# Patient Record
Sex: FEMALE | Race: WHITE | NOT HISPANIC OR LATINO | Employment: FULL TIME | URBAN - METROPOLITAN AREA
[De-identification: names, ages, dates, MRNs, and addresses within clinical notes are randomized per-mention and may not be internally consistent; named-entity substitution may affect disease eponyms.]

---

## 2017-04-06 ENCOUNTER — ALLSCRIPTS OFFICE VISIT (OUTPATIENT)
Dept: OTHER | Facility: OTHER | Age: 57
End: 2017-04-06

## 2017-04-06 DIAGNOSIS — Z12.31 ENCOUNTER FOR SCREENING MAMMOGRAM FOR MALIGNANT NEOPLASM OF BREAST: ICD-10-CM

## 2017-04-06 DIAGNOSIS — G89.4 CHRONIC PAIN SYNDROME: ICD-10-CM

## 2017-04-06 DIAGNOSIS — F11.90 UNCOMPLICATED OPIOID USE: ICD-10-CM

## 2017-04-24 ENCOUNTER — GENERIC CONVERSION - ENCOUNTER (OUTPATIENT)
Dept: OTHER | Facility: OTHER | Age: 57
End: 2017-04-24

## 2017-04-25 ENCOUNTER — ALLSCRIPTS OFFICE VISIT (OUTPATIENT)
Dept: OTHER | Facility: OTHER | Age: 57
End: 2017-04-25

## 2017-05-01 ENCOUNTER — ALLSCRIPTS OFFICE VISIT (OUTPATIENT)
Dept: OTHER | Facility: OTHER | Age: 57
End: 2017-05-01

## 2017-05-04 ENCOUNTER — TRANSCRIBE ORDERS (OUTPATIENT)
Dept: ADMINISTRATIVE | Facility: HOSPITAL | Age: 57
End: 2017-05-04

## 2017-05-04 DIAGNOSIS — E13.42 DIABETIC POLYNEUROPATHY ASSOCIATED WITH OTHER SPECIFIED DIABETES MELLITUS (HCC): ICD-10-CM

## 2017-05-04 DIAGNOSIS — F11.90 OPIATE USE: Primary | ICD-10-CM

## 2017-05-15 ENCOUNTER — GENERIC CONVERSION - ENCOUNTER (OUTPATIENT)
Dept: OTHER | Facility: OTHER | Age: 57
End: 2017-05-15

## 2017-06-07 ENCOUNTER — GENERIC CONVERSION - ENCOUNTER (OUTPATIENT)
Dept: OTHER | Facility: OTHER | Age: 57
End: 2017-06-07

## 2017-06-12 ENCOUNTER — HOSPITAL ENCOUNTER (OUTPATIENT)
Dept: NEUROLOGY | Facility: HOSPITAL | Age: 57
Discharge: HOME | End: 2017-06-12
Attending: ANESTHESIOLOGY
Payer: COMMERCIAL

## 2017-06-12 ENCOUNTER — HOSPITAL ENCOUNTER (OUTPATIENT)
Dept: RADIOLOGY | Facility: HOSPITAL | Age: 57
Discharge: HOME | End: 2017-06-12
Attending: ANESTHESIOLOGY
Payer: COMMERCIAL

## 2017-06-12 ENCOUNTER — TRANSCRIBE ORDERS (OUTPATIENT)
Dept: ADMINISTRATIVE | Facility: HOSPITAL | Age: 57
End: 2017-06-12

## 2017-06-12 DIAGNOSIS — F11.90 OPIATE USE: ICD-10-CM

## 2017-06-12 DIAGNOSIS — E13.42 DIABETIC POLYNEUROPATHY ASSOCIATED WITH OTHER SPECIFIED DIABETES MELLITUS (HCC): ICD-10-CM

## 2017-06-12 DIAGNOSIS — M79.604 PAIN OF RIGHT LOWER EXTREMITY: ICD-10-CM

## 2017-06-12 DIAGNOSIS — F11.90 UNCOMPLICATED OPIOID USE: ICD-10-CM

## 2017-06-12 PROCEDURE — 72110 X-RAY EXAM L-2 SPINE 4/>VWS: CPT

## 2017-06-12 PROCEDURE — 72072 X-RAY EXAM THORAC SPINE 3VWS: CPT

## 2017-06-12 PROCEDURE — 95911 NRV CNDJ TEST 9-10 STUDIES: CPT

## 2017-06-12 PROCEDURE — 95886 MUSC TEST DONE W/N TEST COMP: CPT

## 2017-06-12 PROCEDURE — 72050 X-RAY EXAM NECK SPINE 4/5VWS: CPT

## 2017-06-13 ENCOUNTER — GENERIC CONVERSION - ENCOUNTER (OUTPATIENT)
Dept: OTHER | Facility: OTHER | Age: 57
End: 2017-06-13

## 2017-06-19 ENCOUNTER — GENERIC CONVERSION - ENCOUNTER (OUTPATIENT)
Dept: OTHER | Facility: OTHER | Age: 57
End: 2017-06-19

## 2017-06-22 ENCOUNTER — ALLSCRIPTS OFFICE VISIT (OUTPATIENT)
Dept: OTHER | Facility: OTHER | Age: 57
End: 2017-06-22

## 2017-07-17 ENCOUNTER — GENERIC CONVERSION - ENCOUNTER (OUTPATIENT)
Dept: OTHER | Facility: OTHER | Age: 57
End: 2017-07-17

## 2017-07-19 ENCOUNTER — HOSPITAL ENCOUNTER (OUTPATIENT)
Dept: BONE DENSITY | Facility: IMAGING CENTER | Age: 57
Discharge: HOME | End: 2017-07-19
Payer: COMMERCIAL

## 2017-07-19 DIAGNOSIS — N63.0 BREAST LUMP: ICD-10-CM

## 2017-07-19 DIAGNOSIS — Z12.31 ENCOUNTER FOR SCREENING MAMMOGRAM FOR MALIGNANT NEOPLASM OF BREAST: ICD-10-CM

## 2017-07-20 ENCOUNTER — HOSPITAL ENCOUNTER (OUTPATIENT)
Dept: ULTRASOUND IMAGING | Facility: CLINIC | Age: 57
Discharge: HOME | End: 2017-07-20
Payer: COMMERCIAL

## 2017-07-20 ENCOUNTER — HOSPITAL ENCOUNTER (OUTPATIENT)
Dept: MAMMOGRAPHY | Facility: CLINIC | Age: 57
Discharge: HOME | End: 2017-07-20
Payer: COMMERCIAL

## 2017-07-20 DIAGNOSIS — N63.0 BREAST LUMP: ICD-10-CM

## 2017-07-20 PROCEDURE — G0279 TOMOSYNTHESIS, MAMMO: HCPCS

## 2017-07-20 PROCEDURE — 76642 ULTRASOUND BREAST LIMITED: CPT

## 2017-07-20 PROCEDURE — G0204 DX MAMMO INCL CAD BI: HCPCS

## 2017-07-24 ENCOUNTER — LAB CONVERSION - ENCOUNTER (OUTPATIENT)
Dept: OTHER | Facility: OTHER | Age: 57
End: 2017-07-24

## 2017-07-24 ENCOUNTER — GENERIC CONVERSION - ENCOUNTER (OUTPATIENT)
Dept: OTHER | Facility: OTHER | Age: 57
End: 2017-07-24

## 2017-07-24 LAB
A/G RATIO (HISTORICAL): 1.5 (CALC) (ref 1–2.5)
ALBUMIN SERPL BCP-MCNC: 4 G/DL (ref 3.6–5.1)
ALP SERPL-CCNC: 87 U/L (ref 33–130)
ALT SERPL W P-5'-P-CCNC: 25 U/L (ref 6–29)
AST SERPL W P-5'-P-CCNC: 20 U/L (ref 10–35)
BILIRUB SERPL-MCNC: 0.5 MG/DL (ref 0.2–1.2)
BUN SERPL-MCNC: 18 MG/DL (ref 7–25)
BUN/CREA RATIO (HISTORICAL): ABNORMAL (CALC) (ref 6–22)
CALCIUM SERPL-MCNC: 9.7 MG/DL (ref 8.6–10.4)
CHLORIDE SERPL-SCNC: 104 MMOL/L (ref 98–110)
CHOLEST SERPL-MCNC: 156 MG/DL (ref 125–200)
CHOLEST/HDLC SERPL: 3.4 (CALC)
CO2 SERPL-SCNC: 27 MMOL/L (ref 20–31)
CREAT SERPL-MCNC: 0.69 MG/DL (ref 0.5–1.05)
EGFR AFRICAN AMERICAN (HISTORICAL): 112 ML/MIN/1.73M2
EGFR-AMERICAN CALC (HISTORICAL): 97 ML/MIN/1.73M2
GAMMA GLOBULIN (HISTORICAL): 2.6 G/DL (CALC) (ref 1.9–3.7)
GLUCOSE (HISTORICAL): 114 MG/DL (ref 65–99)
HBA1C MFR BLD HPLC: 7.4 % OF TOTAL HGB
HDLC SERPL-MCNC: 46 MG/DL
LDL CHOLESTEROL (HISTORICAL): 86 MG/DL (CALC)
NON-HDL-CHOL (CHOL-HDL) (HISTORICAL): 110 MG/DL (CALC)
POTASSIUM SERPL-SCNC: 4.3 MMOL/L (ref 3.5–5.3)
SODIUM SERPL-SCNC: 142 MMOL/L (ref 135–146)
TOTAL PROTEIN (HISTORICAL): 6.6 G/DL (ref 6.1–8.1)
TRIGL SERPL-MCNC: 121 MG/DL

## 2017-08-01 ENCOUNTER — ALLSCRIPTS OFFICE VISIT (OUTPATIENT)
Dept: OTHER | Facility: OTHER | Age: 57
End: 2017-08-01

## 2017-08-17 ENCOUNTER — ALLSCRIPTS OFFICE VISIT (OUTPATIENT)
Dept: OTHER | Facility: OTHER | Age: 57
End: 2017-08-17

## 2017-08-24 ENCOUNTER — GENERIC CONVERSION - ENCOUNTER (OUTPATIENT)
Dept: OTHER | Facility: OTHER | Age: 57
End: 2017-08-24

## 2017-08-25 ENCOUNTER — GENERIC CONVERSION - ENCOUNTER (OUTPATIENT)
Dept: OTHER | Facility: OTHER | Age: 57
End: 2017-08-25

## 2017-09-23 ENCOUNTER — HOSPITAL ENCOUNTER (EMERGENCY)
Facility: HOSPITAL | Age: 57
Discharge: HOME | End: 2017-09-23
Admitting: EMERGENCY MEDICINE
Payer: COMMERCIAL

## 2017-09-23 ENCOUNTER — APPOINTMENT (EMERGENCY)
Dept: CT IMAGING | Facility: HOSPITAL | Age: 57
End: 2017-09-23
Payer: COMMERCIAL

## 2017-09-23 ENCOUNTER — OFFICE VISIT (OUTPATIENT)
Dept: URGENT CARE | Facility: CLINIC | Age: 57
End: 2017-09-23
Payer: COMMERCIAL

## 2017-09-23 VITALS
SYSTOLIC BLOOD PRESSURE: 147 MMHG | RESPIRATION RATE: 20 BRPM | BODY MASS INDEX: 31.58 KG/M2 | DIASTOLIC BLOOD PRESSURE: 84 MMHG | HEART RATE: 102 BPM | HEIGHT: 64 IN | TEMPERATURE: 98.1 F | OXYGEN SATURATION: 96 % | WEIGHT: 185 LBS

## 2017-09-23 DIAGNOSIS — N10 ACUTE PYELITIS: Primary | ICD-10-CM

## 2017-09-23 LAB
ACETONE SERPL-MCNC: NEGATIVE MG/DL
ALBUMIN SERPL BCP-MCNC: 3.5 G/DL (ref 3.5–5)
ALP SERPL-CCNC: 95 U/L (ref 46–116)
ALT SERPL W P-5'-P-CCNC: 30 U/L (ref 12–78)
ANION GAP SERPL CALCULATED.3IONS-SCNC: 9 MMOL/L (ref 4–13)
AST SERPL W P-5'-P-CCNC: 17 U/L (ref 5–45)
BACTERIA UR QL AUTO: ABNORMAL /HPF
BASOPHILS # BLD AUTO: 0.02 THOUSANDS/ΜL (ref 0–0.1)
BASOPHILS NFR BLD AUTO: 0 % (ref 0–1)
BILIRUB SERPL-MCNC: 0.5 MG/DL (ref 0.2–1)
BILIRUB UR QL STRIP: NEGATIVE
BUN SERPL-MCNC: 16 MG/DL (ref 5–25)
CALCIUM SERPL-MCNC: 9.6 MG/DL (ref 8.3–10.1)
CHLORIDE SERPL-SCNC: 103 MMOL/L (ref 100–108)
CLARITY UR: ABNORMAL
CO2 SERPL-SCNC: 30 MMOL/L (ref 21–32)
COLOR UR: YELLOW
CREAT SERPL-MCNC: 0.76 MG/DL (ref 0.6–1.3)
EOSINOPHIL # BLD AUTO: 0.14 THOUSAND/ΜL (ref 0–0.61)
EOSINOPHIL NFR BLD AUTO: 1 % (ref 0–6)
ERYTHROCYTE [DISTWIDTH] IN BLOOD BY AUTOMATED COUNT: 14.5 % (ref 11.6–15.1)
GFR SERPL CREATININE-BSD FRML MDRD: 87 ML/MIN/1.73SQ M
GLUCOSE SERPL-MCNC: 155 MG/DL (ref 65–140)
GLUCOSE UR STRIP-MCNC: ABNORMAL MG/DL
HCT VFR BLD AUTO: 37 % (ref 34.8–46.1)
HGB BLD-MCNC: 11.8 G/DL (ref 11.5–15.4)
HGB UR QL STRIP.AUTO: ABNORMAL
KETONES UR STRIP-MCNC: NEGATIVE MG/DL
LEUKOCYTE ESTERASE UR QL STRIP: ABNORMAL
LIPASE SERPL-CCNC: 113 U/L (ref 73–393)
LYMPHOCYTES # BLD AUTO: 1.85 THOUSANDS/ΜL (ref 0.6–4.47)
LYMPHOCYTES NFR BLD AUTO: 17 % (ref 14–44)
MCH RBC QN AUTO: 26.3 PG (ref 26.8–34.3)
MCHC RBC AUTO-ENTMCNC: 31.9 G/DL (ref 31.4–37.4)
MCV RBC AUTO: 83 FL (ref 82–98)
MONOCYTES # BLD AUTO: 0.74 THOUSAND/ΜL (ref 0.17–1.22)
MONOCYTES NFR BLD AUTO: 7 % (ref 4–12)
NEUTROPHILS # BLD AUTO: 7.99 THOUSANDS/ΜL (ref 1.85–7.62)
NEUTS SEG NFR BLD AUTO: 75 % (ref 43–75)
NITRITE UR QL STRIP: POSITIVE
NON-SQ EPI CELLS URNS QL MICRO: ABNORMAL /HPF
PH UR STRIP.AUTO: 6 [PH] (ref 4.5–8)
PLATELET # BLD AUTO: 267 THOUSANDS/UL (ref 149–390)
PMV BLD AUTO: 11.3 FL (ref 8.9–12.7)
POTASSIUM SERPL-SCNC: 4.3 MMOL/L (ref 3.5–5.3)
PROT SERPL-MCNC: 7.7 G/DL (ref 6.4–8.2)
PROT UR STRIP-MCNC: ABNORMAL MG/DL
RBC # BLD AUTO: 4.48 MILLION/UL (ref 3.81–5.12)
RBC #/AREA URNS AUTO: ABNORMAL /HPF
SODIUM SERPL-SCNC: 142 MMOL/L (ref 136–145)
SP GR UR STRIP.AUTO: 1.01 (ref 1–1.03)
UROBILINOGEN UR QL STRIP.AUTO: 0.2 E.U./DL
WBC # BLD AUTO: 10.74 THOUSAND/UL (ref 4.31–10.16)
WBC #/AREA URNS AUTO: ABNORMAL /HPF

## 2017-09-23 PROCEDURE — G0382 LEV 3 HOSP TYPE B ED VISIT: HCPCS

## 2017-09-23 PROCEDURE — 74177 CT ABD & PELVIS W/CONTRAST: CPT

## 2017-09-23 PROCEDURE — 83690 ASSAY OF LIPASE: CPT | Performed by: PHYSICIAN ASSISTANT

## 2017-09-23 PROCEDURE — 82009 KETONE BODYS QUAL: CPT | Performed by: PHYSICIAN ASSISTANT

## 2017-09-23 PROCEDURE — 87077 CULTURE AEROBIC IDENTIFY: CPT | Performed by: PHYSICIAN ASSISTANT

## 2017-09-23 PROCEDURE — 81001 URINALYSIS AUTO W/SCOPE: CPT | Performed by: PHYSICIAN ASSISTANT

## 2017-09-23 PROCEDURE — 87086 URINE CULTURE/COLONY COUNT: CPT | Performed by: PHYSICIAN ASSISTANT

## 2017-09-23 PROCEDURE — 36415 COLL VENOUS BLD VENIPUNCTURE: CPT | Performed by: PHYSICIAN ASSISTANT

## 2017-09-23 PROCEDURE — 99284 EMERGENCY DEPT VISIT MOD MDM: CPT

## 2017-09-23 PROCEDURE — 85025 COMPLETE CBC W/AUTO DIFF WBC: CPT | Performed by: PHYSICIAN ASSISTANT

## 2017-09-23 PROCEDURE — 80053 COMPREHEN METABOLIC PANEL: CPT | Performed by: PHYSICIAN ASSISTANT

## 2017-09-23 PROCEDURE — 96374 THER/PROPH/DIAG INJ IV PUSH: CPT

## 2017-09-23 PROCEDURE — 87186 SC STD MICRODIL/AGAR DIL: CPT | Performed by: PHYSICIAN ASSISTANT

## 2017-09-23 PROCEDURE — 96361 HYDRATE IV INFUSION ADD-ON: CPT

## 2017-09-23 PROCEDURE — 81002 URINALYSIS NONAUTO W/O SCOPE: CPT

## 2017-09-23 RX ORDER — METFORMIN HYDROCHLORIDE 1000 MG/1
1000 TABLET, FILM COATED, EXTENDED RELEASE ORAL
COMMUNITY
End: 2017-09-23 | Stop reason: CLARIF

## 2017-09-23 RX ORDER — PREGABALIN 100 MG/1
100 CAPSULE ORAL 2 TIMES DAILY
COMMUNITY
End: 2019-04-01 | Stop reason: SDUPTHER

## 2017-09-23 RX ORDER — ONDANSETRON 2 MG/ML
4 INJECTION INTRAMUSCULAR; INTRAVENOUS ONCE
Status: COMPLETED | OUTPATIENT
Start: 2017-09-23 | End: 2017-09-23

## 2017-09-23 RX ORDER — CIPROFLOXACIN 500 MG/1
500 TABLET, FILM COATED ORAL ONCE
Status: DISCONTINUED | OUTPATIENT
Start: 2017-09-23 | End: 2017-09-23

## 2017-09-23 RX ORDER — ALBUTEROL SULFATE 90 UG/1
2 AEROSOL, METERED RESPIRATORY (INHALATION) EVERY 6 HOURS PRN
COMMUNITY
End: 2018-06-18 | Stop reason: SDUPTHER

## 2017-09-23 RX ORDER — CYCLOBENZAPRINE HCL 5 MG
10 TABLET ORAL DAILY
COMMUNITY
End: 2017-12-20 | Stop reason: ALTCHOICE

## 2017-09-23 RX ORDER — ATORVASTATIN CALCIUM 40 MG/1
40 TABLET, FILM COATED ORAL DAILY
COMMUNITY
End: 2018-03-19 | Stop reason: SDUPTHER

## 2017-09-23 RX ORDER — AMLODIPINE BESYLATE AND BENAZEPRIL HYDROCHLORIDE 5; 20 MG/1; MG/1
1 CAPSULE ORAL DAILY
COMMUNITY
End: 2018-03-14 | Stop reason: SDUPTHER

## 2017-09-23 RX ORDER — OMEPRAZOLE 40 MG/1
40 CAPSULE, DELAYED RELEASE ORAL DAILY
COMMUNITY
End: 2017-10-20 | Stop reason: ALTCHOICE

## 2017-09-23 RX ORDER — BUDESONIDE AND FORMOTEROL FUMARATE DIHYDRATE 80; 4.5 UG/1; UG/1
2 AEROSOL RESPIRATORY (INHALATION) 2 TIMES DAILY
COMMUNITY
End: 2018-07-23 | Stop reason: ALTCHOICE

## 2017-09-23 RX ORDER — ONDANSETRON 4 MG/1
4 TABLET, FILM COATED ORAL EVERY 8 HOURS PRN
Qty: 9 TABLET | Refills: 0 | Status: SHIPPED | OUTPATIENT
Start: 2017-09-23 | End: 2017-10-20 | Stop reason: ALTCHOICE

## 2017-09-23 RX ORDER — SULFAMETHOXAZOLE AND TRIMETHOPRIM 800; 160 MG/1; MG/1
1 TABLET ORAL ONCE
Status: DISCONTINUED | OUTPATIENT
Start: 2017-09-23 | End: 2017-09-23

## 2017-09-23 RX ORDER — CEPHALEXIN 500 MG/1
500 CAPSULE ORAL 4 TIMES DAILY
Qty: 40 CAPSULE | Refills: 0 | Status: SHIPPED | OUTPATIENT
Start: 2017-09-23 | End: 2017-10-03

## 2017-09-23 RX ORDER — CEPHALEXIN 250 MG/1
500 CAPSULE ORAL ONCE
Status: COMPLETED | OUTPATIENT
Start: 2017-09-23 | End: 2017-09-23

## 2017-09-23 RX ORDER — TRAMADOL HYDROCHLORIDE 50 MG/1
50 TABLET ORAL 2 TIMES DAILY
COMMUNITY
End: 2019-07-16 | Stop reason: CLARIF

## 2017-09-23 RX ORDER — ZOLPIDEM TARTRATE 5 MG/1
5 TABLET ORAL
COMMUNITY
End: 2018-03-20 | Stop reason: SDUPTHER

## 2017-09-23 RX ADMIN — IOHEXOL 100 ML: 350 INJECTION, SOLUTION INTRAVENOUS at 14:13

## 2017-09-23 RX ADMIN — CEPHALEXIN 500 MG: 250 CAPSULE ORAL at 15:19

## 2017-09-23 RX ADMIN — SODIUM CHLORIDE 500 ML: 0.9 INJECTION, SOLUTION INTRAVENOUS at 14:36

## 2017-09-23 RX ADMIN — ONDANSETRON 4 MG: 2 INJECTION INTRAMUSCULAR; INTRAVENOUS at 12:53

## 2017-09-23 RX ADMIN — SODIUM CHLORIDE 1000 ML: 0.9 INJECTION, SOLUTION INTRAVENOUS at 12:49

## 2017-09-25 LAB — BACTERIA UR CULT: ABNORMAL

## 2017-10-20 ENCOUNTER — HOSPITAL ENCOUNTER (EMERGENCY)
Facility: HOSPITAL | Age: 57
Discharge: HOME | End: 2017-10-20
Attending: EMERGENCY MEDICINE
Payer: COMMERCIAL

## 2017-10-20 VITALS
TEMPERATURE: 98.1 F | SYSTOLIC BLOOD PRESSURE: 184 MMHG | HEIGHT: 64 IN | RESPIRATION RATE: 18 BRPM | BODY MASS INDEX: 32.27 KG/M2 | OXYGEN SATURATION: 96 % | DIASTOLIC BLOOD PRESSURE: 82 MMHG | HEART RATE: 79 BPM | WEIGHT: 189 LBS

## 2017-10-20 DIAGNOSIS — S39.012A STRAIN OF LUMBAR REGION, INITIAL ENCOUNTER: ICD-10-CM
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
## 2017-10-20 PROCEDURE — 99284 EMERGENCY DEPT VISIT MOD MDM: CPT
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
## 2017-10-20 NOTE — ED NOTES
Pt ambulated herself to and from the bathroom without this nurse's knowledge despite this nurse providing pt with call bell and requesting for pt to use call bell and be placed on bedpan in she needed to use the bathroom until evaluated by MD  Pt states "I'm not using a bed pan, I am a grown women", Dr Suarez Monday made aware        Idris Capps, ROXANNE  10/20/17 9362
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
## 2017-10-23 ENCOUNTER — GENERIC CONVERSION - ENCOUNTER (OUTPATIENT)
Dept: OTHER | Facility: OTHER | Age: 57
End: 2017-10-23

## 2017-11-07 ENCOUNTER — GENERIC CONVERSION - ENCOUNTER (OUTPATIENT)
Dept: OTHER | Facility: OTHER | Age: 57
End: 2017-11-07

## 2017-11-07 ENCOUNTER — LAB CONVERSION - ENCOUNTER (OUTPATIENT)
Dept: OTHER | Facility: OTHER | Age: 57
End: 2017-11-07

## 2017-11-07 LAB
BUN SERPL-MCNC: 13 MG/DL (ref 7–25)
BUN/CREA RATIO (HISTORICAL): ABNORMAL (CALC) (ref 6–22)
CALCIUM SERPL-MCNC: 9.6 MG/DL (ref 8.6–10.4)
CHLORIDE SERPL-SCNC: 105 MMOL/L (ref 98–110)
CO2 SERPL-SCNC: 30 MMOL/L (ref 20–31)
CREAT SERPL-MCNC: 0.63 MG/DL (ref 0.5–1.05)
CREATININE, RANDOM URINE (HISTORICAL): 107 MG/DL (ref 20–320)
EGFR AFRICAN AMERICAN (HISTORICAL): 115 ML/MIN/1.73M2
EGFR-AMERICAN CALC (HISTORICAL): 100 ML/MIN/1.73M2
GLUCOSE (HISTORICAL): 141 MG/DL (ref 65–99)
HBA1C MFR BLD HPLC: 7.6 % OF TOTAL HGB
MAGNESIUM, UR (HISTORICAL): 0.7 MG/DL
MICROALBUMIN/CREATININE RATIO (HISTORICAL): 7 MCG/MG CREAT
POTASSIUM SERPL-SCNC: 4.4 MMOL/L (ref 3.5–5.3)
SODIUM SERPL-SCNC: 143 MMOL/L (ref 135–146)

## 2017-11-10 ENCOUNTER — GENERIC CONVERSION - ENCOUNTER (OUTPATIENT)
Dept: FAMILY MEDICINE CLINIC | Facility: CLINIC | Age: 57
End: 2017-11-10

## 2017-11-10 ENCOUNTER — GENERIC CONVERSION - ENCOUNTER (OUTPATIENT)
Dept: OTHER | Facility: OTHER | Age: 57
End: 2017-11-10

## 2017-11-13 ENCOUNTER — GENERIC CONVERSION - ENCOUNTER (OUTPATIENT)
Dept: OTHER | Facility: OTHER | Age: 57
End: 2017-11-13

## 2017-11-17 ENCOUNTER — GENERIC CONVERSION - ENCOUNTER (OUTPATIENT)
Dept: OTHER | Facility: OTHER | Age: 57
End: 2017-11-17

## 2017-11-17 LAB — SPECIMEN STATUS REPORT (HISTORICAL): NORMAL

## 2017-11-22 ENCOUNTER — HOSPITAL ENCOUNTER (OUTPATIENT)
Dept: RADIOLOGY | Facility: HOSPITAL | Age: 57
Discharge: HOME | End: 2017-11-22
Payer: COMMERCIAL

## 2017-11-22 ENCOUNTER — TRANSCRIBE ORDERS (OUTPATIENT)
Dept: ADMINISTRATIVE | Facility: HOSPITAL | Age: 57
End: 2017-11-22

## 2017-11-22 DIAGNOSIS — M54.10 RADICULOPATHY, UNSPECIFIED SPINAL REGION: Primary | ICD-10-CM

## 2017-11-22 DIAGNOSIS — M54.10 RADICULOPATHY, UNSPECIFIED SPINAL REGION: ICD-10-CM

## 2017-11-22 PROCEDURE — 72114 X-RAY EXAM L-S SPINE BENDING: CPT

## 2017-11-22 PROCEDURE — 72050 X-RAY EXAM NECK SPINE 4/5VWS: CPT

## 2017-12-04 ENCOUNTER — GENERIC CONVERSION - ENCOUNTER (OUTPATIENT)
Dept: FAMILY MEDICINE CLINIC | Facility: CLINIC | Age: 57
End: 2017-12-04

## 2017-12-20 ENCOUNTER — HOSPITAL ENCOUNTER (EMERGENCY)
Facility: HOSPITAL | Age: 57
Discharge: HOME | End: 2017-12-20
Attending: EMERGENCY MEDICINE | Admitting: EMERGENCY MEDICINE
Payer: COMMERCIAL

## 2017-12-20 ENCOUNTER — APPOINTMENT (EMERGENCY)
Dept: RADIOLOGY | Facility: HOSPITAL | Age: 57
End: 2017-12-20
Payer: COMMERCIAL

## 2017-12-20 VITALS
HEART RATE: 87 BPM | OXYGEN SATURATION: 96 % | TEMPERATURE: 97.1 F | BODY MASS INDEX: 33.47 KG/M2 | SYSTOLIC BLOOD PRESSURE: 161 MMHG | DIASTOLIC BLOOD PRESSURE: 81 MMHG | WEIGHT: 195 LBS | RESPIRATION RATE: 15 BRPM

## 2017-12-20 DIAGNOSIS — S90.122A CONTUSION OF THIRD TOE OF LEFT FOOT, INITIAL ENCOUNTER: Primary | ICD-10-CM

## 2017-12-20 PROCEDURE — 73630 X-RAY EXAM OF FOOT: CPT

## 2017-12-20 PROCEDURE — 99283 EMERGENCY DEPT VISIT LOW MDM: CPT

## 2017-12-20 RX ORDER — PIOGLITAZONEHYDROCHLORIDE 15 MG/1
15 TABLET ORAL DAILY
COMMUNITY
End: 2018-04-27 | Stop reason: ALTCHOICE

## 2017-12-20 RX ORDER — HYDROCODONE BITARTRATE AND ACETAMINOPHEN 5; 325 MG/1; MG/1
1 TABLET ORAL EVERY 4 HOURS PRN
COMMUNITY
End: 2018-07-23

## 2017-12-21 NOTE — DISCHARGE INSTRUCTIONS
Contusion in Adults   WHAT YOU NEED TO KNOW:   A contusion is a bruise that appears on your skin after an injury  A bruise happens when small blood vessels tear but skin does not  When blood vessels tear, blood leaks into nearby tissue, such as soft tissue or muscle  DISCHARGE INSTRUCTIONS:   Return to the emergency department if:   · You have new trouble moving the injured area  · You have tingling or numbness in or near the injured area  · Your hand or foot below the bruise gets cold or turns pale  Contact your healthcare provider if:   · You find a new lump in the injured area  · Your symptoms do not improve with treatment after 4 to 5 days  · You have questions or concerns about your condition or care  Medicines: You may need any of the following:  · NSAIDs  help decrease swelling and pain or fever  This medicine is available with or without a doctor's order  NSAIDs can cause stomach bleeding or kidney problems in certain people  If you take blood thinner medicine, always ask your healthcare provider if NSAIDs are safe for you  Always read the medicine label and follow directions  · Prescription pain medicine  may be given  Do not wait until the pain is severe before you take your medicine  · Take your medicine as directed  Contact your healthcare provider if you think your medicine is not helping or if you have side effects  Tell him of her if you are allergic to any medicine  Keep a list of the medicines, vitamins, and herbs you take  Include the amounts, and when and why you take them  Bring the list or the pill bottles to follow-up visits  Carry your medicine list with you in case of an emergency  Follow up with your healthcare provider as directed: You may need to return within a week to check your injury again  Write down your questions so you remember to ask them during your visits    Help a contusion heal:   · Rest the injured area  or use it less than usual  If you bruised advice for individual conditions or treatments  Talk to your doctor, nurse or pharmacist before following any medical regimen to see if it is safe and effective for you

## 2017-12-22 ENCOUNTER — TRANSCRIBE ORDERS (OUTPATIENT)
Dept: ADMINISTRATIVE | Facility: HOSPITAL | Age: 57
End: 2017-12-22

## 2017-12-22 ENCOUNTER — HOSPITAL ENCOUNTER (OUTPATIENT)
Dept: RADIOLOGY | Facility: HOSPITAL | Age: 57
Discharge: HOME | End: 2017-12-22
Payer: COMMERCIAL

## 2017-12-22 ENCOUNTER — ALLSCRIPTS OFFICE VISIT (OUTPATIENT)
Dept: OTHER | Facility: OTHER | Age: 57
End: 2017-12-22

## 2017-12-22 ENCOUNTER — GENERIC CONVERSION - ENCOUNTER (OUTPATIENT)
Dept: OTHER | Facility: OTHER | Age: 57
End: 2017-12-22

## 2017-12-22 DIAGNOSIS — M25.512 LEFT SHOULDER PAIN, UNSPECIFIED CHRONICITY: ICD-10-CM

## 2017-12-22 DIAGNOSIS — M25.512 LEFT SHOULDER PAIN, UNSPECIFIED CHRONICITY: Primary | ICD-10-CM

## 2017-12-22 PROCEDURE — 73030 X-RAY EXAM OF SHOULDER: CPT

## 2017-12-23 NOTE — PROGRESS NOTES
Assessment   1  Superficial bruising of toe (924 3) (T72 463J)    Discussion/Summary      Discussed signs/symptoms of worsening symptoms- go to ER if worse any problems or concerns, call office also approved by Dr Carole Ch  Possible side effects of new medications were reviewed with the patient/guardian today  The treatment plan was reviewed with the patient/guardian  The patient/guardian understands and agrees with the treatment plan      Chief Complaint   LEFT MIDDLE TOE IS PURPLE FOR 3 DAYS TO ER AND XRAY WAS NEGATIVE          History of Present Illness   HPI: LEFT MIDDLE TOE IS PURPLE FOR 3 DAYS TO ER AND XRAY WAS NEGATIVE painful  not on blood thinners, does not know of a traumatic injury      Review of Systems        Constitutional: No fever, no chills, feels well, no tiredness, no recent weight gain or loss  ENT: no ear ache, no loss of hearing, no nosebleeds or nasal discharge, no sore throat or hoarseness  Cardiovascular: no complaints of slow or fast heart rate, no chest pain, no palpitations, no leg claudication or lower extremity edema  Respiratory: no complaints of shortness of breath, no wheezing, no dyspnea on exertion, no orthopnea or PND  Breasts: no complaints of breast pain, breast lump or nipple discharge  Gastrointestinal: no complaints of abdominal pain, no constipation, no nausea or diarrhea, no vomiting, no bloody stools  Genitourinary: no complaints of dysuria, no incontinence, no pelvic pain, no dysmenorrhea, no vaginal discharge or abnormal vaginal bleeding  Musculoskeletal: as noted in HPI  Integumentary: as noted in HPI  Neurological: no complaints of headache, no confusion, no numbness or tingling, no dizziness or fainting  Active Problems   1  Benign essential hypertension (401 1) (I10)   2  Chronic bilateral back pain (724 5,338 29) (M54 9,G89 29)   3  Chronic cervical pain (723 1,338 29) (M54 2,G89 29)   4   Chronic lumbar radiculopathy (724 4) (M54 16)   5  Chronic pain syndrome (338 4) (G89 4)   6  Chronic thoracic spine pain (724 1,338 29) (M54 6,G89 29)   7  Chronic, continuous use of opioids (305 51) (F11 90)   8  Controlled type 2 diabetes mellitus without complication, without long-term current use of     insulin (250 00) (E11 9)   9  Diabetic peripheral neuropathy (250 60,357 2) (E11 42)   10  Diffuse pain (780 96) (R52)   11  Encounter for mammogram to establish baseline mammogram (V76 12) (Z12 31)   12  GERD without esophagitis (530 81) (K21 9)   13  Hyperlipidemia associated with type 2 diabetes mellitus (113 29,123  4) (E11 69,E78 5)   14  Insomnia (780 52) (G47 00)   15  Mild persistent asthma without complication (574 91) (M23 94)   16  Needs flu shot (V04 81) (Z23)   17  Peripheral polyneuropathy (356 9) (G62 9)   18  Screening for cervical cancer (V76 2) (Z12 4)   19  Screening for colon cancer (V76 51) (Z12 11)    Past Medical History   1  History of Anxiety (300 00) (F41 9)   2  History of asthma (V12 69) (Z87 09)   3  History of headache (V13 89) (Z87 898)   4  History of lump of left breast (V13 89) (R02 465)  Active Problems And Past Medical History Reviewed: The active problems and past medical history were reviewed and updated today  Family History   Mother    1  Family history of diabetes mellitus (V18 0) (Z83 3)   2  Family history of hyperlipidemia (V18 19) (Z83 49)   3  Family history of hypertension (V17 49) (Z82 49)  Father    4  Family history of Bladder carcinoma   5  Family history of lung cancer (V16 1) (Z80 1)   6  Family history of malignant neoplasm of prostate (V16 42) (Z80 42)  Brother    7  Family history of diabetes mellitus (V18 0) (Z83 3)   8  Family history of hyperlipidemia (V18 19) (Z83 49)   9  Family history of hypertension (V17 49) (Z82 49)  Family History Reviewed: The family history was reviewed and updated today         Social History    · Former smoker (E70 30) (L49 222)   · Former smoker (F96 95) (L39 206)  The social history was reviewed and updated today  Surgical History   1  History of Appendectomy   2  History of Back Surgery   3  History of  Section   4  History of Cholecystectomy   5  History of Gastric Surgery For Morbid Obesity Gastric Bypass   6  History of Shoulder Surgery  Surgical History Reviewed: The surgical history was reviewed and updated today  Current Meds    1  Acetaminophen-Codeine #3 300-30 MG Oral Tablet; Therapy: 93Mvp6373 to Recorded   2  Albuterol Sulfate (2 5 MG/3ML) 0 083% Inhalation Nebulization Solution; USE 1 UNIT     DOSE EVERY 4-6 HOURS AS NEEDED FOR WHEEZING ; Therapy: 50XSV3340 to Recorded   3  Amlodipine Besy-Benazepril HCl - 5-20 MG Oral Capsule; take 1 capsule daily; Therapy: 85PFJ2374 to (Evaluate:2018)  Requested for: 70Yps9488; Last     Rx:31Dir0933 Ordered   4  Atorvastatin Calcium 40 MG Oral Tablet; TAKE 1 TABLET DAILY; Therapy: 35OIW5214 to (Linda Salas) Recorded   5  Cyclobenzaprine HCl - 5 MG Oral Tablet; Take 1 tablet daily; Therapy: 25WUP2552 to Recorded   6  Iron 28 MG Oral Tablet; TAKE AS DIRECTED; Therapy: 89LMR8266 to Recorded   7  Lidocaine 5 % External Ointment; Therapy: 14LMA5367 to Recorded   8  Lyrica 100 MG Oral Capsule; Take 1 in AM and afternoon and 2 PO HS; Therapy: 63YQN4302 to (Evaluate:94Dab7192)  Requested for: 85OPW5473; Last     Rx:09Xhd1201; Status: ACTIVE - Renewal Denied Ordered   9  MetFORMIN HCl - 1000 MG Oral Tablet; take 1 tablet twice a day; Therapy: 62WBY7382 to (Evaluate:2018)  Requested for: 22Ada0768; Last     Rx:32Ely2421 Ordered   10  Omeprazole 40 MG Oral Capsule Delayed Release; TAKE 1 CAPSULE EVERY            MORNING; Therapy: 36TKL0224 to (Linda Manges)  Requested for: 71IJX9735; Last      Rx:2017 Ordered   11  OneTouch Ultra Blue In Vitro Strip; TEST ONCE DAILY; Therapy: 88JHN1738 to Recorded   12   ProAir  (90 Base) MCG/ACT Inhalation Aerosol Solution; INHALE 1 TO 2 PUFFS      EVERY 4 TO 6 HOURS AS NEEDED; Therapy: 93CCL4596 to (Evaluate:16Jun2018)  Requested for: 98AYY4311; Last      Rx:37Hpx3302 Ordered   13  Symbicort 160-4 5 MCG/ACT Inhalation Aerosol; INHALE 2 PUFFS TWICE DAILY  RINSE MOUTH AFTER USE; Therapy: 02GMT8422 to (Last Rx:06Apr2017) Ordered   14  TiZANidine HCl - 4 MG Oral Tablet; TAKE 1 TABLET 3 TIMES DAILY AS NEEDED; Therapy: 45UED1743 to Recorded   15  Tradjenta 5 MG Oral Tablet; TAKE 1 TABLET DAILY; Therapy: 64EZT4700 to ()  Requested for: 26Tjh8095; Last      Rx:91Ers4340 Ordered   16  TraMADol HCl - 50 MG Oral Tablet; Take 1-2 pills TID PRN for pain, max of 5 per day; Therapy: 73YSK7614 to (Evaluate:15Nov2017); Last Rx:17Aug2017 Ordered   17  Tylenol with Codeine #3 300-30 MG Oral Tablet; take 1 tablet by mouth every 4 to 6      hours; Therapy: 56HDN4689 to Recorded   18  Vicodin 5-300 MG Oral Tablet; Therapy: 07Dsu5802 to Recorded   19  Zolpidem Tartrate 10 MG Oral Tablet; take 1 tablet by mouth at bedtime if needed; Therapy: 26JGT5596 to (Evaluate:19Jan2018)  Requested for: 90OFC4226; Last      Rx:20Nov2017 Ordered     The medication list was reviewed and updated today  Allergies   1  NSAIDs   2  Percocet TABS    Vitals    Recorded: 40DLJ6818 89:80AA   Systolic 973   Diastolic 72   Height 5 ft 4 in   Weight 196 lb    BMI Calculated 33 64   BSA Calculated 1 94     Physical Exam        Constitutional      General appearance: No acute distress, well appearing and well nourished  Pulmonary      Respiratory effort: No increased work of breathing or signs of respiratory distress  Auscultation of lungs: Clear to auscultation  Cardiovascular      Auscultation of heart: Normal rate and rhythm, normal S1 and S2, without murmurs         Musculoskeletal      Gait and station: Normal        Digits and nails: Abnormal  -- 3rd and 4th left toes: + ecchymosis, mild tenderness with palp, no sores or open wounds  no swelling  pounding pedal pulses, cap  refill <2 secs  Also examined by Dr Rebecca Sanchez  Skin      Skin and subcutaneous tissue: Abnormal  -- ecchymosis as described above  Neurologic      Cranial nerves: Cranial nerves 2-12 intact         Psychiatric      Orientation to person, place, and time: Normal        Mood and affect: Normal           Signatures    Electronically signed by : Erika Alva; Dec 22 2017 11:30AM EST                       (Author)     Electronically signed by : Virgil Rogers MD; Dec 22 2017  1:57PM EST

## 2017-12-27 ENCOUNTER — GENERIC CONVERSION - ENCOUNTER (OUTPATIENT)
Dept: FAMILY MEDICINE CLINIC | Facility: CLINIC | Age: 57
End: 2017-12-27

## 2018-01-10 NOTE — MISCELLANEOUS
Message  Patient called requesting her records faxed over to Dr Tawana Snider, Fax # [phone]  I faxed over all notes & all imaging this morning Thursday 08/24/17  Active Problems    1  Benign essential hypertension (401 1) (I10)   2  Chronic bilateral back pain (724 5,338 29) (M54 9,G89 29)   3  Chronic cervical pain (723 1,338 29) (M54 2,G89 29)   4  Chronic lumbar radiculopathy (724 4) (M54 16)   5  Chronic pain syndrome (338 4) (G89 4)   6  Chronic thoracic spine pain (724 1,338 29) (M54 6,G89 29)   7  Chronic, continuous use of opioids (305 51) (F11 90)   8  Controlled type 2 diabetes mellitus without complication, without long-term current use of   insulin (250 00) (E11 9)   9  Diabetic peripheral neuropathy (250 60,357 2) (E11 42)   10  Diffuse pain (780 96) (R52)   11  Encounter for mammogram to establish baseline mammogram (V76 12) (Z12 31)   12  GERD without esophagitis (530 81) (K21 9)   13  Hyperlipidemia associated with type 2 diabetes mellitus (151 31,285  4) (E11 69,E78 5)   14  Insomnia (780 52) (G47 00)   15  Mild persistent asthma without complication (349 94) (B50 85)   16  Peripheral polyneuropathy (356 9) (G62 9)    Current Meds   1  Albuterol Sulfate (2 5 MG/3ML) 0 083% Inhalation Nebulization Solution; USE 1 UNIT   DOSE EVERY 4-6 HOURS AS NEEDED FOR WHEEZING ; Therapy: 45UQR0222 to Recorded   2  Amlodipine Besy-Benazepril HCl - 5-20 MG Oral Capsule; take 1 capsule daily; Therapy: 98DEG5679 to (Evaluate:19Nov2017)  Requested for: 56Wfg1169; Last   Rx:82Khe1050 Ordered   3  Atorvastatin Calcium 40 MG Oral Tablet; TAKE 1 TABLET DAILY; Therapy: 57EFN1142 to (Magalie Culp) Recorded   4  Cyclobenzaprine HCl - 5 MG Oral Tablet; Take 1 tablet daily; Therapy: 00WFY2576 to Recorded   5  Iron 28 MG Oral Tablet; TAKE AS DIRECTED; Therapy: 61RBO3196 to Recorded   6  Lyrica 100 MG Oral Capsule; Take 1 in AM and afternoon and 2 PO HS;    Therapy: 49YZM6812 to (Evaluate:20Nee4974) Requested for: 34Oiv2861; Last   Rx:17Aug2017; Status: ACTIVE - Renewal Denied Ordered   7  MetFORMIN HCl - 1000 MG Oral Tablet; take 1 tablet twice a day; Therapy: 18ALM2809 to (Evaluate:19Nov2017)  Requested for: 27Oyk7961; Last   Rx:21Aug2017 Ordered   8  Omeprazole 40 MG Oral Capsule Delayed Release; TAKE ONE CAPSULE BY MOUTH   EVERY MORNING; Therapy: 18IML8624 to (Evaluate:19Nov2017)  Requested for: 36XNA1557; Last   Rx:74Ail6136 Ordered   9  OneTouch Ultra Blue In Vitro Strip; TEST ONCE DAILY; Therapy: 95PJO2088 to Recorded   10  ProAir  (90 Base) MCG/ACT Inhalation Aerosol Solution; INHALE 1 TO 2 PUFFS    EVERY 4 TO 6 HOURS AS NEEDED; Therapy: 15LTB3758 to Recorded   11  Symbicort 160-4 5 MCG/ACT Inhalation Aerosol; INHALE 2 PUFFS TWICE DAILY  RINSE MOUTH AFTER USE; Therapy: 93EOM8984 to (Last Rx:06Apr2017) Ordered   12  Tradjenta 5 MG Oral Tablet; TAKE 1 TABLET DAILY; Therapy: 64WHK9029 to [phone])  Requested for: 01Aug2017; Last    Rx:01Aug2017 Ordered   13  TraMADol HCl - 50 MG Oral Tablet; Take 1-2 pills TID PRN for pain, max of 5 per day; Therapy: 95RKZ5850 to (Evaluate:25Xmg8924); Last Rx:17Aug2017 Ordered   14  Zolpidem Tartrate 10 MG Oral Tablet (Ambien); take 1 tablet by mouth at bedtime if    needed; Therapy: 64UXS5590 to (Evaluate:77Oia1028)  Requested for: 60Eeb3745; Last    Rx:18Hqb7190 Ordered    Allergies    1  NSAIDs   2   Percocet TABS    Signatures   Electronically signed by : Jeyson Salas, ; Aug 24 2017 11:54AM EST                       (Author)

## 2018-01-11 NOTE — MISCELLANEOUS
Message   Recorded as Task   Date: 06/05/2017 01:42 PM, Created By: Elia Dubin   Task Name: Miscellaneous   Assigned To: [facility] [place] [facility]   Regarding Patient: Gamaliel Kothari, Status: Active   CommentHilliard Perks - 05 Jun 2017 1:42 PM     TASK CREATED  med refill: tramadol hcl er 200mg tablets qty 30  1 x a day  pt has 3 left  pharmacy verified in allscripts  please call pt back at [phone]   Dayton Tucker - 05 Jun 2017 2:17 PM     TASK EDITED  LMOM to cb  Per 4/25 ov note  - emg b/l le  - x-rays cervical, thoracic, lumbar spine  - Lyrica  - PDMP - ok, drug screen collected  -ov in 2-3 weeks  5/15 - missed ov, lyrica refilled  6/22 ov w/ DG scheduled  Dayton Tucker - 07 Jun 2017 11:44 AM     TASK EDITED  s/w pt, reviewed 4/25 ov note  Per pt, EMG is scheduled for 6/15  X-rays to be done the same day  pt confirmed Lyrica w/ no issues, + relief  Confirmed Tramadol er 200 mg 1 tab qd w/ no issue + relief  States she has #2 tabs on hand  Per pt, missed 5/15 ov r/t both parents dying in early may  Advised pt, medication refills require an ov  Will d/w Dr Nadia Monique and cb to advise  Confirmed pharmacy per allscripts and ov w/ DG on 6/22  pt verbalized understanding and appreciation  Dayton Tucker - 07 Jun 2017 11:51 AM     TASK EDITED  Pdmp printed and on your desk for review   Joe Rudolph - 07 Jun 2017 12:29 PM     TASK REPLIED TO: Previously Assigned To Joe Rudolph  ok to call in   Dayton Tucker - 07 Jun 2017 1:25 PM     TASK EDITED  Called in Tramadol  mg 1 tab po qd #30 / 0 to [facility]  Cannot update chart r/t warning:  Increased risk of seizures is listed in the 's package labeling as a possibility when TraMADol HCl  MG TB24  and Cyclobenzaprine HCl - 5 MG Oral Tablet 5 MG TABS  are coadministered  Clinical significance is not known  OK to continue?    Joe Rudolph - 07 Jun 2017 1:35 PM     TASK REPLIED TO: Previously Assigned To Joe Rudolph  yes please   Dayton Tucker - 07 Jun 2017 1:51 PM     TASK EDITED  added to allscripts  Pt aware  Active Problems    1  Arthritis (716 90) (M19 90)   2  Benign essential hypertension (401 1) (I10)   3  Chronic back pain (724 5,338 29) (M54 9,G89 29)   4  Chronic pain syndrome (338 4) (G89 4)   5  Chronic, continuous use of opioids (305 51) (F11 90)   6  Controlled type 2 diabetes mellitus without complication, without long-term current use of   insulin (250 00) (E11 9)   7  Diabetes mellitus (250 00) (E11 9)   8  Diabetic peripheral neuropathy (250 60,357 2) (E11 42)   9  Encounter for mammogram to establish baseline mammogram (V76 12) (Z12 31)   10  GERD (gastroesophageal reflux disease) (530 81) (K21 9)   11  GERD without esophagitis (530 81) (K21 9)   12  Headache (784 0) (R51)   13  Hyperlipidemia (272 4) (E78 5)   14  Hyperlipidemia associated with type 2 diabetes mellitus (603 22,213  4) (E11 69,E78 5)   15  Hypertension (401 9) (I10)   16  Insomnia (780 52) (G47 00)   17  Mild persistent asthma without complication (981 58) (T94 08)   18  Peripheral polyneuropathy (356 9) (G62 9)    Current Meds   1  Albuterol Sulfate (2 5 MG/3ML) 0 083% Inhalation Nebulization Solution; USE 1 UNIT   DOSE EVERY 4-6 HOURS AS NEEDED FOR WHEEZING ; Therapy: 12FFQ2147 to Recorded   2  Ambien 10 MG Oral Tablet (Zolpidem Tartrate); TAKE 1 TABLET Bedtime PRN; Therapy: (Dorothea Cross) to Recorded   3  Amlodipine Besy-Benazepril HCl - 5-20 MG Oral Capsule; take 1 capsule daily; Therapy: 99DKD1133 to (Evaluate:97Kxi3198)  Requested for: 24ASX9783; Last   Rx:05Jun2017 Ordered   4  Atorvastatin Calcium 40 MG Oral Tablet; TAKE 1 TABLET DAILY; Therapy: 24OOD4379 to (Adan Laura) Recorded   5  Butalbital-APAP-Caffeine -40 MG Oral Tablet; TAKE 1 TABLET EVERY 6 HOURS   AS NEEDED; Therapy: 14BRB0997 to (Evaluate:25Kzx0592) Recorded   6  Cyclobenzaprine HCl - 5 MG Oral Tablet; Take 1 tablet daily; Therapy: 84BET2775 to Recorded   7  Iron 28 MG Oral Tablet; TAKE AS DIRECTED; Therapy: 40TYO2339 to Recorded   8  Lyrica 150 MG Oral Capsule; TAKE 1 CAPSULE TWICE DAILY; Therapy: 69OVS0912 to (Evaluate:87Gxk8445); Last Rx:15May2017 Ordered   9  MetFORMIN HCl - 1000 MG Oral Tablet; TAKE 1 TABLET TWICE DAILY; Therapy: 09WGC2218 to (Last Rx:05Jun2017)  Requested for: 51XXF7081 Ordered   10  Omeprazole 40 MG Oral Capsule Delayed Release; TAKE ONE CAPSULE BY MOUTH    EVERY MORNING; Therapy: 71FBM4916 to (Evaluate:19Nov2017)  Requested for: 35UPK8877; Last    Rx:23May2017 Ordered   11  OneTouch Ultra Blue In Vitro Strip; TEST ONCE DAILY; Therapy: 62HQI2927 to Recorded   12  ProAir  (90 Base) MCG/ACT Inhalation Aerosol Solution; INHALE 1 TO 2 PUFFS    EVERY 4 TO 6 HOURS AS NEEDED; Therapy: 55BBA2251 to Recorded   13  Symbicort 160-4 5 MCG/ACT Inhalation Aerosol; INHALE 2 PUFFS TWICE DAILY  RINSE MOUTH AFTER USE; Therapy: 29KTW1768 to (Last Rx:06Apr2017) Ordered   14  Tradjenta 5 MG Oral Tablet; TAKE 1 TABLET DAILY; Therapy: 44OBP7731 to (Evaluate:03Oct2017); Last Rx:06Apr2017 Ordered   15  TraMADol HCl - 50 MG Oral Tablet; Therapy: 45FUJ1059 to Recorded   16  Venlafaxine HCl - 75 MG Oral Tablet; TAKE 1 TABLET DAILY; Therapy: 50ITH1651 to Recorded   17  Zolpidem Tartrate 10 MG Oral Tablet (Ambien); TAKE 1 TABLET AT BEDTIME AS    NEEDED; Therapy: 30KMI5432 to (Evaluate:27Saa4760); Last Rx:23May2017 Ordered    Allergies    1  NSAIDs   2  NSAIDs   3  Percocet TABS   4   Percocet TABS    Signatures   Electronically signed by : Olvin Sims, ; Jun 7 2017  1:51PM EST                       (Author)

## 2018-01-11 NOTE — RESULT NOTES
Verified Results  (1) COMPREHENSIVE METABOLIC PANEL 05XQP5000 93:17UX Natacha Butter     Test Name Result Flag Reference   GLUCOSE 114 mg/dL H 65-99   Fasting reference interval     For someone without known diabetes, a glucose value  between 100 and 125 mg/dL is consistent with  prediabetes and should be confirmed with a  follow-up test    UREA NITROGEN (BUN) 18 mg/dL  7-25   CREATININE 0 69 mg/dL  0 50-1 05   For patients >52years of age, the reference limit  for Creatinine is approximately 13% higher for people  identified as -American  eGFR NON-AFR  AMERICAN 97 mL/min/1 73m2  > OR = 60   eGFR AFRICAN AMERICAN 112 mL/min/1 73m2  > OR = 60   BUN/CREATININE RATIO   9-00   NOT APPLICABLE (calc)   SODIUM 142 mmol/L  135-146   POTASSIUM 4 3 mmol/L  3 5-5 3   CHLORIDE 104 mmol/L     CARBON DIOXIDE 27 mmol/L  20-31   CALCIUM 9 7 mg/dL  8 6-10 4   PROTEIN, TOTAL 6 6 g/dL  6 1-8 1   ALBUMIN 4 0 g/dL  3 6-5 1   GLOBULIN 2 6 g/dL (calc)  1 9-3 7   ALBUMIN/GLOBULIN RATIO 1 5 (calc)  1 0-2 5   BILIRUBIN, TOTAL 0 5 mg/dL  0 2-1 2   ALKALINE PHOSPHATASE 87 U/L     AST 20 U/L  10-35   ALT 25 U/L  6-29     (Q) LIPID PANEL WITH REFLEX TO DIRECT LDL 77GDT9084 09:53AM Natacha Butter     Test Name Result Flag Reference   CHOLESTEROL, TOTAL 156 mg/dL  125-200   HDL CHOLESTEROL 46 mg/dL  > OR = 46   TRIGLICERIDES 982 mg/dL  <150   LDL-CHOLESTEROL 86 mg/dL (calc)  <130   Desirable range <100 mg/dL for patients with CHD or  diabetes and <70 mg/dL for diabetic patients with  known heart disease  CHOL/HDLC RATIO 3 4 (calc)  < OR = 5 0   NON HDL CHOLESTEROL 110 mg/dL (calc)     Target for non-HDL cholesterol is 30 mg/dL higher than   LDL cholesterol target       (Q) HEMOGLOBIN A1c 87Niv8796 09:53AM Natacha Butter   REPORT COMMENT:  FASTING:YES     Test Name Result Flag Reference   HEMOGLOBIN A1c 7 4 % of total Hgb H <5 7   For someone without known diabetes, a hemoglobin A1c  value of 6 5% or greater indicates that they may have   diabetes and this should be confirmed with a follow-up   test      For someone with known diabetes, a value <7% indicates   that their diabetes is well controlled and a value   greater than or equal to 7% indicates suboptimal   control  A1c targets should be individualized based on   duration of diabetes, age, comorbid conditions, and   other considerations  Currently, no consensus exists regarding use of  hemoglobin A1c for diagnosis of diabetes for children

## 2018-01-11 NOTE — MISCELLANEOUS
Message   Recorded as Task   Date: 04/07/2017 02:25 PM, Created By: System   Task Name: Schedule Appointment   Assigned To: [facility],Team   Regarding Patient: Alessandro Lyles, Status: In Progress   Comment:    System - 07 Apr 2017 2:25 PM     Preferred Communication: Mail  Ordering Site: [facility]    Referred To: [facility] [place] [facility]  To Be Done: 07 Apr 2017   Jocelyn Le - 07 Apr 2017 3:43 PM     TASK REASSIGNED: Previously Assigned To Shon Alves  Please call patient to schedule an appointment within 48 hours on their  The best time to reach the patient is in the  Patient prefers appointment to be  or     Last Mammogram date:[  ]    After this appointment is scheduled please reply back to [facility] med team    Humberto Stanley - 07 Apr 2017 5:28 PM     TASK IN PROGRESS   Olivia Zelaya - 10 Apr 2017 9:48 AM     TASK EDITED  intake started in your office at [facility]  Please follow up with the patient and schedule the appt  Filomena Kinney - 11 Apr 2017 8:21 AM     TASK REPLIED TO: Previously Assigned To [facility],Team  Per [facility]-04/06/2017- [facility] FOR ORDER & PREVIOUS PAIN RECORDS   FOR DR Rajan Ruiz TO REVIEW INTAKE IN [place] Petty - 11 Apr 2017 8:21 AM     TASK IN PROGRESS   Brittaney Liang - 24 Apr 2017 9:37 AM     TASK EDITED  Pt is scheduled for 4/25/17 at 8:30        Active Problems    1  Benign essential hypertension (401 1) (I10)   2  Chronic back pain (724 5,338 29) (M54 9,G89 29)   3  Controlled type 2 diabetes mellitus without complication, without long-term current use of   insulin (250 00) (E11 9)   4  Encounter for mammogram to establish baseline mammogram (V76 12) (Z12 31)   5  GERD without esophagitis (530 81) (K21 9)   6  Hyperlipidemia associated with type 2 diabetes mellitus (232 49,294  4) (E11 69,E78 5)   7   Mild persistent asthma without complication (381 18) (C05 55)    Current Meds 1  Albuterol Sulfate (2 5 MG/3ML) 0 083% Inhalation Nebulization Solution; USE 1 UNIT   DOSE EVERY 4-6 HOURS AS NEEDED FOR WHEEZING ; Therapy: 35JJK0534 to Recorded   2  Ambien 10 MG Oral Tablet (Zolpidem Tartrate); TAKE 1 TABLET AT BEDTIME AS   NEEDED; Therapy: 72JDH6337 to (Evaluate:05Jun2017) Recorded   3  Amlodipine Besy-Benazepril HCl - 5-20 MG Oral Capsule; take 1 capsule by mouth once   daily; Therapy: 57KNW9761 to (Chas Raquel) Recorded   4  Atorvastatin Calcium 40 MG Oral Tablet; TAKE 1 TABLET DAILY; Therapy: 95DEA4261 to (Chas Raquel) Recorded   5  Butalbital-APAP-Caffeine -40 MG Oral Tablet; TAKE 1 TABLET EVERY 6 HOURS   AS NEEDED; Therapy: 92OLT6503 to (Evaluate:01May2017) Recorded   6  Cyclobenzaprine HCl - 5 MG Oral Tablet; Take 1 tablet daily; Therapy: 31GSZ8560 to Recorded   7  Iron 28 MG Oral Tablet; TAKE AS DIRECTED; Therapy: 40COP4219 to Recorded   8  Lyrica 150 MG Oral Capsule; TAKE 1 CAPSULE TWICE DAILY; Therapy: 48LXR7446 to Recorded   9  MetFORMIN HCl - 1000 MG Oral Tablet; TAKE 1 TABLET TWICE DAILY; Therapy: 36OUT2146 to (Evaluate:05Jul2017) Recorded   10  Omeprazole 40 MG Oral Capsule Delayed Release; TAKE ONE CAPSULE BY MOUTH    EVERY MORNING; Therapy: 92LWV4202 to (Evaluate:03Oct2017) Recorded   11  OneTouch Ultra Blue In Vitro Strip; TEST ONCE DAILY; Therapy: 53JMF2084 to Recorded   12  ProAir  (90 Base) MCG/ACT Inhalation Aerosol Solution; INHALE 1 TO 2 PUFFS    EVERY 4 TO 6 HOURS AS NEEDED; Therapy: 73EOC6917 to Recorded   13  Symbicort 160-4 5 MCG/ACT Inhalation Aerosol; INHALE 2 PUFFS TWICE DAILY  RINSE MOUTH AFTER USE; Therapy: 20QEW0445 to (Last Rx:06Apr2017) Ordered   14  Tanzeum 30 MG Subcutaneous Pen-injector; Therapy: 59YPZ4942 to Recorded   15  Tradjenta 5 MG Oral Tablet; TAKE 1 TABLET DAILY; Therapy: 07LJT9249 to (Evaluate:03Oct2017); Last Rx:06Apr2017 Ordered   16  TraMADol HCl - 50 MG Oral Tablet;     Therapy: 22BLC5139 to Recorded   17  TraMADol HCl  MG Oral Capsule Extended Release 24 Hour; take 1 capsule    daily; Therapy: 24FZR0659 to Recorded   18  Venlafaxine HCl - 75 MG Oral Tablet; TAKE 1 TABLET DAILY; Therapy: 12YMF3466 to Recorded    Allergies    1  NSAIDs   2  Percocet TABS    Signatures   Electronically signed by :  Chapo Kruse, ; Apr 24 2017  9:38AM EST                       (Author)

## 2018-01-11 NOTE — RESULT NOTES
Verified Results  (1) BASIC METABOLIC PROFILE 09PNX7064 08:36AM Luli Chaudhari     Test Name Result Flag Reference   GLUCOSE 141 mg/dL H 65-99   Fasting reference interval     For someone without known diabetes, a glucose  value >125 mg/dL indicates that they may have  diabetes and this should be confirmed with a  follow-up test    UREA NITROGEN (BUN) 13 mg/dL  7-25   CREATININE 0 63 mg/dL  0 50-1 05   For patients >52years of age, the reference limit  for Creatinine is approximately 13% higher for people  identified as -American  eGFR NON-AFR   AMERICAN 100 mL/min/1 73m2  > OR = 60   eGFR AFRICAN AMERICAN 115 mL/min/1 73m2  > OR = 60   BUN/CREATININE RATIO   7-96   NOT APPLICABLE (calc)   SODIUM 143 mmol/L  135-146   POTASSIUM 4 4 mmol/L  3 5-5 3   CHLORIDE 105 mmol/L     CARBON DIOXIDE 30 mmol/L  20-31   CALCIUM 9 6 mg/dL  8 6-10 4

## 2018-01-12 VITALS
DIASTOLIC BLOOD PRESSURE: 80 MMHG | BODY MASS INDEX: 31.6 KG/M2 | HEART RATE: 74 BPM | HEIGHT: 64 IN | SYSTOLIC BLOOD PRESSURE: 128 MMHG | WEIGHT: 185.13 LBS

## 2018-01-13 VITALS
DIASTOLIC BLOOD PRESSURE: 78 MMHG | TEMPERATURE: 98.7 F | BODY MASS INDEX: 32.18 KG/M2 | HEIGHT: 64 IN | WEIGHT: 188.5 LBS | HEART RATE: 84 BPM | SYSTOLIC BLOOD PRESSURE: 118 MMHG

## 2018-01-13 VITALS
HEIGHT: 64 IN | DIASTOLIC BLOOD PRESSURE: 88 MMHG | WEIGHT: 187 LBS | BODY MASS INDEX: 31.92 KG/M2 | SYSTOLIC BLOOD PRESSURE: 132 MMHG

## 2018-01-13 VITALS
OXYGEN SATURATION: 94 % | RESPIRATION RATE: 16 BRPM | HEIGHT: 64 IN | WEIGHT: 180 LBS | SYSTOLIC BLOOD PRESSURE: 130 MMHG | DIASTOLIC BLOOD PRESSURE: 80 MMHG | HEART RATE: 97 BPM | BODY MASS INDEX: 30.73 KG/M2

## 2018-01-13 NOTE — PROGRESS NOTES
Assessment    1  Chronic bilateral back pain (724 5,338 29) (M54 9,G89 29)   2  Chronic lumbar radiculopathy (724 4) (M54 16)   3  Chronic thoracic spine pain (724 1,338 29) (M54 6,G89 29)   4  Chronic cervical pain (723 1,338 29) (M54 2,G89 29)   5  Chronic pain syndrome (338 4) (G89 4)   6  Diabetic peripheral neuropathy (250 60,357 2) (E11 42)   7  Diffuse pain (780 96) (R52)    Plan   Chronic bilateral back pain, Chronic cervical pain, Chronic pain syndrome, Chronic  thoracic spine pain    · TraMADol HCl - 50 MG Oral Tablet; Take 1-2 pills TID PRN for pain, max of 5 per  day   Rx By: Faiza Alvarez; Dispense: 30 Days ; #:150 Tablet; Refill: 2; For: Chronic bilateral back pain, Chronic cervical pain, Chronic pain syndrome, Chronic thoracic spine pain; CB = N; Print Rx  Chronic pain syndrome, Diabetic peripheral neuropathy    · Lyrica 100 MG Oral Capsule; Take 1 in AM and afternoon and 2 PO HS   Rx By: Faiza Alvarez; Dispense: 30 Days ; #:120 Capsule; Refill: 2; For: Chronic pain syndrome, Diabetic peripheral neuropathy; CB = N; Print Rx    Follow-up visit in 3 months Evaluation and Treatment  Follow-up  Status: Hold For - Scheduling  Requested for: 02Mcm7866  Ordered; For: Diffuse pain;  Ordered By: Faiza Alvarez  Performed:   Due: 04UZW7172     Discussion/Summary    While the patient was in the office today, I did have a very thorough and heidi conversation with the patient regarding her medication regimen and treatment plan  I 1st of all address the fact that at this point I feel the patient has a very unrealistic expectation of medication management and what we do here in pain management   I reviewed the medication management goals with the patient and explained to her that unfortunately she is going to have periods flare up and that as long as her medication regimen treatment plan is providing moderate and steady relief on a consistent basis, without side effects, this is most likely the best that we can hope for, especially since she has had this pain for 25 years  The patient seemed a little dismayed, but verbalized an understanding  With regards to her left-sided low back pain, I explained the patient that with her current symptoms, exam findings, the fact she has had an intra-articular facet joint injection in the past with relief, I feel that it would be in her best interest consider proceeding with a left L3 through S1 diagnostic medial branch block with Dr Manjula Wynn  The patient's low back pain persists despite time, relative rest, activity modification and therapy  Based on the patient's symptoms examination, I suspect that the pain is being generated by the lumbar facet joints  The facet joints are only one of many possible low-back pain generators  Unfortunately, studies have demonstrated that history and examination alone are unreliable  We will schedule the patient for diagnostic lumbar medial branch blockade using the double block paradigm  If the patient receives significant relief of appropriate duration with lidocaine 2%, we will confirm with Marcaine 0 75%  If the patient demonstrates appropriate response to medial branch blockade we will schedule for radiofrequency ablation to provide long-term relief  Complete risks and benefits including bleeding, infection, tissue reaction, nerve injury and allergic reaction were discussed  The approach was demonstrated using models and literature was provided  However, I advised the patient that we either need to get a copy of her last MRI report and/or consider getting an updated MRI for re-evaluation before we would proceed with any kind of injections  However, the patient was quite hesitant about proceeding with a repeat MRI as with her insurance testing can cost her lot of money and she is still pain off the cost of her EMG and some other testing she had to have for our office   The patient was agreeable to calling the MRI facility where she had her last MRI to get the report faxed to our office for us to review as I did give her the back line fax number and advised her that once we receive the MRI results, I will review them with Dr Chase Heard and give her a call  The patient also asked that I discussed with Dr Chase Heard the fact that she would like to proceed with just 1 injection and do a repeat intra-articular facet joint injection, in place of proceeding with the diagnostic medial branch blocks and possible radiofrequency ablation procedure  I explained to the patient that typically with her age and her etiology, the medial branch block is the more preferred treatment, however, I will discuss that with Dr Chase Heard once we have the MRI results and we will call her at that point in time  The patient was agreeable and verbalized an understanding  With regards to her medication regimen, I did have a thorough conversation with the patient and explained to her that at this point we could consider transferring her from the Tramadol to Tylenol with codeine instead of the tramadol for the next 3 months since she does not feel the tramadol as as helpful as she would like for this current flare-up  However, I explained to her that it is not our office policy to prescribe more than 1 short-acting opioid medication and that it has to be that she has the Tramadol with the Tylenol with codeine not both  After thorough conversation the patient want to know if during the flare ups could she take an extra Tramadol or two and I advised her that yes she could and gave her 5 extra pills in her prescription, however, made it clear that she needs to keep track of her x-rays and that she cannot run out early so she is having more pain on one day and takes extra pills she is going as take less pills on another day  The patient was agreeable and verbalized an understanding  The patient is to continue with the rest of her medication regimen as prescribed      While the patient was in the office today, I did review the patient's report on the [facility] web site and found it to be appropriate for what is being prescribed and I reviewed it for any inconsistencies or evidence of multiple prescribers/drug diversion  A copy of the patient's report can be found in their chart  The risk of opioid medications, including dependence, addiction and tolerance were explained to the patient  The patient understands and agrees to use these medications only as prescribed  I have fully discussed the potential side effects of the medication with the patient, which include, but are not limited to, constipation, drowsiness, addiction, impaired judgment and risk of fatal overdose as not taken as prescribed  I have warned the patient that sharing medications is a felony  I warned against driving while taking sedating medications  At this point in time, the patient is showing no signs of addiction, abuse, diversion or suicidal ideation  The patient has the current Goals: To continue with at least a current level of pain relief and healthy proceed with facet joint injection versus diagnostic medial branch blocks in the future  The patent has the current Barriers: Chronic pain syndrome, opioid dependence, gastric bypass surgery, and an unrealistic expectation of medication management and pain management in general    Patient is able to Self-Care  Educational resources provided: While the patient was in the office today, I discussed with the patient that with medication management our overall goal is to reduce the pain symptoms by 50%, 50% of the time, on the least amount medications, with the least amount side effects  The patient was agreeable and verbalized an understanding  The treatment plan was reviewed with the patient/guardian   The patient/guardian understands and agrees with the treatment plan   The patient was counseled regarding instructions for management, prognosis, patient and family education, impressions, risks and benefits of treatment options and importance of compliance with treatment  total time of encounter was 30 minutes  Chief Complaint    1  Pain  Chronic low back and leg pain, worsened  Chronic diffuse thoracic and neck pain  History of Present Illness  The patient presents today for a follow-up office visit  She is currently being treated for her chronic pain symptoms with the worst of her pain currently being her worsening left-sided greater than right low back pain and continued lower extremity radicular symptoms  She feels that with the increase in the Lyrica at her last office visit that the leg pain has improved and is being managed at approximately 70-80% relief with regards to the leg pain, however, she is noting 0% relief of her low back pain  The patient reports that the tramadol helps, but does not provide significant or long-lasting relief to allow her to function at work and wants to know if we could prescribe anything for the short time such as Tylenol with codeine as that is what she is use in the past during flare ups for now  She also want to discuss the possibility of proceeding with left intra-articular facet joint injections of the lumbar spine as she reports having them at least 3-4 years ago with moderate to significant improvement for several years  The patient reports that her current medication regimen is providing minimal to moderate relief, at best, however, the relief is not consistent as she would like and presents today to discuss her medication regimen and treatment plan options  Nilsa Aschoff presents with complaints of intermittent episodes of moderate left lower back and bilateral lower leg pain, described as sharp and burning  On a scale of 1 to 10, the patient rates the pain as 8  Symptoms are worsening  Review of Systems    Constitutional: no fever, no recent weight gain and no recent weight loss     Eyes: no double vision and no blurry vision  Cardiovascular: no chest pain, no palpitations and no lower extremity edema  Respiratory: no complaints of shortness of breath and no wheezing  Musculoskeletal: difficulty walking, joint stiffness and decreased range of motion, but no muscle weakness, no joint swelling, no limb swelling and no pain in extremity  Neurological: no dizziness, no difficulty swallowing, no memory loss, no loss of consciousness and no seizures  Gastrointestinal: no nausea, no vomiting, no constipation and no diarrhea  Genitourinary: no difficulty initiating urine stream, no genital pain and no frequent urination  Integumentary: no complaints of skin rash  Psychiatric: no depression  Endocrine: no excessive thirst, no adrenal disease, no hypothyroidism and no hyperthyroidism  Hematologic/Lymphatic: no tendency for easy bruising and no tendency for easy bleeding  Active Problems    1  Benign essential hypertension (401 1) (I10)   2  Chronic bilateral back pain (724 5,338 29) (M54 9,G89 29)   3  Chronic cervical pain (723 1,338 29) (M54 2,G89 29)   4  Chronic lumbar radiculopathy (724 4) (M54 16)   5  Chronic pain syndrome (338 4) (G89 4)   6  Chronic thoracic spine pain (724 1,338 29) (M54 6,G89 29)   7  Chronic, continuous use of opioids (305 51) (F11 90)   8  Controlled type 2 diabetes mellitus without complication, without long-term current use   of insulin (250 00) (E11 9)   9  Diabetic peripheral neuropathy (250 60,357 2) (E11 42)   10  Diffuse pain (780 96) (R52)   11  Encounter for mammogram to establish baseline mammogram (V76 12) (Z12 31)   12  GERD without esophagitis (530 81) (K21 9)   13  Hyperlipidemia associated with type 2 diabetes mellitus (594 59,027  4) (E11 69,E78 5)   14  Insomnia (780 52) (G47 00)   15  Mild persistent asthma without complication (024 12) (U24 01)   16  Peripheral polyneuropathy (356 9) (G62 9)    Past Medical History    1   History of Anxiety (300 00) (F41 9)   2  History of asthma (V12 69) (Z87 09)   3  History of headache (V13 89) (Z87 898)   4  History of lump of left breast (V13 89) (O47 819)    The active problems and past medical history were reviewed and updated today  Surgical History    1  History of Appendectomy   2  History of Back Surgery   3  History of  Section   4  History of Cholecystectomy   5  History of Gastric Surgery For Morbid Obesity Gastric Bypass   6  History of Shoulder Surgery    The surgical history was reviewed and updated today  Family History  Mother    1  Family history of diabetes mellitus (V18 0) (Z83 3)   2  Family history of hyperlipidemia (V18 19) (Z83 49)   3  Family history of hypertension (V17 49) (Z82 49)  Father    4  Family history of Bladder carcinoma   5  Family history of lung cancer (V16 1) (Z80 1)   6  Family history of malignant neoplasm of prostate (V16 42) (Z80 42)  Brother    7  Family history of diabetes mellitus (V18 0) (Z83 3)   8  Family history of hyperlipidemia (V18 19) (Z83 49)   9  Family history of hypertension (V17 49) (Z82 49)    The family history was reviewed and updated today  Social History    · Former smoker (U73 24) (O91 403)   · Former smoker (B51 50) (E83 373)  The social history was reviewed and updated today  The social history was reviewed and is unchanged  Current Meds   1  Albuterol Sulfate (2 5 MG/3ML) 0 083% Inhalation Nebulization Solution; USE 1 UNIT   DOSE EVERY 4-6 HOURS AS NEEDED FOR WHEEZING ; Therapy: 72CPT8838 to Recorded   2  Amlodipine Besy-Benazepril HCl - 5-20 MG Oral Capsule; take 1 capsule daily; Therapy: 73MOP1422 to (Evaluate:59Rle7455)  Requested for: 95ZQC1115; Last   Rx:2017 Ordered   3  Atorvastatin Calcium 40 MG Oral Tablet; TAKE 1 TABLET DAILY; Therapy: 07RZM7337 to (Angeline Astudillo) Recorded   4  Cyclobenzaprine HCl - 5 MG Oral Tablet; Take 1 tablet daily; Therapy: 53EFW8263 to Recorded   5   Iron 28 MG Oral Tablet; TAKE AS DIRECTED; Therapy: 74QQH0997 to Recorded   6  Lyrica 100 MG Oral Capsule; Take 1 in AM and afternoon and 2 PO HS; Therapy: 25KGA5382 to (Evaluate:94Mlg4517)  Requested for: 01Aug2017; Last   Rx:01Aug2017 Ordered   7  MetFORMIN HCl - 1000 MG Oral Tablet; TAKE 1 TABLET TWICE DAILY; Therapy: 22YDL9726 to (Last Rx:05Jun2017)  Requested for: 81DWN7678 Ordered   8  Omeprazole 40 MG Oral Capsule Delayed Release; TAKE ONE CAPSULE BY MOUTH   EVERY MORNING; Therapy: 32YKR6314 to (Evaluate:19Nov2017)  Requested for: 38RNS1700; Last   Rx:67Kwg2906 Ordered   9  OneTouch Ultra Blue In Vitro Strip; TEST ONCE DAILY; Therapy: 22IYR1221 to Recorded   10  ProAir  (90 Base) MCG/ACT Inhalation Aerosol Solution; INHALE 1 TO 2 PUFFS    EVERY 4 TO 6 HOURS AS NEEDED; Therapy: 40MCX1539 to Recorded   11  Symbicort 160-4 5 MCG/ACT Inhalation Aerosol; INHALE 2 PUFFS TWICE DAILY  RINSE    MOUTH AFTER USE; Therapy: 15KKB4206 to (Last Rx:06Apr2017) Ordered   12  Tradjenta 5 MG Oral Tablet; TAKE 1 TABLET DAILY; Therapy: 74OED9372 to [phone]  Requested for: 01Aug2017; Last    Rx:01Aug2017 Ordered   13  TraMADol HCl - 50 MG Oral Tablet; Take 1-2 pills TID PRN for pain, max of 5 per day; Therapy: 29KXM4108 to (Evaluate:18Kxx9431); Last Rx:22Jun2017 Ordered   14  Zolpidem Tartrate 10 MG Oral Tablet; take 1 tablet by mouth at bedtime if needed; Therapy: 48MII9990 to (Evaluate:31Phe6656)  Requested for: 63CGA8086; Last    Rx:96Xtf7236 Ordered    The medication list was reviewed and updated today  Allergies    1  NSAIDs   2   Percocet TABS    Vitals  Vital Signs    Recorded: 17Aug2017 02:26PM   Temperature 98 7 F   Heart Rate 84   Systolic 368   Diastolic 78   Height 5 ft 4 in   Weight 188 lb 8 oz   BMI Calculated 32 36   BSA Calculated 1 91   Pain Scale 8     Physical Exam    Constitutional   General appearance: Well developed, well nourished, alert, in no distress, non-toxic and no overt pain behavior  Eyes   Sclera: anicteric   HEENT   Hearing grossly intact  Pulmonary   Respiratory effort: Even and unlabored  Cardiovascular   Examination of extremities: No edema or pitting edema present  Abdomen   Abdomen: Soft, non-tender, non-distended  Skin   Skin and subcutaneous tissue: Normal without rashes or lesions, well hydrated  Psychiatric   Mood and affect: Mood and affect appropriate  Neurologic   Cranial nerves: Cranial nerves II-XII grossly intact  the muscle tone was normal   Musculoskeletal Tandem Gait: Intact   Lumbar/Sacral Spine examination demonstrates Lumbosacral Spine:   Appearance: Normal    Tenderness: at lumbar spine, at the sacral spine, left paraspinal, left sciatic notch and the left sacroiliac joint  Palpatory Findings include left-sided muscle spasms  Lumbosacral Spine Sensory: intact to light touch and pinprick in the lower extremities  Flexion was not restricted and was painless  Extension was restricted and was painful  Rotation to the left was not restricted and was painful  Rotation to the right was not restricted and was painless  ROM Hips: Full   Foot and ankle strength was normal bilaterally  Knee strength was normal bilaterally  Hip strength was normal bilaterally  Evaluation of Muscle Stretch Reflexes on the right side demonstrates 1/4 Hamstring Reflex, 1/4 Knee Jerk Reflex and 1/4 Ankle Jerk Reflex, but negative right ankle clonus  Evaluation of Muscle Stretch Reflexes on the left side demonstrates 1/4 Hamstring Reflex, 1/4 Knee Jerk Reflex, 1/4 Ankle Jerk Reflex and negative left ankle clonus  Special Tests: negative Straight Leg Raise on right and negative Straight Leg Raise on left  Results/Data  Results Free Text Form [facility]:   Results     Other  Tramadol- this am 08/17/2017        Future Appointments    Date/Time Provider Specialty Site   11/09/2017 03:15 PM [facility]ITRA Salazar [facility] Signatures   Electronically signed by : CHITRA Yao;  Aug 18 2017  6:57AM EST                       (Author)    Electronically signed by : Carlos Chowdhury DO; Aug 18 2017 10:10AM EST

## 2018-01-14 VITALS
SYSTOLIC BLOOD PRESSURE: 122 MMHG | HEART RATE: 90 BPM | BODY MASS INDEX: 31.58 KG/M2 | OXYGEN SATURATION: 96 % | DIASTOLIC BLOOD PRESSURE: 84 MMHG | HEIGHT: 64 IN | WEIGHT: 185 LBS

## 2018-01-15 ENCOUNTER — ALLSCRIPTS OFFICE VISIT (OUTPATIENT)
Dept: OTHER | Facility: OTHER | Age: 58
End: 2018-01-15

## 2018-01-15 VITALS
HEART RATE: 88 BPM | SYSTOLIC BLOOD PRESSURE: 126 MMHG | WEIGHT: 186 LBS | DIASTOLIC BLOOD PRESSURE: 76 MMHG | HEIGHT: 64 IN | TEMPERATURE: 98 F | BODY MASS INDEX: 31.76 KG/M2

## 2018-01-15 NOTE — RESULT NOTES
Verified Results  (Q) MICROALBUMIN, RANDOM URINE (W/CREATININE) [id number] 08:36AM [employer]     Test Name Result Flag Reference   CREATININE, RANDOM URINE 107 mg/dL     MICROALBUMIN 0 7 mg/dL     Reference Range  Not established   MICROALBUMIN/CREATININE$RATIO, RANDOM URINE 7 mcg/mg creat  <30   The ADA defines abnormalities in albumin  excretion as follows:     Category         Result (mcg/mg creatinine)     Normal                    <30  Microalbuminuria            Clinical albuminuria   > OR = 300     The ADA recommends that at least two of three  specimens collected within a 3-6 month period be  abnormal before considering a patient to be  within a diagnostic category  (Q) HEMOGLOBIN A1c [id number] 08:36AM [employer]     Test Name Result Flag Reference   HEMOGLOBIN A1c 7 6 % of total Hgb H <5 7   For someone without known diabetes, a hemoglobin A1c  value of 6 5% or greater indicates that they may have   diabetes and this should be confirmed with a follow-up   test      For someone with known diabetes, a value <7% indicates   that their diabetes is well controlled and a value   greater than or equal to 7% indicates suboptimal   control  A1c targets should be individualized based on   duration of diabetes, age, comorbid conditions, and   other considerations  Currently, no consensus exists regarding use of  hemoglobin A1c for diagnosis of diabetes for children

## 2018-01-16 NOTE — MISCELLANEOUS
Message   Recorded as Task   Date: 06/13/2017 08:04 AM, Created By: Ilia Conde   Task Name: Care Coordination   Assigned To: [facility] [place] [facility],Team   Regarding Patient: Jorge Luis Sutherland, Status: Active   Comment:    Joe Rudolph - 13 Jun 2017 8:04 AM     TASK CREATED  EMG revealed mild S1 radiculopathy  We'll discuss at follow-up accommodation and treatment   Dayton Tucker - 13 Jun 2017 11:26 AM     TASK EDITED  LMOM to cb on home #   Jackie Tan - 13 Jun 2017 12:26 PM     TASK EDITED  Pt returned call and can be reached at [phone]  Caitlin,Dayton - 13 Jun 2017 1:45 PM     TASK EDITED  s/w pt, advised of above and confirmed 6/22 ov w/ DG  pt verbalized understanding and appreciation  Active Problems    1  Arthritis (716 90) (M19 90)   2  Benign essential hypertension (401 1) (I10)   3  Chronic back pain (724 5,338 29) (M54 9,G89 29)   4  Chronic pain syndrome (338 4) (G89 4)   5  Chronic, continuous use of opioids (305 51) (F11 90)   6  Controlled type 2 diabetes mellitus without complication, without long-term current use of   insulin (250 00) (E11 9)   7  Diabetes mellitus (250 00) (E11 9)   8  Diabetic peripheral neuropathy (250 60,357 2) (E11 42)   9  Encounter for mammogram to establish baseline mammogram (V76 12) (Z12 31)   10  GERD (gastroesophageal reflux disease) (530 81) (K21 9)   11  GERD without esophagitis (530 81) (K21 9)   12  Headache (784 0) (R51)   13  Hyperlipidemia (272 4) (E78 5)   14  Hyperlipidemia associated with type 2 diabetes mellitus (977 36,836  4) (E11 69,E78 5)   15  Hypertension (401 9) (I10)   16  Insomnia (780 52) (G47 00)   17  Mild persistent asthma without complication (635 66) (G34 34)   18  Peripheral polyneuropathy (356 9) (G62 9)    Current Meds   1  Albuterol Sulfate (2 5 MG/3ML) 0 083% Inhalation Nebulization Solution; USE 1 UNIT   DOSE EVERY 4-6 HOURS AS NEEDED FOR WHEEZING ; Therapy: 76LDS4823 to Recorded   2   Ambien 10 MG Oral Tablet (Zolpidem Tartrate); TAKE 1 TABLET Bedtime PRN; Therapy: (Marine Medleynell) to Recorded   3  Amlodipine Besy-Benazepril HCl - 5-20 MG Oral Capsule; take 1 capsule daily; Therapy: 53XCV4470 to (Evaluate:03Sep2017)  Requested for: 91OCD5536; Last   Rx:05Jun2017 Ordered   4  Atorvastatin Calcium 40 MG Oral Tablet; TAKE 1 TABLET DAILY; Therapy: 21JVD2508 to (Rita Mckeon) Recorded   5  Butalbital-APAP-Caffeine -40 MG Oral Tablet; TAKE 1 TABLET EVERY 6 HOURS   AS NEEDED; Therapy: 36GLB4822 to (Evaluate:01May2017) Recorded   6  Cyclobenzaprine HCl - 5 MG Oral Tablet; Take 1 tablet daily; Therapy: 60HUQ2064 to Recorded   7  Iron 28 MG Oral Tablet; TAKE AS DIRECTED; Therapy: 55RPJ6235 to Recorded   8  Lyrica 150 MG Oral Capsule; TAKE 1 CAPSULE TWICE DAILY; Therapy: 88AOU5784 to (Evaluate:42Vyb2768); Last Rx:15May2017 Ordered   9  MetFORMIN HCl - 1000 MG Oral Tablet; TAKE 1 TABLET TWICE DAILY; Therapy: 86YOW6122 to (Last Rx:05Jun2017)  Requested for: 44YEQ3877 Ordered   10  Omeprazole 40 MG Oral Capsule Delayed Release; TAKE ONE CAPSULE BY MOUTH    EVERY MORNING; Therapy: 48LDN4793 to (Evaluate:19Nov2017)  Requested for: 32FWV0787; Last    Rx:10Avi9387 Ordered   11  OneTouch Ultra Blue In Vitro Strip; TEST ONCE DAILY; Therapy: 62VPL5305 to Recorded   12  ProAir  (90 Base) MCG/ACT Inhalation Aerosol Solution; INHALE 1 TO 2 PUFFS    EVERY 4 TO 6 HOURS AS NEEDED; Therapy: 01UGF4947 to Recorded   13  Symbicort 160-4 5 MCG/ACT Inhalation Aerosol; INHALE 2 PUFFS TWICE DAILY  RINSE MOUTH AFTER USE; Therapy: 09GEV0647 to (Last Rx:06Apr2017) Ordered   14  Tradjenta 5 MG Oral Tablet; TAKE 1 TABLET DAILY; Therapy: 45IJS7380 to (Evaluate:03Oct2017); Last Rx:06Apr2017 Ordered   15  TraMADol HCl - 50 MG Oral Tablet; Therapy: 18SLV2996 to Recorded   16  Venlafaxine HCl - 75 MG Oral Tablet; TAKE 1 TABLET DAILY; Therapy: 81DEI9000 to Recorded   17   Zolpidem Tartrate 10 MG Oral Tablet (Ambien); TAKE 1 TABLET AT BEDTIME AS    NEEDED; Therapy: 47IDH5537 to (Evaluate:36Pyg7467); Last Rx:78Qtv0040 Ordered    Allergies    1  NSAIDs   2  NSAIDs   3  Percocet TABS   4   Percocet TABS    Signatures   Electronically signed by : Angeline Tovar, ; Jun 13 2017  1:45PM EST                       (Author)

## 2018-01-17 NOTE — MISCELLANEOUS
Message   Recorded as Task   Date: 07/17/2017 09:03 AM, Created By: Araceli Tony   Task Name: Miscellaneous   Assigned To: [facility]   Regarding Patient: Ghanshyam Murdock, Status: Active   CommentArlette Dc - 17 Jul 2017 9:03 AM     TASK CREATED  Pt called regarding her Lyrica refill  Pt was on 150mg and was given 50mg to add to it for 200mg  Pt would like 200mg as it seems to be working  Please call [phone]  Dayton Tucker - 17 Jul 2017 2:58 PM     TASK EDITED  s/w pt, confirmed she has been taking lyrica 150 mg 1 tab am, 50 mg afternoon and 150 mg hs  Pt states she would liek to increase to 100 mg am, afternoon and 200 mg hs as discussed at 6/22 ov  Pt states she had some drowsiness x ~ 1 week w/ increase  Resolved at this time  Denies any other se's  Pt states she continues w/ a lot of pain  Feels it is r/t increased activity  Advised pt, will d/w Dg and cb to advise  pt verbalized understanding and appreciation  Fareed Soriano - 17 Jul 2017 3:16 PM     TASK REPLIED TO: Previously Assigned To Fareed Soriano  Can you please call in Lyrica 100 mg, 1 in AM and afternoon and 2 PO HS, disp #120 with zero refills as that should get her to her next OV as scheduled next month  Thank you  Dayton Tucker - 17 Jul 2017 4:11 PM     TASK EDITED  rx called in to [facility] per allscripts  S/w pt, advised of above  pt verbalized understanding - will cb w/ questions or concerns  Active Problems    1  Arthritis (716 90) (M19 90)   2  Benign essential hypertension (401 1) (I10)   3  Chronic bilateral back pain (724 5,338 29) (M54 9,G89 29)   4  Chronic cervical pain (723 1,338 29) (M54 2,G89 29)   5  Chronic lumbar radiculopathy (724 4) (M54 16)   6  Chronic pain syndrome (338 4) (G89 4)   7  Chronic thoracic spine pain (724 1,338 29) (M54 6,G89 29)   8  Chronic, continuous use of opioids (305 51) (F11 90)   9   Controlled type 2 diabetes mellitus without complication, without long-term current use of   insulin (250 00) (E11 9)   10  Diabetes mellitus (250 00) (E11 9)   11  Diabetic peripheral neuropathy (250 60,357 2) (E11 42)   12  Diffuse pain (780 96) (R52)   13  Encounter for mammogram to establish baseline mammogram (V76 12) (Z12 31)   14  GERD (gastroesophageal reflux disease) (530 81) (K21 9)   15  GERD without esophagitis (530 81) (K21 9)   16  Headache (784 0) (R51)   17  Hyperlipidemia (272 4) (E78 5)   18  Hyperlipidemia associated with type 2 diabetes mellitus (456 71,155  4) (E11 69,E78 5)   19  Hypertension (401 9) (I10)   20  Insomnia (780 52) (G47 00)   21  Mild persistent asthma without complication (744 18) (U08 24)   22  Peripheral polyneuropathy (356 9) (G62 9)    Current Meds   1  Albuterol Sulfate (2 5 MG/3ML) 0 083% Inhalation Nebulization Solution; USE 1 UNIT   DOSE EVERY 4-6 HOURS AS NEEDED FOR WHEEZING ; Therapy: 62SFK5794 to Recorded   2  Amlodipine Besy-Benazepril HCl - 5-20 MG Oral Capsule; take 1 capsule daily; Therapy: 07MBD6808 to (Evaluate:40Kgt8748)  Requested for: 77SOB6651; Last   Rx:05Jun2017 Ordered   3  Atorvastatin Calcium 40 MG Oral Tablet; TAKE 1 TABLET DAILY; Therapy: 43ATD9745 to [facility] Recorded   4  Butalbital-APAP-Caffeine -40 MG Oral Tablet; TAKE 1 TABLET EVERY 6 HOURS   AS NEEDED; Therapy: 10DGG2422 to (Evaluate:73Dpe7828) Recorded   5  Cyclobenzaprine HCl - 5 MG Oral Tablet; Take 1 tablet daily; Therapy: 86NYA3434 to Recorded   6  Iron 28 MG Oral Tablet; TAKE AS DIRECTED; Therapy: 38XPS9504 to Recorded   7  Lyrica 100 MG Oral Capsule; Take 1 in AM and afternoon and 2 PO HS; Therapy: 33MJE4498 to (Evaluate:73Agk2895)  Requested for: 04BLT7235; Last   Rx:12Fgz8269 Ordered   8  Lyrica 50 MG Oral Capsule; Take 1 pill in the afternoon; Therapy: 29ARA8614 to (Evaluate:67Qtw8846); Last Rx:22Jun2017 Ordered   9  MetFORMIN HCl - 1000 MG Oral Tablet; TAKE 1 TABLET TWICE DAILY;    Therapy: 46DJT8033 to (Last Rx:05Jun2017)  Requested for: 63MKD3427 Ordered   10  Omeprazole 40 MG Oral Capsule Delayed Release; TAKE ONE CAPSULE BY MOUTH    EVERY MORNING; Therapy: 18WLE2214 to (Evaluate:19Nov2017)  Requested for: 61KQE5529; Last    Rx:23May2017 Ordered   11  OneTouch Ultra Blue In Vitro Strip; TEST ONCE DAILY; Therapy: 74BOS6988 to Recorded   12  ProAir  (90 Base) MCG/ACT Inhalation Aerosol Solution; INHALE 1 TO 2 PUFFS    EVERY 4 TO 6 HOURS AS NEEDED; Therapy: 85DPM5089 to Recorded   13  Symbicort 160-4 5 MCG/ACT Inhalation Aerosol; INHALE 2 PUFFS TWICE DAILY  RINSE MOUTH AFTER USE; Therapy: 60IJR7948 to (Last Rx:06Apr2017) Ordered   14  Tradjenta 5 MG Oral Tablet; TAKE 1 TABLET DAILY; Therapy: 97WOJ9954 to (Evaluate:70Kqv8838)  Requested for: 12LBR1218; Last    FF:24ZFE5131 Ordered   15  TraMADol HCl - 50 MG Oral Tablet; Take 1-2 pills TID PRN for pain, max of 5 per day; Therapy: 26MUH2729 to (Evaluate:99Gra8519); Last Rx:22Jun2017 Ordered   16  Venlafaxine HCl - 75 MG Oral Tablet; TAKE 1 TABLET DAILY; Therapy: 91APL6439 to Recorded   17  Zolpidem Tartrate 10 MG Oral Tablet (Ambien); TAKE 1 TABLET AT BEDTIME AS    NEEDED; Therapy: 44DYH9560 to (Evaluate:40Wqe4740); Last Rx:23May2017 Ordered    Allergies    1  NSAIDs   2   Percocet TABS    Signatures   Electronically signed by : Jaye Newberry, ; Jul 17 2017  4:11PM EST                       (Author)

## 2018-01-17 NOTE — MISCELLANEOUS
Message   Recorded as Task   Date: 06/19/2017 08:07 AM, Created By: Sheryle Clamp   Task Name: Miscellaneous   Assigned To: [facility]   Regarding Patient: Dago Morales, Status: Active   CommentSlorin Jacintok - 19 Jun 2017 8:07 AM     TASK CREATED  Pt having a lot pain in her left lower back  Has an appt  on Thursday, 6/22 but has been having pain since last Wednesday  Please call [phone]   Dayton Tucker - 19 Jun 2017 11:49 AM     TASK EDITED  S/w pt, confirmed above  Per pt, completed x-rays and emg as ordered by [facility] in april 2017  Confirmed tramadol and Lyrica  Pt states she continues to have pain r/t strenuous activity over the weekend  Pt states she recently tx'd to [address] from her pain dr  In [place]  Per pt, + relief w/ TPI's from her previous pain provider  Questioning a sooner ov w/ DG for TPI's this week  Advised pt, no availability at this time  Will d/w Dg and cb to advise  Fareed Soriano - 19 Jun 2017 11:52 AM     TASK REPLIED TO: Previously Assigned To Fareed Soriano  You can have them double book her Thursday morning or afternoon for TPI's  Dayton Tucker - 19 Jun 2017 11:53 AM     TASK REASSIGNED: Previously Assigned To [facility]  She's scheduled for thursday, @ 9:30  Anything in the meantime? rest, ice / heat? oral steroid? Fareed Soriano - 19 Jun 2017 12:11 PM     TASK REPLIED TO: Previously Assigned To Fareed Soriano  I would try rest, ice, heat, and continue her meds  We will have to re-evaluate then  Thank you  Daytno Tucker - 19 Jun 2017 2:04 PM     TASK EDITED  s/w pt, advised of above  pt verbalized understanding  Active Problems    1  Arthritis (716 90) (M19 90)   2  Benign essential hypertension (401 1) (I10)   3  Chronic back pain (724 5,338 29) (M54 9,G89 29)   4  Chronic pain syndrome (338 4) (G89 4)   5  Chronic, continuous use of opioids (305 51) (F11 90)   6   Controlled type 2 diabetes mellitus without complication, without long-term current use of   insulin (250 00) (E11 9)   7  Diabetes mellitus (250 00) (E11 9)   8  Diabetic peripheral neuropathy (250 60,357 2) (E11 42)   9  Encounter for mammogram to establish baseline mammogram (V76 12) (Z12 31)   10  GERD (gastroesophageal reflux disease) (530 81) (K21 9)   11  GERD without esophagitis (530 81) (K21 9)   12  Headache (784 0) (R51)   13  Hyperlipidemia (272 4) (E78 5)   14  Hyperlipidemia associated with type 2 diabetes mellitus (779 28,454  4) (E11 69,E78 5)   15  Hypertension (401 9) (I10)   16  Insomnia (780 52) (G47 00)   17  Mild persistent asthma without complication (065 06) (X21 97)   18  Peripheral polyneuropathy (356 9) (G62 9)    Current Meds   1  Albuterol Sulfate (2 5 MG/3ML) 0 083% Inhalation Nebulization Solution; USE 1 UNIT   DOSE EVERY 4-6 HOURS AS NEEDED FOR WHEEZING ; Therapy: 37MXE1363 to Recorded   2  Ambien 10 MG Oral Tablet (Zolpidem Tartrate); TAKE 1 TABLET Bedtime PRN; Therapy: (Kandy Flick) to Recorded   3  Amlodipine Besy-Benazepril HCl - 5-20 MG Oral Capsule; take 1 capsule daily; Therapy: 91XOW1910 to (Evaluate:97Sbq9271)  Requested for: 19DWW7590; Last   Rx:05Jun2017 Ordered   4  Atorvastatin Calcium 40 MG Oral Tablet; TAKE 1 TABLET DAILY; Therapy: 36OEY8473 to (Geoffry Primus) Recorded   5  Butalbital-APAP-Caffeine -40 MG Oral Tablet; TAKE 1 TABLET EVERY 6 HOURS   AS NEEDED; Therapy: 19WEK0059 to (Evaluate:20Uvx7182) Recorded   6  Cyclobenzaprine HCl - 5 MG Oral Tablet; Take 1 tablet daily; Therapy: 36HKG5867 to Recorded   7  Iron 28 MG Oral Tablet; TAKE AS DIRECTED; Therapy: 59GFP3685 to Recorded   8  Lyrica 150 MG Oral Capsule; TAKE 1 CAPSULE TWICE DAILY; Therapy: 15AHM4251 to (Evaluate:55Tol7896); Last Rx:69Gla1445 Ordered   9  MetFORMIN HCl - 1000 MG Oral Tablet; TAKE 1 TABLET TWICE DAILY; Therapy: 69BTE9939 to (Last Rx:05Jun2017)  Requested for: 12YZF7102 Ordered   10   Omeprazole 40 MG Oral Capsule Delayed Release; TAKE ONE CAPSULE BY MOUTH    EVERY MORNING; Therapy: 56OLJ7329 to (Evaluate:19Nov2017)  Requested for: 99FQI7473; Last    Rx:23May2017 Ordered   11  OneTouch Ultra Blue In Vitro Strip; TEST ONCE DAILY; Therapy: 42XKP2800 to Recorded   12  ProAir  (90 Base) MCG/ACT Inhalation Aerosol Solution; INHALE 1 TO 2 PUFFS    EVERY 4 TO 6 HOURS AS NEEDED; Therapy: 33NYJ3281 to Recorded   13  Symbicort 160-4 5 MCG/ACT Inhalation Aerosol; INHALE 2 PUFFS TWICE DAILY  RINSE MOUTH AFTER USE; Therapy: 14HYH9876 to (Last Rx:06Apr2017) Ordered   14  Tradjenta 5 MG Oral Tablet; TAKE 1 TABLET DAILY; Therapy: 82UQN0412 to (Evaluate:03Oct2017); Last Rx:06Apr2017 Ordered   15  TraMADol HCl - 50 MG Oral Tablet; Therapy: 62IFL1429 to Recorded   16  Venlafaxine HCl - 75 MG Oral Tablet; TAKE 1 TABLET DAILY; Therapy: 49AXN1926 to Recorded   17  Zolpidem Tartrate 10 MG Oral Tablet (Ambien); TAKE 1 TABLET AT BEDTIME AS    NEEDED; Therapy: 15YML7604 to (Evaluate:69Ren5016); Last Rx:23May2017 Ordered    Allergies    1  NSAIDs   2  NSAIDs   3  Percocet TABS   4   Percocet TABS    Signatures   Electronically signed by : Lyubov Flores, ; Jun 19 2017  2:04PM EST                       (Author)

## 2018-01-22 VITALS — DIASTOLIC BLOOD PRESSURE: 82 MMHG | SYSTOLIC BLOOD PRESSURE: 138 MMHG | HEART RATE: 80 BPM

## 2018-01-22 VITALS
WEIGHT: 196 LBS | SYSTOLIC BLOOD PRESSURE: 120 MMHG | BODY MASS INDEX: 33.46 KG/M2 | HEIGHT: 64 IN | DIASTOLIC BLOOD PRESSURE: 72 MMHG

## 2018-01-22 VITALS
OXYGEN SATURATION: 96 % | HEIGHT: 64 IN | DIASTOLIC BLOOD PRESSURE: 92 MMHG | WEIGHT: 191 LBS | SYSTOLIC BLOOD PRESSURE: 148 MMHG | BODY MASS INDEX: 32.61 KG/M2 | HEART RATE: 84 BPM

## 2018-01-23 VITALS
RESPIRATION RATE: 16 BRPM | BODY MASS INDEX: 33.46 KG/M2 | OXYGEN SATURATION: 95 % | HEART RATE: 108 BPM | HEIGHT: 64 IN | WEIGHT: 196 LBS | DIASTOLIC BLOOD PRESSURE: 80 MMHG | SYSTOLIC BLOOD PRESSURE: 120 MMHG

## 2018-02-05 ENCOUNTER — TELEPHONE (OUTPATIENT)
Dept: FAMILY MEDICINE CLINIC | Facility: CLINIC | Age: 58
End: 2018-02-05

## 2018-02-05 NOTE — TELEPHONE ENCOUNTER
msg left would like someone to call her back, has questions about her dm medications and her sugar readings

## 2018-02-07 DIAGNOSIS — E11.8 TYPE 2 DIABETES MELLITUS WITH COMPLICATION, WITHOUT LONG-TERM CURRENT USE OF INSULIN (HCC): Primary | ICD-10-CM

## 2018-03-14 ENCOUNTER — OFFICE VISIT (OUTPATIENT)
Dept: FAMILY MEDICINE CLINIC | Facility: CLINIC | Age: 58
End: 2018-03-14
Payer: COMMERCIAL

## 2018-03-14 VITALS
HEIGHT: 64 IN | OXYGEN SATURATION: 96 % | HEART RATE: 87 BPM | DIASTOLIC BLOOD PRESSURE: 82 MMHG | WEIGHT: 193 LBS | BODY MASS INDEX: 32.95 KG/M2 | SYSTOLIC BLOOD PRESSURE: 126 MMHG

## 2018-03-14 DIAGNOSIS — E13.8 DIABETES MELLITUS OF OTHER TYPE WITH COMPLICATION, UNSPECIFIED LONG TERM INSULIN USE STATUS: Primary | ICD-10-CM

## 2018-03-14 DIAGNOSIS — I10 ESSENTIAL HYPERTENSION: Primary | ICD-10-CM

## 2018-03-14 DIAGNOSIS — Z01.419 ENCNTR FOR GYN EXAM (GENERAL) (ROUTINE) W/O ABN FINDINGS: Primary | ICD-10-CM

## 2018-03-14 PROCEDURE — S0612 ANNUAL GYNECOLOGICAL EXAMINA: HCPCS | Performed by: NURSE PRACTITIONER

## 2018-03-14 RX ORDER — AMLODIPINE BESYLATE AND BENAZEPRIL HYDROCHLORIDE 5; 20 MG/1; MG/1
CAPSULE ORAL
Qty: 90 CAPSULE | Refills: 1 | Status: SHIPPED | OUTPATIENT
Start: 2018-03-14 | End: 2018-08-22 | Stop reason: SDUPTHER

## 2018-03-14 NOTE — PROGRESS NOTES
[id number]         NAME: Preeti Mcmillan is a 62 y o  female  : 1960    MRN: [id number]  DATE: 2018  TIME: 5:15 PM    Assessment and Plan   Encntr for gyn exam (general) (routine) w/o abn findings [Z01 419]  1  Encntr for gyn exam (general) (routine) w/o abn findings           Patient Instructions     Patient Instructions   Call for mammo order when due  No pap today  Call or return for problems/concerns          Chief Complaint     Chief Complaint   Patient presents with    Gynecologic Exam     yearly         History of Present Illness       Here for annual well women exam Pap done last year at previous Dr Kait Isidro pap today  No problems or concerns  Menopause age [de-identified] forties  Has not been sexually active for 20 years  Gynecologic Exam   Pertinent negatives include no abdominal pain, back pain, constipation, diarrhea, dysuria, fever, frequency, headaches, nausea, rash, sore throat, urgency or vomiting  Review of Systems   Review of Systems   Constitutional: Negative for activity change, diaphoresis, fatigue and fever  HENT: Negative for congestion, facial swelling, hearing loss, rhinorrhea, sinus pain, sinus pressure, sneezing, sore throat and voice change  Eyes: Negative for discharge and visual disturbance  Respiratory: Negative for cough, choking, chest tightness, shortness of breath, wheezing and stridor  Cardiovascular: Negative for chest pain, palpitations and leg swelling  Gastrointestinal: Negative for abdominal distention, abdominal pain, constipation, diarrhea, nausea and vomiting  Endocrine: Negative for polydipsia, polyphagia and polyuria  Genitourinary: Negative for difficulty urinating, dysuria, frequency and urgency  Musculoskeletal: Negative for arthralgias, back pain, gait problem, joint swelling, myalgias, neck pain and neck stiffness  Skin: Negative for color change, rash and wound     Neurological: Negative for dizziness, syncope, speech difficulty, weakness, light-headedness and headaches  Hematological: Negative for adenopathy  Does not bruise/bleed easily  Psychiatric/Behavioral: Negative for agitation, behavioral problems, confusion, hallucinations, sleep disturbance and suicidal ideas  The patient is not nervous/anxious            Current Medications       Current Outpatient Prescriptions:     albuterol (PROVENTIL HFA,VENTOLIN HFA) 90 mcg/act inhaler, Inhale 2 puffs every 6 (six) hours as needed for wheezing, Disp: , Rfl:     amLODIPine-benazepril (LOTREL 5-20) 5-20 MG per capsule, TAKE 1 CAPSULE DAILY, Disp: 90 capsule, Rfl: 1    atorvastatin (LIPITOR) 40 mg tablet, Take 40 mg by mouth daily, Disp: , Rfl:     budesonide-formoterol (SYMBICORT) 80-4 5 MCG/ACT inhaler, Inhale 2 puffs 2 (two) times a day, Disp: , Rfl:     Ferrous Fumarate 63 (20 Fe) MG TABS, Take 65 mg by mouth daily, Disp: , Rfl:     HYDROcodone-acetaminophen (NORCO) 5-325 mg per tablet, Take 1 tablet by mouth every 4 (four) hours as needed for pain, Disp: , Rfl:     Linagliptin (TRADJENTA) 5 MG TABS, Take 5 mg by mouth daily, Disp: , Rfl:     metFORMIN (GLUCOPHAGE) 1000 MG tablet, TAKE 1 TABLET TWICE A DAY, Disp: 180 tablet, Rfl: 0    pioglitazone (ACTOS) 15 mg tablet, Take 15 mg by mouth daily, Disp: , Rfl:     pregabalin (LYRICA) 100 mg capsule, Take 50 mg by mouth 3 (three) times a day, Disp: , Rfl:     traMADol (ULTRAM) 50 mg tablet, Take 50 mg by mouth 2 (two) times a day, Disp: , Rfl:     zolpidem (AMBIEN) 5 mg tablet, Take 5 mg by mouth daily at bedtime as needed for sleep, Disp: , Rfl:     Current Allergies     Allergies as of 03/14/2018 - Reviewed 03/14/2018   Allergen Reaction Noted    Nsaids Other (See Comments) 09/23/2017    Percocet [oxycodone-acetaminophen] GI Intolerance 09/23/2017            The following portions of the patient's history were reviewed and updated as appropriate: allergies, current medications, past family history, past medical history, past social history, past surgical history and problem list      Past Medical History:   Diagnosis Date    Anxiety     Asthma     Breast lump     Resolved: 2017     Chronic pain     back    Diabetes mellitus ([address] )     History of stomach ulcers     Hyperlipidemia     Hypertension        Past Surgical History:   Procedure Laterality Date    APPENDECTOMY      BACK SURGERY       SECTION      CHOLECYSTECTOMY      GASTRIC BYPASS      SHOULDER SURGERY         Family History   Problem Relation Age of Onset    Diabetes Mother      Mellitus    Hyperlipidemia Mother     Hypertension Mother     Cancer Father      Bladder     Alcohol abuse Father     Lung cancer Father     Prostate cancer Father     Diabetes Brother      Mellitus    Hyperlipidemia Brother     Hypertension Brother          Medications have been verified  Objective   /82   Pulse 87   Ht 5' 4" (1 626 m)   Wt 87 5 kg (193 lb)   LMP  (LMP Unknown)   SpO2 96%   BMI 33 13 kg/m²        Physical Exam     Physical Exam   Constitutional: She is oriented to person, place, and time  She appears well-developed and well-nourished  No distress  Neck: Normal range of motion  Neck supple  No tracheal deviation present  No thyroid mass and no thyromegaly present  Thyroid: no nodules   Cardiovascular: Normal rate and regular rhythm  Exam reveals no gallop and no friction rub  No murmur heard  Pulmonary/Chest: Effort normal and breath sounds normal  No respiratory distress  She has no wheezes  She has no rales  She exhibits no tenderness  Abdominal: Soft  Bowel sounds are normal  She exhibits no distension and no mass  There is no tenderness  There is no rebound and no guarding  Genitourinary: Vagina normal and uterus normal  No breast swelling, tenderness, discharge or bleeding  There is no rash, tenderness, lesion or injury on the right labia   There is no rash, tenderness, lesion or injury on the left labia  Uterus is not deviated, not enlarged, not fixed and not tender  Cervix exhibits no motion tenderness, no discharge and no friability  Right adnexum displays no mass, no tenderness and no fullness  Left adnexum displays no mass, no tenderness and no fullness  No erythema, tenderness or bleeding in the vagina  No foreign body in the vagina  No signs of injury around the vagina  No vaginal discharge found  Musculoskeletal: Normal range of motion  She exhibits no edema, tenderness or deformity  Neurological: She is alert and oriented to person, place, and time  Skin: Skin is warm and dry  She is not diaphoretic  Psychiatric: She has a normal mood and affect   Her behavior is normal  Judgment and thought content normal

## 2018-03-19 DIAGNOSIS — E78.5 HYPERLIPIDEMIA ASSOCIATED WITH TYPE 2 DIABETES MELLITUS (HCC): Primary | ICD-10-CM

## 2018-03-19 DIAGNOSIS — E11.69 HYPERLIPIDEMIA ASSOCIATED WITH TYPE 2 DIABETES MELLITUS (HCC): Primary | ICD-10-CM

## 2018-03-19 RX ORDER — ATORVASTATIN CALCIUM 40 MG/1
40 TABLET, FILM COATED ORAL DAILY
Qty: 90 TABLET | Refills: 0 | Status: SHIPPED | OUTPATIENT
Start: 2018-03-19 | End: 2018-06-03 | Stop reason: SDUPTHER

## 2018-03-20 DIAGNOSIS — G47.00 INSOMNIA, UNSPECIFIED TYPE: Primary | ICD-10-CM

## 2018-03-20 RX ORDER — ZOLPIDEM TARTRATE 5 MG/1
5 TABLET ORAL
Qty: 30 TABLET | Refills: 2 | Status: SHIPPED | OUTPATIENT
Start: 2018-03-20 | End: 2018-06-20 | Stop reason: SDUPTHER

## 2018-04-16 NOTE — PROGRESS NOTES
Call patient with lab result-labs look okay except for elevated liver enzymes  Has she had any intestinal complaints  Such as diarrhea or nausea

## 2018-04-18 ENCOUNTER — TELEPHONE (OUTPATIENT)
Dept: FAMILY MEDICINE CLINIC | Facility: CLINIC | Age: 58
End: 2018-04-18

## 2018-04-18 DIAGNOSIS — R74.01 ELEVATED TRANSAMINASE LEVEL: Primary | ICD-10-CM

## 2018-04-18 NOTE — TELEPHONE ENCOUNTER
Okay to order, but would talk to her 1st to see if there is any other exposures of the risk that she is aware of  Also ask how much alcohol  Ask how she is feeling

## 2018-04-18 NOTE — TELEPHONE ENCOUNTER
May be wait about 1 week and repeat the hepatic function panel also to see if this is trending up or down

## 2018-04-18 NOTE — TELEPHONE ENCOUNTER
PM -- See below -- anything other than the Hep B testing? Spoke to patient  States she does gets some episodes of nausea after eating lunch, but then it does subside  She Denies:   Any Alcohol use, IV drug use, tattoos, Hepatitis B exposure, No diarrhea, No yellowing of eyes/skin  Reports that she thinks she was told in past that she had a fatty liver-- had CT abdomen/pelvis 9/23/2017    "LIVER/GALLBLADDER/BILIARY TREE:  Prior cholecystectomy with expected prominence of the central intrahepatic ducts and common duct gentle taper to the pancreatic head  No concerning biliary ductal dilatation  Liver parenchyma is unremarkable    Liver   size is normal "

## 2018-04-18 NOTE — TELEPHONE ENCOUNTER
Spoke to [facility], added :  Hep B surface Ag & Hep A Ab -- addl code R74 [id number]    Patient notified of same and as per PM    Hepatic Panel ordered at [facility] for next week

## 2018-04-18 NOTE — TELEPHONE ENCOUNTER
PM -- before I call her if she has not had any "stomach issues" should I order a Hep B panel? Hep C was just Negative  This is a huge increase in her ALT/AST since they were done 7 mos ago      ----- Message from Estella Thompson MD sent at 4/16/2018  7:27 PM EDT -----  Call patient with lab result-labs look okay except for elevated liver enzymes  Has she had any intestinal complaints  Such as diarrhea or nausea

## 2018-04-19 DIAGNOSIS — E11.9 TYPE 2 DIABETES MELLITUS WITHOUT COMPLICATION, WITHOUT LONG-TERM CURRENT USE OF INSULIN (HCC): Primary | ICD-10-CM

## 2018-04-20 LAB
ALBUMIN SERPL-MCNC: 3.8 G/DL (ref 3.6–5.1)
ALBUMIN/GLOB SERPL: 1.7 (CALC) (ref 1–2.5)
ALP SERPL-CCNC: 118 U/L (ref 33–130)
ALT SERPL-CCNC: 135 U/L (ref 6–29)
AST SERPL-CCNC: 206 U/L (ref 10–35)
BILIRUB SERPL-MCNC: 0.6 MG/DL (ref 0.2–1.2)
BUN SERPL-MCNC: 16 MG/DL (ref 7–25)
BUN/CREAT SERPL: ABNORMAL (CALC) (ref 6–22)
CALCIUM SERPL-MCNC: 9 MG/DL (ref 8.6–10.4)
CHLORIDE SERPL-SCNC: 104 MMOL/L (ref 98–110)
CHOLEST SERPL-MCNC: 130 MG/DL
CHOLEST/HDLC SERPL: 3 (CALC)
CO2 SERPL-SCNC: 29 MMOL/L (ref 20–31)
CREAT SERPL-MCNC: 0.68 MG/DL (ref 0.5–1.05)
GLOBULIN SER CALC-MCNC: 2.3 G/DL (CALC) (ref 1.9–3.7)
GLUCOSE SERPL-MCNC: 127 MG/DL (ref 65–99)
HAV AB SER QL IA: REACTIVE
HBA1C MFR BLD: 7.7 % OF TOTAL HGB
HBV SURFACE AG SERPL QL IA: NORMAL
HCV AB S/CO SERPL IA: 0.01
HCV AB SERPL QL IA: NORMAL
HDLC SERPL-MCNC: 43 MG/DL
LDLC SERPL CALC-MCNC: 66 MG/DL (CALC)
NONHDLC SERPL-MCNC: 87 MG/DL (CALC)
POTASSIUM SERPL-SCNC: 4.1 MMOL/L (ref 3.5–5.3)
PROT SERPL-MCNC: 6.1 G/DL (ref 6.1–8.1)
REF LAB TEST NAME: NORMAL
REF LAB TEST: NORMAL
SL AMB CLIENT CONTACT: NORMAL
SL AMB EGFR AFRICAN AMERICAN: 112 ML/MIN/1.73M2
SL AMB EGFR NON AFRICAN AMERICAN: 96 ML/MIN/1.73M2
SODIUM SERPL-SCNC: 141 MMOL/L (ref 135–146)
TRIGL SERPL-MCNC: 130 MG/DL
VIT B12 SERPL-MCNC: 292 PG/ML (ref 200–1100)

## 2018-04-25 ENCOUNTER — TELEPHONE (OUTPATIENT)
Dept: FAMILY MEDICINE CLINIC | Facility: CLINIC | Age: 58
End: 2018-04-25

## 2018-04-25 NOTE — TELEPHONE ENCOUNTER
Msg left pt Cb for results        Call patient with lab result-  Patient aware of hepatitis a-A1c is elevated at 7 7   She could increase Actos to 30 mg daily and repeat in 3 months   She should also attempt to improve her diet

## 2018-04-27 ENCOUNTER — OFFICE VISIT (OUTPATIENT)
Dept: FAMILY MEDICINE CLINIC | Facility: CLINIC | Age: 58
End: 2018-04-27
Payer: COMMERCIAL

## 2018-04-27 VITALS
WEIGHT: 194 LBS | HEIGHT: 64 IN | DIASTOLIC BLOOD PRESSURE: 84 MMHG | SYSTOLIC BLOOD PRESSURE: 126 MMHG | BODY MASS INDEX: 33.12 KG/M2

## 2018-04-27 DIAGNOSIS — B15.9 VIRAL HEPATITIS A WITHOUT HEPATIC COMA: ICD-10-CM

## 2018-04-27 DIAGNOSIS — E11.9 CONTROLLED TYPE 2 DIABETES MELLITUS WITHOUT COMPLICATION, WITHOUT LONG-TERM CURRENT USE OF INSULIN (HCC): ICD-10-CM

## 2018-04-27 DIAGNOSIS — R19.7 DIARRHEA OF PRESUMED INFECTIOUS ORIGIN: ICD-10-CM

## 2018-04-27 DIAGNOSIS — R74.01 ELEVATED TRANSAMINASE LEVEL: Primary | ICD-10-CM

## 2018-04-27 PROBLEM — E78.5 HYPERLIPIDEMIA ASSOCIATED WITH TYPE 2 DIABETES MELLITUS (HCC): Status: ACTIVE | Noted: 2017-04-06

## 2018-04-27 PROBLEM — E11.69 HYPERLIPIDEMIA ASSOCIATED WITH TYPE 2 DIABETES MELLITUS (HCC): Status: ACTIVE | Noted: 2017-04-06

## 2018-04-27 PROBLEM — G89.29 CHRONIC THORACIC SPINE PAIN: Status: ACTIVE | Noted: 2017-06-22

## 2018-04-27 PROBLEM — G47.00 INSOMNIA: Status: ACTIVE | Noted: 2017-04-24

## 2018-04-27 PROBLEM — K21.9 GERD WITHOUT ESOPHAGITIS: Status: ACTIVE | Noted: 2017-04-06

## 2018-04-27 PROBLEM — M54.9 CHRONIC BILATERAL BACK PAIN: Status: ACTIVE | Noted: 2017-04-06

## 2018-04-27 PROBLEM — I10 BENIGN ESSENTIAL HYPERTENSION: Status: ACTIVE | Noted: 2017-04-06

## 2018-04-27 PROBLEM — J45.30 MILD PERSISTENT ASTHMA WITHOUT COMPLICATION: Status: ACTIVE | Noted: 2017-04-06

## 2018-04-27 PROBLEM — M54.6 CHRONIC THORACIC SPINE PAIN: Status: ACTIVE | Noted: 2017-06-22

## 2018-04-27 PROBLEM — G89.29 CHRONIC BILATERAL BACK PAIN: Status: ACTIVE | Noted: 2017-04-06

## 2018-04-27 PROBLEM — M54.16 CHRONIC LUMBAR RADICULOPATHY: Status: ACTIVE | Noted: 2017-06-22

## 2018-04-27 PROBLEM — G62.9 PERIPHERAL POLYNEUROPATHY: Status: ACTIVE | Noted: 2017-04-25

## 2018-04-27 PROBLEM — M54.2 CHRONIC CERVICAL PAIN: Status: ACTIVE | Noted: 2017-06-22

## 2018-04-27 PROBLEM — G89.29 CHRONIC CERVICAL PAIN: Status: ACTIVE | Noted: 2017-06-22

## 2018-04-27 PROBLEM — G89.4 CHRONIC PAIN DISORDER: Status: ACTIVE | Noted: 2017-04-25

## 2018-04-27 PROCEDURE — 99214 OFFICE O/P EST MOD 30 MIN: CPT | Performed by: FAMILY MEDICINE

## 2018-04-27 RX ORDER — ONDANSETRON 4 MG/1
4 TABLET, FILM COATED ORAL EVERY 8 HOURS PRN
Qty: 20 TABLET | Refills: 0 | Status: SHIPPED | OUTPATIENT
Start: 2018-04-27 | End: 2018-09-11

## 2018-04-27 RX ORDER — GLIPIZIDE 2.5 MG/1
2.5 TABLET, EXTENDED RELEASE ORAL DAILY
Qty: 30 TABLET | Refills: 5 | Status: SHIPPED | OUTPATIENT
Start: 2018-04-27 | End: 2018-07-23 | Stop reason: SDUPTHER

## 2018-04-27 NOTE — PROGRESS NOTES
Assessment/Plan:    No problem-specific Assessment & Plan notes found for this encounter  Diagnoses and all orders for this visit:    Elevated transaminase level    Viral hepatitis A without hepatic coma  -     CBC and differential; Future  -     Hepatic function panel; Future  -     ondansetron (ZOFRAN) 4 mg tablet; Take 1 tablet (4 mg total) by mouth every 8 (eight) hours as needed for nausea or vomiting  -     CBC and differential  -     Hepatic function panel    Diarrhea of presumed infectious origin  -     CBC and differential; Future  -     Stool Enteric Bacterial Panel by PCR; Future  -     ondansetron (ZOFRAN) 4 mg tablet; Take 1 tablet (4 mg total) by mouth every 8 (eight) hours as needed for nausea or vomiting  -     CBC and differential  -     Stool Enteric Bacterial Panel by PCR    Controlled type 2 diabetes mellitus without complication, without long-term current use of insulin (HCC)  -     glipiZIDE (GLUCOTROL XL) 2 5 mg 24 hr tablet; Take 1 tablet (2 5 mg total) by mouth daily    Other orders  -     glucose blood (ONE TOUCH ULTRA TEST) test strip; by In Vitro route daily        Patient Instructions     I would repeat her liver enzymes today  Also add a CBC  These results will come back on Saturday  With the diarrhea persisting it may also be reasonable just to check a stool sample for culture  Stick on a lighter diet with good fluid intake  Diabetes - will give written prescription for glipizide to be used once she is feeling better and attempt to lower sugar  If there are side effects from this or sugars are still not coming down may need to go to basal insulin  Subjective:   Chief Complaint   Patient presents with    Discuss Medication     diabetes, hep A          Patient ID: Brian Brochure is a 62 y o  female  Patient here to follow-up on abnormal laboratory studies  Liver enzymes are elevated with a positive hepatitis a antibody    Hepatitis-B antigen and hepatitis-C antibody were negative  Patient does recall eating some raw shrimp she had ordered which may have been a bit off  She is having some abdominal cramping with right upper quadrant pain  There is also diarrhea  Intermittent fevers are also present  Diabetes- A1c had been 7 7%  Has had some recent high sugars being ill  Did not tolerate the Actos as a drop sugars too low  Also has not tolerated the other injectables  Options are discussed  The following portions of the patient's history were reviewed and updated as appropriate: allergies, current medications, past family history, past medical history, past social history and problem list     Review of Systems   Constitutional: Positive for appetite change, chills, diaphoresis, fatigue and fever  Respiratory: Positive for cough  Negative for choking and shortness of breath  Cardiovascular: Negative for chest pain and palpitations  Gastrointestinal: Positive for abdominal pain, diarrhea and nausea  Negative for abdominal distention, blood in stool, constipation, rectal pain and vomiting  Genitourinary: Negative for dysuria, flank pain, frequency and urgency  Musculoskeletal: Negative for back pain and myalgias  Neurological: Negative for dizziness, syncope and light-headedness  Objective:      /84   Ht 5' 4" (1 626 m)   Wt 88 kg (194 lb)   LMP  (LMP Unknown)   BMI 33 30 kg/m²          Physical Exam   Constitutional: She is oriented to person, place, and time  She appears well-developed and well-nourished  No distress  HENT:   Head: Normocephalic and atraumatic  Mouth/Throat: Oropharynx is clear and moist    Neck: Neck supple  No thyromegaly present  Cardiovascular: Normal rate, regular rhythm and normal heart sounds  Pulmonary/Chest: Effort normal and breath sounds normal  No respiratory distress  She has no wheezes  Abdominal: Soft  Bowel sounds are normal  She exhibits no distension and no mass   There is no hepatosplenomegaly  There is tenderness  There is no rebound, no guarding and no CVA tenderness  No hernia  Musculoskeletal:        Right shoulder: She exhibits no tenderness  Lymphadenopathy:     She has no cervical adenopathy  Neurological: She is alert and oriented to person, place, and time  Skin: Skin is warm and dry  She is not diaphoretic

## 2018-04-27 NOTE — PATIENT INSTRUCTIONS
I would repeat her liver enzymes today  Also add a CBC  These results will come back on Saturday  With the diarrhea persisting it may also be reasonable just to check a stool sample for culture  Stick on a lighter diet with good fluid intake  Diabetes - will give written prescription for glipizide to be used once she is feeling better and attempt to lower sugar  If there are side effects from this or sugars are still not coming down may need to go to basal insulin

## 2018-04-28 LAB
ALBUMIN SERPL-MCNC: 4.2 G/DL (ref 3.6–5.1)
ALBUMIN/GLOB SERPL: 1.6 (CALC) (ref 1–2.5)
ALP SERPL-CCNC: 74 U/L (ref 33–130)
ALT SERPL-CCNC: 29 U/L (ref 6–29)
AST SERPL-CCNC: 23 U/L (ref 10–35)
BASOPHILS # BLD AUTO: 31 CELLS/UL (ref 0–200)
BASOPHILS NFR BLD AUTO: 0.3 %
BILIRUB DIRECT SERPL-MCNC: 0.1 MG/DL
BILIRUB INDIRECT SERPL-MCNC: 0.3 MG/DL (CALC) (ref 0.2–1.2)
BILIRUB SERPL-MCNC: 0.4 MG/DL (ref 0.2–1.2)
EOSINOPHIL # BLD AUTO: 165 CELLS/UL (ref 15–500)
EOSINOPHIL NFR BLD AUTO: 1.6 %
ERYTHROCYTE [DISTWIDTH] IN BLOOD BY AUTOMATED COUNT: 14.2 % (ref 11–15)
GLOBULIN SER CALC-MCNC: 2.7 G/DL (CALC) (ref 1.9–3.7)
HCT VFR BLD AUTO: 38.6 % (ref 35–45)
HGB BLD-MCNC: 12.4 G/DL (ref 11.7–15.5)
LYMPHOCYTES # BLD AUTO: 2184 CELLS/UL (ref 850–3900)
LYMPHOCYTES NFR BLD AUTO: 21.2 %
MCH RBC QN AUTO: 26.2 PG (ref 27–33)
MCHC RBC AUTO-ENTMCNC: 32.1 G/DL (ref 32–36)
MCV RBC AUTO: 81.6 FL (ref 80–100)
MONOCYTES # BLD AUTO: 453 CELLS/UL (ref 200–950)
MONOCYTES NFR BLD AUTO: 4.4 %
NEUTROPHILS # BLD AUTO: 7468 CELLS/UL (ref 1500–7800)
NEUTROPHILS NFR BLD AUTO: 72.5 %
PLATELET # BLD AUTO: 270 THOUSAND/UL (ref 140–400)
PMV BLD REES-ECKER: 12.5 FL (ref 7.5–12.5)
PROT SERPL-MCNC: 6.9 G/DL (ref 6.1–8.1)
RBC # BLD AUTO: 4.73 MILLION/UL (ref 3.8–5.1)
WBC # BLD AUTO: 10.3 THOUSAND/UL (ref 3.8–10.8)

## 2018-04-28 NOTE — PROGRESS NOTES
Call patient with lab result-  Liver enzymes are return back to normal   Stool culture still pending

## 2018-04-30 ENCOUNTER — TELEPHONE (OUTPATIENT)
Dept: FAMILY MEDICINE CLINIC | Facility: CLINIC | Age: 58
End: 2018-04-30

## 2018-04-30 NOTE — TELEPHONE ENCOUNTER
PT AWARE--WILL CALL WHEN STOOL CULTURE RESULTS ARE FINALIZED      ----- Message from Jasper Sandhoff, MD sent at 4/28/2018 10:27 AM EDT -----  Call patient with lab result-  Liver enzymes are return back to normal   Stool culture still pending

## 2018-05-01 ENCOUNTER — TELEPHONE (OUTPATIENT)
Dept: FAMILY MEDICINE CLINIC | Facility: CLINIC | Age: 58
End: 2018-05-01

## 2018-05-01 NOTE — TELEPHONE ENCOUNTER
PT AWARE- STILL HAVING DIARRHEA- ONCE OR TWICE PER DAY, ALSO C/O FATIGUE AND SOME PAIN RUQ PAIN--- WANTS TO KNOW IF SHE STILL HAS HEPATITIS A? AND IF SO, HOW LONG WILL SHE HAVE THE SXS FOR? TASK TO PM TO ADVISE        ----- Message from Bonnie Lara MD sent at 4/30/2018  1:05 PM EDT -----  Call patient with lab result-stool culture is negative, if she still having the diarrhea?

## 2018-05-02 NOTE — TELEPHONE ENCOUNTER
The liver enzymes are back to normal   My concern is the diarrhea may not be related to the hepatitis any more  Check to see if anybody else is ill  Also has she had been on antibiotics in the last month for rehab where about C diff  I believe only did a stool culture  I suppose we should check over in parasites and C diff

## 2018-05-02 NOTE — TELEPHONE ENCOUNTER
PT AWARE- SHE IS NOT CONCERNED ABOUT THE DIARRHEA AS MUCH B/C SHE HAS CHANGED HER DIET  IF THINGS DONT GET ANY BETTER, OR WORSE, THEN SHE WILL CALL BACK TO HAVE HER STOOL TESTED

## 2018-05-07 DIAGNOSIS — K21.00 GASTROESOPHAGEAL REFLUX DISEASE WITH ESOPHAGITIS: Primary | ICD-10-CM

## 2018-05-08 RX ORDER — OMEPRAZOLE 40 MG/1
CAPSULE, DELAYED RELEASE ORAL
Qty: 90 CAPSULE | Refills: 1 | Status: SHIPPED | OUTPATIENT
Start: 2018-05-08 | End: 2018-10-25 | Stop reason: SDUPTHER

## 2018-05-30 DIAGNOSIS — E11.9 TYPE 2 DIABETES MELLITUS WITHOUT COMPLICATION, WITHOUT LONG-TERM CURRENT USE OF INSULIN (HCC): ICD-10-CM

## 2018-05-30 NOTE — TELEPHONE ENCOUNTER
Called and spoke to pt         E-Prescribing Status     Medication        Linagliptin (TRADJENTA) 5 MG TABS        Sig: Take 5 mg by mouth daily        Class: Normal        Route: Oral        Date/Time Signed: 4/19/2018  3:00 PM       E-Prescribing Status: Receipt confirmed by pharmacy (4/19/2018  3:00 PM EDT)

## 2018-06-01 DIAGNOSIS — E11.9 TYPE 2 DIABETES MELLITUS WITHOUT COMPLICATION, WITHOUT LONG-TERM CURRENT USE OF INSULIN (HCC): ICD-10-CM

## 2018-06-03 DIAGNOSIS — E11.69 HYPERLIPIDEMIA ASSOCIATED WITH TYPE 2 DIABETES MELLITUS (HCC): ICD-10-CM

## 2018-06-03 DIAGNOSIS — E78.5 HYPERLIPIDEMIA ASSOCIATED WITH TYPE 2 DIABETES MELLITUS (HCC): ICD-10-CM

## 2018-06-03 DIAGNOSIS — E11.9 TYPE 2 DIABETES MELLITUS WITHOUT COMPLICATION, WITHOUT LONG-TERM CURRENT USE OF INSULIN (HCC): Primary | ICD-10-CM

## 2018-06-04 RX ORDER — ATORVASTATIN CALCIUM 40 MG/1
TABLET, FILM COATED ORAL
Qty: 90 TABLET | Refills: 0 | Status: SHIPPED | OUTPATIENT
Start: 2018-06-04 | End: 2018-09-04 | Stop reason: SDUPTHER

## 2018-06-18 ENCOUNTER — TELEPHONE (OUTPATIENT)
Dept: FAMILY MEDICINE CLINIC | Facility: CLINIC | Age: 58
End: 2018-06-18

## 2018-06-18 DIAGNOSIS — J45.909 ASTHMA, UNSPECIFIED ASTHMA SEVERITY, UNSPECIFIED WHETHER COMPLICATED, UNSPECIFIED WHETHER PERSISTENT: Primary | ICD-10-CM

## 2018-06-18 RX ORDER — ALBUTEROL SULFATE 90 UG/1
2 AEROSOL, METERED RESPIRATORY (INHALATION) EVERY 6 HOURS PRN
Qty: 3 INHALER | Refills: 0 | Status: SHIPPED | OUTPATIENT
Start: 2018-06-18 | End: 2018-06-20

## 2018-06-20 ENCOUNTER — TELEPHONE (OUTPATIENT)
Dept: FAMILY MEDICINE CLINIC | Facility: CLINIC | Age: 58
End: 2018-06-20

## 2018-06-20 DIAGNOSIS — J45.30 MILD PERSISTENT ASTHMA, UNSPECIFIED WHETHER COMPLICATED: Primary | ICD-10-CM

## 2018-06-20 DIAGNOSIS — G47.00 INSOMNIA, UNSPECIFIED TYPE: ICD-10-CM

## 2018-06-20 RX ORDER — ALBUTEROL SULFATE 90 UG/1
2 AEROSOL, METERED RESPIRATORY (INHALATION) EVERY 6 HOURS PRN
Qty: 3 INHALER | Refills: 0 | Status: SHIPPED | OUTPATIENT
Start: 2018-06-20 | End: 2018-08-16 | Stop reason: SDUPTHER

## 2018-06-20 RX ORDER — ZOLPIDEM TARTRATE 5 MG/1
5 TABLET ORAL
Qty: 30 TABLET | Refills: 1 | Status: SHIPPED | OUTPATIENT
Start: 2018-06-20 | End: 2018-08-20 | Stop reason: SDUPTHER

## 2018-06-20 NOTE — TELEPHONE ENCOUNTER
Call pharmacy- the proAir is what was called- all our RX start with the generic name  I cannot order as straight ProAir- The ProAir is in ( )   Can this be called in?

## 2018-07-09 LAB
LEFT EYE DIABETIC RETINOPATHY: NORMAL
RIGHT EYE DIABETIC RETINOPATHY: NORMAL

## 2018-07-15 LAB
ALBUMIN SERPL-MCNC: 3.7 G/DL (ref 3.6–5.1)
ALBUMIN/GLOB SERPL: 1.4 (CALC) (ref 1–2.5)
ALP SERPL-CCNC: 175 U/L (ref 33–130)
ALT SERPL-CCNC: 62 U/L (ref 6–29)
AST SERPL-CCNC: 28 U/L (ref 10–35)
BILIRUB DIRECT SERPL-MCNC: 0.1 MG/DL
BILIRUB INDIRECT SERPL-MCNC: 0.3 MG/DL (CALC) (ref 0.2–1.2)
BILIRUB SERPL-MCNC: 0.4 MG/DL (ref 0.2–1.2)
BUN SERPL-MCNC: 13 MG/DL (ref 7–25)
BUN/CREAT SERPL: ABNORMAL (CALC) (ref 6–22)
CALCIUM SERPL-MCNC: 9.3 MG/DL (ref 8.6–10.4)
CHLORIDE SERPL-SCNC: 104 MMOL/L (ref 98–110)
CO2 SERPL-SCNC: 28 MMOL/L (ref 20–31)
CREAT SERPL-MCNC: 0.64 MG/DL (ref 0.5–1.05)
GLOBULIN SER CALC-MCNC: 2.6 G/DL (CALC) (ref 1.9–3.7)
GLUCOSE SERPL-MCNC: 141 MG/DL (ref 65–99)
HBA1C MFR BLD: 7.9 % OF TOTAL HGB
POTASSIUM SERPL-SCNC: 4.8 MMOL/L (ref 3.5–5.3)
PROT SERPL-MCNC: 6.3 G/DL (ref 6.1–8.1)
SL AMB EGFR AFRICAN AMERICAN: 114 ML/MIN/1.73M2
SL AMB EGFR NON AFRICAN AMERICAN: 98 ML/MIN/1.73M2
SODIUM SERPL-SCNC: 140 MMOL/L (ref 135–146)

## 2018-07-23 ENCOUNTER — OFFICE VISIT (OUTPATIENT)
Dept: FAMILY MEDICINE CLINIC | Facility: CLINIC | Age: 58
End: 2018-07-23
Payer: COMMERCIAL

## 2018-07-23 VITALS
DIASTOLIC BLOOD PRESSURE: 90 MMHG | SYSTOLIC BLOOD PRESSURE: 160 MMHG | OXYGEN SATURATION: 93 % | BODY MASS INDEX: 32.78 KG/M2 | RESPIRATION RATE: 16 BRPM | WEIGHT: 192 LBS | HEIGHT: 64 IN | HEART RATE: 88 BPM

## 2018-07-23 DIAGNOSIS — I10 BENIGN ESSENTIAL HYPERTENSION: ICD-10-CM

## 2018-07-23 DIAGNOSIS — K21.9 GERD WITHOUT ESOPHAGITIS: ICD-10-CM

## 2018-07-23 DIAGNOSIS — G89.4 CHRONIC PAIN DISORDER: ICD-10-CM

## 2018-07-23 DIAGNOSIS — B19.9 INFECTIOUS VIRAL HEPATITIS: ICD-10-CM

## 2018-07-23 DIAGNOSIS — E11.69 HYPERLIPIDEMIA ASSOCIATED WITH TYPE 2 DIABETES MELLITUS (HCC): ICD-10-CM

## 2018-07-23 DIAGNOSIS — Z00.00 ENCOUNTER FOR WELLNESS EXAMINATION IN ADULT: Primary | ICD-10-CM

## 2018-07-23 DIAGNOSIS — Z12.31 SCREENING MAMMOGRAM, ENCOUNTER FOR: ICD-10-CM

## 2018-07-23 DIAGNOSIS — E11.9 TYPE 2 DIABETES MELLITUS WITHOUT COMPLICATION, WITHOUT LONG-TERM CURRENT USE OF INSULIN (HCC): ICD-10-CM

## 2018-07-23 DIAGNOSIS — E78.5 HYPERLIPIDEMIA ASSOCIATED WITH TYPE 2 DIABETES MELLITUS (HCC): ICD-10-CM

## 2018-07-23 DIAGNOSIS — J45.30 MILD PERSISTENT ASTHMA WITHOUT COMPLICATION: ICD-10-CM

## 2018-07-23 PROCEDURE — 99396 PREV VISIT EST AGE 40-64: CPT | Performed by: FAMILY MEDICINE

## 2018-07-23 PROCEDURE — 99214 OFFICE O/P EST MOD 30 MIN: CPT | Performed by: FAMILY MEDICINE

## 2018-07-23 RX ORDER — PREDNISONE 20 MG/1
TABLET ORAL
Qty: 15 TABLET | Refills: 0 | Status: SHIPPED | OUTPATIENT
Start: 2018-07-23 | End: 2018-08-29

## 2018-07-23 RX ORDER — GLIPIZIDE 5 MG/1
5 TABLET, FILM COATED, EXTENDED RELEASE ORAL DAILY
Qty: 90 TABLET | Refills: 0 | Status: SHIPPED | OUTPATIENT
Start: 2018-07-23 | End: 2018-11-26 | Stop reason: SDUPTHER

## 2018-07-23 RX ORDER — GLIPIZIDE 5 MG/1
5 TABLET, FILM COATED, EXTENDED RELEASE ORAL DAILY
Qty: 90 TABLET | Refills: 1 | Status: SHIPPED | OUTPATIENT
Start: 2018-07-23 | End: 2018-07-23 | Stop reason: SDUPTHER

## 2018-07-23 RX ORDER — BUDESONIDE AND FORMOTEROL FUMARATE DIHYDRATE 160; 4.5 UG/1; UG/1
2 AEROSOL RESPIRATORY (INHALATION) 2 TIMES DAILY
Qty: 3 INHALER | Refills: 3 | Status: SHIPPED | OUTPATIENT
Start: 2018-07-23 | End: 2019-07-06 | Stop reason: SDUPTHER

## 2018-07-23 NOTE — PATIENT INSTRUCTIONS
Increase Glucotrol to 5 mg daily  Continue other meds for diabetes  Recheck labs in 3 months  Asthma-restart Symbicort twice daily and short course of prednisone for acute symptoms  Continue your ProAir as needed  Liver enzyme elevations-would repeat hepatitis panel just to verify this is not bad hepatitis B or C rather than the positive hepatitis A  Continue other medications  Health maintenance-mammogram order  Consider getting new shingles vaccine at pharmacy  Advanced directives were discussed and forms provided

## 2018-07-23 NOTE — PROGRESS NOTES
Assessment/Plan:    No problem-specific Assessment & Plan notes found for this encounter  Diagnoses and all orders for this visit:    Encounter for wellness examination in adult    Screening mammogram, encounter for  -     Mammo screening bilateral w 3d & cad; Future    Mild persistent asthma without complication  -     predniSONE 20 mg tablet; 1 tab twice daily for 5 days, then 1 tab daily for 5 days  -     budesonide-formoterol (SYMBICORT) 160-4 5 mcg/act inhaler; Inhale 2 puffs 2 (two) times a day Rinse mouth after use  Hyperlipidemia associated with type 2 diabetes mellitus (HCC)    Benign essential hypertension    Chronic pain disorder    GERD without esophagitis    Type 2 diabetes mellitus without complication, without long-term current use of insulin (HCC)  -     Linagliptin (TRADJENTA) 5 MG TABS; Take 5 mg by mouth daily  -     Discontinue: glipiZIDE (GLUCOTROL XL) 5 mg 24 hr tablet; Take 1 tablet (5 mg total) by mouth daily  -     Hemoglobin A1C With EAG; Future  -     Comprehensive metabolic panel; Future  -     Hemoglobin A1C With EAG  -     Comprehensive metabolic panel  -     glipiZIDE (GLUCOTROL XL) 5 mg 24 hr tablet; Take 1 tablet (5 mg total) by mouth daily    Infectious viral hepatitis  -     Hepatitis panel, acute; Future  -     Hepatitis panel, acute      ncrease Glucotrol to 5 mg daily  Continue other meds for diabetes  Recheck labs in 3 months  Asthma-restart Symbicort twice daily and short course of prednisone for acute symptoms  Continue your ProAir as needed  Liver enzyme elevations-would repeat hepatitis panel just to verify this is not bad hepatitis B or C rather than the positive hepatitis A  Continue other medications  Subjective:   Chief Complaint   Patient presents with    Follow-up     mm - review labs,mammo, med refill     Physical Exam               Patient ID: García Ramirez is a 62 y o  female        Medical management multiple issues-  1 diabetes mellitus type 2- A1c is still elevated  She had previous side effects from some of the injectables  Also side effects to Invokana  She is reluctant to start insulin  She does admit there is room with the diet to improve  2) hypertension - slightly elevated today patient did not take her pills  Normal range are 125-135/85-90  No cardiac symptoms  Numeral 3) hyperlipidemia- stable on current medication  4) asthma- patient is been diagnosed with mild intermittent asthma  However since having respiratory infection she has been using her Proventil inhaler more frequently  She also does feel wheezy at times  She had been given a Symbicort inhaler in the past which she did not refill be on the sample  5) GERD - stable with current medications  6) previous hepatitis- her liver enzymes had improved with last check, however on this draw all there is slight elevation  Unclear whether this is a persistent hepatitis or whether this is due to fatty liver  Hepatitis a antibody had been positive B and C were negative 3 months ago  She has no risk factors for B or C hepatitis  7) chronic pain syndrome continues to see pain management  The following portions of the patient's history were reviewed and updated as appropriate: allergies, current medications, past family history, past medical history, past social history, past surgical history and problem list     Review of Systems   Constitutional: Negative for activity change, appetite change, diaphoresis and fatigue  Respiratory: Negative for cough, chest tightness, shortness of breath and wheezing  Cardiovascular: Negative for chest pain, palpitations and leg swelling  Fast or slow heart rate   Gastrointestinal: Negative for abdominal pain, blood in stool, constipation, diarrhea, nausea and vomiting  Genitourinary: Negative for difficulty urinating, dysuria and frequency     Musculoskeletal: Negative for arthralgias, gait problem, joint swelling and myalgias  Neurological: Negative for dizziness, weakness, light-headedness, numbness and headaches  Psychiatric/Behavioral: Negative for agitation, confusion, dysphoric mood and sleep disturbance  The patient is not nervous/anxious  Objective:      /90 (BP Location: Left arm, Patient Position: Sitting, Cuff Size: Standard)   Pulse 88   Resp 16   Ht 5' 4" (1 626 m)   Wt 87 1 kg (192 lb)   LMP  (LMP Unknown)   SpO2 93%   BMI 32 96 kg/m²          Physical Exam   Constitutional: She is oriented to person, place, and time  She appears well-developed and well-nourished  No distress  HENT:   Head: Normocephalic and atraumatic  Right Ear: Tympanic membrane, external ear and ear canal normal    Left Ear: Tympanic membrane, external ear and ear canal normal    Nose: No mucosal edema or rhinorrhea  Right sinus exhibits no maxillary sinus tenderness  Left sinus exhibits no maxillary sinus tenderness  Mouth/Throat: Uvula is midline  Mucous membranes are not pale and not dry  Normal dentition  No oropharyngeal exudate  Eyes: EOM are normal  Pupils are equal, round, and reactive to light  Right eye exhibits no discharge  Left eye exhibits no discharge  Neck: Normal range of motion  Neck supple  No thyromegaly present  Cardiovascular: Normal rate, regular rhythm, normal heart sounds and intact distal pulses  Pulses are no weak pulses  No murmur heard  Pulses:       Dorsalis pedis pulses are 2+ on the right side, and 2+ on the left side  Posterior tibial pulses are 2+ on the right side, and 2+ on the left side  Pulmonary/Chest: Effort normal and breath sounds normal  No respiratory distress  She has no wheezes  She has no rales  Musculoskeletal: Normal range of motion  She exhibits no edema or tenderness  Feet:   Right Foot:   Skin Integrity: Negative for ulcer, skin breakdown, erythema, warmth, callus or dry skin     Left Foot:   Skin Integrity: Negative for ulcer, skin breakdown, erythema, warmth, callus or dry skin  Lymphadenopathy:     She has no cervical adenopathy  Neurological: She is alert and oriented to person, place, and time  No cranial nerve deficit  Skin: She is not diaphoretic  Psychiatric: She has a normal mood and affect  Her behavior is normal        Patient's shoes and socks removed  Right Foot/Ankle   Right Foot Inspection  Skin Exam: skin normal and skin intact no dry skin, no warmth, no callus, no erythema, no maceration, no abnormal color, no pre-ulcer, no ulcer and no callus                          Toe Exam: ROM and strength within normal limits  Sensory   Vibration: intact      Vascular    The right DP pulse is 2+  The right PT pulse is 2+  Left Foot/Ankle  Left Foot Inspection  Skin Exam: skin normal and skin intactno dry skin, no warmth, no erythema, no maceration, normal color, no pre-ulcer, no ulcer and no callus                         Toe Exam: ROM and strength within normal limits                   Sensory   Vibration: intact    Monofilament: intact  Vascular    The left DP pulse is 2+  The left PT pulse is 2+  Assign Risk Category:  No deformity present; No loss of protective sensation;  No weak pulses       Risk: 0

## 2018-07-23 NOTE — PROGRESS NOTES
HPI:  Radha Rhodes is a 62 y o  female here for her yearly health maintenance exam    Patient Active Problem List   Diagnosis    Benign essential hypertension    Controlled type 2 diabetes mellitus without complication, without long-term current use of insulin (HCC)    Mild persistent asthma without complication    Peripheral polyneuropathy    Chronic pain disorder    Chronic bilateral back pain    Chronic lumbar radiculopathy    Chronic cervical pain    Chronic thoracic spine pain    GERD without esophagitis    Hyperlipidemia associated with type 2 diabetes mellitus ([facility] [address] )    Insomnia     Past Medical History:   Diagnosis Date    Anxiety     Asthma     Breast lump     Resolved: 2017     Chronic pain     back    Diabetes mellitus ([facility] [address] )     History of stomach ulcers     Hyperlipidemia     Hypertension     Infectious viral hepatitis     Hepatitiss A       1  Advanced Directive: no     2  Durable Power of  for Healthcare: no     3  Social History:               Marital History:               Work Status: full time              Drug and alcohol History: no drug              Alcohol Use: rare     4  General Health: fair              Regular Dental Visits:yes              Vision problems:wears glasses-              Hearing Loss:no              Immunizations up to date: no- consider repeat shingles                 Lifestyle:                           Healthy Diet:fair                          Tobacco Use:quit 2008                          Regular exercise:no                          Weight concerns:yes                          Drug abuse: none     5  Reproductive Health (females only)              Premenopausal:-              Perimenopausal:-              Postmenopausal: yes                 Menstrual Problems:n              Contraceptions:n              Sexually Active: -              Pregnancy History:     6   Over the past 2 weeks, how often have you been bothered by the following:              Little interest or pleasure in doing things:not at all              Felling down, depressed or hopeless: not at all    Current Outpatient Prescriptions   Medication Sig Dispense Refill    albuterol (PROAIR HFA) 90 mcg/act inhaler Inhale 2 puffs every 6 (six) hours as needed for wheezing 3 Inhaler 0    amLODIPine-benazepril (LOTREL 5-20) 5-20 MG per capsule TAKE 1 CAPSULE DAILY 90 capsule 1    atorvastatin (LIPITOR) 40 mg tablet TAKE 1 TABLET DAILY 90 tablet 0    budesonide-formoterol (SYMBICORT) 80-4 5 MCG/ACT inhaler Inhale 2 puffs 2 (two) times a day      Ferrous Fumarate 63 (20 Fe) MG TABS Take 65 mg by mouth daily      glipiZIDE (GLUCOTROL XL) 2 5 mg 24 hr tablet Take 1 tablet (2 5 mg total) by mouth daily 30 tablet 5    glucose blood (ONE TOUCH ULTRA TEST) test strip by In Vitro route daily      HYDROcodone-acetaminophen (NORCO) 5-325 mg per tablet Take 1 tablet by mouth every 4 (four) hours as needed for pain      Linagliptin (TRADJENTA) 5 MG TABS Take 5 mg by mouth daily 90 tablet 0    metFORMIN (GLUCOPHAGE) 1000 MG tablet TAKE 1 TABLET TWICE A  tablet 0    omeprazole (PriLOSEC) 40 MG capsule TAKE 1 CAPSULE EVERY       MORNING 90 capsule 1    ondansetron (ZOFRAN) 4 mg tablet Take 1 tablet (4 mg total) by mouth every 8 (eight) hours as needed for nausea or vomiting 20 tablet 0    pregabalin (LYRICA) 100 mg capsule Take 50 mg by mouth 3 (three) times a day      traMADol (ULTRAM) 50 mg tablet Take 50 mg by mouth 2 (two) times a day      zolpidem (AMBIEN) 5 mg tablet Take 1 tablet (5 mg total) by mouth daily at bedtime as needed for sleep 30 tablet 1     No current facility-administered medications for this visit        Allergies   Allergen Reactions    Nsaids Other (See Comments)     Cannot take NSAIDS secondary to having gastric bypass    Percocet [Oxycodone-Acetaminophen] GI Intolerance     Immunization History   Administered Date(s) Administered    Influenza Quadrivalent Preservative Free 3 years and older IM 11/10/2017    Zoster 02/26/2016       Patient Care Team:  Cheryle Cardozo MD as PCP - General (Family Medicine)  CHITRA Norman      Physical Exam :/90 (BP Location: Left arm, Patient Position: Sitting, Cuff Size: Standard)   Pulse 88   Resp 16   Ht 5' 4" (1 626 m)   Wt 87 1 kg (192 lb)   LMP  (LMP Unknown)   SpO2 93%   BMI 32 96 kg/m²     General Appearance:    Alert, cooperative, no distress, appears stated age   Head:    Normocephalic, without obvious abnormality, atraumatic   Eyes:    PERRL, conjunctiva/corneas clear, EOM's intact, fundi     benign, both eyes        Ears:    Normal TM's and external ear canals, both ears   Nose:   Nares normal, septum midline, mucosa normal, no drainage    or sinus tenderness   Throat:   Lips, mucosa, and tongue normal; teeth and gums normal   Neck:   Supple, symmetrical, trachea midline, no adenopathy;        thyroid:  No enlargement/tenderness/nodules; no carotid    bruit or JVD   Lungs:     Mild wheezes bilaterally, respirations unlabored   Chest wall:    No tenderness or deformity   Heart:    Regular rate and rhythm, S1 and S2 normal, no murmur, rub   or gallop   Abdomen:     Soft, non-tender, bowel sounds active all four quadrants,     no masses, no organomegaly   Extremities:   Extremities normal, atraumatic, no cyanosis or edema   Pulses:   2+ and symmetric all extremities   Skin:   Skin color, texture, turgor normal, no rashes or lesions   Lymph nodes:   Cervical and supraclavicular nodes normal   Neurologic:   CNII-XII intact  Normal strength, sensation and reflexes       throughout       Physical Exam     Reviewed Updated [facility] Prior Wellness Visits:   Last Health Maintenance visit information was reviewed, patient interviewed , no change since last HM visit yes  Last HM visit information was reviewed, patient interviewed and updates made to the record today yes    Assessment and Plan:  1   Encounter for wellness examination in adult         Health Maintenance Due   Topic Date Due    HIV SCREENING  1960    PNEUMOCOCCAL POLYSACCHARIDE VACCINE AGE 2-64 HIGH RISK  01/07/1962    DM Eye Exam  01/07/1970    DTaP,Tdap,and Td Vaccines (1 - Tdap) 01/07/1981    Medicare Annual Wellness Visit (AWV)  05/01/2018    MAMMOGRAM  07/20/2018        Health maintenance-mammogram order  Consider getting new shingles vaccine at pharmacy  Advanced directives were discussed and forms provided

## 2018-08-16 DIAGNOSIS — J45.30 MILD PERSISTENT ASTHMA, UNSPECIFIED WHETHER COMPLICATED: ICD-10-CM

## 2018-08-16 RX ORDER — ALBUTEROL SULFATE 90 UG/1
2 AEROSOL, METERED RESPIRATORY (INHALATION) EVERY 6 HOURS PRN
Qty: 3 INHALER | Refills: 0 | Status: SHIPPED | OUTPATIENT
Start: 2018-08-16 | End: 2018-10-15 | Stop reason: SDUPTHER

## 2018-08-20 DIAGNOSIS — G47.00 INSOMNIA, UNSPECIFIED TYPE: ICD-10-CM

## 2018-08-20 RX ORDER — ZOLPIDEM TARTRATE 5 MG/1
5 TABLET ORAL
Qty: 30 TABLET | Refills: 1 | Status: SHIPPED | OUTPATIENT
Start: 2018-08-20 | End: 2018-10-18 | Stop reason: SDUPTHER

## 2018-08-22 DIAGNOSIS — E11.9 TYPE 2 DIABETES MELLITUS WITHOUT COMPLICATION, WITHOUT LONG-TERM CURRENT USE OF INSULIN (HCC): ICD-10-CM

## 2018-08-22 DIAGNOSIS — I10 ESSENTIAL HYPERTENSION: ICD-10-CM

## 2018-08-22 RX ORDER — AMLODIPINE BESYLATE AND BENAZEPRIL HYDROCHLORIDE 5; 20 MG/1; MG/1
CAPSULE ORAL
Qty: 90 CAPSULE | Refills: 1 | Status: SHIPPED | OUTPATIENT
Start: 2018-08-22 | End: 2019-02-28 | Stop reason: SDUPTHER

## 2018-08-29 ENCOUNTER — OFFICE VISIT (OUTPATIENT)
Dept: FAMILY MEDICINE CLINIC | Facility: CLINIC | Age: 58
End: 2018-08-29
Payer: COMMERCIAL

## 2018-08-29 VITALS
DIASTOLIC BLOOD PRESSURE: 90 MMHG | HEIGHT: 64 IN | SYSTOLIC BLOOD PRESSURE: 140 MMHG | WEIGHT: 193 LBS | HEART RATE: 94 BPM | OXYGEN SATURATION: 97 % | RESPIRATION RATE: 18 BRPM | BODY MASS INDEX: 32.95 KG/M2

## 2018-08-29 DIAGNOSIS — Z12.2 ENCOUNTER FOR SCREENING FOR LUNG CANCER: ICD-10-CM

## 2018-08-29 DIAGNOSIS — J45.30 MILD PERSISTENT ASTHMA WITHOUT COMPLICATION: Primary | ICD-10-CM

## 2018-08-29 DIAGNOSIS — J20.9 ACUTE BRONCHITIS, UNSPECIFIED ORGANISM: ICD-10-CM

## 2018-08-29 PROCEDURE — 99214 OFFICE O/P EST MOD 30 MIN: CPT | Performed by: FAMILY MEDICINE

## 2018-08-29 RX ORDER — AZITHROMYCIN 250 MG/1
TABLET, FILM COATED ORAL
Qty: 6 TABLET | Refills: 0 | Status: SHIPPED | OUTPATIENT
Start: 2018-08-29 | End: 2018-09-02

## 2018-08-29 RX ORDER — PREDNISONE 20 MG/1
TABLET ORAL
Qty: 15 TABLET | Refills: 0 | Status: SHIPPED | OUTPATIENT
Start: 2018-08-29 | End: 2019-01-01

## 2018-08-29 NOTE — PATIENT INSTRUCTIONS
Start Zithromax and prednisone taper  Continue her Symbicort and rescue inhaler  If you're not seen improve in the next 2-3 days I would get a chest x-ray  When you are well, assuming chest x-ray shows no lung cancers, you would benefit from a screening CT scan

## 2018-08-29 NOTE — PROGRESS NOTES
[id number]        NAME: Krystle Burdick is a 62 y o  female  : 1960    MRN: [id number]  DATE: 2018  TIME: 11:00 AM    Assessment and Plan   Mild persistent asthma without complication [A01 50]  1  Mild persistent asthma without complication  predniSONE 20 mg tablet    XR chest pa & lateral   2  Acute bronchitis, unspecified organism  predniSONE 20 mg tablet    azithromycin (ZITHROMAX) 250 mg tablet    XR chest pa & lateral   3  Encounter for screening for lung cancer  [place] lung screening program       Mild persistent asthma without complication  Has been doing well- This is a flare due to URI_ continue routine meds, Should have PFTs in future to evaluate for COPD vs all asthma  Patient Instructions     Patient Instructions     Start Zithromax and prednisone taper  Continue her Symbicort and rescue inhaler  If you're not seen improve in the next 2-3 days I would get a chest x-ray  When you are well, assuming chest x-ray shows no lung cancers, you would benefit from a screening CT scan  Chief Complaint     Chief Complaint   Patient presents with    Shortness of Breath     sob         History of Present Illness       Patient been ill for the last 7-8 days  This has been mostly flare of asthma with cough  Over the last 2-3 days she has also had some night sweats  At home her pulse ox are dropping slightly to 91-93%  She has been using her rescue inhaler greater than 4 times per day  She does continue using the Symbicort 2 puffs twice daily  She does have a history of cigarette smoking greater than 30 pack year  Review of Systems   Review of Systems   Constitutional: Positive for diaphoresis and fever  Negative for chills  HENT: Negative for congestion, sinus pressure and sore throat  Respiratory: Positive for cough, chest tightness and shortness of breath  Negative for wheezing      Cardiovascular: Negative for chest pain, palpitations and leg swelling  Gastrointestinal: Positive for nausea  Negative for abdominal pain and diarrhea  Current Medications       Current Outpatient Prescriptions:     albuterol (PROAIR HFA) 90 mcg/act inhaler, Inhale 2 puffs every 6 (six) hours as needed for wheezing, Disp: 3 Inhaler, Rfl: 0    amLODIPine-benazepril (LOTREL 5-20) 5-20 MG per capsule, TAKE 1 CAPSULE DAILY, Disp: 90 capsule, Rfl: 1    atorvastatin (LIPITOR) 40 mg tablet, TAKE 1 TABLET DAILY, Disp: 90 tablet, Rfl: 0    azithromycin (ZITHROMAX) 250 mg tablet, 2 tabs Day 1, then 1 tab daily X 4 days, Disp: 6 tablet, Rfl: 0    budesonide-formoterol (SYMBICORT) 160-4 5 mcg/act inhaler, Inhale 2 puffs 2 (two) times a day Rinse mouth after use , Disp: 3 Inhaler, Rfl: 3    Ferrous Fumarate 63 (20 Fe) MG TABS, Take 65 mg by mouth daily, Disp: , Rfl:     glipiZIDE (GLUCOTROL XL) 5 mg 24 hr tablet, Take 1 tablet (5 mg total) by mouth daily, Disp: 90 tablet, Rfl: 0    glucose blood (ONE TOUCH ULTRA TEST) test strip, by In Vitro route daily, Disp: , Rfl:     Linagliptin (TRADJENTA) 5 MG TABS, Take 5 mg by mouth daily, Disp: 90 tablet, Rfl: 3    metFORMIN (GLUCOPHAGE) 1000 MG tablet, TAKE 1 TABLET TWICE A DAY, Disp: 180 tablet, Rfl: 1    omeprazole (PriLOSEC) 40 MG capsule, TAKE 1 CAPSULE EVERY       MORNING, Disp: 90 capsule, Rfl: 1    ondansetron (ZOFRAN) 4 mg tablet, Take 1 tablet (4 mg total) by mouth every 8 (eight) hours as needed for nausea or vomiting, Disp: 20 tablet, Rfl: 0    predniSONE 20 mg tablet, 1 tab twice daily for 5 days, then 1 tab daily for 5 days  , Disp: 15 tablet, Rfl: 0    pregabalin (LYRICA) 100 mg capsule, Take 50 mg by mouth 3 (three) times a day, Disp: , Rfl:     traMADol (ULTRAM) 50 mg tablet, Take 50 mg by mouth 2 (two) times a day, Disp: , Rfl:     zolpidem (AMBIEN) 5 mg tablet, Take 1 tablet (5 mg total) by mouth daily at bedtime as needed for sleep, Disp: 30 tablet, Rfl: 1    Current Allergies Allergies as of 2018 - Reviewed 2018   Allergen Reaction Noted    Nsaids Other (See Comments) 2017    Percocet [oxycodone-acetaminophen] GI Intolerance 2017            The following portions of the patient's history were reviewed and updated as appropriate: allergies, current medications, past family history, past medical history, past social history, past surgical history and problem list      Past Medical History:   Diagnosis Date    Anxiety     Asthma     Breast lump     Resolved: 2017     Chronic pain     back    Diabetes mellitus ([address] )     History of stomach ulcers     Hyperlipidemia     Hypertension     Infectious viral hepatitis     Hepatitiss A       Past Surgical History:   Procedure Laterality Date    APPENDECTOMY      BACK SURGERY       SECTION      CHOLECYSTECTOMY      GASTRIC BYPASS      SHOULDER SURGERY         Family History   Problem Relation Age of Onset    Diabetes Mother         Mellitus    Hyperlipidemia Mother     Hypertension Mother     Cancer Father         Bladder     Alcohol abuse Father     Lung cancer Father     Prostate cancer Father     Diabetes Brother         Mellitus    Hyperlipidemia Brother     Hypertension Brother     Mental illness Neg Hx          Medications have been verified  Objective   /90 (BP Location: Right arm, Patient Position: Sitting, Cuff Size: Large)   Pulse 94   Resp 18   Ht 5' 4" (1 626 m)   Wt 87 5 kg (193 lb)   LMP  (LMP Unknown)   SpO2 97%   BMI 33 13 kg/m²        Physical Exam     Physical Exam   Constitutional: She appears well-developed and well-nourished  No distress  HENT:   Head: Normocephalic and atraumatic  Right Ear: Tympanic membrane and external ear normal  No drainage  Left Ear: Tympanic membrane normal  There is drainage  Mouth/Throat: No oropharyngeal exudate  Eyes: Conjunctivae and EOM are normal  Right eye exhibits no discharge   Left eye exhibits no discharge  Neck: Normal range of motion  Neck supple  No thyromegaly present  Cardiovascular: Normal rate, regular rhythm and normal heart sounds  Pulmonary/Chest: Effort normal  No respiratory distress  She has decreased breath sounds in the right middle field, the right lower field, the left middle field and the left lower field  She has wheezes  She has no rhonchi  She has no rales  Abdominal: Soft  Lymphadenopathy:     She has no cervical adenopathy  Skin: Skin is warm and dry

## 2018-08-29 NOTE — ASSESSMENT & PLAN NOTE
Has been doing well- This is a flare due to URI_ continue routine meds, Should have PFTs in future to evaluate for COPD vs all asthma

## 2018-08-31 ENCOUNTER — HOSPITAL ENCOUNTER (OUTPATIENT)
Dept: RADIOLOGY | Facility: HOSPITAL | Age: 58
Discharge: HOME | End: 2018-08-31
Payer: COMMERCIAL

## 2018-08-31 ENCOUNTER — TRANSCRIBE ORDERS (OUTPATIENT)
Dept: ADMINISTRATIVE | Facility: HOSPITAL | Age: 58
End: 2018-08-31

## 2018-08-31 DIAGNOSIS — J45.30 MILD PERSISTENT ASTHMA WITHOUT COMPLICATION: ICD-10-CM

## 2018-08-31 DIAGNOSIS — J20.9 ACUTE BRONCHITIS, UNSPECIFIED ORGANISM: ICD-10-CM

## 2018-08-31 PROCEDURE — 71046 X-RAY EXAM CHEST 2 VIEWS: CPT

## 2018-09-04 DIAGNOSIS — E78.5 HYPERLIPIDEMIA ASSOCIATED WITH TYPE 2 DIABETES MELLITUS (HCC): ICD-10-CM

## 2018-09-04 DIAGNOSIS — E11.69 HYPERLIPIDEMIA ASSOCIATED WITH TYPE 2 DIABETES MELLITUS (HCC): ICD-10-CM

## 2018-09-04 RX ORDER — ATORVASTATIN CALCIUM 40 MG/1
TABLET, FILM COATED ORAL
Qty: 90 TABLET | Refills: 0 | Status: SHIPPED | OUTPATIENT
Start: 2018-09-04 | End: 2018-11-22 | Stop reason: SDUPTHER

## 2018-09-05 ENCOUNTER — TELEPHONE (OUTPATIENT)
Dept: FAMILY MEDICINE CLINIC | Facility: CLINIC | Age: 58
End: 2018-09-05

## 2018-09-05 NOTE — TELEPHONE ENCOUNTER
----- Message from Morgan Manley MD sent at 9/5/2018  8:02 AM EDT -----  Call patient with lab result-possible pneumonia, if not feeling better, should retreat with Levaquin? Check allergies, F/U appt 1-2 weeks, will need repeat CXR 3-4 weeks

## 2018-09-05 NOTE — PROGRESS NOTES
Call patient with lab result-possible pneumonia, if not feeling better, should retreat with Levaquin? Check allergies, F/U appt 1-2 weeks, will need repeat CXR 3-4 weeks

## 2018-09-11 ENCOUNTER — OFFICE VISIT (OUTPATIENT)
Dept: FAMILY MEDICINE CLINIC | Facility: CLINIC | Age: 58
End: 2018-09-11
Payer: COMMERCIAL

## 2018-09-11 VITALS
SYSTOLIC BLOOD PRESSURE: 126 MMHG | HEART RATE: 90 BPM | HEIGHT: 64 IN | WEIGHT: 193 LBS | BODY MASS INDEX: 32.95 KG/M2 | OXYGEN SATURATION: 96 % | TEMPERATURE: 98.4 F | DIASTOLIC BLOOD PRESSURE: 84 MMHG

## 2018-09-11 DIAGNOSIS — A09 INFECTIOUS ENTERITIS, UNSPECIFIED INFECTIOUS AGENT: Primary | ICD-10-CM

## 2018-09-11 DIAGNOSIS — J18.9 PNEUMONIA OF RIGHT LOWER LOBE DUE TO INFECTIOUS ORGANISM: ICD-10-CM

## 2018-09-11 PROCEDURE — 3008F BODY MASS INDEX DOCD: CPT | Performed by: FAMILY MEDICINE

## 2018-09-11 PROCEDURE — 99214 OFFICE O/P EST MOD 30 MIN: CPT | Performed by: FAMILY MEDICINE

## 2018-09-11 PROCEDURE — 1036F TOBACCO NON-USER: CPT | Performed by: FAMILY MEDICINE

## 2018-09-11 RX ORDER — ONDANSETRON 4 MG/1
4 TABLET, FILM COATED ORAL EVERY 8 HOURS PRN
Qty: 20 TABLET | Refills: 0 | Status: SHIPPED | OUTPATIENT
Start: 2018-09-11 | End: 2019-11-01 | Stop reason: ALTCHOICE

## 2018-09-11 NOTE — PROGRESS NOTES
[id number]        NAME: Monica Villarreal is a 62 y o  female  : 1960    MRN: [id number]  DATE: 2018  TIME: 2:10 PM    Assessment and Plan   Infectious enteritis, unspecified infectious agent [A09]  1  Infectious enteritis, unspecified infectious agent  ondansetron (ZOFRAN) 4 mg tablet   2  Pneumonia of right lower lobe due to infectious organism ([address] )  XR chest pa & lateral       No problem-specific Assessment & Plan notes found for this encounter  Patient Instructions     Patient Instructions    This appears to be a viral enteritis  Use the Zofran as needed for nausea  Increase fluid intake, but stick to a lighter diet  You should have some caloric intake in her fluids  If this persist beyond the next 4-5 days, we should hear from you  If he develops severe abdominal pains, you may require ER evaluation  As far as her recent pneumonia-I would wait 3 weeks and repeat the chest x-ray to document clearing of the pneumonia  Assuming the chest x-ray shows resolution, I would wait another 2-3 weeks and get your screening lung cancer CT scan  Chief Complaint     Chief Complaint   Patient presents with    Fatigue     for 5 days    Nausea     1 day         History of Present Illness       Patient here for evaluation of acute illness  Over the last 4-5 days patient has had muscle aches with possible low-grade fever  Also she had nausea with 1 episode of vomiting yesterday  No diarrhea is reported  The symptoms do not appear to be related to recent antibiotics for her pneumonia  Patient recently treated with Zithromax for pneumonia found on x-ray  Her breathing is improved  She has finished prednisone taper and Zithromax  She was off these several days prior to onset of her nausea  Review of Systems   Review of Systems   Constitutional: Positive for appetite change, chills, fatigue and fever  Negative for diaphoresis  Respiratory: Negative for cough, choking and shortness of breath  Cardiovascular: Negative for chest pain and palpitations  Gastrointestinal: Positive for abdominal pain, nausea and vomiting  Negative for abdominal distention, blood in stool, constipation, diarrhea and rectal pain  Genitourinary: Negative for dysuria, flank pain and frequency  Musculoskeletal: Negative for back pain and myalgias  Neurological: Negative for dizziness, syncope and light-headedness  Current Medications       Current Outpatient Prescriptions:     albuterol (PROAIR HFA) 90 mcg/act inhaler, Inhale 2 puffs every 6 (six) hours as needed for wheezing, Disp: 3 Inhaler, Rfl: 0    amLODIPine-benazepril (LOTREL 5-20) 5-20 MG per capsule, TAKE 1 CAPSULE DAILY, Disp: 90 capsule, Rfl: 1    atorvastatin (LIPITOR) 40 mg tablet, TAKE 1 TABLET DAILY, Disp: 90 tablet, Rfl: 0    budesonide-formoterol (SYMBICORT) 160-4 5 mcg/act inhaler, Inhale 2 puffs 2 (two) times a day Rinse mouth after use , Disp: 3 Inhaler, Rfl: 3    Ferrous Fumarate 63 (20 Fe) MG TABS, Take 65 mg by mouth daily, Disp: , Rfl:     glipiZIDE (GLUCOTROL XL) 5 mg 24 hr tablet, Take 1 tablet (5 mg total) by mouth daily, Disp: 90 tablet, Rfl: 0    glucose blood (ONE TOUCH ULTRA TEST) test strip, by In Vitro route daily, Disp: , Rfl:     Linagliptin (TRADJENTA) 5 MG TABS, Take 5 mg by mouth daily, Disp: 90 tablet, Rfl: 3    metFORMIN (GLUCOPHAGE) 1000 MG tablet, TAKE 1 TABLET TWICE A DAY, Disp: 180 tablet, Rfl: 1    omeprazole (PriLOSEC) 40 MG capsule, TAKE 1 CAPSULE EVERY       MORNING, Disp: 90 capsule, Rfl: 1    predniSONE 20 mg tablet, 1 tab twice daily for 5 days, then 1 tab daily for 5 days  , Disp: 15 tablet, Rfl: 0    pregabalin (LYRICA) 100 mg capsule, Take 50 mg by mouth 3 (three) times a day, Disp: , Rfl:     traMADol (ULTRAM) 50 mg tablet, Take 50 mg by mouth 2 (two) times a day, Disp: , Rfl:     zolpidem (AMBIEN) 5 mg tablet, Take 1 tablet (5 mg total) by mouth daily at bedtime as needed for sleep, Disp: 30 tablet, Rfl: 1    ondansetron (ZOFRAN) 4 mg tablet, Take 1 tablet (4 mg total) by mouth every 8 (eight) hours as needed for nausea or vomiting, Disp: 20 tablet, Rfl: 0    Current Allergies     Allergies as of 2018 - Reviewed 2018   Allergen Reaction Noted    Nsaids Other (See Comments) 2017    Percocet [oxycodone-acetaminophen] GI Intolerance 2017            The following portions of the patient's history were reviewed and updated as appropriate: allergies, current medications, past family history, past medical history, past social history, past surgical history and problem list      Past Medical History:   Diagnosis Date    Anxiety     Asthma     Breast lump     Resolved: 2017     Chronic pain     back    Diabetes mellitus ([facility] )     History of stomach ulcers     Hyperlipidemia     Hypertension     Infectious viral hepatitis     Hepatitiss A       Past Surgical History:   Procedure Laterality Date    APPENDECTOMY      BACK SURGERY       SECTION      CHOLECYSTECTOMY      GASTRIC BYPASS      SHOULDER SURGERY         Family History   Problem Relation Age of Onset    Diabetes Mother         Mellitus    Hyperlipidemia Mother     Hypertension Mother     Colon cancer Mother     Pancreatic cancer Mother     Melanoma Mother     Cancer Father         Bladder     Alcohol abuse Father     Lung cancer Father     Prostate cancer Father     Diabetes Brother         Mellitus    Hyperlipidemia Brother     Hypertension Brother     Mental illness Neg Hx          Medications have been verified  Objective   /84   Pulse 90   Temp 98 4 °F (36 9 °C)   Ht 5' 4" (1 626 m)   Wt 87 5 kg (193 lb)   LMP  (LMP Unknown)   SpO2 96%   BMI 33 13 kg/m²        Physical Exam     Physical Exam   Constitutional: She is oriented to person, place, and time  She appears well-developed and well-nourished   No distress  HENT:   Head: Normocephalic and atraumatic  Mouth/Throat: Oropharynx is clear and moist    Neck: Neck supple  No thyromegaly present  Cardiovascular: Normal rate, regular rhythm and normal heart sounds  Pulmonary/Chest: Effort normal and breath sounds normal  No respiratory distress  She has no wheezes  Abdominal: Soft  Bowel sounds are normal  She exhibits no distension and no mass  There is no hepatosplenomegaly  There is no tenderness  There is no rebound, no guarding and no CVA tenderness  No hernia  Musculoskeletal:        Right shoulder: She exhibits no tenderness  Lymphadenopathy:     She has no cervical adenopathy  Neurological: She is alert and oriented to person, place, and time  Skin: Skin is warm and dry  She is not diaphoretic

## 2018-09-11 NOTE — PATIENT INSTRUCTIONS
This appears to be a viral enteritis  Use the Zofran as needed for nausea  Increase fluid intake, but stick to a lighter diet  You should have some caloric intake in her fluids  If this persist beyond the next 4-5 days, we should hear from you  If he develops severe abdominal pains, you may require ER evaluation  As far as her recent pneumonia-I would wait 3 weeks and repeat the chest x-ray to document clearing of the pneumonia  Assuming the chest x-ray shows resolution, I would wait another 2-3 weeks and get your screening lung cancer CT scan

## 2018-10-08 ENCOUNTER — HOSPITAL ENCOUNTER (OUTPATIENT)
Dept: RADIOLOGY | Facility: HOSPITAL | Age: 58
Discharge: HOME | End: 2018-10-08
Payer: COMMERCIAL

## 2018-10-08 DIAGNOSIS — J18.9 PNEUMONIA OF RIGHT LOWER LOBE DUE TO INFECTIOUS ORGANISM: ICD-10-CM

## 2018-10-08 PROCEDURE — 71046 X-RAY EXAM CHEST 2 VIEWS: CPT

## 2018-10-15 ENCOUNTER — TELEPHONE (OUTPATIENT)
Dept: FAMILY MEDICINE CLINIC | Facility: CLINIC | Age: 58
End: 2018-10-15

## 2018-10-15 DIAGNOSIS — J45.30 MILD PERSISTENT ASTHMA, UNSPECIFIED WHETHER COMPLICATED: ICD-10-CM

## 2018-10-15 RX ORDER — ALBUTEROL SULFATE 90 UG/1
2 AEROSOL, METERED RESPIRATORY (INHALATION) EVERY 6 HOURS PRN
Qty: 3 INHALER | Refills: 1 | Status: SHIPPED | OUTPATIENT
Start: 2018-10-15 | End: 2019-02-13 | Stop reason: SDUPTHER

## 2018-10-15 NOTE — TELEPHONE ENCOUNTER
Chest x-ray was normal   Inform patient I was out of the office last week and did not see until today  Date on current symptoms  ----- Message from Dixon Wadsworth sent at 10/12/2018  3:40 PM EDT -----  Regarding: FW: Referral Request  Contact: [phone]      ----- Message -----  From: Db Albarran  Sent: 10/12/2018   2:54 PM  To: [facility]  Subject: Referral Request                                 Hi Dr Katie Sweet, I had my chest x-ray done on Monday as a follow-up to pneumonia about a month ago  Curious as to why I haven't heard anything yet and [facility]  Is that something your office can follow up on? Thank you

## 2018-10-15 NOTE — TELEPHONE ENCOUNTER
Please approve    Due labs 10/2018 - called and spoke to pt, per pt she will go in the next couple of weeks

## 2018-10-16 ENCOUNTER — TELEPHONE (OUTPATIENT)
Dept: FAMILY MEDICINE CLINIC | Facility: CLINIC | Age: 58
End: 2018-10-16

## 2018-10-16 NOTE — TELEPHONE ENCOUNTER
----- Message from Jennifer Martins MD sent at 10/15/2018  5:16 PM EDT -----  Call patient with lab result-be sure patient does chest x-ray was normal

## 2018-10-18 DIAGNOSIS — G47.00 INSOMNIA, UNSPECIFIED TYPE: ICD-10-CM

## 2018-10-18 RX ORDER — ZOLPIDEM TARTRATE 5 MG/1
5 TABLET ORAL
Qty: 30 TABLET | Refills: 0 | Status: SHIPPED | OUTPATIENT
Start: 2018-10-18 | End: 2018-11-19 | Stop reason: SDUPTHER

## 2018-10-25 ENCOUNTER — HOSPITAL ENCOUNTER (OUTPATIENT)
Dept: CT IMAGING | Facility: HOSPITAL | Age: 58
Discharge: HOME | End: 2018-10-25
Payer: COMMERCIAL

## 2018-10-25 DIAGNOSIS — K21.00 GASTROESOPHAGEAL REFLUX DISEASE WITH ESOPHAGITIS: ICD-10-CM

## 2018-10-25 DIAGNOSIS — Z12.2 ENCOUNTER FOR SCREENING FOR LUNG CANCER: ICD-10-CM

## 2018-10-25 RX ORDER — OMEPRAZOLE 40 MG/1
CAPSULE, DELAYED RELEASE ORAL
Qty: 90 CAPSULE | Refills: 1 | Status: SHIPPED | OUTPATIENT
Start: 2018-10-25 | End: 2019-04-22 | Stop reason: SDUPTHER

## 2018-10-29 ENCOUNTER — HOSPITAL ENCOUNTER (OUTPATIENT)
Dept: MAMMOGRAPHY | Facility: CLINIC | Age: 58
Discharge: HOME | End: 2018-10-29
Payer: COMMERCIAL

## 2018-10-29 ENCOUNTER — TELEPHONE (OUTPATIENT)
Dept: FAMILY MEDICINE CLINIC | Facility: CLINIC | Age: 58
End: 2018-10-29

## 2018-10-29 DIAGNOSIS — Z12.31 SCREENING MAMMOGRAM, ENCOUNTER FOR: ICD-10-CM

## 2018-10-29 PROCEDURE — 77067 SCR MAMMO BI INCL CAD: CPT

## 2018-10-29 PROCEDURE — 77063 BREAST TOMOSYNTHESIS BI: CPT

## 2018-10-29 NOTE — PROGRESS NOTES
Call patient with lab result-CT scan negative for lung cancer  There is some evidence of decreased lung aeration in the bases

## 2018-10-29 NOTE — TELEPHONE ENCOUNTER
----- Message from Reid Lauren MD sent at 10/29/2018  1:58 PM EDT -----  Call patient with lab result-CT scan negative for lung cancer  There is some evidence of decreased lung aeration in the bases

## 2018-11-05 LAB
ALBUMIN SERPL-MCNC: 3.8 G/DL (ref 3.6–5.1)
ALBUMIN/GLOB SERPL: 1.4 (CALC) (ref 1–2.5)
ALP SERPL-CCNC: 324 U/L (ref 33–130)
ALT SERPL-CCNC: 160 U/L (ref 6–29)
AST SERPL-CCNC: 88 U/L (ref 10–35)
BILIRUB SERPL-MCNC: 0.6 MG/DL (ref 0.2–1.2)
BUN SERPL-MCNC: 16 MG/DL (ref 7–25)
BUN/CREAT SERPL: ABNORMAL (CALC) (ref 6–22)
CALCIUM SERPL-MCNC: 9.5 MG/DL (ref 8.6–10.4)
CHLORIDE SERPL-SCNC: 103 MMOL/L (ref 98–110)
CO2 SERPL-SCNC: 28 MMOL/L (ref 20–32)
CREAT SERPL-MCNC: 0.75 MG/DL (ref 0.5–1.05)
EST. AVERAGE GLUCOSE BLD GHB EST-MCNC: 180 (CALC)
EST. AVERAGE GLUCOSE BLD GHB EST-SCNC: 10 (CALC)
GLOBULIN SER CALC-MCNC: 2.8 G/DL (CALC) (ref 1.9–3.7)
GLUCOSE SERPL-MCNC: 135 MG/DL (ref 65–99)
HAV IGM SERPL QL IA: NORMAL
HBA1C MFR BLD: 7.9 % OF TOTAL HGB
HBV CORE IGM SERPL QL IA: NORMAL
HBV SURFACE AG SERPL QL IA: NORMAL
HCV AB S/CO SERPL IA: 0.01
HCV AB SERPL QL IA: NORMAL
POTASSIUM SERPL-SCNC: 4.5 MMOL/L (ref 3.5–5.3)
PROT SERPL-MCNC: 6.6 G/DL (ref 6.1–8.1)
SL AMB EGFR AFRICAN AMERICAN: 102 ML/MIN/1.73M2
SL AMB EGFR NON AFRICAN AMERICAN: 88 ML/MIN/1.73M2
SODIUM SERPL-SCNC: 140 MMOL/L (ref 135–146)

## 2018-11-05 NOTE — PROGRESS NOTES
Call patient with lab result-liver enzymes are higher, if she still feeling sick to the stomach? Order ultrasound of abdomen due to increased liver enzymes  This would include liver and hopefully pancreatic area

## 2018-11-07 ENCOUNTER — TELEPHONE (OUTPATIENT)
Dept: FAMILY MEDICINE CLINIC | Facility: CLINIC | Age: 58
End: 2018-11-07

## 2018-11-07 DIAGNOSIS — R74.8 ELEVATED LIVER ENZYMES: Primary | ICD-10-CM

## 2018-11-07 NOTE — TELEPHONE ENCOUNTER
Notified patient as per Dr Marine Platt  She states she is still feeling sick  Will get US Abdomen      ----- Message from Simeon Rapp MD sent at 11/5/2018  6:40 PM EST -----  Call patient with lab result-liver enzymes are higher, if she still feeling sick to the stomach? Order ultrasound of abdomen due to increased liver enzymes  This would include liver and hopefully pancreatic area      Review Identification Number: [id number] Review Decision: Certified in [facility]AR[facility] Trace Number: [id number] Reason Code: -- NavJunt Status: Approved    I called patient back, gave her the phone # for Central Scheduling and she will call to schedule appointment later today

## 2018-11-13 DIAGNOSIS — E11.9 TYPE 2 DIABETES MELLITUS WITHOUT COMPLICATION, WITHOUT LONG-TERM CURRENT USE OF INSULIN (HCC): ICD-10-CM

## 2018-11-14 ENCOUNTER — OFFICE VISIT (OUTPATIENT)
Dept: FAMILY MEDICINE CLINIC | Facility: CLINIC | Age: 58
End: 2018-11-14
Payer: COMMERCIAL

## 2018-11-14 VITALS
DIASTOLIC BLOOD PRESSURE: 70 MMHG | HEART RATE: 96 BPM | HEIGHT: 64 IN | OXYGEN SATURATION: 97 % | BODY MASS INDEX: 32.95 KG/M2 | WEIGHT: 193 LBS | SYSTOLIC BLOOD PRESSURE: 122 MMHG

## 2018-11-14 DIAGNOSIS — I10 BENIGN ESSENTIAL HYPERTENSION: ICD-10-CM

## 2018-11-14 DIAGNOSIS — Z23 NEED FOR VACCINATION: Primary | ICD-10-CM

## 2018-11-14 DIAGNOSIS — E11.69 HYPERLIPIDEMIA ASSOCIATED WITH TYPE 2 DIABETES MELLITUS (HCC): ICD-10-CM

## 2018-11-14 DIAGNOSIS — R74.01 ELEVATED TRANSAMINASE LEVEL: ICD-10-CM

## 2018-11-14 DIAGNOSIS — E78.5 HYPERLIPIDEMIA ASSOCIATED WITH TYPE 2 DIABETES MELLITUS (HCC): ICD-10-CM

## 2018-11-14 DIAGNOSIS — E11.9 CONTROLLED TYPE 2 DIABETES MELLITUS WITHOUT COMPLICATION, WITHOUT LONG-TERM CURRENT USE OF INSULIN (HCC): ICD-10-CM

## 2018-11-14 PROCEDURE — 99214 OFFICE O/P EST MOD 30 MIN: CPT | Performed by: FAMILY MEDICINE

## 2018-11-14 PROCEDURE — 90471 IMMUNIZATION ADMIN: CPT

## 2018-11-14 PROCEDURE — 90682 RIV4 VACC RECOMBINANT DNA IM: CPT

## 2018-11-14 PROCEDURE — 3008F BODY MASS INDEX DOCD: CPT | Performed by: FAMILY MEDICINE

## 2018-11-14 PROCEDURE — 3074F SYST BP LT 130 MM HG: CPT | Performed by: FAMILY MEDICINE

## 2018-11-14 PROCEDURE — 3078F DIAST BP <80 MM HG: CPT | Performed by: FAMILY MEDICINE

## 2018-11-14 NOTE — PROGRESS NOTES
[id number]        NAME: Charleen Ndiaye is a 62 y o  female  : 1960    MRN: [id number]  DATE: 2018  TIME: 2:38 PM    Assessment and Plan   Need for vaccination [Z23]  1  Need for vaccination  influenza vaccine, 6340-0924, quadrivalent, recombinant, PF, 0 5 mL, for patients 18 yr+ (FLUBLOK)   2  Controlled type 2 diabetes mellitus without complication, without long-term current use of insulin (HCC)     3  Elevated transaminase level     4  Hyperlipidemia associated with type 2 diabetes mellitus ([address] )     5  Benign essential hypertension         Controlled type 2 diabetes mellitus without complication, without long-term current use of insulin (HCC)  Lab Results   Component Value Date    HGBA1C 7 9 (H) 2018       No results for input(s): POCGLU in the last 72 hours  Blood Sugar Average: Last 72 hrs:   A1c is stable  Currently under 3 medications  We did discuss if the sugars are not control would consider switching to basal insulin  Will wait to the evaluation of possible liver issues or pancreatic issues this further evaluated  Hyperlipidemia associated with type 2 diabetes mellitus ([facility] )  Lab Results   Component Value Date    HGBA1C 7 9 (H) 2018       No results for input(s): POCGLU in the last 72 hours  Blood Sugar Average: Last 72 hrs:   this has been stable current medication  Benign essential hypertension  Good control current medications continue the same  Elevated transaminase level  Patient has rising liver enzymes and also alkaline phosphatase  She really has no symptoms  She has had a cholecystectomy in the past she has had ultrasound tomorrow to evaluate liver and upper abdomen  If this is unremarkable she may need to see GI  Patient Instructions     Patient Instructions   Get the ultrasound as ordered  Your liver enzymes are further elevated    You may need to get a CT scan of the abdomen or I may send you directly to GI further evaluation due to your elevated liver enzymes and the fact you're having some vomiting  Chief Complaint     Chief Complaint   Patient presents with    Follow-up     MM/labs         History of Present Illness       Patient here to follow-up on recent labs  Multiple issues-  1) diabetes mellitus-A1C is still slightly high  3 current medications  Patient previous had tried injectables, non-insulin, which she had side effects from  She does attempt to watch her diet  2) elevated liver enzymes  These are slightly higher than last month  She does have an ultrasound of upper abdomen schedule for tomorrow  She does note some nausea with occasional vomiting  No weight loss at this point  No jaundice is noted  She had previous cholecystectomy  She also has a rising alkaline phosphatase  No bony pains are reported  3) hypertension-good level with current medications  4) hyperlipidemia-last levels have been good she is on a statin, not sure this is adding to her liver enzymes elevations  Review of Systems   Review of Systems   Constitutional: Negative for appetite change, chills, diaphoresis, fatigue and fever  Respiratory: Negative for cough, choking and shortness of breath  Cardiovascular: Negative for chest pain and palpitations  Gastrointestinal: Positive for nausea and vomiting  Negative for abdominal distention, abdominal pain, blood in stool, constipation, diarrhea and rectal pain  Genitourinary: Negative for dysuria, flank pain and frequency  Musculoskeletal: Negative for back pain and myalgias  Neurological: Negative for dizziness, syncope and light-headedness           Current Medications       Current Outpatient Prescriptions:     albuterol (PROAIR HFA) 90 mcg/act inhaler, Inhale 2 puffs every 6 (six) hours as needed for wheezing, Disp: 3 Inhaler, Rfl: 1    amLODIPine-benazepril (LOTREL 5-20) 5-20 MG per capsule, TAKE 1 CAPSULE DAILY, Disp: 90 capsule, Rfl: 1    atorvastatin (LIPITOR) 40 mg tablet, TAKE 1 TABLET DAILY, Disp: 90 tablet, Rfl: 0    budesonide-formoterol (SYMBICORT) 160-4 5 mcg/act inhaler, Inhale 2 puffs 2 (two) times a day Rinse mouth after use , Disp: 3 Inhaler, Rfl: 3    Ferrous Fumarate 63 (20 Fe) MG TABS, Take 65 mg by mouth daily, Disp: , Rfl:     glipiZIDE (GLUCOTROL XL) 5 mg 24 hr tablet, Take 1 tablet (5 mg total) by mouth daily, Disp: 90 tablet, Rfl: 0    glucose blood (ONE TOUCH ULTRA TEST) test strip, by In Vitro route daily, Disp: , Rfl:     Linagliptin (TRADJENTA) 5 MG TABS, Take 5 mg by mouth daily, Disp: 90 tablet, Rfl: 0    metFORMIN (GLUCOPHAGE) 1000 MG tablet, TAKE 1 TABLET TWICE A DAY, Disp: 180 tablet, Rfl: 1    omeprazole (PriLOSEC) 40 MG capsule, TAKE 1 CAPSULE EVERY       MORNING, Disp: 90 capsule, Rfl: 1    ondansetron (ZOFRAN) 4 mg tablet, Take 1 tablet (4 mg total) by mouth every 8 (eight) hours as needed for nausea or vomiting, Disp: 20 tablet, Rfl: 0    predniSONE 20 mg tablet, 1 tab twice daily for 5 days, then 1 tab daily for 5 days  , Disp: 15 tablet, Rfl: 0    pregabalin (LYRICA) 100 mg capsule, Take 50 mg by mouth 3 (three) times a day, Disp: , Rfl:     traMADol (ULTRAM) 50 mg tablet, Take 50 mg by mouth 2 (two) times a day, Disp: , Rfl:     zolpidem (AMBIEN) 5 mg tablet, Take 1 tablet (5 mg total) by mouth daily at bedtime as needed for sleep, Disp: 30 tablet, Rfl: 0    Current Allergies     Allergies as of 11/14/2018 - Reviewed 11/14/2018   Allergen Reaction Noted    Nsaids Other (See Comments) 09/23/2017    Percocet [oxycodone-acetaminophen] GI Intolerance 09/23/2017            The following portions of the patient's history were reviewed and updated as appropriate: allergies, current medications, past family history, past medical history, past social history, past surgical history and problem list      Past Medical History:   Diagnosis Date    Anxiety     Asthma     Breast lump Resolved: 2017     Chronic pain     back    Diabetes mellitus ([address] )     History of stomach ulcers     Hyperlipidemia     Hypertension     Infectious viral hepatitis     Hepatitiss A       Past Surgical History:   Procedure Laterality Date    APPENDECTOMY      BACK SURGERY       SECTION      CHOLECYSTECTOMY      GASTRIC BYPASS      SHOULDER SURGERY         Family History   Problem Relation Age of Onset    Diabetes Mother         Mellitus    Hyperlipidemia Mother     Hypertension Mother     Colon cancer Mother     Pancreatic cancer Mother     Melanoma Mother     Cancer Father         Bladder     Alcohol abuse Father     Lung cancer Father     Prostate cancer Father     Diabetes Brother         Mellitus    Hyperlipidemia Brother     Hypertension Brother     Mental illness Neg Hx          Medications have been verified  Objective   /70   Pulse 96   Ht 5' 4" (1 626 m)   Wt 87 5 kg (193 lb)   LMP  (LMP Unknown)   SpO2 97%   BMI 33 13 kg/m²        Physical Exam     Physical Exam   Constitutional: She is oriented to person, place, and time  She appears well-developed and well-nourished  No distress  HENT:   Head: Normocephalic and atraumatic  Mouth/Throat: Oropharynx is clear and moist    Neck: Neck supple  No thyromegaly present  Cardiovascular: Normal rate, regular rhythm and normal heart sounds  Pulmonary/Chest: Effort normal and breath sounds normal  No respiratory distress  She has no wheezes  Abdominal: Soft  Bowel sounds are normal  She exhibits no distension and no mass  There is no hepatosplenomegaly  There is tenderness  There is no rebound, no guarding and no CVA tenderness  No hernia  Musculoskeletal:        Right shoulder: She exhibits no tenderness  Lymphadenopathy:     She has no cervical adenopathy  Neurological: She is alert and oriented to person, place, and time  Skin: Skin is warm and dry  She is not diaphoretic

## 2018-11-14 NOTE — ASSESSMENT & PLAN NOTE
Patient has rising liver enzymes and also alkaline phosphatase  She really has no symptoms  She has had a cholecystectomy in the past she has had ultrasound tomorrow to evaluate liver and upper abdomen  If this is unremarkable she may need to see GI

## 2018-11-14 NOTE — PATIENT INSTRUCTIONS
Get the ultrasound as ordered  Your liver enzymes are further elevated  You may need to get a CT scan of the abdomen or I may send you directly to GI further evaluation due to your elevated liver enzymes and the fact you're having some vomiting

## 2018-11-14 NOTE — ASSESSMENT & PLAN NOTE
Lab Results   Component Value Date    HGBA1C 7 9 (H) 11/03/2018       No results for input(s): POCGLU in the last 72 hours  Blood Sugar Average: Last 72 hrs:   this has been stable current medication

## 2018-11-14 NOTE — ASSESSMENT & PLAN NOTE
Lab Results   Component Value Date    HGBA1C 7 9 (H) 11/03/2018       No results for input(s): POCGLU in the last 72 hours  Blood Sugar Average: Last 72 hrs:   A1c is stable  Currently under 3 medications  We did discuss if the sugars are not control would consider switching to basal insulin  Will wait to the evaluation of possible liver issues or pancreatic issues this further evaluated

## 2018-11-15 ENCOUNTER — HOSPITAL ENCOUNTER (OUTPATIENT)
Dept: ULTRASOUND IMAGING | Facility: HOSPITAL | Age: 58
Discharge: HOME | End: 2018-11-15
Payer: COMMERCIAL

## 2018-11-15 ENCOUNTER — TELEPHONE (OUTPATIENT)
Dept: FAMILY MEDICINE CLINIC | Facility: CLINIC | Age: 58
End: 2018-11-15

## 2018-11-15 DIAGNOSIS — R74.8 ELEVATED LIVER ENZYMES: ICD-10-CM

## 2018-11-15 DIAGNOSIS — R79.89 LFT ELEVATION: Primary | ICD-10-CM

## 2018-11-15 PROCEDURE — 76700 US EXAM ABDOM COMPLETE: CPT

## 2018-11-15 NOTE — PROGRESS NOTES
Call patient with lab result-ultrasound does not reveal any problem with the liver  What I can see of the pancreas is also normal   I would refer her for GI consult  If they cannot see here within 10-14 days    We should also may be try to get a CT scan of the abdomen with contrast

## 2018-11-15 NOTE — TELEPHONE ENCOUNTER
Patient notified as per Dr Herbert Hagan  She is going to call her GI -- Dr Andreas Interiano and see if they can get her in within the next 2 weeks  She will call back with date and can decide at that time if need to attempt a CT prior to GI visit  GI order placed, but, not  Insurance Referral until patient secures appointment      ----- Message from Oliver Cast MD sent at 11/15/2018 12:15 PM EST -----  Call patient with lab result-ultrasound does not reveal any problem with the liver  What I can see of the pancreas is also normal   I would refer her for GI consult  If they cannot see here within 10-14 days    We should also may be try to get a CT scan of the abdomen with contrast

## 2018-11-19 DIAGNOSIS — G47.00 INSOMNIA, UNSPECIFIED TYPE: ICD-10-CM

## 2018-11-19 RX ORDER — ZOLPIDEM TARTRATE 5 MG/1
5 TABLET ORAL
Qty: 30 TABLET | Refills: 3 | Status: SHIPPED | OUTPATIENT
Start: 2018-11-19 | End: 2019-03-12 | Stop reason: SDUPTHER

## 2018-11-22 DIAGNOSIS — E11.69 HYPERLIPIDEMIA ASSOCIATED WITH TYPE 2 DIABETES MELLITUS (HCC): ICD-10-CM

## 2018-11-22 DIAGNOSIS — E78.5 HYPERLIPIDEMIA ASSOCIATED WITH TYPE 2 DIABETES MELLITUS (HCC): ICD-10-CM

## 2018-11-23 RX ORDER — ATORVASTATIN CALCIUM 40 MG/1
TABLET, FILM COATED ORAL
Qty: 90 TABLET | Refills: 0 | Status: SHIPPED | OUTPATIENT
Start: 2018-11-23 | End: 2019-02-23 | Stop reason: SDUPTHER

## 2018-11-26 DIAGNOSIS — E11.9 TYPE 2 DIABETES MELLITUS WITHOUT COMPLICATION, WITHOUT LONG-TERM CURRENT USE OF INSULIN (HCC): ICD-10-CM

## 2018-11-26 RX ORDER — GLIPIZIDE 5 MG/1
5 TABLET, FILM COATED, EXTENDED RELEASE ORAL DAILY
Qty: 90 TABLET | Refills: 1 | Status: SHIPPED | OUTPATIENT
Start: 2018-11-26 | End: 2019-05-26 | Stop reason: SDUPTHER

## 2019-01-01 ENCOUNTER — HOSPITAL ENCOUNTER (EMERGENCY)
Facility: HOSPITAL | Age: 59
Discharge: HOME | End: 2019-01-01
Attending: EMERGENCY MEDICINE | Admitting: EMERGENCY MEDICINE
Payer: COMMERCIAL

## 2019-01-01 ENCOUNTER — TELEPHONE (OUTPATIENT)
Dept: OTHER | Facility: OTHER | Age: 59
End: 2019-01-01

## 2019-01-01 ENCOUNTER — APPOINTMENT (EMERGENCY)
Dept: RADIOLOGY | Facility: HOSPITAL | Age: 59
End: 2019-01-01
Payer: COMMERCIAL

## 2019-01-01 VITALS
DIASTOLIC BLOOD PRESSURE: 87 MMHG | RESPIRATION RATE: 18 BRPM | BODY MASS INDEX: 32.44 KG/M2 | HEIGHT: 64 IN | SYSTOLIC BLOOD PRESSURE: 126 MMHG | WEIGHT: 190 LBS | TEMPERATURE: 97.1 F | HEART RATE: 108 BPM | OXYGEN SATURATION: 22 %

## 2019-01-01 DIAGNOSIS — J18.9 PNEUMONIA: Primary | ICD-10-CM

## 2019-01-01 LAB
ALBUMIN SERPL BCP-MCNC: 3.2 G/DL (ref 3.5–5)
ALP SERPL-CCNC: 98 U/L (ref 46–116)
ALT SERPL W P-5'-P-CCNC: 26 U/L (ref 12–78)
ANION GAP SERPL CALCULATED.3IONS-SCNC: 13 MMOL/L (ref 4–13)
APTT PPP: 30 SECONDS (ref 26–38)
AST SERPL W P-5'-P-CCNC: 17 U/L (ref 5–45)
BASOPHILS # BLD AUTO: 0.03 THOUSANDS/ΜL (ref 0–0.1)
BASOPHILS NFR BLD AUTO: 0 % (ref 0–1)
BILIRUB SERPL-MCNC: 0.4 MG/DL (ref 0.2–1)
BILIRUB UR QL STRIP: NEGATIVE
BUN SERPL-MCNC: 13 MG/DL (ref 5–25)
CALCIUM SERPL-MCNC: 9.2 MG/DL (ref 8.3–10.1)
CHLORIDE SERPL-SCNC: 99 MMOL/L (ref 100–108)
CLARITY UR: CLEAR
CO2 SERPL-SCNC: 26 MMOL/L (ref 21–32)
COLOR UR: YELLOW
CREAT SERPL-MCNC: 0.81 MG/DL (ref 0.6–1.3)
EOSINOPHIL # BLD AUTO: 0.11 THOUSAND/ΜL (ref 0–0.61)
EOSINOPHIL NFR BLD AUTO: 1 % (ref 0–6)
ERYTHROCYTE [DISTWIDTH] IN BLOOD BY AUTOMATED COUNT: 14.9 % (ref 11.6–15.1)
GFR SERPL CREATININE-BSD FRML MDRD: 80 ML/MIN/1.73SQ M
GLUCOSE SERPL-MCNC: 257 MG/DL (ref 65–140)
GLUCOSE UR STRIP-MCNC: ABNORMAL MG/DL
HCT VFR BLD AUTO: 37.5 % (ref 34.8–46.1)
HGB BLD-MCNC: 11.8 G/DL (ref 11.5–15.4)
HGB UR QL STRIP.AUTO: NEGATIVE
IMM GRANULOCYTES # BLD AUTO: 0.07 THOUSAND/UL (ref 0–0.2)
IMM GRANULOCYTES NFR BLD AUTO: 1 % (ref 0–2)
INR PPP: 0.98 (ref 0.86–1.17)
KETONES UR STRIP-MCNC: NEGATIVE MG/DL
LACTATE SERPL-SCNC: 1.8 MMOL/L (ref 0.5–2)
LEUKOCYTE ESTERASE UR QL STRIP: NEGATIVE
LYMPHOCYTES # BLD AUTO: 1.93 THOUSANDS/ΜL (ref 0.6–4.47)
LYMPHOCYTES NFR BLD AUTO: 14 % (ref 14–44)
MCH RBC QN AUTO: 26.1 PG (ref 26.8–34.3)
MCHC RBC AUTO-ENTMCNC: 31.5 G/DL (ref 31.4–37.4)
MCV RBC AUTO: 83 FL (ref 82–98)
MONOCYTES # BLD AUTO: 0.85 THOUSAND/ΜL (ref 0.17–1.22)
MONOCYTES NFR BLD AUTO: 6 % (ref 4–12)
NEUTROPHILS # BLD AUTO: 11.07 THOUSANDS/ΜL (ref 1.85–7.62)
NEUTS SEG NFR BLD AUTO: 78 % (ref 43–75)
NITRITE UR QL STRIP: NEGATIVE
NRBC BLD AUTO-RTO: 0 /100 WBCS
PH UR STRIP.AUTO: 5.5 [PH] (ref 4.5–8)
PLATELET # BLD AUTO: 232 THOUSANDS/UL (ref 149–390)
PMV BLD AUTO: 11.8 FL (ref 8.9–12.7)
POTASSIUM SERPL-SCNC: 3.9 MMOL/L (ref 3.5–5.3)
PROT SERPL-MCNC: 8 G/DL (ref 6.4–8.2)
PROT UR STRIP-MCNC: NEGATIVE MG/DL
PROTHROMBIN TIME: 12.4 SECONDS (ref 11.8–14.2)
RBC # BLD AUTO: 4.52 MILLION/UL (ref 3.81–5.12)
SODIUM SERPL-SCNC: 138 MMOL/L (ref 136–145)
SP GR UR STRIP.AUTO: 1.02 (ref 1–1.03)
TROPONIN I SERPL-MCNC: <0.02 NG/ML
UROBILINOGEN UR QL STRIP.AUTO: 0.2 E.U./DL
WBC # BLD AUTO: 14.06 THOUSAND/UL (ref 4.31–10.16)

## 2019-01-01 PROCEDURE — 81003 URINALYSIS AUTO W/O SCOPE: CPT | Performed by: EMERGENCY MEDICINE

## 2019-01-01 PROCEDURE — 83605 ASSAY OF LACTIC ACID: CPT | Performed by: EMERGENCY MEDICINE

## 2019-01-01 PROCEDURE — 99284 EMERGENCY DEPT VISIT MOD MDM: CPT

## 2019-01-01 PROCEDURE — 96365 THER/PROPH/DIAG IV INF INIT: CPT

## 2019-01-01 PROCEDURE — 85730 THROMBOPLASTIN TIME PARTIAL: CPT | Performed by: EMERGENCY MEDICINE

## 2019-01-01 PROCEDURE — 96375 TX/PRO/DX INJ NEW DRUG ADDON: CPT

## 2019-01-01 PROCEDURE — 71046 X-RAY EXAM CHEST 2 VIEWS: CPT

## 2019-01-01 PROCEDURE — 96367 TX/PROPH/DG ADDL SEQ IV INF: CPT

## 2019-01-01 PROCEDURE — 36415 COLL VENOUS BLD VENIPUNCTURE: CPT | Performed by: EMERGENCY MEDICINE

## 2019-01-01 PROCEDURE — 93005 ELECTROCARDIOGRAM TRACING: CPT

## 2019-01-01 PROCEDURE — 85610 PROTHROMBIN TIME: CPT | Performed by: EMERGENCY MEDICINE

## 2019-01-01 PROCEDURE — 94640 AIRWAY INHALATION TREATMENT: CPT

## 2019-01-01 PROCEDURE — 87040 BLOOD CULTURE FOR BACTERIA: CPT | Performed by: EMERGENCY MEDICINE

## 2019-01-01 PROCEDURE — 80053 COMPREHEN METABOLIC PANEL: CPT | Performed by: EMERGENCY MEDICINE

## 2019-01-01 PROCEDURE — 85025 COMPLETE CBC W/AUTO DIFF WBC: CPT | Performed by: EMERGENCY MEDICINE

## 2019-01-01 PROCEDURE — 84484 ASSAY OF TROPONIN QUANT: CPT | Performed by: EMERGENCY MEDICINE

## 2019-01-01 RX ORDER — LEVALBUTEROL 1.25 MG/.5ML
1.25 SOLUTION, CONCENTRATE RESPIRATORY (INHALATION) ONCE
Status: COMPLETED | OUTPATIENT
Start: 2019-01-01 | End: 2019-01-01

## 2019-01-01 RX ORDER — PREDNISONE 20 MG/1
60 TABLET ORAL DAILY
Qty: 12 TABLET | Refills: 0 | Status: SHIPPED | OUTPATIENT
Start: 2019-01-02 | End: 2019-04-01 | Stop reason: ALTCHOICE

## 2019-01-01 RX ORDER — CEFTRIAXONE 1 G/50ML
1000 INJECTION, SOLUTION INTRAVENOUS ONCE
Status: COMPLETED | OUTPATIENT
Start: 2019-01-01 | End: 2019-01-01

## 2019-01-01 RX ORDER — AZITHROMYCIN 250 MG/1
250 TABLET, FILM COATED ORAL EVERY 24 HOURS
Qty: 4 TABLET | Refills: 0 | Status: SHIPPED | OUTPATIENT
Start: 2019-01-02 | End: 2019-01-06

## 2019-01-01 RX ORDER — SODIUM CHLORIDE FOR INHALATION 0.9 %
VIAL, NEBULIZER (ML) INHALATION
Status: COMPLETED
Start: 2019-01-01 | End: 2019-01-01

## 2019-01-01 RX ORDER — BENZONATATE 100 MG/1
100 CAPSULE ORAL ONCE
Status: COMPLETED | OUTPATIENT
Start: 2019-01-01 | End: 2019-01-01

## 2019-01-01 RX ORDER — METHYLPREDNISOLONE SODIUM SUCCINATE 125 MG/2ML
125 INJECTION, POWDER, LYOPHILIZED, FOR SOLUTION INTRAMUSCULAR; INTRAVENOUS ONCE
Status: COMPLETED | OUTPATIENT
Start: 2019-01-01 | End: 2019-01-01

## 2019-01-01 RX ADMIN — CEFTRIAXONE 1000 MG: 1 INJECTION, SOLUTION INTRAVENOUS at 11:29

## 2019-01-01 RX ADMIN — AZITHROMYCIN MONOHYDRATE 500 MG: 500 INJECTION, POWDER, LYOPHILIZED, FOR SOLUTION INTRAVENOUS at 12:06

## 2019-01-01 RX ADMIN — BENZONATATE 100 MG: 100 CAPSULE ORAL at 11:28

## 2019-01-01 RX ADMIN — ISODIUM CHLORIDE 3 ML: 0.03 SOLUTION RESPIRATORY (INHALATION) at 11:28

## 2019-01-01 RX ADMIN — SODIUM CHLORIDE 1000 ML: 0.9 INJECTION, SOLUTION INTRAVENOUS at 11:14

## 2019-01-01 RX ADMIN — LEVALBUTEROL 1.25 MG: 1.25 SOLUTION, CONCENTRATE RESPIRATORY (INHALATION) at 11:28

## 2019-01-01 RX ADMIN — METHYLPREDNISOLONE SODIUM SUCCINATE 125 MG: 125 INJECTION, POWDER, FOR SOLUTION INTRAMUSCULAR; INTRAVENOUS at 11:28

## 2019-01-01 NOTE — DISCHARGE INSTRUCTIONS
Community Acquired Pneumonia   WHAT YOU NEED TO KNOW:   What is community-acquired pneumonia (CAP)? CAP is a lung infection that you get outside of a hospital or nursing home setting  Your lungs become inflamed and cannot work well  CAP may be caused by bacteria, viruses, or fungi  What increases my risk for CAP? · Chronic lung disease    · Cigarette smoking    · Brain disorders such as stroke, dementia, and cerebral palsy    · Weakened immune system    · Recent surgery or trauma    · Surgery for cancer of the mouth, throat, or neck    · Medical conditions such as diabetes or heart disease  What are the signs and symptoms of CAP?   · Cough that may bring up green, yellow, or bloody mucus    · Fever, chills, or severe shaking    · Shortness of breath    · Breathing and heartbeat that are faster than usual    · Pain in your chest or back when you breathe in or cough    · Fatigue and loss of appetite    · Trouble thinking clearly (especially in elderly people)  How is CAP diagnosed? Your healthcare provider will listen to your lungs for abnormal sounds  You may also need any of the following:  · X-ray or CT scan  pictures may show a lung infection or other problems, such as fluid around your lungs  You may be given contrast liquid to help your lungs show up better in the pictures  Tell the healthcare provider if you have ever had an allergic reaction to contrast liquid  · A pulse oximeter  is a device that measures the amount of oxygen in your blood  · Blood and sputum tests  may be done to check for the germ causing your infection  · Bronchoscopy  is a procedure to look inside your airway and learn the cause of your airway or lung condition  A bronchoscope (thin tube with a light) is inserted into your mouth and moved down your throat to your airway  You may be given medicine to numb your throat and help you relax during the procedure   Tissue and fluid may be collected from your airway or lungs to be tested  How is CAP treated? Treatment will depend on what type of germ is causing your CAP, and how bad your symptoms are  You may need antibiotics if your pneumonia is caused by bacteria  Antiviral medicines may be given if you have viral pneumonia  You may need medicines that dilate your bronchial tubes  You may need oxygen if your blood oxygen level is lower than it should be  You may need to be admitted to the hospital if your pneumonia is severe  What can I do to manage CAP?   · Do not smoke or allow others to smoke around you  Nicotine and other chemicals in cigarettes and cigars can cause lung damage  Ask your healthcare provider for information if you currently smoke and need help to quit  E-cigarettes or smokeless tobacco still contain nicotine  Talk to your healthcare provider before you use these products  · Breathe warm, moist air  This helps loosen mucus  Loosely place a warm, wet washcloth over your nose and mouth  A room humidifier may also help make the air moist     · Take deep breaths  Deep breaths help open your airway  Take 2 deep breaths and cough 2 or 3 times every hour  Coughing helps get mucus out of your body  · Drink liquids as directed  Ask your healthcare provider how much liquid to drink each day and which liquids to drink  Liquids help make mucus thin and easier to get out of your body  · Gently tap your chest   This helps loosen mucus so it is easier to cough  Lie with your head lower than your chest several times a day and tap your chest      · Get plenty of rest   Rest helps your body heal   How can I prevent CAP? · Wash your hands often with soap and water  Carry germ-killing hand gel with you  You can use the gel to clean your hands when soap and water are not available  Do not touch your eyes, nose, or mouth unless you have washed your hands first      · Clean surfaces often    Clean doorknobs, countertops, cell phones, and other surfaces that are touched often  · Always cover your mouth when you cough  Cough into a tissue or your shirtsleeve so you do not spread germs from your hands  · Try to avoid people who have a cold or the flu  If you are sick, stay away from others as much as possible  · Ask about vaccines  You may need a vaccine to help prevent pneumonia  Get an influenza (flu) vaccine every year as soon as it becomes available  When should I seek immediate care? · You are confused and cannot think clearly  · You have increased trouble breathing  · Your lips or fingernails turn gray or blue  When should I contact my healthcare provider? · Your symptoms do not get better, or get worse  · You are urinating less, or not at all  · You have questions or concerns about your condition or care  CARE AGREEMENT:   You have the right to help plan your care  Learn about your health condition and how it may be treated  Discuss treatment options with your caregivers to decide what care you want to receive  You always have the right to refuse treatment  The above information is an  only  It is not intended as medical advice for individual conditions or treatments  Talk to your doctor, nurse or pharmacist before following any medical regimen to see if it is safe and effective for you  © 2017 [facility] Information is for End User's use only and may not be sold, redistributed or otherwise used for commercial purposes  All illustrations and images included in CareNotes® are the copyrighted property of [employer]  or Chema Knowles  Asthma   WHAT YOU NEED TO KNOW:   What is asthma? Asthma is a lung disease that makes breathing difficult  Chronic inflammation and reactions to triggers narrow the airways in the lungs  Asthma can become life-threatening if it is not managed  What is cough-variant asthma?   Cough-variant asthma is a type of asthma that causes a dry cough that keeps coming back  A dry cough may be your only symptom, or you may also have chest tightness  These symptoms may be caused by exercise or exposure to odors, allergens, or respiratory tract infections  Cough-variant asthma is treated the same way as typical asthma  What are the signs and symptoms of asthma? · Coughing     · Wheezing     · Shortness of breath     · Chest tightness  What may trigger an asthma attack? · A cold, the flu, or a sinus infection     · Exercise     · Weather changes, especially cold, dry air    · Smoking or secondhand smoke    · Fumes from chemicals, dust, air pollution, or other small particles in the air    · Pets, pollen, dust mites, or cockroaches       How is asthma diagnosed? Your healthcare provider will examine you and listen to your lungs  He or she will ask how often you have symptoms and what makes them worse  Tell him or her if you have trouble sleeping, exercising, or doing other activities because of shortness of breath  Your provider will ask about your allergies and past colds, and if anyone in your family has allergies or asthma  Tell your healthcare provider about medicines you take, including over-the-counter drugs and herbal supplements  You may need a chest x-ray to check for lung problems, or a lung function test  Lung function tests show how well you can breathe  How is asthma treated? · Medicines  decrease inflammation, open airways, and make it easier to breathe  Medicines may be inhaled, taken as a pill, or injected  Short-term medicines relieve your symptoms quickly  Long-term medicines are used to prevent future attacks  You may also need medicine to help control your allergies  · Allergy testing  may find allergies that trigger an asthma attack  You may need allergy shots or medicine to control allergies that make your asthma worse  How can I manage my symptoms and prevent future attacks? · Follow your Asthma Action Plan (AAP)    This is a written plan that you and your healthcare provider create  It explains which medicine you need and when to change doses if necessary  It also explains how you can monitor symptoms and use a peak flow meter  The meter measures how well your lungs are working  · Manage other health conditions , such as allergies, acid reflux, and sleep apnea  · Identify and avoid triggers  These may include pets, dust mites, mold, and cockroaches  · Do not smoke or be around others who smoke  Nicotine and other chemicals in cigarettes and cigars can cause lung damage  Ask your healthcare provider for information if you currently smoke and need help to quit  E-cigarettes or smokeless tobacco still contain nicotine  Talk to your healthcare provider before you use these products  · Ask about the flu vaccine  The flu can make your asthma worse  You may need a yearly flu shot  When should I seek immediate care? · You have severe shortness of breath  · Your lips or nails turn blue or gray  · The skin around your neck and ribs pulls in with each breath  · You have shortness of breath, even after you take your short-term medicine as directed  · Your peak flow numbers are in the red zone of your AAP  When should I contact my healthcare provider? · You run out of medicine before your next refill is due  · Your symptoms get worse  · You need to take more medicine than usual to control your symptoms  · You have questions or concerns about your condition or care  CARE AGREEMENT:   You have the right to help plan your care  Learn about your health condition and how it may be treated  Discuss treatment options with your caregivers to decide what care you want to receive  You always have the right to refuse treatment  The above information is an  only  It is not intended as medical advice for individual conditions or treatments   Talk to your doctor, nurse or pharmacist before following any medical regimen to see if it is safe and effective for you  © 2017 [employer] [employer] Information is for End User's use only and may not be sold, redistributed or otherwise used for commercial purposes  All illustrations and images included in CareNotes® are the copyrighted property of A [employer] M [employer]  or Chema Knowles

## 2019-01-01 NOTE — SEPSIS NOTE
Sepsis Note   Bladimir Ortiz 62 y o  female MRN: [id number]  Unit/Bed#: ED 06 Encounter: [id number]            Initial Sepsis Screening     Row Name 01/01/19 1141                Is the patient's history suggestive of a new or worsening infection? (!)  Yes (Proceed)  -ELVA        Suspected source of infection pneumonia  -ELVA        Are two or more of the following signs & symptoms of infection both present and new to the patient? (!)  Yes (Proceed)  -ELVA        Indicate SIRS criteria Tachycardia > 90 bpm;Leukocytosis (WBC > 99243 IJL)  -ELVA        If the answer is yes to both questions, suspicion of sepsis is present          If severe sepsis is present AND tissue hypoperfusion perists in the hour after fluid resuscitation or lactate > 4, the patient meets criteria for SEPTIC SHOCK          Are any of the following organ dysfunction criteria present within 6 hours of suspected infection and SIRS criteria that are NOT considered to be chronic conditions? No  -ELVA        Organ dysfunction          Date of presentation of severe sepsis          Time of presentation of severe sepsis          Tissue hypoperfusion persists in the hour after crystalloid fluid administration, evidenced, by either:          Was hypotension present within one hour of the conclusion of crystalloid fluid administration?           Date of presentation of septic shock          Time of presentation of septic shock            User Key  (r) = Recorded By, (t) = Taken By, (c) = Cosigned By    Initials Name Provider Type    [address], DO Physician

## 2019-01-01 NOTE — TELEPHONE ENCOUNTER
Dustin Mishra 1960  CONFIDENTIALTY NOTICE: This fax transmission is intended only for the addressee  It contains information that is legally privileged,  confidential or otherwise protected from use or disclosure  If you are not the intended recipient, you are strictly prohibited from reviewing,  disclosing, copying using or disseminating any of this information or taking any action in reliance on or regarding this information  If you have  received this fax in error, please notify us immediately by telephone so that we can arrange for its return to us  Page:   Call Id: [id number]  Health Call  Standard Call Report  Health Call  Patient Name: Dustin Mishra  Gender: Female  : 1960  Age: 62 Y 6 M 22 D  Return Phone  Number: [phone] (Home)  Address: [place] [address]  City/State/Zip: [address] [place] [address]  Practice Name: [facility]:  Physician:  [address] [place] [address] Name:  Relationship To  Patient:  Return Phone Number: [phone] (Home)  Presenting Problem: "I am having a asthma flare up "  Service Type: Messages  Charged Service 1: Messages  Pharmacy Name and  Number:  Nurse Assessment  Protocols  Protocol Title Nurse Date/Time  Disp  Time Disposition Final User  2019 10:36:52 AM Send to Follow Up Radha Medel RN, Coretta Cheadle  2019 11:50:19 AM Close Yes Lee Thacker RN, Coretta Cheadle  Comments  User: Nicci Nunn RN Date/Time: 2019 10:36:20 AM  Called back and no answer  Left VM  Will try again in about 15 minutes  User: Nicci Nunn RN Date/Time: 2019 11:50:14 AM  Called back and no answer  Left VM  Will close file now

## 2019-01-01 NOTE — ED PROVIDER NOTES
History  Chief Complaint   Patient presents with    Cough     Patient presents to the ER stating she was diagnosed with URI last friday and put on keflex, continues with URI symptoms  Patient complains of 6 days of SOB, green mucous cough with chest pain with cough  She has associated sweats, sore throat, body aches  She has been taking her nebulizer for asthma and thinks that a steroid would help  She has dysuria as well so [facility] started her on keflex 5 days ago but no relief of symptoms  History provided by:  Patient      Prior to Admission Medications   Prescriptions Last Dose Informant Patient Reported? Taking? Ferrous Fumarate 63 (20 Fe) MG TABS   Yes No   Sig: Take 65 mg by mouth daily   Linagliptin (TRADJENTA) 5 MG TABS   No No   Sig: Take 5 mg by mouth daily   albuterol (PROAIR HFA) 90 mcg/act inhaler   No No   Sig: Inhale 2 puffs every 6 (six) hours as needed for wheezing   amLODIPine-benazepril (LOTREL 5-20) 5-20 MG per capsule   No No   Sig: TAKE 1 CAPSULE DAILY   atorvastatin (LIPITOR) 40 mg tablet   No No   Sig: TAKE 1 TABLET DAILY   budesonide-formoterol (SYMBICORT) 160-4 5 mcg/act inhaler   No No   Sig: Inhale 2 puffs 2 (two) times a day Rinse mouth after use     glipiZIDE (GLUCOTROL XL) 5 mg 24 hr tablet   No No   Sig: Take 1 tablet (5 mg total) by mouth daily   glucose blood (ONE TOUCH ULTRA TEST) test strip   No No   Si each by Other route daily   metFORMIN (GLUCOPHAGE) 1000 MG tablet   No No   Sig: TAKE 1 TABLET TWICE A DAY   omeprazole (PriLOSEC) 40 MG capsule   No No   Sig: TAKE 1 CAPSULE EVERY       MORNING   ondansetron (ZOFRAN) 4 mg tablet   No No   Sig: Take 1 tablet (4 mg total) by mouth every 8 (eight) hours as needed for nausea or vomiting   pregabalin (LYRICA) 100 mg capsule   Yes No   Sig: Take 50 mg by mouth 3 (three) times a day   traMADol (ULTRAM) 50 mg tablet   Yes No   Sig: Take 50 mg by mouth 2 (two) times a day   zolpidem (AMBIEN) 5 mg tablet   No No   Sig: Take 1 tablet (5 mg total) by mouth daily at bedtime as needed for sleep      Facility-Administered Medications: None       Past Medical History:   Diagnosis Date    Anxiety     Asthma     Breast lump     Resolved: 2017     Chronic pain     back    Diabetes mellitus ([address] )     History of stomach ulcers     Hyperlipidemia     Hypertension     Infectious viral hepatitis     Hepatitiss A       Past Surgical History:   Procedure Laterality Date    APPENDECTOMY      BACK SURGERY       SECTION      CHOLECYSTECTOMY      GASTRIC BYPASS      SHOULDER SURGERY         Family History   Problem Relation Age of Onset    Diabetes Mother         Mellitus    Hyperlipidemia Mother     Hypertension Mother     Colon cancer Mother     Pancreatic cancer Mother     Melanoma Mother     Cancer Father         Bladder     Alcohol abuse Father     Lung cancer Father     Prostate cancer Father     Diabetes Brother         Mellitus    Hyperlipidemia Brother     Hypertension Brother     Mental illness Neg Hx      I have reviewed and agree with the history as documented  Social History   Substance Use Topics    Smoking status: Former Smoker    Smokeless tobacco: Never Used    Alcohol use No        Review of Systems   Constitutional: Positive for diaphoresis  Negative for chills and fever  HENT: Positive for sore throat  Negative for rhinorrhea  Respiratory: Positive for shortness of breath  Cardiovascular: Positive for chest pain  Gastrointestinal: Negative for constipation, diarrhea, nausea and vomiting  Genitourinary: Positive for dysuria  Negative for frequency  Skin: Negative for rash  All other systems reviewed and are negative  Physical Exam  Physical Exam   Constitutional: She is oriented to person, place, and time  She appears well-developed and well-nourished  HENT:   Mouth/Throat: Posterior oropharyngeal erythema present     Eyes: Pupils are equal, round, and reactive to light  Conjunctivae and EOM are normal    Neck: Normal range of motion  Neck supple  No spinous process tenderness present  Cardiovascular: Regular rhythm, normal heart sounds and intact distal pulses  Tachycardia present  Pulmonary/Chest: Effort normal  Tachypnea noted  No respiratory distress  She has wheezes  Abdominal: Soft  Bowel sounds are normal  She exhibits no distension  There is no tenderness  Musculoskeletal: Normal range of motion  Neurological: She is alert and oriented to person, place, and time  She has normal strength  No sensory deficit  GCS eye subscore is 4  GCS verbal subscore is 5  GCS motor subscore is 6  Skin: Skin is warm  No rash noted  She is diaphoretic  Psychiatric: She has a normal mood and affect  Nursing note and vitals reviewed        Vital Signs  ED Triage Vitals [01/01/19 1051]   Temperature Pulse Respirations Blood Pressure SpO2   (!) 97 1 °F (36 2 °C) (!) 115 15 122/74 96 %      Temp Source Heart Rate Source Patient Position - Orthostatic VS BP Location FiO2 (%)   Temporal Monitor Sitting Right arm --      Pain Score       --           Vitals:    01/01/19 1200 01/01/19 1215 01/01/19 1227 01/01/19 1245   BP:   128/65 126/87   Pulse: (!) 115 (!) 111 (!) 114 (!) 108   Patient Position - Orthostatic VS:   Lying Lying       Visual Acuity  Visual Acuity      Most Recent Value   L Pupil Size (mm)  3   R Pupil Size (mm)  3          ED Medications  Medications   sodium chloride 0 9 % bolus 1,000 mL (0 mL Intravenous Stopped 1/1/19 1334)   cefTRIAXone (ROCEPHIN) IVPB (premix) 1,000 mg (0 mg Intravenous Stopped 1/1/19 1206)   azithromycin (ZITHROMAX) 500 mg in sodium chloride 0 9% 250mL IVPB 500 mg (0 mg Intravenous Stopped 1/1/19 1334)   levalbuterol (XOPENEX) inhalation solution 1 25 mg (1 25 mg Nebulization Given 1/1/19 1128)   benzonatate (TESSALON PERLES) capsule 100 mg (100 mg Oral Given 1/1/19 1128)   methylPREDNISolone sodium succinate (Solu-MEDROL) injection 125 mg (125 mg Intravenous Given 1/1/19 1128)   sodium chloride 0 9 % inhalation solution **ADS Override Pull** (3 mL  Given 1/1/19 1128)       Diagnostic Studies  Results Reviewed     Procedure Component Value Units Date/Time    UA w Reflex to Microscopic w Reflex to Culture [[id number]]  (Abnormal) Collected:  01/01/19 1156    Lab Status:  Final result Specimen:  Urine from Urine, Clean Catch Updated:  01/01/19 1222     Color, UA Yellow     Clarity, UA Clear     Specific [place], UA 1 020     pH, UA 5 5     Leukocytes, UA Negative     Nitrite, UA Negative     Protein, UA Negative mg/dl      Glucose,  (1/10%) (A) mg/dl      Ketones, UA Negative mg/dl      Urobilinogen, UA 0 2 E U /dl      Bilirubin, UA Negative     Blood, UA Negative    Troponin I [[id number]]  (Normal) Collected:  01/01/19 1110    Lab Status:  Final result Specimen:  Blood from Arm, Left Updated:  01/01/19 1137     Troponin I <0 02 ng/mL     Lactic Acid x2 [[id number]]  (Normal) Collected:  01/01/19 1110    Lab Status:  Final result Specimen:  Blood from Arm, Left Updated:  01/01/19 1136     LACTIC ACID 1 8 mmol/L     Narrative:         Result may be elevated if tourniquet was used during collection      Comprehensive metabolic panel [[id number]]  (Abnormal) Collected:  01/01/19 1110    Lab Status:  Final result Specimen:  Blood from Arm, Left Updated:  01/01/19 1135     Sodium 138 mmol/L      Potassium 3 9 mmol/L      Chloride 99 (L) mmol/L      CO2 26 mmol/L      ANION GAP 13 mmol/L      BUN 13 mg/dL      Creatinine 0 81 mg/dL      Glucose 257 (H) mg/dL      Calcium 9 2 mg/dL      AST 17 U/L      ALT 26 U/L      Alkaline Phosphatase 98 U/L      Total Protein 8 0 g/dL      Albumin 3 2 (L) g/dL      Total Bilirubin 0 40 mg/dL      eGFR 80 ml/min/1 73sq m     Narrative:         National Kidney Disease Education Program recommendations are as follows:  GFR calculation is accurate only with a steady state creatinine  Chronic Kidney disease less than 60 ml/min/1 73 sq  meters  Kidney failure less than 15 ml/min/1 73 sq  meters  APTT [[id number]]  (Normal) Collected:  01/01/19 1110    Lab Status:  Final result Specimen:  Blood from Arm, Left Updated:  01/01/19 1128     PTT 30 seconds     Protime-INR [[id number]]  (Normal) Collected:  01/01/19 1110    Lab Status:  Final result Specimen:  Blood from Arm, Left Updated:  01/01/19 1128     Protime 12 4 seconds      INR 0 98    CBC and differential [[id number]]  (Abnormal) Collected:  01/01/19 1110    Lab Status:  Final result Specimen:  Blood from Arm, Left Updated:  01/01/19 1117     WBC 14 06 (H) Thousand/uL      RBC 4 52 Million/uL      Hemoglobin 11 8 g/dL      Hematocrit 37 5 %      MCV 83 fL      MCH 26 1 (L) pg      MCHC 31 5 g/dL      RDW 14 9 %      MPV 11 8 fL      Platelets 494 Thousands/uL      nRBC 0 /100 WBCs      Neutrophils Relative 78 (H) %      Immat GRANS % 1 %      Lymphocytes Relative 14 %      Monocytes Relative 6 %      Eosinophils Relative 1 %      Basophils Relative 0 %      Neutrophils Absolute 11 07 (H) Thousands/µL      Immature Grans Absolute 0 07 Thousand/uL      Lymphocytes Absolute 1 93 Thousands/µL      Monocytes Absolute 0 85 Thousand/µL      Eosinophils Absolute 0 11 Thousand/µL      Basophils Absolute 0 03 Thousands/µL     Blood culture #1 [[id number]] Collected:  01/01/19 1109    Lab Status: In process Specimen:  Blood from Arm, Left Updated:  01/01/19 1115    Blood culture #2 [[id number]] Collected:  01/01/19 1110    Lab Status:   In process Specimen:  Blood from Arm, Right Updated:  01/01/19 1115                 XR chest pa & lateral   ED Interpretation by Nara Christie DO (01/01 1231)   Right middle lobe infiltrate                 Procedures  ECG 12 Lead Documentation  Date/Time: 1/1/2019 11:41 AM  Performed by: Enoch Sr  Authorized by: Enoch Sr     Indications / Diagnosis:  Sob  ECG reviewed by me, the ED Provider: yes    Patient location:  ED  Previous ECG:     Previous ECG: Unavailable  Interpretation:     Interpretation: normal    Rate:     ECG rate:  114    ECG rate assessment: tachycardic    Rhythm:     Rhythm: sinus tachycardia    Ectopy:     Ectopy: none    QRS:     QRS axis:  Normal    QRS intervals:  Normal  Conduction:     Conduction: normal    ST segments:     ST segments:  Normal  T waves:     T waves: normal               Phone Contacts  ED Phone Contact    ED Course     patient reassessed after breathing tx - wheezing resolved but rhonchi noted right posterior chest                    Initial Sepsis Screening     [address] [facility] [address] Name 01/01/19 1141                Is the patient's history suggestive of a new or worsening infection? (!)  Yes (Proceed)  -ELVA        Suspected source of infection pneumonia  -ELVA        Are two or more of the following signs & symptoms of infection both present and new to the patient? (!)  Yes (Proceed)  -ELVA        Indicate SIRS criteria Tachycardia > 90 bpm;Leukocytosis (WBC > [address] IJL)  -ELVA        If the answer is yes to both questions, suspicion of sepsis is present          If severe sepsis is present AND tissue hypoperfusion perists in the hour after fluid resuscitation or lactate > 4, the patient meets criteria for SEPTIC SHOCK          Are any of the following organ dysfunction criteria present within 6 hours of suspected infection and SIRS criteria that are NOT considered to be chronic conditions?         Organ dysfunction          Date of presentation of severe sepsis          Time of presentation of severe sepsis          Tissue hypoperfusion persists in the hour after crystalloid fluid administration, evidenced, by either:          Was hypotension present within one hour of the conclusion of crystalloid fluid administration?           Date of presentation of septic shock          Time of presentation of septic shock            User Key  (r) = Recorded By, (t) = Taken By, (c) = Cosigned By    Initials Name Provider Type    Salvador Ferguson DO Physician                  MDM  Number of Diagnoses or Management Options  Pneumonia: new and requires workup     Amount and/or Complexity of Data Reviewed  Clinical lab tests: ordered and reviewed  Tests in the radiology section of CPT®: ordered and reviewed  Discuss the patient with other providers: yes    Patient Progress  Patient progress: improved    CritCare Time    Disposition  Final diagnoses:   Pneumonia - acute right middle lung with asthma exacerbation     Time reflects when diagnosis was documented in both MDM as applicable and the Disposition within this note     Time User Action Codes Description Comment    1/1/2019 12:25 PM Allen Fiore Add [J18 9] Pneumonia     1/1/2019 12:26 PM Allen Fiore Modify [J18 9] Pneumonia acute right middle lung with asthma exacerbation      ED Disposition     ED Disposition Condition Comment    Discharge  Charleen Ndiaye discharge to home/self care      Condition at discharge: Good        Follow-up Information     Follow up With Specialties Details Why Bailee Zaldivar MD Family Medicine In 10 days  [address]  [address] [address]  [address] [facility]  [phone]            Discharge Medication List as of 1/1/2019 12:31 PM      START taking these medications    Details   azithromycin (ZITHROMAX) 250 mg tablet Take 1 tablet (250 mg total) by mouth every 24 hours for 4 days Take 2 tablets today then 1 tablet daily x 4 days, Starting Wed 1/2/2019, Until Sun 1/6/2019, Print      predniSONE 20 mg tablet Take 3 tablets (60 mg total) by mouth daily, Starting Wed 1/2/2019, Print         CONTINUE these medications which have NOT CHANGED    Details   albuterol (PROAIR HFA) 90 mcg/act inhaler Inhale 2 puffs every 6 (six) hours as needed for wheezing, Starting Mon 10/15/2018, Normal      amLODIPine-benazepril (LOTREL 5-20) 5-20 MG per capsule TAKE 1 CAPSULE DAILY, Normal      atorvastatin (LIPITOR) 40 mg tablet TAKE 1 TABLET DAILY, Normal      budesonide-formoterol (SYMBICORT) 160-4 5 mcg/act inhaler Inhale 2 puffs 2 (two) times a day Rinse mouth after use , Starting Mon 7/23/2018, Normal      Ferrous Fumarate 63 (20 Fe) MG TABS Take 65 mg by mouth daily, Historical Med      glipiZIDE (GLUCOTROL XL) 5 mg 24 hr tablet Take 1 tablet (5 mg total) by mouth daily, Starting Mon 11/26/2018, Normal      glucose blood (ONE TOUCH ULTRA TEST) test strip 1 each by Other route daily, Starting Mon 11/26/2018, Normal      Linagliptin (TRADJENTA) 5 MG TABS Take 5 mg by mouth daily, Starting Tue 11/13/2018, Normal      metFORMIN (GLUCOPHAGE) 1000 MG tablet TAKE 1 TABLET TWICE A DAY, Normal      omeprazole (PriLOSEC) 40 MG capsule TAKE 1 CAPSULE EVERY       MORNING, Normal      ondansetron (ZOFRAN) 4 mg tablet Take 1 tablet (4 mg total) by mouth every 8 (eight) hours as needed for nausea or vomiting, Starting Tue 9/11/2018, Normal      pregabalin (LYRICA) 100 mg capsule Take 50 mg by mouth 3 (three) times a day, Historical Med      traMADol (ULTRAM) 50 mg tablet Take 50 mg by mouth 2 (two) times a day, Historical Med      zolpidem (AMBIEN) 5 mg tablet Take 1 tablet (5 mg total) by mouth daily at bedtime as needed for sleep, Starting Mon 11/19/2018, Normal           No discharge procedures on file      ED Provider  Electronically Signed by           Terrell Pugh DO  01/01/19 7483

## 2019-01-02 LAB
ATRIAL RATE: 114 BPM
P AXIS: 66 DEGREES
PR INTERVAL: 202 MS
QRS AXIS: 42 DEGREES
QRSD INTERVAL: 80 MS
QT INTERVAL: 308 MS
QTC INTERVAL: 424 MS
T WAVE AXIS: 72 DEGREES
VENTRICULAR RATE: 114 BPM

## 2019-01-02 PROCEDURE — 93010 ELECTROCARDIOGRAM REPORT: CPT | Performed by: INTERNAL MEDICINE

## 2019-01-03 DIAGNOSIS — J45.909 ASTHMA, UNSPECIFIED ASTHMA SEVERITY, UNSPECIFIED WHETHER COMPLICATED, UNSPECIFIED WHETHER PERSISTENT: Primary | ICD-10-CM

## 2019-01-03 RX ORDER — ALBUTEROL SULFATE 2.5 MG/3ML
2.5 SOLUTION RESPIRATORY (INHALATION) EVERY 6 HOURS PRN
Qty: 100 VIAL | Refills: 0 | Status: SHIPPED | OUTPATIENT
Start: 2019-01-03 | End: 2020-02-21 | Stop reason: SDUPTHER

## 2019-01-03 NOTE — TELEPHONE ENCOUNTER
Spoke to patient regarding follow up to ER on 1/1/19 for pneumonia    Appointment scheduled for 1/15/19 @019 ([id number])

## 2019-01-03 NOTE — TELEPHONE ENCOUNTER
Per pt's ph call, per ER she has pneumonia, pt would like to know when to f/u with you  Vaughn Rodriguez

## 2019-01-03 NOTE — TELEPHONE ENCOUNTER
PLEASE APPROVE       PT WAS TOLD IN ER THAT SHE HAS PNEUMONIA, SHE WANTS TO KNOW WHEN SHE SHOULD F/U WITH YOU

## 2019-01-06 LAB
BACTERIA BLD CULT: NORMAL
BACTERIA BLD CULT: NORMAL

## 2019-01-15 ENCOUNTER — OFFICE VISIT (OUTPATIENT)
Dept: FAMILY MEDICINE CLINIC | Facility: CLINIC | Age: 59
End: 2019-01-15
Payer: COMMERCIAL

## 2019-01-15 VITALS
HEART RATE: 90 BPM | BODY MASS INDEX: 32.44 KG/M2 | RESPIRATION RATE: 16 BRPM | DIASTOLIC BLOOD PRESSURE: 70 MMHG | TEMPERATURE: 98.2 F | SYSTOLIC BLOOD PRESSURE: 120 MMHG | OXYGEN SATURATION: 93 % | WEIGHT: 190 LBS | HEIGHT: 64 IN

## 2019-01-15 DIAGNOSIS — J18.9 PNEUMONIA OF RIGHT LOWER LOBE DUE TO INFECTIOUS ORGANISM: Primary | ICD-10-CM

## 2019-01-15 PROCEDURE — 3008F BODY MASS INDEX DOCD: CPT | Performed by: FAMILY MEDICINE

## 2019-01-15 PROCEDURE — 99213 OFFICE O/P EST LOW 20 MIN: CPT | Performed by: FAMILY MEDICINE

## 2019-01-15 PROCEDURE — 1036F TOBACCO NON-USER: CPT | Performed by: FAMILY MEDICINE

## 2019-01-15 RX ORDER — BUPRENORPHINE HYDROCHLORIDE 75 UG/1
150 FILM, SOLUBLE BUCCAL 2 TIMES DAILY
Refills: 0 | COMMUNITY
Start: 2019-01-07 | End: 2019-04-01 | Stop reason: SINTOL

## 2019-01-15 NOTE — PROGRESS NOTES
[address]         NAME: Marc Lopez is a 61 y o  female  : 1960    MRN: [id number]  DATE: January 15, 2019  TIME: 3:55 PM    Assessment and Plan   Pneumonia of right lower lobe due to infectious organism ([facility] [address] ) [J18 1]  1  Pneumonia of right lower lobe due to infectious organism [facility])         No problem-specific Assessment & Plan notes found for this encounter  Patient Instructions     Patient Instructions   I think clinically your improving from her pneumonia  I would wait approximately 2 weeks before having her endoscopy done  Use her asthma inhalers routinely and as needed for symptom relief  You would be due for a BMP and A1c for me sometime after   This can be added on to any additional blood work that the GI specialist would want  I would maybe also add a TSH due to your sweats  Checking blood sugar when you wake up with sweats to be sure your blood sugars were not low  Chief Complaint     Chief Complaint   Patient presents with    Follow-up     fu ER - pneumonia         History of Present Illness       Patient returns today for follow-up from urgency you room visit 2 weeks ago  Diagnosed with clinical pneumonia, although radiology did not feel there was 1 on the reading of the x-ray  She had been on Keflex prior to the visit and was switched to Zithromax and prednisone  She is generally feeling better  There is still some shortness of breath with exertion  Cough is down to minimal and is nonproductive  No current night sweats or fevers are noted  Patient is asthmatic in using her inhalers  She has also been scheduled for an endoscopic exam due to elevated liver enzymes  Review of Systems   Review of Systems   Constitutional: Positive for fatigue  Negative for appetite change, chills, diaphoresis and fever  HENT: Negative for ear pain, rhinorrhea, sinus pressure and sore throat      Eyes: Negative for discharge, redness and itching  Respiratory: Positive for cough  Negative for shortness of breath and wheezing  Cardiovascular: Negative for chest pain and palpitations  Rapid or slow heart rate   Gastrointestinal: Negative for abdominal pain, diarrhea, nausea and vomiting           Current Medications       Current Outpatient Prescriptions:     albuterol (2 5 mg/3 mL) 0 083 % nebulizer solution, Take 1 vial (2 5 mg total) by nebulization every 6 (six) hours as needed for wheezing or shortness of breath, Disp: 100 vial, Rfl: 0    albuterol (PROAIR HFA) 90 mcg/act inhaler, Inhale 2 puffs every 6 (six) hours as needed for wheezing, Disp: 3 Inhaler, Rfl: 1    amLODIPine-benazepril (LOTREL 5-20) 5-20 MG per capsule, TAKE 1 CAPSULE DAILY, Disp: 90 capsule, Rfl: 1    atorvastatin (LIPITOR) 40 mg tablet, TAKE 1 TABLET DAILY, Disp: 90 tablet, Rfl: 0    BELBUCA 75 MCG FILM, Take 150 mg by mouth 2 (two) times a day, Disp: , Rfl: 0    budesonide-formoterol (SYMBICORT) 160-4 5 mcg/act inhaler, Inhale 2 puffs 2 (two) times a day Rinse mouth after use , Disp: 3 Inhaler, Rfl: 3    Ferrous Fumarate 63 (20 Fe) MG TABS, Take 65 mg by mouth daily, Disp: , Rfl:     glipiZIDE (GLUCOTROL XL) 5 mg 24 hr tablet, Take 1 tablet (5 mg total) by mouth daily, Disp: 90 tablet, Rfl: 1    glucose blood (ONE TOUCH ULTRA TEST) test strip, 1 each by Other route daily, Disp: 100 each, Rfl: 1    Linagliptin (TRADJENTA) 5 MG TABS, Take 5 mg by mouth daily, Disp: 90 tablet, Rfl: 0    LYRICA 200 MG capsule, Take 200 mg by mouth 2 (two) times a day, Disp: , Rfl: 0    metFORMIN (GLUCOPHAGE) 1000 MG tablet, TAKE 1 TABLET TWICE A DAY, Disp: 180 tablet, Rfl: 1    omeprazole (PriLOSEC) 40 MG capsule, TAKE 1 CAPSULE EVERY       MORNING, Disp: 90 capsule, Rfl: 1    ondansetron (ZOFRAN) 4 mg tablet, Take 1 tablet (4 mg total) by mouth every 8 (eight) hours as needed for nausea or vomiting, Disp: 20 tablet, Rfl: 0    predniSONE 20 mg tablet, Take 3 tablets (60 mg total) by mouth daily, Disp: 12 tablet, Rfl: 0    pregabalin (LYRICA) 100 mg capsule, Take 50 mg by mouth 3 (three) times a day, Disp: , Rfl:     traMADol (ULTRAM) 50 mg tablet, Take 50 mg by mouth 2 (two) times a day, Disp: , Rfl:     zolpidem (AMBIEN) 5 mg tablet, Take 1 tablet (5 mg total) by mouth daily at bedtime as needed for sleep, Disp: 30 tablet, Rfl: 3    Current Allergies     Allergies as of 01/15/2019 - Reviewed 01/15/2019   Allergen Reaction Noted    Nsaids Other (See Comments) 2017    Percocet [oxycodone-acetaminophen] GI Intolerance 2017            The following portions of the patient's history were reviewed and updated as appropriate: allergies, current medications, past family history, past medical history, past social history, past surgical history and problem list      Past Medical History:   Diagnosis Date    Anxiety     Asthma     Breast lump     Resolved: 2017     Chronic pain     back    Diabetes mellitus ([facility] [address] )     History of stomach ulcers     Hyperlipidemia     Hypertension     Infectious viral hepatitis     Hepatitiss A       Past Surgical History:   Procedure Laterality Date    APPENDECTOMY      BACK SURGERY       SECTION      CHOLECYSTECTOMY      GASTRIC BYPASS      SHOULDER SURGERY         Family History   Problem Relation Age of Onset    Diabetes Mother         Mellitus    Hyperlipidemia Mother     Hypertension Mother     Colon cancer Mother     Pancreatic cancer Mother     Melanoma Mother     Cancer Father         Bladder     Alcohol abuse Father     Lung cancer Father     Prostate cancer Father     Diabetes Brother         Mellitus    Hyperlipidemia Brother     Hypertension Brother     Mental illness Neg Hx          Medications have been verified          Objective   /70 (BP Location: Right arm, Patient Position: Sitting, Cuff Size: Large)   Pulse 90   Temp 98 2 °F (36 8 °C) (Oral)   Resp 16   Ht 5' 4" (1 626 m)   Wt 86 2 kg (190 lb)   LMP  (LMP Unknown)   SpO2 93%   BMI 32 61 kg/m²        Physical Exam     Physical Exam   Constitutional: She is oriented to person, place, and time  She appears well-developed and well-nourished  No distress  HENT:   Head: Normocephalic and atraumatic  Right Ear: Tympanic membrane and external ear normal  No drainage  Left Ear: Tympanic membrane normal  No drainage  Mouth/Throat: No oropharyngeal exudate  Eyes: Conjunctivae and EOM are normal  Right eye exhibits no discharge  Left eye exhibits no discharge  Neck: Normal range of motion  Neck supple  No thyromegaly present  Cardiovascular: Normal rate, regular rhythm and normal heart sounds  Pulmonary/Chest: Effort normal  No respiratory distress  She has no wheezes  She has no rales  Lymphadenopathy:     She has no cervical adenopathy  Neurological: She is alert and oriented to person, place, and time  Skin: Skin is warm and dry  Psychiatric: She has a normal mood and affect   Her behavior is normal

## 2019-01-15 NOTE — PATIENT INSTRUCTIONS
I think clinically your improving from her pneumonia  I would wait approximately 2 weeks before having her endoscopy done  Use her asthma inhalers routinely and as needed for symptom relief  You would be due for a BMP and A1c for me sometime after February 4th  This can be added on to any additional blood work that the GI specialist would want  I would maybe also add a TSH due to your sweats  Checking blood sugar when you wake up with sweats to be sure your blood sugars were not low

## 2019-02-12 DIAGNOSIS — E11.9 TYPE 2 DIABETES MELLITUS WITHOUT COMPLICATION, WITHOUT LONG-TERM CURRENT USE OF INSULIN (HCC): ICD-10-CM

## 2019-02-13 DIAGNOSIS — J45.30 MILD PERSISTENT ASTHMA, UNSPECIFIED WHETHER COMPLICATED: ICD-10-CM

## 2019-02-19 DIAGNOSIS — E11.9 TYPE 2 DIABETES MELLITUS WITHOUT COMPLICATION, WITHOUT LONG-TERM CURRENT USE OF INSULIN (HCC): Primary | ICD-10-CM

## 2019-02-19 NOTE — TELEPHONE ENCOUNTER
Ambar Lin was not covered by insurance  PMM would like to change med to [address] [facility] or [address]   Discussed with patient  And she will try Januvia,  Has been on that previously and did not have any adverse issues  Will send med to be approved

## 2019-02-23 DIAGNOSIS — E78.5 HYPERLIPIDEMIA ASSOCIATED WITH TYPE 2 DIABETES MELLITUS (HCC): ICD-10-CM

## 2019-02-23 DIAGNOSIS — E11.69 HYPERLIPIDEMIA ASSOCIATED WITH TYPE 2 DIABETES MELLITUS (HCC): ICD-10-CM

## 2019-02-25 RX ORDER — ATORVASTATIN CALCIUM 40 MG/1
TABLET, FILM COATED ORAL
Qty: 90 TABLET | Refills: 0 | Status: SHIPPED | OUTPATIENT
Start: 2019-02-25 | End: 2019-05-29 | Stop reason: SDUPTHER

## 2019-02-28 DIAGNOSIS — I10 ESSENTIAL HYPERTENSION: ICD-10-CM

## 2019-02-28 DIAGNOSIS — E11.9 TYPE 2 DIABETES MELLITUS WITHOUT COMPLICATION, WITHOUT LONG-TERM CURRENT USE OF INSULIN (HCC): ICD-10-CM

## 2019-02-28 RX ORDER — AMLODIPINE BESYLATE AND BENAZEPRIL HYDROCHLORIDE 5; 20 MG/1; MG/1
CAPSULE ORAL
Qty: 90 CAPSULE | Refills: 0 | Status: SHIPPED | OUTPATIENT
Start: 2019-02-28 | End: 2019-05-29 | Stop reason: SDUPTHER

## 2019-03-12 DIAGNOSIS — G47.00 INSOMNIA, UNSPECIFIED TYPE: ICD-10-CM

## 2019-03-13 RX ORDER — ZOLPIDEM TARTRATE 5 MG/1
TABLET ORAL
Qty: 30 TABLET | Refills: 3 | Status: SHIPPED | OUTPATIENT
Start: 2019-03-13 | End: 2019-07-09 | Stop reason: SDUPTHER

## 2019-03-23 LAB — HBA1C MFR BLD HPLC: 7.6 %

## 2019-04-01 ENCOUNTER — OFFICE VISIT (OUTPATIENT)
Dept: FAMILY MEDICINE CLINIC | Facility: CLINIC | Age: 59
End: 2019-04-01
Payer: COMMERCIAL

## 2019-04-01 VITALS
SYSTOLIC BLOOD PRESSURE: 134 MMHG | HEART RATE: 90 BPM | OXYGEN SATURATION: 94 % | WEIGHT: 190 LBS | RESPIRATION RATE: 16 BRPM | HEIGHT: 64 IN | DIASTOLIC BLOOD PRESSURE: 84 MMHG | BODY MASS INDEX: 32.44 KG/M2

## 2019-04-01 DIAGNOSIS — E78.5 HYPERLIPIDEMIA ASSOCIATED WITH TYPE 2 DIABETES MELLITUS (HCC): ICD-10-CM

## 2019-04-01 DIAGNOSIS — E66.9 OBESITY (BMI 30.0-34.9): ICD-10-CM

## 2019-04-01 DIAGNOSIS — I10 BENIGN ESSENTIAL HYPERTENSION: ICD-10-CM

## 2019-04-01 DIAGNOSIS — E11.9 CONTROLLED TYPE 2 DIABETES MELLITUS WITHOUT COMPLICATION, WITHOUT LONG-TERM CURRENT USE OF INSULIN (HCC): Primary | ICD-10-CM

## 2019-04-01 DIAGNOSIS — G62.9 PERIPHERAL POLYNEUROPATHY: ICD-10-CM

## 2019-04-01 DIAGNOSIS — M54.50 CHRONIC BILATERAL LOW BACK PAIN WITHOUT SCIATICA: ICD-10-CM

## 2019-04-01 DIAGNOSIS — Z23 NEED FOR 23-POLYVALENT PNEUMOCOCCAL POLYSACCHARIDE VACCINE: ICD-10-CM

## 2019-04-01 DIAGNOSIS — G89.29 CHRONIC BILATERAL LOW BACK PAIN WITHOUT SCIATICA: ICD-10-CM

## 2019-04-01 DIAGNOSIS — E11.69 HYPERLIPIDEMIA ASSOCIATED WITH TYPE 2 DIABETES MELLITUS (HCC): ICD-10-CM

## 2019-04-01 DIAGNOSIS — J45.30 MILD PERSISTENT ASTHMA WITHOUT COMPLICATION: ICD-10-CM

## 2019-04-01 PROCEDURE — 3008F BODY MASS INDEX DOCD: CPT | Performed by: FAMILY MEDICINE

## 2019-04-01 PROCEDURE — 99214 OFFICE O/P EST MOD 30 MIN: CPT | Performed by: FAMILY MEDICINE

## 2019-04-01 PROCEDURE — 3079F DIAST BP 80-89 MM HG: CPT | Performed by: FAMILY MEDICINE

## 2019-04-01 PROCEDURE — 90471 IMMUNIZATION ADMIN: CPT

## 2019-04-01 PROCEDURE — 3075F SYST BP GE 130 - 139MM HG: CPT | Performed by: FAMILY MEDICINE

## 2019-04-01 PROCEDURE — 1036F TOBACCO NON-USER: CPT | Performed by: FAMILY MEDICINE

## 2019-04-01 PROCEDURE — 90732 PPSV23 VACC 2 YRS+ SUBQ/IM: CPT

## 2019-04-01 RX ORDER — TRAMADOL HYDROCHLORIDE 300 MG/1
300 CAPSULE ORAL DAILY
Qty: 30 TABLET | Refills: 0 | COMMUNITY
Start: 2019-04-01 | End: 2022-04-11 | Stop reason: SDUPTHER

## 2019-04-06 DIAGNOSIS — J45.30 MILD PERSISTENT ASTHMA WITHOUT COMPLICATION: ICD-10-CM

## 2019-04-08 LAB
ALBUMIN/CREAT UR: 9 MCG/MG CREAT
CREAT UR-MCNC: 120 MG/DL (ref 20–275)
MICROALBUMIN UR-MCNC: 1.1 MG/DL

## 2019-04-08 RX ORDER — BUDESONIDE AND FORMOTEROL FUMARATE DIHYDRATE 160; 4.5 UG/1; UG/1
AEROSOL RESPIRATORY (INHALATION)
Qty: 30.6 G | Refills: 0 | OUTPATIENT
Start: 2019-04-08

## 2019-04-09 ENCOUNTER — TELEPHONE (OUTPATIENT)
Dept: FAMILY MEDICINE CLINIC | Facility: CLINIC | Age: 59
End: 2019-04-09

## 2019-04-22 DIAGNOSIS — K21.00 GASTROESOPHAGEAL REFLUX DISEASE WITH ESOPHAGITIS: ICD-10-CM

## 2019-04-22 RX ORDER — OMEPRAZOLE 40 MG/1
CAPSULE, DELAYED RELEASE ORAL
Qty: 90 CAPSULE | Refills: 0 | Status: SHIPPED | OUTPATIENT
Start: 2019-04-22 | End: 2019-07-21 | Stop reason: SDUPTHER

## 2019-05-26 DIAGNOSIS — E11.9 TYPE 2 DIABETES MELLITUS WITHOUT COMPLICATION, WITHOUT LONG-TERM CURRENT USE OF INSULIN (HCC): ICD-10-CM

## 2019-05-28 RX ORDER — GLIPIZIDE 5 MG/1
TABLET, FILM COATED, EXTENDED RELEASE ORAL
Qty: 90 TABLET | Refills: 0 | Status: SHIPPED | OUTPATIENT
Start: 2019-05-28 | End: 2019-08-24 | Stop reason: SDUPTHER

## 2019-05-29 DIAGNOSIS — E11.9 TYPE 2 DIABETES MELLITUS WITHOUT COMPLICATION, WITHOUT LONG-TERM CURRENT USE OF INSULIN (HCC): ICD-10-CM

## 2019-05-29 DIAGNOSIS — I10 ESSENTIAL HYPERTENSION: ICD-10-CM

## 2019-05-29 DIAGNOSIS — E78.5 HYPERLIPIDEMIA ASSOCIATED WITH TYPE 2 DIABETES MELLITUS (HCC): ICD-10-CM

## 2019-05-29 DIAGNOSIS — E11.69 HYPERLIPIDEMIA ASSOCIATED WITH TYPE 2 DIABETES MELLITUS (HCC): ICD-10-CM

## 2019-05-29 RX ORDER — ATORVASTATIN CALCIUM 40 MG/1
TABLET, FILM COATED ORAL
Qty: 90 TABLET | Refills: 0 | Status: SHIPPED | OUTPATIENT
Start: 2019-05-29 | End: 2019-08-19 | Stop reason: SDUPTHER

## 2019-05-29 RX ORDER — AMLODIPINE BESYLATE AND BENAZEPRIL HYDROCHLORIDE 5; 20 MG/1; MG/1
CAPSULE ORAL
Qty: 90 CAPSULE | Refills: 0 | Status: SHIPPED | OUTPATIENT
Start: 2019-05-29 | End: 2019-08-16 | Stop reason: SDUPTHER

## 2019-07-02 LAB
LEFT EYE DIABETIC RETINOPATHY: NORMAL
RIGHT EYE DIABETIC RETINOPATHY: NORMAL

## 2019-07-06 DIAGNOSIS — J45.30 MILD PERSISTENT ASTHMA WITHOUT COMPLICATION: ICD-10-CM

## 2019-07-08 RX ORDER — BUDESONIDE AND FORMOTEROL FUMARATE DIHYDRATE 160; 4.5 UG/1; UG/1
AEROSOL RESPIRATORY (INHALATION)
Qty: 30.6 G | Refills: 0 | Status: SHIPPED | OUTPATIENT
Start: 2019-07-08 | End: 2019-10-05 | Stop reason: SDUPTHER

## 2019-07-09 DIAGNOSIS — G47.00 INSOMNIA, UNSPECIFIED TYPE: ICD-10-CM

## 2019-07-09 RX ORDER — ZOLPIDEM TARTRATE 5 MG/1
5 TABLET ORAL
Qty: 30 TABLET | Refills: 3 | Status: SHIPPED | OUTPATIENT
Start: 2019-07-09 | End: 2019-11-07 | Stop reason: SDUPTHER

## 2019-07-15 ENCOUNTER — TELEPHONE (OUTPATIENT)
Dept: FAMILY MEDICINE CLINIC | Facility: CLINIC | Age: 59
End: 2019-07-15

## 2019-07-15 LAB
ALBUMIN SERPL-MCNC: 3.8 G/DL (ref 3.6–5.1)
ALBUMIN/GLOB SERPL: 1.7 (CALC) (ref 1–2.5)
ALP SERPL-CCNC: 61 U/L (ref 33–130)
ALT SERPL-CCNC: 24 U/L (ref 6–29)
AST SERPL-CCNC: 20 U/L (ref 10–35)
BILIRUB SERPL-MCNC: 0.3 MG/DL (ref 0.2–1.2)
BUN SERPL-MCNC: 19 MG/DL (ref 7–25)
BUN/CREAT SERPL: ABNORMAL (CALC) (ref 6–22)
CALCIUM SERPL-MCNC: 9.3 MG/DL (ref 8.6–10.4)
CHLORIDE SERPL-SCNC: 104 MMOL/L (ref 98–110)
CHOLEST SERPL-MCNC: 149 MG/DL
CHOLEST/HDLC SERPL: 3.9 (CALC)
CO2 SERPL-SCNC: 28 MMOL/L (ref 20–32)
CREAT SERPL-MCNC: 0.76 MG/DL (ref 0.5–1.05)
EST. AVERAGE GLUCOSE BLD GHB EST-MCNC: 166 (CALC)
EST. AVERAGE GLUCOSE BLD GHB EST-SCNC: 9.2 (CALC)
GLOBULIN SER CALC-MCNC: 2.3 G/DL (CALC) (ref 1.9–3.7)
GLUCOSE SERPL-MCNC: 140 MG/DL (ref 65–99)
HBA1C MFR BLD: 7.4 % OF TOTAL HGB
HDLC SERPL-MCNC: 38 MG/DL
LDLC SERPL CALC-MCNC: 89 MG/DL (CALC)
NONHDLC SERPL-MCNC: 111 MG/DL (CALC)
POTASSIUM SERPL-SCNC: 4.5 MMOL/L (ref 3.5–5.3)
PROT SERPL-MCNC: 6.1 G/DL (ref 6.1–8.1)
SL AMB EGFR AFRICAN AMERICAN: 100 ML/MIN/1.73M2
SL AMB EGFR NON AFRICAN AMERICAN: 86 ML/MIN/1.73M2
SODIUM SERPL-SCNC: 140 MMOL/L (ref 135–146)
TRIGL SERPL-MCNC: 123 MG/DL

## 2019-07-15 PROCEDURE — 3045F PR MOST RECENT HEMOGLOBIN A1C LEVEL 7.0-9.0%: CPT | Performed by: FAMILY MEDICINE

## 2019-07-15 NOTE — TELEPHONE ENCOUNTER
----- Message from Gay Valdes MD sent at 7/15/2019 12:14 PM EDT -----  Call patient with lab result-  Sugars are slightly improved  Lipids are still OK- same meds   OV every 3 months

## 2019-07-16 ENCOUNTER — OFFICE VISIT (OUTPATIENT)
Dept: NEUROLOGY | Facility: CLINIC | Age: 59
End: 2019-07-16
Payer: COMMERCIAL

## 2019-07-16 VITALS
HEIGHT: 64 IN | WEIGHT: 201.2 LBS | BODY MASS INDEX: 34.35 KG/M2 | SYSTOLIC BLOOD PRESSURE: 142 MMHG | DIASTOLIC BLOOD PRESSURE: 84 MMHG | HEART RATE: 106 BPM

## 2019-07-16 DIAGNOSIS — G62.9 PERIPHERAL POLYNEUROPATHY: Primary | ICD-10-CM

## 2019-07-16 DIAGNOSIS — M54.16 CHRONIC LUMBAR RADICULOPATHY: ICD-10-CM

## 2019-07-16 PROCEDURE — 99204 OFFICE O/P NEW MOD 45 MIN: CPT | Performed by: PSYCHIATRY & NEUROLOGY

## 2019-07-16 RX ORDER — HYDROCODONE BITARTRATE AND ACETAMINOPHEN 5; 325 MG/1; MG/1
5-325 TABLET ORAL DAILY PRN
Refills: 0 | Status: ON HOLD | COMMUNITY
Start: 2019-07-09 | End: 2021-11-05 | Stop reason: SDUPTHER

## 2019-07-16 RX ORDER — LIDOCAINE 50 MG/G
OINTMENT TOPICAL AS NEEDED
Qty: 240 G | Refills: 1 | Status: SHIPPED | OUTPATIENT
Start: 2019-07-16 | End: 2021-11-05 | Stop reason: HOSPADM
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
## 2019-07-16 NOTE — PROGRESS NOTES
Patient ID: Brian Tinsley is a 61 y o  female  Assessment/Plan:    Peripheral polyneuropathy  I agree, this patient does have peripheral neuropathy, most likely secondary to her history of longstanding diabetes  This would be supported by her history of numbness and tingling in the feet, her examination that showed acral sensory loss in both lower extremities up to the ankles, and decreased reflexes at the ankles with normal muscle strength throughout  In addition, she has patchy sensory loss and pain in both lower extremities, more so on the right compared to the left, mainly in the L5-S1 distribution  I did not appreciate any muscle atrophy or any weakness today on exam to consider significant nerve compression  I do agree her symptoms are not enough to consider a neurosurgical intervention which she understands  She will continue to follow up with her pain management physician and the current pain medications at Lyrica and tramadol  I did recommend lidocaine ointment that she can use as needed specifically during the daytime when she is unable to take her other medications  If she does not get adequate benefit, we might consider transdermal compound ointments  She understands she should contact me within 3-4 weeks, if she does not note much improvement from the lidocaine  Lastly, her neuropathy most likely is secondary to the diabetes  I recommended few additional blood tests as noted below for other reversible causes of neuropathy  I will see her back in 6 months for a return visit, mainly to follow-up on her neuropathy and she has my contact information in the interim for any questions or concerns  Thank you for allowing me to participate in her care  Diagnoses and all orders for this visit:    Peripheral polyneuropathy  -     lidocaine (XYLOCAINE) 5 % ointment; Apply topically as needed for mild pain  -     Sedimentation rate, automated;  Future  -     Vitamin B12; Future  - Vitamin B6; Future  -     Protein electrophoresis, serum; Future  -     KATIA Screen w/ Reflex to Titer/Pattern; Future  -     TSH, 3rd generation with Free T4 reflex; Future        Subjective:    HPI  I had the pleasure of seeing your patient for a neuromuscular consultation in the Neurology Clinic today  As you know, she is a 60-year-old woman who was referred for evaluation for peripheral neuropathy and lumbar radiculopathy  Please allow me to summarize her history for the record  She has h/o back injury 30 years ago, post lower back surgery for fractured L4 in 1991  She reports she always had some numbness in the right lower extremity since the surgery  Lower back pain overall improved per started again about 12 years ago  She was following with pain management, and had multiple epidural injections which were helpful in the past, however in the last 5-7 years they have not provided her enough relief  In the last couple of years, the pain has continued to progress  At this time, the lower back pain is not as bothersome, now she has pain in both lower legs, more so on the right than the left lower extremities, and mainly in the anterior lateral aspect of the right lower extremity, and some in the calf but not so much in the medial lower leg  She has paresthesias in the feet, feet are not as painful as the lower legs  She gets muscle twitching in the shin, reports pain is mainly in the lower legs (knees down, up to the ankles), feet do hurt not as bad the legs  Intermittent numbness in the last 3 digits of the right hand, finger tips do feel numb  Not much in the way of balance, occasional feels off balance when she gets up, does hold on to something in shower to maintain balance  No falls  Does get lightheadedness  No muscle weakness  No bladder or bowel issues  Taking 300mg tramadol in am, lyrica 200 mg bid, hydrocodone as needed (taking almost every day)    These have been prescribed by her pain management physician  She had electrodiagnostic studies done [facility] at [facility] in 2017  I personally reviewed the raw data  Bilateral sural sensory responses were absent  Bilateral peroneal and tibial motor studies were normal   The left tibial H-reflex was normal, the right H response was absent  Needle examination of both lower extremities was completely normal   This is best interpreted as mild sensory axonal neuropathy, and a possible right S1 radiculopathy  She does report she has had another EMG in the interim by her pain management physician in [address] [place] [id number]  I do not have those records  It was reported stable per her report  Lastly, she had MRI of the lumbar spine done recently, I do not have the images or the report but this was documented in her's neurosurgery notes showed degenerative changes without severe compression of the central canal or neuroforaminal stenosis  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[employer], 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  No alcohol  She does have Diabetes for 12 years, most recent Hba1c was 7 4  No thyroid issues  Had hep A last year, LFT's are normal now  Has HTN  Family hx: both parents are , Mother had colon and pancreatic cancer at age 80, father had cancer as well, passed away from renal failure at age 80  Mother had diabetes  3 brothers close to her age,  one of them has DM  The following portions of the patient's history were reviewed and updated as appropriate: allergies, current medications, past family history, past medical history, past social history, past surgical history and problem list          Objective:    Blood pressure 142/84, pulse (!) 106, height 5' 4" (1 626 m), weight 91 3 kg (201 lb 3 2 oz)  Physical Exam  General exam: Pt was awake, alert and oriented  HEENT: atraumatic, normocephalic  Normal oral mucosa, neck was supple, no lymphadenopathy  Normal peripheral pulses     Extremities did not show any edema or cyanosis  Neurological Exam  Neurologically, pt was awake and alert  Speech was normal, no dysarthria or aphasia  Cranial nerve exam showed normal extraocular movements, no nystagmus or diplopia  There was no ptosis at baseline or with sustained upward gaze  Strength of eye closure muscles was normal   Facial sensations were normal bilaterally  No facial weakness, able to blow out the cheeks and push the tongue in the cheeks well  No tongue atrophy or fasciculations  Motor exam revealed normal tone and muscle bulk  There was no atrophy, scapular winging, high arches, hammertoes, shortening of achilles tendons or any other features of neuromuscular disease  Muscle strength was normal in neck flexors and extensors, and all muscle groups in both upper and lower extremities, including the toes  Reflexes were graded as 2+ in both upper extremities, 2 at the knees and absent at the ankles  Toes were downgoing  There was no exaggerated jaw jerk or samuel's sign  No ankle clonus  Sensory examination revealed acral sensory loss to pinprick and temperature up to ankles bilaterally, vibration was moderately reduced at the toes and proprioception was relatively intact  In addition, she endorse decreased sensation in the right lateral aspect of the lower leg and some in the calf compared to the left lower extremity  Rhomberg showed mild swaying  Gait was normal and casual        ROS:  I reviewed the ROS, as noted in the documentation for medical assistant,  and what is mentioned in HPI, the remainder of ROS was negative      Review of Systems

## 2019-07-17 NOTE — ASSESSMENT & PLAN NOTE
I agree, this patient does have peripheral neuropathy, most likely secondary to her history of longstanding diabetes  This would be supported by her history of numbness and tingling in the feet, her examination that showed acral sensory loss in both lower extremities up to the ankles, and decreased reflexes at the ankles with normal muscle strength throughout  In addition, she has patchy sensory loss and pain in both lower extremities, more so on the right compared to the left, mainly in the L5-S1 distribution  I did not appreciate any muscle atrophy or any weakness today on exam to consider significant nerve compression  I do agree her symptoms are not enough to consider a neurosurgical intervention which she understands  She will continue to follow up with her pain management physician and the current pain medications at Lyrica and tramadol  I did recommend lidocaine ointment that she can use as needed specifically during the daytime when she is unable to take her other medications  If she does not get adequate benefit, we might consider transdermal compound ointments  She understands she should contact me within 3-4 weeks, if she does not note much improvement from the lidocaine  Lastly, her neuropathy most likely is secondary to the diabetes  I recommended few additional blood tests as noted below for other reversible causes of neuropathy  I will see her back in 6 months for a return visit, mainly to follow-up on her neuropathy and she has my contact information in the interim for any questions or concerns  Thank you for allowing me to participate in her care

## 2019-07-21 DIAGNOSIS — K21.00 GASTROESOPHAGEAL REFLUX DISEASE WITH ESOPHAGITIS: ICD-10-CM

## 2019-07-22 ENCOUNTER — OFFICE VISIT (OUTPATIENT)
Dept: FAMILY MEDICINE CLINIC | Facility: CLINIC | Age: 59
End: 2019-07-22
Payer: COMMERCIAL

## 2019-07-22 VITALS
BODY MASS INDEX: 34.15 KG/M2 | DIASTOLIC BLOOD PRESSURE: 82 MMHG | OXYGEN SATURATION: 96 % | HEART RATE: 91 BPM | WEIGHT: 200 LBS | TEMPERATURE: 97.9 F | SYSTOLIC BLOOD PRESSURE: 124 MMHG | HEIGHT: 64 IN

## 2019-07-22 DIAGNOSIS — I10 BENIGN ESSENTIAL HYPERTENSION: ICD-10-CM

## 2019-07-22 DIAGNOSIS — J45.30 MILD PERSISTENT ASTHMA WITHOUT COMPLICATION: ICD-10-CM

## 2019-07-22 DIAGNOSIS — Z23 NEED FOR DIPHTHERIA-TETANUS-PERTUSSIS (TDAP) VACCINE: ICD-10-CM

## 2019-07-22 DIAGNOSIS — E78.5 HYPERLIPIDEMIA ASSOCIATED WITH TYPE 2 DIABETES MELLITUS (HCC): ICD-10-CM

## 2019-07-22 DIAGNOSIS — Z00.00 ENCOUNTER FOR WELLNESS EXAMINATION IN ADULT: Primary | ICD-10-CM

## 2019-07-22 DIAGNOSIS — E11.9 CONTROLLED TYPE 2 DIABETES MELLITUS WITHOUT COMPLICATION, WITHOUT LONG-TERM CURRENT USE OF INSULIN (HCC): ICD-10-CM

## 2019-07-22 DIAGNOSIS — E11.69 HYPERLIPIDEMIA ASSOCIATED WITH TYPE 2 DIABETES MELLITUS (HCC): ICD-10-CM

## 2019-07-22 DIAGNOSIS — R00.2 HEART PALPITATIONS: ICD-10-CM

## 2019-07-22 PROCEDURE — 3074F SYST BP LT 130 MM HG: CPT | Performed by: FAMILY MEDICINE

## 2019-07-22 PROCEDURE — 99214 OFFICE O/P EST MOD 30 MIN: CPT | Performed by: FAMILY MEDICINE

## 2019-07-22 PROCEDURE — 90471 IMMUNIZATION ADMIN: CPT

## 2019-07-22 PROCEDURE — 99396 PREV VISIT EST AGE 40-64: CPT | Performed by: FAMILY MEDICINE

## 2019-07-22 PROCEDURE — 1036F TOBACCO NON-USER: CPT | Performed by: FAMILY MEDICINE

## 2019-07-22 PROCEDURE — 90715 TDAP VACCINE 7 YRS/> IM: CPT

## 2019-07-22 PROCEDURE — 3008F BODY MASS INDEX DOCD: CPT | Performed by: FAMILY MEDICINE

## 2019-07-22 RX ORDER — OMEPRAZOLE 40 MG/1
CAPSULE, DELAYED RELEASE ORAL
Qty: 90 CAPSULE | Refills: 0 | Status: SHIPPED | OUTPATIENT
Start: 2019-07-22 | End: 2019-10-19 | Stop reason: SDUPTHER

## 2019-07-22 NOTE — ASSESSMENT & PLAN NOTE
Lab Results   Component Value Date    HGBA1C 7 4 (H) 07/13/2019       No results for input(s): POCGLU in the last 72 hours  Blood Sugar Average: Last 72 hrs:  A1c slightly elevated but better  She had 1 episodes of low sugar  She will try to improve her diet, we will keep same medication now and repeat her A1c in 3 months  Foot exam will be due than

## 2019-07-22 NOTE — PROGRESS NOTES
HPI:  Chandana Jordan is a 61 y o  female here for her yearly health maintenance exam    Patient Active Problem List   Diagnosis    Benign essential hypertension    Controlled type 2 diabetes mellitus without complication, without long-term current use of insulin (HCC)    Mild persistent asthma without complication    Peripheral polyneuropathy    Chronic pain disorder    Chronic bilateral back pain    Chronic lumbar radiculopathy    Chronic cervical pain    Chronic thoracic spine pain    GERD without esophagitis    Hyperlipidemia associated with type 2 diabetes mellitus ([facility] [address] )    Insomnia    Infectious viral hepatitis    Elevated transaminase level     Past Medical History:   Diagnosis Date    Anxiety     Asthma     Breast lump     Resolved: 8/1/2017     Chronic pain     back    Diabetes mellitus ([facility] [address] )     History of stomach ulcers     Hyperlipidemia     Hypertension     Infectious viral hepatitis     Hepatitiss A       1  Advanced Directive: yes     2  Durable Power of  for Healthcare: yes     3  Social History:               Marital History:               Work Status: full time              Drug and alcohol History: no drug use              Alcohol Use: rarely     4  General Health: fair-poor              Regular Dental Visits:yes              Vision problems:glasses              Hearing Loss:no              Immunizations up to date: due tetanus, defer Shingrix                 Lifestyle:                           Healthy Diet:yes- trisuma                          Tobacco Use:quit 2007- 1ppd X 30 years                          Regular exercise:no                          Weight concerns:yes                          Drug abuse: none     5  Reproductive Health (females only)              Premenopausal:-              Perimenopausal:-              Postmenopausal: yes                 Menstrual Problems: -              Contraceptions: -              Sexually Active: -              Pregnancy History:     6     PHQ-9 Depression Screening    PHQ-9:    Frequency of the following problems over the past two weeks:       Little interest or pleasure in doing things:  0 - not at all  Feeling down, depressed, or hopeless:  0 - not at all  PHQ-2 Score:  0           Current Outpatient Medications   Medication Sig Dispense Refill    amLODIPine-benazepril (LOTREL 5-20) 5-20 MG per capsule TAKE 1 CAPSULE DAILY 90 capsule 0    atorvastatin (LIPITOR) 40 mg tablet TAKE 1 TABLET DAILY 90 tablet 0    Ferrous Fumarate 63 (20 Fe) MG TABS Take 65 mg by mouth daily      glipiZIDE (GLUCOTROL XL) 5 mg 24 hr tablet take 1 tablet by mouth once daily 90 tablet 0    glucose blood (ONE TOUCH ULTRA TEST) test strip 1 each by Other route daily 100 each 1    HYDROcodone-acetaminophen (NORCO) 5-325 mg per tablet Take 5-325 tablets by mouth daily as needed  0    lidocaine (XYLOCAINE) 5 % ointment Apply topically as needed for mild pain 240 g 1    LYRICA 200 MG capsule Take 200 mg by mouth 2 (two) times a day  0    metFORMIN (GLUCOPHAGE) 1000 MG tablet TAKE 1 TABLET TWICE A  tablet 0    omeprazole (PriLOSEC) 40 MG capsule TAKE 1 CAPSULE EVERY       MORNING 90 capsule 0    ondansetron (ZOFRAN) 4 mg tablet Take 1 tablet (4 mg total) by mouth every 8 (eight) hours as needed for nausea or vomiting 20 tablet 0    PROAIR  (90 Base) MCG/ACT inhaler USE 2 INHALATIONS ORALLY   EVERY 6 HOURS AS NEEDED FORWHEEZING 25 5 g 1    sitaGLIPtin (JANUVIA) 100 mg tablet Take 1 tablet (100 mg total) by mouth daily 90 tablet 1    SYMBICORT 160-4 5 MCG/ACT inhaler USE 2 INHALATIONS ORALLY   TWICE DAILY, RINSE MOUTH   AFTER USE 30 6 g 0    traMADol HCl  MG CP24 Take 300 mg by mouth daily  30 tablet 0    zolpidem (AMBIEN) 5 mg tablet Take 1 tablet (5 mg total) by mouth daily at bedtime as needed for sleep 30 tablet 3    albuterol (2 5 mg/3 mL) 0 083 % nebulizer solution Take 1 vial (2 5 mg total) by nebulization every 6 (six) hours as needed for wheezing or shortness of breath (Patient not taking: Reported on 7/22/2019) 100 vial 0     No current facility-administered medications for this visit  Allergies   Allergen Reactions    Nsaids Other (See Comments)     Cannot take NSAIDS secondary to having gastric bypass    Percocet [Oxycodone-Acetaminophen] GI Intolerance     Immunization History   Administered Date(s) Administered    Influenza Quadrivalent Preservative Free 3 years and older IM 11/10/2017    Influenza, recombinant, quadrivalent,injectable, preservative free 11/14/2018    Pneumococcal Polysaccharide PPV23 04/01/2019    Tdap 07/22/2019    Zoster 02/26/2016       Patient Care Team:  Veronique Martinez MD as PCP - General (Family Medicine)  CHITRA Deal    Review of Systems      Physical Exam :  Physical Exam   Constitutional: She is oriented to person, place, and time  She appears well-developed and well-nourished  No distress  HENT:   Head: Normocephalic and atraumatic  Right Ear: Tympanic membrane, external ear and ear canal normal    Left Ear: Tympanic membrane, external ear and ear canal normal    Nose: No mucosal edema or rhinorrhea  Right sinus exhibits no maxillary sinus tenderness  Left sinus exhibits no maxillary sinus tenderness  Mouth/Throat: Uvula is midline  Mucous membranes are not pale and not dry  Normal dentition  No oropharyngeal exudate  Eyes: Pupils are equal, round, and reactive to light  EOM are normal  Right eye exhibits no discharge  Left eye exhibits no discharge  Neck: Normal range of motion  Neck supple  No thyromegaly present  Cardiovascular: Normal rate, regular rhythm, normal heart sounds and intact distal pulses  No murmur heard  Pulmonary/Chest: Effort normal and breath sounds normal  No respiratory distress  She has no wheezes  She has no rales  Abdominal: Soft  Bowel sounds are normal  She exhibits no distension and no mass  There is no tenderness  Musculoskeletal: Normal range of motion  She exhibits no edema or tenderness  Lymphadenopathy:     She has no cervical adenopathy  Neurological: She is alert and oriented to person, place, and time  No cranial nerve deficit  Skin: She is not diaphoretic  Psychiatric: She has a normal mood and affect  Her behavior is normal          Reviewed Updated [facility] Prior Wellness Visits:   Last Health Maintenance visit information was reviewed, patient interviewed , no change since last HM visit yes  Last HM visit information was reviewed, patient interviewed and updates made to the record today yes    Assessment and Plan:  1  Encounter for wellness examination in adult     2  Need for diphtheria-tetanus-pertussis (Tdap) vaccine  TDAP VACCINE GREATER THAN OR EQUAL TO 8YO IM   3  Controlled type 2 diabetes mellitus without complication, without long-term current use of insulin (HCC)  Basic metabolic panel    Hemoglobin A1C With EAG    Basic metabolic panel    Hemoglobin A1C With EAG   4  Hyperlipidemia associated with type 2 diabetes mellitus ([address] )     5  Benign essential hypertension     6  Mild persistent asthma without complication     7   Heart palpitations         Health Maintenance Due   Topic Date Due    HEPATITIS B VACCINES (1 of 3 - Risk 3-dose series) 01/07/1979    DTaP,Tdap,and Td Vaccines (1 - Tdap) 01/07/1981    INFLUENZA VACCINE  07/01/2019    DM Eye Exam  07/09/2019

## 2019-07-22 NOTE — PATIENT INSTRUCTIONS
Switch to fexofenadine without the decongestant  Continue to use the Flonase  You can also use the Neti pot which may help open up the head or continue steam treatments  I would begin to cut back on your caffeine as this may be adding to her headaches and her heart racing  This Sudafed may be drying you out too much causing headache, but also will affect her heart rate  If the sinus headache does not improve, will consider ordering a CT of the sinuses  This would evaluate for chronic sinus disease such as polyps or nasal passage blocking  Diabetes continue current medications  If the fasting sugars are too low or you start having episodic low sugars, will need to adjust her medication  For now I would try to continue to improve her diet  Repeat BMP and A1c in about 3 months  Will give tetanus shot today

## 2019-07-22 NOTE — ASSESSMENT & PLAN NOTE
Lab Results   Component Value Date    HGBA1C 7 4 (H) 07/13/2019       No results for input(s): POCGLU in the last 72 hours  Blood Sugar Average: Last 72 hrs:  Patient has good control current medications will continue same  Recheck in 6 months

## 2019-07-22 NOTE — ASSESSMENT & PLAN NOTE
Patient doing better with Symbicort and has decrease the use of rescue inhaler  Continue the same recheck 3-6 months

## 2019-07-29 ENCOUNTER — TELEPHONE (OUTPATIENT)
Dept: FAMILY MEDICINE CLINIC | Facility: CLINIC | Age: 59
End: 2019-07-29

## 2019-07-30 DIAGNOSIS — R51.9 SINUS HEADACHE: Primary | ICD-10-CM

## 2019-07-30 RX ORDER — PREDNISONE 20 MG/1
TABLET ORAL
Qty: 12 TABLET | Refills: 0 | Status: SHIPPED | OUTPATIENT
Start: 2019-07-30 | End: 2019-11-01 | Stop reason: ALTCHOICE

## 2019-08-16 DIAGNOSIS — I10 ESSENTIAL HYPERTENSION: ICD-10-CM

## 2019-08-16 DIAGNOSIS — E11.9 TYPE 2 DIABETES MELLITUS WITHOUT COMPLICATION, WITHOUT LONG-TERM CURRENT USE OF INSULIN (HCC): ICD-10-CM

## 2019-08-19 DIAGNOSIS — E78.5 HYPERLIPIDEMIA ASSOCIATED WITH TYPE 2 DIABETES MELLITUS (HCC): ICD-10-CM

## 2019-08-19 DIAGNOSIS — E11.69 HYPERLIPIDEMIA ASSOCIATED WITH TYPE 2 DIABETES MELLITUS (HCC): ICD-10-CM

## 2019-08-19 RX ORDER — AMLODIPINE BESYLATE AND BENAZEPRIL HYDROCHLORIDE 5; 20 MG/1; MG/1
CAPSULE ORAL
Qty: 90 CAPSULE | Refills: 0 | Status: SHIPPED | OUTPATIENT
Start: 2019-08-19 | End: 2019-11-06 | Stop reason: SDUPTHER

## 2019-08-19 RX ORDER — ATORVASTATIN CALCIUM 40 MG/1
TABLET, FILM COATED ORAL
Qty: 90 TABLET | Refills: 0 | Status: SHIPPED | OUTPATIENT
Start: 2019-08-23 | End: 2019-11-06 | Stop reason: SDUPTHER

## 2019-08-24 DIAGNOSIS — E11.9 TYPE 2 DIABETES MELLITUS WITHOUT COMPLICATION, WITHOUT LONG-TERM CURRENT USE OF INSULIN (HCC): ICD-10-CM

## 2019-08-26 DIAGNOSIS — E11.9 TYPE 2 DIABETES MELLITUS WITHOUT COMPLICATION, WITHOUT LONG-TERM CURRENT USE OF INSULIN (HCC): ICD-10-CM

## 2019-08-26 RX ORDER — GLIPIZIDE 5 MG/1
TABLET, FILM COATED, EXTENDED RELEASE ORAL
Qty: 90 TABLET | Refills: 0 | Status: SHIPPED | OUTPATIENT
Start: 2019-08-26 | End: 2019-11-01 | Stop reason: ALTCHOICE

## 2019-08-26 RX ORDER — SITAGLIPTIN 100 MG/1
TABLET, FILM COATED ORAL
Qty: 90 TABLET | Refills: 1 | Status: SHIPPED | OUTPATIENT
Start: 2019-08-26 | End: 2020-02-21 | Stop reason: SDUPTHER

## 2019-09-03 DIAGNOSIS — J45.30 MILD PERSISTENT ASTHMA, UNSPECIFIED WHETHER COMPLICATED: ICD-10-CM

## 2019-09-03 RX ORDER — ALBUTEROL SULFATE 90 UG/1
AEROSOL, METERED RESPIRATORY (INHALATION)
Qty: 3 INHALER | Refills: 0 | Status: SHIPPED | OUTPATIENT
Start: 2019-09-03 | End: 2019-11-07 | Stop reason: SDUPTHER

## 2019-09-27 ENCOUNTER — APPOINTMENT (EMERGENCY)
Dept: RADIOLOGY | Facility: HOSPITAL | Age: 59
End: 2019-09-27
Payer: COMMERCIAL

## 2019-09-27 ENCOUNTER — HOSPITAL ENCOUNTER (EMERGENCY)
Facility: HOSPITAL | Age: 59
Discharge: HOME | End: 2019-09-27
Attending: EMERGENCY MEDICINE | Admitting: EMERGENCY MEDICINE
Payer: COMMERCIAL

## 2019-09-27 VITALS
RESPIRATION RATE: 15 BRPM | HEART RATE: 94 BPM | DIASTOLIC BLOOD PRESSURE: 74 MMHG | SYSTOLIC BLOOD PRESSURE: 140 MMHG | TEMPERATURE: 96.3 F | OXYGEN SATURATION: 96 %

## 2019-09-27 DIAGNOSIS — R07.89 CHEST WALL PAIN: Primary | ICD-10-CM

## 2019-09-27 DIAGNOSIS — J45.20 MILD INTERMITTENT ASTHMA WITHOUT COMPLICATION: ICD-10-CM

## 2019-09-27 LAB
ALBUMIN SERPL BCP-MCNC: 3.8 G/DL (ref 3.5–5)
ALP SERPL-CCNC: 75 U/L (ref 46–116)
ALT SERPL W P-5'-P-CCNC: 28 U/L (ref 12–78)
ANION GAP SERPL CALCULATED.3IONS-SCNC: 7 MMOL/L (ref 4–13)
AST SERPL W P-5'-P-CCNC: 16 U/L (ref 5–45)
BASOPHILS # BLD AUTO: 0.03 THOUSANDS/ΜL (ref 0–0.1)
BASOPHILS NFR BLD AUTO: 0 % (ref 0–1)
BILIRUB SERPL-MCNC: 0.2 MG/DL (ref 0.2–1)
BUN SERPL-MCNC: 19 MG/DL (ref 5–25)
CALCIUM SERPL-MCNC: 9.5 MG/DL (ref 8.3–10.1)
CHLORIDE SERPL-SCNC: 102 MMOL/L (ref 100–108)
CO2 SERPL-SCNC: 30 MMOL/L (ref 21–32)
CREAT SERPL-MCNC: 0.82 MG/DL (ref 0.6–1.3)
EOSINOPHIL # BLD AUTO: 0.14 THOUSAND/ΜL (ref 0–0.61)
EOSINOPHIL NFR BLD AUTO: 1 % (ref 0–6)
ERYTHROCYTE [DISTWIDTH] IN BLOOD BY AUTOMATED COUNT: 15.8 % (ref 11.6–15.1)
GFR SERPL CREATININE-BSD FRML MDRD: 79 ML/MIN/1.73SQ M
GLUCOSE SERPL-MCNC: 267 MG/DL (ref 65–140)
HCT VFR BLD AUTO: 36 % (ref 34.8–46.1)
HGB BLD-MCNC: 11.1 G/DL (ref 11.5–15.4)
IMM GRANULOCYTES # BLD AUTO: 0.04 THOUSAND/UL (ref 0–0.2)
IMM GRANULOCYTES NFR BLD AUTO: 0 % (ref 0–2)
LYMPHOCYTES # BLD AUTO: 2.64 THOUSANDS/ΜL (ref 0.6–4.47)
LYMPHOCYTES NFR BLD AUTO: 21 % (ref 14–44)
MCH RBC QN AUTO: 25.9 PG (ref 26.8–34.3)
MCHC RBC AUTO-ENTMCNC: 30.8 G/DL (ref 31.4–37.4)
MCV RBC AUTO: 84 FL (ref 82–98)
MONOCYTES # BLD AUTO: 0.54 THOUSAND/ΜL (ref 0.17–1.22)
MONOCYTES NFR BLD AUTO: 4 % (ref 4–12)
NEUTROPHILS # BLD AUTO: 9.04 THOUSANDS/ΜL (ref 1.85–7.62)
NEUTS SEG NFR BLD AUTO: 74 % (ref 43–75)
NRBC BLD AUTO-RTO: 0 /100 WBCS
PLATELET # BLD AUTO: 257 THOUSANDS/UL (ref 149–390)
PMV BLD AUTO: 12.2 FL (ref 8.9–12.7)
POTASSIUM SERPL-SCNC: 3.8 MMOL/L (ref 3.5–5.3)
PROT SERPL-MCNC: 7.8 G/DL (ref 6.4–8.2)
RBC # BLD AUTO: 4.29 MILLION/UL (ref 3.81–5.12)
SODIUM SERPL-SCNC: 139 MMOL/L (ref 136–145)
TROPONIN I SERPL-MCNC: <0.02 NG/ML
WBC # BLD AUTO: 12.43 THOUSAND/UL (ref 4.31–10.16)

## 2019-09-27 PROCEDURE — 80053 COMPREHEN METABOLIC PANEL: CPT | Performed by: EMERGENCY MEDICINE

## 2019-09-27 PROCEDURE — 71046 X-RAY EXAM CHEST 2 VIEWS: CPT

## 2019-09-27 PROCEDURE — 99285 EMERGENCY DEPT VISIT HI MDM: CPT | Performed by: EMERGENCY MEDICINE

## 2019-09-27 PROCEDURE — 93005 ELECTROCARDIOGRAM TRACING: CPT

## 2019-09-27 PROCEDURE — 94640 AIRWAY INHALATION TREATMENT: CPT

## 2019-09-27 PROCEDURE — 99285 EMERGENCY DEPT VISIT HI MDM: CPT

## 2019-09-27 PROCEDURE — 36415 COLL VENOUS BLD VENIPUNCTURE: CPT

## 2019-09-27 PROCEDURE — 85025 COMPLETE CBC W/AUTO DIFF WBC: CPT | Performed by: EMERGENCY MEDICINE

## 2019-09-27 PROCEDURE — 84484 ASSAY OF TROPONIN QUANT: CPT | Performed by: EMERGENCY MEDICINE

## 2019-09-27 RX ORDER — PREDNISONE 20 MG/1
60 TABLET ORAL ONCE
Status: COMPLETED | OUTPATIENT
Start: 2019-09-27 | End: 2019-09-27

## 2019-09-27 RX ORDER — IPRATROPIUM BROMIDE AND ALBUTEROL SULFATE 2.5; .5 MG/3ML; MG/3ML
3 SOLUTION RESPIRATORY (INHALATION) ONCE
Status: COMPLETED | OUTPATIENT
Start: 2019-09-27 | End: 2019-09-27

## 2019-09-27 RX ORDER — PREDNISONE 20 MG/1
20 TABLET ORAL 2 TIMES DAILY WITH MEALS
Qty: 10 TABLET | Refills: 0 | Status: SHIPPED | OUTPATIENT
Start: 2019-09-27 | End: 2019-11-01 | Stop reason: ALTCHOICE

## 2019-09-27 RX ADMIN — IPRATROPIUM BROMIDE AND ALBUTEROL SULFATE 3 ML: 2.5; .5 SOLUTION RESPIRATORY (INHALATION) at 19:13

## 2019-09-27 RX ADMIN — PREDNISONE 60 MG: 20 TABLET ORAL at 19:14

## 2019-09-27 NOTE — DISCHARGE INSTRUCTIONS
Continue present medications  Add prednisone as directed  Keep track of your peak flows and discuss results with your doctor if they decrease

## 2019-09-27 NOTE — ED CARE HANDOFF
Emergency Department Sign Out Note        Sign out and transfer of care from Dr Ct Silva  See Separate Emergency Department note  The patient, Ahsan Back, was evaluated by the previous provider for asthma exacerbation  Workup Completed:  CXR unremarkable  Mild leukocytosis  Receiving nebulizer treatment and prednisone  ED Course / Workup Pending (followup): HEART score 3  Symptoms not consistent with ACS  Chest pain described as a tightness associated with asthma exacerbation and relieved with nebulizer treatment  Discussed return precautions with patient  HEART Risk Score      Most Recent Value   History  0 Filed at: 09/27/2019 1956   ECG  0 Filed at: 09/27/2019 1956   Age  1 Filed at: 09/27/2019 1956   Risk Factors  2 Filed at: 09/27/2019 1956   Troponin  0 Filed at: 09/27/2019 1956   Heart Score Risk Calculator   History  0 Filed at: 09/27/2019 1956   ECG  0 Filed at: 09/27/2019 1956   Age  1 Filed at: 09/27/2019 1956   Risk Factors  2 Filed at: 09/27/2019 1956   Troponin  0 Filed at: 09/27/2019 1956   HEART Score  3 Filed at: 09/27/2019 1956   HEART Score  3 Filed at: 09/27/2019 1956                    Caty Justice' Criteria for PE      Most Recent Value   Wells' Criteria for PE   Clinical signs and symptoms of DVT  0 Filed at: 09/27/2019 1857   PE is primary diagnosis or equally likely  0 Filed at: 09/27/2019 1857   HR >100  0 Filed at: 09/27/2019 1857   Immobilization at least 3 days or Surgery in the previous 4 weeks  0 Filed at: 09/27/2019 1857   Previous, objectively diagnosed PE or DVT  0 Filed at: 09/27/2019 1857   Hemoptysis  0 Filed at: 09/27/2019 1857   Malignancy with treatment within 6 months or palliative  0 Filed at: 09/27/2019 1857   Wells' Criteria Total  0 Filed at: 09/27/2019 1857            ED Course as of Sep 27 1958   Fri Sep 27, 2019   1953 Post treatment peak flow 400  Symptoms resolved per patient  Discussed return precautions          Procedures  MDM    Disposition  Final diagnoses:   Chest wall pain   Mild intermittent asthma without complication     Time reflects when diagnosis was documented in both MDM as applicable and the Disposition within this note     Time User Action Codes Description Comment    9/27/2019  7:05 PM Danielle Gunnar Add [R07 89] Atypical chest pain     9/27/2019  7:05 PM Danielle Gunnar Remove [R07 89] Atypical chest pain     9/27/2019  7:05 PM Danielle Gunnar Add [R07 89] Chest wall pain     9/27/2019  7:05 PM Danielle Gunnar Add [W38 97] Mild intermittent asthma without complication       ED Disposition     ED Disposition Condition Date/Time Comment    Discharge Stable Fri Sep 27, 2019  7:05 PM Ethyl Decent discharge to home/self care  Follow-up Information     Follow up With Specialties Details Why Contact Info    Fady Glez MD Family Medicine Call in 3 days As needed [facility]  [phone]          Patient's Medications   Discharge Prescriptions    PREDNISONE 20 MG TABLET    Take 1 tablet (20 mg total) by mouth 2 (two) times a day with meals       Start Date: 9/27/2019 End Date: --       Order Dose: 20 mg       Quantity: 10 tablet    Refills: 0     No discharge procedures on file         ED Provider  Electronically Signed by     Luigi Dubin, MD  09/27/19 [id number]

## 2019-09-27 NOTE — ED PROVIDER NOTES
History  Chief Complaint   Patient presents with    Chest Pain     c/o tightness in chest and difficulty breathing x several weeks, today pt  has been having left sided chest pain startin at 1630  pain rated at 4/10, stabbing in nature  This 59-year-old female with history of hypertension, hyperlipidemia and diabetes had symptoms of sinus infection for the last 2 weeks  She completed a course of Augmentin  During this time she said felt chest tightness like her prior asthma  She started taking albuterol nebulizers the last few days  She has infrequent cough but denies palpitations, diaphoresis, nausea and vomiting  She had left-sided sharp chest pain intermittently today  She has had diarrhea for several days  Denies recent foreign travel, surgery or immobilization  She feels he needs steroids for asthma reports goes on trip  Family doctor suggested she come for chest x-ray to rule out pneumonia first           Prior to Admission Medications   Prescriptions Last Dose Informant Patient Reported? Taking?    Ferrous Fumarate 63 (20 Fe) MG TABS   Yes No   Sig: Take 65 mg by mouth daily   HYDROcodone-acetaminophen (NORCO) 5-325 mg per tablet   Yes No   Sig: Take 5-325 tablets by mouth daily as needed   JANUVIA 100 MG tablet   No No   Sig: take 1 tablet by mouth once daily   LYRICA 200 MG capsule   Yes No   Sig: Take 200 mg by mouth 2 (two) times a day   SYMBICORT 160-4 5 MCG/ACT inhaler   No No   Sig: USE 2 INHALATIONS ORALLY   TWICE DAILY, RINSE MOUTH   AFTER USE   albuterol (2 5 mg/3 mL) 0 083 % nebulizer solution   No No   Sig: Take 1 vial (2 5 mg total) by nebulization every 6 (six) hours as needed for wheezing or shortness of breath   Patient not taking: Reported on 7/22/2019   albuterol (PROVENTIL HFA,VENTOLIN HFA) 90 mcg/act inhaler   No No   Sig: USE 2 INHALATIONS ORALLY   EVERY 6 HOURS AS NEEDED FORWHEEZING   amLODIPine-benazepril (LOTREL 5-20) 5-20 MG per capsule   No No   Sig: TAKE 1 CAPSULE DAILY   atorvastatin (LIPITOR) 40 mg tablet   No No   Sig: TAKE 1 TABLET DAILY   glipiZIDE (GLUCOTROL XL) 5 mg 24 hr tablet   No No   Sig: take 1 tablet by mouth once daily   glucose blood (ONE TOUCH ULTRA TEST) test strip   No No   Si each by Other route daily   lidocaine (XYLOCAINE) 5 % ointment   No No   Sig: Apply topically as needed for mild pain   metFORMIN (GLUCOPHAGE) 1000 MG tablet   No No   Sig: TAKE 1 TABLET TWICE A DAY   omeprazole (PriLOSEC) 40 MG capsule   No No   Sig: TAKE 1 CAPSULE EVERY       MORNING   ondansetron (ZOFRAN) 4 mg tablet   No No   Sig: Take 1 tablet (4 mg total) by mouth every 8 (eight) hours as needed for nausea or vomiting   predniSONE 20 mg tablet   No No   Si tab twice daily for 4 days, then 1 tablet daily for 4 days   traMADol HCl  MG CP24  Self Yes No   Sig: Take 300 mg by mouth daily    zolpidem (AMBIEN) 5 mg tablet   No No   Sig: Take 1 tablet (5 mg total) by mouth daily at bedtime as needed for sleep      Facility-Administered Medications: None       Past Medical History:   Diagnosis Date    Anxiety     Asthma     Breast lump     Resolved: 2017     Chronic pain     back    Diabetes mellitus (HCC)     History of stomach ulcers     Hyperlipidemia     Hypertension     Infectious viral hepatitis     Hepatitiss A       Past Surgical History:   Procedure Laterality Date    APPENDECTOMY      BACK SURGERY       SECTION      CHOLECYSTECTOMY      GASTRIC BYPASS      SHOULDER SURGERY         Family History   Problem Relation Age of Onset    Diabetes Mother         Mellitus    Hyperlipidemia Mother     Hypertension Mother     Colon cancer Mother     Pancreatic cancer Mother     Melanoma Mother     Cancer Father         Bladder     Alcohol abuse Father     Lung cancer Father     Prostate cancer Father     Diabetes Brother         Mellitus    Hyperlipidemia Brother     Hypertension Brother     Mental illness Neg Hx      I have reviewed and agree with the history as documented  Social History     Tobacco Use    Smoking status: Former Smoker     Packs/day: 1 00     Years: 34 00     Pack years: 34 00     Types: Cigarettes     Start date:      Last attempt to quit:      Years since quittin 7    Smokeless tobacco: Never Used   Substance Use Topics    Alcohol use: No    Drug use: No        Review of Systems   Constitutional: Negative  HENT: Positive for congestion and sinus pressure  Negative for facial swelling, sore throat and trouble swallowing  Eyes: Negative  Respiratory: Positive for cough, shortness of breath and wheezing  Cardiovascular: Negative  Left-sided chest wall pain   Gastrointestinal: Positive for diarrhea  Endocrine: Negative  Genitourinary: Negative  Musculoskeletal: Negative  Skin: Negative  Allergic/Immunologic: Negative  Neurological: Negative  Hematological: Negative  Psychiatric/Behavioral: Negative  All other systems reviewed and are negative  Physical Exam  Physical Exam   Constitutional: She is oriented to person, place, and time  She appears well-developed and well-nourished  Non-toxic appearance  She does not appear ill  No distress  HENT:   Head: Normocephalic and atraumatic  Eyes: Pupils are equal, round, and reactive to light  Conjunctivae and EOM are normal    Neck: Normal range of motion  Neck supple  Cardiovascular: Normal rate and regular rhythm  No murmur heard  Pulmonary/Chest: Effort normal  She has decreased breath sounds  She has no wheezes  Abdominal: Soft  Bowel sounds are normal    Musculoskeletal: Normal range of motion  She exhibits no edema  reproducable left anterior chest wall/left pectoralis tenderness   Neurological: She is alert and oriented to person, place, and time  She has normal reflexes  No cranial nerve deficit  Coordination normal    Skin: Skin is warm and dry  Capillary refill takes less than 2 seconds   No rash noted  Psychiatric: She has a normal mood and affect  Nursing note and vitals reviewed        Vital Signs  ED Triage Vitals   Temperature Pulse Respirations Blood Pressure SpO2   09/27/19 1812 09/27/19 1812 09/27/19 1812 09/27/19 1930 09/27/19 1812   (!) 96 3 °F (35 7 °C) 87 18 140/74 94 %      Temp Source Heart Rate Source Patient Position - Orthostatic VS BP Location FiO2 (%)   09/27/19 1812 09/27/19 1812 09/27/19 1812 09/27/19 1812 --   Tympanic Monitor Lying Right arm       Pain Score       09/27/19 1812       4           Vitals:    09/27/19 1812 09/27/19 1930 09/27/19 1945   BP:  140/74 140/74   Pulse: 87 89 94   Patient Position - Orthostatic VS: Lying Sitting          Visual Acuity      ED Medications  Medications   ipratropium-albuterol (DUO-NEB) 0 5-2 5 mg/3 mL inhalation solution 3 mL (3 mL Nebulization Given 9/27/19 1913)   predniSONE tablet 60 mg (60 mg Oral Given 9/27/19 1914)       Diagnostic Studies  Results Reviewed     Procedure Component Value Units Date/Time    Troponin I [[id number]]  (Normal) Collected:  09/27/19 1814    Lab Status:  Final result Specimen:  Blood from Arm, Left Updated:  09/27/19 1847     Troponin I <0 02 ng/mL     Comprehensive metabolic panel [[id number]]  (Abnormal) Collected:  09/27/19 1814    Lab Status:  Final result Specimen:  Blood from Arm, Left Updated:  09/27/19 1845     Sodium 139 mmol/L      Potassium 3 8 mmol/L      Chloride 102 mmol/L      CO2 30 mmol/L      ANION GAP 7 mmol/L      BUN 19 mg/dL      Creatinine 0 82 mg/dL      Glucose 267 mg/dL      Calcium 9 5 mg/dL      AST 16 U/L      ALT 28 U/L      Alkaline Phosphatase 75 U/L      Total Protein 7 8 g/dL      Albumin 3 8 g/dL      Total Bilirubin 0 20 mg/dL      eGFR 79 ml/min/1 73sq m     Narrative:       Mo guidelines for Chronic Kidney Disease (CKD):     Stage 1 with normal or high GFR (GFR > 90 mL/min/1 73 square meters)    Stage 2 Mild CKD (GFR = 60-89 mL/min/1 73 square meters)    Stage 3A Moderate CKD (GFR = 45-59 mL/min/1 73 square meters)    Stage 3B Moderate CKD (GFR = 30-44 mL/min/1 73 square meters)    Stage 4 Severe CKD (GFR = 15-29 mL/min/1 73 square meters)    Stage 5 End Stage CKD (GFR <15 mL/min/1 73 square meters)  Note: GFR calculation is accurate only with a steady state creatinine    CBC and differential [[id number]]  (Abnormal) Collected:  09/27/19 1814    Lab Status:  Final result Specimen:  Blood from Arm, Left Updated:  09/27/19 1825     WBC 12 43 Thousand/uL      RBC 4 29 Million/uL      Hemoglobin 11 1 g/dL      Hematocrit 36 0 %      MCV 84 fL      MCH 25 9 pg      MCHC 30 8 g/dL      RDW 15 8 %      MPV 12 2 fL      Platelets 857 Thousands/uL      nRBC 0 /100 WBCs      Neutrophils Relative 74 %      Immat GRANS % 0 %      Lymphocytes Relative 21 %      Monocytes Relative 4 %      Eosinophils Relative 1 %      Basophils Relative 0 %      Neutrophils Absolute 9 04 Thousands/µL      Immature Grans Absolute 0 04 Thousand/uL      Lymphocytes Absolute 2 64 Thousands/µL      Monocytes Absolute 0 54 Thousand/µL      Eosinophils Absolute 0 14 Thousand/µL      Basophils Absolute 0 03 Thousands/µL                  XR chest 2 views   ED Interpretation by Benja Cardenas DO (09/27 3336)   Unremarkable      Final Result by Love Louie MD (09/27 2158)      No acute cardiopulmonary disease              Workstation performed: [id number]                    Procedures  ECG 12 Lead Documentation Only  Date/Time: 9/27/2019 6:17 PM  Performed by: Benja Cardenas DO  Authorized by: Benja Cardenas DO     ECG reviewed by me, the ED Provider: yes    Patient location:  ED  Previous ECG:     Previous ECG:  Compared to current    Comparison ECG info:  Compared to EKG of January 1, 2019    Similarity:  No change  Rhythm:     Rhythm: sinus rhythm and A-V block    Ectopy:     Ectopy: none    QRS:     QRS axis:  Normal    QRS intervals:  Normal  Conduction: Conduction: abnormal      Abnormal conduction: 1st degree    ST segments:     ST segments:  Normal  T waves:     T waves: normal    Other findings:     Other findings comment:  Low voltage           ED Course  ED Course as of Sep 29 1357   Fri Sep 27, 2019   1909 Peak flow is 380 pretreatment            HEART Risk Score      Most Recent Value   History  0 Filed at: 09/27/2019 1956   ECG  0 Filed at: 09/27/2019 1956   Age  1 Filed at: 09/27/2019 1956   Risk Factors  2 Filed at: 09/27/2019 1956   Troponin  0 Filed at: 09/27/2019 1956   Heart Score Risk Calculator   History  0 Filed at: 09/27/2019 1956   ECG  0 Filed at: 09/27/2019 1956   Age  1 Filed at: 09/27/2019 1956   Risk Factors  2 Filed at: 09/27/2019 1956   Troponin  0 Filed at: 09/27/2019 1956   HEART Score  3 Filed at: 09/27/2019 1956   HEART Score  3 Filed at: 09/27/2019 1956                      Thu Dickson Criteria for PE      Most Recent Value   Boom' Criteria for PE   Clinical signs and symptoms of DVT  0 Filed at: 09/27/2019 1857   PE is primary diagnosis or equally likely  0 Filed at: 09/27/2019 1857   HR >100  0 Filed at: 09/27/2019 1857   Immobilization at least 3 days or Surgery in the previous 4 weeks  0 Filed at: 09/27/2019 1857   Previous, objectively diagnosed PE or DVT  0 Filed at: 09/27/2019 1857   Hemoptysis  0 Filed at: 09/27/2019 1857   Malignancy with treatment within 6 months or palliative  0 Filed at: 09/27/2019 1857   Boom' Criteria Total  0 Filed at: 09/27/2019 1857            MDM  Number of Diagnoses or Management Options  Chest wall pain: new and requires workup  Mild intermittent asthma without complication: new and requires workup     Amount and/or Complexity of Data Reviewed  Clinical lab tests: ordered and reviewed  Tests in the radiology section of CPT®: ordered and reviewed  Independent visualization of images, tracings, or specimens: yes        Disposition  Final diagnoses:   Chest wall pain   Mild intermittent asthma without complication     Time reflects when diagnosis was documented in both MDM as applicable and the Disposition within this note     Time User Action Codes Description Comment    9/27/2019  7:05 PM Todd Eason Add [R07 89] Atypical chest pain     9/27/2019  7:05 PM Todd Eason Remove [R07 89] Atypical chest pain     9/27/2019  7:05 PM Todd Ashing Add [R07 89] Chest wall pain     9/27/2019  7:05 PM Todd Eason Add [N38 14] Mild intermittent asthma without complication       ED Disposition     ED Disposition Condition Date/Time Comment    Discharge Stable Fri Sep 27, 2019  7:05 PM Kait Freed discharge to home/self care  Follow-up Information     Follow up With Specialties Details Why Contact Info    Shayla Gilmore MD Family Medicine Call in 3 days As needed [address]            Discharge Medication List as of 9/27/2019  7:09 PM      START taking these medications    Details   ! ! predniSONE 20 mg tablet Take 1 tablet (20 mg total) by mouth 2 (two) times a day with meals, Starting Fri 9/27/2019, Normal       !! - Potential duplicate medications found  Please discuss with provider        CONTINUE these medications which have NOT CHANGED    Details   albuterol (2 5 mg/3 mL) 0 083 % nebulizer solution Take 1 vial (2 5 mg total) by nebulization every 6 (six) hours as needed for wheezing or shortness of breath, Starting u 1/3/2019, Normal      albuterol (PROVENTIL HFA,VENTOLIN HFA) 90 mcg/act inhaler USE 2 INHALATIONS ORALLY   EVERY 6 HOURS AS NEEDED FORWHEEZING, Normal      amLODIPine-benazepril (LOTREL 5-20) 5-20 MG per capsule TAKE 1 CAPSULE DAILY, Normal      atorvastatin (LIPITOR) 40 mg tablet TAKE 1 TABLET DAILY, Normal      Ferrous Fumarate 63 (20 Fe) MG TABS Take 65 mg by mouth daily, Historical Med      glipiZIDE (GLUCOTROL XL) 5 mg 24 hr tablet take 1 tablet by mouth once daily, Normal      glucose blood (ONE TOUCH ULTRA TEST) test strip 1 each by Other route daily, Starting Mon 11/26/2018, Normal      HYDROcodone-acetaminophen (NORCO) 5-325 mg per tablet Take 5-325 tablets by mouth daily as needed, Starting Tue 7/9/2019, Historical Med      JANUVIA 100 MG tablet take 1 tablet by mouth once daily, Normal      lidocaine (XYLOCAINE) 5 % ointment Apply topically as needed for mild pain, Starting Tue 7/16/2019, Normal      LYRICA 200 MG capsule Take 200 mg by mouth 2 (two) times a day, Starting Wed 12/19/2018, Historical Med      metFORMIN (GLUCOPHAGE) 1000 MG tablet TAKE 1 TABLET TWICE A DAY, Normal      omeprazole (PriLOSEC) 40 MG capsule TAKE 1 CAPSULE EVERY       MORNING, Normal      ondansetron (ZOFRAN) 4 mg tablet Take 1 tablet (4 mg total) by mouth every 8 (eight) hours as needed for nausea or vomiting, Starting Tue 9/11/2018, Normal      !! predniSONE 20 mg tablet 1 tab twice daily for 4 days, then 1 tablet daily for 4 days, Normal      SYMBICORT 160-4 5 MCG/ACT inhaler USE 2 INHALATIONS ORALLY   TWICE DAILY, RINSE MOUTH   AFTER USE, Normal      traMADol HCl  MG CP24 Take 300 mg by mouth daily , Starting Mon 4/1/2019, Historical Med      zolpidem (AMBIEN) 5 mg tablet Take 1 tablet (5 mg total) by mouth daily at bedtime as needed for sleep, Starting Tue 7/9/2019, Normal       !! - Potential duplicate medications found  Please discuss with provider  No discharge procedures on file      ED Provider  Electronically Signed by           Zeyad Leyva DO  09/29/19 3589

## 2019-10-02 ENCOUNTER — VBI (OUTPATIENT)
Dept: FAMILY MEDICINE CLINIC | Facility: CLINIC | Age: 59
End: 2019-10-02

## 2019-10-02 LAB
ATRIAL RATE: 84 BPM
P AXIS: 50 DEGREES
PR INTERVAL: 214 MS
QRS AXIS: 30 DEGREES
QRSD INTERVAL: 92 MS
QT INTERVAL: 368 MS
QTC INTERVAL: 434 MS
T WAVE AXIS: 63 DEGREES
VENTRICULAR RATE: 84 BPM

## 2019-10-02 PROCEDURE — 93010 ELECTROCARDIOGRAM REPORT: CPT | Performed by: INTERNAL MEDICINE

## 2019-10-02 NOTE — TELEPHONE ENCOUNTER
Naresh Marroquin    ED Visit Information     Ed visit date: 9/27/2019  Diagnosis Description: Chest wall pain; Mild intermittent asthma without complication  In Network? Yes [facility] [place]  Discharge status: Home  Discharged with meds ? Yes  Number of ED visits to date: 2  ED Severity:n/a     Outreach Information    Outreach successful: Yes 2  Date letter mailed:10/03/2019  Date Finalized:10/03/2019    Care Coordination    Follow up appointment with pcp: no Declined due to being out of state  Transportation issues ? NA    Value Consolidated [place]    Outreach type:  7 Day Outreach  Patient refsued the answer questions:  Yes  Emergent necessity warranted by diagnosis:  Yes  10/02/2019 12:51 PM Phone ([place]) Juan Springerce (Self) [phone] (H)   Left Message  Unable to reach patient regarding recent ED visit on 9/27 for Chest wall pain; Mild intermittent asthma without complication  Patient was discharged with medication and advised to follow up with PCP  2nd attempt will be made on 10/3      10/03/2019 11:11 AM Phone ([place]) Juan Milroy (Self) [phone] (H)   Left Message  Unable to reach patient regarding recent ED visit on 9/27 for Chest wall pain; Mild intermittent asthma without complication  Patient was discharged with medication (prednisone) and advised to follow up with PCP  Letter generated and mailed  10/03/2019 11:37 AM Phone ([email or web address]) Juan Milroy ([employer]) [phone] (H)   Return Call  Personal communication with patient regarding recent ED visit on  9/27 for Chest wall pain; Mild intermittent asthma without complication  Patient was discharged with medication (prednisone) and advised to follow up with PCP  Patient stated that she is feeling better and is currently in [place]  She will be returning next week and will call to schedule, if needed  Patient rushed off phone  Unable to ask follow up questions

## 2019-10-02 NOTE — LETTER
[address]  [address]  [address] [address]  [place] [id number]    Date: 10/03/19  Beatrice [address] [place] [id number]    Dear Davidson Recio:                                                                                                                              Thank you for choosing [facility] emergency department for care  As your primary care provider we want to make sure that your ongoing medical care is being addressed  If you require follow up care as a result of your emergency department visit, there are a few things we would like you to know  As part of our continuing commitment to caring for our patient, we have added more same day appointments and have extended our office hours to meet your medical needs  After hours, on-call physicians are available via our main office line  We encourage you to contact our office prior to seeking treatment to discuss your symptoms with our medical staff  Together, we can determine the correct course of action  A majority of non-emergent conditions such as: common cold, flu-like symptoms, fevers, strains/sprains, dislocations, minor burns, cuts and animal bites can be treated at [facility] facilities  Diagnostic testing is available at some sites  Of course, if you are experiencing a life threatening medical emergency call 911 or proceed directly to the nearest emergency room      Your nearest [facility] facility is conveniently located at:    [address], [facility] [place], [address]  [address] offered at most [address] your spot online at www [email or web address] or on the [facility] [address]    Sincerely,    [address]  Dept: [phone]

## 2019-10-05 DIAGNOSIS — J45.30 MILD PERSISTENT ASTHMA WITHOUT COMPLICATION: ICD-10-CM

## 2019-10-07 RX ORDER — BUDESONIDE AND FORMOTEROL FUMARATE DIHYDRATE 160; 4.5 UG/1; UG/1
AEROSOL RESPIRATORY (INHALATION)
Qty: 30.6 G | Refills: 0 | Status: SHIPPED | OUTPATIENT
Start: 2019-10-07 | End: 2019-12-26 | Stop reason: SDUPTHER

## 2019-10-15 LAB
ALBUMIN SERPL ELPH-MCNC: 3.7 G/DL (ref 3.8–4.8)
ALPHA1 GLOB SERPL ELPH-MCNC: 0.3 G/DL (ref 0.2–0.3)
ALPHA2 GLOB SERPL ELPH-MCNC: 0.8 G/DL (ref 0.5–0.9)
ANA SER QL IF: NEGATIVE
BETA1 GLOB SERPL ELPH-MCNC: 0.5 G/DL (ref 0.4–0.6)
BETA2 GLOB SERPL ELPH-MCNC: 0.4 G/DL (ref 0.2–0.5)
ERYTHROCYTE [SEDIMENTATION RATE] IN BLOOD BY WESTERGREN METHOD: 11 MM/H
GAMMA GLOB SERPL ELPH-MCNC: 0.6 G/DL (ref 0.8–1.7)
PROT SERPL-MCNC: 6.3 G/DL (ref 6.1–8.1)
TSH SERPL-ACNC: 1.85 MIU/L (ref 0.4–4.5)
VIT B12 SERPL-MCNC: 144 PG/ML (ref 200–1100)
VIT B6 SERPL-MCNC: 6.7 NG/ML (ref 2.1–21.7)

## 2019-10-17 ENCOUNTER — TRANSCRIBE ORDERS (OUTPATIENT)
Dept: ADMINISTRATIVE | Facility: HOSPITAL | Age: 59
End: 2019-10-17

## 2019-10-17 DIAGNOSIS — M54.14 THORACIC RADICULITIS: Primary | ICD-10-CM

## 2019-10-19 DIAGNOSIS — K21.00 GASTROESOPHAGEAL REFLUX DISEASE WITH ESOPHAGITIS: ICD-10-CM

## 2019-10-21 DIAGNOSIS — D47.2 ASYMPTOMATIC MONOCLONAL GAMMOPATHY: Primary | ICD-10-CM

## 2019-10-21 RX ORDER — OMEPRAZOLE 40 MG/1
CAPSULE, DELAYED RELEASE ORAL
Qty: 90 CAPSULE | Refills: 0 | Status: SHIPPED | OUTPATIENT
Start: 2019-10-21 | End: 2020-01-08

## 2019-10-21 NOTE — PROGRESS NOTES
Serum protein electrophoresis recently done showed a pattern that may be suggestive of potentially early nephrotic syndrome  It was suggested to check urine protein electrophoresis, order was placed today

## 2019-10-22 ENCOUNTER — TELEPHONE (OUTPATIENT)
Dept: NEUROLOGY | Facility: CLINIC | Age: 59
End: 2019-10-22

## 2019-10-22 NOTE — TELEPHONE ENCOUNTER
----- Message from Petrona Fowler MD sent at 10/21/2019  2:44 PM EDT -----  Please let the patient know that her blood work done recently showed low vitamin B12 levels at 144 , I recommend that she gets vitamin B12 injections, I usually recommend once a week for 4 weeks and then once a month for the next 6 months  She can get these either in her primary care physician's office, or can be arranged at our practice  In addition, serum protein electrophoresis showed minimal abnormality, which is not related to her neuropathy but I would like to check these proteins in her urine  I have placed the order, please ask her to get this done    Rest of the blood work was normal     Thank you    ----- Message -----  From: Haim Patel Results In  Sent: 10/14/2019  12:00 PM EDT  To: Petrona Fowler MD

## 2019-10-24 NOTE — TELEPHONE ENCOUNTER
Spoke with patient and advised of results  Patient will follow up with PCP regarding B12 injections  Patient requested us to mail her the lab script for the urine test  Will do so

## 2019-10-25 ENCOUNTER — CLINICAL SUPPORT (OUTPATIENT)
Dept: FAMILY MEDICINE CLINIC | Facility: CLINIC | Age: 59
End: 2019-10-25
Payer: COMMERCIAL

## 2019-10-25 DIAGNOSIS — E53.8 B12 DEFICIENCY: Primary | ICD-10-CM

## 2019-10-25 RX ORDER — CYANOCOBALAMIN 1000 UG/ML
1000 INJECTION INTRAMUSCULAR; SUBCUTANEOUS WEEKLY
Status: DISCONTINUED | OUTPATIENT
Start: 2019-10-25 | End: 2021-11-05 | Stop reason: HOSPADM

## 2019-10-25 RX ADMIN — CYANOCOBALAMIN 1000 MCG: 1000 INJECTION INTRAMUSCULAR; SUBCUTANEOUS at 15:15

## 2019-10-26 LAB
BUN SERPL-MCNC: 17 MG/DL (ref 7–25)
BUN/CREAT SERPL: ABNORMAL (CALC) (ref 6–22)
CALCIUM SERPL-MCNC: 9.5 MG/DL (ref 8.6–10.4)
CHLORIDE SERPL-SCNC: 100 MMOL/L (ref 98–110)
CO2 SERPL-SCNC: 30 MMOL/L (ref 20–32)
CREAT SERPL-MCNC: 0.61 MG/DL (ref 0.5–1.05)
EST. AVERAGE GLUCOSE BLD GHB EST-MCNC: 203 (CALC)
EST. AVERAGE GLUCOSE BLD GHB EST-SCNC: 11.2 (CALC)
GLUCOSE SERPL-MCNC: 128 MG/DL (ref 65–99)
HBA1C MFR BLD: 8.7 % OF TOTAL HGB
POTASSIUM SERPL-SCNC: 4.4 MMOL/L (ref 3.5–5.3)
SL AMB EGFR AFRICAN AMERICAN: 115 ML/MIN/1.73M2
SL AMB EGFR NON AFRICAN AMERICAN: 99 ML/MIN/1.73M2
SODIUM SERPL-SCNC: 136 MMOL/L (ref 135–146)

## 2019-10-28 ENCOUNTER — TELEPHONE (OUTPATIENT)
Dept: FAMILY MEDICINE CLINIC | Facility: CLINIC | Age: 59
End: 2019-10-28

## 2019-10-28 NOTE — TELEPHONE ENCOUNTER
----- Message from Rodo Crawley MD sent at 10/26/2019  8:46 AM EDT -----  Results reviewed-will discuss at scheduled appointment-no changes needed now

## 2019-10-31 ENCOUNTER — HOSPITAL ENCOUNTER (OUTPATIENT)
Dept: MRI IMAGING | Facility: HOSPITAL | Age: 59
Discharge: HOME | End: 2019-10-31
Payer: COMMERCIAL

## 2019-10-31 DIAGNOSIS — M54.14 THORACIC RADICULITIS: ICD-10-CM

## 2019-10-31 PROCEDURE — 72146 MRI CHEST SPINE W/O DYE: CPT

## 2019-11-01 ENCOUNTER — OFFICE VISIT (OUTPATIENT)
Dept: FAMILY MEDICINE CLINIC | Facility: CLINIC | Age: 59
End: 2019-11-01
Payer: COMMERCIAL

## 2019-11-01 VITALS
BODY MASS INDEX: 35 KG/M2 | OXYGEN SATURATION: 96 % | DIASTOLIC BLOOD PRESSURE: 84 MMHG | HEIGHT: 64 IN | HEART RATE: 83 BPM | SYSTOLIC BLOOD PRESSURE: 126 MMHG | WEIGHT: 205 LBS

## 2019-11-01 DIAGNOSIS — E78.5 HYPERLIPIDEMIA ASSOCIATED WITH TYPE 2 DIABETES MELLITUS (HCC): ICD-10-CM

## 2019-11-01 DIAGNOSIS — Z12.2 ENCOUNTER FOR SCREENING FOR LUNG CANCER: ICD-10-CM

## 2019-11-01 DIAGNOSIS — E11.9 CONTROLLED TYPE 2 DIABETES MELLITUS WITHOUT COMPLICATION, WITHOUT LONG-TERM CURRENT USE OF INSULIN (HCC): Primary | ICD-10-CM

## 2019-11-01 DIAGNOSIS — I10 BENIGN ESSENTIAL HYPERTENSION: ICD-10-CM

## 2019-11-01 DIAGNOSIS — Z12.31 SCREENING MAMMOGRAM, ENCOUNTER FOR: ICD-10-CM

## 2019-11-01 DIAGNOSIS — E11.69 HYPERLIPIDEMIA ASSOCIATED WITH TYPE 2 DIABETES MELLITUS (HCC): ICD-10-CM

## 2019-11-01 PROCEDURE — 3074F SYST BP LT 130 MM HG: CPT | Performed by: FAMILY MEDICINE

## 2019-11-01 PROCEDURE — 3008F BODY MASS INDEX DOCD: CPT | Performed by: FAMILY MEDICINE

## 2019-11-01 PROCEDURE — 99214 OFFICE O/P EST MOD 30 MIN: CPT | Performed by: FAMILY MEDICINE

## 2019-11-01 PROCEDURE — 3079F DIAST BP 80-89 MM HG: CPT | Performed by: FAMILY MEDICINE

## 2019-11-01 RX ORDER — TRAMADOL HYDROCHLORIDE 300 MG/1
300 TABLET, FILM COATED, EXTENDED RELEASE ORAL DAILY
Refills: 0 | COMMUNITY
Start: 2019-10-10 | End: 2020-02-06 | Stop reason: SDUPTHER

## 2019-11-01 RX ORDER — AMOXICILLIN AND CLAVULANATE POTASSIUM 875; 125 MG/1; MG/1
TABLET, FILM COATED ORAL
Refills: 0 | COMMUNITY
Start: 2019-09-14 | End: 2019-11-01 | Stop reason: ALTCHOICE

## 2019-11-01 RX ORDER — BENZONATATE 200 MG/1
200 CAPSULE ORAL 3 TIMES DAILY
Refills: 0 | COMMUNITY
Start: 2019-09-14 | End: 2019-11-01 | Stop reason: ALTCHOICE

## 2019-11-01 NOTE — PATIENT INSTRUCTIONS
Due to high A1c, will stop glipizide and start Basaglar 10 units at bedtime  Monitor sugars  Send in report of fasting sugars for the next 2 weeks  Goal for fasting sugar should be   I would repeat your A1c in 2-3 months  Follow up with Neurology  Get repeat CT lung screen CT scan    Get mammogram as ordered

## 2019-11-01 NOTE — PROGRESS NOTES
[id number]         NAME: Melyssa Heath is a 61 y o  female  : 1960    MRN: [id number]  DATE: 2019  TIME: 4:57 PM    Assessment and Plan   Controlled type 2 diabetes mellitus without complication, without long-term current use of insulin ([address] ) [E11 9]  1  Controlled type 2 diabetes mellitus without complication, without long-term current use of insulin ([facility])  insulin glargine (BASAGLAR KWIKPEN) 100 units/mL injection pen    Basic metabolic panel    Hemoglobin A1C With EAG    Basic metabolic panel    Hemoglobin A1C With EAG   2  Hyperlipidemia associated with type 2 diabetes mellitus ([address] )     3  Benign essential hypertension     4  Screening mammogram, encounter for  Mammo screening bilateral w 3d & cad   5  Encounter for screening for lung cancer  CT lung screening program       No problem-specific Assessment & Plan notes found for this encounter  Patient Instructions     Patient Instructions   Due to high A1c, will stop glipizide and start Basaglar 10 units at bedtime  Monitor sugars  Send in report of fasting sugars for the next 2 weeks  Goal for fasting sugar should be   I would repeat your A1c in 2-3 months  Follow up with Neurology  Get repeat CT lung screen CT scan  Chief Complaint     Chief Complaint   Patient presents with    Follow-up     3 month dm exam          History of Present Illness       Medical management multiple issues-  1) diabetes-A1c is markedly elevated 8 6  Patient had been on prednisone for other medical issues  Also had been on a cruise wish may have over eaten  Options have been discussed  2) hyperlipidemia-last levels were good  Tolerating medication  3) hypertension-satisfactory control  Review of Systems   Review of Systems   Constitutional: Negative for activity change, appetite change, diaphoresis and fatigue     Respiratory: Negative for cough, chest tightness, shortness of breath and wheezing  Cardiovascular: Negative for chest pain, palpitations and leg swelling  Fast or slow heart rate   Gastrointestinal: Negative for abdominal pain, blood in stool, constipation, diarrhea, nausea and vomiting  Genitourinary: Negative for difficulty urinating, dysuria and frequency  Neurological: Negative for dizziness, light-headedness and headaches  Psychiatric/Behavioral: Negative for agitation, confusion, dysphoric mood and sleep disturbance  The patient is not nervous/anxious            Current Medications       Current Outpatient Medications:     albuterol (PROVENTIL HFA,VENTOLIN HFA) 90 mcg/act inhaler, USE 2 INHALATIONS ORALLY   EVERY 6 HOURS AS NEEDED FORWHEEZING, Disp: 3 Inhaler, Rfl: 0    amLODIPine-benazepril (LOTREL 5-20) 5-20 MG per capsule, TAKE 1 CAPSULE DAILY, Disp: 90 capsule, Rfl: 0    atorvastatin (LIPITOR) 40 mg tablet, TAKE 1 TABLET DAILY, Disp: 90 tablet, Rfl: 0    Ferrous Fumarate 63 (20 Fe) MG TABS, Take 65 mg by mouth daily, Disp: , Rfl:     glucose blood (ONE TOUCH ULTRA TEST) test strip, 1 each by Other route daily, Disp: 100 each, Rfl: 1    HYDROcodone-acetaminophen (NORCO) 5-325 mg per tablet, Take 5-325 tablets by mouth daily as needed, Disp: , Rfl: 0    JANUVIA 100 MG tablet, take 1 tablet by mouth once daily, Disp: 90 tablet, Rfl: 1    lidocaine (XYLOCAINE) 5 % ointment, Apply topically as needed for mild pain, Disp: 240 g, Rfl: 1    LYRICA 200 MG capsule, Take 200 mg by mouth 2 (two) times a day, Disp: , Rfl: 0    metFORMIN (GLUCOPHAGE) 1000 MG tablet, TAKE 1 TABLET TWICE A DAY, Disp: 180 tablet, Rfl: 0    omeprazole (PriLOSEC) 40 MG capsule, TAKE 1 CAPSULE EVERY       MORNING, Disp: 90 capsule, Rfl: 0    SYMBICORT 160-4 5 MCG/ACT inhaler, USE 2 INHALATIONS ORALLY   TWICE DAILY, RINSE MOUTH   AFTER USE, Disp: 30 6 g, Rfl: 0    traMADol HCl  MG CP24, Take 300 mg by mouth daily , Disp: 30 tablet, Rfl: 0    traMADol HCl ER, Biphasic, 300 MG 24 hr tablet, Take 300 mg by mouth daily, Disp: , Rfl: 0    zolpidem (AMBIEN) 5 mg tablet, Take 1 tablet (5 mg total) by mouth daily at bedtime as needed for sleep, Disp: 30 tablet, Rfl: 3    albuterol (2 5 mg/3 mL) 0 083 % nebulizer solution, Take 1 vial (2 5 mg total) by nebulization every 6 (six) hours as needed for wheezing or shortness of breath (Patient not taking: Reported on 2019), Disp: 100 vial, Rfl: 0    insulin glargine (BASAGLAR KWIKPEN) 100 units/mL injection pen, Inject 10 Units under the skin daily at bedtime, Disp: 5 pen, Rfl: 1    Current Facility-Administered Medications:     cyanocobalamin injection 1,000 mcg, 1,000 mcg, Intramuscular, Weekly, CHITRA Ingram, 1,000 mcg at 10/25/19 1515    Current Allergies     Allergies as of 2019 - Reviewed 2019   Allergen Reaction Noted    Nsaids Other (See Comments) 2017    Percocet [oxycodone-acetaminophen] GI Intolerance 2017            The following portions of the patient's history were reviewed and updated as appropriate: allergies, current medications, past family history, past medical history, past social history, past surgical history and problem list      Past Medical History:   Diagnosis Date    Anxiety     Asthma     Breast lump     Resolved: 2017     Chronic pain     back    Diabetes mellitus ([address] )     History of stomach ulcers     Hyperlipidemia     Hypertension     Infectious viral hepatitis     Hepatitiss A       Past Surgical History:   Procedure Laterality Date    APPENDECTOMY      BACK SURGERY       SECTION      CHOLECYSTECTOMY      GASTRIC BYPASS      SHOULDER SURGERY         Family History   Problem Relation Age of Onset    Diabetes Mother         Mellitus    Hyperlipidemia Mother     Hypertension Mother     Colon cancer Mother     Pancreatic cancer Mother     Melanoma Mother     Cancer Father         Bladder     Alcohol abuse Father     Lung cancer Father     Prostate cancer Father     Diabetes Brother         Mellitus    Hyperlipidemia Brother     Hypertension Brother     Mental illness Neg Hx          Medications have been verified  Objective   /84   Pulse 83   Ht 5' 4" (1 626 m)   Wt 93 kg (205 lb)   LMP  (LMP Unknown)   SpO2 96%   BMI 35 19 kg/m²        Physical Exam     Physical Exam   Constitutional: She is oriented to person, place, and time  She appears well-developed and well-nourished  No distress  HENT:   Head: Normocephalic and atraumatic  Right Ear: Tympanic membrane, external ear and ear canal normal    Left Ear: Tympanic membrane, external ear and ear canal normal    Nose: No mucosal edema or rhinorrhea  Right sinus exhibits no maxillary sinus tenderness  Left sinus exhibits no maxillary sinus tenderness  Mouth/Throat: Uvula is midline  Mucous membranes are not pale and not dry  Normal dentition  No oropharyngeal exudate  Eyes: Pupils are equal, round, and reactive to light  EOM are normal  Right eye exhibits no discharge  Left eye exhibits no discharge  Neck: Normal range of motion  Neck supple  No thyromegaly present  Cardiovascular: Normal rate, regular rhythm, normal heart sounds and intact distal pulses  No murmur heard  Pulmonary/Chest: Effort normal and breath sounds normal  No respiratory distress  She has no wheezes  She has no rales  Musculoskeletal: Normal range of motion  She exhibits no edema or tenderness  Lymphadenopathy:     She has no cervical adenopathy  Neurological: She is alert and oriented to person, place, and time  No cranial nerve deficit  Skin: She is not diaphoretic  Psychiatric: She has a normal mood and affect   Her behavior is normal

## 2019-11-04 DIAGNOSIS — E11.9 CONTROLLED TYPE 2 DIABETES MELLITUS WITHOUT COMPLICATION, WITHOUT LONG-TERM CURRENT USE OF INSULIN (HCC): Primary | ICD-10-CM

## 2019-11-04 RX ORDER — PEN NEEDLE, DIABETIC 30 GX3/16"
NEEDLE, DISPOSABLE MISCELLANEOUS
Qty: 30 EACH | Refills: 1 | Status: SHIPPED | OUTPATIENT
Start: 2019-11-04 | End: 2020-04-08

## 2019-11-06 DIAGNOSIS — J45.30 MILD PERSISTENT ASTHMA, UNSPECIFIED WHETHER COMPLICATED: ICD-10-CM

## 2019-11-06 DIAGNOSIS — E78.5 HYPERLIPIDEMIA ASSOCIATED WITH TYPE 2 DIABETES MELLITUS (HCC): ICD-10-CM

## 2019-11-06 DIAGNOSIS — E11.69 HYPERLIPIDEMIA ASSOCIATED WITH TYPE 2 DIABETES MELLITUS (HCC): ICD-10-CM

## 2019-11-06 DIAGNOSIS — E11.9 TYPE 2 DIABETES MELLITUS WITHOUT COMPLICATION, WITHOUT LONG-TERM CURRENT USE OF INSULIN (HCC): ICD-10-CM

## 2019-11-06 DIAGNOSIS — I10 ESSENTIAL HYPERTENSION: ICD-10-CM

## 2019-11-06 LAB
ALBUMIN ?TM MFR UR ELPH: 100 %
ALPHA1 GLOB ?TM MFR UR ELPH: 0 %
ALPHA2 GLOB ?TM MFR UR ELPH: 0 %
B-GLOBULIN ?TM MFR UR ELPH: 0 %
CREAT UR-MCNC: 73 MG/DL (ref 20–275)
GAMMA GLOB ?TM MFR UR ELPH: 0 %
INTERPRETATION UR IFE-IMP: NORMAL
PROT UR-MCNC: 9 MG/DL (ref 5–24)
PROT/CREAT UR: 0.12 MG/MG CREAT (ref 0.02–0.16)
PROT/CREAT UR: 123 MG/G CREAT (ref 21–161)

## 2019-11-07 DIAGNOSIS — G47.00 INSOMNIA, UNSPECIFIED TYPE: ICD-10-CM

## 2019-11-07 RX ORDER — ATORVASTATIN CALCIUM 40 MG/1
TABLET, FILM COATED ORAL
Qty: 90 TABLET | Refills: 0 | Status: SHIPPED | OUTPATIENT
Start: 2019-11-18 | End: 2020-02-10

## 2019-11-07 RX ORDER — ALBUTEROL SULFATE 90 UG/1
AEROSOL, METERED RESPIRATORY (INHALATION)
Qty: 25.5 G | Refills: 0 | Status: SHIPPED | OUTPATIENT
Start: 2019-11-27 | End: 2020-01-08

## 2019-11-07 RX ORDER — AMLODIPINE BESYLATE AND BENAZEPRIL HYDROCHLORIDE 5; 20 MG/1; MG/1
CAPSULE ORAL
Qty: 90 CAPSULE | Refills: 0 | Status: SHIPPED | OUTPATIENT
Start: 2019-11-13 | End: 2020-02-05

## 2019-11-08 ENCOUNTER — CLINICAL SUPPORT (OUTPATIENT)
Dept: FAMILY MEDICINE CLINIC | Facility: CLINIC | Age: 59
End: 2019-11-08

## 2019-11-08 DIAGNOSIS — E53.8 B12 DEFICIENCY: Primary | ICD-10-CM

## 2019-11-08 RX ORDER — ZOLPIDEM TARTRATE 5 MG/1
5 TABLET ORAL
Qty: 30 TABLET | Refills: 2 | Status: SHIPPED | OUTPATIENT
Start: 2019-11-08 | End: 2020-02-07 | Stop reason: SDUPTHER

## 2019-11-08 RX ADMIN — CYANOCOBALAMIN 1000 MCG: 1000 INJECTION INTRAMUSCULAR; SUBCUTANEOUS at 13:56

## 2019-11-15 ENCOUNTER — CLINICAL SUPPORT (OUTPATIENT)
Dept: FAMILY MEDICINE CLINIC | Facility: CLINIC | Age: 59
End: 2019-11-15
Payer: COMMERCIAL

## 2019-11-15 DIAGNOSIS — E53.8 B12 DEFICIENCY: Primary | ICD-10-CM

## 2019-11-15 RX ADMIN — CYANOCOBALAMIN 1000 MCG: 1000 INJECTION INTRAMUSCULAR; SUBCUTANEOUS at 13:56

## 2019-12-04 ENCOUNTER — TELEPHONE (OUTPATIENT)
Dept: FAMILY MEDICINE CLINIC | Facility: CLINIC | Age: 59
End: 2019-12-04

## 2019-12-04 DIAGNOSIS — R11.0 NAUSEA: Primary | ICD-10-CM

## 2019-12-04 RX ORDER — ONDANSETRON 4 MG/1
4 TABLET, FILM COATED ORAL EVERY 8 HOURS PRN
Qty: 20 TABLET | Refills: 0 | Status: SHIPPED | OUTPATIENT
Start: 2019-12-04 | End: 2020-06-09 | Stop reason: SDUPTHER

## 2019-12-18 ENCOUNTER — HOSPITAL ENCOUNTER (OUTPATIENT)
Dept: CT IMAGING | Facility: HOSPITAL | Age: 59
Discharge: HOME | End: 2019-12-18
Payer: COMMERCIAL

## 2019-12-18 DIAGNOSIS — Z12.2 ENCOUNTER FOR SCREENING FOR LUNG CANCER: ICD-10-CM

## 2019-12-18 PROCEDURE — G0297 LDCT FOR LUNG CA SCREEN: HCPCS

## 2019-12-19 ENCOUNTER — OFFICE VISIT (OUTPATIENT)
Dept: FAMILY MEDICINE CLINIC | Facility: CLINIC | Age: 59
End: 2019-12-19
Payer: COMMERCIAL

## 2019-12-19 VITALS
SYSTOLIC BLOOD PRESSURE: 136 MMHG | BODY MASS INDEX: 34.83 KG/M2 | DIASTOLIC BLOOD PRESSURE: 82 MMHG | TEMPERATURE: 97.9 F | OXYGEN SATURATION: 92 % | HEART RATE: 108 BPM | HEIGHT: 64 IN | WEIGHT: 204 LBS

## 2019-12-19 DIAGNOSIS — J18.9 COMMUNITY ACQUIRED PNEUMONIA OF RIGHT LOWER LOBE OF LUNG: Primary | ICD-10-CM

## 2019-12-19 DIAGNOSIS — J45.30 MILD PERSISTENT ASTHMA WITHOUT COMPLICATION: ICD-10-CM

## 2019-12-19 PROCEDURE — 1036F TOBACCO NON-USER: CPT | Performed by: FAMILY MEDICINE

## 2019-12-19 PROCEDURE — 3008F BODY MASS INDEX DOCD: CPT | Performed by: FAMILY MEDICINE

## 2019-12-19 PROCEDURE — 99214 OFFICE O/P EST MOD 30 MIN: CPT | Performed by: FAMILY MEDICINE

## 2019-12-19 RX ORDER — AMOXICILLIN AND CLAVULANATE POTASSIUM 875; 125 MG/1; MG/1
1 TABLET, FILM COATED ORAL EVERY 12 HOURS SCHEDULED
Qty: 20 TABLET | Refills: 0 | Status: SHIPPED | OUTPATIENT
Start: 2019-12-19 | End: 2019-12-29

## 2019-12-19 RX ORDER — PREDNISONE 20 MG/1
TABLET ORAL
Qty: 15 TABLET | Refills: 0 | Status: SHIPPED | OUTPATIENT
Start: 2019-12-19 | End: 2020-02-10 | Stop reason: HOSPADM

## 2019-12-19 RX ADMIN — CYANOCOBALAMIN 1000 MCG: 1000 INJECTION INTRAMUSCULAR; SUBCUTANEOUS at 15:16

## 2019-12-19 NOTE — PATIENT INSTRUCTIONS
Start Augmentin and prednisone to help with possible pneumonia  Use her nebulizer inhalers  If things become worse as far as fevers and shortness of breath be evaluated in hospital emergency room  If things are generally are not improving call the office to get your CT report as that may show possible pneumonia  Or get a chest x-ray

## 2019-12-19 NOTE — PROGRESS NOTES
[address]         NAME: Wilmer Mi is a 61 y o  female  : 1960    MRN: [id number]  DATE: 2019  TIME: 6:20 PM    Assessment and Plan   Community acquired pneumonia of right lower lobe of lung ([address] ) [J18 1]  1  Community acquired pneumonia of right lower lobe of lung (HCC)  amoxicillin-clavulanate (AUGMENTIN) 875-125 mg per tablet    predniSONE 20 mg tablet    XR chest pa & lateral   2  Mild persistent asthma without complication  predniSONE 20 mg tablet       No problem-specific Assessment & Plan notes found for this encounter  Patient Instructions     Patient Instructions   Start Augmentin and prednisone to help with possible pneumonia  Use her nebulizer inhalers  If things become worse as far as fevers and shortness of breath be evaluated in hospital emergency room  If things are generally are not improving call the office to get your CT report as that may show possible pneumonia  Or get a chest x-ray  Chief Complaint     Chief Complaint   Patient presents with    Cough     poss bronchitis, sinus         History of Present Illness       Patient been ill over the last 7 -8 days  Initially started with sinus congestion and pressure  Over the last 48 hours more cough congestive been some shortness of breath  She does has asthma, she has been using her inhalers more  She also uses a nebulizer  Review of Systems   Review of Systems   Constitutional: Negative for appetite change, chills, diaphoresis and fever  HENT: Positive for congestion and sinus pressure  Negative for ear pain, rhinorrhea and sore throat  Eyes: Negative for discharge, redness and itching  Respiratory: Positive for cough and wheezing  Negative for shortness of breath  Cardiovascular: Negative for chest pain and palpitations  Rapid or slow heart rate   Gastrointestinal: Negative for abdominal pain, diarrhea, nausea and vomiting           Current Medications       Current Outpatient Medications:     albuterol (PROVENTIL HFA,VENTOLIN HFA) 90 mcg/act inhaler, USE 2 INHALATIONS ORALLY   EVERY 6 HOURS AS NEEDED FORWHEEZING, Disp: 25 5 g, Rfl: 0    amLODIPine-benazepril (LOTREL 5-20) 5-20 MG per capsule, TAKE 1 CAPSULE DAILY, Disp: 90 capsule, Rfl: 0    atorvastatin (LIPITOR) 40 mg tablet, TAKE 1 TABLET DAILY, Disp: 90 tablet, Rfl: 0    Ferrous Fumarate 63 (20 Fe) MG TABS, Take 65 mg by mouth daily, Disp: , Rfl:     glucose blood (ONE TOUCH ULTRA TEST) test strip, 1 each by Other route daily, Disp: 100 each, Rfl: 1    HYDROcodone-acetaminophen (NORCO) 5-325 mg per tablet, Take 5-325 tablets by mouth daily as needed, Disp: , Rfl: 0    insulin glargine (BASAGLAR KWIKPEN) 100 units/mL injection pen, Inject 10 Units under the skin daily at bedtime, Disp: 5 pen, Rfl: 1    Insulin Pen Needle (PEN NEEDLES) 31G X 5 MM MISC, by Does not apply route daily at bedtime, Disp: 30 each, Rfl: 1    JANUVIA 100 MG tablet, take 1 tablet by mouth once daily, Disp: 90 tablet, Rfl: 1    lidocaine (XYLOCAINE) 5 % ointment, Apply topically as needed for mild pain, Disp: 240 g, Rfl: 1    LYRICA 200 MG capsule, Take 200 mg by mouth 3 (three) times a day , Disp: , Rfl: 0    metFORMIN (GLUCOPHAGE) 1000 MG tablet, TAKE 1 TABLET TWICE A DAY, Disp: 180 tablet, Rfl: 0    omeprazole (PriLOSEC) 40 MG capsule, TAKE 1 CAPSULE EVERY       MORNING, Disp: 90 capsule, Rfl: 0    ondansetron (ZOFRAN) 4 mg tablet, Take 1 tablet (4 mg total) by mouth every 8 (eight) hours as needed for nausea or vomiting, Disp: 20 tablet, Rfl: 0    SYMBICORT 160-4 5 MCG/ACT inhaler, USE 2 INHALATIONS ORALLY   TWICE DAILY, RINSE MOUTH   AFTER USE, Disp: 30 6 g, Rfl: 0    traMADol HCl  MG CP24, Take 300 mg by mouth daily , Disp: 30 tablet, Rfl: 0    traMADol HCl ER, Biphasic, 300 MG 24 hr tablet, Take 300 mg by mouth daily, Disp: , Rfl: 0    zolpidem (AMBIEN) 5 mg tablet, Take 1 tablet (5 mg total) by mouth daily at bedtime as needed for sleep, Disp: 30 tablet, Rfl: 2    albuterol (2 5 mg/3 mL) 0 083 % nebulizer solution, Take 1 vial (2 5 mg total) by nebulization every 6 (six) hours as needed for wheezing or shortness of breath (Patient not taking: Reported on 2019), Disp: 100 vial, Rfl: 0    amoxicillin-clavulanate (AUGMENTIN) 875-125 mg per tablet, Take 1 tablet by mouth every 12 (twelve) hours for 10 days, Disp: 20 tablet, Rfl: 0    predniSONE 20 mg tablet, 1 tab twice daily for 5 days, then 1 tab daily for 5 days  , Disp: 15 tablet, Rfl: 0    Current Facility-Administered Medications:     cyanocobalamin injection 1,000 mcg, 1,000 mcg, Intramuscular, Weekly, Matilde Deephaven, CRNP, 1,000 mcg at 19 1516    Current Allergies     Allergies as of 2019 - Reviewed 2019   Allergen Reaction Noted    Nsaids Other (See Comments) 2017    Percocet [oxycodone-acetaminophen] GI Intolerance 2017            The following portions of the patient's history were reviewed and updated as appropriate: allergies, current medications, past family history, past medical history, past social history, past surgical history and problem list      Past Medical History:   Diagnosis Date    Anxiety     Asthma     Breast lump     Resolved: 2017     Chronic pain     back    Diabetes mellitus ([facility] )     History of stomach ulcers     Hyperlipidemia     Hypertension     Infectious viral hepatitis     Hepatitiss A       Past Surgical History:   Procedure Laterality Date    APPENDECTOMY      BACK SURGERY       SECTION      CHOLECYSTECTOMY      GASTRIC BYPASS      SHOULDER SURGERY         Family History   Problem Relation Age of Onset    Diabetes Mother         Mellitus    Hyperlipidemia Mother     Hypertension Mother     Colon cancer Mother     Pancreatic cancer Mother     Melanoma Mother     Cancer Father         Bladder     Alcohol abuse Father     Lung cancer Father     Prostate cancer Father     Diabetes Brother         Mellitus    Hyperlipidemia Brother     Hypertension Brother     Mental illness Neg Hx          Medications have been verified  Objective   /82 (BP Location: Left arm, Patient Position: Sitting, Cuff Size: Extra-Large)   Pulse (!) 108   Temp 97 9 °F (36 6 °C) (Tympanic)   Ht 5' 4" (1 626 m)   Wt 92 5 kg (204 lb)   LMP  (LMP Unknown)   SpO2 92%   BMI 35 02 kg/m²        Physical Exam     Physical Exam   Constitutional: She is oriented to person, place, and time  She appears well-developed and well-nourished  No distress  HENT:   Head: Normocephalic and atraumatic  Right Ear: Tympanic membrane and external ear normal  No drainage  Left Ear: Tympanic membrane normal  No drainage  Nose: Rhinorrhea present  Right sinus exhibits maxillary sinus tenderness  Left sinus exhibits maxillary sinus tenderness  Mouth/Throat: No oropharyngeal exudate  Eyes: Conjunctivae and EOM are normal  Right eye exhibits no discharge  Left eye exhibits no discharge  Neck: Normal range of motion  Neck supple  No thyromegaly present  Cardiovascular: Normal rate, regular rhythm and normal heart sounds  Pulmonary/Chest: Effort normal  No respiratory distress  She has wheezes  She has no rales  Lymphadenopathy:     She has no cervical adenopathy  Neurological: She is alert and oriented to person, place, and time

## 2019-12-26 DIAGNOSIS — J45.30 MILD PERSISTENT ASTHMA WITHOUT COMPLICATION: ICD-10-CM

## 2019-12-27 ENCOUNTER — TELEPHONE (OUTPATIENT)
Dept: FAMILY MEDICINE CLINIC | Facility: CLINIC | Age: 59
End: 2019-12-27

## 2019-12-27 NOTE — RESULT ENCOUNTER NOTE
Call patient with lab result-call patient review CT scan  No lung cancer seen  General recommendations repeat in 1 year

## 2019-12-27 NOTE — TELEPHONE ENCOUNTER
----- Message from Re Lemus MD sent at 12/27/2019 10:46 AM EST -----  Call patient with lab result-call patient review CT scan  No lung cancer seen  General recommendations repeat in 1 year

## 2019-12-30 RX ORDER — BUDESONIDE AND FORMOTEROL FUMARATE DIHYDRATE 160; 4.5 UG/1; UG/1
AEROSOL RESPIRATORY (INHALATION)
Qty: 30.6 G | Refills: 0 | Status: SHIPPED | OUTPATIENT
Start: 2019-12-30 | End: 2020-05-26

## 2019-12-30 NOTE — TELEPHONE ENCOUNTER
Can we try again to call in verify her  Believe she was using the Symbicort  But there were problems with the expense I believes maybe she was away for the holiday

## 2020-01-06 ENCOUNTER — HOSPITAL ENCOUNTER (OUTPATIENT)
Dept: BONE DENSITY | Facility: CLINIC | Age: 60
Discharge: HOME | End: 2020-01-06
Payer: COMMERCIAL

## 2020-01-06 VITALS — HEIGHT: 64 IN | WEIGHT: 204 LBS | BODY MASS INDEX: 34.83 KG/M2

## 2020-01-06 DIAGNOSIS — Z12.31 SCREENING MAMMOGRAM, ENCOUNTER FOR: ICD-10-CM

## 2020-01-06 PROCEDURE — 77063 BREAST TOMOSYNTHESIS BI: CPT

## 2020-01-06 PROCEDURE — 77067 SCR MAMMO BI INCL CAD: CPT

## 2020-01-08 DIAGNOSIS — J45.30 MILD PERSISTENT ASTHMA, UNSPECIFIED WHETHER COMPLICATED: ICD-10-CM

## 2020-01-08 DIAGNOSIS — K21.00 GASTROESOPHAGEAL REFLUX DISEASE WITH ESOPHAGITIS: ICD-10-CM

## 2020-01-08 RX ORDER — OMEPRAZOLE 40 MG/1
CAPSULE, DELAYED RELEASE ORAL
Qty: 90 CAPSULE | Refills: 0 | Status: SHIPPED | OUTPATIENT
Start: 2020-01-08 | End: 2020-04-06

## 2020-01-08 RX ORDER — ALBUTEROL SULFATE 90 UG/1
AEROSOL, METERED RESPIRATORY (INHALATION)
Qty: 25.5 G | Refills: 0 | Status: SHIPPED | OUTPATIENT
Start: 2020-01-08 | End: 2020-03-13

## 2020-01-30 LAB
BUN SERPL-MCNC: 14 MG/DL (ref 7–25)
BUN/CREAT SERPL: ABNORMAL (CALC) (ref 6–22)
CALCIUM SERPL-MCNC: 9.4 MG/DL (ref 8.6–10.4)
CHLORIDE SERPL-SCNC: 103 MMOL/L (ref 98–110)
CO2 SERPL-SCNC: 29 MMOL/L (ref 20–32)
CREAT SERPL-MCNC: 0.68 MG/DL (ref 0.5–0.99)
EST. AVERAGE GLUCOSE BLD GHB EST-MCNC: 229 (CALC)
EST. AVERAGE GLUCOSE BLD GHB EST-SCNC: 12.7 (CALC)
GLUCOSE SERPL-MCNC: 168 MG/DL (ref 65–99)
HBA1C MFR BLD: 9.6 % OF TOTAL HGB
POTASSIUM SERPL-SCNC: 4.1 MMOL/L (ref 3.5–5.3)
SL AMB EGFR AFRICAN AMERICAN: 110 ML/MIN/1.73M2
SL AMB EGFR NON AFRICAN AMERICAN: 95 ML/MIN/1.73M2
SODIUM SERPL-SCNC: 141 MMOL/L (ref 135–146)

## 2020-01-30 PROCEDURE — 3046F HEMOGLOBIN A1C LEVEL >9.0%: CPT | Performed by: FAMILY MEDICINE

## 2020-02-03 ENCOUNTER — TELEPHONE (OUTPATIENT)
Dept: FAMILY MEDICINE CLINIC | Facility: CLINIC | Age: 60
End: 2020-02-03

## 2020-02-03 NOTE — TELEPHONE ENCOUNTER
----- Message from Dory Mayorga MD sent at 2/3/2020 12:55 PM EST -----  Call patient with lab result-

## 2020-02-05 DIAGNOSIS — I10 ESSENTIAL HYPERTENSION: ICD-10-CM

## 2020-02-05 DIAGNOSIS — E11.9 TYPE 2 DIABETES MELLITUS WITHOUT COMPLICATION, WITHOUT LONG-TERM CURRENT USE OF INSULIN (HCC): ICD-10-CM

## 2020-02-05 RX ORDER — AMLODIPINE BESYLATE AND BENAZEPRIL HYDROCHLORIDE 5; 20 MG/1; MG/1
CAPSULE ORAL
Qty: 90 CAPSULE | Refills: 0 | Status: SHIPPED | OUTPATIENT
Start: 2020-02-05 | End: 2020-05-09

## 2020-02-06 ENCOUNTER — OFFICE VISIT (OUTPATIENT)
Dept: FAMILY MEDICINE CLINIC | Facility: CLINIC | Age: 60
End: 2020-02-06
Payer: COMMERCIAL

## 2020-02-06 VITALS
OXYGEN SATURATION: 96 % | TEMPERATURE: 98.2 F | HEART RATE: 70 BPM | SYSTOLIC BLOOD PRESSURE: 126 MMHG | BODY MASS INDEX: 34.49 KG/M2 | WEIGHT: 202 LBS | HEIGHT: 64 IN | DIASTOLIC BLOOD PRESSURE: 74 MMHG

## 2020-02-06 DIAGNOSIS — E53.8 B12 DEFICIENCY: ICD-10-CM

## 2020-02-06 DIAGNOSIS — E11.9 CONTROLLED TYPE 2 DIABETES MELLITUS WITHOUT COMPLICATION, WITHOUT LONG-TERM CURRENT USE OF INSULIN (HCC): Primary | ICD-10-CM

## 2020-02-06 DIAGNOSIS — I10 BENIGN ESSENTIAL HYPERTENSION: ICD-10-CM

## 2020-02-06 PROCEDURE — 3074F SYST BP LT 130 MM HG: CPT | Performed by: FAMILY MEDICINE

## 2020-02-06 PROCEDURE — 3078F DIAST BP <80 MM HG: CPT | Performed by: FAMILY MEDICINE

## 2020-02-06 PROCEDURE — 1036F TOBACCO NON-USER: CPT | Performed by: FAMILY MEDICINE

## 2020-02-06 PROCEDURE — 99214 OFFICE O/P EST MOD 30 MIN: CPT | Performed by: FAMILY MEDICINE

## 2020-02-06 PROCEDURE — 96372 THER/PROPH/DIAG INJ SC/IM: CPT | Performed by: FAMILY MEDICINE

## 2020-02-06 PROCEDURE — 2022F DILAT RTA XM EVC RTNOPTHY: CPT | Performed by: FAMILY MEDICINE

## 2020-02-06 PROCEDURE — 3008F BODY MASS INDEX DOCD: CPT | Performed by: FAMILY MEDICINE

## 2020-02-06 RX ADMIN — CYANOCOBALAMIN 1000 MCG: 1000 INJECTION INTRAMUSCULAR; SUBCUTANEOUS at 14:47

## 2020-02-06 NOTE — PATIENT INSTRUCTIONS
Increase Basaglar to 15 units daily  Send in fasting blood sugar reports in 1 week  If her fasting sugars are still greater than 140, increase your Basaglar to 18 units per day  I would check some sugars prior to evening meal several times a week  Our goal is to get her fasting sugars closer or below 120  The afternoon sugars would like to be below 150  Continue other medications  Continue to work on the diet, portion control, increasing activity as able

## 2020-02-06 NOTE — PROGRESS NOTES
[address]        NAME: Kait Saez is a 61 y o  female  : 1960    MRN: [id number]  DATE: 2020  TIME: 3:26 PM    Assessment and Plan   Controlled type 2 diabetes mellitus without complication, without long-term current use of insulin ([address] ) [E11 9]  1  Controlled type 2 diabetes mellitus without complication, without long-term current use of insulin ([facility])  insulin glargine (BASAGLAR KWIKPEN) 100 units/mL injection pen   2  B12 deficiency     3  Benign essential hypertension         No problem-specific Assessment & Plan notes found for this encounter  Patient Instructions     Patient Instructions   Increase Basaglar to 15 units daily  Send in fasting blood sugar reports in 1 week  If her fasting sugars are still greater than 140, increase your Basaglar to 18 units per day  I would check some sugars prior to evening meal several times a week  Our goal is to get her fasting sugars closer or below 120  The afternoon sugars would like to be below 150  Continue other medications  Continue to work on the diet, portion control, increasing activity as able  Chief Complaint     Chief Complaint   Patient presents with    Follow-up     3 MM/B12 shot         History of Present Illness       Medical management 3 month check-  1) diabetes-A1c is actually higher  Patient has started at insulin the we add this communicated on message to call in with sugars to adjust before visit  She still taking only 10 units Basaglar  2) hypertension-good control current medication  3) history of vitamin B12 deficiency  Review of Systems   Review of Systems   Constitutional: Negative for activity change, appetite change, diaphoresis and fatigue  Respiratory: Negative for cough, chest tightness, shortness of breath and wheezing  Cardiovascular: Negative for chest pain, palpitations and leg swelling          Fast or slow heart rate   Gastrointestinal: Negative for abdominal pain, blood in stool, constipation, diarrhea, nausea and vomiting  Genitourinary: Positive for frequency  Negative for difficulty urinating, dysuria and hematuria  Musculoskeletal: Positive for arthralgias  Negative for gait problem, joint swelling and myalgias  Neurological: Negative for dizziness, light-headedness and headaches  Psychiatric/Behavioral: Negative for agitation, confusion, dysphoric mood and sleep disturbance  The patient is not nervous/anxious            Current Medications       Current Outpatient Medications:     albuterol (2 5 mg/3 mL) 0 083 % nebulizer solution, Take 1 vial (2 5 mg total) by nebulization every 6 (six) hours as needed for wheezing or shortness of breath, Disp: 100 vial, Rfl: 0    albuterol (PROVENTIL HFA,VENTOLIN HFA) 90 mcg/act inhaler, USE 2 INHALATIONS ORALLY   EVERY 6 HOURS AS NEEDED FORWHEEZING, Disp: 25 5 g, Rfl: 0    amLODIPine-benazepril (LOTREL 5-20) 5-20 MG per capsule, TAKE 1 CAPSULE DAILY, Disp: 90 capsule, Rfl: 0    atorvastatin (LIPITOR) 40 mg tablet, TAKE 1 TABLET DAILY, Disp: 90 tablet, Rfl: 0    Ferrous Fumarate 63 (20 Fe) MG TABS, Take 65 mg by mouth daily, Disp: , Rfl:     glucose blood (ONE TOUCH ULTRA TEST) test strip, 1 each by Other route daily, Disp: 100 each, Rfl: 1    HYDROcodone-acetaminophen (NORCO) 5-325 mg per tablet, Take 5-325 tablets by mouth daily as needed, Disp: , Rfl: 0    insulin glargine (BASAGLAR KWIKPEN) 100 units/mL injection pen, Inject 18 Units under the skin daily at bedtime, Disp: 5 pen, Rfl: 1    Insulin Pen Needle (PEN NEEDLES) 31G X 5 MM MISC, by Does not apply route daily at bedtime, Disp: 30 each, Rfl: 1    JANUVIA 100 MG tablet, take 1 tablet by mouth once daily, Disp: 90 tablet, Rfl: 1    lidocaine (XYLOCAINE) 5 % ointment, Apply topically as needed for mild pain, Disp: 240 g, Rfl: 1    LYRICA 200 MG capsule, Take 200 mg by mouth 3 (three) times a day , Disp: , Rfl: 0    metFORMIN (GLUCOPHAGE) 1000 MG tablet, TAKE 1 TABLET TWICE A DAY, Disp: 180 tablet, Rfl: 0    omeprazole (PriLOSEC) 40 MG capsule, TAKE 1 CAPSULE EVERY       MORNING, Disp: 90 capsule, Rfl: 0    ondansetron (ZOFRAN) 4 mg tablet, Take 1 tablet (4 mg total) by mouth every 8 (eight) hours as needed for nausea or vomiting, Disp: 20 tablet, Rfl: 0    SYMBICORT 160-4 5 MCG/ACT inhaler, USE 2 INHALATIONS ORALLY   TWICE DAILY, RINSE MOUTH   AFTER USE, Disp: 30 6 g, Rfl: 0    traMADol HCl  MG CP24, Take 300 mg by mouth daily , Disp: 30 tablet, Rfl: 0    zolpidem (AMBIEN) 5 mg tablet, Take 1 tablet (5 mg total) by mouth daily at bedtime as needed for sleep, Disp: 30 tablet, Rfl: 2    predniSONE 20 mg tablet, 1 tab twice daily for 5 days, then 1 tab daily for 5 days   (Patient not taking: Reported on 2020), Disp: 15 tablet, Rfl: 0    Current Facility-Administered Medications:     cyanocobalamin injection 1,000 mcg, 1,000 mcg, Intramuscular, Weekly, CHITRA Alex, 1,000 mcg at 20 1447    Current Allergies     Allergies as of 2020 - Reviewed 2020   Allergen Reaction Noted    Nsaids Other (See Comments) 2017    Percocet [oxycodone-acetaminophen] GI Intolerance 2017            The following portions of the patient's history were reviewed and updated as appropriate: allergies, current medications, past family history, past medical history, past social history, past surgical history and problem list      Past Medical History:   Diagnosis Date    Anxiety     Asthma     Breast lump     Resolved: 2017     Chronic pain     back    Diabetes mellitus [facility] [address] [facility] )     History of stomach ulcers     Hyperlipidemia     Hypertension     Infectious viral hepatitis     Hepatitiss A       Past Surgical History:   Procedure Laterality Date    APPENDECTOMY      BACK SURGERY       SECTION      CHOLECYSTECTOMY      GASTRIC BYPASS      SHOULDER SURGERY         Family History   Problem Relation Age of Onset    Diabetes Mother         Mellitus    Hyperlipidemia Mother     Hypertension Mother     Colon cancer Mother     Pancreatic cancer Mother     Melanoma Mother     Cancer Father         Bladder     Alcohol abuse Father     Lung cancer Father     Prostate cancer Father     Diabetes Brother         Mellitus    Hyperlipidemia Brother     Hypertension Brother     Stomach cancer Paternal Grandmother     Lung cancer Paternal Grandfather     Diabetes Brother     Breast cancer Maternal Aunt 28    Mental illness Neg Hx          Medications have been verified  Objective   /74 (BP Location: Right arm, Patient Position: Sitting, Cuff Size: Extra-Large)   Pulse 70   Temp 98 2 °F (36 8 °C) (Tympanic)   Ht 5' 4" (1 626 m)   Wt 91 6 kg (202 lb)   LMP  (LMP Unknown)   SpO2 96%   BMI 34 67 kg/m²        Physical Exam     Physical Exam   Constitutional: She is oriented to person, place, and time  She appears well-developed and well-nourished  No distress  HENT:   Head: Normocephalic and atraumatic  Right Ear: Tympanic membrane and external ear normal  No drainage  Left Ear: Tympanic membrane normal  No drainage  Mouth/Throat: No oropharyngeal exudate  Eyes: Conjunctivae and EOM are normal  Right eye exhibits no discharge  Left eye exhibits no discharge  Neck: Normal range of motion  Neck supple  No thyromegaly present  Cardiovascular: Normal rate, regular rhythm and normal heart sounds  Pulmonary/Chest: Effort normal  No respiratory distress  She has no wheezes  She has no rales  Lymphadenopathy:     She has no cervical adenopathy  Neurological: She is alert and oriented to person, place, and time  Skin: Skin is dry  Psychiatric: She has a normal mood and affect   Her behavior is normal

## 2020-02-07 DIAGNOSIS — G47.00 INSOMNIA, UNSPECIFIED TYPE: ICD-10-CM

## 2020-02-07 RX ORDER — ZOLPIDEM TARTRATE 5 MG/1
5 TABLET ORAL
Qty: 30 TABLET | Refills: 1 | Status: SHIPPED | OUTPATIENT
Start: 2020-02-07 | End: 2020-04-03 | Stop reason: SDUPTHER

## 2020-02-09 DIAGNOSIS — E78.5 HYPERLIPIDEMIA ASSOCIATED WITH TYPE 2 DIABETES MELLITUS (HCC): ICD-10-CM

## 2020-02-09 DIAGNOSIS — E11.9 TYPE 2 DIABETES MELLITUS WITHOUT COMPLICATION, WITHOUT LONG-TERM CURRENT USE OF INSULIN (HCC): ICD-10-CM

## 2020-02-09 DIAGNOSIS — E11.69 HYPERLIPIDEMIA ASSOCIATED WITH TYPE 2 DIABETES MELLITUS (HCC): ICD-10-CM

## 2020-02-10 RX ORDER — ATORVASTATIN CALCIUM 40 MG/1
TABLET, FILM COATED ORAL
Qty: 90 TABLET | Refills: 0 | Status: SHIPPED | OUTPATIENT
Start: 2020-02-10 | End: 2020-05-12

## 2020-02-14 ENCOUNTER — TELEPHONE (OUTPATIENT)
Dept: FAMILY MEDICINE CLINIC | Facility: CLINIC | Age: 60
End: 2020-02-14

## 2020-02-14 NOTE — TELEPHONE ENCOUNTER
PC pt states she was to call in her morning sugars, and she added few evening sugars--pt states she is aware what she needs to do    Morning 151, 134, 134, 149, 199, 174, 171    Evening 214, 183, 145, 165

## 2020-02-14 NOTE — TELEPHONE ENCOUNTER
Assuming the better ones are towards the front of this list, I would still have her increase her basal glargine 18 units per day

## 2020-02-21 DIAGNOSIS — J45.909 ASTHMA, UNSPECIFIED ASTHMA SEVERITY, UNSPECIFIED WHETHER COMPLICATED, UNSPECIFIED WHETHER PERSISTENT: ICD-10-CM

## 2020-02-21 DIAGNOSIS — E11.9 TYPE 2 DIABETES MELLITUS WITHOUT COMPLICATION, WITHOUT LONG-TERM CURRENT USE OF INSULIN (HCC): ICD-10-CM

## 2020-02-24 RX ORDER — ALBUTEROL SULFATE 2.5 MG/3ML
2.5 SOLUTION RESPIRATORY (INHALATION) EVERY 6 HOURS PRN
Qty: 100 VIAL | Refills: 0 | Status: SHIPPED | OUTPATIENT
Start: 2020-02-24 | End: 2020-07-27 | Stop reason: SDUPTHER

## 2020-03-02 ENCOUNTER — OFFICE VISIT (OUTPATIENT)
Dept: FAMILY MEDICINE CLINIC | Facility: CLINIC | Age: 60
End: 2020-03-02
Payer: COMMERCIAL

## 2020-03-02 VITALS
HEIGHT: 64 IN | DIASTOLIC BLOOD PRESSURE: 84 MMHG | WEIGHT: 201 LBS | HEART RATE: 77 BPM | TEMPERATURE: 97.9 F | OXYGEN SATURATION: 95 % | BODY MASS INDEX: 34.31 KG/M2 | SYSTOLIC BLOOD PRESSURE: 152 MMHG

## 2020-03-02 DIAGNOSIS — E11.69 HYPERLIPIDEMIA ASSOCIATED WITH TYPE 2 DIABETES MELLITUS (HCC): ICD-10-CM

## 2020-03-02 DIAGNOSIS — K21.9 GERD WITHOUT ESOPHAGITIS: ICD-10-CM

## 2020-03-02 DIAGNOSIS — I10 BENIGN ESSENTIAL HYPERTENSION: ICD-10-CM

## 2020-03-02 DIAGNOSIS — E53.8 B12 DEFICIENCY: ICD-10-CM

## 2020-03-02 DIAGNOSIS — E11.9 CONTROLLED TYPE 2 DIABETES MELLITUS WITHOUT COMPLICATION, WITHOUT LONG-TERM CURRENT USE OF INSULIN (HCC): Primary | ICD-10-CM

## 2020-03-02 DIAGNOSIS — E78.5 HYPERLIPIDEMIA ASSOCIATED WITH TYPE 2 DIABETES MELLITUS (HCC): ICD-10-CM

## 2020-03-02 DIAGNOSIS — J45.30 MILD PERSISTENT ASTHMA WITHOUT COMPLICATION: ICD-10-CM

## 2020-03-02 PROBLEM — B19.9 INFECTIOUS VIRAL HEPATITIS: Status: RESOLVED | Noted: 2018-07-23 | Resolved: 2020-03-02

## 2020-03-02 PROBLEM — R74.01 ELEVATED TRANSAMINASE LEVEL: Status: RESOLVED | Noted: 2018-11-14 | Resolved: 2020-03-02

## 2020-03-02 PROCEDURE — 1036F TOBACCO NON-USER: CPT | Performed by: FAMILY MEDICINE

## 2020-03-02 PROCEDURE — 3008F BODY MASS INDEX DOCD: CPT | Performed by: FAMILY MEDICINE

## 2020-03-02 PROCEDURE — 3079F DIAST BP 80-89 MM HG: CPT | Performed by: FAMILY MEDICINE

## 2020-03-02 PROCEDURE — 99214 OFFICE O/P EST MOD 30 MIN: CPT | Performed by: FAMILY MEDICINE

## 2020-03-02 PROCEDURE — 3077F SYST BP >= 140 MM HG: CPT | Performed by: FAMILY MEDICINE

## 2020-03-02 PROCEDURE — 2022F DILAT RTA XM EVC RTNOPTHY: CPT | Performed by: FAMILY MEDICINE

## 2020-03-02 RX ORDER — LANCETS
EACH MISCELLANEOUS
COMMUNITY
Start: 2020-02-17 | End: 2021-01-19

## 2020-03-02 NOTE — PATIENT INSTRUCTIONS
Increase Basaglar to 22 units  If after 1 week the fasting sugars are still above 160 increase to 25 units in the evening  Drop off or call in fasting blood sugar readings  Weight Management   AMBULATORY CARE:   Why it is important to manage your weight:  Being overweight increases your risk of health conditions such as heart disease, high blood pressure, type 2 diabetes, and certain types of cancer  It can also increase your risk for osteoarthritis, sleep apnea, and other respiratory problems  Aim for a slow, steady weight loss  Even a small amount of weight loss can lower your risk of health problems  How to lose weight safely:  A safe and healthy way to lose weight is to eat fewer calories and get regular exercise  You can lose up about 1 pound a week by decreasing the number of calories you eat by 500 calories each day  You can decrease calories by eating smaller portion sizes or by cutting out high-calorie foods  Read labels to find out how many calories are in the foods you eat  You can also burn calories with exercise such as walking, swimming, or biking  You will be more likely to keep weight off if you make these changes part of your lifestyle  Healthy meal plan for weight management:  A healthy meal plan includes a variety of foods, contains fewer calories, and helps you stay healthy  A healthy meal plan includes the following:  · Eat whole-grain foods more often  A healthy meal plan should contain fiber  Fiber is the part of grains, fruits, and vegetables that is not broken down by your body  Whole-grain foods are healthy and provide extra fiber in your diet  Some examples of whole-grain foods are whole-wheat breads and pastas, oatmeal, brown rice, and bulgur  · Eat a variety of vegetables every day  Include dark, leafy greens such as spinach, kale, kvng greens, and mustard greens  Eat yellow and orange vegetables such as carrots, sweet potatoes, and winter squash       · Eat a variety of fruits every day  Choose fresh or canned fruit (canned in its own juice or light syrup) instead of juice  Fruit juice has very little or no fiber  · Eat low-fat dairy foods  Drink fat-free (skim) milk or 1% milk  Eat fat-free yogurt and low-fat cottage cheese  Try low-fat cheeses such as mozzarella and other reduced-fat cheeses  · Choose meat and other protein foods that are low in fat  Choose beans or other legumes such as split peas or lentils  Choose fish, skinless poultry (chicken or turkey), or lean cuts of red meat (beef or pork)  Before you cook meat or poultry, cut off any visible fat  · Use less fat and oil  Try baking foods instead of frying them  Add less fat, such as margarine, sour cream, regular salad dressing and mayonnaise to foods  Eat fewer high-fat foods  Some examples of high-fat foods include french fries, doughnuts, ice cream, and cakes  · Eat fewer sweets  Limit foods and drinks that are high in sugar  This includes candy, cookies, regular soda, and sweetened drinks  Ways to decrease calories:   · Eat smaller portions  ¨ Use a small plate with smaller servings  ¨ Do not eat second helpings  ¨ When you eat at a restaurant, ask for a box and place half of your meal in the box before you eat  ¨ Share an entrée with someone else  · Replace high-calorie snacks with healthy, low-calorie snacks  ¨ Choose fresh fruit, vegetables, fat-free rice cakes, or air-popped popcorn instead of potato chips, nuts, or chocolate  ¨ Choose water or calorie-free drinks instead of soda or sweetened drinks  · Eat regular meals  Skipping meals can lead to overeating later in the day  Eat a healthy snack in place of a meal if you do not have time to eat a regular meal      · Do not shop for groceries when you are hungry  You may be more likely to make unhealthy food choices  Take a grocery list of healthy foods and shop after you have eaten    Exercise:  Exercise at least 30 minutes per day on most days of the week  Some examples of exercise include walking, biking, dancing, and swimming  You can also fit in more physical activity by taking the stairs instead of the elevator or parking farther away from stores  Ask your healthcare provider about the best exercise plan for you  Other things to consider as you try to lose weight:   · Be aware of situations that may give you the urge to overeat, such as eating while watching television  Find ways to avoid these situations  For example, read a book, go for a walk, or do crafts  · Meet with a weight loss support group or friends who are also trying to lose weight  This may help you stay motivated to continue working on your weight loss goals  © 2017 [employer] Information is for End User's use only and may not be sold, redistributed or otherwise used for commercial purposes  All illustrations and images included in CareNotes® are the copyrighted property of [employer]  or [employer]  The above information is an  only  It is not intended as medical advice for individual conditions or treatments  Talk to your doctor, nurse or pharmacist before following any medical regimen to see if it is safe and effective for you  Heart Healthy Diet   AMBULATORY CARE:   A heart healthy diet  is an eating plan low in total fat, unhealthy fats, and sodium (salt)  A heart healthy diet helps decrease your risk for heart disease and stroke  Limit the amount of fat you eat to 25% to 35% of your total daily calories  Limit sodium to less than 2,300 mg each day  Healthy fats:  Healthy fats can help improve cholesterol levels  The risk for heart disease is decreased when cholesterol levels are normal  Choose healthy fats, such as the following:  · Unsaturated fat  is found in foods such as soybean, canola, olive, corn, and safflower oils   It is also found in soft tub margarine that is made with liquid vegetable oil  · Omega-3 fat  is found in certain fish, such as salmon, tuna, and trout, and in walnuts and flaxseed  Unhealthy fats:  Unhealthy fats can cause unhealthy cholesterol levels in your blood and increase your risk of heart disease  Limit unhealthy fats, such as the following:  · Cholesterol  is found in animal foods, such as eggs and lobster, and in dairy products made from whole milk  Limit cholesterol to less than 300 milligrams (mg) each day  You may need to limit cholesterol to 200 mg each day if you have heart disease  · Saturated fat  is found in meats, such as rosado and hamburger  It is also found in chicken or turkey skin, whole milk, and butter  Limit saturated fat to less than 7% of your total daily calories  Limit saturated fat to less than 6% if you have heart disease or are at increased risk for it  · Trans fat  is found in packaged foods, such as potato chips and cookies  It is also in hard margarine, some fried foods, and shortening  Avoid trans fats as much as possible    Heart healthy foods and drinks to include:  Ask your dietitian or healthcare provider how many servings to have from each of the following food groups:  · Grains:      ¨ Whole-wheat breads, cereals, and pastas, and brown rice    ¨ Low-fat, low-sodium crackers and chips    · Vegetables:      ¨ Broccoli, green beans, green peas, and spinach    ¨ Collards, kale, and lima beans    ¨ Carrots, sweet potatoes, tomatoes, and peppers    ¨ Canned vegetables with no salt added    · Fruits:      ¨ Bananas, peaches, pears, and pineapple    ¨ Grapes, raisins, and dates    ¨ Oranges, tangerines, grapefruit, orange juice, and grapefruit juice    ¨ Apricots, mangoes, melons, and papaya    ¨ Raspberries and strawberries    ¨ Canned fruit with no added sugar    · Low-fat dairy products:      ¨ Nonfat (skim) milk, 1% milk, and low-fat almond, cashew, or soy milks fortified with calcium    ¨ Low-fat cheese, regular or frozen yogurt, and cottage cheese    · Meats and proteins , such as lean cuts of beef and pork (loin, leg, round), skinless chicken and turkey, legumes, soy products, egg whites, and nuts  Foods and drinks to limit or avoid:  Ask your dietitian or healthcare provider about these and other foods that are high in unhealthy fat, sodium, and sugar:  · Snack or packaged foods , such as frozen dinners, cookies, macaroni and cheese, and cereals with more than 300 mg of sodium per serving    · Canned or dry mixes  for cakes, soups, sauces, or gravies    · Vegetables with added sodium , such as instant potatoes, vegetables with added sauces, or regular canned vegetables    · Other foods high in sodium , such as ketchup, barbecue sauce, salad dressing, pickles, olives, soy sauce, and miso    · High-fat dairy foods  such as whole or 2% milk, cream cheese, or sour cream, and cheeses     · High-fat protein foods  such as high-fat cuts of beef (T-bone steaks, ribs), chicken or turkey with skin, and organ meats, such as liver    · Cured or smoked meats , such as hot dogs, rosado, and sausage    · Unhealthy fats and oils , such as butter, stick margarine, shortening, and cooking oils such as coconut or palm oil    · Food and drinks high in sugar , such as soft drinks (soda), sports drinks, sweetened tea, candy, cake, cookies, pies, and doughnuts  Other diet guidelines to follow:   · Eat more foods containing omega-3 fats  Eat fish high in omega-3 fats at least 2 times a week  · Limit alcohol  Too much alcohol can damage your heart and raise your blood pressure  Women should limit alcohol to 1 drink a day  Men should limit alcohol to 2 drinks a day  A drink of alcohol is 12 ounces of beer, 5 ounces of wine, or 1½ ounces of liquor  · Choose low-sodium foods  High-sodium foods can lead to high blood pressure  Add little or no salt to food you prepare  Use herbs and spices in place of salt      · Eat more fiber  to help lower cholesterol levels  Eat at least 5 servings of fruits and vegetables each day  Eat 3 ounces of whole-grain foods each day  Legumes (beans) are also a good source of fiber  Lifestyle guidelines:   · Do not smoke  Nicotine and other chemicals in cigarettes and cigars can cause lung and heart damage  Ask your healthcare provider for information if you currently smoke and need help to quit  E-cigarettes or smokeless tobacco still contain nicotine  Talk to your healthcare provider before you use these products  · Exercise regularly  to help you maintain a healthy weight and improve your blood pressure and cholesterol levels  Ask your healthcare provider about the best exercise plan for you  Do not start an exercise program without asking your healthcare provider  Follow up with your healthcare provider as directed:  Write down your questions so you remember to ask them during your visits  © 2017 [employer]  Information is for End User's use only and may not be sold, redistributed or otherwise used for commercial purposes  All illustrations and images included in CareNotes® are the copyrighted property of [employer]  or [employer]  The above information is an  only  It is not intended as medical advice for individual conditions or treatments  Talk to your doctor, nurse or pharmacist before following any medical regimen to see if it is safe and effective for you

## 2020-03-02 NOTE — PROGRESS NOTES
[address]        NAME: Kat Sousa is a 61 y o  female  : 1960    MRN: [id number]  DATE: 2020  TIME: 3:40 PM    Assessment and Plan   Controlled type 2 diabetes mellitus without complication, without long-term current use of insulin ([address] ) [E11 9]  1  Controlled type 2 diabetes mellitus without complication, without long-term current use of insulin (HCC)  Hemoglobin A1C With EAG    Comprehensive metabolic panel    Lipid Panel with Direct LDL reflex    Microalbumin, Random Urine (W/Creatinine)    Hemoglobin A1C With EAG    Comprehensive metabolic panel    Lipid Panel with Direct LDL reflex    Microalbumin, Random Urine (W/Creatinine)   2  Mild persistent asthma without complication     3  Benign essential hypertension  Comprehensive metabolic panel    Comprehensive metabolic panel   4  BMI 34 0-34 9,adult     5  Hyperlipidemia associated with type 2 diabetes mellitus (HCC)  Comprehensive metabolic panel    Lipid Panel with Direct LDL reflex    Comprehensive metabolic panel    Lipid Panel with Direct LDL reflex   6  B12 deficiency  Methylmalonic acid, serum    Methylmalonic acid, serum   7  GERD without esophagitis         No problem-specific Assessment & Plan notes found for this encounter  Patient Instructions     Patient Instructions   Increase Basaglar to 22 units  If after 1 week the fasting sugars are still above 160 increase to 25 units in the evening  Drop off or call in fasting blood sugar readings  Weight Management   AMBULATORY CARE:   Why it is important to manage your weight:  Being overweight increases your risk of health conditions such as heart disease, high blood pressure, type 2 diabetes, and certain types of cancer  It can also increase your risk for osteoarthritis, sleep apnea, and other respiratory problems  Aim for a slow, steady weight loss  Even a small amount of weight loss can lower your risk of health problems    How to lose weight safely:  A safe and healthy way to lose weight is to eat fewer calories and get regular exercise  You can lose up about 1 pound a week by decreasing the number of calories you eat by 500 calories each day  You can decrease calories by eating smaller portion sizes or by cutting out high-calorie foods  Read labels to find out how many calories are in the foods you eat  You can also burn calories with exercise such as walking, swimming, or biking  You will be more likely to keep weight off if you make these changes part of your lifestyle  Healthy meal plan for weight management:  A healthy meal plan includes a variety of foods, contains fewer calories, and helps you stay healthy  A healthy meal plan includes the following:  · Eat whole-grain foods more often  A healthy meal plan should contain fiber  Fiber is the part of grains, fruits, and vegetables that is not broken down by your body  Whole-grain foods are healthy and provide extra fiber in your diet  Some examples of whole-grain foods are whole-wheat breads and pastas, oatmeal, brown rice, and bulgur  · Eat a variety of vegetables every day  Include dark, leafy greens such as spinach, kale, kvng greens, and mustard greens  Eat yellow and orange vegetables such as carrots, sweet potatoes, and winter squash  · Eat a variety of fruits every day  Choose fresh or canned fruit (canned in its own juice or light syrup) instead of juice  Fruit juice has very little or no fiber  · Eat low-fat dairy foods  Drink fat-free (skim) milk or 1% milk  Eat fat-free yogurt and low-fat cottage cheese  Try low-fat cheeses such as mozzarella and other reduced-fat cheeses  · Choose meat and other protein foods that are low in fat  Choose beans or other legumes such as split peas or lentils  Choose fish, skinless poultry (chicken or turkey), or lean cuts of red meat (beef or pork)  Before you cook meat or poultry, cut off any visible fat       · Use less fat and oil  Try baking foods instead of frying them  Add less fat, such as margarine, sour cream, regular salad dressing and mayonnaise to foods  Eat fewer high-fat foods  Some examples of high-fat foods include french fries, doughnuts, ice cream, and cakes  · Eat fewer sweets  Limit foods and drinks that are high in sugar  This includes candy, cookies, regular soda, and sweetened drinks  Ways to decrease calories:   · Eat smaller portions  ¨ Use a small plate with smaller servings  ¨ Do not eat second helpings  ¨ When you eat at a restaurant, ask for a box and place half of your meal in the box before you eat  ¨ Share an entrée with someone else  · Replace high-calorie snacks with healthy, low-calorie snacks  ¨ Choose fresh fruit, vegetables, fat-free rice cakes, or air-popped popcorn instead of potato chips, nuts, or chocolate  ¨ Choose water or calorie-free drinks instead of soda or sweetened drinks  · Eat regular meals  Skipping meals can lead to overeating later in the day  Eat a healthy snack in place of a meal if you do not have time to eat a regular meal      · Do not shop for groceries when you are hungry  You may be more likely to make unhealthy food choices  Take a grocery list of healthy foods and shop after you have eaten  Exercise:  Exercise at least 30 minutes per day on most days of the week  Some examples of exercise include walking, biking, dancing, and swimming  You can also fit in more physical activity by taking the stairs instead of the elevator or parking farther away from stores  Ask your healthcare provider about the best exercise plan for you  Other things to consider as you try to lose weight:   · Be aware of situations that may give you the urge to overeat, such as eating while watching television  Find ways to avoid these situations  For example, read a book, go for a walk, or do crafts      · Meet with a weight loss support group or friends who are also trying to lose weight  This may help you stay motivated to continue working on your weight loss goals  © 2017 [employer] Information is for End User's use only and may not be sold, redistributed or otherwise used for commercial purposes  All illustrations and images included in CareNotes® are the copyrighted property of [employer]  or [employer] Know[employer]  The above information is an  only  It is not intended as medical advice for individual conditions or treatments  Talk to your doctor, nurse or pharmacist before following any medical regimen to see if it is safe and effective for you  Heart Healthy Diet   AMBULATORY CARE:   A heart healthy diet  is an eating plan low in total fat, unhealthy fats, and sodium (salt)  A heart healthy diet helps decrease your risk for heart disease and stroke  Limit the amount of fat you eat to 25% to 35% of your total daily calories  Limit sodium to less than 2,300 mg each day  Healthy fats:  Healthy fats can help improve cholesterol levels  The risk for heart disease is decreased when cholesterol levels are normal  Choose healthy fats, such as the following:  · Unsaturated fat  is found in foods such as soybean, canola, olive, corn, and safflower oils  It is also found in soft tub margarine that is made with liquid vegetable oil  · Omega-3 fat  is found in certain fish, such as salmon, tuna, and trout, and in walnuts and flaxseed  Unhealthy fats:  Unhealthy fats can cause unhealthy cholesterol levels in your blood and increase your risk of heart disease  Limit unhealthy fats, such as the following:  · Cholesterol  is found in animal foods, such as eggs and lobster, and in dairy products made from whole milk  Limit cholesterol to less than 300 milligrams (mg) each day  You may need to limit cholesterol to 200 mg each day if you have heart disease  · Saturated fat  is found in meats, such as rosado and hamburger   It is also found in chicken or turkey skin, whole milk, and butter  Limit saturated fat to less than 7% of your total daily calories  Limit saturated fat to less than 6% if you have heart disease or are at increased risk for it  · Trans fat  is found in packaged foods, such as potato chips and cookies  It is also in hard margarine, some fried foods, and shortening  Avoid trans fats as much as possible    Heart healthy foods and drinks to include:  Ask your dietitian or healthcare provider how many servings to have from each of the following food groups:  · Grains:      ¨ Whole-wheat breads, cereals, and pastas, and brown rice    ¨ Low-fat, low-sodium crackers and chips    · Vegetables:      ¨ Broccoli, green beans, green peas, and spinach    ¨ Collards, kale, and lima beans    ¨ Carrots, sweet potatoes, tomatoes, and peppers    ¨ Canned vegetables with no salt added    · Fruits:      ¨ Bananas, peaches, pears, and pineapple    ¨ Grapes, raisins, and dates    ¨ Oranges, tangerines, grapefruit, orange juice, and grapefruit juice    ¨ Apricots, mangoes, melons, and papaya    ¨ Raspberries and strawberries    ¨ Canned fruit with no added sugar    · Low-fat dairy products:      ¨ Nonfat (skim) milk, 1% milk, and low-fat almond, cashew, or soy milks fortified with calcium    ¨ Low-fat cheese, regular or frozen yogurt, and cottage cheese    · Meats and proteins , such as lean cuts of beef and pork (loin, leg, round), skinless chicken and turkey, legumes, soy products, egg whites, and nuts  Foods and drinks to limit or avoid:  Ask your dietitian or healthcare provider about these and other foods that are high in unhealthy fat, sodium, and sugar:  · Snack or packaged foods , such as frozen dinners, cookies, macaroni and cheese, and cereals with more than 300 mg of sodium per serving    · Canned or dry mixes  for cakes, soups, sauces, or gravies    · Vegetables with added sodium , such as instant potatoes, vegetables with added sauces, or regular canned vegetables    · Other foods high in sodium , such as ketchup, barbecue sauce, salad dressing, pickles, olives, soy sauce, and miso    · High-fat dairy foods  such as whole or 2% milk, cream cheese, or sour cream, and cheeses     · High-fat protein foods  such as high-fat cuts of beef (T-bone steaks, ribs), chicken or turkey with skin, and organ meats, such as liver    · Cured or smoked meats , such as hot dogs, rosado, and sausage    · Unhealthy fats and oils , such as butter, stick margarine, shortening, and cooking oils such as coconut or palm oil    · Food and drinks high in sugar , such as soft drinks (soda), sports drinks, sweetened tea, candy, cake, cookies, pies, and doughnuts  Other diet guidelines to follow:   · Eat more foods containing omega-3 fats  Eat fish high in omega-3 fats at least 2 times a week  · Limit alcohol  Too much alcohol can damage your heart and raise your blood pressure  Women should limit alcohol to 1 drink a day  Men should limit alcohol to 2 drinks a day  A drink of alcohol is 12 ounces of beer, 5 ounces of wine, or 1½ ounces of liquor  · Choose low-sodium foods  High-sodium foods can lead to high blood pressure  Add little or no salt to food you prepare  Use herbs and spices in place of salt  · Eat more fiber  to help lower cholesterol levels  Eat at least 5 servings of fruits and vegetables each day  Eat 3 ounces of whole-grain foods each day  Legumes (beans) are also a good source of fiber  Lifestyle guidelines:   · Do not smoke  Nicotine and other chemicals in cigarettes and cigars can cause lung and heart damage  Ask your healthcare provider for information if you currently smoke and need help to quit  E-cigarettes or smokeless tobacco still contain nicotine  Talk to your healthcare provider before you use these products  · Exercise regularly  to help you maintain a healthy weight and improve your blood pressure and cholesterol levels  Ask your healthcare provider about the best exercise plan for you  Do not start an exercise program without asking your healthcare provider  Follow up with your healthcare provider as directed:  Write down your questions so you remember to ask them during your visits  © 2017 [employer] Information is for End User's use only and may not be sold, redistributed or otherwise used for commercial purposes  All illustrations and images included in CareNotes® are the copyrighted property of [employer]  or [employer] Know[employer]  The above information is an  only  It is not intended as medical advice for individual conditions or treatments  Talk to your doctor, nurse or pharmacist before following any medical regimen to see if it is safe and effective for you  Chief Complaint     Chief Complaint   Patient presents with    Follow-up         History of Present Illness       Patient here to follow-up on diabetes  A1c end of January had been 9 6  We had increased her basal insulin to 18 units  Sugars are generally still around 200 fasting  No low sugars are reported  She also will get closer to 300 before her evening meal   Patient also follow-up on her asthma  She had a bad reflux episode where she may have aspirated  She has been using the nebulizer in feels significantly better  No current fevers  GERD symptoms are also better controlled  Hypertension-slightly elevated today  Patient has not been monitoring at home  Patient due labs to monitor B12 deficiency  Continues with shots monthly  Review of Systems   Review of Systems   Constitutional: Negative for appetite change, chills, diaphoresis and fever  HENT: Negative for congestion, ear pain, rhinorrhea, sinus pressure and sore throat  Eyes: Negative for discharge, redness and itching  Respiratory: Positive for cough  Negative for shortness of breath and wheezing      Cardiovascular: Negative for chest pain and palpitations  Rapid or slow heart rate   Gastrointestinal: Negative for abdominal pain, diarrhea, nausea and vomiting           Current Medications       Current Outpatient Medications:     Accu-Chek FastClix Lancets MISC, , Disp: , Rfl:     albuterol (2 5 mg/3 mL) 0 083 % nebulizer solution, Take 1 vial (2 5 mg total) by nebulization every 6 (six) hours as needed for wheezing or shortness of breath, Disp: 100 vial, Rfl: 0    albuterol (PROVENTIL HFA,VENTOLIN HFA) 90 mcg/act inhaler, USE 2 INHALATIONS ORALLY   EVERY 6 HOURS AS NEEDED FORWHEEZING, Disp: 25 5 g, Rfl: 0    amLODIPine-benazepril (LOTREL 5-20) 5-20 MG per capsule, TAKE 1 CAPSULE DAILY, Disp: 90 capsule, Rfl: 0    atorvastatin (LIPITOR) 40 mg tablet, TAKE 1 TABLET DAILY, Disp: 90 tablet, Rfl: 0    Ferrous Fumarate 63 (20 Fe) MG TABS, Take 65 mg by mouth daily, Disp: , Rfl:     glucose blood (ONE TOUCH ULTRA TEST) test strip, 1 each by Other route daily, Disp: 100 each, Rfl: 0    HYDROcodone-acetaminophen (NORCO) 5-325 mg per tablet, Take 5-325 tablets by mouth daily as needed, Disp: , Rfl: 0    insulin glargine (BASAGLAR KWIKPEN) 100 units/mL injection pen, Inject 18 Units under the skin daily at bedtime, Disp: 5 pen, Rfl: 1    Insulin Pen Needle (PEN NEEDLES) 31G X 5 MM MISC, by Does not apply route daily at bedtime, Disp: 30 each, Rfl: 1    lidocaine (XYLOCAINE) 5 % ointment, Apply topically as needed for mild pain, Disp: 240 g, Rfl: 1    LYRICA 200 MG capsule, Take 200 mg by mouth 3 (three) times a day , Disp: , Rfl: 0    metFORMIN (GLUCOPHAGE) 1000 MG tablet, TAKE 1 TABLET TWICE A DAY, Disp: 180 tablet, Rfl: 0    omeprazole (PriLOSEC) 40 MG capsule, TAKE 1 CAPSULE EVERY       MORNING, Disp: 90 capsule, Rfl: 0    ondansetron (ZOFRAN) 4 mg tablet, Take 1 tablet (4 mg total) by mouth every 8 (eight) hours as needed for nausea or vomiting, Disp: 20 tablet, Rfl: 0    sitaGLIPtin (Januvia) 100 mg tablet, Take 1 tablet (100 mg total) by mouth daily, Disp: 90 tablet, Rfl: 0    SYMBICORT 160-4 5 MCG/ACT inhaler, USE 2 INHALATIONS ORALLY   TWICE DAILY, RINSE MOUTH   AFTER USE, Disp: 30 6 g, Rfl: 0    traMADol HCl  MG CP24, Take 300 mg by mouth daily , Disp: 30 tablet, Rfl: 0    zolpidem (AMBIEN) 5 mg tablet, Take 1 tablet (5 mg total) by mouth daily at bedtime as needed for sleep, Disp: 30 tablet, Rfl: 1    Current Facility-Administered Medications:     cyanocobalamin injection 1,000 mcg, 1,000 mcg, Intramuscular, Weekly, CHITRA Ingram, 1,000 mcg at 20 1447    Current Allergies     Allergies as of 2020 - Reviewed 2020   Allergen Reaction Noted    Nsaids Other (See Comments) 2017    Percocet [oxycodone-acetaminophen] GI Intolerance 2017            The following portions of the patient's history were reviewed and updated as appropriate: allergies, current medications, past family history, past medical history, past social history, past surgical history and problem list      Past Medical History:   Diagnosis Date    Anxiety     Asthma     Breast lump     Resolved: 2017     Chronic pain     back    Diabetes mellitus ([facility] [address] )     History of stomach ulcers     Hyperlipidemia     Hypertension     Infectious viral hepatitis     Hepatitiss A       Past Surgical History:   Procedure Laterality Date    APPENDECTOMY      BACK SURGERY       SECTION      CHOLECYSTECTOMY      GASTRIC BYPASS      SHOULDER SURGERY         Family History   Problem Relation Age of Onset    Diabetes Mother         Mellitus    Hyperlipidemia Mother     Hypertension Mother     Colon cancer Mother     Pancreatic cancer Mother     Melanoma Mother     Cancer Father         Bladder     Alcohol abuse Father     Lung cancer Father     Prostate cancer Father     Diabetes Brother         Mellitus    Hyperlipidemia Brother     Hypertension Brother     Stomach cancer Paternal Grandmother     Lung cancer Paternal Grandfather     Diabetes Brother     Breast cancer Maternal Aunt 28    Mental illness Neg Hx          Medications have been verified  Objective   /84 (BP Location: Right arm, Patient Position: Sitting, Cuff Size: Large)   Pulse 77   Temp 97 9 °F (36 6 °C) (Tympanic)   Ht 5' 4" (1 626 m)   Wt 91 2 kg (201 lb)   LMP  (LMP Unknown)   SpO2 95%   BMI 34 50 kg/m²        Physical Exam     Physical Exam   Constitutional: She is oriented to person, place, and time  She appears well-developed and well-nourished  No distress  HENT:   Head: Normocephalic and atraumatic  Right Ear: Tympanic membrane and external ear normal  No drainage  Left Ear: Tympanic membrane normal  No drainage  Mouth/Throat: No oropharyngeal exudate  Eyes: Conjunctivae and EOM are normal  Right eye exhibits no discharge  Left eye exhibits no discharge  Neck: Normal range of motion  Neck supple  No thyromegaly present  Cardiovascular: Normal rate, regular rhythm and normal heart sounds  Pulmonary/Chest: Effort normal  No respiratory distress  She has no wheezes  She has no rales  Musculoskeletal: Normal range of motion  She exhibits no edema  Lymphadenopathy:     She has no cervical adenopathy  Neurological: She is alert and oriented to person, place, and time  Psychiatric: She has a normal mood and affect  Her behavior is normal    Vitals reviewed  BMI Counseling: Body mass index is 34 5 kg/m²  The BMI is above normal  Nutrition recommendations include reducing portion sizes, decreasing overall calorie intake and 3-5 servings of fruits/vegetables daily

## 2020-03-12 DIAGNOSIS — J45.30 MILD PERSISTENT ASTHMA, UNSPECIFIED WHETHER COMPLICATED: ICD-10-CM

## 2020-03-14 ENCOUNTER — NURSE TRIAGE (OUTPATIENT)
Dept: OTHER | Facility: OTHER | Age: 60
End: 2020-03-14

## 2020-03-14 NOTE — TELEPHONE ENCOUNTER
Regarding: coronavirus  ----- Message from Jeannette Miranda sent at 3/14/2020  2:58 PM EDT -----  "I have a sore throat , ache and weakness   I do have a cough as well "

## 2020-03-14 NOTE — TELEPHONE ENCOUNTER
Recent travel: Denies    + exposure contact: Denies    Place of employment/student:     Denies: cough, sob, fever      Reason for Disposition   [1] No COVID-19 EXPOSURE BUT [2] questions about    Protocols used: CORONAVIRUS (COVID-19) EXPOSURE-ADULT-AH

## 2020-03-16 ENCOUNTER — PATIENT MESSAGE (OUTPATIENT)
Dept: FAMILY MEDICINE CLINIC | Facility: CLINIC | Age: 60
End: 2020-03-16

## 2020-03-16 RX ORDER — ALBUTEROL SULFATE 90 UG/1
AEROSOL, METERED RESPIRATORY (INHALATION)
Qty: 25.5 G | Refills: 0 | Status: SHIPPED | OUTPATIENT
Start: 2020-03-16 | End: 2020-05-28 | Stop reason: SDUPTHER

## 2020-03-30 ENCOUNTER — TELEPHONE (OUTPATIENT)
Dept: FAMILY MEDICINE CLINIC | Facility: CLINIC | Age: 60
End: 2020-03-30

## 2020-04-02 ENCOUNTER — PATIENT MESSAGE (OUTPATIENT)
Dept: FAMILY MEDICINE CLINIC | Facility: CLINIC | Age: 60
End: 2020-04-02

## 2020-04-03 ENCOUNTER — DOCUMENTATION (OUTPATIENT)
Dept: FAMILY MEDICINE CLINIC | Facility: CLINIC | Age: 60
End: 2020-04-03

## 2020-04-03 ENCOUNTER — TELEPHONE (OUTPATIENT)
Dept: FAMILY MEDICINE CLINIC | Facility: CLINIC | Age: 60
End: 2020-04-03

## 2020-04-03 DIAGNOSIS — G47.00 INSOMNIA, UNSPECIFIED TYPE: ICD-10-CM

## 2020-04-04 RX ORDER — ZOLPIDEM TARTRATE 5 MG/1
5 TABLET ORAL
Qty: 30 TABLET | Refills: 1 | Status: SHIPPED | OUTPATIENT
Start: 2020-04-04 | End: 2020-05-29 | Stop reason: SDUPTHER

## 2020-04-06 DIAGNOSIS — K21.00 GASTROESOPHAGEAL REFLUX DISEASE WITH ESOPHAGITIS: ICD-10-CM

## 2020-04-06 RX ORDER — OMEPRAZOLE 40 MG/1
CAPSULE, DELAYED RELEASE ORAL
Qty: 90 CAPSULE | Refills: 0 | Status: SHIPPED | OUTPATIENT
Start: 2020-04-06 | End: 2020-07-06

## 2020-04-07 DIAGNOSIS — E11.9 CONTROLLED TYPE 2 DIABETES MELLITUS WITHOUT COMPLICATION, WITHOUT LONG-TERM CURRENT USE OF INSULIN (HCC): ICD-10-CM

## 2020-04-08 RX ORDER — PEN NEEDLE, DIABETIC 31 GX5/16"
NEEDLE, DISPOSABLE MISCELLANEOUS
Qty: 100 EACH | Refills: 1 | Status: SHIPPED | OUTPATIENT
Start: 2020-04-08 | End: 2020-12-02 | Stop reason: SDUPTHER

## 2020-04-09 ENCOUNTER — TELEPHONE (OUTPATIENT)
Dept: FAMILY MEDICINE CLINIC | Facility: CLINIC | Age: 60
End: 2020-04-09

## 2020-04-13 ENCOUNTER — TELEPHONE (OUTPATIENT)
Dept: FAMILY MEDICINE CLINIC | Facility: CLINIC | Age: 60
End: 2020-04-13

## 2020-04-13 DIAGNOSIS — E11.69 HYPERLIPIDEMIA ASSOCIATED WITH TYPE 2 DIABETES MELLITUS (HCC): ICD-10-CM

## 2020-04-13 DIAGNOSIS — E11.9 TYPE 2 DIABETES MELLITUS WITHOUT COMPLICATION, WITHOUT LONG-TERM CURRENT USE OF INSULIN (HCC): ICD-10-CM

## 2020-04-13 DIAGNOSIS — E78.5 HYPERLIPIDEMIA ASSOCIATED WITH TYPE 2 DIABETES MELLITUS (HCC): ICD-10-CM

## 2020-04-13 DIAGNOSIS — I10 ESSENTIAL HYPERTENSION: ICD-10-CM

## 2020-04-13 DIAGNOSIS — E11.9 CONTROLLED TYPE 2 DIABETES MELLITUS WITHOUT COMPLICATION, WITHOUT LONG-TERM CURRENT USE OF INSULIN (HCC): ICD-10-CM

## 2020-04-13 RX ORDER — AMLODIPINE BESYLATE AND BENAZEPRIL HYDROCHLORIDE 5; 20 MG/1; MG/1
1 CAPSULE ORAL DAILY
Qty: 90 CAPSULE | Refills: 0 | Status: CANCELLED | OUTPATIENT
Start: 2020-04-28

## 2020-04-13 RX ORDER — ATORVASTATIN CALCIUM 40 MG/1
40 TABLET, FILM COATED ORAL DAILY
Qty: 90 TABLET | Refills: 0 | Status: CANCELLED | OUTPATIENT
Start: 2020-05-01

## 2020-04-24 ENCOUNTER — TELEMEDICINE (OUTPATIENT)
Dept: FAMILY MEDICINE CLINIC | Facility: CLINIC | Age: 60
End: 2020-04-24
Payer: COMMERCIAL

## 2020-04-24 DIAGNOSIS — J45.31 MILD PERSISTENT ASTHMA WITH ACUTE EXACERBATION: Primary | ICD-10-CM

## 2020-04-24 PROCEDURE — 99213 OFFICE O/P EST LOW 20 MIN: CPT | Performed by: FAMILY MEDICINE

## 2020-04-24 RX ORDER — PREDNISONE 20 MG/1
TABLET ORAL
Qty: 15 TABLET | Refills: 0 | Status: SHIPPED | OUTPATIENT
Start: 2020-04-24 | End: 2020-06-09 | Stop reason: ALTCHOICE

## 2020-05-06 ENCOUNTER — TELEMEDICINE (OUTPATIENT)
Dept: FAMILY MEDICINE CLINIC | Facility: CLINIC | Age: 60
End: 2020-05-06
Payer: COMMERCIAL

## 2020-05-06 VITALS
OXYGEN SATURATION: 94 % | HEIGHT: 64 IN | HEART RATE: 11 BPM | WEIGHT: 200 LBS | SYSTOLIC BLOOD PRESSURE: 120 MMHG | BODY MASS INDEX: 34.15 KG/M2 | DIASTOLIC BLOOD PRESSURE: 72 MMHG

## 2020-05-06 DIAGNOSIS — Z20.828 EXPOSURE TO SARS-ASSOCIATED CORONAVIRUS: Primary | ICD-10-CM

## 2020-05-06 DIAGNOSIS — Z20.828 EXPOSURE TO SARS-ASSOCIATED CORONAVIRUS: ICD-10-CM

## 2020-05-06 DIAGNOSIS — J06.9 VIRAL UPPER RESPIRATORY TRACT INFECTION: ICD-10-CM

## 2020-05-06 DIAGNOSIS — J45.30 MILD PERSISTENT ASTHMA WITHOUT COMPLICATION: ICD-10-CM

## 2020-05-06 PROCEDURE — 99214 OFFICE O/P EST MOD 30 MIN: CPT | Performed by: FAMILY MEDICINE

## 2020-05-06 PROCEDURE — U0003 INFECTIOUS AGENT DETECTION BY NUCLEIC ACID (DNA OR RNA); SEVERE ACUTE RESPIRATORY SYNDROME CORONAVIRUS 2 (SARS-COV-2) (CORONAVIRUS DISEASE [COVID-19]), AMPLIFIED PROBE TECHNIQUE, MAKING USE OF HIGH THROUGHPUT TECHNOLOGIES AS DESCRIBED BY CMS-2020-01-R: HCPCS

## 2020-05-07 ENCOUNTER — TELEPHONE (OUTPATIENT)
Dept: FAMILY MEDICINE CLINIC | Facility: CLINIC | Age: 60
End: 2020-05-07

## 2020-05-07 LAB — SARS-COV-2 RNA SPEC QL NAA+PROBE: NOT DETECTED

## 2020-05-09 DIAGNOSIS — I10 ESSENTIAL HYPERTENSION: ICD-10-CM

## 2020-05-09 DIAGNOSIS — E11.9 TYPE 2 DIABETES MELLITUS WITHOUT COMPLICATION, WITHOUT LONG-TERM CURRENT USE OF INSULIN (HCC): ICD-10-CM

## 2020-05-09 RX ORDER — AMLODIPINE BESYLATE AND BENAZEPRIL HYDROCHLORIDE 5; 20 MG/1; MG/1
CAPSULE ORAL
Qty: 90 CAPSULE | Refills: 0 | Status: SHIPPED | OUTPATIENT
Start: 2020-05-09 | End: 2020-08-05

## 2020-05-12 DIAGNOSIS — E11.69 HYPERLIPIDEMIA ASSOCIATED WITH TYPE 2 DIABETES MELLITUS (HCC): ICD-10-CM

## 2020-05-12 DIAGNOSIS — E78.5 HYPERLIPIDEMIA ASSOCIATED WITH TYPE 2 DIABETES MELLITUS (HCC): ICD-10-CM

## 2020-05-12 RX ORDER — ATORVASTATIN CALCIUM 40 MG/1
TABLET, FILM COATED ORAL
Qty: 90 TABLET | Refills: 0 | Status: SHIPPED | OUTPATIENT
Start: 2020-05-12 | End: 2020-08-05

## 2020-05-20 DIAGNOSIS — E11.9 CONTROLLED TYPE 2 DIABETES MELLITUS WITHOUT COMPLICATION, WITHOUT LONG-TERM CURRENT USE OF INSULIN (HCC): ICD-10-CM

## 2020-05-26 DIAGNOSIS — J45.30 MILD PERSISTENT ASTHMA WITHOUT COMPLICATION: ICD-10-CM

## 2020-05-26 RX ORDER — BUDESONIDE AND FORMOTEROL FUMARATE DIHYDRATE 160; 4.5 UG/1; UG/1
AEROSOL RESPIRATORY (INHALATION)
Qty: 30.6 G | Refills: 0 | Status: SHIPPED | OUTPATIENT
Start: 2020-05-26 | End: 2020-08-05

## 2020-05-28 DIAGNOSIS — J45.30 MILD PERSISTENT ASTHMA, UNSPECIFIED WHETHER COMPLICATED: ICD-10-CM

## 2020-05-28 RX ORDER — ALBUTEROL SULFATE 90 UG/1
AEROSOL, METERED RESPIRATORY (INHALATION)
Qty: 25.5 G | Refills: 3 | Status: SHIPPED | OUTPATIENT
Start: 2020-05-28 | End: 2020-12-14 | Stop reason: SDUPTHER

## 2020-05-29 DIAGNOSIS — E11.9 TYPE 2 DIABETES MELLITUS WITHOUT COMPLICATION, WITHOUT LONG-TERM CURRENT USE OF INSULIN (HCC): ICD-10-CM

## 2020-05-29 DIAGNOSIS — G47.00 INSOMNIA, UNSPECIFIED TYPE: ICD-10-CM

## 2020-05-29 LAB
ALBUMIN/CREAT UR: 7 MCG/MG CREAT
CREAT UR-MCNC: 103 MG/DL (ref 20–275)
MICROALBUMIN UR-MCNC: 0.7 MG/DL

## 2020-05-29 PROCEDURE — 3061F NEG MICROALBUMINURIA REV: CPT | Performed by: FAMILY MEDICINE

## 2020-06-01 ENCOUNTER — TELEPHONE (OUTPATIENT)
Dept: FAMILY MEDICINE CLINIC | Facility: CLINIC | Age: 60
End: 2020-06-01

## 2020-06-01 RX ORDER — ZOLPIDEM TARTRATE 5 MG/1
5 TABLET ORAL
Qty: 30 TABLET | Refills: 0 | Status: SHIPPED | OUTPATIENT
Start: 2020-06-01 | End: 2020-07-02 | Stop reason: SDUPTHER

## 2020-06-02 LAB
ALBUMIN SERPL-MCNC: 3.9 G/DL (ref 3.6–5.1)
ALBUMIN/GLOB SERPL: 1.6 (CALC) (ref 1–2.5)
ALP SERPL-CCNC: 67 U/L (ref 37–153)
ALT SERPL-CCNC: 27 U/L (ref 6–29)
AST SERPL-CCNC: 24 U/L (ref 10–35)
BILIRUB SERPL-MCNC: 0.5 MG/DL (ref 0.2–1.2)
BUN SERPL-MCNC: 20 MG/DL (ref 7–25)
BUN/CREAT SERPL: ABNORMAL (CALC) (ref 6–22)
CALCIUM SERPL-MCNC: 9.5 MG/DL (ref 8.6–10.4)
CHLORIDE SERPL-SCNC: 105 MMOL/L (ref 98–110)
CHOLEST SERPL-MCNC: 124 MG/DL
CHOLEST/HDLC SERPL: 4.3 (CALC)
CO2 SERPL-SCNC: 26 MMOL/L (ref 20–32)
CREAT SERPL-MCNC: 0.61 MG/DL (ref 0.5–0.99)
EST. AVERAGE GLUCOSE BLD GHB EST-MCNC: 180 (CALC)
EST. AVERAGE GLUCOSE BLD GHB EST-SCNC: 10 (CALC)
GLOBULIN SER CALC-MCNC: 2.4 G/DL (CALC) (ref 1.9–3.7)
GLUCOSE SERPL-MCNC: 124 MG/DL (ref 65–99)
HBA1C MFR BLD: 7.9 % OF TOTAL HGB
HDLC SERPL-MCNC: 29 MG/DL
LDLC SERPL CALC-MCNC: 74 MG/DL (CALC)
METHYLMALONATE SERPL-SCNC: 202 NMOL/L (ref 87–318)
NONHDLC SERPL-MCNC: 95 MG/DL (CALC)
POTASSIUM SERPL-SCNC: 4.3 MMOL/L (ref 3.5–5.3)
PROT SERPL-MCNC: 6.3 G/DL (ref 6.1–8.1)
SL AMB EGFR AFRICAN AMERICAN: 114 ML/MIN/1.73M2
SL AMB EGFR NON AFRICAN AMERICAN: 99 ML/MIN/1.73M2
SODIUM SERPL-SCNC: 141 MMOL/L (ref 135–146)
TRIGL SERPL-MCNC: 125 MG/DL

## 2020-06-02 PROCEDURE — 3051F HG A1C>EQUAL 7.0%<8.0%: CPT | Performed by: FAMILY MEDICINE

## 2020-06-02 NOTE — RESULT ENCOUNTER NOTE
Call patient with lab result-A1c is slightly better  Verify where her sugars are running  Verify what dose of basal insulin she is taking  Should increase by 2-4 units depending were sugars are running

## 2020-06-04 ENCOUNTER — TELEPHONE (OUTPATIENT)
Dept: FAMILY MEDICINE CLINIC | Facility: CLINIC | Age: 60
End: 2020-06-04

## 2020-06-09 ENCOUNTER — TELEMEDICINE (OUTPATIENT)
Dept: FAMILY MEDICINE CLINIC | Facility: CLINIC | Age: 60
End: 2020-06-09
Payer: COMMERCIAL

## 2020-06-09 VITALS — OXYGEN SATURATION: 95 %

## 2020-06-09 DIAGNOSIS — J45.30 MILD PERSISTENT ASTHMA WITHOUT COMPLICATION: ICD-10-CM

## 2020-06-09 DIAGNOSIS — R11.0 NAUSEA: ICD-10-CM

## 2020-06-09 DIAGNOSIS — E11.9 CONTROLLED TYPE 2 DIABETES MELLITUS WITHOUT COMPLICATION, WITHOUT LONG-TERM CURRENT USE OF INSULIN (HCC): Primary | ICD-10-CM

## 2020-06-09 DIAGNOSIS — K21.9 GERD WITHOUT ESOPHAGITIS: ICD-10-CM

## 2020-06-09 DIAGNOSIS — K29.00 ACUTE GASTRITIS WITHOUT HEMORRHAGE, UNSPECIFIED GASTRITIS TYPE: ICD-10-CM

## 2020-06-09 PROCEDURE — 99214 OFFICE O/P EST MOD 30 MIN: CPT | Performed by: FAMILY MEDICINE

## 2020-06-09 RX ORDER — ONDANSETRON 4 MG/1
4 TABLET, FILM COATED ORAL EVERY 8 HOURS PRN
Qty: 30 TABLET | Refills: 1 | Status: SHIPPED | OUTPATIENT
Start: 2020-06-09 | End: 2020-09-17

## 2020-06-11 ENCOUNTER — TELEPHONE (OUTPATIENT)
Dept: FAMILY MEDICINE CLINIC | Facility: CLINIC | Age: 60
End: 2020-06-11

## 2020-07-02 DIAGNOSIS — G47.00 INSOMNIA, UNSPECIFIED TYPE: ICD-10-CM

## 2020-07-02 RX ORDER — ZOLPIDEM TARTRATE 5 MG/1
5 TABLET ORAL
Qty: 30 TABLET | Refills: 0 | Status: SHIPPED | OUTPATIENT
Start: 2020-07-02 | End: 2020-07-30 | Stop reason: SDUPTHER

## 2020-07-06 DIAGNOSIS — K21.00 GASTROESOPHAGEAL REFLUX DISEASE WITH ESOPHAGITIS: ICD-10-CM

## 2020-07-06 LAB
LEFT EYE DIABETIC RETINOPATHY: NORMAL
RIGHT EYE DIABETIC RETINOPATHY: NORMAL

## 2020-07-06 RX ORDER — OMEPRAZOLE 40 MG/1
CAPSULE, DELAYED RELEASE ORAL
Qty: 90 CAPSULE | Refills: 0 | Status: SHIPPED | OUTPATIENT
Start: 2020-07-06 | End: 2020-09-27

## 2020-07-07 DIAGNOSIS — E11.9 TYPE 2 DIABETES MELLITUS WITHOUT COMPLICATION, WITHOUT LONG-TERM CURRENT USE OF INSULIN (HCC): ICD-10-CM

## 2020-07-07 RX ORDER — SITAGLIPTIN 100 MG/1
TABLET, FILM COATED ORAL
Qty: 90 TABLET | Refills: 0 | Status: SHIPPED | OUTPATIENT
Start: 2020-07-07 | End: 2020-10-14 | Stop reason: SDUPTHER

## 2020-07-10 DIAGNOSIS — E11.9 CONTROLLED TYPE 2 DIABETES MELLITUS WITHOUT COMPLICATION, WITHOUT LONG-TERM CURRENT USE OF INSULIN (HCC): ICD-10-CM

## 2020-07-27 ENCOUNTER — OFFICE VISIT (OUTPATIENT)
Dept: FAMILY MEDICINE CLINIC | Facility: CLINIC | Age: 60
End: 2020-07-27
Payer: COMMERCIAL

## 2020-07-27 VITALS
OXYGEN SATURATION: 94 % | HEIGHT: 64 IN | TEMPERATURE: 97.4 F | DIASTOLIC BLOOD PRESSURE: 68 MMHG | SYSTOLIC BLOOD PRESSURE: 122 MMHG | WEIGHT: 200 LBS | HEART RATE: 104 BPM | BODY MASS INDEX: 34.15 KG/M2

## 2020-07-27 DIAGNOSIS — E61.1 IRON DEFICIENCY: ICD-10-CM

## 2020-07-27 DIAGNOSIS — I10 BENIGN ESSENTIAL HYPERTENSION: ICD-10-CM

## 2020-07-27 DIAGNOSIS — Z00.00 ANNUAL PHYSICAL EXAM: Primary | ICD-10-CM

## 2020-07-27 DIAGNOSIS — E11.9 CONTROLLED TYPE 2 DIABETES MELLITUS WITHOUT COMPLICATION, WITHOUT LONG-TERM CURRENT USE OF INSULIN (HCC): ICD-10-CM

## 2020-07-27 DIAGNOSIS — E78.5 HYPERLIPIDEMIA ASSOCIATED WITH TYPE 2 DIABETES MELLITUS (HCC): ICD-10-CM

## 2020-07-27 DIAGNOSIS — E11.69 HYPERLIPIDEMIA ASSOCIATED WITH TYPE 2 DIABETES MELLITUS (HCC): ICD-10-CM

## 2020-07-27 DIAGNOSIS — J45.30 MILD PERSISTENT ASTHMA WITHOUT COMPLICATION: ICD-10-CM

## 2020-07-27 DIAGNOSIS — K21.9 GERD WITHOUT ESOPHAGITIS: ICD-10-CM

## 2020-07-27 PROBLEM — M54.16 LUMBAR RADICULOPATHY: Status: ACTIVE | Noted: 2017-02-20

## 2020-07-27 PROBLEM — M25.559 PAIN IN JOINT INVOLVING PELVIC REGION AND THIGH: Status: ACTIVE | Noted: 2018-02-01

## 2020-07-27 PROCEDURE — 2022F DILAT RTA XM EVC RTNOPTHY: CPT | Performed by: FAMILY MEDICINE

## 2020-07-27 PROCEDURE — 3078F DIAST BP <80 MM HG: CPT | Performed by: FAMILY MEDICINE

## 2020-07-27 PROCEDURE — 3008F BODY MASS INDEX DOCD: CPT | Performed by: FAMILY MEDICINE

## 2020-07-27 PROCEDURE — 99396 PREV VISIT EST AGE 40-64: CPT | Performed by: FAMILY MEDICINE

## 2020-07-27 PROCEDURE — 99214 OFFICE O/P EST MOD 30 MIN: CPT | Performed by: FAMILY MEDICINE

## 2020-07-27 PROCEDURE — 1036F TOBACCO NON-USER: CPT | Performed by: FAMILY MEDICINE

## 2020-07-27 PROCEDURE — 3074F SYST BP LT 130 MM HG: CPT | Performed by: FAMILY MEDICINE

## 2020-07-27 RX ORDER — ALBUTEROL SULFATE 2.5 MG/3ML
2.5 SOLUTION RESPIRATORY (INHALATION) EVERY 6 HOURS PRN
Qty: 100 VIAL | Refills: 0 | Status: SHIPPED | OUTPATIENT
Start: 2020-07-27 | End: 2020-10-08

## 2020-07-27 RX ORDER — ASCORBIC ACID 500 MG
2000 TABLET ORAL DAILY
COMMUNITY
End: 2021-11-05 | Stop reason: HOSPADM

## 2020-07-27 NOTE — PROGRESS NOTES
ADULT ANNUAL PHYSICAL  [address]    NAME: Dean Pierce  AGE: 61 y o  SEX: female  : 1960     DATE: 2020     Assessment and Plan:     Problem List Items Addressed This Visit     None      Visit Diagnoses     Annual physical exam    -  Primary          Immunizations and preventive care screenings were discussed with patient today  Appropriate education was printed on patient's after visit summary  Counseling:  Dental Health: discussed importance of regular tooth brushing, flossing, and dental visits  Injury prevention: discussed safety/seat belts, safety helmets, smoke detectors, carbon dioxide detectors, and smoking near bedding or upholstery  · Exercise: the importance of regular exercise/physical activity was discussed  Recommend exercise 3-5 times per week for at least 30 minutes  No follow-ups on file  Chief Complaint:     Chief Complaint   Patient presents with    Annual Exam     physical/MM      History of Present Illness:     Adult Annual Physical   Patient here for a comprehensive physical exam  The patient reports See problem list     Diet and Physical Activity  · Diet/Nutrition: well balanced diet, limited junk food and consuming 3-5 servings of fruits/vegetables daily  · Exercise: walking and 1-2 times a week on average  Depression Screening  PHQ-9 Depression Screening    PHQ-9:    Frequency of the following problems over the past two weeks:            General Health  · Sleep: sleeps well and gets 7-8 hours of sleep on average  · Hearing: normal - bilateral   · Vision: wears glasses and No diabetic retinopathy  · Dental: regular dental visits, brushes teeth once daily and flosses teeth occasionally  /GYN Health  · Patient is: postmenopausal  · Last menstrual period: -  · Contraceptive method: n/a       Review of Systems:     Review of Systems   Constitutional: Negative for activity change, appetite change, diaphoresis and fatigue  Respiratory: Negative for cough, chest tightness, shortness of breath and wheezing  Cardiovascular: Positive for palpitations  Negative for chest pain and leg swelling  Fast or slow heart rate   Gastrointestinal: Negative for abdominal pain, blood in stool, constipation, diarrhea, nausea and vomiting  Genitourinary: Negative for difficulty urinating, dysuria, frequency, hematuria and vaginal bleeding  Musculoskeletal: Negative for arthralgias, gait problem, joint swelling and myalgias  Neurological: Negative for dizziness, light-headedness and headaches  Psychiatric/Behavioral: Negative for agitation, confusion, dysphoric mood and sleep disturbance  The patient is not nervous/anxious         Past Medical History:     Past Medical History:   Diagnosis Date    Anxiety     Asthma     Breast lump     Resolved: 2017     Chronic pain     back    Diabetes mellitus ([facility] [address] )     History of stomach ulcers     Hyperlipidemia     Hypertension     Infectious viral hepatitis     Hepatitiss A      Past Surgical History:     Past Surgical History:   Procedure Laterality Date    APPENDECTOMY      BACK SURGERY       SECTION      CHOLECYSTECTOMY      GASTRIC BYPASS      SHOULDER SURGERY        Social History:        Social History     Socioeconomic History    Marital status:      Spouse name: None    Number of children: None    Years of education: None    Highest education level: None   Occupational History    None   Social Needs    Financial resource strain: None    Food insecurity:     Worry: None     Inability: None    Transportation needs:     Medical: None     Non-medical: None   Tobacco Use    Smoking status: Former Smoker     Packs/day: 1 00     Years: 34 00     Pack years: 34 00     Types: Cigarettes     Start date:      Last attempt to quit:      Years since quittin 5    Smokeless tobacco: Never Used Substance and Sexual Activity    Alcohol use: No    Drug use: No    Sexual activity: Never   Lifestyle    Physical activity:     Days per week: None     Minutes per session: None    Stress: None   Relationships    Social connections:     Talks on phone: None     Gets together: None     Attends Voodoo service: None     Active member of club or organization: None     Attends meetings of clubs or organizations: None     Relationship status: None    Intimate partner violence:     Fear of current or ex partner: None     Emotionally abused: None     Physically abused: None     Forced sexual activity: None   Other Topics Concern    None   Social History Narrative    None      Family History:     Family History   Problem Relation Age of Onset    Diabetes Mother         Mellitus    Hyperlipidemia Mother     Hypertension Mother     Colon cancer Mother     Pancreatic cancer Mother     Melanoma Mother     Cancer Father         Bladder     Alcohol abuse Father     Lung cancer Father     Prostate cancer Father     Diabetes Brother         Mellitus    Hyperlipidemia Brother     Hypertension Brother     Stomach cancer Paternal Grandmother     Lung cancer Paternal Grandfather     Diabetes Brother     Breast cancer Maternal Aunt 32    Mental illness Neg Hx       Current Medications:     Current Outpatient Medications   Medication Sig Dispense Refill    Accu-Chek FastClix Lancets MISC       albuterol (2 5 mg/3 mL) 0 083 % nebulizer solution Take 1 vial (2 5 mg total) by nebulization every 6 (six) hours as needed for wheezing or shortness of breath 100 vial 0    albuterol (PROVENTIL HFA,VENTOLIN HFA) 90 mcg/act inhaler USE 2 INHALATIONS EVERY 6 HOURS AS NEEDED FOR WHEEZING 25 5 g 3    amLODIPine-benazepril (LOTREL 5-20) 5-20 MG per capsule TAKE 1 CAPSULE DAILY 90 capsule 0    atorvastatin (LIPITOR) 40 mg tablet TAKE 1 TABLET DAILY 90 tablet 0    B-D UF III MINI PEN NEEDLES 31G X 5 MM MISC USE AT BEDTIME 100 each 1    Ferrous Fumarate 63 (20 Fe) MG TABS Take 65 mg by mouth daily      glucose blood (ONE TOUCH ULTRA TEST) test strip 1 each by Other route daily 100 each 0    HYDROcodone-acetaminophen (NORCO) 5-325 mg per tablet Take 5-325 tablets by mouth daily as needed  0    insulin glargine (Basaglar KwikPen) 100 units/mL injection pen Inject 31 units under skin daily at bedtime 5 pen 0    JANUVIA 100 MG tablet take 1 tablet by mouth once daily 90 tablet 0    lidocaine (XYLOCAINE) 5 % ointment Apply topically as needed for mild pain 240 g 1    LYRICA 200 MG capsule Take 200 mg by mouth 3 (three) times a day   0    metFORMIN (GLUCOPHAGE) 1000 MG tablet TAKE 1 TABLET TWICE A  tablet 0    omeprazole (PriLOSEC) 40 MG capsule TAKE 1 CAPSULE EVERY       MORNING 90 capsule 0    ondansetron (ZOFRAN) 4 mg tablet Take 1 tablet (4 mg total) by mouth every 8 (eight) hours as needed for nausea or vomiting 30 tablet 1    SYMBICORT 160-4 5 MCG/ACT inhaler USE 2 INHALATIONS ORALLY   TWICE DAILY, RINSE MOUTH   AFTER USE 30 6 g 0    traMADol HCl  MG CP24 Take 300 mg by mouth daily  30 tablet 0    zolpidem (AMBIEN) 5 mg tablet Take 1 tablet (5 mg total) by mouth daily at bedtime as needed for sleep 30 tablet 0    ascorbic acid (VITAMIN C) 500 mg tablet Take 2,000 mg by mouth daily      VITAMIN D PO        Current Facility-Administered Medications   Medication Dose Route Frequency Provider Last Rate Last Dose    cyanocobalamin injection 1,000 mcg  1,000 mcg Intramuscular Weekly CHITRA Abreu   1,000 mcg at 02/06/20 1447      Allergies:      Allergies   Allergen Reactions    Nsaids Other (See Comments)     Cannot take NSAIDS secondary to having gastric bypass    Percocet [Oxycodone-Acetaminophen] GI Intolerance      Physical Exam:     /68 (BP Location: Left arm, Patient Position: Sitting, Cuff Size: Extra-Large)   Pulse 104   Temp (!) 97 4 °F (36 3 °C) (Tympanic)   Ht 5' 4" (1 626 m)   Wt 90 7 kg (200 lb)   LMP  (LMP Unknown)   SpO2 94%   BMI 34 33 kg/m²     Physical Exam   Constitutional: She appears well-developed and well-nourished  No distress  HENT:   Head: Normocephalic and atraumatic  Right Ear: External ear normal    Left Ear: External ear normal    Mouth/Throat: Oropharynx is clear and moist    Eyes: Pupils are equal, round, and reactive to light  Conjunctivae and EOM are normal  Right eye exhibits no discharge  Left eye exhibits no discharge  Neck: Normal range of motion  Neck supple  No thyromegaly present  Cardiovascular: Normal rate, regular rhythm, normal heart sounds and intact distal pulses  No murmur heard  Pulmonary/Chest: Effort normal and breath sounds normal  No respiratory distress  She has no wheezes  She exhibits no tenderness  Abdominal: Soft  Bowel sounds are normal  She exhibits no mass  There is no tenderness  There is no rebound  Musculoskeletal: Normal range of motion  She exhibits no edema or deformity  Lymphadenopathy:     She has no cervical adenopathy  Neurological: She is alert  She has normal reflexes  No cranial nerve deficit  Skin: Skin is warm and dry  No rash noted  No erythema  Psychiatric: She has a normal mood and affect   Her behavior is normal  Judgment normal        Darcie Sr MD  [id number]

## 2020-07-27 NOTE — PATIENT INSTRUCTIONS
Medical management-continue current medications  Continue to monitor sugars  Will check labs in early September to include BMP, A1c, CBC, and ferritin level  Continue to work on diet, exercise as able, and weight loss  Check back here 3-6 months  Wellness Visit for Adults   AMBULATORY CARE:   A wellness visit  is when you see your healthcare provider to get screened for health problems  You can also get advice on how to stay healthy  Write down your questions so you remember to ask them  Ask your healthcare provider how often you should have a wellness visit  What happens at a wellness visit:  Your healthcare provider will ask about your health, and your family history of health problems  This includes high blood pressure, heart disease, and cancer  He or she will ask if you have symptoms that concern you, if you smoke, and about your mood  You may also be asked about your intake of medicines, supplements, food, and alcohol  Any of the following may be done:  · Your weight  will be checked  Your height may also be checked so your body mass index (BMI) can be calculated  Your BMI shows if you are at a healthy weight  · Your blood pressure  and heart rate will be checked  Your temperature may also be checked  · Blood and urine tests  may be done  Blood tests may be done to check your cholesterol levels  Abnormal cholesterol levels increase your risk for heart disease and stroke  You may also need a blood or urine test to check for diabetes if you are at increased risk  Urine tests may be done to look for signs of an infection or kidney disease  · A physical exam  includes checking your heartbeat and lungs with a stethoscope  Your healthcare provider may also check your skin to look for sun damage  · Screening tests  may be recommended  A screening test is done to check for diseases that may not cause symptoms   The screening tests you may need depend on your age, gender, family history, and lifestyle habits  For example, colorectal screening may be recommended if you are 48years old or older  Screening tests you need if you are a woman:   · A Pap smear  is used to screen for cervical cancer  Pap smears are usually done every 3 to 5 years depending on your age  You may need them more often if you have had abnormal Pap smear test results in the past  Ask your healthcare provider how often you should have a Pap smear  · A mammogram  is an x-ray of your breasts to screen for breast cancer  Experts recommend mammograms every 2 years starting at age 48 years  You may need a mammogram at age 52 years or younger if you have an increased risk for breast cancer  Talk to your healthcare provider about when you should start having mammograms and how often you need them  Vaccines you may need:   · Get an influenza vaccine  every year  The influenza vaccine protects you from the flu  Several types of viruses cause the flu  The viruses change over time, so new vaccines are made each year  · Get a tetanus-diphtheria (Td) booster vaccine  every 10 years  This vaccine protects you against tetanus and diphtheria  Tetanus is a severe infection that may cause painful muscle spasms and lockjaw  Diphtheria is a severe bacterial infection that causes a thick covering in the back of your mouth and throat  · Get a human papillomavirus (HPV) vaccine  if you are female and aged 23 to 32 or male 23 to 24 and never received it  This vaccine protects you from HPV infection  HPV is the most common infection spread by sexual contact  HPV may also cause vaginal, penile, and anal cancers  · Get a pneumococcal vaccine  if you are aged 72 years or older  The pneumococcal vaccine is an injection given to protect you from pneumococcal disease  Pneumococcal disease is an infection caused by pneumococcal bacteria  The infection may cause pneumonia, meningitis, or an ear infection      · Get a shingles vaccine  if you are aged 61 or older, even if you have had shingles before  The shingles vaccine is an injection to protect you from the varicella-zoster virus  This is the same virus that causes chickenpox  Shingles is a painful rash that develops in people who had chickenpox or have been exposed to the virus  How to eat healthy:  My Plate is a model for planning healthy meals  It shows the types and amounts of foods that should go on your plate  Fruits and vegetables make up about half of your plate, and grains and protein make up the other half  A serving of dairy is included on the side of your plate  The amount of calories and serving sizes you need depends on your age, gender, weight, and height  Examples of healthy foods are listed below:  · Eat a variety of vegetables  such as dark green, red, and orange vegetables  You can also include canned vegetables low in sodium (salt) and frozen vegetables without added butter or sauces  · Eat a variety of fresh fruits , canned fruit in 100% juice, frozen fruit, and dried fruit  · Include whole grains  At least half of the grains you eat should be whole grains  Examples include whole-wheat bread, wheat pasta, brown rice, and whole-grain cereals such as oatmeal     · Eat a variety of protein foods such as seafood (fish and shellfish), lean meat, and poultry without skin (turkey and chicken)  Examples of lean meats include pork leg, shoulder, or tenderloin, and beef round, sirloin, tenderloin, and extra lean ground beef  Other protein foods include eggs and egg substitutes, beans, peas, soy products, nuts, and seeds  · Choose low-fat dairy products such as skim or 1% milk or low-fat yogurt, cheese, and cottage cheese  · Limit unhealthy fats  such as butter, hard margarine, and shortening  Exercise:  Exercise at least 30 minutes per day on most days of the week  Some examples of exercise include walking, biking, dancing, and swimming   You can also fit in more physical activity by taking the stairs instead of the elevator or parking farther away from stores  Include muscle strengthening activities 2 days each week  Regular exercise provides many health benefits  It helps you manage your weight, and decreases your risk for type 2 diabetes, heart disease, stroke, and high blood pressure  Exercise can also help improve your mood  Ask your healthcare provider about the best exercise plan for you  General health and safety guidelines:   · Do not smoke  Nicotine and other chemicals in cigarettes and cigars can cause lung damage  Ask your healthcare provider for information if you currently smoke and need help to quit  E-cigarettes or smokeless tobacco still contain nicotine  Talk to your healthcare provider before you use these products  · Limit alcohol  A drink of alcohol is 12 ounces of beer, 5 ounces of wine, or 1½ ounces of liquor  · Lose weight, if needed  Being overweight increases your risk of certain health conditions  These include heart disease, high blood pressure, type 2 diabetes, and certain types of cancer  · Protect your skin  Do not sunbathe or use tanning beds  Use sunscreen with a SPF 15 or higher  Apply sunscreen at least 15 minutes before you go outside  Reapply sunscreen every 2 hours  Wear protective clothing, hats, and sunglasses when you are outside  · Drive safely  Always wear your seatbelt  Make sure everyone in your car wears a seatbelt  A seatbelt can save your life if you are in an accident  Do not use your cell phone when you are driving  This could distract you and cause an accident  Pull over if you need to make a call or send a text message  · Practice safe sex  Use latex condoms if are sexually active and have more than one partner  Your healthcare provider may recommend screening tests for sexually transmitted infections (STIs)  · Wear helmets, lifejackets, and protective gear    Always wear a helmet when you ride a bike or motorcycle, go skiing, or play sports that could cause a head injury  Wear protective equipment when you play sports  Wear a lifejacket when you are on a boat or doing water sports  © 2017 [facility] Information is for End User's use only and may not be sold, redistributed or otherwise used for commercial purposes  All illustrations and images included in CareNotes® are the copyrighted property of [employer]  or Chema Knowles  The above information is an  only  It is not intended as medical advice for individual conditions or treatments  Talk to your doctor, nurse or pharmacist before following any medical regimen to see if it is safe and effective for you  Weight Management   AMBULATORY CARE:   Why it is important to manage your weight:  Being overweight increases your risk of health conditions such as heart disease, high blood pressure, type 2 diabetes, and certain types of cancer  It can also increase your risk for osteoarthritis, sleep apnea, and other respiratory problems  Aim for a slow, steady weight loss  Even a small amount of weight loss can lower your risk of health problems  How to lose weight safely:  A safe and healthy way to lose weight is to eat fewer calories and get regular exercise  You can lose up about 1 pound a week by decreasing the number of calories you eat by 500 calories each day  You can decrease calories by eating smaller portion sizes or by cutting out high-calorie foods  Read labels to find out how many calories are in the foods you eat  You can also burn calories with exercise such as walking, swimming, or biking  You will be more likely to keep weight off if you make these changes part of your lifestyle  Healthy meal plan for weight management:  A healthy meal plan includes a variety of foods, contains fewer calories, and helps you stay healthy  A healthy meal plan includes the following:  · Eat whole-grain foods more often    A healthy meal plan should contain fiber  Fiber is the part of grains, fruits, and vegetables that is not broken down by your body  Whole-grain foods are healthy and provide extra fiber in your diet  Some examples of whole-grain foods are whole-wheat breads and pastas, oatmeal, brown rice, and bulgur  · Eat a variety of vegetables every day  Include dark, leafy greens such as spinach, kale, kvng greens, and mustard greens  Eat yellow and orange vegetables such as carrots, sweet potatoes, and winter squash  · Eat a variety of fruits every day  Choose fresh or canned fruit (canned in its own juice or light syrup) instead of juice  Fruit juice has very little or no fiber  · Eat low-fat dairy foods  Drink fat-free (skim) milk or 1% milk  Eat fat-free yogurt and low-fat cottage cheese  Try low-fat cheeses such as mozzarella and other reduced-fat cheeses  · Choose meat and other protein foods that are low in fat  Choose beans or other legumes such as split peas or lentils  Choose fish, skinless poultry (chicken or turkey), or lean cuts of red meat (beef or pork)  Before you cook meat or poultry, cut off any visible fat  · Use less fat and oil  Try baking foods instead of frying them  Add less fat, such as margarine, sour cream, regular salad dressing and mayonnaise to foods  Eat fewer high-fat foods  Some examples of high-fat foods include french fries, doughnuts, ice cream, and cakes  · Eat fewer sweets  Limit foods and drinks that are high in sugar  This includes candy, cookies, regular soda, and sweetened drinks  Ways to decrease calories:   · Eat smaller portions  ¨ Use a small plate with smaller servings  ¨ Do not eat second helpings  ¨ When you eat at a restaurant, ask for a box and place half of your meal in the box before you eat  ¨ Share an entrée with someone else  · Replace high-calorie snacks with healthy, low-calorie snacks       ¨ Choose fresh fruit, vegetables, fat-free rice cakes, or air-popped popcorn instead of potato chips, nuts, or chocolate  ¨ Choose water or calorie-free drinks instead of soda or sweetened drinks  · Eat regular meals  Skipping meals can lead to overeating later in the day  Eat a healthy snack in place of a meal if you do not have time to eat a regular meal      · Do not shop for groceries when you are hungry  You may be more likely to make unhealthy food choices  Take a grocery list of healthy foods and shop after you have eaten  Exercise:  Exercise at least 30 minutes per day on most days of the week  Some examples of exercise include walking, biking, dancing, and swimming  You can also fit in more physical activity by taking the stairs instead of the elevator or parking farther away from stores  Ask your healthcare provider about the best exercise plan for you  Other things to consider as you try to lose weight:   · Be aware of situations that may give you the urge to overeat, such as eating while watching television  Find ways to avoid these situations  For example, read a book, go for a walk, or do crafts  · Meet with a weight loss support group or friends who are also trying to lose weight  This may help you stay motivated to continue working on your weight loss goals  © 2017 [address]  Information is for End User's use only and may not be sold, redistributed or otherwise used for commercial purposes  All illustrations and images included in CareNotes® are the copyrighted property of [employer]  or Chema Knowles  The above information is an  only  It is not intended as medical advice for individual conditions or treatments  Talk to your doctor, nurse or pharmacist before following any medical regimen to see if it is safe and effective for you  Cholesterol and Your Health   AMBULATORY CARE:   Cholesterol  is a waxy, fat-like substance   Cholesterol is made by your body, but also comes from certain foods you eat  Your body uses cholesterol to make hormones and new cells  Your body also uses cholesterol to protect nerves  Cholesterol comes from foods such as meat and dairy products  Your total cholesterol level is made up by LDL cholesterol, HDL cholesterol, and triglycerides:  · LDL cholesterol  is called bad cholesterol  because it forms plaque in your arteries  As plaque builds up, your arteries become narrow, and less blood flows through  When plaque decreases blood flow to your heart, you may have chest pain  If plaque completely blocks an artery that bring blood to your heart, you may have a heart attack  Plaque can break off and form blood clots  Blood clots may block arteries in your brain and cause a stroke  · HDL cholesterol  is called good cholesterol  because it helps remove LDL cholesterol from your arteries  It does this by attaching to LDL cholesterol and carrying it to your liver  Your liver breaks down LDL cholesterol so your body can get rid of it  High levels of HDL cholesterol can help prevent a heart attack and stroke  Low levels of HDL cholesterol can increase your risk for heart disease, heart attack, and stroke  · Triglycerides  are a type of fat that store energy from foods you eat  High levels of triglycerides also cause plaque buildup  This can increase your risk for a heart attack or stroke  If your triglyceride level is high, your LDL cholesterol level may also be high  How food affects your cholesterol levels:   · Unhealthy fats  increase LDL cholesterol and triglyceride levels in your blood  They are found in foods high in cholesterol, saturated fat, and trans fat:     ¨ Cholesterol  is found in eggs, dairy, and meat  ¨ Saturated fat  is found in butter, cheese, ice cream, whole milk, and coconut oil  Saturated fat is also found in meat, such as sausage, hot dogs, and bologna      ¨ Trans fat  is found in liquid oils and is used in fried and baked foods  Foods that contain trans fats include chips, crackers, muffins, sweet rolls, microwave popcorn, and cookies  · Healthy fats,  also called unsaturated fats, help lower LDL cholesterol and triglyceride levels  Healthy fats include the following:     ¨ Monounsaturated fats  are found in foods such as olive oil, canola oil, avocado, nuts, and olives  ¨ Polyunsaturated fats,  such as omega 3 fats, are found in fish, such as salmon, trout, and tuna  They can also be found in plant foods such as flaxseed, walnuts, and soybeans  Other things that affect your cholesterol levels:   · Smoking cigarettes    · Being overweight or obese     · Drinking large amounts of alcohol    · Not enough exercise or no exercise    · Certain genes passed from your parents to you  What you need to know about having your cholesterol levels checked: Adults 21to 39years of age should have their cholesterol levels checked every 4 to 6 years  Adults 45 years and older should have their cholesterol checked every 1 to 2 years  You may need your cholesterol checked more often, or at a younger age, if you have risk factors for heart disease  You may also need to have your cholesterol checked more often if you have other health conditions, such as diabetes  Blood tests are used to check cholesterol levels  Blood tests measure your levels of triglycerides, LDL cholesterol, and HDL cholesterol  Cholesterol level goals: Your cholesterol level goal may depend on your risk for heart disease  It may also depend on your age and other health conditions  Ask your healthcare provider if the following goals are right for you:  · Your total cholesterol level  should be less than 200 mg/dL  This number may also depend on your HDL and LDL cholesterol goals  · Your LDL cholesterol level  should be less than 130 mg/dL  · Your HDL cholesterol level  should be 60 mg/dL or higher       · Your triglyceride level  should be less than 150 mg/dL  Treatment for high cholesterol:  Treatment for high cholesterol will also decrease your risk of heart disease, heart attack, and stroke  Treatment may include any of the following:  · Medicines  may be given to lower your LDL cholesterol, triglyceride levels, or total cholesterol level  You may need medicines to lower your cholesterol if any of the following is true:     ¨ You have a history of stroke, TIA, unstable angina, or a heart attack    ¨ Your LDL cholesterol level is 190 mg/dL or higher    ¨ You are age 36to 76years of age, have diabetes, and your LDL cholesterol is 70 mg/dL or higher    ¨ You are age 36to 76years of age, have risk factors for heart disease, and your LDL cholesterol is 70 mg/dL or higher    · Lifestyle changes  include changes to your diet, exercise, weight loss, and quitting smoking  It also includes decreasing the amount of alcohol you drink  · Supplements  include fish oil, red yeast rice, and garlic  Fish oil may help lower your triglyceride and LDL cholesterol levels  It may also increase your HDL cholesterol level  Red yeast rice may help decrease your total cholesterol level and LDL cholesterol level  Garlic may help lower your total cholesterol level  Do not take these supplements without talking to your healthcare provider  Nutrition to help lower your cholesterol levels:  A registered dietitian can help you create a healthy eating plan  Read food labels and choose foods low in saturated fat, trans fats, and cholesterol  · Decrease the total amount of fat you eat  Choose lean meats, fat-free or 1% fat milk, and low-fat dairy products, such as yogurt and cheese  Try to limit or avoid red meats  Limit or do not eat fried foods or baked goods such as cookies  · Replace unhealthy fats with healthy fats  Cook foods in olive oil or canola oil  Choose soft margarines that are low in saturated fat and trans fat   Seeds, nuts, and avocados are other examples of healthy fats  · Eat foods with omega-3 fats  Examples include salmon, tuna, mackerel, walnuts, and flaxseed  Eat fish 2 times per week  Children and pregnant women should not eat fish that have high levels of mercury, such as shark, swordfish, and samina mackerel  · Increase the amount of plant-based foods you eat  Plant-based foods are low in cholesterol and fat  Eating more of these foods may help lower your cholesterol and help you lose weight  Examples of plant-based foods includes fruits, vegetables, legumes, and whole grains  Replace milk that contains dairy with almond, soy, or coconut milk  Eat beans and foods with soy for protein instead of meat  Ask your healthcare provider or dietitian for more information on plant-based foods  · Increase the amount of fiber you eat  High-fiber foods can help lower your LDL cholesterol  You should eat between 20 and 30 grams of fiber each day  Eat at least 5 servings of fruits and vegetables each day  Other examples of high-fiber foods include whole-grain or whole-wheat breads, pastas, or cereals, and brown rice  Eat 3 ounces of whole-grain foods each day  Increase fiber slowly  You may have abdominal discomfort, bloating, and gas if you add fiber to your diet too quickly  Lifestyle changes you can make to help lower your cholesterol levels:   · Maintain a healthy weight  Ask your healthcare provider how much you should weigh  Ask him or her to help you create a weight loss plan if you are overweight  Weight loss can decrease your total cholesterol and triglyceride levels  · Exercise regularly  Exercise can help lower your total cholesterol level and maintain a healthy weight  Exercise can also help increase your HDL cholesterol level  Work with your healthcare provider to create an exercise program that is right for you  Get at least 30 minutes of moderate exercise most days of the week  Examples of exercise include brisk walking, swimming, or biking  · Do not smoke  Nicotine and other chemicals in cigarettes and cigars can damage your lungs, heart, and blood vessels  They can also raise your triglyceride levels  Ask your healthcare provider for information if you currently smoke and need help to quit  E-cigarettes or smokeless tobacco still contain nicotine  Talk to your healthcare provider before you use these products  · Limit or do not drink alcohol  Alcohol can increase your triglyceride levels  Ask your healthcare provider if it is safe for you to drink alcohol  Also ask how much is safe for you to drink each day  © 2017 [facility] Information is for End User's use only and may not be sold, redistributed or otherwise used for commercial purposes  All illustrations and images included in CareNotes® are the copyrighted property of [employer]  or Chema Knowles  The above information is an  only  It is not intended as medical advice for individual conditions or treatments  Talk to your doctor, nurse or pharmacist before following any medical regimen to see if it is safe and effective for you

## 2020-07-27 NOTE — PROGRESS NOTES
Subjective:   Chief Complaint   Patient presents with    Annual Exam     physical/MM        Patient ID: Yesika Baeza is a 61 y o  female  Medical Management-  1) diabetes-sugars have been down below 125  Only 1 low sugar symptom  She is not able to exercise aggressively due to other medical issues  Current dose 31 units daily  2) hypertension-this has been generally well controlled  3) hyperlipidemia-last levels were good  4) asthma-this has been generally stable  You      The following portions of the patient's history were reviewed and updated as appropriate: allergies, current medications, past family history, past medical history, past social history, past surgical history and problem list     Review of Systems   Constitutional: Negative for activity change, appetite change, diaphoresis and fatigue  Respiratory: Negative for cough, chest tightness, shortness of breath and wheezing  Cardiovascular: Negative for chest pain, palpitations and leg swelling  Fast or slow heart rate   Gastrointestinal: Negative for abdominal pain, blood in stool, constipation, diarrhea, nausea and vomiting  Genitourinary: Negative for difficulty urinating, dysuria and frequency  Musculoskeletal: Negative for arthralgias, gait problem, joint swelling and myalgias  Neurological: Negative for dizziness, weakness, light-headedness, numbness and headaches  Psychiatric/Behavioral: Negative for agitation, confusion, dysphoric mood and sleep disturbance  The patient is not nervous/anxious  Objective:  Vitals:    07/27/20 1013   BP: 122/68   BP Location: Left arm   Patient Position: Sitting   Cuff Size: Extra-Large   Pulse: 104   Temp: (!) 97 4 °F (36 3 °C)   TempSrc: Tympanic   SpO2: 94%   Weight: 90 7 kg (200 lb)   Height: 5' 4" (1 626 m)      Physical Exam   Constitutional: She appears well-developed and well-nourished  No distress  HENT:   Head: Normocephalic and atraumatic     Right Ear: Tympanic membrane and external ear normal  No drainage  Left Ear: Tympanic membrane normal  No drainage  Mouth/Throat: No oropharyngeal exudate  Eyes: Conjunctivae and EOM are normal  Right eye exhibits no discharge  Left eye exhibits no discharge  Neck: Normal range of motion  Neck supple  No thyromegaly present  Cardiovascular: Normal rate, regular rhythm and normal heart sounds  Pulses are no weak pulses  Pulses:       Dorsalis pedis pulses are 2+ on the right side, and 2+ on the left side  Posterior tibial pulses are 2+ on the right side, and 2+ on the left side  Pulmonary/Chest: Effort normal  No respiratory distress  She has no wheezes  She has no rales  Musculoskeletal:        Feet:    Feet:   Right Foot:   Skin Integrity: Negative for ulcer, skin breakdown, erythema, warmth, callus or dry skin  Left Foot:   Skin Integrity: Negative for ulcer, skin breakdown, erythema, warmth, callus or dry skin  Lymphadenopathy:     She has no cervical adenopathy  Neurological: She is alert  Skin: Skin is warm and dry  Vitals reviewed  Patient's shoes and socks removed  Right Foot/Ankle   Right Foot Inspection  Skin Exam: skin normal and skin intact no dry skin, no warmth, no callus, no erythema, no maceration, no abnormal color, no pre-ulcer, no ulcer and no callus                            Sensory   Vibration: intact    Monofilament testing: intact  Vascular  Capillary refills: < 3 seconds  The right DP pulse is 2+  The right PT pulse is 2+  Left Foot/Ankle  Left Foot Inspection  Skin Exam: skin normal and skin intactno dry skin, no warmth, no erythema, no maceration, normal color, no pre-ulcer, no ulcer and no callus                                         Sensory   Vibration: intact    Monofilament: intact  Vascular  Capillary refills: < 3 seconds  The left DP pulse is 2+  The left PT pulse is 2+  Assign Risk Category:  No deformity present;  No loss of protective sensation; No weak pulses       Risk: 0      Assessment/Plan:    No problem-specific Assessment & Plan notes found for this encounter  Diagnoses and all orders for this visit:    Annual physical exam    Controlled type 2 diabetes mellitus without complication, without long-term current use of insulin ([facility] )  -     Hemoglobin A1C With EAG; Future  -     Basic metabolic panel; Future  -     CBC and differential; Future  -     Hemoglobin A1C With EAG  -     Basic metabolic panel  -     CBC and differential    Hyperlipidemia associated with type 2 diabetes mellitus (HCC)    Benign essential hypertension  -     CBC and differential; Future  -     CBC and differential    Mild persistent asthma without complication  -     albuterol (2 5 mg/3 mL) 0 083 % nebulizer solution; Take 1 vial (2 5 mg total) by nebulization every 6 (six) hours as needed for wheezing or shortness of breath    GERD without esophagitis  -     CBC and differential; Future  -     CBC and differential    Iron deficiency  -     CBC and differential; Future  -     Ferritin; Future  -     CBC and differential  -     Ferritin    Other orders  -     ascorbic acid (VITAMIN C) 500 mg tablet; Take 2,000 mg by mouth daily  -     VITAMIN D PO        Medical management-continue current medications  Continue to monitor sugars  Will check labs in early September to include BMP, A1c, CBC, and ferritin level  Continue to work on diet, exercise as able, and weight loss  Check back here 3-6 months

## 2020-07-30 DIAGNOSIS — G47.00 INSOMNIA, UNSPECIFIED TYPE: ICD-10-CM

## 2020-07-30 RX ORDER — ZOLPIDEM TARTRATE 5 MG/1
5 TABLET ORAL
Qty: 30 TABLET | Refills: 0 | Status: SHIPPED | OUTPATIENT
Start: 2020-07-30 | End: 2020-08-28 | Stop reason: SDUPTHER

## 2020-08-03 PROBLEM — E11.42 CONTROLLED TYPE 2 DIABETES MELLITUS WITH DIABETIC POLYNEUROPATHY, WITH LONG-TERM CURRENT USE OF INSULIN (HCC): Status: ACTIVE | Noted: 2017-04-06

## 2020-08-03 PROBLEM — Z79.4 CONTROLLED TYPE 2 DIABETES MELLITUS WITH DIABETIC POLYNEUROPATHY, WITH LONG-TERM CURRENT USE OF INSULIN (HCC): Status: ACTIVE | Noted: 2017-04-06

## 2020-08-04 DIAGNOSIS — J45.30 MILD PERSISTENT ASTHMA WITHOUT COMPLICATION: ICD-10-CM

## 2020-08-04 DIAGNOSIS — I10 ESSENTIAL HYPERTENSION: ICD-10-CM

## 2020-08-04 DIAGNOSIS — E11.9 TYPE 2 DIABETES MELLITUS WITHOUT COMPLICATION, WITHOUT LONG-TERM CURRENT USE OF INSULIN (HCC): ICD-10-CM

## 2020-08-04 DIAGNOSIS — E78.5 HYPERLIPIDEMIA ASSOCIATED WITH TYPE 2 DIABETES MELLITUS (HCC): ICD-10-CM

## 2020-08-04 DIAGNOSIS — E11.69 HYPERLIPIDEMIA ASSOCIATED WITH TYPE 2 DIABETES MELLITUS (HCC): ICD-10-CM

## 2020-08-05 RX ORDER — BLOOD SUGAR DIAGNOSTIC
STRIP MISCELLANEOUS
Qty: 100 EACH | Refills: 1 | Status: SHIPPED | OUTPATIENT
Start: 2020-08-05 | End: 2020-11-10

## 2020-08-05 RX ORDER — BUDESONIDE AND FORMOTEROL FUMARATE DIHYDRATE 160; 4.5 UG/1; UG/1
AEROSOL RESPIRATORY (INHALATION)
Qty: 30.6 G | Refills: 0 | Status: SHIPPED | OUTPATIENT
Start: 2020-08-05 | End: 2020-10-21

## 2020-08-05 RX ORDER — AMLODIPINE BESYLATE AND BENAZEPRIL HYDROCHLORIDE 5; 20 MG/1; MG/1
CAPSULE ORAL
Qty: 90 CAPSULE | Refills: 0 | Status: SHIPPED | OUTPATIENT
Start: 2020-08-05 | End: 2020-10-21

## 2020-08-05 RX ORDER — ATORVASTATIN CALCIUM 40 MG/1
TABLET, FILM COATED ORAL
Qty: 90 TABLET | Refills: 0 | Status: SHIPPED | OUTPATIENT
Start: 2020-08-05 | End: 2020-10-21

## 2020-08-28 DIAGNOSIS — G47.00 INSOMNIA, UNSPECIFIED TYPE: ICD-10-CM

## 2020-08-29 RX ORDER — ZOLPIDEM TARTRATE 5 MG/1
5 TABLET ORAL
Qty: 30 TABLET | Refills: 0 | Status: SHIPPED | OUTPATIENT
Start: 2020-08-30 | End: 2020-09-26 | Stop reason: SDUPTHER

## 2020-09-01 DIAGNOSIS — E11.9 CONTROLLED TYPE 2 DIABETES MELLITUS WITHOUT COMPLICATION, WITHOUT LONG-TERM CURRENT USE OF INSULIN (HCC): ICD-10-CM

## 2020-09-01 RX ORDER — INSULIN GLARGINE 100 [IU]/ML
INJECTION, SOLUTION SUBCUTANEOUS
Qty: 5 PEN | Refills: 0 | Status: SHIPPED | OUTPATIENT
Start: 2020-09-01 | End: 2020-10-16 | Stop reason: SDUPTHER

## 2020-09-03 ENCOUNTER — TELEMEDICINE (OUTPATIENT)
Dept: FAMILY MEDICINE CLINIC | Facility: CLINIC | Age: 60
End: 2020-09-03
Payer: COMMERCIAL

## 2020-09-03 DIAGNOSIS — Z79.4 CONTROLLED TYPE 2 DIABETES MELLITUS WITH DIABETIC POLYNEUROPATHY, WITH LONG-TERM CURRENT USE OF INSULIN (HCC): ICD-10-CM

## 2020-09-03 DIAGNOSIS — E11.42 CONTROLLED TYPE 2 DIABETES MELLITUS WITH DIABETIC POLYNEUROPATHY, WITH LONG-TERM CURRENT USE OF INSULIN (HCC): ICD-10-CM

## 2020-09-03 DIAGNOSIS — K21.9 GERD WITHOUT ESOPHAGITIS: Primary | ICD-10-CM

## 2020-09-03 PROCEDURE — 1036F TOBACCO NON-USER: CPT | Performed by: FAMILY MEDICINE

## 2020-09-03 PROCEDURE — 99213 OFFICE O/P EST LOW 20 MIN: CPT | Performed by: FAMILY MEDICINE

## 2020-09-03 NOTE — PATIENT INSTRUCTIONS
Double up on omeprazole 40 mg to twice daily  Get antacid which has some simethicone in it  Decrease the amount of food to taking her evening meal   Continue to raise the head of the bed  Contact GI for any further evaluations or advice  They may need to evaluate for diabetic gastroparesis

## 2020-09-03 NOTE — PROGRESS NOTES
Virtual Regular Visit      Assessment/Plan:    Problem List Items Addressed This Visit        Digestive    GERD without esophagitis - Primary       Endocrine    Controlled type 2 diabetes mellitus with diabetic polyneuropathy, with long-term current use of insulin ([facility] )               Reason for visit is No chief complaint on file  Encounter provider Eder Nick MD    Provider located at [address] [place] [id number]      Recent Visits  No visits were found meeting these conditions  Showing recent visits within past 7 days and meeting all other requirements     Today's Visits  Date Type Provider Dept   09/03/20 Telemedicine Eder Nick MD [facility] [address] [facility] today's visits and meeting all other requirements     Future Appointments  No visits were found meeting these conditions  Showing future appointments within next 150 days and meeting all other requirements        The patient was identified by name and date of birth  Yesika Baeza was informed that this is a telemedicine visit and that the visit is being conducted through [employer] and patient was informed that this is not a secure, HIPAA-complaint platform  She agrees to proceed     My office door was closed  No one else was in the room  She acknowledged consent and understanding of privacy and security of the video platform  The patient has agreed to participate and understands they can discontinue the visit at any time  Patient is aware this is a billable service  Vika Yancey is a 61 y o  female -   Patient having increased reflux symptoms over the last several weeks  Is taking omeprazole 40 mg with antacids on an as-needed basis  She has or problems mostly at bedtime  She does have the ability to raise the head of her bed or sleep in a recliner  This does not seem to change the symptoms    She does have her largest meal in the evening but  goes to bed 4-5 hours later  She appears to just have acid rather than food  There is no vomiting present  She does feel somewhat bloated         Past Medical History:   Diagnosis Date    Anxiety     Asthma     Breast lump     Resolved: 2017     Chronic pain     back    Diabetes mellitus ([address] )     History of stomach ulcers     Hyperlipidemia     Hypertension     Infectious viral hepatitis     Hepatitiss A       Past Surgical History:   Procedure Laterality Date    APPENDECTOMY      BACK SURGERY       SECTION      CHOLECYSTECTOMY      GASTRIC BYPASS      SHOULDER SURGERY         Current Outpatient Medications   Medication Sig Dispense Refill    Accu-Chek FastClix Lancets MISC       Accu-Chek Guide test strip TEST TWO TIMES A  each 1    albuterol (2 5 mg/3 mL) 0 083 % nebulizer solution Take 1 vial (2 5 mg total) by nebulization every 6 (six) hours as needed for wheezing or shortness of breath 100 vial 0    albuterol (PROVENTIL HFA,VENTOLIN HFA) 90 mcg/act inhaler USE 2 INHALATIONS EVERY 6 HOURS AS NEEDED FOR WHEEZING 25 5 g 3    amLODIPine-benazepril (LOTREL 5-20) 5-20 MG per capsule TAKE 1 CAPSULE DAILY 90 capsule 0    ascorbic acid (VITAMIN C) 500 mg tablet Take 2,000 mg by mouth daily      atorvastatin (LIPITOR) 40 mg tablet TAKE 1 TABLET DAILY 90 tablet 0    B-D UF III MINI PEN NEEDLES 31G X 5 MM MISC USE AT BEDTIME 100 each 1    Ferrous Fumarate 63 (20 Fe) MG TABS Take 65 mg by mouth daily      HYDROcodone-acetaminophen (NORCO) 5-325 mg per tablet Take 5-325 tablets by mouth daily as needed  0    insulin glargine (Basaglar KwikPen) 100 units/mL injection pen Inject 31 units under skin daily at bedtime 5 pen 0    JANUVIA 100 MG tablet take 1 tablet by mouth once daily 90 tablet 0    lidocaine (XYLOCAINE) 5 % ointment Apply topically as needed for mild pain 240 g 1    LYRICA 200 MG capsule Take 200 mg by mouth 3 (three) times a day 0    metFORMIN (GLUCOPHAGE) 1000 MG tablet TAKE 1 TABLET TWICE A  tablet 0    omeprazole (PriLOSEC) 40 MG capsule TAKE 1 CAPSULE EVERY       MORNING 90 capsule 0    ondansetron (ZOFRAN) 4 mg tablet Take 1 tablet (4 mg total) by mouth every 8 (eight) hours as needed for nausea or vomiting 30 tablet 1    Symbicort 160-4 5 MCG/ACT inhaler USE 2 INHALATIONS ORALLY   TWICE DAILY, RINSE MOUTH   AFTER USE 30 6 g 0    traMADol HCl  MG CP24 Take 300 mg by mouth daily  30 tablet 0    VITAMIN D PO       zolpidem (AMBIEN) 5 mg tablet Take 1 tablet (5 mg total) by mouth daily at bedtime as needed for sleep 30 tablet 0     Current Facility-Administered Medications   Medication Dose Route Frequency Provider Last Rate Last Dose    cyanocobalamin injection 1,000 mcg  1,000 mcg Intramuscular Weekly CHITRA Smiley   1,000 mcg at 02/06/20 1447        Allergies   Allergen Reactions    Nsaids Other (See Comments)     Cannot take NSAIDS secondary to having gastric bypass    Percocet [Oxycodone-Acetaminophen] GI Intolerance       Review of Systems   Constitutional: Negative for appetite change, chills, diaphoresis and fever  HENT: Negative for ear pain, rhinorrhea, sinus pressure and sore throat  Eyes: Negative for discharge, redness and itching  Respiratory: Positive for cough  Negative for shortness of breath and wheezing  Cardiovascular: Negative for chest pain and palpitations  Rapid or slow heart rate   Gastrointestinal: Negative for abdominal pain, blood in stool, diarrhea, nausea and vomiting  Video Exam    There were no vitals filed for this visit  Physical Exam  Constitutional:       Appearance: Normal appearance  HENT:      Head: Normocephalic and atraumatic  Eyes:      Extraocular Movements: Extraocular movements intact  Pulmonary:      Effort: Pulmonary effort is normal  No respiratory distress  Neurological:      Mental Status: She is alert            I spent 15 minutes directly with the patient during this visit  Patient Instructions   Double up on omeprazole 40 mg to twice daily  Get antacid which has some simethicone in it  Decrease the amount of food to taking her evening meal   Continue to raise the head of the bed  Contact GI for any further evaluations or advice  They may need to evaluate for diabetic gastroparesis  VIRTUAL VISIT NYDIA Viktoriya Stapleton 19 acknowledges that she has consented to an online visit or consultation  She understands that the online visit is based solely on information provided by her, and that, in the absence of a face-to-face physical evaluation by the physician, the diagnosis she receives is both limited and provisional in terms of accuracy and completeness  This is not intended to replace a full medical face-to-face evaluation by the physician  Darnell Calvillo understands and accepts these terms

## 2020-09-04 ENCOUNTER — TELEPHONE (OUTPATIENT)
Dept: FAMILY MEDICINE CLINIC | Facility: CLINIC | Age: 60
End: 2020-09-04

## 2020-09-04 LAB
BASOPHILS # BLD AUTO: 30 CELLS/UL (ref 0–200)
BASOPHILS NFR BLD AUTO: 0.3 %
BUN SERPL-MCNC: 21 MG/DL (ref 7–25)
BUN/CREAT SERPL: ABNORMAL (CALC) (ref 6–22)
CALCIUM SERPL-MCNC: 9.1 MG/DL (ref 8.6–10.4)
CHLORIDE SERPL-SCNC: 105 MMOL/L (ref 98–110)
CO2 SERPL-SCNC: 31 MMOL/L (ref 20–32)
CREAT SERPL-MCNC: 0.68 MG/DL (ref 0.5–0.99)
EOSINOPHIL # BLD AUTO: 149 CELLS/UL (ref 15–500)
EOSINOPHIL NFR BLD AUTO: 1.5 %
ERYTHROCYTE [DISTWIDTH] IN BLOOD BY AUTOMATED COUNT: 14.1 % (ref 11–15)
EST. AVERAGE GLUCOSE BLD GHB EST-MCNC: 177 (CALC)
EST. AVERAGE GLUCOSE BLD GHB EST-SCNC: 9.8 (CALC)
FERRITIN SERPL-MCNC: 18 NG/ML (ref 16–232)
GLUCOSE SERPL-MCNC: 126 MG/DL (ref 65–99)
HBA1C MFR BLD: 7.8 % OF TOTAL HGB
HCT VFR BLD AUTO: 36.9 % (ref 35–45)
HGB BLD-MCNC: 11.4 G/DL (ref 11.7–15.5)
LYMPHOCYTES # BLD AUTO: 2119 CELLS/UL (ref 850–3900)
LYMPHOCYTES NFR BLD AUTO: 21.4 %
MCH RBC QN AUTO: 25 PG (ref 27–33)
MCHC RBC AUTO-ENTMCNC: 30.9 G/DL (ref 32–36)
MCV RBC AUTO: 80.9 FL (ref 80–100)
MONOCYTES # BLD AUTO: 554 CELLS/UL (ref 200–950)
MONOCYTES NFR BLD AUTO: 5.6 %
NEUTROPHILS # BLD AUTO: 7049 CELLS/UL (ref 1500–7800)
NEUTROPHILS NFR BLD AUTO: 71.2 %
PLATELET # BLD AUTO: 196 THOUSAND/UL (ref 140–400)
PMV BLD REES-ECKER: 12 FL (ref 7.5–12.5)
POTASSIUM SERPL-SCNC: 4.3 MMOL/L (ref 3.5–5.3)
RBC # BLD AUTO: 4.56 MILLION/UL (ref 3.8–5.1)
SL AMB EGFR AFRICAN AMERICAN: 110 ML/MIN/1.73M2
SL AMB EGFR NON AFRICAN AMERICAN: 95 ML/MIN/1.73M2
SODIUM SERPL-SCNC: 143 MMOL/L (ref 135–146)
WBC # BLD AUTO: 9.9 THOUSAND/UL (ref 3.8–10.8)

## 2020-09-04 PROCEDURE — 3051F HG A1C>EQUAL 7.0%<8.0%: CPT | Performed by: FAMILY MEDICINE

## 2020-09-04 NOTE — TELEPHONE ENCOUNTER
----- Message from Reid Lauren MD sent at 9/4/2020 11:50 AM EDT -----  Call patient with lab result-sugars slight better- A1C still at 7 8   Advise increase basal insulin dose by 4 units- repeat labs 3 months

## 2020-09-04 NOTE — RESULT ENCOUNTER NOTE
Call patient with lab result-sugars slight better- A1C still at 7 8   Advise increase basal insulin dose by 4 units- repeat labs 3 months

## 2020-09-17 DIAGNOSIS — R11.0 NAUSEA: ICD-10-CM

## 2020-09-17 RX ORDER — ONDANSETRON 4 MG/1
TABLET, FILM COATED ORAL
Qty: 30 TABLET | Refills: 1 | Status: SHIPPED | OUTPATIENT
Start: 2020-09-17 | End: 2021-03-23 | Stop reason: SDUPTHER

## 2020-09-23 ENCOUNTER — IMMUNIZATIONS (OUTPATIENT)
Dept: FAMILY MEDICINE CLINIC | Facility: CLINIC | Age: 60
End: 2020-09-23
Payer: COMMERCIAL

## 2020-09-23 DIAGNOSIS — Z23 NEED FOR VACCINATION: Primary | ICD-10-CM

## 2020-09-23 PROCEDURE — 90682 RIV4 VACC RECOMBINANT DNA IM: CPT

## 2020-09-23 PROCEDURE — 90471 IMMUNIZATION ADMIN: CPT

## 2020-09-26 DIAGNOSIS — G47.00 INSOMNIA, UNSPECIFIED TYPE: ICD-10-CM

## 2020-09-27 DIAGNOSIS — K21.00 GASTROESOPHAGEAL REFLUX DISEASE WITH ESOPHAGITIS: ICD-10-CM

## 2020-09-27 RX ORDER — OMEPRAZOLE 40 MG/1
CAPSULE, DELAYED RELEASE ORAL
Qty: 90 CAPSULE | Refills: 0 | Status: SHIPPED | OUTPATIENT
Start: 2020-09-27 | End: 2020-12-30

## 2020-09-28 RX ORDER — ZOLPIDEM TARTRATE 5 MG/1
5 TABLET ORAL
Qty: 30 TABLET | Refills: 2 | Status: SHIPPED | OUTPATIENT
Start: 2020-09-28 | End: 2020-12-24 | Stop reason: SDUPTHER

## 2020-10-08 DIAGNOSIS — J45.30 MILD PERSISTENT ASTHMA WITHOUT COMPLICATION: ICD-10-CM

## 2020-10-08 RX ORDER — ALBUTEROL SULFATE 2.5 MG/3ML
SOLUTION RESPIRATORY (INHALATION)
Qty: 300 ML | Refills: 0 | Status: SHIPPED | OUTPATIENT
Start: 2020-10-08 | End: 2021-09-15 | Stop reason: SDUPTHER

## 2020-10-14 DIAGNOSIS — E11.9 TYPE 2 DIABETES MELLITUS WITHOUT COMPLICATION, WITHOUT LONG-TERM CURRENT USE OF INSULIN (HCC): ICD-10-CM

## 2020-10-16 DIAGNOSIS — E11.9 CONTROLLED TYPE 2 DIABETES MELLITUS WITHOUT COMPLICATION, WITHOUT LONG-TERM CURRENT USE OF INSULIN (HCC): ICD-10-CM

## 2020-10-16 RX ORDER — INSULIN GLARGINE 100 [IU]/ML
INJECTION, SOLUTION SUBCUTANEOUS
Qty: 5 PEN | Refills: 1 | Status: SHIPPED | OUTPATIENT
Start: 2020-10-16 | End: 2021-01-06 | Stop reason: SDUPTHER

## 2020-10-21 DIAGNOSIS — J45.30 MILD PERSISTENT ASTHMA WITHOUT COMPLICATION: ICD-10-CM

## 2020-10-21 DIAGNOSIS — E11.9 TYPE 2 DIABETES MELLITUS WITHOUT COMPLICATION, WITHOUT LONG-TERM CURRENT USE OF INSULIN (HCC): ICD-10-CM

## 2020-10-21 DIAGNOSIS — E11.69 HYPERLIPIDEMIA ASSOCIATED WITH TYPE 2 DIABETES MELLITUS (HCC): ICD-10-CM

## 2020-10-21 DIAGNOSIS — I10 ESSENTIAL HYPERTENSION: ICD-10-CM

## 2020-10-21 DIAGNOSIS — E78.5 HYPERLIPIDEMIA ASSOCIATED WITH TYPE 2 DIABETES MELLITUS (HCC): ICD-10-CM

## 2020-10-21 RX ORDER — AMLODIPINE BESYLATE AND BENAZEPRIL HYDROCHLORIDE 5; 20 MG/1; MG/1
CAPSULE ORAL
Qty: 90 CAPSULE | Refills: 0 | Status: SHIPPED | OUTPATIENT
Start: 2020-10-21 | End: 2021-01-19

## 2020-10-21 RX ORDER — ATORVASTATIN CALCIUM 40 MG/1
TABLET, FILM COATED ORAL
Qty: 90 TABLET | Refills: 0 | Status: SHIPPED | OUTPATIENT
Start: 2020-10-21 | End: 2021-01-23

## 2020-10-21 RX ORDER — BUDESONIDE AND FORMOTEROL FUMARATE DIHYDRATE 160; 4.5 UG/1; UG/1
AEROSOL RESPIRATORY (INHALATION)
Qty: 30.6 G | Refills: 0 | Status: SHIPPED | OUTPATIENT
Start: 2020-10-21 | End: 2021-01-18

## 2020-10-23 ENCOUNTER — TELEMEDICINE (OUTPATIENT)
Dept: FAMILY MEDICINE CLINIC | Facility: CLINIC | Age: 60
End: 2020-10-23
Payer: COMMERCIAL

## 2020-10-23 DIAGNOSIS — J45.31 MILD PERSISTENT ASTHMA WITH EXACERBATION: Primary | ICD-10-CM

## 2020-10-23 PROCEDURE — 99213 OFFICE O/P EST LOW 20 MIN: CPT | Performed by: NURSE PRACTITIONER

## 2020-10-23 RX ORDER — PREDNISONE 20 MG/1
TABLET ORAL
Qty: 15 TABLET | Refills: 0 | Status: SHIPPED | OUTPATIENT
Start: 2020-10-23 | End: 2021-11-05 | Stop reason: HOSPADM

## 2020-11-10 DIAGNOSIS — E11.9 TYPE 2 DIABETES MELLITUS WITHOUT COMPLICATION, WITHOUT LONG-TERM CURRENT USE OF INSULIN (HCC): ICD-10-CM

## 2020-11-10 RX ORDER — BLOOD SUGAR DIAGNOSTIC
STRIP MISCELLANEOUS
Qty: 100 EACH | Refills: 1 | Status: SHIPPED | OUTPATIENT
Start: 2020-11-10 | End: 2021-02-18

## 2020-12-02 DIAGNOSIS — Z79.4 CONTROLLED TYPE 2 DIABETES MELLITUS WITH DIABETIC POLYNEUROPATHY, WITH LONG-TERM CURRENT USE OF INSULIN (HCC): Primary | ICD-10-CM

## 2020-12-02 DIAGNOSIS — E11.42 CONTROLLED TYPE 2 DIABETES MELLITUS WITH DIABETIC POLYNEUROPATHY, WITH LONG-TERM CURRENT USE OF INSULIN (HCC): Primary | ICD-10-CM

## 2020-12-02 DIAGNOSIS — E11.9 CONTROLLED TYPE 2 DIABETES MELLITUS WITHOUT COMPLICATION, WITHOUT LONG-TERM CURRENT USE OF INSULIN (HCC): ICD-10-CM

## 2020-12-02 DIAGNOSIS — E61.1 IRON DEFICIENCY: ICD-10-CM

## 2020-12-02 RX ORDER — PEN NEEDLE, DIABETIC 31 GX5/16"
NEEDLE, DISPOSABLE MISCELLANEOUS
Qty: 100 EACH | Refills: 0 | Status: SHIPPED | OUTPATIENT
Start: 2020-12-02 | End: 2021-03-10 | Stop reason: SDUPTHER

## 2020-12-14 DIAGNOSIS — E11.9 TYPE 2 DIABETES MELLITUS WITHOUT COMPLICATION, WITHOUT LONG-TERM CURRENT USE OF INSULIN (HCC): ICD-10-CM

## 2020-12-14 DIAGNOSIS — J45.30 MILD PERSISTENT ASTHMA, UNSPECIFIED WHETHER COMPLICATED: ICD-10-CM

## 2020-12-14 RX ORDER — SITAGLIPTIN 100 MG/1
TABLET, FILM COATED ORAL
Qty: 90 TABLET | Refills: 0 | Status: SHIPPED | OUTPATIENT
Start: 2020-12-14 | End: 2021-03-08

## 2020-12-15 RX ORDER — ALBUTEROL SULFATE 90 UG/1
AEROSOL, METERED RESPIRATORY (INHALATION)
Qty: 25.5 G | Refills: 3 | Status: SHIPPED | OUTPATIENT
Start: 2020-12-15 | End: 2021-02-25

## 2020-12-22 ENCOUNTER — TELEPHONE (OUTPATIENT)
Dept: FAMILY MEDICINE CLINIC | Facility: CLINIC | Age: 60
End: 2020-12-22

## 2020-12-22 LAB
BUN SERPL-MCNC: 16 MG/DL (ref 7–25)
BUN/CREAT SERPL: NORMAL (CALC) (ref 6–22)
CALCIUM SERPL-MCNC: 9.1 MG/DL (ref 8.6–10.4)
CHLORIDE SERPL-SCNC: 102 MMOL/L (ref 98–110)
CO2 SERPL-SCNC: 30 MMOL/L (ref 20–32)
CREAT SERPL-MCNC: 0.64 MG/DL (ref 0.5–0.99)
FERRITIN SERPL-MCNC: 19 NG/ML (ref 16–232)
GLUCOSE SERPL-MCNC: 96 MG/DL (ref 65–99)
HBA1C MFR BLD: 7.1 % OF TOTAL HGB
HCT VFR BLD AUTO: 35.9 % (ref 35–45)
HGB BLD-MCNC: 11.1 G/DL (ref 11.7–15.5)
POTASSIUM SERPL-SCNC: 4.1 MMOL/L (ref 3.5–5.3)
SL AMB EGFR AFRICAN AMERICAN: 112 ML/MIN/1.73M2
SL AMB EGFR NON AFRICAN AMERICAN: 97 ML/MIN/1.73M2
SODIUM SERPL-SCNC: 141 MMOL/L (ref 135–146)

## 2020-12-24 DIAGNOSIS — G47.00 INSOMNIA, UNSPECIFIED TYPE: ICD-10-CM

## 2020-12-25 RX ORDER — ZOLPIDEM TARTRATE 5 MG/1
5 TABLET ORAL
Qty: 30 TABLET | Refills: 2 | Status: SHIPPED | OUTPATIENT
Start: 2020-12-25 | End: 2021-03-23 | Stop reason: SDUPTHER

## 2020-12-30 DIAGNOSIS — K21.00 GASTROESOPHAGEAL REFLUX DISEASE WITH ESOPHAGITIS: ICD-10-CM

## 2020-12-30 RX ORDER — OMEPRAZOLE 40 MG/1
CAPSULE, DELAYED RELEASE ORAL
Qty: 90 CAPSULE | Refills: 0 | Status: SHIPPED | OUTPATIENT
Start: 2020-12-30 | End: 2021-03-23

## 2021-01-06 DIAGNOSIS — E11.9 CONTROLLED TYPE 2 DIABETES MELLITUS WITHOUT COMPLICATION, WITHOUT LONG-TERM CURRENT USE OF INSULIN (HCC): ICD-10-CM

## 2021-01-07 RX ORDER — INSULIN GLARGINE 100 [IU]/ML
INJECTION, SOLUTION SUBCUTANEOUS
Qty: 5 PEN | Refills: 1 | Status: SHIPPED | OUTPATIENT
Start: 2021-01-07 | End: 2021-03-25 | Stop reason: SDUPTHER

## 2021-01-16 DIAGNOSIS — J45.30 MILD PERSISTENT ASTHMA WITHOUT COMPLICATION: ICD-10-CM

## 2021-01-18 DIAGNOSIS — I10 ESSENTIAL HYPERTENSION: ICD-10-CM

## 2021-01-18 DIAGNOSIS — E11.9 TYPE 2 DIABETES MELLITUS WITHOUT COMPLICATION, WITHOUT LONG-TERM CURRENT USE OF INSULIN (HCC): ICD-10-CM

## 2021-01-18 RX ORDER — BUDESONIDE AND FORMOTEROL FUMARATE DIHYDRATE 160; 4.5 UG/1; UG/1
AEROSOL RESPIRATORY (INHALATION)
Qty: 30.6 G | Refills: 0 | Status: SHIPPED | OUTPATIENT
Start: 2021-01-18 | End: 2021-03-30

## 2021-01-19 RX ORDER — LANCETS
EACH MISCELLANEOUS
Qty: 204 EACH | Refills: 3 | Status: SHIPPED | OUTPATIENT
Start: 2021-01-19 | End: 2022-01-07

## 2021-01-19 RX ORDER — AMLODIPINE BESYLATE AND BENAZEPRIL HYDROCHLORIDE 5; 20 MG/1; MG/1
CAPSULE ORAL
Qty: 90 CAPSULE | Refills: 0 | Status: SHIPPED | OUTPATIENT
Start: 2021-01-19 | End: 2021-06-03 | Stop reason: SDUPTHER

## 2021-01-22 DIAGNOSIS — E11.69 HYPERLIPIDEMIA ASSOCIATED WITH TYPE 2 DIABETES MELLITUS (HCC): ICD-10-CM

## 2021-01-22 DIAGNOSIS — E78.5 HYPERLIPIDEMIA ASSOCIATED WITH TYPE 2 DIABETES MELLITUS (HCC): ICD-10-CM

## 2021-01-23 ENCOUNTER — TELEPHONE (OUTPATIENT)
Dept: FAMILY MEDICINE CLINIC | Facility: CLINIC | Age: 61
End: 2021-01-23

## 2021-01-23 RX ORDER — ATORVASTATIN CALCIUM 40 MG/1
TABLET, FILM COATED ORAL
Qty: 90 TABLET | Refills: 0 | Status: SHIPPED | OUTPATIENT
Start: 2021-01-23 | End: 2021-08-06 | Stop reason: SDUPTHER

## 2021-01-23 NOTE — TELEPHONE ENCOUNTER
Message left that Dr Aaron Urbina can do a virtual appointment on Monday morning may be as early as 8am to be sure she is available  Requested she call the office back to confirm this  I also told her to be sure she is quarantined away from others  ----- Message from [place],Nob [address] Gee Brian sent at 1/22/2021  7:34 PM EST -----  Regarding: Visit Follow-Up Question  Contact: [phone]  Hi Dr Yañez Second, my daughter recently saw a doctor and she has Bell's palsy  The doctor asked if anybody else around her was sick  As it happens I am sick  He suggests that we get tested for covid  My symptoms are sore throat, headache, stomach pain and fatigue  So I'm wondering if you would suggest covid testing and if so where would I get that done?

## 2021-01-25 ENCOUNTER — TELEMEDICINE (OUTPATIENT)
Dept: FAMILY MEDICINE CLINIC | Facility: CLINIC | Age: 61
End: 2021-01-25
Payer: COMMERCIAL

## 2021-01-25 DIAGNOSIS — Z20.822 SUSPECTED COVID-19 VIRUS INFECTION: ICD-10-CM

## 2021-01-25 DIAGNOSIS — Z20.822 SUSPECTED COVID-19 VIRUS INFECTION: Primary | ICD-10-CM

## 2021-01-25 PROCEDURE — U0003 INFECTIOUS AGENT DETECTION BY NUCLEIC ACID (DNA OR RNA); SEVERE ACUTE RESPIRATORY SYNDROME CORONAVIRUS 2 (SARS-COV-2) (CORONAVIRUS DISEASE [COVID-19]), AMPLIFIED PROBE TECHNIQUE, MAKING USE OF HIGH THROUGHPUT TECHNOLOGIES AS DESCRIBED BY CMS-2020-01-R: HCPCS | Performed by: FAMILY MEDICINE

## 2021-01-25 PROCEDURE — U0005 INFEC AGEN DETEC AMPLI PROBE: HCPCS | Performed by: FAMILY MEDICINE

## 2021-01-25 PROCEDURE — 99213 OFFICE O/P EST LOW 20 MIN: CPT | Performed by: FAMILY MEDICINE

## 2021-01-25 NOTE — PROGRESS NOTES
COVID-19 Virtual Visit     Assessment/Plan:    Problem List Items Addressed This Visit     None      Visit Diagnoses     Suspected COVID-19 virus infection    -  Primary         Disposition:     I referred patient to one of our centralized sites for a COVID-19 swab  I have spent 15 minutes directly with the patient  Greater than 50% of this time was spent in counseling/coordination of care regarding: impressions  Encounter provider Bj Siegel MD    Provider located at [address] [place] [address] [place]    Recent Visits  Date Type Provider Dept   01/23/21 Telephone Marci Ramirez MA [facility] [address] [facility] recent visits within past 7 days and meeting all other requirements     Today's Visits  Date Type Provider Dept   01/25/21 Telemedicine Bj Siegel MD [facility] [address] [facility] today's visits and meeting all other requirements     Future Appointments  No visits were found meeting these conditions  Showing future appointments within next 150 days and meeting all other requirements        Patient agrees to participate in a virtual check in via telephone or video visit instead of presenting to the office to address urgent/immediate medical needs  Patient is aware this is a billable service  After connecting through [facility], the patient was identified by name and date of birth  Jeff Rader was informed that this was a telemedicine visit and that the exam was being conducted confidentially over secure lines  My office door was closed  No one else was in the room  Jeff Rader acknowledged consent and understanding of privacy and security of the telemedicine visit  I informed the patient that I have reviewed her record in Epic and presented the opportunity for her to ask any questions regarding the visit today  The patient agreed to participate      Subjective:   Jeff Rader is a 64 y o  female who is concerned about COVID-19  Patient's symptoms include fatigue, sore throat and myalgias       Date of symptom onset: 2021    Lab Results   Component Value Date    SARSCOV2 Not Detected 2020     Past Medical History:   Diagnosis Date    Anxiety     Asthma     Breast lump     Resolved: 2017     Chronic pain     back    Diabetes mellitus ([address] )     History of stomach ulcers     Hyperlipidemia     Hypertension     Infectious viral hepatitis     Hepatitiss A     Past Surgical History:   Procedure Laterality Date    APPENDECTOMY      BACK SURGERY       SECTION      CHOLECYSTECTOMY      GASTRIC BYPASS      SHOULDER SURGERY       Current Outpatient Medications   Medication Sig Dispense Refill    Accu-Chek FastClix Lancets MISC TEST TWO TIMES A  each 3    Accu-Chek Guide test strip TEST TWO TIMES A  each 1    albuterol (2 5 mg/3 mL) 0 083 % nebulizer solution INHALE THE CONTENTS OF 1 VIAL VIA NEBULIZER EVERY 6 HOURS AS NEEDED FOR SHORTNESS OF BREATH OR WHEEZING 300 mL 0    albuterol (PROVENTIL HFA,VENTOLIN HFA) 90 mcg/act inhaler USE 2 INHALATIONS EVERY 6 HOURS AS NEEDED FOR WHEEZING 25 5 g 3    amLODIPine-benazepril (LOTREL 5-20) 5-20 MG per capsule TAKE 1 CAPSULE DAILY 90 capsule 0    ascorbic acid (VITAMIN C) 500 mg tablet Take 2,000 mg by mouth daily      atorvastatin (LIPITOR) 40 mg tablet TAKE 1 TABLET DAILY 90 tablet 0    Ferrous Fumarate 63 (20 Fe) MG TABS Take 65 mg by mouth daily      HYDROcodone-acetaminophen (NORCO) 5-325 mg per tablet Take 5-325 tablets by mouth daily as needed  0    insulin glargine (Basaglar KwikPen) 100 units/mL injection pen Inject 31 units under skin daily at bedtime 5 pen 1    Insulin Pen Needle (B-D UF III MINI PEN NEEDLES) 31G X 5 MM MISC USE AT BEDTIME 100 each 0    Januvia 100 MG tablet TAKE 1 TABLET DAILY 90 tablet 0    lidocaine (XYLOCAINE) 5 % ointment Apply topically as needed for mild pain 240 g 1    LYRICA 200 MG capsule Take 200 mg by mouth 3 (three) times a day   0    metFORMIN (GLUCOPHAGE) 1000 MG tablet TAKE 1 TABLET TWICE A  tablet 0    omeprazole (PriLOSEC) 40 MG capsule TAKE 1 CAPSULE EVERY       MORNING 90 capsule 0    ondansetron (ZOFRAN) 4 mg tablet take 1 tablet by mouth every 8 hours if needed for nausea and vomiting 30 tablet 1    predniSONE 20 mg tablet Take twice daily x 5 days, then once daily x 5 days 15 tablet 0    Symbicort 160-4 5 MCG/ACT inhaler USE 2 INHALATIONS ORALLY   TWICE DAILY, RINSE MOUTH   AFTER USE 30 6 g 0    traMADol HCl  MG CP24 Take 300 mg by mouth daily  30 tablet 0    VITAMIN D PO       zolpidem (AMBIEN) 5 mg tablet Take 1 tablet (5 mg total) by mouth daily at bedtime as needed for sleep 30 tablet 2     Current Facility-Administered Medications   Medication Dose Route Frequency Provider Last Rate Last Admin    cyanocobalamin injection 1,000 mcg  1,000 mcg Intramuscular Weekly Oval Bars, CRNP   1,000 mcg at 02/06/20 3747     Allergies   Allergen Reactions    Nsaids Other (See Comments)     Cannot take NSAIDS secondary to having gastric bypass    Percocet [Oxycodone-Acetaminophen] GI Intolerance       Review of Systems   Constitutional: Positive for fatigue  HENT: Positive for sore throat  Musculoskeletal: Positive for myalgias  Objective: There were no vitals filed for this visit  Physical Exam  VIRTUAL VISIT DISCLAIMER    Avril Darden acknowledges that she has consented to an online visit or consultation  She understands that the online visit is based solely on information provided by her, and that, in the absence of a face-to-face physical evaluation by the physician, the diagnosis she receives is both limited and provisional in terms of accuracy and completeness  This is not intended to replace a full medical face-to-face evaluation by the physician  Avril Darden understands and accepts these terms

## 2021-01-26 ENCOUNTER — TELEPHONE (OUTPATIENT)
Dept: FAMILY MEDICINE CLINIC | Facility: CLINIC | Age: 61
End: 2021-01-26

## 2021-01-26 LAB — SARS-COV-2 RNA RESP QL NAA+PROBE: NEGATIVE

## 2021-01-27 ENCOUNTER — TELEMEDICINE (OUTPATIENT)
Dept: FAMILY MEDICINE CLINIC | Facility: CLINIC | Age: 61
End: 2021-01-27
Payer: COMMERCIAL

## 2021-01-27 VITALS
TEMPERATURE: 97 F | OXYGEN SATURATION: 95 % | HEIGHT: 64 IN | HEART RATE: 94 BPM | WEIGHT: 200 LBS | BODY MASS INDEX: 34.15 KG/M2

## 2021-01-27 DIAGNOSIS — J01.00 ACUTE NON-RECURRENT MAXILLARY SINUSITIS: Primary | ICD-10-CM

## 2021-01-27 PROCEDURE — 3725F SCREEN DEPRESSION PERFORMED: CPT | Performed by: FAMILY MEDICINE

## 2021-01-27 PROCEDURE — 3008F BODY MASS INDEX DOCD: CPT | Performed by: FAMILY MEDICINE

## 2021-01-27 PROCEDURE — 1036F TOBACCO NON-USER: CPT | Performed by: FAMILY MEDICINE

## 2021-01-27 PROCEDURE — 99213 OFFICE O/P EST LOW 20 MIN: CPT | Performed by: FAMILY MEDICINE

## 2021-01-27 RX ORDER — CEPHALEXIN 500 MG/1
500 CAPSULE ORAL 3 TIMES DAILY
Qty: 30 CAPSULE | Refills: 0 | Status: SHIPPED | OUTPATIENT
Start: 2021-01-27 | End: 2021-02-06

## 2021-01-27 NOTE — PROGRESS NOTES
Virtual Regular Visit      Assessment/Plan:    Problem List Items Addressed This Visit     None      Visit Diagnoses     Acute non-recurrent maxillary sinusitis    -  Primary    Relevant Medications    cephalexin (KEFLEX) 500 mg capsule               Reason for visit is   Chief Complaint   Patient presents with    Sinus Problem     SINUS/ST/HEADACHE X 1 WEEK    Virtual Regular Visit        Encounter provider Re Lemus MD    Provider located at [address] [place] [phone]      Recent Visits  Date Type Provider Dept   01/26/21 Telephone [address] [facility] [address]   01/25/21 Telemedicine Troy Sands MD [facility] [address]   01/23/21 Telephone Ulisses Meek MA [facility]   Showing recent visits within past 7 days and meeting all other requirements     Today's Visits  Date Type Provider Dept   01/27/21 Telemedicine Re Lemus MD [facility] [address] [facility] today's visits and meeting all other requirements     Future Appointments  No visits were found meeting these conditions  Showing future appointments within next 150 days and meeting all other requirements        The patient was identified by name and date of birth  Flako Bob was informed that this is a telemedicine visit and that the visit is being conducted through [employer] and patient was informed that this is not a secure, HIPAA-compliant platform  She agrees to proceed     My office door was closed  No one else was in the room  She acknowledged consent and understanding of privacy and security of the video platform  The patient has agreed to participate and understands they can discontinue the visit at any time  Patient is aware this is a billable service  Dacia Cooney is a 64 y o  female -   Patient calls in today follow-up on upper respiratory symptoms    Did have a negative COVID test 2 days ago  She has now had sinus pressure with congestion over the last 1 week  Mild sinus pressure headache  Also slight postnasal drip with mild cough  No fevers  No other new symptoms to suggest COVID         Past Medical History:   Diagnosis Date    Anxiety     Asthma     Breast lump     Resolved: 2017     Chronic pain     back    Diabetes mellitus ([address] )     History of stomach ulcers     Hyperlipidemia     Hypertension     Infectious viral hepatitis     Hepatitiss A       Past Surgical History:   Procedure Laterality Date    APPENDECTOMY      BACK SURGERY       SECTION      CHOLECYSTECTOMY      GASTRIC BYPASS      SHOULDER SURGERY         Current Outpatient Medications   Medication Sig Dispense Refill    Accu-Chek FastClix Lancets MISC TEST TWO TIMES A  each 3    Accu-Chek Guide test strip TEST TWO TIMES A  each 1    albuterol (2 5 mg/3 mL) 0 083 % nebulizer solution INHALE THE CONTENTS OF 1 VIAL VIA NEBULIZER EVERY 6 HOURS AS NEEDED FOR SHORTNESS OF BREATH OR WHEEZING 300 mL 0    albuterol (PROVENTIL HFA,VENTOLIN HFA) 90 mcg/act inhaler USE 2 INHALATIONS EVERY 6 HOURS AS NEEDED FOR WHEEZING 25 5 g 3    amLODIPine-benazepril (LOTREL 5-20) 5-20 MG per capsule TAKE 1 CAPSULE DAILY 90 capsule 0    ascorbic acid (VITAMIN C) 500 mg tablet Take 2,000 mg by mouth daily      atorvastatin (LIPITOR) 40 mg tablet TAKE 1 TABLET DAILY 90 tablet 0    cephalexin (KEFLEX) 500 mg capsule Take 1 capsule (500 mg total) by mouth 3 (three) times a day for 10 days Complete full course 30 capsule 0    Ferrous Fumarate 63 (20 Fe) MG TABS Take 65 mg by mouth daily      HYDROcodone-acetaminophen (NORCO) 5-325 mg per tablet Take 5-325 tablets by mouth daily as needed  0    insulin glargine (Basaglar KwikPen) 100 units/mL injection pen Inject 31 units under skin daily at bedtime 5 pen 1    Insulin Pen Needle (B-D UF III MINI PEN NEEDLES) 31G X 5 MM MISC USE AT BEDTIME 100 each 0    Januvia 100 MG tablet TAKE 1 TABLET DAILY 90 tablet 0    lidocaine (XYLOCAINE) 5 % ointment Apply topically as needed for mild pain 240 g 1    LYRICA 200 MG capsule Take 200 mg by mouth 3 (three) times a day   0    metFORMIN (GLUCOPHAGE) 1000 MG tablet TAKE 1 TABLET TWICE A  tablet 0    omeprazole (PriLOSEC) 40 MG capsule TAKE 1 CAPSULE EVERY       MORNING 90 capsule 0    ondansetron (ZOFRAN) 4 mg tablet take 1 tablet by mouth every 8 hours if needed for nausea and vomiting 30 tablet 1    predniSONE 20 mg tablet Take twice daily x 5 days, then once daily x 5 days 15 tablet 0    Symbicort 160-4 5 MCG/ACT inhaler USE 2 INHALATIONS ORALLY   TWICE DAILY, RINSE MOUTH   AFTER USE 30 6 g 0    traMADol HCl  MG CP24 Take 300 mg by mouth daily  30 tablet 0    VITAMIN D PO       zolpidem (AMBIEN) 5 mg tablet Take 1 tablet (5 mg total) by mouth daily at bedtime as needed for sleep 30 tablet 2     Current Facility-Administered Medications   Medication Dose Route Frequency Provider Last Rate Last Admin    cyanocobalamin injection 1,000 mcg  1,000 mcg Intramuscular Weekly CHITRA [place]   1,000 mcg at 02/06/20 1447        Allergies   Allergen Reactions    Nsaids Other (See Comments)     Cannot take NSAIDS secondary to having gastric bypass    Percocet [Oxycodone-Acetaminophen] GI Intolerance       Review of Systems   Constitutional: Negative for appetite change, chills, diaphoresis, fatigue and fever  HENT: Positive for congestion and sinus pressure  Negative for ear pain, rhinorrhea and sore throat  Eyes: Negative for discharge, redness and itching  Respiratory: Positive for cough  Negative for shortness of breath and wheezing  Cardiovascular: Negative for chest pain and palpitations  Rapid or slow heart rate   Gastrointestinal: Negative for abdominal pain, diarrhea, nausea and vomiting         Video Exam    Vitals:    01/27/21 0830   Pulse: 94   Temp: (!) 97 °F (36 1 °C)   SpO2: 95%   Weight: 90 7 kg (200 lb)   Height: 5' 4" (1 626 m)       Physical Exam  Constitutional:       General: She is not in acute distress  Appearance: Normal appearance  She is not ill-appearing  HENT:      Head: Normocephalic and atraumatic  Nose: Congestion present  Right Sinus: Frontal sinus tenderness present  Left Sinus: Frontal sinus tenderness present  Eyes:      Extraocular Movements: Extraocular movements intact  Pulmonary:      Effort: Pulmonary effort is normal  No respiratory distress  Neurological:      Mental Status: She is alert  Psychiatric:         Behavior: Behavior normal           I spent 15 minutes directly with the patient during this visit    Patient Instructions   With COVID negative testing and persistent sinus pressure  Will treat as sinus infection with Keflex 3 times daily for 7-10 days  Other measures were discussed  Recheck as needed  VIRTUAL VISIT NYDIA Viktoriya Stap[address] 19 acknowledges that she has consented to an online visit or consultation  She understands that the online visit is based solely on information provided by her, and that, in the absence of a face-to-face physical evaluation by the physician, the diagnosis she receives is both limited and provisional in terms of accuracy and completeness  This is not intended to replace a full medical face-to-face evaluation by the physician  Vivien Hammond understands and accepts these terms

## 2021-01-27 NOTE — PATIENT INSTRUCTIONS
With COVID negative testing and persistent sinus pressure  Will treat as sinus infection with Keflex 3 times daily for 7-10 days  Other measures were discussed  Recheck as needed

## 2021-02-18 DIAGNOSIS — E11.9 TYPE 2 DIABETES MELLITUS WITHOUT COMPLICATION, WITHOUT LONG-TERM CURRENT USE OF INSULIN (HCC): ICD-10-CM

## 2021-02-18 RX ORDER — BLOOD SUGAR DIAGNOSTIC
STRIP MISCELLANEOUS
Qty: 100 EACH | Refills: 3 | Status: SHIPPED | OUTPATIENT
Start: 2021-02-18 | End: 2021-08-13

## 2021-02-24 DIAGNOSIS — J45.30 MILD PERSISTENT ASTHMA, UNSPECIFIED WHETHER COMPLICATED: ICD-10-CM

## 2021-02-25 RX ORDER — ALBUTEROL SULFATE 90 UG/1
AEROSOL, METERED RESPIRATORY (INHALATION)
Qty: 25.5 G | Refills: 3 | Status: SHIPPED | OUTPATIENT
Start: 2021-02-25 | End: 2021-10-20 | Stop reason: SDUPTHER

## 2021-03-06 DIAGNOSIS — E11.9 TYPE 2 DIABETES MELLITUS WITHOUT COMPLICATION, WITHOUT LONG-TERM CURRENT USE OF INSULIN (HCC): ICD-10-CM

## 2021-03-08 RX ORDER — SITAGLIPTIN 100 MG/1
TABLET, FILM COATED ORAL
Qty: 90 TABLET | Refills: 0 | Status: SHIPPED | OUTPATIENT
Start: 2021-03-08 | End: 2021-03-30

## 2021-03-10 DIAGNOSIS — Z23 ENCOUNTER FOR IMMUNIZATION: ICD-10-CM

## 2021-03-10 DIAGNOSIS — E11.9 CONTROLLED TYPE 2 DIABETES MELLITUS WITHOUT COMPLICATION, WITHOUT LONG-TERM CURRENT USE OF INSULIN (HCC): ICD-10-CM

## 2021-03-10 DIAGNOSIS — Z79.4 CONTROLLED TYPE 2 DIABETES MELLITUS WITH DIABETIC POLYNEUROPATHY, WITH LONG-TERM CURRENT USE OF INSULIN (HCC): Primary | ICD-10-CM

## 2021-03-10 DIAGNOSIS — E61.1 IRON DEFICIENCY: ICD-10-CM

## 2021-03-10 DIAGNOSIS — E11.42 CONTROLLED TYPE 2 DIABETES MELLITUS WITH DIABETIC POLYNEUROPATHY, WITH LONG-TERM CURRENT USE OF INSULIN (HCC): Primary | ICD-10-CM

## 2021-03-10 RX ORDER — PEN NEEDLE, DIABETIC 31 GX5/16"
NEEDLE, DISPOSABLE MISCELLANEOUS
Qty: 100 EACH | Refills: 0 | Status: SHIPPED | OUTPATIENT
Start: 2021-03-10 | End: 2021-06-24

## 2021-03-17 ENCOUNTER — TELEPHONE (OUTPATIENT)
Dept: FAMILY MEDICINE CLINIC | Facility: CLINIC | Age: 61
End: 2021-03-17

## 2021-03-17 LAB
BASOPHILS # BLD AUTO: 26 CELLS/UL (ref 0–200)
BASOPHILS NFR BLD AUTO: 0.3 %
BUN SERPL-MCNC: 20 MG/DL (ref 7–25)
BUN/CREAT SERPL: ABNORMAL (CALC) (ref 6–22)
CALCIUM SERPL-MCNC: 9.8 MG/DL (ref 8.6–10.4)
CHLORIDE SERPL-SCNC: 103 MMOL/L (ref 98–110)
CO2 SERPL-SCNC: 33 MMOL/L (ref 20–32)
CREAT SERPL-MCNC: 0.66 MG/DL (ref 0.5–0.99)
EOSINOPHIL # BLD AUTO: 181 CELLS/UL (ref 15–500)
EOSINOPHIL NFR BLD AUTO: 2.1 %
ERYTHROCYTE [DISTWIDTH] IN BLOOD BY AUTOMATED COUNT: 14.9 % (ref 11–15)
GLUCOSE SERPL-MCNC: 116 MG/DL (ref 65–99)
HBA1C MFR BLD: 7.1 % OF TOTAL HGB
HCT VFR BLD AUTO: 36.3 % (ref 35–45)
HGB BLD-MCNC: 11.4 G/DL (ref 11.7–15.5)
LYMPHOCYTES # BLD AUTO: 2253 CELLS/UL (ref 850–3900)
LYMPHOCYTES NFR BLD AUTO: 26.2 %
MCH RBC QN AUTO: 25.1 PG (ref 27–33)
MCHC RBC AUTO-ENTMCNC: 31.4 G/DL (ref 32–36)
MCV RBC AUTO: 80 FL (ref 80–100)
MONOCYTES # BLD AUTO: 507 CELLS/UL (ref 200–950)
MONOCYTES NFR BLD AUTO: 5.9 %
NEUTROPHILS # BLD AUTO: 5633 CELLS/UL (ref 1500–7800)
NEUTROPHILS NFR BLD AUTO: 65.5 %
PLATELET # BLD AUTO: 226 THOUSAND/UL (ref 140–400)
PMV BLD REES-ECKER: 13.3 FL (ref 7.5–12.5)
POTASSIUM SERPL-SCNC: 4.3 MMOL/L (ref 3.5–5.3)
RBC # BLD AUTO: 4.54 MILLION/UL (ref 3.8–5.1)
SL AMB EGFR AFRICAN AMERICAN: 110 ML/MIN/1.73M2
SL AMB EGFR NON AFRICAN AMERICAN: 95 ML/MIN/1.73M2
SODIUM SERPL-SCNC: 142 MMOL/L (ref 135–146)
WBC # BLD AUTO: 8.6 THOUSAND/UL (ref 3.8–10.8)

## 2021-03-17 PROCEDURE — 3051F HG A1C>EQUAL 7.0%<8.0%: CPT | Performed by: FAMILY MEDICINE

## 2021-03-17 NOTE — TELEPHONE ENCOUNTER
----- Message from Meche De Jesus MD sent at 3/17/2021  8:10 AM EDT -----  Call patient with lab result-labs look good, she should be in for diabetes visit- in office advised

## 2021-03-23 ENCOUNTER — OFFICE VISIT (OUTPATIENT)
Dept: FAMILY MEDICINE CLINIC | Facility: CLINIC | Age: 61
End: 2021-03-23
Payer: COMMERCIAL

## 2021-03-23 VITALS
DIASTOLIC BLOOD PRESSURE: 86 MMHG | OXYGEN SATURATION: 92 % | HEART RATE: 90 BPM | BODY MASS INDEX: 34.15 KG/M2 | WEIGHT: 200 LBS | SYSTOLIC BLOOD PRESSURE: 136 MMHG | TEMPERATURE: 96.4 F | HEIGHT: 64 IN

## 2021-03-23 DIAGNOSIS — E11.69 HYPERLIPIDEMIA ASSOCIATED WITH TYPE 2 DIABETES MELLITUS (HCC): ICD-10-CM

## 2021-03-23 DIAGNOSIS — G47.00 INSOMNIA, UNSPECIFIED TYPE: ICD-10-CM

## 2021-03-23 DIAGNOSIS — E61.1 IRON DEFICIENCY: ICD-10-CM

## 2021-03-23 DIAGNOSIS — Z12.31 ENCOUNTER FOR SCREENING MAMMOGRAM FOR MALIGNANT NEOPLASM OF BREAST: ICD-10-CM

## 2021-03-23 DIAGNOSIS — Z79.4 CONTROLLED TYPE 2 DIABETES MELLITUS WITH DIABETIC POLYNEUROPATHY, WITH LONG-TERM CURRENT USE OF INSULIN (HCC): Primary | ICD-10-CM

## 2021-03-23 DIAGNOSIS — R11.0 NAUSEA: ICD-10-CM

## 2021-03-23 DIAGNOSIS — I10 BENIGN ESSENTIAL HYPERTENSION: ICD-10-CM

## 2021-03-23 DIAGNOSIS — J01.00 ACUTE NON-RECURRENT MAXILLARY SINUSITIS: ICD-10-CM

## 2021-03-23 DIAGNOSIS — E11.42 CONTROLLED TYPE 2 DIABETES MELLITUS WITH DIABETIC POLYNEUROPATHY, WITH LONG-TERM CURRENT USE OF INSULIN (HCC): Primary | ICD-10-CM

## 2021-03-23 DIAGNOSIS — K21.00 GASTROESOPHAGEAL REFLUX DISEASE WITH ESOPHAGITIS: ICD-10-CM

## 2021-03-23 DIAGNOSIS — R41.3 MEMORY DYSFUNCTION: ICD-10-CM

## 2021-03-23 DIAGNOSIS — E53.8 B12 DEFICIENCY: ICD-10-CM

## 2021-03-23 DIAGNOSIS — E78.5 HYPERLIPIDEMIA ASSOCIATED WITH TYPE 2 DIABETES MELLITUS (HCC): ICD-10-CM

## 2021-03-23 DIAGNOSIS — J45.30 MILD PERSISTENT ASTHMA WITHOUT COMPLICATION: ICD-10-CM

## 2021-03-23 PROCEDURE — 99214 OFFICE O/P EST MOD 30 MIN: CPT | Performed by: FAMILY MEDICINE

## 2021-03-23 PROCEDURE — 1036F TOBACCO NON-USER: CPT | Performed by: FAMILY MEDICINE

## 2021-03-23 PROCEDURE — 3075F SYST BP GE 130 - 139MM HG: CPT | Performed by: FAMILY MEDICINE

## 2021-03-23 PROCEDURE — 3079F DIAST BP 80-89 MM HG: CPT | Performed by: FAMILY MEDICINE

## 2021-03-23 PROCEDURE — 3008F BODY MASS INDEX DOCD: CPT | Performed by: FAMILY MEDICINE

## 2021-03-23 RX ORDER — AZITHROMYCIN 250 MG/1
TABLET, FILM COATED ORAL
Qty: 6 TABLET | Refills: 0 | Status: SHIPPED | OUTPATIENT
Start: 2021-03-23 | End: 2021-03-27

## 2021-03-23 RX ORDER — ZOLPIDEM TARTRATE 5 MG/1
5 TABLET ORAL
Qty: 30 TABLET | Refills: 2 | Status: SHIPPED | OUTPATIENT
Start: 2021-03-23 | End: 2021-06-17 | Stop reason: SDUPTHER

## 2021-03-23 RX ORDER — ONDANSETRON 4 MG/1
4 TABLET, FILM COATED ORAL EVERY 8 HOURS PRN
Qty: 30 TABLET | Refills: 1 | Status: SHIPPED | OUTPATIENT
Start: 2021-03-23 | End: 2022-07-20 | Stop reason: SDUPTHER

## 2021-03-23 RX ORDER — OMEPRAZOLE 40 MG/1
CAPSULE, DELAYED RELEASE ORAL
Qty: 90 CAPSULE | Refills: 0 | Status: SHIPPED | OUTPATIENT
Start: 2021-03-23 | End: 2021-04-22

## 2021-03-23 RX ORDER — TRAMADOL HYDROCHLORIDE 300 MG/1
300 TABLET, FILM COATED, EXTENDED RELEASE ORAL DAILY
COMMUNITY
Start: 2021-03-17 | End: 2021-11-05 | Stop reason: HOSPADM

## 2021-03-23 NOTE — PATIENT INSTRUCTIONS
Weight Management   AMBULATORY CARE:   Why it is important to manage your weight:  Being overweight increases your risk of health conditions such as heart disease, high blood pressure, type 2 diabetes, and certain types of cancer  It can also increase your risk for osteoarthritis, sleep apnea, and other respiratory problems  Aim for a slow, steady weight loss  Even a small amount of weight loss can lower your risk of health problems  How to lose weight safely:  A safe and healthy way to lose weight is to eat fewer calories and get regular exercise  · You can lose up about 1 pound a week by decreasing the number of calories you eat by 500 calories each day  You can decrease calories by eating smaller portion sizes or by cutting out high-calorie foods  Read labels to find out how many calories are in the foods you eat  · You can also burn calories with exercise such as walking, swimming, or biking  You will be more likely to keep weight off if you make these changes part of your lifestyle  Exercise at least 30 minutes per day on most days of the week  You can also fit in more physical activity by taking the stairs instead of the elevator or parking farther away from stores  Ask your healthcare provider about the best exercise plan for you  Healthy meal plan for weight management:  A healthy meal plan includes a variety of foods, contains fewer calories, and helps you stay healthy  A healthy meal plan includes the following:     · Eat whole-grain foods more often  A healthy meal plan should contain fiber  Fiber is the part of grains, fruits, and vegetables that is not broken down by your body  Whole-grain foods are healthy and provide extra fiber in your diet  Some examples of whole-grain foods are whole-wheat breads and pastas, oatmeal, brown rice, and bulgur  · Eat a variety of vegetables every day  Include dark, leafy greens such as spinach, kale, kvng greens, and mustard greens   Eat yellow and orange vegetables such as carrots, sweet potatoes, and winter squash  · Eat a variety of fruits every day  Choose fresh or canned fruit (canned in its own juice or light syrup) instead of juice  Fruit juice has very little or no fiber  · Eat low-fat dairy foods  Drink fat-free (skim) milk or 1% milk  Eat fat-free yogurt and low-fat cottage cheese  Try low-fat cheeses such as mozzarella and other reduced-fat cheeses  · Choose meat and other protein foods that are low in fat  Choose beans or other legumes such as split peas or lentils  Choose fish, skinless poultry (chicken or turkey), or lean cuts of red meat (beef or pork)  Before you cook meat or poultry, cut off any visible fat  · Use less fat and oil  Try baking foods instead of frying them  Add less fat, such as margarine, sour cream, regular salad dressing and mayonnaise to foods  Eat fewer high-fat foods  Some examples of high-fat foods include french fries, doughnuts, ice cream, and cakes  · Eat fewer sweets  Limit foods and drinks that are high in sugar  This includes candy, cookies, regular soda, and sweetened drinks  Ways to decrease calories:   · Eat smaller portions  ? Use a small plate with smaller servings  ? Do not eat second helpings  ? When you eat at a restaurant, ask for a box and place half of your meal in the box before you eat  ? Share an entrée with someone else  · Replace high-calorie snacks with healthy, low-calorie snacks  ? Choose fresh fruit, vegetables, fat-free rice cakes, or air-popped popcorn instead of potato chips, nuts, or chocolate  ? Choose water or calorie-free drinks instead of soda or sweetened drinks  · Do not shop for groceries when you are hungry  You may be more likely to make unhealthy food choices  Take a grocery list of healthy foods and shop after you have eaten  · Eat regular meals  Do not skip meals  Skipping meals can lead to overeating later in the day   This can make it harder for you to lose weight  Eat a healthy snack in place of a meal if you do not have time to eat a regular meal  Talk with a dietitian to help you create a meal plan and schedule that is right for you  Other things to consider as you try to lose weight:   · Be aware of situations that may give you the urge to overeat, such as eating while watching television  Find ways to avoid these situations  For example, read a book, go for a walk, or do crafts  · Meet with a weight loss support group or friends who are also trying to lose weight  This may help you stay motivated to continue working on your weight loss goals  © Copyright [address] Information is for End User's use only and may not be sold, redistributed or otherwise used for commercial purposes  All illustrations and images included in CareNotes® are the copyrighted property of [employer]  or [facility]   The above information is an  only  It is not intended as medical advice for individual conditions or treatments  Talk to your doctor, nurse or pharmacist before following any medical regimen to see if it is safe and effective for you

## 2021-03-23 NOTE — PROGRESS NOTES
[id number]         NAME: Jose Raul Darling is a 64 y o  female  : 1960    MRN: [id number]  DATE: 2021  TIME: 12:58 PM    Assessment and Plan   Controlled type 2 diabetes mellitus with diabetic polyneuropathy, with long-term current use of insulin ([facility] [address] ) [E11 42, Z79 4]  1  Controlled type 2 diabetes mellitus with diabetic polyneuropathy, with long-term current use of insulin (HCC)  Hemoglobin A1C With EAG    Comprehensive metabolic panel    Hemoglobin A1C With EAG    Comprehensive metabolic panel   2  Hyperlipidemia associated with type 2 diabetes mellitus (HCC)  Comprehensive metabolic panel    Lipid Panel with Direct LDL reflex    Comprehensive metabolic panel    Lipid Panel with Direct LDL reflex   3  Benign essential hypertension     4  Encounter for screening mammogram for malignant neoplasm of breast  Mammo screening bilateral w 3d & cad   5  Mild persistent asthma without complication     6  Insomnia, unspecified type  zolpidem (AMBIEN) 5 mg tablet   7  Nausea  ondansetron (ZOFRAN) 4 mg tablet   8  BMI 34 0-34 9,adult     9  Acute non-recurrent maxillary sinusitis  azithromycin (ZITHROMAX) 250 mg tablet   10  B12 deficiency  Vitamin B12    Vitamin B12   11  Iron deficiency  Ferritin    Ferritin   12  Memory dysfunction  Ambulatory referral to Neurology       No problem-specific Assessment & Plan notes found for this encounter  Patient Instructions     Patient Instructions       Weight Management   AMBULATORY CARE:   Why it is important to manage your weight:  Being overweight increases your risk of health conditions such as heart disease, high blood pressure, type 2 diabetes, and certain types of cancer  It can also increase your risk for osteoarthritis, sleep apnea, and other respiratory problems  Aim for a slow, steady weight loss  Even a small amount of weight loss can lower your risk of health problems    How to lose weight safely:  A safe and healthy way to lose weight is to eat fewer calories and get regular exercise  · You can lose up about 1 pound a week by decreasing the number of calories you eat by 500 calories each day  You can decrease calories by eating smaller portion sizes or by cutting out high-calorie foods  Read labels to find out how many calories are in the foods you eat  · You can also burn calories with exercise such as walking, swimming, or biking  You will be more likely to keep weight off if you make these changes part of your lifestyle  Exercise at least 30 minutes per day on most days of the week  You can also fit in more physical activity by taking the stairs instead of the elevator or parking farther away from stores  Ask your healthcare provider about the best exercise plan for you  Healthy meal plan for weight management:  A healthy meal plan includes a variety of foods, contains fewer calories, and helps you stay healthy  A healthy meal plan includes the following:     · Eat whole-grain foods more often  A healthy meal plan should contain fiber  Fiber is the part of grains, fruits, and vegetables that is not broken down by your body  Whole-grain foods are healthy and provide extra fiber in your diet  Some examples of whole-grain foods are whole-wheat breads and pastas, oatmeal, brown rice, and bulgur  · Eat a variety of vegetables every day  Include dark, leafy greens such as spinach, kale, kvng greens, and mustard greens  Eat yellow and orange vegetables such as carrots, sweet potatoes, and winter squash  · Eat a variety of fruits every day  Choose fresh or canned fruit (canned in its own juice or light syrup) instead of juice  Fruit juice has very little or no fiber  · Eat low-fat dairy foods  Drink fat-free (skim) milk or 1% milk  Eat fat-free yogurt and low-fat cottage cheese  Try low-fat cheeses such as mozzarella and other reduced-fat cheeses      · Choose meat and other protein foods that are low in fat  Choose beans or other legumes such as split peas or lentils  Choose fish, skinless poultry (chicken or turkey), or lean cuts of red meat (beef or pork)  Before you cook meat or poultry, cut off any visible fat  · Use less fat and oil  Try baking foods instead of frying them  Add less fat, such as margarine, sour cream, regular salad dressing and mayonnaise to foods  Eat fewer high-fat foods  Some examples of high-fat foods include french fries, doughnuts, ice cream, and cakes  · Eat fewer sweets  Limit foods and drinks that are high in sugar  This includes candy, cookies, regular soda, and sweetened drinks  Ways to decrease calories:   · Eat smaller portions  ? Use a small plate with smaller servings  ? Do not eat second helpings  ? When you eat at a restaurant, ask for a box and place half of your meal in the box before you eat  ? Share an entrée with someone else  · Replace high-calorie snacks with healthy, low-calorie snacks  ? Choose fresh fruit, vegetables, fat-free rice cakes, or air-popped popcorn instead of potato chips, nuts, or chocolate  ? Choose water or calorie-free drinks instead of soda or sweetened drinks  · Do not shop for groceries when you are hungry  You may be more likely to make unhealthy food choices  Take a grocery list of healthy foods and shop after you have eaten  · Eat regular meals  Do not skip meals  Skipping meals can lead to overeating later in the day  This can make it harder for you to lose weight  Eat a healthy snack in place of a meal if you do not have time to eat a regular meal  Talk with a dietitian to help you create a meal plan and schedule that is right for you  Other things to consider as you try to lose weight:   · Be aware of situations that may give you the urge to overeat, such as eating while watching television  Find ways to avoid these situations   For example, read a book, go for a walk, or do angi  · Meet with a weight loss support group or friends who are also trying to lose weight  This may help you stay motivated to continue working on your weight loss goals  © Copyright [address] Information is for End User's use only and may not be sold, redistributed or otherwise used for commercial purposes  All illustrations and images included in CareNotes® are the copyrighted property of A D A M , Inc  or [facility] Lobito Darleen   The above information is an  only  It is not intended as medical advice for individual conditions or treatments  Talk to your doctor, nurse or pharmacist before following any medical regimen to see if it is safe and effective for you  Chief Complaint     Chief Complaint   Patient presents with    Follow-up     diabetic          History of Present Illness         Medical management - labs are reviewed  Medications reconciled  1) diabetes - stable current medications  2) hyperlipidemia-also stable  3) hypertension-  Levels remain at top of acceptable range  Occasionally higher at home  No cardiac symptoms  4)  Asthma- stable with current inhalers  5) acute problem today - sinus congestion sinus pressure  No fevers  Some chills  This is been going on for 7-10 days  6) insomnia- stable with as needed medication  7) patient has ongoing memory dysfunction  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  This has caused a decrease in her job performance from where she was before  Severe enough that she is concerned between her chronic pain and memory dysfunction she needs to go on disability  8) vitamin-B 12 deficiency -no recent level  9) history of iron deficiency-no recent levels  Review of Systems   Review of Systems   Constitutional: Positive for fatigue  Negative for activity change, appetite change, diaphoresis and fever  HENT: Positive for congestion and sinus pressure   Negative for ear pain and sore throat  Respiratory: Positive for cough  Negative for chest tightness, shortness of breath and wheezing  Cardiovascular: Negative for chest pain, palpitations and leg swelling  Fast or slow heart rate   Gastrointestinal: Negative for abdominal pain, blood in stool, constipation, diarrhea, nausea and vomiting  Genitourinary: Negative for difficulty urinating, dysuria, frequency and hematuria  Musculoskeletal: Positive for back pain and neck stiffness  Negative for arthralgias, gait problem, joint swelling and myalgias  Neurological: Positive for weakness  Negative for dizziness, light-headedness, numbness and headaches  Psychiatric/Behavioral: Negative for agitation, confusion, dysphoric mood and sleep disturbance  The patient is not nervous/anxious            Current Medications       Current Outpatient Medications:     Accu-Chek FastClix Lancets MISC, TEST TWO TIMES A DAY, Disp: 204 each, Rfl: 3    Accu-Chek Guide test strip, TEST TWO TIMES A DAY, Disp: 100 each, Rfl: 3    albuterol (2 5 mg/3 mL) 0 083 % nebulizer solution, INHALE THE CONTENTS OF 1 VIAL VIA NEBULIZER EVERY 6 HOURS AS NEEDED FOR SHORTNESS OF BREATH OR WHEEZING, Disp: 300 mL, Rfl: 0    albuterol (PROVENTIL HFA,VENTOLIN HFA) 90 mcg/act inhaler, USE 2 INHALATIONS ORALLY   EVERY 6 HOURS AS NEEDED FORWHEEZING, Disp: 25 5 g, Rfl: 3    amLODIPine-benazepril (LOTREL 5-20) 5-20 MG per capsule, TAKE 1 CAPSULE DAILY, Disp: 90 capsule, Rfl: 0    ascorbic acid (VITAMIN C) 500 mg tablet, Take 2,000 mg by mouth daily, Disp: , Rfl:     atorvastatin (LIPITOR) 40 mg tablet, TAKE 1 TABLET DAILY, Disp: 90 tablet, Rfl: 0    Ferrous Fumarate 63 (20 Fe) MG TABS, Take 65 mg by mouth daily, Disp: , Rfl:     HYDROcodone-acetaminophen (NORCO) 5-325 mg per tablet, Take 5-325 tablets by mouth daily as needed, Disp: , Rfl: 0    insulin glargine (Basaglar KwikPen) 100 units/mL injection pen, Inject 31 units under skin daily at bedtime, Disp: 5 pen, Rfl: 1    Insulin Pen Needle (B-D UF III MINI PEN NEEDLES) 31G X 5 MM MISC, USE AT BEDTIME, Disp: 100 each, Rfl: 0    Januvia 100 MG tablet, TAKE 1 TABLET DAILY, Disp: 90 tablet, Rfl: 0    lidocaine (XYLOCAINE) 5 % ointment, Apply topically as needed for mild pain, Disp: 240 g, Rfl: 1    LYRICA 200 MG capsule, Take 200 mg by mouth 3 (three) times a day , Disp: , Rfl: 0    metFORMIN (GLUCOPHAGE) 1000 MG tablet, TAKE 1 TABLET TWICE A DAY, Disp: 180 tablet, Rfl: 0    ondansetron (ZOFRAN) 4 mg tablet, Take 1 tablet (4 mg total) by mouth every 8 (eight) hours as needed for nausea or vomiting, Disp: 30 tablet, Rfl: 1    Symbicort 160-4 5 MCG/ACT inhaler, USE 2 INHALATIONS ORALLY   TWICE DAILY, RINSE MOUTH   AFTER USE, Disp: 30 6 g, Rfl: 0    traMADol HCl  MG CP24, Take 300 mg by mouth daily , Disp: 30 tablet, Rfl: 0    traMADol HCl ER, Biphasic, 300 MG 24 hr tablet, Take 300 mg by mouth daily, Disp: , Rfl:     VITAMIN D PO, , Disp: , Rfl:     zolpidem (AMBIEN) 5 mg tablet, Take 1 tablet (5 mg total) by mouth daily at bedtime as needed for sleep, Disp: 30 tablet, Rfl: 2    azithromycin (ZITHROMAX) 250 mg tablet, 2 tabs Day 1, then 1 tab daily X 4 days, Disp: 6 tablet, Rfl: 0    omeprazole (PriLOSEC) 40 MG capsule, TAKE 1 CAPSULE EVERY       MORNING, Disp: 90 capsule, Rfl: 0    predniSONE 20 mg tablet, Take twice daily x 5 days, then once daily x 5 days (Patient not taking: Reported on 3/23/2021), Disp: 15 tablet, Rfl: 0    Current Facility-Administered Medications:     cyanocobalamin injection 1,000 mcg, 1,000 mcg, Intramuscular, Weekly, Rofridane Cheli, JENNYNP, 1,000 mcg at 02/06/20 3994    Current Allergies     Allergies as of 03/23/2021 - Reviewed 03/23/2021   Allergen Reaction Noted    Nsaids Other (See Comments) 09/23/2017    Percocet [oxycodone-acetaminophen] GI Intolerance 09/23/2017            The following portions of the patient's history were reviewed and updated as appropriate: allergies, current medications, past family history, past medical history, past social history, past surgical history and problem list      Past Medical History:   Diagnosis Date    Anxiety     Asthma     Breast lump     Resolved: 2017     Chronic pain     back    Diabetes mellitus ([address] )     History of stomach ulcers     Hyperlipidemia     Hypertension     Infectious viral hepatitis     Hepatitiss A       Past Surgical History:   Procedure Laterality Date    APPENDECTOMY      BACK SURGERY       SECTION      CHOLECYSTECTOMY      GASTRIC BYPASS      SHOULDER SURGERY         Family History   Problem Relation Age of Onset    Diabetes Mother         Mellitus    Hyperlipidemia Mother     Hypertension Mother     Colon cancer Mother     Pancreatic cancer Mother     Melanoma Mother     Cancer Father         Bladder     Alcohol abuse Father     Lung cancer Father     Prostate cancer Father     Diabetes Brother         Mellitus    Hyperlipidemia Brother     Hypertension Brother     Stomach cancer Paternal Grandmother     Lung cancer Paternal Grandfather     Diabetes Brother     Breast cancer Maternal Aunt 28    Mental illness Neg Hx          Medications have been verified  Objective   /86   Pulse 90   Temp (!) 96 4 °F (35 8 °C) (Temporal)   Ht 5' 4" (1 626 m)   Wt 90 7 kg (200 lb)   LMP  (LMP Unknown)   SpO2 92%   BMI 34 33 kg/m²        Physical Exam     Physical Exam  Vitals signs reviewed  Constitutional:       General: She is not in acute distress  Appearance: She is well-developed  HENT:      Head: Normocephalic and atraumatic  Right Ear: Tympanic membrane and external ear normal  No drainage  Left Ear: Tympanic membrane normal  No drainage  Nose: Congestion present  Right Sinus: Maxillary sinus tenderness present  Left Sinus: Maxillary sinus tenderness present        Mouth/Throat:      Mouth: Mucous membranes are moist  Pharynx: No oropharyngeal exudate or posterior oropharyngeal erythema  Eyes:      General:         Right eye: No discharge  Left eye: No discharge  Conjunctiva/sclera: Conjunctivae normal    Neck:      Musculoskeletal: Normal range of motion and neck supple  Thyroid: No thyromegaly  Cardiovascular:      Rate and Rhythm: Normal rate and regular rhythm  Heart sounds: Normal heart sounds  Pulmonary:      Effort: Pulmonary effort is normal  No respiratory distress  Breath sounds: No wheezing or rales  Lymphadenopathy:      Cervical: Cervical adenopathy present  Right cervical: Superficial cervical adenopathy present  Skin:     General: Skin is warm and dry  BMI Counseling: Body mass index is 34 33 kg/m²  The BMI is above normal  Nutrition recommendations include reducing portion sizes, decreasing overall calorie intake and 3-5 servings of fruits/vegetables daily

## 2021-03-25 DIAGNOSIS — E11.9 CONTROLLED TYPE 2 DIABETES MELLITUS WITHOUT COMPLICATION, WITHOUT LONG-TERM CURRENT USE OF INSULIN (HCC): ICD-10-CM

## 2021-03-25 RX ORDER — INSULIN GLARGINE 100 [IU]/ML
INJECTION, SOLUTION SUBCUTANEOUS
Qty: 15 ML | Refills: 2 | Status: SHIPPED | OUTPATIENT
Start: 2021-03-25 | End: 2021-08-05

## 2021-03-30 DIAGNOSIS — E11.9 TYPE 2 DIABETES MELLITUS WITHOUT COMPLICATION, WITHOUT LONG-TERM CURRENT USE OF INSULIN (HCC): ICD-10-CM

## 2021-03-30 DIAGNOSIS — J45.30 MILD PERSISTENT ASTHMA WITHOUT COMPLICATION: ICD-10-CM

## 2021-03-30 RX ORDER — BUDESONIDE AND FORMOTEROL FUMARATE DIHYDRATE 160; 4.5 UG/1; UG/1
AEROSOL RESPIRATORY (INHALATION)
Qty: 30.6 G | Refills: 0 | Status: SHIPPED | OUTPATIENT
Start: 2021-03-30 | End: 2021-04-22

## 2021-03-30 RX ORDER — SITAGLIPTIN 100 MG/1
TABLET, FILM COATED ORAL
Qty: 90 TABLET | Refills: 0 | Status: SHIPPED | OUTPATIENT
Start: 2021-03-30 | End: 2021-06-21 | Stop reason: SDUPTHER

## 2021-04-21 DIAGNOSIS — E11.9 TYPE 2 DIABETES MELLITUS WITHOUT COMPLICATION, WITHOUT LONG-TERM CURRENT USE OF INSULIN (HCC): ICD-10-CM

## 2021-04-22 DIAGNOSIS — J45.30 MILD PERSISTENT ASTHMA WITHOUT COMPLICATION: ICD-10-CM

## 2021-04-22 DIAGNOSIS — K21.00 GASTROESOPHAGEAL REFLUX DISEASE WITH ESOPHAGITIS: ICD-10-CM

## 2021-04-22 RX ORDER — OMEPRAZOLE 40 MG/1
CAPSULE, DELAYED RELEASE ORAL
Qty: 90 CAPSULE | Refills: 0 | Status: SHIPPED | OUTPATIENT
Start: 2021-04-22 | End: 2021-07-15 | Stop reason: SDUPTHER

## 2021-04-22 RX ORDER — BUDESONIDE AND FORMOTEROL FUMARATE DIHYDRATE 160; 4.5 UG/1; UG/1
AEROSOL RESPIRATORY (INHALATION)
Qty: 30.6 G | Refills: 0 | Status: SHIPPED | OUTPATIENT
Start: 2021-04-22 | End: 2022-07-18 | Stop reason: ALTCHOICE

## 2021-05-02 ENCOUNTER — HOSPITAL ENCOUNTER (OUTPATIENT)
Dept: MAMMOGRAPHY | Facility: IMAGING CENTER | Age: 61
Discharge: HOME | End: 2021-05-02
Payer: COMMERCIAL

## 2021-05-02 VITALS — WEIGHT: 200 LBS | BODY MASS INDEX: 34.15 KG/M2 | HEIGHT: 64 IN

## 2021-05-02 DIAGNOSIS — Z12.31 ENCOUNTER FOR SCREENING MAMMOGRAM FOR MALIGNANT NEOPLASM OF BREAST: ICD-10-CM

## 2021-05-02 DIAGNOSIS — Z12.31 VISIT FOR SCREENING MAMMOGRAM: ICD-10-CM

## 2021-05-02 PROCEDURE — 77063 BREAST TOMOSYNTHESIS BI: CPT

## 2021-05-02 PROCEDURE — 77067 SCR MAMMO BI INCL CAD: CPT

## 2021-06-03 DIAGNOSIS — I10 ESSENTIAL HYPERTENSION: ICD-10-CM

## 2021-06-03 RX ORDER — AMLODIPINE BESYLATE AND BENAZEPRIL HYDROCHLORIDE 5; 20 MG/1; MG/1
1 CAPSULE ORAL DAILY
Qty: 90 CAPSULE | Refills: 2 | Status: SHIPPED | OUTPATIENT
Start: 2021-06-03 | End: 2021-12-06 | Stop reason: ALTCHOICE

## 2021-06-15 LAB
ALBUMIN SERPL-MCNC: 4 G/DL (ref 3.6–5.1)
ALBUMIN/GLOB SERPL: 1.5 (CALC) (ref 1–2.5)
ALP SERPL-CCNC: 63 U/L (ref 37–153)
ALT SERPL-CCNC: 27 U/L (ref 6–29)
AST SERPL-CCNC: 23 U/L (ref 10–35)
BILIRUB SERPL-MCNC: 0.3 MG/DL (ref 0.2–1.2)
BUN SERPL-MCNC: 17 MG/DL (ref 7–25)
BUN/CREAT SERPL: ABNORMAL (CALC) (ref 6–22)
CALCIUM SERPL-MCNC: 9.9 MG/DL (ref 8.6–10.4)
CHLORIDE SERPL-SCNC: 102 MMOL/L (ref 98–110)
CHOLEST SERPL-MCNC: 144 MG/DL
CHOLEST/HDLC SERPL: 3.7 (CALC)
CO2 SERPL-SCNC: 30 MMOL/L (ref 20–32)
CREAT SERPL-MCNC: 0.57 MG/DL (ref 0.5–0.99)
EST. AVERAGE GLUCOSE BLD GHB EST-MCNC: 143 (CALC)
EST. AVERAGE GLUCOSE BLD GHB EST-SCNC: 7.9 (CALC)
FERRITIN SERPL-MCNC: 16 NG/ML (ref 16–288)
GLOBULIN SER CALC-MCNC: 2.7 G/DL (CALC) (ref 1.9–3.7)
GLUCOSE SERPL-MCNC: 106 MG/DL (ref 65–99)
HBA1C MFR BLD: 6.6 % OF TOTAL HGB
HDLC SERPL-MCNC: 39 MG/DL
LDLC SERPL CALC-MCNC: 78 MG/DL (CALC)
NONHDLC SERPL-MCNC: 105 MG/DL (CALC)
POTASSIUM SERPL-SCNC: 4.2 MMOL/L (ref 3.5–5.3)
PROT SERPL-MCNC: 6.7 G/DL (ref 6.1–8.1)
SL AMB EGFR AFRICAN AMERICAN: 116 ML/MIN/1.73M2
SL AMB EGFR NON AFRICAN AMERICAN: 100 ML/MIN/1.73M2
SODIUM SERPL-SCNC: 140 MMOL/L (ref 135–146)
TRIGL SERPL-MCNC: 177 MG/DL
VIT B12 SERPL-MCNC: 738 PG/ML (ref 200–1100)

## 2021-06-16 ENCOUNTER — TELEPHONE (OUTPATIENT)
Dept: FAMILY MEDICINE CLINIC | Facility: CLINIC | Age: 61
End: 2021-06-16

## 2021-06-16 NOTE — TELEPHONE ENCOUNTER
----- Message from Tierney Gentile MD sent at 6/16/2021  9:08 AM EDT -----  Call patient with lab result-labs look good, continue same medications  Looks like will be due office visit for annual physical after July 27, 2021

## 2021-06-16 NOTE — RESULT ENCOUNTER NOTE
Call patient with lab result-labs look good, continue same medications  Looks like will be due office visit for annual physical after July 27, 2021

## 2021-06-17 DIAGNOSIS — G47.00 INSOMNIA, UNSPECIFIED TYPE: ICD-10-CM

## 2021-06-17 RX ORDER — ZOLPIDEM TARTRATE 5 MG/1
5 TABLET ORAL
Qty: 30 TABLET | Refills: 2 | Status: SHIPPED | OUTPATIENT
Start: 2021-06-17 | End: 2021-09-20 | Stop reason: SDUPTHER

## 2021-06-17 NOTE — TELEPHONE ENCOUNTER
Med refill     zolpidem (AMBIEN) 5 mg tablet      zolpidem (AMBIEN) 5 mg tablet         [phone]

## 2021-06-18 ENCOUNTER — CONSULT (OUTPATIENT)
Dept: NEUROLOGY | Facility: CLINIC | Age: 61
End: 2021-06-18
Payer: COMMERCIAL

## 2021-06-18 VITALS
HEART RATE: 91 BPM | DIASTOLIC BLOOD PRESSURE: 62 MMHG | BODY MASS INDEX: 33.47 KG/M2 | SYSTOLIC BLOOD PRESSURE: 128 MMHG | WEIGHT: 195 LBS

## 2021-06-18 DIAGNOSIS — R41.3 MEMORY DYSFUNCTION: ICD-10-CM

## 2021-06-18 PROCEDURE — 99214 OFFICE O/P EST MOD 30 MIN: CPT | Performed by: PSYCHIATRY & NEUROLOGY

## 2021-06-18 NOTE — PROGRESS NOTES
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   Pt family also noted some diff with stm  Pt to have cogntiive work up   rec ct head, pt with significant claustrophobia for mri as well as spinal cord stim for her chronic pain  rec memory labs  Pt with known vit b12 deficency dating at least back to 2018  Pt has more recently been on vit b12 replacement  Question of nutrirional componenet from history of gastric bypass contributing to memory issues  Will update memory labs and imaging  rec formal neurocognitive eval as well       Diagnoses and all orders for this visit:    Memory dysfunction  -     Ambulatory referral to Neurology  -     Vitamin B6; Future  -     Vitamin B1, whole blood; Future  -     Folate; Future  -     Lyme Antibody Profile with reflex to WB; Future  -     Sjogren's Antibodies; Future  -     Copper Level; Future  -     Vitamin E; Future  -     Zinc; Future  -     CT head wo contrast; Future  -     Ambulatory referral to Neuropsychology; Future           Subjective:    HPI    Pt is a 65 yo f with pmh of gastric bypass, dm type 2, asthma, vit b12 deficiency, neuropathy, hypercholesterolemia, htn, chronic pain, lumbar spondylosis, prior lumbar surgery 26 yrs ago, SCS about one yr ago who presents today for memory loss  Pt notes sxs for the past several yrs, more apparent past 2 yrs  Pt notes no tia or cva like sxs  Pt denies any new sxs  No falls or trips  No change in bowel or bladder  No LOC, no seizure  No change in speech  No change in vision  No loss of vision  No diplopia  No loss of vision  No recent hospitalizations    No recent infections  No fever or chills,  No cough or sob  Pt notes history of headaches  Pt notes occ feeling of food getting stuck  No diff with articulation of speech  No incontinence  Pt notes long standing diff with directions, ever since learning to drive  Not new  Pt denies any significant financial issues  Pt notes occ positional dizziness  Pt notes diff with recall of specific details  Pt has noted specifically on job, issues with keeping up on same level of detail  Worked for same company for 24 yrs  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 to her  Pt notes family also noted issues with stm and repeating self  Also family reminds her of events from their youth  Pt with supportive family and lives close to them  Pt works in IT banking field  Pt with known history of gastric bypass  Pt also with known history of vit b12 deficiency  Pt more recently with im replacement  Rev labs dating back to 2018 April 2018 vit b12 at 292  Oct 2019 level 144, and now on 6/15/21 level 738  Pt notes she had replacement im a few months ago and now on oral    Question if this deficiency related to her memory issues  Rev labs that may be contributory from medical standpoint  Will update labs  Pt also rec to have imaging done as well  Pt is very claustrophobic and concerned about getting mri  Will order ct head imaging at this time as pt also has SCS as well  From [employer] card, looks like scs likely compatible but needs programming before going for mri  Pt agreeable to ct head at this time  The following portions of the patient's history were reviewed and updated as appropriate: allergies, current medications, past family history, past medical history, past social history, past surgical history and problem list and med rec and ros rev           Objective:    Blood pressure 128/62, pulse 91, weight 88 5 kg (195 lb)  Physical Exam  Constitutional:       General: She is not in acute distress  Appearance: She is not ill-appearing  Eyes:      General: Lids are normal       Extraocular Movements: Extraocular movements intact  Pupils: Pupils are equal, round, and reactive to light  Musculoskeletal:      Right lower leg: No edema  Left lower leg: No edema  Neurological:      Mental Status: She is alert  Deep Tendon Reflexes: Strength normal and reflexes are normal and symmetric  Psychiatric:         Speech: Speech normal          Neurological Exam  Mental Status  Alert  Speech is normal  Language is fluent with no aphasia  Attention and concentration are normal  Fund of knowledge is appropriate for level of education  MOCA 29/30  Only issue with number/ letter trail  Cranial Nerves  CN II: Visual acuity is normal  Visual fields full to confrontation  Right funduscopic exam: disc intact  Left funduscopic exam: disc intact  CN III, IV, VI: Extraocular movements intact bilaterally  Normal lids and orbits bilaterally  Pupils equal round and reactive to light bilaterally  CN V: Facial sensation is normal   CN VII: Full and symmetric facial movement  CN VIII: Hearing is normal   CN IX, X: Palate elevates symmetrically  Normal gag reflex  CN XI: Shoulder shrug strength is normal   CN XII: Tongue midline without atrophy or fasciculations  Motor  Normal muscle bulk throughout  Normal muscle tone  No abnormal involuntary movements  Strength is 5/5 throughout all four extremities  Sensory  Sensation is intact to light touch, pinprick, vibration and proprioception in all four extremities  Reflexes  Deep tendon reflexes are 2+ and symmetric in all four extremities with downgoing toes bilaterally      Coordination  Right: Finger-to-nose normal  Rapid alternating movement normal   Left: Finger-to-nose normal  Heel-to-shin normal     Gait  Casual gait is normal including stance, stride, and arm swing  ROS:    Review of Systems   Constitutional: Negative  Negative for appetite change and fever  HENT: Negative  Negative for hearing loss, tinnitus, trouble swallowing and voice change  Eyes: Negative  Negative for photophobia and pain  Respiratory: Negative  Negative for shortness of breath  Cardiovascular: Negative  Negative for palpitations  Gastrointestinal: Negative  Negative for nausea and vomiting  Endocrine: Negative  Negative for cold intolerance  Genitourinary: Negative  Negative for dysuria, frequency and urgency  Musculoskeletal: Negative  Negative for myalgias and neck pain  Skin: Negative  Negative for rash  Neurological: Negative  Negative for dizziness, tremors, seizures, syncope, facial asymmetry, speech difficulty, weakness, light-headedness, numbness and headaches  Memory problems   Hematological: Negative  Does not bruise/bleed easily  Psychiatric/Behavioral: Negative  Negative for confusion, hallucinations and sleep disturbance

## 2021-06-18 NOTE — ASSESSMENT & PLAN NOTE
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

## 2021-06-21 DIAGNOSIS — E11.9 TYPE 2 DIABETES MELLITUS WITHOUT COMPLICATION, WITHOUT LONG-TERM CURRENT USE OF INSULIN (HCC): ICD-10-CM

## 2021-06-24 DIAGNOSIS — E11.9 CONTROLLED TYPE 2 DIABETES MELLITUS WITHOUT COMPLICATION, WITHOUT LONG-TERM CURRENT USE OF INSULIN (HCC): ICD-10-CM

## 2021-06-24 RX ORDER — PEN NEEDLE, DIABETIC 31 GX5/16"
NEEDLE, DISPOSABLE MISCELLANEOUS
Qty: 100 EACH | Refills: 0 | Status: SHIPPED | OUTPATIENT
Start: 2021-06-24 | End: 2021-09-24

## 2021-06-25 ENCOUNTER — TELEPHONE (OUTPATIENT)
Dept: NEUROLOGY | Facility: CLINIC | Age: 61
End: 2021-06-25

## 2021-06-25 NOTE — TELEPHONE ENCOUNTER
Neuropsychological Evaluation  [facility]  [address] [facility]  [address]  P: [phone] F: [phone]    Intake Note  Date of Referral to Neuropsychology: 06/18/2021  Referring Provider: Dr Bubba Torres to conduct screening prior to scheduling neuropsychological evaluation  Spoke to patient directly to answer intake questions  Explained nature of neuropsychological evaluation  Patient is agreeable to evaluation  The following questions were answered  1 ) Does the patient have the ability to do a virtual intake? Yes    2 ) Does the patient have any problems that would limit her ability to participate in testing that requires interaction with another person, such as hearing or language impairments? No    3 ) Does the patient have any problems that would limit her ability to complete testing during one appointment that can last for more than 2 hours? (e g , severe fatigue, pain, or other debility) No    4 ) Does the patient have a preference between completing the evaluation in one session, or over the course of a few sessions? No    5 ) Does the patient have difficulties ambulating (e g , does she need a walker, cane, or wheelchair)? No    6 ) Would the patient be available for a last minute appointment if the opportunity arises? No    Referral will be reviewed by providers and we will call to schedule as appropriate  Referral is pending insurance review  Insurance company will be contacted to verify participation of providers and authorization/pre-certification for Neuropsychological evaluation  Patient has been made aware of this

## 2021-07-02 ENCOUNTER — HOSPITAL ENCOUNTER (OUTPATIENT)
Dept: CT IMAGING | Facility: HOSPITAL | Age: 61
Discharge: HOME | End: 2021-07-02
Attending: PSYCHIATRY & NEUROLOGY
Payer: COMMERCIAL

## 2021-07-02 DIAGNOSIS — R41.3 MEMORY DYSFUNCTION: ICD-10-CM

## 2021-07-02 PROCEDURE — G1004 CDSM NDSC: HCPCS

## 2021-07-02 PROCEDURE — 70450 CT HEAD/BRAIN W/O DYE: CPT

## 2021-07-08 DIAGNOSIS — E11.9 TYPE 2 DIABETES MELLITUS WITHOUT COMPLICATION, WITHOUT LONG-TERM CURRENT USE OF INSULIN (HCC): ICD-10-CM

## 2021-07-15 DIAGNOSIS — K21.00 GASTROESOPHAGEAL REFLUX DISEASE WITH ESOPHAGITIS: ICD-10-CM

## 2021-07-15 RX ORDER — OMEPRAZOLE 40 MG/1
40 CAPSULE, DELAYED RELEASE ORAL EVERY MORNING
Qty: 90 CAPSULE | Refills: 0 | Status: SHIPPED | OUTPATIENT
Start: 2021-07-15 | End: 2021-08-13

## 2021-07-21 ENCOUNTER — OFFICE VISIT (OUTPATIENT)
Dept: FAMILY MEDICINE CLINIC | Facility: CLINIC | Age: 61
End: 2021-07-21
Payer: COMMERCIAL

## 2021-07-21 VITALS
SYSTOLIC BLOOD PRESSURE: 136 MMHG | WEIGHT: 200 LBS | HEART RATE: 96 BPM | DIASTOLIC BLOOD PRESSURE: 78 MMHG | BODY MASS INDEX: 34.15 KG/M2 | HEIGHT: 64 IN | OXYGEN SATURATION: 92 % | RESPIRATION RATE: 14 BRPM

## 2021-07-21 DIAGNOSIS — Z79.4 CONTROLLED TYPE 2 DIABETES MELLITUS WITH DIABETIC POLYNEUROPATHY, WITH LONG-TERM CURRENT USE OF INSULIN (HCC): Primary | ICD-10-CM

## 2021-07-21 DIAGNOSIS — E11.42 CONTROLLED TYPE 2 DIABETES MELLITUS WITH DIABETIC POLYNEUROPATHY, WITH LONG-TERM CURRENT USE OF INSULIN (HCC): Primary | ICD-10-CM

## 2021-07-21 DIAGNOSIS — J45.30 MILD PERSISTENT ASTHMA WITHOUT COMPLICATION: ICD-10-CM

## 2021-07-21 PROCEDURE — 3008F BODY MASS INDEX DOCD: CPT | Performed by: FAMILY MEDICINE

## 2021-07-21 PROCEDURE — 99213 OFFICE O/P EST LOW 20 MIN: CPT | Performed by: FAMILY MEDICINE

## 2021-07-21 PROCEDURE — 1036F TOBACCO NON-USER: CPT | Performed by: FAMILY MEDICINE

## 2021-07-21 NOTE — PROGRESS NOTES
[address]         NAME: Joyce Metcalf is a 64 y o  female  : 1960    MRN: [id number]  DATE: 2021  TIME: 11:10 AM    Assessment and Plan   Controlled type 2 diabetes mellitus with diabetic polyneuropathy, with long-term current use of insulin ([address] ) [E11 42, Z79 4]  1  Controlled type 2 diabetes mellitus with diabetic polyneuropathy, with long-term current use of insulin ([address] )     2  Benign essential hypertension     3  Hyperlipidemia associated with type 2 diabetes mellitus ([address] )     4  Mild persistent asthma without complication         No problem-specific Assessment & Plan notes found for this encounter  Patient Instructions     There are no Patient Instructions on file for this visit  Chief Complaint     Chief Complaint   Patient presents with    Abdominal Pain     RUQ- feels lumps X 3 months         History of Present Illness       Medical management-no recent labs  Medications are reviewed  1) diabetes-last A1c was good  She realizes she was taking more insulin had been advised  Currently on 35 units of basal   No low symptoms  2) asthma-stable  3) chronic back pain  Patient has a stimulator which has been adjusted to maximize still not controlling pain mostly sciatica down the right leg  She is having a pain pump placed next week  4) right upper abdominal wall tenderness  She does have some palpable nodules  No change in skin color  This is not radiating around from the back  There is no dysesthesia of the skin noted  Review of Systems   Review of Systems   Constitutional: Negative for chills, diaphoresis and fatigue  Gastrointestinal: Positive for abdominal pain  Negative for blood in stool, constipation, diarrhea, nausea and rectal pain  Skin: Negative for color change, rash and wound  Hematological: Does not bruise/bleed easily           Current Medications       Current Outpatient Medications:     Accu-Chek FastClix Lancets MISC, TEST TWO TIMES A DAY, Disp: 204 each, Rfl: 3    Accu-Chek Guide test strip, TEST TWO TIMES A DAY, Disp: 100 each, Rfl: 3    albuterol (2 5 mg/3 mL) 0 083 % nebulizer solution, INHALE THE CONTENTS OF 1 VIAL VIA NEBULIZER EVERY 6 HOURS AS NEEDED FOR SHORTNESS OF BREATH OR WHEEZING, Disp: 300 mL, Rfl: 0    albuterol (PROVENTIL HFA,VENTOLIN HFA) 90 mcg/act inhaler, USE 2 INHALATIONS ORALLY   EVERY 6 HOURS AS NEEDED FORWHEEZING, Disp: 25 5 g, Rfl: 3    amLODIPine-benazepril (LOTREL 5-20) 5-20 MG per capsule, Take 1 capsule by mouth daily, Disp: 90 capsule, Rfl: 2    ascorbic acid (VITAMIN C) 500 mg tablet, Take 2,000 mg by mouth daily, Disp: , Rfl:     atorvastatin (LIPITOR) 40 mg tablet, TAKE 1 TABLET DAILY, Disp: 90 tablet, Rfl: 0    B-D UF III MINI PEN NEEDLES 31G X 5 MM MISC, use at bedtime, Disp: 100 each, Rfl: 0    Ferrous Fumarate 63 (20 Fe) MG TABS, Take 65 mg by mouth daily, Disp: , Rfl:     HYDROcodone-acetaminophen (NORCO) 5-325 mg per tablet, Take 5-325 tablets by mouth daily as needed, Disp: , Rfl: 0    insulin glargine (Basaglar KwikPen) 100 units/mL injection pen, Inject 31 units under skin daily at bedtime, Disp: 15 mL, Rfl: 2    lidocaine (XYLOCAINE) 5 % ointment, Apply topically as needed for mild pain, Disp: 240 g, Rfl: 1    LYRICA 200 MG capsule, Take 200 mg by mouth 3 (three) times a day , Disp: , Rfl: 0    metFORMIN (GLUCOPHAGE) 1000 MG tablet, TAKE 1 TABLET TWICE A DAY, Disp: 180 tablet, Rfl: 0    ondansetron (ZOFRAN) 4 mg tablet, Take 1 tablet (4 mg total) by mouth every 8 (eight) hours as needed for nausea or vomiting, Disp: 30 tablet, Rfl: 1    sitaGLIPtin (Januvia) 100 mg tablet, Take 1 tablet (100 mg total) by mouth daily, Disp: 90 tablet, Rfl: 0    Symbicort 160-4 5 MCG/ACT inhaler, USE 2 INHALATIONS ORALLY   TWICE DAILY, RINSE MOUTH   AFTER USE, Disp: 30 6 g, Rfl: 0    traMADol HCl  MG CP24, Take 300 mg by mouth daily , Disp: 30 tablet, Rfl: 0   traMADol HCl ER, Biphasic, 300 MG 24 hr tablet, Take 300 mg by mouth daily, Disp: , Rfl:     VITAMIN D PO, , Disp: , Rfl:     zolpidem (AMBIEN) 5 mg tablet, Take 1 tablet (5 mg total) by mouth daily at bedtime as needed for sleep, Disp: 30 tablet, Rfl: 2    omeprazole (PriLOSEC) 40 MG capsule, Take 1 capsule (40 mg total) by mouth every morning, Disp: 90 capsule, Rfl: 0    predniSONE 20 mg tablet, Take twice daily x 5 days, then once daily x 5 days (Patient not taking: Reported on 3/23/2021), Disp: 15 tablet, Rfl: 0    Current Facility-Administered Medications:     cyanocobalamin injection 1,000 mcg, 1,000 mcg, Intramuscular, Weekly, CHITRA Ingram, 1,000 mcg at 20 1447    Current Allergies     Allergies as of 2021 - Reviewed 2021   Allergen Reaction Noted    Nsaids Other (See Comments) 2017    Percocet [oxycodone-acetaminophen] GI Intolerance 2017            The following portions of the patient's history were reviewed and updated as appropriate: allergies, current medications, past family history, past medical history, past social history, past surgical history and problem list      Past Medical History:   Diagnosis Date    Anxiety     Asthma     Breast lump     Resolved: 2017     Chronic pain     back    Diabetes mellitus ([facility] )     History of stomach ulcers     Hyperlipidemia     Hypertension     Infectious viral hepatitis     Hepatitiss A       Past Surgical History:   Procedure Laterality Date    APPENDECTOMY      BACK SURGERY       SECTION      CHOLECYSTECTOMY      GASTRIC BYPASS      SHOULDER SURGERY         Family History   Problem Relation Age of Onset    Diabetes Mother         Mellitus    Hyperlipidemia Mother     Hypertension Mother     Colon cancer Mother     Pancreatic cancer Mother     Melanoma Mother     Cancer Father         Bladder     Alcohol abuse Father     Lung cancer Father     Prostate cancer Father    Endy Palumbo Diabetes Brother         Mellitus    Hyperlipidemia Brother     Hypertension Brother     Stomach cancer Paternal Grandmother     Lung cancer Paternal Grandfather     Diabetes Brother     Breast cancer Maternal Aunt 28    Mental illness Neg Hx          Medications have been verified  Objective   /78   Pulse 96   Resp 14   Ht 5' 4" (1 626 m)   Wt 90 7 kg (200 lb)   LMP  (LMP Unknown)   SpO2 92%   BMI 34 33 kg/m²        Physical Exam     Physical Exam  Vitals reviewed  Constitutional:       General: She is not in acute distress  Appearance: She is well-developed  HENT:      Head: Normocephalic and atraumatic  Right Ear: Tympanic membrane and external ear normal  No drainage  Left Ear: Tympanic membrane normal  No drainage  Mouth/Throat:      Pharynx: No oropharyngeal exudate  Eyes:      General:         Right eye: No discharge  Left eye: No discharge  Conjunctiva/sclera: Conjunctivae normal    Neck:      Thyroid: No thyromegaly  Cardiovascular:      Rate and Rhythm: Normal rate and regular rhythm  Heart sounds: Normal heart sounds  Pulmonary:      Effort: Pulmonary effort is normal  No respiratory distress  Breath sounds: No wheezing or rales  Musculoskeletal:      Cervical back: Normal range of motion and neck supple  Lymphadenopathy:      Cervical: No cervical adenopathy  Skin:     General: Skin is warm  Findings: No bruising, erythema or rash  Comments: Palpable nodules within the abdominal wall adipose or muscle tissue  No induration noted  No fluctuance noted  No change in sensation over the affected areas

## 2021-07-21 NOTE — PATIENT INSTRUCTIONS
These appear to be tender nodules within the fatty tissue of the abdominal wall  Your liver enzymes were normal I do not think this is coming from the liver  Also do not think it is related to your stimulator  Diabetes-continue Basaglar at 33 units in the evening  I would repeat laboratory studies for diabetes in October November with office visit after  Continue other medications

## 2021-08-05 DIAGNOSIS — E11.9 CONTROLLED TYPE 2 DIABETES MELLITUS WITHOUT COMPLICATION, WITHOUT LONG-TERM CURRENT USE OF INSULIN (HCC): ICD-10-CM

## 2021-08-05 RX ORDER — INSULIN GLARGINE 100 [IU]/ML
INJECTION, SOLUTION SUBCUTANEOUS
Qty: 15 ML | Refills: 2 | Status: SHIPPED | OUTPATIENT
Start: 2021-08-05 | End: 2021-09-23 | Stop reason: SDUPTHER

## 2021-08-06 DIAGNOSIS — E11.69 HYPERLIPIDEMIA ASSOCIATED WITH TYPE 2 DIABETES MELLITUS (HCC): ICD-10-CM

## 2021-08-06 DIAGNOSIS — E78.5 HYPERLIPIDEMIA ASSOCIATED WITH TYPE 2 DIABETES MELLITUS (HCC): ICD-10-CM

## 2021-08-06 RX ORDER — ATORVASTATIN CALCIUM 40 MG/1
40 TABLET, FILM COATED ORAL DAILY
Qty: 90 TABLET | Refills: 0 | Status: SHIPPED | OUTPATIENT
Start: 2021-08-06 | End: 2021-09-24

## 2021-08-13 DIAGNOSIS — E11.9 TYPE 2 DIABETES MELLITUS WITHOUT COMPLICATION, WITHOUT LONG-TERM CURRENT USE OF INSULIN (HCC): ICD-10-CM

## 2021-08-13 DIAGNOSIS — K21.00 GASTROESOPHAGEAL REFLUX DISEASE WITH ESOPHAGITIS: ICD-10-CM

## 2021-08-13 RX ORDER — BLOOD SUGAR DIAGNOSTIC
STRIP MISCELLANEOUS
Qty: 100 STRIP | Refills: 3 | Status: SHIPPED | OUTPATIENT
Start: 2021-08-13 | End: 2022-03-18

## 2021-08-13 RX ORDER — OMEPRAZOLE 40 MG/1
CAPSULE, DELAYED RELEASE ORAL
Qty: 90 CAPSULE | Refills: 0 | Status: SHIPPED | OUTPATIENT
Start: 2021-08-13 | End: 2021-11-11 | Stop reason: SDUPTHER

## 2021-09-14 DIAGNOSIS — E11.9 TYPE 2 DIABETES MELLITUS WITHOUT COMPLICATION, WITHOUT LONG-TERM CURRENT USE OF INSULIN (HCC): ICD-10-CM

## 2021-09-14 RX ORDER — SITAGLIPTIN 100 MG/1
TABLET, FILM COATED ORAL
Qty: 90 TABLET | Refills: 0 | Status: SHIPPED | OUTPATIENT
Start: 2021-09-14 | End: 2021-10-26 | Stop reason: SDUPTHER

## 2021-09-15 DIAGNOSIS — J45.30 MILD PERSISTENT ASTHMA WITHOUT COMPLICATION: ICD-10-CM

## 2021-09-16 RX ORDER — ALBUTEROL SULFATE 2.5 MG/3ML
2.5 SOLUTION RESPIRATORY (INHALATION) EVERY 6 HOURS PRN
Qty: 300 ML | Refills: 1 | Status: SHIPPED | OUTPATIENT
Start: 2021-09-16

## 2021-09-20 DIAGNOSIS — G47.00 INSOMNIA, UNSPECIFIED TYPE: ICD-10-CM

## 2021-09-20 RX ORDER — ZOLPIDEM TARTRATE 5 MG/1
5 TABLET ORAL
Qty: 30 TABLET | Refills: 2 | Status: SHIPPED | OUTPATIENT
Start: 2021-09-20 | End: 2021-12-27

## 2021-09-21 DIAGNOSIS — E11.9 CONTROLLED TYPE 2 DIABETES MELLITUS WITHOUT COMPLICATION, WITHOUT LONG-TERM CURRENT USE OF INSULIN (HCC): ICD-10-CM

## 2021-09-21 RX ORDER — PEN NEEDLE, DIABETIC 31 GX5/16"
NEEDLE, DISPOSABLE MISCELLANEOUS
Qty: 100 EACH | Refills: 0 | Status: CANCELLED | OUTPATIENT
Start: 2021-09-21

## 2021-09-23 ENCOUNTER — DOCUMENTATION (OUTPATIENT)
Dept: NEUROLOGY | Facility: CLINIC | Age: 61
End: 2021-09-23

## 2021-09-23 DIAGNOSIS — E11.9 CONTROLLED TYPE 2 DIABETES MELLITUS WITHOUT COMPLICATION, WITHOUT LONG-TERM CURRENT USE OF INSULIN (HCC): ICD-10-CM

## 2021-09-23 RX ORDER — INSULIN GLARGINE 100 [IU]/ML
INJECTION, SOLUTION SUBCUTANEOUS
Qty: 15 ML | Refills: 2 | Status: SHIPPED | OUTPATIENT
Start: 2021-09-23 | End: 2021-12-21 | Stop reason: SDUPTHER

## 2021-09-23 NOTE — TELEPHONE ENCOUNTER
Pt needs a refill of the injection pen  Not quite sure why it was denied?      Send to ruth uriarte [phone]

## 2021-09-23 NOTE — PROGRESS NOTES
No authorization for CPT codes [id number], [id number], [id number], [id number], [id number], [id number], [id number], [id number]  Person of contact: Bita Murillo    Date of Contact: 09/23/2021  Reference #[id number]

## 2021-09-24 DIAGNOSIS — E78.5 HYPERLIPIDEMIA ASSOCIATED WITH TYPE 2 DIABETES MELLITUS (HCC): ICD-10-CM

## 2021-09-24 DIAGNOSIS — E11.69 HYPERLIPIDEMIA ASSOCIATED WITH TYPE 2 DIABETES MELLITUS (HCC): ICD-10-CM

## 2021-09-24 DIAGNOSIS — E11.9 CONTROLLED TYPE 2 DIABETES MELLITUS WITHOUT COMPLICATION, WITHOUT LONG-TERM CURRENT USE OF INSULIN (HCC): ICD-10-CM

## 2021-09-24 RX ORDER — ATORVASTATIN CALCIUM 40 MG/1
TABLET, FILM COATED ORAL
Qty: 90 TABLET | Refills: 0 | Status: SHIPPED | OUTPATIENT
Start: 2021-09-24 | End: 2021-10-29 | Stop reason: SDUPTHER

## 2021-09-24 RX ORDER — PEN NEEDLE, DIABETIC 31 GX5/16"
NEEDLE, DISPOSABLE MISCELLANEOUS
Qty: 100 EACH | Refills: 1 | Status: SHIPPED | OUTPATIENT
Start: 2021-09-24 | End: 2021-10-23 | Stop reason: SDUPTHER

## 2021-09-30 ENCOUNTER — OFFICE VISIT (OUTPATIENT)
Dept: NEUROLOGY | Facility: CLINIC | Age: 61
End: 2021-09-30
Payer: COMMERCIAL

## 2021-09-30 VITALS
DIASTOLIC BLOOD PRESSURE: 94 MMHG | HEART RATE: 88 BPM | WEIGHT: 197 LBS | TEMPERATURE: 96.3 F | SYSTOLIC BLOOD PRESSURE: 146 MMHG | BODY MASS INDEX: 33.63 KG/M2 | HEIGHT: 64 IN

## 2021-09-30 DIAGNOSIS — G62.9 PERIPHERAL POLYNEUROPATHY: ICD-10-CM

## 2021-09-30 DIAGNOSIS — R41.3 MEMORY DYSFUNCTION: ICD-10-CM

## 2021-09-30 DIAGNOSIS — E53.8 B12 DEFICIENCY: Primary | ICD-10-CM

## 2021-09-30 PROCEDURE — 99214 OFFICE O/P EST MOD 30 MIN: CPT | Performed by: PSYCHIATRY & NEUROLOGY

## 2021-09-30 NOTE — ASSESSMENT & PLAN NOTE
Pt here for neuro follow up  Overall pt doing about the same  No new memory issues  Pt noted redoing one task at work  Pt denies any issues with cooking, driving, or judgement  Pt had updated ct head in July, no acute intracranial abn  Small vessel ischemic changes noted  Pt rec to consider baby asa due to other cva risk factors if ok from medical standpoint  Pt to check with pcp  Pt quit tob about 13 yrs ago  Followed by pcp for htn and dm  Pt is scheduled for her neuropsychology intake next week with rest of formal memory testing in about 2 weeks  Pt to follow up after studies  Pt with known vit b12 deficiency  Labs from June good  Will repeat to make sure consistency in values on current oral supplement  Pt had prior im boluses

## 2021-09-30 NOTE — PROGRESS NOTES
Patient ID: Godwin Quevedo is a 64 y o  female  Assessment/Plan:    Memory dysfunction  Pt here for neuro follow up  Overall pt doing about the same  No new memory issues  Pt noted redoing one task at work  Pt denies any issues with cooking, driving, or judgement  Pt had updated ct head in July, no acute intracranial abn  Small vessel ischemic changes noted  Pt rec to consider baby asa due to other cva risk factors if ok from medical standpoint  Pt to check with pcp  Pt quit tob about 13 yrs ago  Followed by pcp for htn and dm  Pt is scheduled for her neuropsychology intake next week with rest of formal memory testing in about 2 weeks  Pt to follow up after studies  Pt with known vit b12 deficiency  Labs from [place] good  Will repeat to make sure consistency in values on current oral supplement  Pt had prior im boluses    B12 deficiency  Last labs from June ok  Repeat follow up to ensure consistent levels on oral replacment    Peripheral polyneuropathy  likley related to history of dm  Previously seen by dr Johnson Ford  No new neuropathic sxs  Update vit b12 labs       Diagnoses and all orders for this visit:    B12 deficiency  -     Vitamin B12; Future  -     Methylmalonic acid, serum; Future    Memory dysfunction    Peripheral polyneuropathy    Other orders  -     Ziconotide Acetate (PRIALT IT); by Intrathecal route continuous           Subjective:    HPI  Pt is a 65 yo f with pmh of gastric bypass, dm type 2, asthma, vit b12 deficiency, neuropathy, hypercholesterolemia, htn, chronic pain, lumbar spondylosis, prior lumbar surgery 26 yrs ago, SCS about one yr ago who presents today for memory loss  pt initially seen on 6/18/21 by me as initial evaluation  Per my last note "Pt notes sxs for the past several yrs, more apparent past 2 yrs  Pt notes no tia or cva like sxs  Pt notes history of headaches  Pt notes occ feeling of food getting stuck  No diff with articulation of speech  No incontinence    Pt notes long standing diff with directions, ever since learning to drive  Not new  Pt denies any significant financial issues  Pt notes occ positional dizziness  Pt notes diff with recall of specific details  Pt has noted specifically on job, issues with keeping up on same level of detail  Worked for same company for 24 yrs  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 to her  Pt notes family also noted issues with stm and repeating self  Also family reminds her of events from their youth  Pt with supportive family and lives close to them  Pt works in [employer] field  Pt with known history of gastric bypass  Pt also with known history of vit b12 deficiency  Pt more recently with im replacement  Rev labs dating back to 2018 April 2018 vit b12 at 292  Oct 2019 level 144, and now on 6/15/21 level 738 "  Kept above paragraph due to complexities of pt case  Overall pt doing about the same as last visit  Pt denies any new sxs  No falls or trips  No change in bowel or bladder  No LOC, no seizure  No change in speech or swallowing  No change in vision  No loss of vision  No diplopia  No loss of vision  No headache, no nausea or vomiting  No recent hospitalizations  No recent infections  No fever or chills  No cough or sob  Pt previously followed by dr Nima Walters for neuropathy in past   Pt with known dm and follows with pcp for her blood sugars  Pt also with known history of vit b12 deficiency and treated in past with im injections  Pt had updated labs in June with level of 738  Pt had updated ct head done on 7/2/21 no acute intracranial abn , microangiopathic changes  [facility]      Pt also had updated labs on 6/29/21 with nl vit b6, nl vit E, nl folate, neg lyme and neg sjogrens, nl zinc   Pt had prior need for vit b12 injections but currently on oral vit b12 replacement  Pt to have updated lab to ensure consistency of levels  Pt notes one error at work with rerunning a program that lasts 24 hours  Pt notes she is doing less creative meal prep  Pt used to be a better meal preparer  Pt notes no issues with safety in kitchen  No judgement or driving issues  Pt had recent pain pump placed very recently  Pt also has SCS in place but not helpful for her pain  Pt is working with pain mx to titrate her pain pump  Pt had ct head doen on 7/2/21 with no acute intracranial abn , microangiopathic changes noted  Rev pathophysiology of small vessel disease with pt  Pt to consider baby asa if ok with primary team     Pt with history of dm and htn,  Pt quit tob about 13 yrs ago  pt will have her formal neurocogntive testing done within the next few weeks  Pt to call me in about 6 weeks to review with her any further treatment or cognitive therapy recommendations  Pt continues to do all own adls including bills, shopping, cooking  The following portions of the patient's history were reviewed and updated as appropriate: allergies, current medications, past family history, past medical history, past social history, past surgical history and problem list and med rec and ros rev  Objective:    Blood pressure 146/94, pulse 88, temperature (!) 96 3 °F (35 7 °C), height 5' 4" (1 626 m), weight 89 4 kg (197 lb)  Physical Exam  Constitutional:       General: She is not in acute distress  Appearance: She is not ill-appearing  Eyes:      General: Lids are normal       Extraocular Movements: Extraocular movements intact  Pupils: Pupils are equal, round, and reactive to light  Musculoskeletal:      Right lower leg: No edema  Left lower leg: No edema  Neurological:      Mental Status: She is alert  Deep Tendon Reflexes: Strength normal and reflexes are normal and symmetric     Psychiatric:         Speech: Speech normal  Neurological Exam  Mental Status  Alert  Oriented to person, place, time and situation  Speech is normal  Language is fluent with no aphasia  Attention and concentration are normal  Fund of knowledge is appropriate for level of education  Follows 4 part command  No right left confusion  No aphasia or dysarthria      Cranial Nerves  CN II: Visual acuity is normal  Visual fields full to confrontation  CN III, IV, VI: Extraocular movements intact bilaterally  Normal lids and orbits bilaterally  Pupils equal round and reactive to light bilaterally  CN V: Facial sensation is normal   CN VII: Full and symmetric facial movement  CN VIII: Hearing is normal   CN IX, X: Palate elevates symmetrically  Normal gag reflex  CN XI: Shoulder shrug strength is normal   CN XII: Tongue midline without atrophy or fasciculations  Motor  Normal muscle bulk throughout  Normal muscle tone  No abnormal involuntary movements  Strength is 5/5 throughout all four extremities  Sensory  Dec vib patti lowers  Reflexes  Deep tendon reflexes are 2+ and symmetric in all four extremities with downgoing toes bilaterally  Coordination  Right: Finger-to-nose normal  Rapid alternating movement normal   Left: Finger-to-nose normal  Heel-to-shin normal     Gait  Casual gait is normal including stance, stride, and arm swing  ROS:    Review of Systems   Constitutional: Negative  Negative for appetite change and fever  HENT: Negative  Negative for hearing loss, tinnitus, trouble swallowing and voice change  Eyes: Negative  Negative for photophobia and pain  Respiratory: Negative  Negative for shortness of breath  Cardiovascular: Negative  Negative for palpitations  Gastrointestinal: Negative  Negative for nausea and vomiting  Endocrine: Negative  Negative for cold intolerance  Genitourinary: Negative  Negative for dysuria, frequency and urgency  Musculoskeletal: Negative    Negative for myalgias and neck pain  Skin: Negative  Negative for rash  Neurological: Negative  Negative for dizziness, tremors, seizures, syncope, facial asymmetry, speech difficulty, weakness, light-headedness, numbness and headaches  Hematological: Negative  Does not bruise/bleed easily  Psychiatric/Behavioral: Positive for confusion  Negative for hallucinations and sleep disturbance  Memory has gotten a little worse       Would like to discuss recent tests

## 2021-09-30 NOTE — ASSESSMENT & PLAN NOTE
heather related to history of dm  Previously seen by dr Pascual Sagastume  No new neuropathic sxs  Update vit b12 labs

## 2021-10-06 ENCOUNTER — TELEMEDICINE (OUTPATIENT)
Dept: NEUROLOGY | Facility: CLINIC | Age: 61
End: 2021-10-06

## 2021-10-06 DIAGNOSIS — Z79.4 CONTROLLED TYPE 2 DIABETES MELLITUS WITH DIABETIC POLYNEUROPATHY, WITH LONG-TERM CURRENT USE OF INSULIN (HCC): ICD-10-CM

## 2021-10-06 DIAGNOSIS — E11.42 CONTROLLED TYPE 2 DIABETES MELLITUS WITH DIABETIC POLYNEUROPATHY, WITH LONG-TERM CURRENT USE OF INSULIN (HCC): ICD-10-CM

## 2021-10-06 DIAGNOSIS — R41.3 MEMORY DYSFUNCTION: Primary | ICD-10-CM

## 2021-10-06 DIAGNOSIS — E11.69 HYPERLIPIDEMIA ASSOCIATED WITH TYPE 2 DIABETES MELLITUS (HCC): ICD-10-CM

## 2021-10-06 DIAGNOSIS — E78.5 HYPERLIPIDEMIA ASSOCIATED WITH TYPE 2 DIABETES MELLITUS (HCC): ICD-10-CM

## 2021-10-06 DIAGNOSIS — I10 BENIGN ESSENTIAL HYPERTENSION: ICD-10-CM

## 2021-10-06 PROCEDURE — NC001 PR NO CHARGE: Performed by: CLINICAL NEUROPSYCHOLOGIST

## 2021-10-07 DIAGNOSIS — E11.9 CONTROLLED TYPE 2 DIABETES MELLITUS WITHOUT COMPLICATION, WITHOUT LONG-TERM CURRENT USE OF INSULIN (HCC): Primary | ICD-10-CM

## 2021-10-07 DIAGNOSIS — E78.5 HYPERLIPIDEMIA ASSOCIATED WITH TYPE 2 DIABETES MELLITUS (HCC): ICD-10-CM

## 2021-10-07 DIAGNOSIS — E11.69 HYPERLIPIDEMIA ASSOCIATED WITH TYPE 2 DIABETES MELLITUS (HCC): ICD-10-CM

## 2021-10-13 LAB
BUN SERPL-MCNC: 16 MG/DL (ref 7–25)
BUN/CREAT SERPL: NORMAL (CALC) (ref 6–22)
CALCIUM SERPL-MCNC: 9.5 MG/DL (ref 8.6–10.4)
CHLORIDE SERPL-SCNC: 106 MMOL/L (ref 98–110)
CO2 SERPL-SCNC: 31 MMOL/L (ref 20–32)
CREAT SERPL-MCNC: 0.69 MG/DL (ref 0.5–0.99)
EST. AVERAGE GLUCOSE BLD GHB EST-MCNC: 151 (CALC)
EST. AVERAGE GLUCOSE BLD GHB EST-SCNC: 8.4 (CALC)
GLUCOSE SERPL-MCNC: 83 MG/DL (ref 65–99)
HBA1C MFR BLD: 6.9 % OF TOTAL HGB
POTASSIUM SERPL-SCNC: 4.1 MMOL/L (ref 3.5–5.3)
SL AMB EGFR AFRICAN AMERICAN: 109 ML/MIN/1.73M2
SL AMB EGFR NON AFRICAN AMERICAN: 94 ML/MIN/1.73M2
SODIUM SERPL-SCNC: 144 MMOL/L (ref 135–146)

## 2021-10-13 PROCEDURE — 3044F HG A1C LEVEL LT 7.0%: CPT | Performed by: PSYCHIATRY & NEUROLOGY

## 2021-10-14 ENCOUNTER — TELEPHONE (OUTPATIENT)
Dept: FAMILY MEDICINE CLINIC | Facility: CLINIC | Age: 61
End: 2021-10-14

## 2021-10-14 ENCOUNTER — OFFICE VISIT (OUTPATIENT)
Dept: NEUROLOGY | Facility: CLINIC | Age: 61
End: 2021-10-14

## 2021-10-14 ENCOUNTER — TELEPHONE (OUTPATIENT)
Dept: NEUROLOGY | Facility: CLINIC | Age: 61
End: 2021-10-14

## 2021-10-14 DIAGNOSIS — R41.3 MEMORY DYSFUNCTION: Primary | ICD-10-CM

## 2021-10-14 DIAGNOSIS — E53.8 B12 DEFICIENCY: ICD-10-CM

## 2021-10-14 DIAGNOSIS — I10 BENIGN ESSENTIAL HYPERTENSION: ICD-10-CM

## 2021-10-14 DIAGNOSIS — Z79.4 CONTROLLED TYPE 2 DIABETES MELLITUS WITH DIABETIC POLYNEUROPATHY, WITH LONG-TERM CURRENT USE OF INSULIN (HCC): ICD-10-CM

## 2021-10-14 DIAGNOSIS — E11.42 CONTROLLED TYPE 2 DIABETES MELLITUS WITH DIABETIC POLYNEUROPATHY, WITH LONG-TERM CURRENT USE OF INSULIN (HCC): ICD-10-CM

## 2021-10-14 DIAGNOSIS — E11.69 HYPERLIPIDEMIA ASSOCIATED WITH TYPE 2 DIABETES MELLITUS (HCC): ICD-10-CM

## 2021-10-14 DIAGNOSIS — G89.4 CHRONIC PAIN DISORDER: ICD-10-CM

## 2021-10-14 DIAGNOSIS — E78.5 HYPERLIPIDEMIA ASSOCIATED WITH TYPE 2 DIABETES MELLITUS (HCC): ICD-10-CM

## 2021-10-14 PROCEDURE — NC001 PR NO CHARGE

## 2021-10-15 LAB
METHYLMALONATE SERPL-SCNC: 211 NMOL/L (ref 87–318)
VIT B12 SERPL-MCNC: 1823 PG/ML (ref 200–1100)

## 2021-10-20 DIAGNOSIS — J45.30 MILD PERSISTENT ASTHMA, UNSPECIFIED WHETHER COMPLICATED: ICD-10-CM

## 2021-10-20 RX ORDER — ALBUTEROL SULFATE 90 UG/1
AEROSOL, METERED RESPIRATORY (INHALATION)
Qty: 25.5 G | Refills: 1 | Status: SHIPPED | OUTPATIENT
Start: 2021-10-20 | End: 2022-03-10 | Stop reason: SDUPTHER

## 2021-10-23 DIAGNOSIS — E11.9 CONTROLLED TYPE 2 DIABETES MELLITUS WITHOUT COMPLICATION, WITHOUT LONG-TERM CURRENT USE OF INSULIN (HCC): ICD-10-CM

## 2021-10-25 RX ORDER — PEN NEEDLE, DIABETIC 31 GX5/16"
NEEDLE, DISPOSABLE MISCELLANEOUS
Qty: 100 EACH | Refills: 1 | Status: SHIPPED | OUTPATIENT
Start: 2021-10-25 | End: 2022-03-23

## 2021-10-26 ENCOUNTER — TELEMEDICINE (OUTPATIENT)
Dept: NEUROLOGY | Facility: CLINIC | Age: 61
End: 2021-10-26
Payer: COMMERCIAL

## 2021-10-26 ENCOUNTER — TELEPHONE (OUTPATIENT)
Dept: NEUROLOGY | Facility: CLINIC | Age: 61
End: 2021-10-26

## 2021-10-26 DIAGNOSIS — E11.9 TYPE 2 DIABETES MELLITUS WITHOUT COMPLICATION, WITHOUT LONG-TERM CURRENT USE OF INSULIN (HCC): ICD-10-CM

## 2021-10-26 DIAGNOSIS — I10 BENIGN ESSENTIAL HYPERTENSION: ICD-10-CM

## 2021-10-26 DIAGNOSIS — E11.42 CONTROLLED TYPE 2 DIABETES MELLITUS WITH DIABETIC POLYNEUROPATHY, WITH LONG-TERM CURRENT USE OF INSULIN (HCC): ICD-10-CM

## 2021-10-26 DIAGNOSIS — E78.5 HYPERLIPIDEMIA ASSOCIATED WITH TYPE 2 DIABETES MELLITUS (HCC): ICD-10-CM

## 2021-10-26 DIAGNOSIS — Z79.4 CONTROLLED TYPE 2 DIABETES MELLITUS WITH DIABETIC POLYNEUROPATHY, WITH LONG-TERM CURRENT USE OF INSULIN (HCC): ICD-10-CM

## 2021-10-26 DIAGNOSIS — R41.3 MEMORY DYSFUNCTION: ICD-10-CM

## 2021-10-26 DIAGNOSIS — G62.9 PERIPHERAL POLYNEUROPATHY: Primary | ICD-10-CM

## 2021-10-26 DIAGNOSIS — R41.3 MEMORY DYSFUNCTION: Primary | ICD-10-CM

## 2021-10-26 DIAGNOSIS — E11.69 HYPERLIPIDEMIA ASSOCIATED WITH TYPE 2 DIABETES MELLITUS (HCC): ICD-10-CM

## 2021-10-26 PROCEDURE — 96133 NRPSYC TST EVAL PHYS/QHP EA: CPT | Performed by: CLINICAL NEUROPSYCHOLOGIST

## 2021-10-26 PROCEDURE — 96132 NRPSYC TST EVAL PHYS/QHP 1ST: CPT | Performed by: CLINICAL NEUROPSYCHOLOGIST

## 2021-10-26 PROCEDURE — 96136 PSYCL/NRPSYC TST PHY/QHP 1ST: CPT | Performed by: CLINICAL NEUROPSYCHOLOGIST

## 2021-10-26 PROCEDURE — 96116 NUBHVL XM PHYS/QHP 1ST HR: CPT | Performed by: CLINICAL NEUROPSYCHOLOGIST

## 2021-10-26 PROCEDURE — 96138 PSYCL/NRPSYC TECH 1ST: CPT | Performed by: CLINICAL NEUROPSYCHOLOGIST

## 2021-10-26 PROCEDURE — 96139 PSYCL/NRPSYC TST TECH EA: CPT | Performed by: CLINICAL NEUROPSYCHOLOGIST

## 2021-10-29 DIAGNOSIS — E78.5 HYPERLIPIDEMIA ASSOCIATED WITH TYPE 2 DIABETES MELLITUS (HCC): ICD-10-CM

## 2021-10-29 DIAGNOSIS — E11.69 HYPERLIPIDEMIA ASSOCIATED WITH TYPE 2 DIABETES MELLITUS (HCC): ICD-10-CM

## 2021-10-29 RX ORDER — ATORVASTATIN CALCIUM 40 MG/1
40 TABLET, FILM COATED ORAL DAILY
Qty: 90 TABLET | Refills: 1 | Status: SHIPPED | OUTPATIENT
Start: 2021-10-29 | End: 2022-01-11

## 2021-11-04 ENCOUNTER — APPOINTMENT (EMERGENCY)
Dept: RADIOLOGY | Facility: HOSPITAL | Age: 61
DRG: 683 | End: 2021-11-04
Payer: COMMERCIAL

## 2021-11-04 ENCOUNTER — HOSPITAL ENCOUNTER (INPATIENT)
Facility: HOSPITAL | Age: 61
LOS: 1 days | Discharge: HOME | DRG: 683 | End: 2021-11-05
Attending: EMERGENCY MEDICINE | Admitting: INTERNAL MEDICINE
Payer: COMMERCIAL

## 2021-11-04 DIAGNOSIS — E86.0 DEHYDRATION: ICD-10-CM

## 2021-11-04 DIAGNOSIS — R42 POSTURAL LIGHTHEADEDNESS: ICD-10-CM

## 2021-11-04 DIAGNOSIS — N17.9 AKI (ACUTE KIDNEY INJURY) (HCC): Primary | ICD-10-CM

## 2021-11-04 DIAGNOSIS — F11.20 NARCOTIC DEPENDENCY, CONTINUOUS (HCC): ICD-10-CM

## 2021-11-04 PROBLEM — Z79.4 TYPE 2 DIABETES MELLITUS WITH DIABETIC NEUROPATHY, WITH LONG-TERM CURRENT USE OF INSULIN (HCC): Status: ACTIVE | Noted: 2021-11-04

## 2021-11-04 PROBLEM — E11.40 TYPE 2 DIABETES MELLITUS WITH DIABETIC NEUROPATHY, WITH LONG-TERM CURRENT USE OF INSULIN (HCC): Status: ACTIVE | Noted: 2021-11-04

## 2021-11-04 PROBLEM — Z92.29 COVID-19 VACCINE SERIES COMPLETED: Status: ACTIVE | Noted: 2021-11-04

## 2021-11-04 PROBLEM — I95.9 HYPOTENSION: Status: ACTIVE | Noted: 2021-11-04

## 2021-11-04 LAB
ALBUMIN SERPL BCP-MCNC: 3 G/DL (ref 3.5–5)
ALP SERPL-CCNC: 65 U/L (ref 46–116)
ALT SERPL W P-5'-P-CCNC: 61 U/L (ref 12–78)
ANION GAP SERPL CALCULATED.3IONS-SCNC: 14 MMOL/L (ref 4–13)
AST SERPL W P-5'-P-CCNC: 52 U/L (ref 5–45)
ATRIAL RATE: 92 BPM
BACTERIA UR QL AUTO: ABNORMAL /HPF
BASOPHILS # BLD AUTO: 0.03 THOUSANDS/ΜL (ref 0–0.1)
BASOPHILS NFR BLD AUTO: 0 % (ref 0–1)
BILIRUB SERPL-MCNC: 0.4 MG/DL (ref 0.2–1)
BILIRUB UR QL STRIP: NEGATIVE
BUN SERPL-MCNC: 33 MG/DL (ref 5–25)
CALCIUM ALBUM COR SERPL-MCNC: 9.9 MG/DL (ref 8.3–10.1)
CALCIUM SERPL-MCNC: 9.1 MG/DL (ref 8.3–10.1)
CHLORIDE SERPL-SCNC: 105 MMOL/L (ref 100–108)
CLARITY UR: ABNORMAL
CO2 SERPL-SCNC: 23 MMOL/L (ref 21–32)
COLOR UR: YELLOW
CREAT SERPL-MCNC: 2.22 MG/DL (ref 0.6–1.3)
EOSINOPHIL # BLD AUTO: 0.21 THOUSAND/ΜL (ref 0–0.61)
EOSINOPHIL NFR BLD AUTO: 2 % (ref 0–6)
ERYTHROCYTE [DISTWIDTH] IN BLOOD BY AUTOMATED COUNT: 17.2 % (ref 11.6–15.1)
EST. AVERAGE GLUCOSE BLD GHB EST-MCNC: 143 MG/DL
GFR SERPL CREATININE-BSD FRML MDRD: 23 ML/MIN/1.73SQ M
GLUCOSE SERPL-MCNC: 113 MG/DL (ref 65–140)
GLUCOSE SERPL-MCNC: 78 MG/DL (ref 65–140)
GLUCOSE SERPL-MCNC: 87 MG/DL (ref 65–140)
GLUCOSE UR STRIP-MCNC: NEGATIVE MG/DL
HBA1C MFR BLD: 6.6 %
HCT VFR BLD AUTO: 41.3 % (ref 34.8–46.1)
HGB BLD-MCNC: 12.5 G/DL (ref 11.5–15.4)
HGB UR QL STRIP.AUTO: NEGATIVE
IMM GRANULOCYTES # BLD AUTO: 0.03 THOUSAND/UL (ref 0–0.2)
IMM GRANULOCYTES NFR BLD AUTO: 0 % (ref 0–2)
KETONES UR STRIP-MCNC: NEGATIVE MG/DL
LEUKOCYTE ESTERASE UR QL STRIP: ABNORMAL
LYMPHOCYTES # BLD AUTO: 1.41 THOUSANDS/ΜL (ref 0.6–4.47)
LYMPHOCYTES NFR BLD AUTO: 11 % (ref 14–44)
MCH RBC QN AUTO: 24.9 PG (ref 26.8–34.3)
MCHC RBC AUTO-ENTMCNC: 30.3 G/DL (ref 31.4–37.4)
MCV RBC AUTO: 82 FL (ref 82–98)
MONOCYTES # BLD AUTO: 0.98 THOUSAND/ΜL (ref 0.17–1.22)
MONOCYTES NFR BLD AUTO: 8 % (ref 4–12)
NEUTROPHILS # BLD AUTO: 9.86 THOUSANDS/ΜL (ref 1.85–7.62)
NEUTS SEG NFR BLD AUTO: 79 % (ref 43–75)
NITRITE UR QL STRIP: NEGATIVE
NON-SQ EPI CELLS URNS QL MICRO: ABNORMAL /HPF
NRBC BLD AUTO-RTO: 0 /100 WBCS
OTHER STN SPEC: ABNORMAL
P AXIS: 56 DEGREES
PH UR STRIP.AUTO: 5 [PH]
PLATELET # BLD AUTO: 176 THOUSANDS/UL (ref 149–390)
PLATELET # BLD AUTO: 218 THOUSANDS/UL (ref 149–390)
PMV BLD AUTO: 12.1 FL (ref 8.9–12.7)
PMV BLD AUTO: 12.3 FL (ref 8.9–12.7)
POTASSIUM SERPL-SCNC: 4.6 MMOL/L (ref 3.5–5.3)
PR INTERVAL: 206 MS
PROT SERPL-MCNC: 7.1 G/DL (ref 6.4–8.2)
PROT UR STRIP-MCNC: NEGATIVE MG/DL
QRS AXIS: 42 DEGREES
QRSD INTERVAL: 86 MS
QT INTERVAL: 346 MS
QTC INTERVAL: 427 MS
RBC # BLD AUTO: 5.02 MILLION/UL (ref 3.81–5.12)
RBC #/AREA URNS AUTO: ABNORMAL /HPF
SODIUM SERPL-SCNC: 142 MMOL/L (ref 136–145)
SP GR UR STRIP.AUTO: 1.02 (ref 1–1.03)
T WAVE AXIS: 75 DEGREES
TROPONIN I SERPL-MCNC: <0.02 NG/ML
UROBILINOGEN UR QL STRIP.AUTO: 0.2 E.U./DL
VENTRICULAR RATE: 92 BPM
WBC # BLD AUTO: 12.52 THOUSAND/UL (ref 4.31–10.16)
WBC #/AREA URNS AUTO: ABNORMAL /HPF

## 2021-11-04 PROCEDURE — 93010 ELECTROCARDIOGRAM REPORT: CPT | Performed by: INTERNAL MEDICINE

## 2021-11-04 PROCEDURE — 71045 X-RAY EXAM CHEST 1 VIEW: CPT

## 2021-11-04 PROCEDURE — 82948 REAGENT STRIP/BLOOD GLUCOSE: CPT

## 2021-11-04 PROCEDURE — 87186 SC STD MICRODIL/AGAR DIL: CPT | Performed by: INTERNAL MEDICINE

## 2021-11-04 PROCEDURE — 99285 EMERGENCY DEPT VISIT HI MDM: CPT

## 2021-11-04 PROCEDURE — 99223 1ST HOSP IP/OBS HIGH 75: CPT | Performed by: INTERNAL MEDICINE

## 2021-11-04 PROCEDURE — 85049 AUTOMATED PLATELET COUNT: CPT | Performed by: INTERNAL MEDICINE

## 2021-11-04 PROCEDURE — 85025 COMPLETE CBC W/AUTO DIFF WBC: CPT | Performed by: EMERGENCY MEDICINE

## 2021-11-04 PROCEDURE — 96360 HYDRATION IV INFUSION INIT: CPT

## 2021-11-04 PROCEDURE — 84484 ASSAY OF TROPONIN QUANT: CPT | Performed by: EMERGENCY MEDICINE

## 2021-11-04 PROCEDURE — 87077 CULTURE AEROBIC IDENTIFY: CPT | Performed by: INTERNAL MEDICINE

## 2021-11-04 PROCEDURE — 99285 EMERGENCY DEPT VISIT HI MDM: CPT | Performed by: EMERGENCY MEDICINE

## 2021-11-04 PROCEDURE — 93005 ELECTROCARDIOGRAM TRACING: CPT

## 2021-11-04 PROCEDURE — 83036 HEMOGLOBIN GLYCOSYLATED A1C: CPT | Performed by: INTERNAL MEDICINE

## 2021-11-04 PROCEDURE — 81001 URINALYSIS AUTO W/SCOPE: CPT | Performed by: INTERNAL MEDICINE

## 2021-11-04 PROCEDURE — 87086 URINE CULTURE/COLONY COUNT: CPT | Performed by: INTERNAL MEDICINE

## 2021-11-04 PROCEDURE — 36415 COLL VENOUS BLD VENIPUNCTURE: CPT

## 2021-11-04 PROCEDURE — 80053 COMPREHEN METABOLIC PANEL: CPT | Performed by: EMERGENCY MEDICINE

## 2021-11-04 RX ORDER — TRAMADOL HYDROCHLORIDE 50 MG/1
200 TABLET ORAL EVERY MORNING
Status: DISCONTINUED | OUTPATIENT
Start: 2021-11-05 | End: 2021-11-05 | Stop reason: HOSPADM

## 2021-11-04 RX ORDER — ONDANSETRON 2 MG/ML
4 INJECTION INTRAMUSCULAR; INTRAVENOUS EVERY 6 HOURS PRN
Status: DISCONTINUED | OUTPATIENT
Start: 2021-11-04 | End: 2021-11-05 | Stop reason: HOSPADM

## 2021-11-04 RX ORDER — PANTOPRAZOLE SODIUM 40 MG/1
40 TABLET, DELAYED RELEASE ORAL
Status: DISCONTINUED | OUTPATIENT
Start: 2021-11-05 | End: 2021-11-05 | Stop reason: HOSPADM

## 2021-11-04 RX ORDER — INSULIN GLARGINE 100 [IU]/ML
30 INJECTION, SOLUTION SUBCUTANEOUS
Status: DISCONTINUED | OUTPATIENT
Start: 2021-11-04 | End: 2021-11-05 | Stop reason: HOSPADM

## 2021-11-04 RX ORDER — ZOLPIDEM TARTRATE 5 MG/1
5 TABLET ORAL
Status: DISCONTINUED | OUTPATIENT
Start: 2021-11-04 | End: 2021-11-05 | Stop reason: HOSPADM

## 2021-11-04 RX ORDER — HYDROCODONE BITARTRATE AND ACETAMINOPHEN 5; 325 MG/1; MG/1
1 TABLET ORAL EVERY 6 HOURS PRN
Status: DISCONTINUED | OUTPATIENT
Start: 2021-11-04 | End: 2021-11-05 | Stop reason: HOSPADM

## 2021-11-04 RX ORDER — ALBUTEROL SULFATE 90 UG/1
2 AEROSOL, METERED RESPIRATORY (INHALATION) EVERY 6 HOURS PRN
Status: DISCONTINUED | OUTPATIENT
Start: 2021-11-04 | End: 2021-11-05 | Stop reason: HOSPADM

## 2021-11-04 RX ORDER — CALCIUM CARBONATE 200(500)MG
500 TABLET,CHEWABLE ORAL DAILY PRN
Status: DISCONTINUED | OUTPATIENT
Start: 2021-11-04 | End: 2021-11-05 | Stop reason: HOSPADM

## 2021-11-04 RX ORDER — ATORVASTATIN CALCIUM 40 MG/1
40 TABLET, FILM COATED ORAL
Status: DISCONTINUED | OUTPATIENT
Start: 2021-11-05 | End: 2021-11-05 | Stop reason: HOSPADM

## 2021-11-04 RX ORDER — PREGABALIN 100 MG/1
100 CAPSULE ORAL 3 TIMES DAILY
Status: DISCONTINUED | OUTPATIENT
Start: 2021-11-04 | End: 2021-11-04

## 2021-11-04 RX ORDER — ACETAMINOPHEN 325 MG/1
650 TABLET ORAL EVERY 6 HOURS PRN
Status: DISCONTINUED | OUTPATIENT
Start: 2021-11-04 | End: 2021-11-05 | Stop reason: HOSPADM

## 2021-11-04 RX ORDER — SODIUM CHLORIDE, SODIUM GLUCONATE, SODIUM ACETATE, POTASSIUM CHLORIDE, MAGNESIUM CHLORIDE, SODIUM PHOSPHATE, DIBASIC, AND POTASSIUM PHOSPHATE .53; .5; .37; .037; .03; .012; .00082 G/100ML; G/100ML; G/100ML; G/100ML; G/100ML; G/100ML; G/100ML
100 INJECTION, SOLUTION INTRAVENOUS CONTINUOUS
Status: DISCONTINUED | OUTPATIENT
Start: 2021-11-04 | End: 2021-11-05 | Stop reason: HOSPADM

## 2021-11-04 RX ORDER — PREGABALIN 25 MG/1
75 CAPSULE ORAL 2 TIMES DAILY
Status: DISCONTINUED | OUTPATIENT
Start: 2021-11-04 | End: 2021-11-05 | Stop reason: HOSPADM

## 2021-11-04 RX ORDER — HEPARIN SODIUM 5000 [USP'U]/ML
5000 INJECTION, SOLUTION INTRAVENOUS; SUBCUTANEOUS EVERY 8 HOURS SCHEDULED
Status: DISCONTINUED | OUTPATIENT
Start: 2021-11-04 | End: 2021-11-05 | Stop reason: HOSPADM

## 2021-11-04 RX ORDER — BUDESONIDE AND FORMOTEROL FUMARATE DIHYDRATE 160; 4.5 UG/1; UG/1
2 AEROSOL RESPIRATORY (INHALATION) 2 TIMES DAILY
Status: DISCONTINUED | OUTPATIENT
Start: 2021-11-04 | End: 2021-11-05 | Stop reason: HOSPADM

## 2021-11-04 RX ADMIN — CALCIUM CARBONATE (ANTACID) CHEW TAB 500 MG 500 MG: 500 CHEW TAB at 20:35

## 2021-11-04 RX ADMIN — SODIUM CHLORIDE, SODIUM GLUCONATE, SODIUM ACETATE, POTASSIUM CHLORIDE, MAGNESIUM CHLORIDE, SODIUM PHOSPHATE, DIBASIC, AND POTASSIUM PHOSPHATE 100 ML/HR: .53; .5; .37; .037; .03; .012; .00082 INJECTION, SOLUTION INTRAVENOUS at 14:54

## 2021-11-04 RX ADMIN — PREGABALIN 75 MG: 25 CAPSULE ORAL at 19:03

## 2021-11-04 RX ADMIN — INSULIN GLARGINE 30 UNITS: 100 INJECTION, SOLUTION SUBCUTANEOUS at 22:50

## 2021-11-04 RX ADMIN — SODIUM CHLORIDE 1000 ML: 0.9 INJECTION, SOLUTION INTRAVENOUS at 11:25

## 2021-11-04 RX ADMIN — ZOLPIDEM TARTRATE 5 MG: 5 TABLET ORAL at 23:01

## 2021-11-04 RX ADMIN — HEPARIN SODIUM 5000 UNITS: 5000 INJECTION, SOLUTION INTRAVENOUS; SUBCUTANEOUS at 14:57

## 2021-11-05 ENCOUNTER — RA CDI HCC (OUTPATIENT)
Dept: OTHER | Facility: HOSPITAL | Age: 61
End: 2021-11-05

## 2021-11-05 VITALS
SYSTOLIC BLOOD PRESSURE: 133 MMHG | TEMPERATURE: 98.1 F | WEIGHT: 195.33 LBS | BODY MASS INDEX: 33.35 KG/M2 | HEART RATE: 75 BPM | OXYGEN SATURATION: 92 % | DIASTOLIC BLOOD PRESSURE: 71 MMHG | HEIGHT: 64 IN | RESPIRATION RATE: 16 BRPM

## 2021-11-05 LAB
ANION GAP SERPL CALCULATED.3IONS-SCNC: 10 MMOL/L (ref 4–13)
BUN SERPL-MCNC: 29 MG/DL (ref 5–25)
CALCIUM SERPL-MCNC: 8.8 MG/DL (ref 8.3–10.1)
CHLORIDE SERPL-SCNC: 108 MMOL/L (ref 100–108)
CO2 SERPL-SCNC: 25 MMOL/L (ref 21–32)
CREAT SERPL-MCNC: 0.99 MG/DL (ref 0.6–1.3)
ERYTHROCYTE [DISTWIDTH] IN BLOOD BY AUTOMATED COUNT: 17 % (ref 11.6–15.1)
GFR SERPL CREATININE-BSD FRML MDRD: 62 ML/MIN/1.73SQ M
GLUCOSE SERPL-MCNC: 109 MG/DL (ref 65–140)
GLUCOSE SERPL-MCNC: 127 MG/DL (ref 65–140)
GLUCOSE SERPL-MCNC: 137 MG/DL (ref 65–140)
GLUCOSE SERPL-MCNC: 156 MG/DL (ref 65–140)
GLUCOSE SERPL-MCNC: 183 MG/DL (ref 65–140)
HBV CORE AB SER QL: NORMAL
HBV CORE IGM SER QL: NORMAL
HBV SURFACE AG SER QL: NORMAL
HCT VFR BLD AUTO: 34.8 % (ref 34.8–46.1)
HCV AB SER QL: NORMAL
HGB BLD-MCNC: 10.5 G/DL (ref 11.5–15.4)
MCH RBC QN AUTO: 25 PG (ref 26.8–34.3)
MCHC RBC AUTO-ENTMCNC: 30.2 G/DL (ref 31.4–37.4)
MCV RBC AUTO: 83 FL (ref 82–98)
PLATELET # BLD AUTO: 144 THOUSANDS/UL (ref 149–390)
PMV BLD AUTO: 11.6 FL (ref 8.9–12.7)
POTASSIUM SERPL-SCNC: 3.9 MMOL/L (ref 3.5–5.3)
RBC # BLD AUTO: 4.2 MILLION/UL (ref 3.81–5.12)
SODIUM SERPL-SCNC: 143 MMOL/L (ref 136–145)
WBC # BLD AUTO: 5.96 THOUSAND/UL (ref 4.31–10.16)

## 2021-11-05 PROCEDURE — 82948 REAGENT STRIP/BLOOD GLUCOSE: CPT

## 2021-11-05 PROCEDURE — 87340 HEPATITIS B SURFACE AG IA: CPT | Performed by: PHYSICIAN ASSISTANT

## 2021-11-05 PROCEDURE — 86705 HEP B CORE ANTIBODY IGM: CPT | Performed by: PHYSICIAN ASSISTANT

## 2021-11-05 PROCEDURE — 97165 OT EVAL LOW COMPLEX 30 MIN: CPT

## 2021-11-05 PROCEDURE — 86704 HEP B CORE ANTIBODY TOTAL: CPT | Performed by: PHYSICIAN ASSISTANT

## 2021-11-05 PROCEDURE — 80048 BASIC METABOLIC PNL TOTAL CA: CPT | Performed by: INTERNAL MEDICINE

## 2021-11-05 PROCEDURE — 86803 HEPATITIS C AB TEST: CPT | Performed by: PHYSICIAN ASSISTANT

## 2021-11-05 PROCEDURE — 99239 HOSP IP/OBS DSCHRG MGMT >30: CPT | Performed by: INTERNAL MEDICINE

## 2021-11-05 PROCEDURE — 97162 PT EVAL MOD COMPLEX 30 MIN: CPT

## 2021-11-05 PROCEDURE — 85027 COMPLETE CBC AUTOMATED: CPT | Performed by: INTERNAL MEDICINE

## 2021-11-05 RX ORDER — HYDROCODONE BITARTRATE AND ACETAMINOPHEN 5; 325 MG/1; MG/1
1 TABLET ORAL 2 TIMES DAILY PRN
Refills: 0
Start: 2021-11-05 | End: 2021-11-15

## 2021-11-05 RX ADMIN — HEPARIN SODIUM 5000 UNITS: 5000 INJECTION, SOLUTION INTRAVENOUS; SUBCUTANEOUS at 05:19

## 2021-11-05 RX ADMIN — BUDESONIDE AND FORMOTEROL FUMARATE DIHYDRATE 2 PUFF: 160; 4.5 AEROSOL RESPIRATORY (INHALATION) at 09:00

## 2021-11-05 RX ADMIN — TRAMADOL HYDROCHLORIDE 200 MG: 50 TABLET ORAL at 09:00

## 2021-11-05 RX ADMIN — PREGABALIN 75 MG: 25 CAPSULE ORAL at 16:48

## 2021-11-05 RX ADMIN — ALBUTEROL SULFATE 2 PUFF: 90 AEROSOL, METERED RESPIRATORY (INHALATION) at 05:19

## 2021-11-05 RX ADMIN — ATORVASTATIN CALCIUM 40 MG: 40 TABLET, FILM COATED ORAL at 09:00

## 2021-11-05 RX ADMIN — HEPARIN SODIUM 5000 UNITS: 5000 INJECTION, SOLUTION INTRAVENOUS; SUBCUTANEOUS at 14:06

## 2021-11-05 RX ADMIN — PANTOPRAZOLE SODIUM 40 MG: 40 TABLET, DELAYED RELEASE ORAL at 05:19

## 2021-11-05 RX ADMIN — INSULIN LISPRO 1 UNITS: 100 INJECTION, SOLUTION INTRAVENOUS; SUBCUTANEOUS at 09:00

## 2021-11-05 RX ADMIN — PREGABALIN 75 MG: 25 CAPSULE ORAL at 09:00

## 2021-11-05 RX ADMIN — ACETAMINOPHEN 650 MG: 325 TABLET, FILM COATED ORAL at 09:00

## 2021-11-07 DIAGNOSIS — N30.00 ACUTE CYSTITIS WITHOUT HEMATURIA: Primary | ICD-10-CM

## 2021-11-07 LAB — BACTERIA UR CULT: ABNORMAL

## 2021-11-07 RX ORDER — CEPHALEXIN 500 MG/1
500 CAPSULE ORAL EVERY 8 HOURS SCHEDULED
Qty: 21 CAPSULE | Refills: 0 | Status: SHIPPED | OUTPATIENT
Start: 2021-11-07 | End: 2021-11-14

## 2021-11-09 ENCOUNTER — TRANSITIONAL CARE MANAGEMENT (OUTPATIENT)
Dept: FAMILY MEDICINE CLINIC | Facility: CLINIC | Age: 61
End: 2021-11-09

## 2021-11-10 ENCOUNTER — TRANSITIONAL CARE MANAGEMENT (OUTPATIENT)
Dept: FAMILY MEDICINE CLINIC | Facility: CLINIC | Age: 61
End: 2021-11-10

## 2021-11-11 ENCOUNTER — OFFICE VISIT (OUTPATIENT)
Dept: FAMILY MEDICINE CLINIC | Facility: CLINIC | Age: 61
End: 2021-11-11
Payer: COMMERCIAL

## 2021-11-11 VITALS
WEIGHT: 195 LBS | HEART RATE: 89 BPM | OXYGEN SATURATION: 91 % | HEIGHT: 64 IN | DIASTOLIC BLOOD PRESSURE: 80 MMHG | SYSTOLIC BLOOD PRESSURE: 120 MMHG | BODY MASS INDEX: 33.29 KG/M2 | TEMPERATURE: 97.2 F

## 2021-11-11 DIAGNOSIS — K21.00 GASTROESOPHAGEAL REFLUX DISEASE WITH ESOPHAGITIS: ICD-10-CM

## 2021-11-11 DIAGNOSIS — Z79.4 TYPE 2 DIABETES MELLITUS WITH DIABETIC NEUROPATHY, WITH LONG-TERM CURRENT USE OF INSULIN (HCC): ICD-10-CM

## 2021-11-11 DIAGNOSIS — E11.40 TYPE 2 DIABETES MELLITUS WITH DIABETIC NEUROPATHY, WITH LONG-TERM CURRENT USE OF INSULIN (HCC): ICD-10-CM

## 2021-11-11 DIAGNOSIS — I10 BENIGN ESSENTIAL HYPERTENSION: ICD-10-CM

## 2021-11-11 DIAGNOSIS — E86.0 DEHYDRATION: Primary | ICD-10-CM

## 2021-11-11 PROBLEM — I95.9 HYPOTENSION: Status: RESOLVED | Noted: 2021-11-04 | Resolved: 2021-11-11

## 2021-11-11 PROBLEM — N17.9 AKI (ACUTE KIDNEY INJURY) (HCC): Status: RESOLVED | Noted: 2021-11-04 | Resolved: 2021-11-11

## 2021-11-11 PROCEDURE — 99495 TRANSJ CARE MGMT MOD F2F 14D: CPT | Performed by: FAMILY MEDICINE

## 2021-11-11 PROCEDURE — 1111F DSCHRG MED/CURRENT MED MERGE: CPT | Performed by: FAMILY MEDICINE

## 2021-11-11 RX ORDER — OMEPRAZOLE 40 MG/1
40 CAPSULE, DELAYED RELEASE ORAL EVERY MORNING
Qty: 90 CAPSULE | Refills: 3 | Status: SHIPPED | OUTPATIENT
Start: 2021-11-11

## 2021-11-15 ENCOUNTER — PATIENT MESSAGE (OUTPATIENT)
Dept: NEUROLOGY | Facility: CLINIC | Age: 61
End: 2021-11-15

## 2021-11-19 DIAGNOSIS — E11.9 TYPE 2 DIABETES MELLITUS WITHOUT COMPLICATION, WITHOUT LONG-TERM CURRENT USE OF INSULIN (HCC): ICD-10-CM

## 2021-11-21 LAB — TSH SERPL-ACNC: 2.44 MIU/L (ref 0.4–4.5)

## 2021-12-06 ENCOUNTER — OFFICE VISIT (OUTPATIENT)
Dept: FAMILY MEDICINE CLINIC | Facility: CLINIC | Age: 61
End: 2021-12-06
Payer: COMMERCIAL

## 2021-12-06 VITALS
WEIGHT: 194 LBS | OXYGEN SATURATION: 92 % | DIASTOLIC BLOOD PRESSURE: 68 MMHG | SYSTOLIC BLOOD PRESSURE: 110 MMHG | TEMPERATURE: 97.8 F | HEART RATE: 92 BPM | BODY MASS INDEX: 33.12 KG/M2 | HEIGHT: 64 IN

## 2021-12-06 DIAGNOSIS — J45.30 MILD PERSISTENT ASTHMA WITHOUT COMPLICATION: ICD-10-CM

## 2021-12-06 DIAGNOSIS — G89.29 CHRONIC THORACIC SPINE PAIN: ICD-10-CM

## 2021-12-06 DIAGNOSIS — I10 BENIGN ESSENTIAL HYPERTENSION: ICD-10-CM

## 2021-12-06 DIAGNOSIS — G89.29 CHRONIC CERVICAL PAIN: ICD-10-CM

## 2021-12-06 DIAGNOSIS — E11.40 TYPE 2 DIABETES MELLITUS WITH DIABETIC NEUROPATHY, WITH LONG-TERM CURRENT USE OF INSULIN (HCC): ICD-10-CM

## 2021-12-06 DIAGNOSIS — M54.6 CHRONIC THORACIC SPINE PAIN: ICD-10-CM

## 2021-12-06 DIAGNOSIS — Z79.4 TYPE 2 DIABETES MELLITUS WITH DIABETIC NEUROPATHY, WITH LONG-TERM CURRENT USE OF INSULIN (HCC): ICD-10-CM

## 2021-12-06 DIAGNOSIS — M54.2 CHRONIC CERVICAL PAIN: ICD-10-CM

## 2021-12-06 DIAGNOSIS — Z00.00 ANNUAL PHYSICAL EXAM: Primary | ICD-10-CM

## 2021-12-06 PROCEDURE — 3078F DIAST BP <80 MM HG: CPT | Performed by: FAMILY MEDICINE

## 2021-12-06 PROCEDURE — 99214 OFFICE O/P EST MOD 30 MIN: CPT | Performed by: FAMILY MEDICINE

## 2021-12-06 PROCEDURE — 1036F TOBACCO NON-USER: CPT | Performed by: FAMILY MEDICINE

## 2021-12-06 PROCEDURE — 99396 PREV VISIT EST AGE 40-64: CPT | Performed by: FAMILY MEDICINE

## 2021-12-06 PROCEDURE — 3074F SYST BP LT 130 MM HG: CPT | Performed by: FAMILY MEDICINE

## 2021-12-06 PROCEDURE — 3008F BODY MASS INDEX DOCD: CPT | Performed by: FAMILY MEDICINE

## 2021-12-06 RX ORDER — LISINOPRIL 20 MG/1
20 TABLET ORAL DAILY
Qty: 90 TABLET | Refills: 3 | Status: SHIPPED | OUTPATIENT
Start: 2021-12-06 | End: 2022-03-06

## 2021-12-06 RX ORDER — HYDROCODONE BITARTRATE AND ACETAMINOPHEN 10; 325 MG/1; MG/1
TABLET ORAL
COMMUNITY
Start: 2021-12-04

## 2021-12-06 RX ORDER — LISINOPRIL 20 MG/1
20 TABLET ORAL DAILY
Qty: 30 TABLET | Refills: 0 | Status: SHIPPED | OUTPATIENT
Start: 2021-12-06 | End: 2021-12-29

## 2021-12-06 RX ORDER — TRAMADOL HYDROCHLORIDE 300 MG/1
300 TABLET, FILM COATED, EXTENDED RELEASE ORAL DAILY
COMMUNITY
Start: 2021-11-23

## 2021-12-11 ENCOUNTER — HOSPITAL ENCOUNTER (EMERGENCY)
Facility: HOSPITAL | Age: 61
Discharge: HOME | End: 2021-12-11
Attending: EMERGENCY MEDICINE | Admitting: EMERGENCY MEDICINE
Payer: COMMERCIAL

## 2021-12-11 ENCOUNTER — APPOINTMENT (EMERGENCY)
Dept: CT IMAGING | Facility: HOSPITAL | Age: 61
End: 2021-12-11
Payer: COMMERCIAL

## 2021-12-11 VITALS
BODY MASS INDEX: 33.12 KG/M2 | SYSTOLIC BLOOD PRESSURE: 137 MMHG | DIASTOLIC BLOOD PRESSURE: 72 MMHG | TEMPERATURE: 97.8 F | WEIGHT: 194 LBS | HEIGHT: 64 IN | OXYGEN SATURATION: 94 % | HEART RATE: 89 BPM | RESPIRATION RATE: 20 BRPM

## 2021-12-11 DIAGNOSIS — R42 DIZZINESS: Primary | ICD-10-CM

## 2021-12-11 LAB
ALBUMIN SERPL BCP-MCNC: 3.3 G/DL (ref 3.5–5)
ALP SERPL-CCNC: 63 U/L (ref 46–116)
ALT SERPL W P-5'-P-CCNC: 28 U/L (ref 12–78)
ANION GAP SERPL CALCULATED.3IONS-SCNC: 10 MMOL/L (ref 4–13)
AST SERPL W P-5'-P-CCNC: 22 U/L (ref 5–45)
BASOPHILS # BLD AUTO: 0.04 THOUSANDS/ΜL (ref 0–0.1)
BASOPHILS NFR BLD AUTO: 0 % (ref 0–1)
BILIRUB SERPL-MCNC: 0.2 MG/DL (ref 0.2–1)
BUN SERPL-MCNC: 28 MG/DL (ref 5–25)
CALCIUM ALBUM COR SERPL-MCNC: 9.7 MG/DL (ref 8.3–10.1)
CALCIUM SERPL-MCNC: 9.1 MG/DL (ref 8.3–10.1)
CARDIAC TROPONIN I PNL SERPL HS: 3 NG/L
CHLORIDE SERPL-SCNC: 106 MMOL/L (ref 100–108)
CO2 SERPL-SCNC: 28 MMOL/L (ref 21–32)
CREAT SERPL-MCNC: 1.13 MG/DL (ref 0.6–1.3)
EOSINOPHIL # BLD AUTO: 0.2 THOUSAND/ΜL (ref 0–0.61)
EOSINOPHIL NFR BLD AUTO: 2 % (ref 0–6)
ERYTHROCYTE [DISTWIDTH] IN BLOOD BY AUTOMATED COUNT: 16.7 % (ref 11.6–15.1)
GFR SERPL CREATININE-BSD FRML MDRD: 53 ML/MIN/1.73SQ M
GLUCOSE SERPL-MCNC: 85 MG/DL (ref 65–140)
HCT VFR BLD AUTO: 36.7 % (ref 34.8–46.1)
HGB BLD-MCNC: 11.1 G/DL (ref 11.5–15.4)
IMM GRANULOCYTES # BLD AUTO: 0.04 THOUSAND/UL (ref 0–0.2)
IMM GRANULOCYTES NFR BLD AUTO: 0 % (ref 0–2)
LYMPHOCYTES # BLD AUTO: 3.05 THOUSANDS/ΜL (ref 0.6–4.47)
LYMPHOCYTES NFR BLD AUTO: 30 % (ref 14–44)
MCH RBC QN AUTO: 24.9 PG (ref 26.8–34.3)
MCHC RBC AUTO-ENTMCNC: 30.2 G/DL (ref 31.4–37.4)
MCV RBC AUTO: 83 FL (ref 82–98)
MONOCYTES # BLD AUTO: 0.63 THOUSAND/ΜL (ref 0.17–1.22)
MONOCYTES NFR BLD AUTO: 6 % (ref 4–12)
NEUTROPHILS # BLD AUTO: 6.12 THOUSANDS/ΜL (ref 1.85–7.62)
NEUTS SEG NFR BLD AUTO: 62 % (ref 43–75)
NRBC BLD AUTO-RTO: 0 /100 WBCS
PLATELET # BLD AUTO: 256 THOUSANDS/UL (ref 149–390)
PMV BLD AUTO: 12.1 FL (ref 8.9–12.7)
POTASSIUM SERPL-SCNC: 4.5 MMOL/L (ref 3.5–5.3)
PROT SERPL-MCNC: 6.8 G/DL (ref 6.4–8.2)
RBC # BLD AUTO: 4.45 MILLION/UL (ref 3.81–5.12)
SODIUM SERPL-SCNC: 144 MMOL/L (ref 136–145)
WBC # BLD AUTO: 10.08 THOUSAND/UL (ref 4.31–10.16)

## 2021-12-11 PROCEDURE — G1004 CDSM NDSC: HCPCS

## 2021-12-11 PROCEDURE — 70498 CT ANGIOGRAPHY NECK: CPT

## 2021-12-11 PROCEDURE — 85025 COMPLETE CBC W/AUTO DIFF WBC: CPT | Performed by: EMERGENCY MEDICINE

## 2021-12-11 PROCEDURE — 80053 COMPREHEN METABOLIC PANEL: CPT | Performed by: EMERGENCY MEDICINE

## 2021-12-11 PROCEDURE — 93005 ELECTROCARDIOGRAM TRACING: CPT

## 2021-12-11 PROCEDURE — 84484 ASSAY OF TROPONIN QUANT: CPT | Performed by: EMERGENCY MEDICINE

## 2021-12-11 PROCEDURE — 70496 CT ANGIOGRAPHY HEAD: CPT

## 2021-12-11 PROCEDURE — 99284 EMERGENCY DEPT VISIT MOD MDM: CPT | Performed by: EMERGENCY MEDICINE

## 2021-12-11 PROCEDURE — 36415 COLL VENOUS BLD VENIPUNCTURE: CPT | Performed by: EMERGENCY MEDICINE

## 2021-12-11 PROCEDURE — 99284 EMERGENCY DEPT VISIT MOD MDM: CPT

## 2021-12-11 RX ADMIN — IOHEXOL 100 ML: 350 INJECTION, SOLUTION INTRAVENOUS at 21:49

## 2021-12-14 ENCOUNTER — VBI (OUTPATIENT)
Dept: FAMILY MEDICINE CLINIC | Facility: CLINIC | Age: 61
End: 2021-12-14

## 2021-12-15 LAB
ATRIAL RATE: 80 BPM
P AXIS: 60 DEGREES
PR INTERVAL: 214 MS
QRS AXIS: 39 DEGREES
QRSD INTERVAL: 88 MS
QT INTERVAL: 374 MS
QTC INTERVAL: 431 MS
T WAVE AXIS: 64 DEGREES
VENTRICULAR RATE: 80 BPM

## 2021-12-15 PROCEDURE — 93010 ELECTROCARDIOGRAM REPORT: CPT | Performed by: INTERNAL MEDICINE

## 2021-12-17 DIAGNOSIS — E11.9 TYPE 2 DIABETES MELLITUS WITHOUT COMPLICATION, WITHOUT LONG-TERM CURRENT USE OF INSULIN (HCC): ICD-10-CM

## 2021-12-17 RX ORDER — SITAGLIPTIN 100 MG/1
TABLET, FILM COATED ORAL
Qty: 90 TABLET | Refills: 0 | Status: SHIPPED | OUTPATIENT
Start: 2021-12-17 | End: 2022-05-16 | Stop reason: SDUPTHER

## 2021-12-21 DIAGNOSIS — E11.9 CONTROLLED TYPE 2 DIABETES MELLITUS WITHOUT COMPLICATION, WITHOUT LONG-TERM CURRENT USE OF INSULIN (HCC): ICD-10-CM

## 2021-12-21 RX ORDER — INSULIN GLARGINE 100 [IU]/ML
33 INJECTION, SOLUTION SUBCUTANEOUS
Qty: 15 ML | Refills: 2 | Status: SHIPPED | OUTPATIENT
Start: 2021-12-21 | End: 2022-05-02 | Stop reason: SDUPTHER

## 2021-12-24 DIAGNOSIS — G47.00 INSOMNIA, UNSPECIFIED TYPE: ICD-10-CM

## 2021-12-27 RX ORDER — ZOLPIDEM TARTRATE 5 MG/1
TABLET ORAL
Qty: 30 TABLET | Refills: 2 | Status: SHIPPED | OUTPATIENT
Start: 2021-12-27 | End: 2022-06-06 | Stop reason: SDUPTHER

## 2021-12-29 DIAGNOSIS — I10 BENIGN ESSENTIAL HYPERTENSION: ICD-10-CM

## 2021-12-29 DIAGNOSIS — Z79.4 TYPE 2 DIABETES MELLITUS WITH DIABETIC NEUROPATHY, WITH LONG-TERM CURRENT USE OF INSULIN (HCC): ICD-10-CM

## 2021-12-29 DIAGNOSIS — E11.40 TYPE 2 DIABETES MELLITUS WITH DIABETIC NEUROPATHY, WITH LONG-TERM CURRENT USE OF INSULIN (HCC): ICD-10-CM

## 2021-12-29 PROCEDURE — 4010F ACE/ARB THERAPY RXD/TAKEN: CPT | Performed by: FAMILY MEDICINE

## 2021-12-29 RX ORDER — LISINOPRIL 20 MG/1
TABLET ORAL
Qty: 30 TABLET | Refills: 0 | Status: SHIPPED | OUTPATIENT
Start: 2021-12-29 | End: 2022-03-01

## 2022-01-03 DIAGNOSIS — G47.00 INSOMNIA, UNSPECIFIED TYPE: ICD-10-CM

## 2022-01-03 RX ORDER — ZOLPIDEM TARTRATE 5 MG/1
TABLET ORAL
Qty: 30 TABLET | Refills: 2 | OUTPATIENT
Start: 2022-01-03

## 2022-01-04 ENCOUNTER — TELEPHONE (OUTPATIENT)
Dept: FAMILY MEDICINE CLINIC | Facility: CLINIC | Age: 62
End: 2022-01-04

## 2022-01-07 DIAGNOSIS — E11.9 TYPE 2 DIABETES MELLITUS WITHOUT COMPLICATION, WITHOUT LONG-TERM CURRENT USE OF INSULIN (HCC): ICD-10-CM

## 2022-01-07 RX ORDER — LANCETS
EACH MISCELLANEOUS
Qty: 204 EACH | Refills: 3 | Status: SHIPPED | OUTPATIENT
Start: 2022-01-07

## 2022-01-11 DIAGNOSIS — E11.69 HYPERLIPIDEMIA ASSOCIATED WITH TYPE 2 DIABETES MELLITUS (HCC): ICD-10-CM

## 2022-01-11 DIAGNOSIS — E78.5 HYPERLIPIDEMIA ASSOCIATED WITH TYPE 2 DIABETES MELLITUS (HCC): ICD-10-CM

## 2022-01-11 RX ORDER — ATORVASTATIN CALCIUM 40 MG/1
TABLET, FILM COATED ORAL
Qty: 90 TABLET | Refills: 1 | Status: SHIPPED | OUTPATIENT
Start: 2022-01-11 | End: 2022-04-22 | Stop reason: SDUPTHER

## 2022-01-27 DIAGNOSIS — E11.9 TYPE 2 DIABETES MELLITUS WITHOUT COMPLICATION, WITHOUT LONG-TERM CURRENT USE OF INSULIN (HCC): ICD-10-CM

## 2022-02-02 ENCOUNTER — TELEPHONE (OUTPATIENT)
Dept: FAMILY MEDICINE CLINIC | Facility: CLINIC | Age: 62
End: 2022-02-02

## 2022-02-02 NOTE — TELEPHONE ENCOUNTER
Pt has a colonoscopy coming up next week, pt would like to know if she should be taking both her insulin and the metFormin the day before and the day of the procedure?

## 2022-02-03 NOTE — TELEPHONE ENCOUNTER
alejo medina can you try calling her again later  please I call her from [place] and maybe she do not recognize the number   Thanks

## 2022-02-09 NOTE — TELEPHONE ENCOUNTER
Looks like she is pretty well controlled  Sukhi Ureña would advise she take half dose of her metformin the day of the prep and not take any the morning of the procedure   Looks like 33 units at bedtime is are normal dose   I would have her take 15 the evening before she starts her prep, then maybe only 10 units the evening before the procedure   Check her sugar that evening before the 10 units   If less than 130 should could probably just hold the insulin for that evening   Assuming all goes well with colonoscopy and she eats well after the colonoscopy, she should take her normal insulin dose that evening   1 she is eating she should probably just take 1 dose of the metformin after the colonoscopy that day  Patient advised as per Dr Dariana Cho -- took Metformin this morning already -- will check sugars tonight and if below 130 will not take any Insulin -- if greater than 130 will take 10U  Tomorrow will takie normal Insulin dose in evening and her PM dose of Metformin -- then she will return to normal dosing

## 2022-03-01 ENCOUNTER — OFFICE VISIT (OUTPATIENT)
Dept: FAMILY MEDICINE CLINIC | Facility: CLINIC | Age: 62
End: 2022-03-01
Payer: COMMERCIAL

## 2022-03-01 VITALS
HEART RATE: 79 BPM | TEMPERATURE: 97.7 F | HEIGHT: 64 IN | DIASTOLIC BLOOD PRESSURE: 72 MMHG | BODY MASS INDEX: 32.27 KG/M2 | WEIGHT: 189 LBS | OXYGEN SATURATION: 96 % | SYSTOLIC BLOOD PRESSURE: 124 MMHG

## 2022-03-01 DIAGNOSIS — I10 BENIGN ESSENTIAL HYPERTENSION: ICD-10-CM

## 2022-03-01 DIAGNOSIS — E11.40 TYPE 2 DIABETES MELLITUS WITH DIABETIC NEUROPATHY, WITH LONG-TERM CURRENT USE OF INSULIN (HCC): Primary | ICD-10-CM

## 2022-03-01 DIAGNOSIS — E11.69 HYPERLIPIDEMIA ASSOCIATED WITH TYPE 2 DIABETES MELLITUS (HCC): ICD-10-CM

## 2022-03-01 DIAGNOSIS — Z79.4 TYPE 2 DIABETES MELLITUS WITH DIABETIC NEUROPATHY, WITH LONG-TERM CURRENT USE OF INSULIN (HCC): Primary | ICD-10-CM

## 2022-03-01 DIAGNOSIS — R73.01 IMPAIRED FASTING GLUCOSE: ICD-10-CM

## 2022-03-01 DIAGNOSIS — E78.5 HYPERLIPIDEMIA ASSOCIATED WITH TYPE 2 DIABETES MELLITUS (HCC): ICD-10-CM

## 2022-03-01 DIAGNOSIS — L30.9 ECZEMA, UNSPECIFIED TYPE: ICD-10-CM

## 2022-03-01 LAB — SL AMB POCT HEMOGLOBIN AIC: 5.7 (ref ?–6.5)

## 2022-03-01 PROCEDURE — 3725F SCREEN DEPRESSION PERFORMED: CPT | Performed by: FAMILY MEDICINE

## 2022-03-01 PROCEDURE — 3044F HG A1C LEVEL LT 7.0%: CPT | Performed by: PSYCHIATRY & NEUROLOGY

## 2022-03-01 PROCEDURE — 83036 HEMOGLOBIN GLYCOSYLATED A1C: CPT | Performed by: FAMILY MEDICINE

## 2022-03-01 PROCEDURE — 3074F SYST BP LT 130 MM HG: CPT | Performed by: FAMILY MEDICINE

## 2022-03-01 PROCEDURE — 99214 OFFICE O/P EST MOD 30 MIN: CPT | Performed by: FAMILY MEDICINE

## 2022-03-01 PROCEDURE — 3078F DIAST BP <80 MM HG: CPT | Performed by: FAMILY MEDICINE

## 2022-03-01 NOTE — ASSESSMENT & PLAN NOTE
Sugars very well controlled  He having lower normal readings  Will decreased Basaglar evening dose to 26 units and continue to monitor sugars  Continue other medications    Lab Results   Component Value Date    HGBA1C 5 7 03/01/2022

## 2022-03-01 NOTE — PROGRESS NOTES
[id number]        NAME: Darnell Calvillo is a 58 y o  female  : 1960    MRN: [id number]  DATE: 2022  TIME: 4:51 PM    Assessment and Plan   Type 2 diabetes mellitus with diabetic neuropathy, with long-term current use of insulin ([address] ) [E11 40, Z79 4]  1  Type 2 diabetes mellitus with diabetic neuropathy, with long-term current use of insulin ([address] )     2  Hyperlipidemia associated with type 2 diabetes mellitus ([address] )     3  Benign essential hypertension     4  Impaired fasting glucose  POCT hemoglobin A1c   5  Eczema, unspecified type         Type 2 diabetes mellitus with diabetic neuropathy, with long-term current use of insulin ([address] )  Sugars very well controlled  He having lower normal readings  Will decreased Basaglar evening dose to 26 units and continue to monitor sugars  Continue other medications  Lab Results   Component Value Date    HGBA1C 5 7 2022       Hyperlipidemia associated with type 2 diabetes mellitus ([address] )    Lab Results   Component Value Date    HGBA1C 5 7 2022   Diabetes other control  Patient remains tolerant of atorvastatin  Narcotic dependency, continuous ([address] )  Patient continues to taper hydrocodone via pain management  Patient Instructions     Patient Instructions   Decreased her basal insulin-Basaglar to 26 units in the evening  Record fasting and before your dose sugars for the next 7-10 days  Follow-up with Neurology later this week  Continue other medications  I would continue cortisone on the area below the right breast you could also use bacitracin-topical antibiotic 2 to 3 times a day to the [place] case there is some infection there  I would use a topical anti-inflammatory such as diclofenac gel 1% 2 to 3 times a day to see if that would help the left hand nodule  Continue current dose of lisinopril  Goal for blood pressure should be 130-135/88-85            Chief Complaint     Chief Complaint Patient presents with    Follow-up     rash on breast , lump on left hand , blood pressure, diabetes         History of Present Illness       Patient comes in today with several complaints  Medications are reviewed and reconciled  1) diabetes-patient getting fasting sugars and bedtime sugars down closer to 80  She is concerned about giving herself insulin when there this low  1 episode where she was down to 40 requiring ER evaluation  Generally her sugars are below 150 throughout the day  2) hypertension-good control at at present with medications switch last visit  3) left hand-painful nodule on the 3rd flexor tendon just over the MCP joint  4) rash under right breast       Review of Systems   Review of Systems   Constitutional: Negative for activity change, appetite change, diaphoresis and fatigue  Respiratory: Negative for cough, chest tightness, shortness of breath and wheezing  Cardiovascular: Negative for chest pain, palpitations and leg swelling  Fast or slow heart rate   Gastrointestinal: Negative for abdominal pain, blood in stool, constipation, diarrhea, nausea and vomiting  Genitourinary: Negative for difficulty urinating, dysuria and frequency  Musculoskeletal: Negative for arthralgias, gait problem, joint swelling and myalgias  Skin: Positive for color change and rash  Neurological: Negative for dizziness, weakness, light-headedness, numbness and headaches  Psychiatric/Behavioral: Negative for agitation, confusion, dysphoric mood and sleep disturbance  The patient is not nervous/anxious            Current Medications       Current Outpatient Medications:     Accu-Chek FastClix Lancets MISC, TEST TWO TIMES A DAY, Disp: 204 each, Rfl: 3    Accu-Chek Guide test strip, TEST TWO TIMES A DAY, Disp: 100 strip, Rfl: 3    albuterol (2 5 mg/3 mL) 0 083 % nebulizer solution, Take 3 mL (2 5 mg total) by nebulization every 6 (six) hours as needed for wheezing or shortness of breath, Disp: 300 mL, Rfl: 1    albuterol (PROVENTIL HFA,VENTOLIN HFA) 90 mcg/act inhaler, USE 2 INHALATIONS ORALLY   EVERY 6 HOURS AS NEEDED FORWHEEZING, Disp: 25 5 g, Rfl: 1    atorvastatin (LIPITOR) 40 mg tablet, TAKE 1 TABLET DAILY, Disp: 90 tablet, Rfl: 1    Ferrous Fumarate 63 (20 Fe) MG TABS, Take 65 mg by mouth daily, Disp: , Rfl:     HYDROcodone-acetaminophen (NORCO)  mg per tablet, , Disp: , Rfl:     insulin glargine (Basaglar KwikPen) 100 units/mL injection pen, Inject 33 Units under the skin daily at bedtime INJECT 33  UNITS UNDER THE SKIN AT BEDTIME, Disp: 15 mL, Rfl: 2    Insulin Pen Needle (B-D UF III MINI PEN NEEDLES) 31G X 5 MM MISC, Test once daily E11 9, Disp: 100 each, Rfl: 1    Januvia 100 MG tablet, TAKE 1 TABLET DAILY, Disp: 90 tablet, Rfl: 0    lisinopril (ZESTRIL) 20 mg tablet, Take 1 tablet (20 mg total) by mouth daily, Disp: 90 tablet, Rfl: 3    LYRICA 200 MG capsule, Take 200 mg by mouth 3 (three) times a day , Disp: , Rfl: 0    metFORMIN (GLUCOPHAGE) 1000 MG tablet, TAKE 1 TABLET TWICE A DAY, Disp: 180 tablet, Rfl: 0    omeprazole (PriLOSEC) 40 MG capsule, Take 1 capsule (40 mg total) by mouth every morning, Disp: 90 capsule, Rfl: 3    ondansetron (ZOFRAN) 4 mg tablet, Take 1 tablet (4 mg total) by mouth every 8 (eight) hours as needed for nausea or vomiting, Disp: 30 tablet, Rfl: 1    Symbicort 160-4 5 MCG/ACT inhaler, USE 2 INHALATIONS ORALLY   TWICE DAILY, RINSE MOUTH   AFTER USE, Disp: 30 6 g, Rfl: 0    traMADol HCl  MG CP24, Take 300 mg by mouth daily , Disp: 30 tablet, Rfl: 0    traMADol HCl ER, Biphasic, 300 MG 24 hr tablet, Take 300 mg by mouth daily, Disp: , Rfl:     VITAMIN D PO, , Disp: , Rfl:     Ziconotide Acetate (PRIALT IT), by Intrathecal route continuous, Disp: , Rfl:     zolpidem (AMBIEN) 5 mg tablet, take 1 tablet by mouth once daily at bedtime if needed for sleep, Disp: 30 tablet, Rfl: 2    Current Allergies     Allergies as of 03/01/2022 - Reviewed 2022   Allergen Reaction Noted    Nsaids Other (See Comments) 2017    Percocet [oxycodone-acetaminophen] GI Intolerance 2017            The following portions of the patient's history were reviewed and updated as appropriate: allergies, current medications, past family history, past medical history, past social history, past surgical history and problem list      Past Medical History:   Diagnosis Date    [facility] 2021    Anxiety     Arthritis     Asthma     Breast lump     Resolved: 2017     Chronic pain     back    Diabetes mellitus [facility] )     GERD (gastroesophageal reflux disease)     Hepatitis     History of stomach ulcers     Hyperlipidemia     Hypertension     Hypotension 2021    Infectious viral hepatitis     Hepatitiss A    Stomach problems        Past Surgical History:   Procedure Laterality Date    APPENDECTOMY      BACK SURGERY       SECTION      CHOLECYSTECTOMY      GASTRIC BYPASS      GASTRIC BYPASS      INFUSION PUMP IMPLANTATION      SHOULDER SURGERY      SPINAL CORD STIMULATOR IMPLANT         Family History   Problem Relation Age of Onset    Diabetes Mother         Mellitus    Hyperlipidemia Mother     Hypertension Mother     Colon cancer Mother     Pancreatic cancer Mother     Melanoma Mother     Cancer Father         Bladder     Alcohol abuse Father     Lung cancer Father     Prostate cancer Father     Diabetes Brother         Mellitus    Hyperlipidemia Brother     Hypertension Brother     Stomach cancer Paternal Grandmother     Lung cancer Paternal Grandfather     Diabetes Brother     Breast cancer Maternal Aunt 28    Arthritis Family     Mental illness Neg Hx          Medications have been verified          Objective   /72   Pulse 79   Temp 97 7 °F (36 5 °C) (Tympanic)   Ht 5' 4" (1 626 m)   Wt 85 7 kg (189 lb)   LMP  (LMP Unknown)   SpO2 96%   BMI 32 44 kg/m²        Physical Exam     Physical Exam  Vitals reviewed  Constitutional:       General: She is not in acute distress  Appearance: She is well-developed  She is not diaphoretic  HENT:      Head: Normocephalic and atraumatic  Right Ear: Tympanic membrane, ear canal and external ear normal       Left Ear: Tympanic membrane, ear canal and external ear normal       Nose: No mucosal edema or rhinorrhea  Right Sinus: No maxillary sinus tenderness  Left Sinus: No maxillary sinus tenderness  Mouth/Throat:      Mouth: Mucous membranes are not pale and not dry  Dentition: Normal dentition  Pharynx: Uvula midline  No oropharyngeal exudate  Eyes:      General:         Right eye: No discharge  Left eye: No discharge  Pupils: Pupils are equal, round, and reactive to light  Neck:      Thyroid: No thyromegaly  Cardiovascular:      Rate and Rhythm: Normal rate and regular rhythm  Heart sounds: Normal heart sounds  No murmur heard  Pulmonary:      Effort: Pulmonary effort is normal  No respiratory distress  Breath sounds: Normal breath sounds  No wheezing or rales  Musculoskeletal:         General: Normal range of motion  Cervical back: Normal range of motion and neck supple  Right lower leg: No edema  Left lower leg: No edema  Comments: Left hand  There is a painful nodule on the palmar aspect  Appears to be adjacent to the 3rd MCP joint  It is not particularly mobile  Lymphadenopathy:      Cervical: No cervical adenopathy  Skin:     Comments: [place] erythematous lesion under the right breast   No significant induration  No pustule or puncture noted  Neurological:      Mental Status: She is alert and oriented to person, place, and time     Psychiatric:         Mood and Affect: Mood normal          Behavior: Behavior normal

## 2022-03-01 NOTE — ASSESSMENT & PLAN NOTE
Lab Results   Component Value Date    HGBA1C 5 7 03/01/2022   Diabetes other control  Patient remains tolerant of atorvastatin

## 2022-03-04 ENCOUNTER — OFFICE VISIT (OUTPATIENT)
Dept: NEUROLOGY | Facility: CLINIC | Age: 62
End: 2022-03-04
Payer: COMMERCIAL

## 2022-03-04 VITALS
HEIGHT: 64 IN | TEMPERATURE: 96.6 F | DIASTOLIC BLOOD PRESSURE: 86 MMHG | WEIGHT: 190 LBS | SYSTOLIC BLOOD PRESSURE: 130 MMHG | BODY MASS INDEX: 32.44 KG/M2

## 2022-03-04 DIAGNOSIS — G62.9 PERIPHERAL POLYNEUROPATHY: Primary | ICD-10-CM

## 2022-03-04 DIAGNOSIS — R41.3 MEMORY DYSFUNCTION: ICD-10-CM

## 2022-03-04 PROCEDURE — 1036F TOBACCO NON-USER: CPT | Performed by: PSYCHIATRY & NEUROLOGY

## 2022-03-04 PROCEDURE — 99214 OFFICE O/P EST MOD 30 MIN: CPT | Performed by: PSYCHIATRY & NEUROLOGY

## 2022-03-04 PROCEDURE — 3008F BODY MASS INDEX DOCD: CPT | Performed by: PSYCHIATRY & NEUROLOGY

## 2022-03-04 NOTE — ASSESSMENT & PLAN NOTE
Re rev neurocognitive eval from 2021  Rev impression from dr Chow Hard specific details as noted in last review as well  Per report no evidence of neurodegenerative process  Pt with some issues with attention  rec addressing with pcp as well as pain mx team  Pt notes she has had some issues with pain pump adjustments  Pt is going every 2 weeks for slower adjustments  Pt had ct head and cta head and neck without acute pathology  Pt now adequately replaced on vit b12

## 2022-03-04 NOTE — PROGRESS NOTES
Patient ID: Kayla Sanon is a 58 y o  female  Assessment/Plan:    Peripheral polyneuropathy  Pt here for neuro follow up  No significant changes in neuropathy  Likely related to her dm  Prev seen by dr Brandon Aguero  No new neuropathic sxs  Exam stable  Nl foot intrinsic strength  Rev 2017 emg report of lowers  Pt with some upper ext paresthesias in hands ie first 3 digits right worse than left  Rev with pt likely cts, but emg can be done to delineate generalized vs cts or combo of both  Pt not interested at this time but will call if any change in hand sxs  No new neck pain  No radicular or myelopathic sxs    Memory dysfunction  Re rev neurocognitive eval from 2021  Rev impression from dr Denise Heredia specific details as noted in last review as well  Per report no evidence of neurodegenerative process  Pt with some issues with attention  rec addressing with pcp as well as pain mx team  Pt notes she has had some issues with pain pump adjustments  Pt is going every 2 weeks for slower adjustments  Pt had ct head and cta head and neck without acute pathology  Pt now adequately replaced on vit b12       Diagnoses and all orders for this visit:    Peripheral polyneuropathy    Memory dysfunction           Subjective:    HPI    Pt is a 57 yo f with pmh of gastric bypass, dm type 2, asthma, vit b12 deficiency, neuropathy, hypercholesterolemia, htn, chronic pain, lumbar spondylosis, prior lumbar surgery 26 yrs ago, SCS about one yr ago who presents today for memory loss   pt last seen on 9/30/21   Per my last note "Pt notes sxs for the past several yrs, more apparent past 2 yrs   Pt notes no tia or cva like sxs   Pt notes history of headaches    Pt notes occ feeling of food getting stuck   No diff with articulation of speech   No incontinence   Pt notes long standing diff with directions, ever since learning to drive   Not new   Pt denies any significant financial issues    Pt notes occ positional dizziness    Pt notes diff with recall of specific details  Pt has noted specifically on job, issues with keeping up on same level of detail   Worked for same company for 24 yrs     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 to her    Pt notes family also noted issues with stm and repeating self  Deepak Reyes family reminds her of events from their youth   Pt with supportive family and lives close to them    Pt works in IT banking field    Pt with known history of gastric bypass   Pt also with known history of vit b12 deficiency   Pt more recently with im replacement   Rev labs dating back to 2018 April 2018 vit b12 at 292    Oct 2019 level 144, and now on 6/15/21 level 738 "  Kept above paragraph due to complexities of pt case  pt here today for her neuro follow up  Pt overall about the same  Pt is planning on taking break from work as a medical leave  Pt is currently working on getting her pain pump meds under better control  Pt has notes some breakthru issues with side effects of meds  Pt is seeing pain mx near [address] about every 2 weeks  Pt seeing dr Shaina Thompson since 2003 and now dr Deric Oneil as well  Pt is not following with [facility] pain mx any longer  Prev seen by dr virgen  Pt notes her neuropathic sxs about the same  No new nocturnal sxs  Pt notes sxs into paresthesias now in upper ext ie her hands particularly the first 3 digits on both sides  Pt sxs more classic for cts  Rev with pt differences between generalized polyneuropathy and focal compressive neuropathies like median neuropathies  Pt notes she is currently not interested in any furhter testing sariah emg studies  If sxs worsen in hand we can always order emg at that time  Pt with issues with neck in past   Currently no neck pain  No radicular sxs  No myelopathic sxs  Pt had been having more dizzy episodes  Pt seen in er in dec    Rev notes from er team   Pt had cta head and neck done as well at that time and no evidence of significant stenosis or large vessel occlusion or aneurysm  Also re rev with pt neurocogntive eval from dr Dois Jeans as well as post testing review from oct 2021  Per Dr Georges Ribeiro from 10/26/21 "Discussed cognitive profile with intact processing speed, executive functioning, verbal memory, verbal reasoning, and visual reasoning, and mild deficiencies in attention and visual memory  Discussed attentional issues in the context of her experienced cognitive difficulties  Explained that the etiology is not readily apparent, though reassured her that her cognitive profile is not suggestive of a neurodegenerative condition at this time"  Pt relieved by this information  Also  re reviewed ct head as well from July with no acute intracranial abn , microangiopathic changes noted  Rev pathophysiology of small vessel disease with pt  Pt to call for any new sxs  Pt following with pcp as well for her dm  Pt notes blood sugars doing well  Pt continues to do her own adls  Pt had updated tsh in nov which was normal                The following portions of the patient's history were reviewed and updated as appropriate: allergies, current medications, past family history, past medical history, past social history, past surgical history and problem list and med rec and ros rev  Objective:    Blood pressure 130/86, temperature (!) 96 6 °F (35 9 °C), height 5' 4" (1 626 m), weight 86 2 kg (190 lb)  Physical Exam  Constitutional:       General: She is not in acute distress  Appearance: She is not ill-appearing  Eyes:      General: Lids are normal       Extraocular Movements: Extraocular movements intact  Pupils: Pupils are equal, round, and reactive to light  Musculoskeletal:      Right lower leg: No edema  Left lower leg: No edema  Neurological:      Mental Status: She is alert        Deep Tendon Reflexes: Strength normal and reflexes are normal and symmetric  Psychiatric:         Speech: Speech normal          Neurological Exam  Mental Status  Alert  Oriented to person, place and time  Speech is normal  Language is fluent with no aphasia  Cranial Nerves  CN II: Visual acuity is normal  Visual fields full to confrontation  CN III, IV, VI: Extraocular movements intact bilaterally  Normal lids and orbits bilaterally  Pupils equal round and reactive to light bilaterally  CN V: Facial sensation is normal   CN VII: Full and symmetric facial movement  CN VIII: Hearing is normal   CN IX, X: Palate elevates symmetrically  Normal gag reflex  CN XI: Shoulder shrug strength is normal   CN XII: Tongue midline without atrophy or fasciculations  Motor  Normal muscle bulk throughout  Normal muscle tone  No abnormal involuntary movements  Strength is 5/5 throughout all four extremities  Sensory  Dec vib patti lowers  Reflexes  Deep tendon reflexes are 2+ and symmetric in all four extremities with downgoing toes bilaterally  Coordination  Right: Finger-to-nose normal  Rapid alternating movement normal   Left: Finger-to-nose normal  Heel-to-shin normal     Gait  Casual gait is normal including stance, stride, and arm swing  ROS:    Review of Systems   Constitutional: Negative  Negative for appetite change and fever  HENT: Negative  Negative for hearing loss, tinnitus, trouble swallowing and voice change  Eyes: Negative  Negative for photophobia and pain  Respiratory: Negative  Negative for shortness of breath  Cardiovascular: Negative  Negative for palpitations  Gastrointestinal: Negative  Negative for nausea and vomiting  Endocrine: Negative  Negative for cold intolerance  Genitourinary: Negative  Negative for dysuria, frequency and urgency  Musculoskeletal: Negative  Negative for myalgias and neck pain  Skin: Negative  Negative for rash  Neurological: Negative    Negative for dizziness, tremors, seizures, syncope, facial asymmetry, speech difficulty, weakness, light-headedness, numbness and headaches  Hematological: Negative  Does not bruise/bleed easily  Psychiatric/Behavioral: Negative  Negative for confusion, hallucinations and sleep disturbance  All other systems reviewed and are negative      Wants to discuss test results and memory issues

## 2022-03-04 NOTE — ASSESSMENT & PLAN NOTE
Pt here for neuro follow up  No significant changes in neuropathy  Likely related to her dm  Prev seen by dr Tray Lee  No new neuropathic sxs  Exam stable  Nl foot intrinsic strength  Rev 2017 emg report of lowers  Pt with some upper ext paresthesias in hands ie first 3 digits right worse than left  Rev with pt likely cts, but emg can be done to delineate generalized vs cts or combo of both  Pt not interested at this time but will call if any change in hand sxs  No new neck pain  No radicular or myelopathic sxs

## 2022-03-10 DIAGNOSIS — J45.30 MILD PERSISTENT ASTHMA, UNSPECIFIED WHETHER COMPLICATED: ICD-10-CM

## 2022-03-10 RX ORDER — ALBUTEROL SULFATE 90 UG/1
AEROSOL, METERED RESPIRATORY (INHALATION)
Qty: 25.5 G | Refills: 1 | Status: SHIPPED | OUTPATIENT
Start: 2022-03-10

## 2022-03-17 ENCOUNTER — TELEPHONE (OUTPATIENT)
Dept: NEUROLOGY | Facility: CLINIC | Age: 62
End: 2022-03-17

## 2022-03-17 NOTE — TELEPHONE ENCOUNTER
Received JIMMIE from [address] [place] [address] asking for medical records from 01/01/2021 to present  Faxed to [phone] for completion

## 2022-03-18 DIAGNOSIS — E11.9 TYPE 2 DIABETES MELLITUS WITHOUT COMPLICATION, WITHOUT LONG-TERM CURRENT USE OF INSULIN (HCC): ICD-10-CM

## 2022-03-18 RX ORDER — BLOOD SUGAR DIAGNOSTIC
STRIP MISCELLANEOUS
Qty: 100 STRIP | Refills: 3 | Status: SHIPPED | OUTPATIENT
Start: 2022-03-18

## 2022-03-22 DIAGNOSIS — E11.9 CONTROLLED TYPE 2 DIABETES MELLITUS WITHOUT COMPLICATION, WITHOUT LONG-TERM CURRENT USE OF INSULIN (HCC): ICD-10-CM

## 2022-03-23 RX ORDER — PEN NEEDLE, DIABETIC 31 GX5/16"
NEEDLE, DISPOSABLE MISCELLANEOUS
Qty: 90 EACH | Refills: 1 | Status: SHIPPED | OUTPATIENT
Start: 2022-03-23 | End: 2022-04-22 | Stop reason: SDUPTHER

## 2022-04-05 ENCOUNTER — TELEPHONE (OUTPATIENT)
Dept: FAMILY MEDICINE CLINIC | Facility: CLINIC | Age: 62
End: 2022-04-05

## 2022-04-05 NOTE — TELEPHONE ENCOUNTER
Pt called in to request an appointment for PM to complete medical leave forms in order to have insurance coverage continue  Pt scheduled for 4/11 OV with PM     Task complete

## 2022-04-11 ENCOUNTER — OFFICE VISIT (OUTPATIENT)
Dept: FAMILY MEDICINE CLINIC | Facility: CLINIC | Age: 62
End: 2022-04-11
Payer: COMMERCIAL

## 2022-04-11 VITALS
DIASTOLIC BLOOD PRESSURE: 68 MMHG | TEMPERATURE: 97.6 F | HEART RATE: 92 BPM | BODY MASS INDEX: 31.24 KG/M2 | WEIGHT: 183 LBS | OXYGEN SATURATION: 92 % | HEIGHT: 64 IN | SYSTOLIC BLOOD PRESSURE: 124 MMHG

## 2022-04-11 DIAGNOSIS — J45.30 MILD PERSISTENT ASTHMA WITHOUT COMPLICATION: ICD-10-CM

## 2022-04-11 DIAGNOSIS — G62.9 PERIPHERAL POLYNEUROPATHY: ICD-10-CM

## 2022-04-11 DIAGNOSIS — Z12.31 SCREENING MAMMOGRAM, ENCOUNTER FOR: ICD-10-CM

## 2022-04-11 DIAGNOSIS — J01.00 ACUTE NON-RECURRENT MAXILLARY SINUSITIS: Primary | ICD-10-CM

## 2022-04-11 PROCEDURE — 99214 OFFICE O/P EST MOD 30 MIN: CPT | Performed by: FAMILY MEDICINE

## 2022-04-11 RX ORDER — MONTELUKAST SODIUM 10 MG/1
10 TABLET ORAL
Qty: 30 TABLET | Refills: 1 | Status: SHIPPED | OUTPATIENT
Start: 2022-04-11 | End: 2022-05-16 | Stop reason: SDUPTHER

## 2022-04-11 RX ORDER — MOMETASONE FUROATE 50 UG/1
2 SPRAY, METERED NASAL DAILY
Qty: 17 G | Refills: 1 | Status: SHIPPED | OUTPATIENT
Start: 2022-04-11 | End: 2022-05-06 | Stop reason: SDUPTHER

## 2022-04-11 RX ORDER — AMOXICILLIN 875 MG/1
875 TABLET, COATED ORAL 2 TIMES DAILY
COMMUNITY
Start: 2022-04-08 | End: 2022-05-16 | Stop reason: ALTCHOICE

## 2022-04-11 NOTE — PATIENT INSTRUCTIONS
Complete her Augmentin twice daily for 7 days  Start Nasonex nasal spray -2 sprays per nostril once daily  Add Singulair 10 mg once daily in the evening  Follow-up with pain management  The headaches persist despite more treatment of your asthma and allergies, may wish to discuss this with Neurology also  These could be related to your daily use of pain medications

## 2022-04-11 NOTE — PROGRESS NOTES
[id number]         NAME: Kim Gaitan is a 58 y o  female  : 1960    MRN: [id number]  DATE: 2022  TIME: 6:15 PM    Assessment and Plan   Acute non-recurrent maxillary sinusitis [J01 00]  1  Acute non-recurrent maxillary sinusitis  mometasone (NASONEX) 50 mcg/act nasal spray   2  Mild persistent asthma without complication  montelukast (SINGULAIR) 10 mg tablet   3  Screening mammogram, encounter for  Mammo screening bilateral w 3d & cad   4  Peripheral polyneuropathy         No problem-specific Assessment & Plan notes found for this encounter  Patient Instructions     Patient Instructions   Complete her Augmentin twice daily for 7 days  Start Nasonex nasal spray -2 sprays per nostril once daily  Add Singulair 10 mg once daily in the evening  Follow-up with pain management  The headaches persist despite more treatment of your asthma and allergies, may wish to discuss this with Neurology also  These could be related to your daily use of pain medications  Chief Complaint     Chief Complaint   Patient presents with    Follow-up     paperwork          History of Present Illness       Patient evaluation of possible sinus infection  Also having some teeth sensitivity on the left upper side  Recent dental visit did not show any dental issues  Daily headaches  Usually 1st thing would waking up  Chronic pain syndrome involving the legs  Does see pain management  Currently taking Ultram XR 3 mg daily  Lyrica 3 times daily along with as needed hydrocodone  Asthma-some increased symptoms recently  Presumptive sinus infection-had a virtual medicine visit to her company they had placed her on augmentin to 3 days ago  Review of Systems   Review of Systems   Constitutional: Negative for appetite change, chills, diaphoresis and fever  HENT: Positive for congestion, postnasal drip, sinus pressure and sinus pain   Negative for ear pain, rhinorrhea and sore throat  Eyes: Negative for discharge, redness and itching  Respiratory: Positive for shortness of breath  Negative for cough and wheezing  Cardiovascular: Negative for chest pain and palpitations  Rapid or slow heart rate   Gastrointestinal: Negative for abdominal pain, diarrhea, nausea and vomiting  Neurological: Positive for numbness           Current Medications       Current Outpatient Medications:     Accu-Chek FastClix Lancets MISC, TEST TWO TIMES A DAY, Disp: 204 each, Rfl: 3    Accu-Chek Guide test strip, TEST TWO TIMES A DAY, Disp: 100 strip, Rfl: 3    albuterol (2 5 mg/3 mL) 0 083 % nebulizer solution, Take 3 mL (2 5 mg total) by nebulization every 6 (six) hours as needed for wheezing or shortness of breath, Disp: 300 mL, Rfl: 1    albuterol (PROVENTIL HFA,VENTOLIN HFA) 90 mcg/act inhaler, USE 2 INHALATIONS ORALLY   EVERY 6 HOURS AS NEEDED FORWHEEZING, Disp: 25 5 g, Rfl: 1    amoxicillin (AMOXIL) 875 mg tablet, Take 875 mg by mouth 2 (two) times a day, Disp: , Rfl:     atorvastatin (LIPITOR) 40 mg tablet, TAKE 1 TABLET DAILY, Disp: 90 tablet, Rfl: 1    Ferrous Fumarate 63 (20 Fe) MG TABS, Take 65 mg by mouth daily, Disp: , Rfl:     HYDROcodone-acetaminophen (NORCO)  mg per tablet, , Disp: , Rfl:     insulin glargine (Basaglar KwikPen) 100 units/mL injection pen, Inject 33 Units under the skin daily at bedtime INJECT 33  UNITS UNDER THE SKIN AT BEDTIME (Patient taking differently: Inject 26 Units under the skin daily at bedtime INJECT 26  UNITS UNDER THE SKIN AT BEDTIME ), Disp: 15 mL, Rfl: 2    Insulin Pen Needle (B-D UF III MINI PEN NEEDLES) 31G X 5 MM MISC, TEST ONCE DAILY, Disp: 90 each, Rfl: 1    Januvia 100 MG tablet, TAKE 1 TABLET DAILY, Disp: 90 tablet, Rfl: 0    LYRICA 200 MG capsule, Take 200 mg by mouth 3 (three) times a day , Disp: , Rfl: 0    metFORMIN (GLUCOPHAGE) 1000 MG tablet, TAKE 1 TABLET TWICE A DAY, Disp: 180 tablet, Rfl: 0   omeprazole (PriLOSEC) 40 MG capsule, Take 1 capsule (40 mg total) by mouth every morning, Disp: 90 capsule, Rfl: 3    ondansetron (ZOFRAN) 4 mg tablet, Take 1 tablet (4 mg total) by mouth every 8 (eight) hours as needed for nausea or vomiting, Disp: 30 tablet, Rfl: 1    Symbicort 160-4 5 MCG/ACT inhaler, USE 2 INHALATIONS ORALLY   TWICE DAILY, RINSE MOUTH   AFTER USE, Disp: 30 6 g, Rfl: 0    traMADol HCl ER, Biphasic, 300 MG 24 hr tablet, Take 300 mg by mouth daily, Disp: , Rfl:     VITAMIN D PO, , Disp: , Rfl:     Ziconotide Acetate (PRIALT IT), by Intrathecal route continuous, Disp: , Rfl:     zolpidem (AMBIEN) 5 mg tablet, take 1 tablet by mouth once daily at bedtime if needed for sleep, Disp: 30 tablet, Rfl: 2    lisinopril (ZESTRIL) 20 mg tablet, Take 1 tablet (20 mg total) by mouth daily, Disp: 90 tablet, Rfl: 3    mometasone (NASONEX) 50 mcg/act nasal spray, 2 sprays into each nostril daily, Disp: 17 g, Rfl: 1    montelukast (SINGULAIR) 10 mg tablet, Take 1 tablet (10 mg total) by mouth daily at bedtime, Disp: 30 tablet, Rfl: 1    Current Allergies     Allergies as of 04/11/2022 - Reviewed 04/11/2022   Allergen Reaction Noted    Nsaids Other (See Comments) 09/23/2017    Percocet [oxycodone-acetaminophen] GI Intolerance 09/23/2017            The following portions of the patient's history were reviewed and updated as appropriate: allergies, current medications, past family history, past medical history, past social history, past surgical history and problem list      Past Medical History:   Diagnosis Date    [facility]LO [facility] ([facility] [address] ) 11/4/2021    Anxiety     Arthritis     Asthma     Breast lump     Resolved: 8/1/2017     Chronic pain     back    Diabetes mellitus (HCC)     GERD (gastroesophageal reflux disease)     Hepatitis     History of stomach ulcers     Hyperlipidemia     Hypertension     Hypotension 11/4/2021    Infectious viral hepatitis     Hepatitiss A    Stomach problems        Past Surgical History:   Procedure Laterality Date    APPENDECTOMY      BACK SURGERY       SECTION      CHOLECYSTECTOMY      GASTRIC BYPASS      GASTRIC BYPASS  2010    INFUSION PUMP IMPLANTATION      SHOULDER SURGERY      SPINAL CORD STIMULATOR IMPLANT         Family History   Problem Relation Age of Onset    Diabetes Mother         Mellitus    Hyperlipidemia Mother     Hypertension Mother     Colon cancer Mother     Pancreatic cancer Mother     Melanoma Mother     Cancer Father         Bladder     Alcohol abuse Father     Lung cancer Father     Prostate cancer Father     Diabetes Brother         Mellitus    Hyperlipidemia Brother     Hypertension Brother     Stomach cancer Paternal Grandmother     Lung cancer Paternal Grandfather     Diabetes Brother     Breast cancer Maternal Aunt 28    Arthritis Family     Mental illness Neg Hx          Medications have been verified  Objective   /68   Pulse 92   Temp 97 6 °F (36 4 °C) (Temporal)   Ht 5' 4" (1 626 m)   Wt 83 kg (183 lb)   LMP  (LMP Unknown)   SpO2 92%   BMI 31 41 kg/m²        Physical Exam     Physical Exam  Vitals reviewed  Constitutional:       General: She is not in acute distress  Appearance: She is well-developed  HENT:      Head: Normocephalic and atraumatic  Right Ear: Tympanic membrane and external ear normal  No drainage  Left Ear: Tympanic membrane normal  No drainage  Nose: Congestion present  Right Turbinates: Swollen  Left Turbinates: Swollen  Right Sinus: Maxillary sinus tenderness present  Left Sinus: Maxillary sinus tenderness present  Mouth/Throat:      Pharynx: No oropharyngeal exudate  Eyes:      General:         Right eye: No discharge  Left eye: No discharge  Conjunctiva/sclera: Conjunctivae normal    Neck:      Thyroid: No thyromegaly     Cardiovascular:      Rate and Rhythm: Normal rate and regular rhythm  Heart sounds: Normal heart sounds  Pulmonary:      Effort: Pulmonary effort is normal  No respiratory distress  Breath sounds: No wheezing or rales  Musculoskeletal:      Cervical back: Normal range of motion and neck supple  Lymphadenopathy:      Cervical: No cervical adenopathy  Skin:     General: Skin is warm and dry

## 2022-04-15 DIAGNOSIS — E11.9 TYPE 2 DIABETES MELLITUS WITHOUT COMPLICATION, WITHOUT LONG-TERM CURRENT USE OF INSULIN (HCC): ICD-10-CM

## 2022-04-21 ENCOUNTER — OFFICE VISIT (OUTPATIENT)
Dept: FAMILY MEDICINE CLINIC | Facility: CLINIC | Age: 62
End: 2022-04-21
Payer: COMMERCIAL

## 2022-04-21 VITALS
OXYGEN SATURATION: 91 % | SYSTOLIC BLOOD PRESSURE: 126 MMHG | WEIGHT: 187 LBS | BODY MASS INDEX: 31.92 KG/M2 | DIASTOLIC BLOOD PRESSURE: 72 MMHG | TEMPERATURE: 98.2 F | HEART RATE: 87 BPM | HEIGHT: 64 IN

## 2022-04-21 DIAGNOSIS — F11.20 NARCOTIC DEPENDENCY, CONTINUOUS (HCC): ICD-10-CM

## 2022-04-21 DIAGNOSIS — M47.814 THORACIC SPONDYLOSIS WITHOUT MYELOPATHY: ICD-10-CM

## 2022-04-21 DIAGNOSIS — G89.29 CHRONIC THORACIC SPINE PAIN: ICD-10-CM

## 2022-04-21 DIAGNOSIS — G62.9 PERIPHERAL POLYNEUROPATHY: ICD-10-CM

## 2022-04-21 DIAGNOSIS — M51.36 DEGENERATION OF LUMBAR INTERVERTEBRAL DISC: ICD-10-CM

## 2022-04-21 DIAGNOSIS — M47.816 LUMBAR SPONDYLOSIS: Primary | ICD-10-CM

## 2022-04-21 DIAGNOSIS — R41.3 MEMORY DYSFUNCTION: ICD-10-CM

## 2022-04-21 DIAGNOSIS — M54.16 LUMBAR RADICULOPATHY: ICD-10-CM

## 2022-04-21 DIAGNOSIS — M54.6 CHRONIC THORACIC SPINE PAIN: ICD-10-CM

## 2022-04-21 PROCEDURE — 3074F SYST BP LT 130 MM HG: CPT | Performed by: FAMILY MEDICINE

## 2022-04-21 PROCEDURE — 3008F BODY MASS INDEX DOCD: CPT | Performed by: FAMILY MEDICINE

## 2022-04-21 PROCEDURE — 99214 OFFICE O/P EST MOD 30 MIN: CPT | Performed by: FAMILY MEDICINE

## 2022-04-21 PROCEDURE — 3078F DIAST BP <80 MM HG: CPT | Performed by: FAMILY MEDICINE

## 2022-04-21 PROCEDURE — 1036F TOBACCO NON-USER: CPT | Performed by: FAMILY MEDICINE

## 2022-04-21 RX ORDER — LIDOCAINE 50 MG/G
OINTMENT TOPICAL
COMMUNITY
Start: 2022-04-14
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
## 2022-04-22 DIAGNOSIS — E11.69 HYPERLIPIDEMIA ASSOCIATED WITH TYPE 2 DIABETES MELLITUS (HCC): ICD-10-CM

## 2022-04-22 DIAGNOSIS — E78.5 HYPERLIPIDEMIA ASSOCIATED WITH TYPE 2 DIABETES MELLITUS (HCC): ICD-10-CM

## 2022-04-22 DIAGNOSIS — E11.9 CONTROLLED TYPE 2 DIABETES MELLITUS WITHOUT COMPLICATION, WITHOUT LONG-TERM CURRENT USE OF INSULIN (HCC): ICD-10-CM

## 2022-04-22 RX ORDER — PEN NEEDLE, DIABETIC 31 GX5/16"
NEEDLE, DISPOSABLE MISCELLANEOUS
Qty: 90 EACH | Refills: 1 | Status: SHIPPED | OUTPATIENT
Start: 2022-04-22

## 2022-04-22 RX ORDER — ATORVASTATIN CALCIUM 40 MG/1
40 TABLET, FILM COATED ORAL DAILY
Qty: 90 TABLET | Refills: 0 | Status: SHIPPED | OUTPATIENT
Start: 2022-04-22 | End: 2022-06-02 | Stop reason: SDUPTHER

## 2022-04-22 NOTE — TELEPHONE ENCOUNTER
Spoke with Liat Marquez today, she is asking about the status of her requested medical records last 03/17/22  I spoke with Ciarra Saenz through teams and she say " her release of information was faxed to medical records department to be completed so she would need to reach out to them for a status of her records"   I will give Medical Records phone number [phone] to pt for more assistance with her request

## 2022-05-02 DIAGNOSIS — E11.9 CONTROLLED TYPE 2 DIABETES MELLITUS WITHOUT COMPLICATION, WITHOUT LONG-TERM CURRENT USE OF INSULIN (HCC): ICD-10-CM

## 2022-05-02 RX ORDER — INSULIN GLARGINE 100 [IU]/ML
33 INJECTION, SOLUTION SUBCUTANEOUS
Qty: 15 ML | Refills: 2 | Status: SHIPPED | OUTPATIENT
Start: 2022-05-02

## 2022-05-06 DIAGNOSIS — J01.00 ACUTE NON-RECURRENT MAXILLARY SINUSITIS: ICD-10-CM

## 2022-05-06 RX ORDER — MOMETASONE FUROATE 50 UG/1
2 SPRAY, METERED NASAL DAILY
Qty: 17 G | Refills: 1 | Status: SHIPPED | OUTPATIENT
Start: 2022-05-06

## 2022-05-16 ENCOUNTER — OFFICE VISIT (OUTPATIENT)
Dept: FAMILY MEDICINE CLINIC | Facility: CLINIC | Age: 62
End: 2022-05-16
Payer: COMMERCIAL

## 2022-05-16 VITALS
HEIGHT: 64 IN | WEIGHT: 183 LBS | TEMPERATURE: 98.8 F | OXYGEN SATURATION: 92 % | SYSTOLIC BLOOD PRESSURE: 112 MMHG | HEART RATE: 100 BPM | BODY MASS INDEX: 31.24 KG/M2 | DIASTOLIC BLOOD PRESSURE: 66 MMHG

## 2022-05-16 DIAGNOSIS — E11.9 TYPE 2 DIABETES MELLITUS WITHOUT COMPLICATION, WITHOUT LONG-TERM CURRENT USE OF INSULIN (HCC): ICD-10-CM

## 2022-05-16 DIAGNOSIS — G56.03 BILATERAL CARPAL TUNNEL SYNDROME: Primary | ICD-10-CM

## 2022-05-16 DIAGNOSIS — M77.8 THUMB TENDONITIS: ICD-10-CM

## 2022-05-16 DIAGNOSIS — J45.30 MILD PERSISTENT ASTHMA WITHOUT COMPLICATION: ICD-10-CM

## 2022-05-16 PROCEDURE — 99214 OFFICE O/P EST MOD 30 MIN: CPT | Performed by: FAMILY MEDICINE

## 2022-05-16 PROCEDURE — 1036F TOBACCO NON-USER: CPT | Performed by: FAMILY MEDICINE

## 2022-05-16 RX ORDER — MONTELUKAST SODIUM 10 MG/1
10 TABLET ORAL
Qty: 90 TABLET | Refills: 1 | Status: SHIPPED | OUTPATIENT
Start: 2022-05-16

## 2022-05-16 NOTE — PROGRESS NOTES
[address] [facility]        NAME: Pro Lara is a 58 y o  female  : 1960    MRN: [id number]  DATE: May 16, 2022  TIME: 11:56 AM    Assessment and Plan   Bilateral carpal tunnel syndrome [G56 03]  1  Bilateral carpal tunnel syndrome  Ambulatory Referral to Orthopedic Surgery   2  Thumb tendonitis  Ambulatory Referral to Orthopedic Surgery   3  BMI 31 0-31 9,adult     4  Mild persistent asthma without complication  montelukast (SINGULAIR) 10 mg tablet   5  Type 2 diabetes mellitus without complication, without long-term current use of insulin (HCC)  sitaGLIPtin (Januvia) 100 mg tablet       No problem-specific Assessment & Plan notes found for this encounter  Patient Instructions     Patient Instructions       Would advise consultation with hand surgeon on all 3 issues  Referral for Dr Marielena Howard sent to mail away  Weight Management   AMBULATORY CARE:   Why it is important to manage your weight:  Being overweight increases your risk of health conditions such as heart disease, high blood pressure, type 2 diabetes, and certain types of cancer  It can also increase your risk for osteoarthritis, sleep apnea, and other respiratory problems  Aim for a slow, steady weight loss  Even a small amount of weight loss can lower your risk of health problems  Risks of being overweight:  Extra weight can cause many health problems, including the following:  · Diabetes (high blood sugar level)    · High blood pressure or high cholesterol    · Heart disease    · Stroke    · Gallbladder or liver disease    · Cancer of the colon, breast, prostate, liver, or kidney    · Sleep apnea    · Arthritis or gout    Screening  is done to check for health conditions before you have signs or symptoms  If you are 28to 79years old, your blood sugar level may be checked every 3 years for signs of prediabetes or diabetes  Your healthcare provider will check your blood pressure at each visit   High blood pressure can lead to a stroke or other problems  Your provider may check for signs of heart disease, cancer, or other health problems  How to lose weight safely:  A safe and healthy way to lose weight is to eat fewer calories and get regular exercise  · You can lose up about 1 pound a week by decreasing the number of calories you eat by 500 calories each day  You can decrease calories by eating smaller portion sizes or by cutting out high-calorie foods  Read labels to find out how many calories are in the foods you eat  · You can also burn calories with exercise such as walking, swimming, or biking  You will be more likely to keep weight off if you make these changes part of your lifestyle  Exercise at least 30 minutes per day on most days of the week  You can also fit in more physical activity by taking the stairs instead of the elevator or parking farther away from stores  Ask your healthcare provider about the best exercise plan for you  Healthy meal plan for weight management:  A healthy meal plan includes a variety of foods, contains fewer calories, and helps you stay healthy  A healthy meal plan includes the following:     · Eat whole-grain foods more often  A healthy meal plan should contain fiber  Fiber is the part of grains, fruits, and vegetables that is not broken down by your body  Whole-grain foods are healthy and provide extra fiber in your diet  Some examples of whole-grain foods are whole-wheat breads and pastas, oatmeal, brown rice, and bulgur  · Eat a variety of vegetables every day  Include dark, leafy greens such as spinach, kale, kvng greens, and mustard greens  Eat yellow and orange vegetables such as carrots, sweet potatoes, and winter squash  · Eat a variety of fruits every day  Choose fresh or canned fruit (canned in its own juice or light syrup) instead of juice  Fruit juice has very little or no fiber  · Eat low-fat dairy foods    Drink fat-free (skim) milk or 1% milk  Eat fat-free yogurt and low-fat cottage cheese  Try low-fat cheeses such as mozzarella and other reduced-fat cheeses  · Choose meat and other protein foods that are low in fat  Choose beans or other legumes such as split peas or lentils  Choose fish, skinless poultry (chicken or turkey), or lean cuts of red meat (beef or pork)  Before you cook meat or poultry, cut off any visible fat  · Use less fat and oil  Try baking foods instead of frying them  Add less fat, such as margarine, sour cream, regular salad dressing and mayonnaise to foods  Eat fewer high-fat foods  Some examples of high-fat foods include french fries, doughnuts, ice cream, and cakes  · Eat fewer sweets  Limit foods and drinks that are high in sugar  This includes candy, cookies, regular soda, and sweetened drinks  Ways to decrease calories:   1  Eat smaller portions  ? Use a small plate with smaller servings  ? Do not eat second helpings  ? When you eat at a restaurant, ask for a box and place half of your meal in the box before you eat  ? Share an entrée with someone else  1  Replace high-calorie snacks with healthy, low-calorie snacks  ? Choose fresh fruit, vegetables, fat-free rice cakes, or air-popped popcorn instead of potato chips, nuts, or chocolate  ? Choose water or calorie-free drinks instead of soda or sweetened drinks  · Do not shop for groceries when you are hungry  You may be more likely to make unhealthy food choices  Take a grocery list of healthy foods and shop after you have eaten  · Eat regular meals  Do not skip meals  Skipping meals can lead to overeating later in the day  This can make it harder for you to lose weight  Eat a healthy snack in place of a meal if you do not have time to eat a regular meal  Talk with a dietitian to help you create a meal plan and schedule that is right for you      Other things to consider as you try to lose weight:   · Be aware of situations that may give you the urge to overeat, such as eating while watching television  Find ways to avoid these situations  For example, read a book, go for a walk, or do crafts  · Meet with a weight loss support group or friends who are also trying to lose weight  This may help you stay motivated to continue working on your weight loss goals  © Copyright [employer] 2022 Information is for End User's use only and may not be sold, redistributed or otherwise used for commercial purposes  All illustrations and images included in CareNotes® are the copyrighted property of A [employer] A M [employer] Inc  or [facility] [employer]   The above information is an  only  It is not intended as medical advice for individual conditions or treatments  Talk to your doctor, nurse or pharmacist before following any medical regimen to see if it is safe and effective for you  Chief Complaint     Chief Complaint   Patient presents with    Follow-up         History of Present Illness       Patient comes in today with several hand issues  1) ongoing carpal tunnel with increasing symptoms  No recent EMG  2) bilateral thumb pain right greater than left-  3) fibrous nodules on the 3rd flexor tendons in the palm area bilaterally  4) diabetes-needs Januvia refill  5) allergies and asthma-patient doing much better with since starting Singulair  Needs refill  Review of Systems   Review of Systems   Constitutional: Negative for appetite change, chills, diaphoresis and fever  HENT: Positive for congestion  Negative for ear pain, rhinorrhea, sinus pressure and sore throat  Eyes: Negative for discharge, redness and itching  Respiratory: Negative for cough, shortness of breath and wheezing  Cardiovascular: Negative for chest pain and palpitations  Rapid or slow heart rate   Gastrointestinal: Negative for abdominal pain, diarrhea, nausea and vomiting     Musculoskeletal: Positive for arthralgias and back pain  Neurological: Positive for weakness and numbness  Negative for dizziness, facial asymmetry and light-headedness           Current Medications       Current Outpatient Medications:     Accu-Chek FastClix Lancets MISC, TEST TWO TIMES A DAY, Disp: 204 each, Rfl: 3    Accu-Chek Guide test strip, TEST TWO TIMES A DAY, Disp: 100 strip, Rfl: 3    albuterol (2 5 mg/3 mL) 0 083 % nebulizer solution, Take 3 mL (2 5 mg total) by nebulization every 6 (six) hours as needed for wheezing or shortness of breath, Disp: 300 mL, Rfl: 1    albuterol (PROVENTIL HFA,VENTOLIN HFA) 90 mcg/act inhaler, USE 2 INHALATIONS ORALLY   EVERY 6 HOURS AS NEEDED FORWHEEZING, Disp: 25 5 g, Rfl: 1    atorvastatin (LIPITOR) 40 mg tablet, Take 1 tablet (40 mg total) by mouth daily, Disp: 90 tablet, Rfl: 0    Ferrous Fumarate 63 (20 Fe) MG TABS, Take 65 mg by mouth daily, Disp: , Rfl:     HYDROcodone-acetaminophen (NORCO)  mg per tablet, , Disp: , Rfl:     insulin glargine (Basaglar KwikPen) 100 units/mL injection pen, Inject 33 Units under the skin daily at bedtime INJECT 33  UNITS UNDER THE SKIN AT BEDTIME, Disp: 15 mL, Rfl: 2    Insulin Pen Needle (B-D UF III MINI PEN NEEDLES) 31G X 5 MM MISC, TEST ONCE DAILY, Disp: 90 each, Rfl: 1    lidocaine (XYLOCAINE) 5 % ointment, apply topically to affected area three times a day, Disp: , Rfl:     LYRICA 200 MG capsule, Take 200 mg by mouth 3 (three) times a day , Disp: , Rfl: 0    metFORMIN (GLUCOPHAGE) 1000 MG tablet, TAKE 1 TABLET TWICE A DAY, Disp: 180 tablet, Rfl: 0    mometasone (NASONEX) 50 mcg/act nasal spray, 2 sprays into each nostril daily, Disp: 17 g, Rfl: 1    montelukast (SINGULAIR) 10 mg tablet, Take 1 tablet (10 mg total) by mouth daily at bedtime, Disp: 90 tablet, Rfl: 1    omeprazole (PriLOSEC) 40 MG capsule, Take 1 capsule (40 mg total) by mouth every morning, Disp: 90 capsule, Rfl: 3    ondansetron (ZOFRAN) 4 mg tablet, Take 1 tablet (4 mg total) by mouth every 8 (eight) hours as needed for nausea or vomiting, Disp: 30 tablet, Rfl: 1    sitaGLIPtin (Januvia) 100 mg tablet, Take 1 tablet (100 mg total) by mouth in the morning , Disp: 90 tablet, Rfl: 3    Symbicort 160-4 5 MCG/ACT inhaler, USE 2 INHALATIONS ORALLY   TWICE DAILY, RINSE MOUTH   AFTER USE, Disp: 30 6 g, Rfl: 0    traMADol HCl ER, Biphasic, 300 MG 24 hr tablet, Take 300 mg by mouth daily, Disp: , Rfl:     VITAMIN D PO, , Disp: , Rfl:     Ziconotide Acetate (PRIALT IT), by Intrathecal route continuous, Disp: , Rfl:     zolpidem (AMBIEN) 5 mg tablet, take 1 tablet by mouth once daily at bedtime if needed for sleep, Disp: 30 tablet, Rfl: 2    lisinopril (ZESTRIL) 20 mg tablet, Take 1 tablet (20 mg total) by mouth daily, Disp: 90 tablet, Rfl: 3    Current Allergies     Allergies as of 2022 - Reviewed 2022   Allergen Reaction Noted    Nsaids Other (See Comments) 2017    Percocet [oxycodone-acetaminophen] GI Intolerance 2017            The following portions of the patient's history were reviewed and updated as appropriate: allergies, current medications, past family history, past medical history, past social history, past surgical history and problem list      Past Medical History:   Diagnosis Date    PABLO (acute kidney injury) ([facility] [address] ) 2021    Anxiety     Arthritis     Asthma     Breast lump     Resolved: 2017     Chronic pain     back    Diabetes mellitus ([facility] [address] )     GERD (gastroesophageal reflux disease)     Hepatitis     History of stomach ulcers     Hyperlipidemia     Hypertension     Hypotension 2021    Infectious viral hepatitis     Hepatitiss A    Stomach problems        Past Surgical History:   Procedure Laterality Date    APPENDECTOMY      BACK SURGERY       SECTION      CHOLECYSTECTOMY      GASTRIC BYPASS      GASTRIC BYPASS      INFUSION PUMP IMPLANTATION     [facility] STIMULATOR IMPLANT   Family History   Problem Relation Age of Onset    Diabetes Mother         Mellitus    Hyperlipidemia Mother     Hypertension Mother     Colon cancer Mother     Pancreatic cancer Mother     Melanoma Mother     Cancer Father         Bladder     Alcohol abuse Father     Lung cancer Father     Prostate cancer Father     Diabetes Brother         Mellitus    Hyperlipidemia Brother     Hypertension Brother     Stomach cancer Paternal Grandmother     Lung cancer Paternal Grandfather     Diabetes Brother     Breast cancer Maternal Aunt 28    Arthritis Family     Mental illness Neg Hx          Medications have been verified  Objective   /66   Pulse 100   Temp 98 8 °F (37 1 °C) (Tympanic)   Ht 5' 4" (1 626 m)   Wt 83 kg (183 lb)   LMP  (LMP Unknown)   SpO2 92%   BMI 31 41 kg/m²        Physical Exam     Physical Exam  Constitutional:       Appearance: Normal appearance  She is obese  HENT:      Head: Normocephalic and atraumatic  Cardiovascular:      Rate and Rhythm: Normal rate and regular rhythm  Heart sounds: Normal heart sounds  Pulmonary:      Effort: Pulmonary effort is normal       Breath sounds: Normal breath sounds  Musculoskeletal:      Right wrist: No swelling or tenderness  Decreased range of motion  Left wrist: No tenderness  Decreased range of motion  Right hand: Tenderness present  Decreased range of motion  Left hand: Tenderness present  Decreased range of motion  Comments: Positive Tinel's and Phalen sign bilaterally  Right greater than left  Thumbs  Bilateral tenderness and pain with full flexion  Hands high fibers deposits in the 3rd flexor tendons bilaterally  Neurological:      Mental Status: She is alert  BMI Counseling: Body mass index is 31 41 kg/m²  The BMI is above normal  Nutrition recommendations include reducing portion sizes and 3-5 servings of fruits/vegetables daily  BMI Counseling:  Body mass index is 31 41 kg/m²  The BMI is above normal  Nutrition recommendations include reducing portion sizes and 3-5 servings of fruits/vegetables daily

## 2022-05-16 NOTE — PATIENT INSTRUCTIONS
Would advise consultation with hand surgeon on all 3 issues  Referral for Dr Marielena Howard sent to mail away  Weight Management   AMBULATORY CARE:   Why it is important to manage your weight:  Being overweight increases your risk of health conditions such as heart disease, high blood pressure, type 2 diabetes, and certain types of cancer  It can also increase your risk for osteoarthritis, sleep apnea, and other respiratory problems  Aim for a slow, steady weight loss  Even a small amount of weight loss can lower your risk of health problems  Risks of being overweight:  Extra weight can cause many health problems, including the following:  Diabetes (high blood sugar level)    High blood pressure or high cholesterol    Heart disease    Stroke    Gallbladder or liver disease    Cancer of the colon, breast, prostate, liver, or kidney    Sleep apnea    Arthritis or gout    Screening  is done to check for health conditions before you have signs or symptoms  If you are 28to 79years old, your blood sugar level may be checked every 3 years for signs of prediabetes or diabetes  Your healthcare provider will check your blood pressure at each visit  High blood pressure can lead to a stroke or other problems  Your provider may check for signs of heart disease, cancer, or other health problems  How to lose weight safely:  A safe and healthy way to lose weight is to eat fewer calories and get regular exercise  You can lose up about 1 pound a week by decreasing the number of calories you eat by 500 calories each day  You can decrease calories by eating smaller portion sizes or by cutting out high-calorie foods  Read labels to find out how many calories are in the foods you eat  You can also burn calories with exercise such as walking, swimming, or biking  You will be more likely to keep weight off if you make these changes part of your lifestyle  Exercise at least 30 minutes per day on most days of the week   You can also fit in more physical activity by taking the stairs instead of the elevator or parking farther away from stores  Ask your healthcare provider about the best exercise plan for you  Healthy meal plan for weight management:  A healthy meal plan includes a variety of foods, contains fewer calories, and helps you stay healthy  A healthy meal plan includes the following:     Eat whole-grain foods more often  A healthy meal plan should contain fiber  Fiber is the part of grains, fruits, and vegetables that is not broken down by your body  Whole-grain foods are healthy and provide extra fiber in your diet  Some examples of whole-grain foods are whole-wheat breads and pastas, oatmeal, brown rice, and bulgur  Eat a variety of vegetables every day  Include dark, leafy greens such as spinach, kale, kvng greens, and mustard greens  Eat yellow and orange vegetables such as carrots, sweet potatoes, and winter squash  Eat a variety of fruits every day  Choose fresh or canned fruit (canned in its own juice or light syrup) instead of juice  Fruit juice has very little or no fiber  Eat low-fat dairy foods  Drink fat-free (skim) milk or 1% milk  Eat fat-free yogurt and low-fat cottage cheese  Try low-fat cheeses such as mozzarella and other reduced-fat cheeses  Choose meat and other protein foods that are low in fat  Choose beans or other legumes such as split peas or lentils  Choose fish, skinless poultry (chicken or turkey), or lean cuts of red meat (beef or pork)  Before you cook meat or poultry, cut off any visible fat  Use less fat and oil  Try baking foods instead of frying them  Add less fat, such as margarine, sour cream, regular salad dressing and mayonnaise to foods  Eat fewer high-fat foods  Some examples of high-fat foods include french fries, doughnuts, ice cream, and cakes  Eat fewer sweets  Limit foods and drinks that are high in sugar   This includes candy, cookies, regular soda, and sweetened drinks  Ways to decrease calories:   Eat smaller portions  Use a small plate with smaller servings  Do not eat second helpings  When you eat at a restaurant, ask for a box and place half of your meal in the box before you eat  Share an entrée with someone else  Replace high-calorie snacks with healthy, low-calorie snacks  Choose fresh fruit, vegetables, fat-free rice cakes, or air-popped popcorn instead of potato chips, nuts, or chocolate  Choose water or calorie-free drinks instead of soda or sweetened drinks  Do not shop for groceries when you are hungry  You may be more likely to make unhealthy food choices  Take a grocery list of healthy foods and shop after you have eaten  Eat regular meals  Do not skip meals  Skipping meals can lead to overeating later in the day  This can make it harder for you to lose weight  Eat a healthy snack in place of a meal if you do not have time to eat a regular meal  Talk with a dietitian to help you create a meal plan and schedule that is right for you  Other things to consider as you try to lose weight:   Be aware of situations that may give you the urge to overeat, such as eating while watching television  Find ways to avoid these situations  For example, read a book, go for a walk, or do crafts  Meet with a weight loss support group or friends who are also trying to lose weight  This may help you stay motivated to continue working on your weight loss goals  © Copyright [employer] 2022 Information is for End User's use only and may not be sold, redistributed or otherwise used for commercial purposes  All illustrations and images included in CareNotes® are the copyrighted property of [employer]  or [employer]   The above information is an  only  It is not intended as medical advice for individual conditions or treatments   Talk to your doctor, nurse or pharmacist before following any medical regimen to see if it is safe and effective for you

## 2022-05-24 ENCOUNTER — TELEPHONE (OUTPATIENT)
Dept: OBGYN CLINIC | Facility: HOSPITAL | Age: 62
End: 2022-05-24

## 2022-05-27 ENCOUNTER — OFFICE VISIT (OUTPATIENT)
Dept: FAMILY MEDICINE CLINIC | Facility: CLINIC | Age: 62
End: 2022-05-27
Payer: COMMERCIAL

## 2022-05-27 VITALS
RESPIRATION RATE: 16 BRPM | DIASTOLIC BLOOD PRESSURE: 72 MMHG | HEART RATE: 98 BPM | HEIGHT: 64 IN | SYSTOLIC BLOOD PRESSURE: 126 MMHG | OXYGEN SATURATION: 93 % | WEIGHT: 178 LBS | BODY MASS INDEX: 30.39 KG/M2

## 2022-05-27 DIAGNOSIS — K21.9 GERD WITHOUT ESOPHAGITIS: ICD-10-CM

## 2022-05-27 DIAGNOSIS — J45.30 MILD PERSISTENT ASTHMA WITHOUT COMPLICATION: ICD-10-CM

## 2022-05-27 DIAGNOSIS — T17.908A ASPIRATION INTO RESPIRATORY TRACT, INITIAL ENCOUNTER: Primary | ICD-10-CM

## 2022-05-27 DIAGNOSIS — L23.7 POISON IVY DERMATITIS: ICD-10-CM

## 2022-05-27 PROCEDURE — 3008F BODY MASS INDEX DOCD: CPT | Performed by: FAMILY MEDICINE

## 2022-05-27 PROCEDURE — 99214 OFFICE O/P EST MOD 30 MIN: CPT | Performed by: FAMILY MEDICINE

## 2022-05-27 RX ORDER — AMOXICILLIN AND CLAVULANATE POTASSIUM 875; 125 MG/1; MG/1
1 TABLET, FILM COATED ORAL EVERY 12 HOURS SCHEDULED
Qty: 20 TABLET | Refills: 0 | Status: SHIPPED | OUTPATIENT
Start: 2022-05-27 | End: 2022-06-06

## 2022-05-27 RX ORDER — PREDNISONE 20 MG/1
TABLET ORAL
Qty: 15 TABLET | Refills: 0 | Status: SHIPPED | OUTPATIENT
Start: 2022-05-27 | End: 2022-07-18 | Stop reason: ALTCHOICE

## 2022-05-27 RX ORDER — TRIAMCINOLONE ACETONIDE 1 MG/G
CREAM TOPICAL 2 TIMES DAILY
Qty: 80 G | Refills: 1 | Status: SHIPPED | OUTPATIENT
Start: 2022-05-27 | End: 2022-07-24

## 2022-05-27 NOTE — PROGRESS NOTES
[id number]         NAME: Ahsan Back is a 58 y o  female  : 1960    MRN: [id number]  DATE: May 27, 2022  TIME: 4:28 PM    Assessment and Plan   Aspiration into respiratory tract, initial encounter [T17 [id number]]  1  Aspiration into respiratory tract, initial encounter  amoxicillin-clavulanate (Augmentin) 875-125 mg per tablet    predniSONE 20 mg tablet   2  Mild persistent asthma without complication  predniSONE 20 mg tablet   3  GERD without esophagitis     4  Poison ivy dermatitis  predniSONE 20 mg tablet    triamcinolone (KENALOG) 0 1 % cream       No problem-specific Assessment & Plan notes found for this encounter  Patient Instructions     Patient Instructions   Antibiotics and prednisone as ordered for lung symptoms  Oral prednisone and topical steroid for poison ivy  Chief Complaint     Chief Complaint   Patient presents with   [facility]    GERD         History of Present Illness       Patient comes in today with 2 problems  1) patient had reflux and had aspiration during the night  She has since had increasing cough and chest congestion  No current fevers  O2 sats are at baseline  2) the poison ivy on arms  Review of Systems   Review of Systems   Constitutional: Negative for appetite change, chills, diaphoresis and fever  HENT: Negative for congestion, ear pain, rhinorrhea, sinus pressure and sore throat  Eyes: Negative for discharge, redness and itching  Respiratory: Positive for cough and shortness of breath  Negative for wheezing  Cardiovascular: Negative for chest pain and palpitations  Rapid or slow heart rate   Gastrointestinal: Negative for abdominal pain, diarrhea, nausea and vomiting  Skin: Positive for color change and rash           Current Medications       Current Outpatient Medications:     amoxicillin-clavulanate (Augmentin) 875-125 mg per tablet, Take 1 tablet by mouth every 12 (twelve) hours for 10 days, Disp: 20 tablet, Rfl: 0    predniSONE 20 mg tablet, 1 tab twice daily for 5 days, then 1 tab daily for 5 days  , Disp: 15 tablet, Rfl: 0    triamcinolone (KENALOG) 0 1 % cream, Apply topically 2 (two) times a day, Disp: 80 g, Rfl: 1    Accu-Chek FastClix Lancets MISC, TEST TWO TIMES A DAY, Disp: 204 each, Rfl: 3    Accu-Chek Guide test strip, TEST TWO TIMES A DAY, Disp: 100 strip, Rfl: 3    albuterol (2 5 mg/3 mL) 0 083 % nebulizer solution, Take 3 mL (2 5 mg total) by nebulization every 6 (six) hours as needed for wheezing or shortness of breath, Disp: 300 mL, Rfl: 1    albuterol (PROVENTIL HFA,VENTOLIN HFA) 90 mcg/act inhaler, USE 2 INHALATIONS ORALLY   EVERY 6 HOURS AS NEEDED FORWHEEZING, Disp: 25 5 g, Rfl: 1    atorvastatin (LIPITOR) 40 mg tablet, Take 1 tablet (40 mg total) by mouth daily, Disp: 90 tablet, Rfl: 0    Ferrous Fumarate 63 (20 Fe) MG TABS, Take 65 mg by mouth daily, Disp: , Rfl:     HYDROcodone-acetaminophen (NORCO)  mg per tablet, , Disp: , Rfl:     insulin glargine (Basaglar KwikPen) 100 units/mL injection pen, Inject 33 Units under the skin daily at bedtime INJECT 33  UNITS UNDER THE SKIN AT BEDTIME, Disp: 15 mL, Rfl: 2    Insulin Pen Needle (B-D UF III MINI PEN NEEDLES) 31G X 5 MM MISC, TEST ONCE DAILY, Disp: 90 each, Rfl: 1    lidocaine (XYLOCAINE) 5 % ointment, apply topically to affected area three times a day, Disp: , Rfl:     lisinopril (ZESTRIL) 20 mg tablet, Take 1 tablet (20 mg total) by mouth daily, Disp: 90 tablet, Rfl: 3    LYRICA 200 MG capsule, Take 200 mg by mouth 3 (three) times a day , Disp: , Rfl: 0    metFORMIN (GLUCOPHAGE) 1000 MG tablet, TAKE 1 TABLET TWICE A DAY, Disp: 180 tablet, Rfl: 0    mometasone (NASONEX) 50 mcg/act nasal spray, 2 sprays into each nostril daily, Disp: 17 g, Rfl: 1    montelukast (SINGULAIR) 10 mg tablet, Take 1 tablet (10 mg total) by mouth daily at bedtime, Disp: 90 tablet, Rfl: 1    omeprazole (PriLOSEC) 40 MG capsule, Take 1 capsule (40 mg total) by mouth every morning, Disp: 90 capsule, Rfl: 3    ondansetron (ZOFRAN) 4 mg tablet, Take 1 tablet (4 mg total) by mouth every 8 (eight) hours as needed for nausea or vomiting, Disp: 30 tablet, Rfl: 1    sitaGLIPtin (Januvia) 100 mg tablet, Take 1 tablet (100 mg total) by mouth in the morning , Disp: 90 tablet, Rfl: 3    Symbicort 160-4 5 MCG/ACT inhaler, USE 2 INHALATIONS ORALLY   TWICE DAILY, RINSE MOUTH   AFTER USE, Disp: 30 6 g, Rfl: 0    traMADol HCl ER, Biphasic, 300 MG 24 hr tablet, Take 300 mg by mouth daily, Disp: , Rfl:     VITAMIN D PO, , Disp: , Rfl:     Ziconotide Acetate (PRIALT IT), by Intrathecal route continuous, Disp: , Rfl:     zolpidem (AMBIEN) 5 mg tablet, take 1 tablet by mouth once daily at bedtime if needed for sleep, Disp: 30 tablet, Rfl: 2    Current Allergies     Allergies as of 05/27/2022 - Reviewed 05/27/2022   Allergen Reaction Noted    Nsaids Other (See Comments) 09/23/2017    Percocet [oxycodone-acetaminophen] GI Intolerance 09/23/2017            The following portions of the patient's history were reviewed and updated as appropriate: allergies, current medications, past family history, past medical history, past social history, past surgical history and problem list      Past Medical History:   Diagnosis Date    PABLO (acute kidney injury) ([facility] [place] [facility] ) 11/04/2021    Allergic     Anemia     Anxiety     Arthritis     Asthma     Breast lump     Resolved: 8/1/2017     Chronic pain     back    [facility]     Diverticulitis of colon     GERD (gastroesophageal reflux disease)     Hepatitis     History of stomach ulcers     HL (hearing loss) 2015    Estimated    Hyperlipidemia     Hypertension     Hypotension 11/04/2021    Infectious viral hepatitis     Hepatitiss A    Memory loss 2015    Estimated    Stomach problems        Past Surgical History:   Procedure Laterality Date    APPENDECTOMY      BACK SURGERY       SECTION      CHOLECYSTECTOMY      GASTRIC BYPASS      GASTRIC BYPASS      INFUSION PUMP IMPLANTATION      SHOULDER SURGERY      SPINAL CORD STIMULATOR IMPLANT         Family History   Problem Relation Age of Onset    Diabetes Mother         Mellitus    Hyperlipidemia Mother     Hypertension Mother     Colon cancer Mother     Pancreatic cancer Mother     Melanoma Mother     Cancer Father         Bladder     Alcohol abuse Father     Lung cancer Father     Prostate cancer Father     Diabetes Brother         Mellitus    Hyperlipidemia Brother     Hypertension Brother     Stomach cancer Paternal Grandmother     Lung cancer Paternal Grandfather     Diabetes Brother     Breast cancer Maternal Aunt 28    Arthritis Family     Mental illness Neg Hx          Medications have been verified  Objective   /72   Pulse 98   Resp 16   Ht 5' 4" (1 626 m)   Wt 80 7 kg (178 lb)   LMP  (LMP Unknown)   SpO2 93%   BMI 30 55 kg/m²        Physical Exam     Physical Exam  Vitals reviewed  Constitutional:       General: She is not in acute distress  Appearance: She is well-developed  HENT:      Head: Normocephalic and atraumatic  Right Ear: Tympanic membrane and external ear normal  No drainage  Left Ear: Tympanic membrane normal  No drainage  Mouth/Throat:      Pharynx: No oropharyngeal exudate  Eyes:      General:         Right eye: No discharge  Left eye: No discharge  Conjunctiva/sclera: Conjunctivae normal    Neck:      Thyroid: No thyromegaly  Cardiovascular:      Rate and Rhythm: Normal rate and regular rhythm  Heart sounds: Normal heart sounds  Pulmonary:      Effort: Pulmonary effort is normal  No respiratory distress  Breath sounds: Wheezing and rhonchi present  No rales  Musculoskeletal:      Cervical back: Normal range of motion and neck supple  Lymphadenopathy:      Cervical: No cervical adenopathy     Skin: General: Skin is warm  Findings: Erythema and rash present

## 2022-05-27 NOTE — PATIENT INSTRUCTIONS
Antibiotics and prednisone as ordered for lung symptoms  Oral prednisone and topical steroid for poison ivy

## 2022-06-02 DIAGNOSIS — E11.69 HYPERLIPIDEMIA ASSOCIATED WITH TYPE 2 DIABETES MELLITUS (HCC): ICD-10-CM

## 2022-06-02 DIAGNOSIS — E78.5 HYPERLIPIDEMIA ASSOCIATED WITH TYPE 2 DIABETES MELLITUS (HCC): ICD-10-CM

## 2022-06-02 RX ORDER — ATORVASTATIN CALCIUM 40 MG/1
40 TABLET, FILM COATED ORAL DAILY
Qty: 90 TABLET | Refills: 0 | Status: SHIPPED | OUTPATIENT
Start: 2022-06-02

## 2022-06-06 DIAGNOSIS — G47.00 INSOMNIA, UNSPECIFIED TYPE: ICD-10-CM

## 2022-06-06 RX ORDER — ZOLPIDEM TARTRATE 5 MG/1
5 TABLET ORAL
Qty: 30 TABLET | Refills: 2 | Status: SHIPPED | OUTPATIENT
Start: 2022-06-06

## 2022-06-20 ENCOUNTER — TELEPHONE (OUTPATIENT)
Dept: FAMILY MEDICINE CLINIC | Facility: CLINIC | Age: 62
End: 2022-06-20

## 2022-06-20 ENCOUNTER — HOSPITAL ENCOUNTER (OUTPATIENT)
Dept: MAMMOGRAPHY | Facility: IMAGING CENTER | Age: 62
Discharge: HOME | End: 2022-06-20
Payer: COMMERCIAL

## 2022-06-20 VITALS — WEIGHT: 178 LBS | BODY MASS INDEX: 30.39 KG/M2 | HEIGHT: 64 IN

## 2022-06-20 DIAGNOSIS — Z12.31 SCREENING MAMMOGRAM, ENCOUNTER FOR: ICD-10-CM

## 2022-06-20 PROCEDURE — 77067 SCR MAMMO BI INCL CAD: CPT

## 2022-06-20 PROCEDURE — 77063 BREAST TOMOSYNTHESIS BI: CPT

## 2022-06-20 NOTE — TELEPHONE ENCOUNTER
Dr smiley call and said about her disability if you want to add restriction to her disability to give a call to dr smiley        [phone] dr smiley

## 2022-06-23 NOTE — TELEPHONE ENCOUNTER
DR ORDAZ, CALLING FROM [address] [facility] FOR PATIENTS DISABILITY FROM MARCH - CURRENT--NEEDS PEAR TO BRO        # [phone]    GROUP AWARE THAT DR Letty Espinal NOT IN UNTIL Monday

## 2022-06-30 DIAGNOSIS — E11.9 TYPE 2 DIABETES MELLITUS WITHOUT COMPLICATION, WITHOUT LONG-TERM CURRENT USE OF INSULIN (HCC): ICD-10-CM

## 2022-07-05 ENCOUNTER — TELEPHONE (OUTPATIENT)
Dept: FAMILY MEDICINE CLINIC | Facility: CLINIC | Age: 62
End: 2022-07-05

## 2022-07-05 NOTE — TELEPHONE ENCOUNTER
Pt stated that the change to the 15 units of insulin instead of 23 seems to be perfect for her  Her recent AM blood sugars are reading at 80, 100, and 80  Pt advised to call the office if there are any changes

## 2022-07-05 NOTE — TELEPHONE ENCOUNTER
----- Message from [place] sent at 7/1/2022  3:21 PM EDT -----  Regarding: Reducing my insulin numbers  Thank you

## 2022-07-18 ENCOUNTER — OFFICE VISIT (OUTPATIENT)
Dept: OBGYN CLINIC | Facility: CLINIC | Age: 62
End: 2022-07-18
Payer: COMMERCIAL

## 2022-07-18 VITALS
SYSTOLIC BLOOD PRESSURE: 140 MMHG | WEIGHT: 175.6 LBS | HEIGHT: 64 IN | BODY MASS INDEX: 29.98 KG/M2 | DIASTOLIC BLOOD PRESSURE: 80 MMHG

## 2022-07-18 DIAGNOSIS — M72.0 DUPUYTREN'S DISEASE: ICD-10-CM

## 2022-07-18 DIAGNOSIS — R20.0 BILATERAL HAND NUMBNESS: ICD-10-CM

## 2022-07-18 DIAGNOSIS — M19.049 CMC ARTHRITIS: ICD-10-CM

## 2022-07-18 DIAGNOSIS — G56.03 BILATERAL CARPAL TUNNEL SYNDROME: Primary | ICD-10-CM

## 2022-07-18 PROCEDURE — 3079F DIAST BP 80-89 MM HG: CPT | Performed by: ORTHOPAEDIC SURGERY

## 2022-07-18 PROCEDURE — 3077F SYST BP >= 140 MM HG: CPT | Performed by: ORTHOPAEDIC SURGERY

## 2022-07-18 PROCEDURE — 99204 OFFICE O/P NEW MOD 45 MIN: CPT | Performed by: ORTHOPAEDIC SURGERY

## 2022-07-18 NOTE — PATIENT INSTRUCTIONS
What is it [facility] Arthritis? In a normal joint, cartilage covers the end of the bones and serves as a shock absorber to allow smooth, pain-free movement  In osteoarthritis (OA, or degenerative arthritis) the cartilage layer wears out, resulting in direct contact between the bones and producing pain and deformity  One of the most common joints to develop OA in the hand is the base of the thumb  The thumb basal joint, also called the carpometacarpal [place]) joint, is a specialized saddle shaped joint that is formed by a small bone of the wrist (trapezium) and the first bone of the thumb (metacarpal)  The saddle shaped joint allows the thumb to have a wide range of motions, including up, down, across the palm, and the ability to pinch (see Figure 1)  Who gets it? OA at the base of the thumb is more commonly seen in women over the age of 36  The exact cause is unknown, but genetics, previous injuries such as fractures or dislocations, and generalized joint laxity may predispose towards development of this type of arthritis  What are the symptoms and signs? The most common symptom is pain at the base of the thumb  The pain can be aggravated by activities that require pinching, such as opening jars, turning door knobs or keys, and writing  Severity can also progress to pain at rest and pain at night  In more severe cases, progressive destruction and mal-alignment of the joint occurs, and a bump develops at the base of the thumb as the metacarpal moves out of the saddle joint  This shift in the joint can cause limited motion and weakness, making pinch difficult (see Figure 2)  The next joint above the [facility] may compensate by loosening, causing it to bend further back (hyperextension)  How is the diagnosis made? The diagnosis is made by history and physical evaluation  Pressure and movement such as twisting will produce pain at the joint  A grinding sensation may also be present at the joint (see Figure 3)  X-rays are used to confirm the diagnosis, although symptom severity often does not correlate with x-ray findings  What are the treatment options? Less severe thumb arthritis will usually respond to non-surgical care  Arthritis medication, splinting and limited cortisone injections may help alleviate pain  A hand therapist might provide a variety of rigid and non-rigid splints which can be used while sleeping or during activities  Patients with advanced disease or who fail non-surgical treatment may be candidates for surgical reconstruction  A variety of surgical techniques are available that can successfully reduce or eliminate pain  Surgical procedures include removal of arthritic bone and joint reconstruction (arthroplasty), joint fusion, bone realignment, and even arthroscopy in select cases  A consultation with your hand surgeon can help decide the best option for you (see Figure 4)  © 2012 American Society for [employer] of the Hand  www handcare  org     What is Dupuytren's Disease? Dupuytren's disease is an abnormal thickening of the tissue just beneath the skin  This thickening occurs in the palm and can extend into the fingers (see Figure 1)  Firm pits, nodules, and cords may develop that can cause the fingers to bend into the palm (see Figure 2), which is a condition described as Dupuytren's contracture  Although the skin may become involved in the process, the deeper structures--such as the tendons--are not directly involved  Occasionally, the disease will cause thickening on top of the finger knuckles (knuckle pads), or lumps or cords within the soles of the feet (plantar fibromatosis)  Causes  The cause of Dupuytren's disease is unknown but may be associated with certain biochemical factors within the involved tissue  The problem is more common in men over age 36 and in people of [place] descent   There is no proven evidence that hand injuries or specific occupational exposures lead to a higher risk of developing Dupuytren's disease  Signs and Symptoms  Symptoms of Dupuytren's disease usually include lumps and pits within the palm  The lumps are generally firm and adherent to the skin  Thick cords may develop, extending from the palm into one or more fingers, with the ring and little fingers most commonly affected  These cords may be mistaken for tendons, but they actually lie between the skin and the tendons  These cords cause bending or contractures of the fingers  In many cases, both hands are affected, although the degree of involvement may vary  The initial lumps may produce discomfort that usually resolves, but Dupuytren's disease is not typically painful  The disease may first be noticed because of difficulty placing the hand flat on an even surface, such as a tabletop (see Figure 3)  As the tissue thickens and fingers are drawn into the palm, one may notice increasing difficulty with activities such as washing, wearing gloves, shaking hands, and putting hands into pockets  Progression is unpredictable  Some individuals will have only small lumps or cords while others will develop severely bent fingers  More severe disease often occurs with an earlier age of onset  Treatment  In mild cases, especially if hand function is not affected, only observation is needed  For more severe cases, various treatment options are available in order to straighten the finger(s)  These options may include needles or open surgery  Your treating surgeon will discuss the method most appropriate for your condition based upon the stage and pattern of the disease and the joints involved  The goal of treatment is to improve finger position and thereby hand function  Despite treatment, the disease process may recur  Before treatment, your treating surgeon will discuss realistic goals, possible risks and results  Specific surgical considerations include the following:    The presence of a lump in the palm does not mean that surgery is required or that the disease will progress  Correction of finger position is best accomplished with milder contractures or contractures that affect the base of the finger  Complete correction sometimes cannot be attained, especially of the middle and end joints in the finger  Skin grafts are sometimes required to cover open areas in the fingers if the skin is deficient  The nerves that provide feeling to the fingertips are often intertwined with the cords  Splinting and hand therapy are often required after surgery in order to maximize and maintain the improvement in finger position and function  © 2012 American Society for [employer] of the Hand  www handcare  org

## 2022-07-18 NOTE — PROGRESS NOTES
ASSESSMENT/PLAN:    Assessment:   Lateral hand numbness and tingling, likely carpal tunnel syndrome  Bilateral thumb CMC arthritis  Bilateral Dupuytren's disease without contracture    Plan:   Patient and I discussed in regards to her bilateral hand numbness and tingling obtained ultrasounds to rule in carpal tunnel syndrome  We did briefly discuss surgical intervention of carpal tunnel releases I will discuss this further after her ultrasound is obtained  In regards to her thumb CMC arthritis I recommended hand therapy and Comfort Cool braces, these were provided in the office  In regards to her nodules in the palm this is due to Dupuytren's disease and we recommended continuing to monitor this  Follow Up: After Testing    To Do Next Visit:    review ultrasound and discuss carpal tunnel release    General Discussions:  CMC Arthritis: The anatomy and physiology of carpometacarpal joint arthritis was discussed with the patient today in the office  Deterioration of the articular cartilage eventually leads to hypermobility at the thumb [facility] joint, resulting in joint subluxation, osteophyte formation, cystic changes within the trapezium and base of the first metacarpal, as well as subchondral sclerosis  Eventually, pain, limited mobility, and compensatory hyperextension at the metacarpophalangeal joint may develop  While normal activity and usage of the thumb joint may provide a painful experience to the patient, this typically does not result in damage to the thumb or hand  Treatment options include resting thumb spica splints to decreased joint edema, pain, and inflammation  Therapy exercises to strengthen the thenar musculature may relieve pain, but do not alter the overall continued development of osteoarthritis  Oral medications, topical medications, corticosteroid injections may decrease pain and increase overall function  Eventually, approximately 5% of patients may require surgical intervention  Cubital Tunnel Syndrome: The anatomy and physiology of cubital tunnel syndrome were discussed with the patient today in the office  Typically, increased elbow flexion activities decrease blood flow within the intraneural spaces, resulting in a feeling of numbness, tingling, weakness, or clumsiness within the hand and fingers  Occasionally, anatomic structures such as medial elbow osteophytes, the medial head of the triceps, were subluxing ulnar nerve may result in increased pressure or aggravation at the cubital tunnel  Typical signs and symptoms usually include numbness and tingling within the ring and small finger, weakness with , and weakness with pinch  Conservative treatment and includes nocturnal bracing to keep the elbow in a semi-extended position, activity modification, therapy, and avoiding excessive elbow flexion activities  A majority of patients typically respond to conservative treatment over a period of approximately 3-6 months  EMG/NCV testing of the ulnar nerve at the elbow is not as reliable as carpal tunnel syndrome  Surgical intervention in the form of in situ release of the ulnar nerve at the elbow or ulnar nerve transposition may be required in up to 20% of patients  Dupuytren's Disease: The anatomy and physiology of Dupuytren's disease were discussed with the patient today in the office  Increased scar formation within the interval between the skin volarly and the flexor tendons dorsally can result in pit formation, nodular formation, or eventual cord formation  These pathologic cords can cause contracture at either the metacarpophalangeal joint, proximal interphalangeal joint, or both  As the cords progress towards the proximal interphalangeal joint, the neurovascular structures of the finger may become involved within the disease process  While this is a genetic condition, variable penetrance occurs within family trees    Conservative treatment options including therapy to maintain joint mobility and a tabletop test were discussed  Other treatments include Xiaflex and possible surgical intervention  _____________________________________________________  CHIEF COMPLAINT:  Chief Complaint   Patient presents with    Right Wrist - Carpal Tunnel     EMG 6/12/17    Left Wrist - Carpal Tunnel     EMG 6/12/17    Left Hand - lumps     C/O lumps of palm     Right Hand - lumps     C/O Lumps of the palm          SUBJECTIVE:  Radha Lim is a 58 y o  female who presents with bilateral hand numbness and bilateral thumb pain as well as nodules in her bilateral palms  I am seeing her in consultation at the request of Kishan Sánchez MD   Patient states she has had bilateral hand numbness and tingling for about 30 years now  She states at that time she was diagnosed with carpal tunnel syndrome had no treatment other than cock-up braces  She states she has had intermittent numbness and tingling test that time but this is worse since January  She reports at that point she return to work and was doing a lot of typing  She has also had pain at the base of her bilateral thumbs since returning to work as well  This is very intermittent  She has noticed nodules in the palm since February with no known incident or trauma  She has no known history of Dupuytren's  She is diabetic but well controlled    Radiation: None  Previous Treatments:  Cock-up braces 30 years ago with partial relief  Associated symptoms:  Pain at the base of thumb  Handedness: right  Work status:  Works for [address] [place] [address]:  Past Medical History:   Diagnosis Date    [facility]ABLO [facility] injury) ([facility] ) 11/04/2021    Allergic     Anemia     Anxiety     Arthritis     Asthma     Breast lump     Resolved: 8/1/2017     Chronic pain     back    Diabetes mellitus ([facility] )     Diverticulitis of colon     GERD (gastroesophageal reflux disease)     Hepatitis     History of stomach ulcers     HL (hearing loss)     Estimated    Hyperlipidemia     Hypertension     Hypotension 2021    Infectious viral hepatitis     Hepatitiss A    Memory loss     Estimated    Stomach problems        PAST SURGICAL HISTORY:  Past Surgical History:   Procedure Laterality Date    APPENDECTOMY      BACK SURGERY       SECTION      CHOLECYSTECTOMY      GASTRIC BYPASS      GASTRIC BYPASS      INFUSION PUMP IMPLANTATION      SHOULDER SURGERY      SPINAL CORD STIMULATOR IMPLANT         FAMILY HISTORY:  Family History   Problem Relation Age of Onset    Diabetes Mother         Mellitus    Hyperlipidemia Mother     Hypertension Mother     Colon cancer Mother     Pancreatic cancer Mother     Melanoma Mother     Cancer Father         Bladder     Alcohol abuse Father     Lung cancer Father     Prostate cancer Father     Diabetes Brother         Mellitus    Hyperlipidemia Brother     Hypertension Brother     Stomach cancer Paternal Grandmother     Lung cancer Paternal Grandfather     Diabetes Brother     Breast cancer Maternal Aunt 28    Arthritis Family     Mental illness Neg Hx        SOCIAL HISTORY:  Social History     Tobacco Use    Smoking status: Former Smoker     Packs/day: 1 00     Years: 34 00     Pack years: 34 00     Types: Cigarettes, Cigarettes     Start date: 1973     Quit date: 2007     Years since quitting: 15 5    Smokeless tobacco: Never Used    Tobacco comment: 15yrs ago   Vaping Use    Vaping Use: Never used   Substance Use Topics    Alcohol use: No    Drug use: No       MEDICATIONS:    Current Outpatient Medications:     Accu-Chek FastClix Lancets MISC, TEST TWO TIMES A DAY, Disp: 204 each, Rfl: 3    Accu-Chek Guide test strip, TEST TWO TIMES A DAY, Disp: 100 strip, Rfl: 3    albuterol (2 5 mg/3 mL) 0 083 % nebulizer solution, Take 3 mL (2 5 mg total) by nebulization every 6 (six) hours as needed for wheezing or shortness of breath, Disp: 300 mL, Rfl: 1    albuterol (PROVENTIL HFA,VENTOLIN HFA) 90 mcg/act inhaler, USE 2 INHALATIONS ORALLY   EVERY 6 HOURS AS NEEDED FORWHEEZING, Disp: 25 5 g, Rfl: 1    atorvastatin (LIPITOR) 40 mg tablet, Take 1 tablet (40 mg total) by mouth daily, Disp: 90 tablet, Rfl: 0    Ferrous Fumarate 63 (20 Fe) MG TABS, Take 65 mg by mouth daily, Disp: , Rfl:     HYDROcodone-acetaminophen (NORCO)  mg per tablet, , Disp: , Rfl:     insulin glargine (Basaglar KwikPen) 100 units/mL injection pen, Inject 33 Units under the skin daily at bedtime INJECT 33  UNITS UNDER THE SKIN AT BEDTIME, Disp: 15 mL, Rfl: 2    Insulin Pen Needle (B-D UF III MINI PEN NEEDLES) 31G X 5 MM MISC, TEST ONCE DAILY, Disp: 90 each, Rfl: 1    lidocaine (XYLOCAINE) 5 % ointment, apply topically to affected area three times a day, Disp: , Rfl:     lisinopril (ZESTRIL) 20 mg tablet, Take 1 tablet (20 mg total) by mouth daily, Disp: 90 tablet, Rfl: 3    LYRICA 200 MG capsule, Take 200 mg by mouth 3 (three) times a day , Disp: , Rfl: 0    metFORMIN (GLUCOPHAGE) 1000 MG tablet, TAKE 1 TABLET TWICE A DAY, Disp: 180 tablet, Rfl: 0    mometasone (NASONEX) 50 mcg/act nasal spray, 2 sprays into each nostril daily, Disp: 17 g, Rfl: 1    montelukast (SINGULAIR) 10 mg tablet, Take 1 tablet (10 mg total) by mouth daily at bedtime, Disp: 90 tablet, Rfl: 1    omeprazole (PriLOSEC) 40 MG capsule, Take 1 capsule (40 mg total) by mouth every morning, Disp: 90 capsule, Rfl: 3    ondansetron (ZOFRAN) 4 mg tablet, Take 1 tablet (4 mg total) by mouth every 8 (eight) hours as needed for nausea or vomiting, Disp: 30 tablet, Rfl: 1    sitaGLIPtin (Januvia) 100 mg tablet, Take 1 tablet (100 mg total) by mouth in the morning , Disp: 90 tablet, Rfl: 3    traMADol HCl ER, Biphasic, 300 MG 24 hr tablet, Take 300 mg by mouth daily, Disp: , Rfl:     triamcinolone (KENALOG) 0 1 % cream, Apply topically 2 (two) times a day, Disp: 80 g, Rfl: 1    VITAMIN D PO, , Disp: , Rfl:     Ziconotide Acetate (PRIALT IT), by Intrathecal route continuous, Disp: , Rfl:     zolpidem (AMBIEN) 5 mg tablet, Take 1 tablet (5 mg total) by mouth daily at bedtime as needed for sleep, Disp: 30 tablet, Rfl: 2    ALLERGIES:  Allergies   Allergen Reactions    Nsaids Other (See Comments)     Cannot take NSAIDS secondary to having gastric bypass    Percocet [Oxycodone-Acetaminophen] GI Intolerance       REVIEW OF SYSTEMS:  Pertinent items are noted in HPI      LABS:  HgA1c:   Lab Results   Component Value Date    HGBA1C 5 7 03/01/2022     BMP:   Lab Results   Component Value Date    CALCIUM 9 1 12/11/2021     11/04/2017    K 4 5 12/11/2021    CO2 28 12/11/2021     12/11/2021    BUN 28 (H) 12/11/2021    CREATININE 1 13 12/11/2021         _____________________________________________________  PHYSICAL EXAMINATION:  Vital signs: /80   Ht 5' 4" (1 626 m)   Wt 79 7 kg (175 lb 9 6 oz)   LMP  (LMP Unknown)   BMI 30 14 kg/m²   General: well developed and well nourished, alert, oriented times 3 and appears comfortable  Psychiatric: Normal  HEENT: Trachea Midline, No torticollis  Cardiovascular: No discernable arrhythmia  Pulmonary: No wheezing or stridor  Abdomen: No rebound or guarding  Extremities: No peripheral edema  Skin: No masses, erythema, lacerations, fluctation, ulcerations  Neurovascular: Pulses Intact    MUSCULOSKELETAL EXAMINATION:  LEFT SIDE:  CMC: Negative Shuck, Negative Shoulder Sign, No Instability and Positive tendnerness CMC, Carpal tunnel:  No atrophy thenar muscles, Weakness APB and Postive Tinel's and Dupuytren's nodules in line with the proximal phalanx of the index finger and long finger distal palmar crease with no flexion contractures noted  RIGHT SIDE:  CMC: Negative Grind, No Instability and Positive tendnerness CMC, Carpal tunnel:  No atrophy thenar muscles, Weakness APB and Postive Tinel's and Dupuytren's nodule in line with the long finger distal palmar crease with no flexion contraction    _____________________________________________________  STUDIES REVIEWED:  No Studies to review      PROCEDURES PERFORMED:  Procedures  No Procedures performed today   Scribe Attestation    I,:  Dede Hines am acting as a scribe while in the presence of the attending physician :       I,:  Shalini Westfall MD personally performed the services described in this documentation    as scribed in my presence :

## 2022-07-20 DIAGNOSIS — R11.0 NAUSEA: ICD-10-CM

## 2022-07-20 RX ORDER — ONDANSETRON 4 MG/1
4 TABLET, FILM COATED ORAL EVERY 8 HOURS PRN
Qty: 30 TABLET | Refills: 1 | Status: SHIPPED | OUTPATIENT
Start: 2022-07-20

## 2022-07-21 ENCOUNTER — TELEPHONE (OUTPATIENT)
Dept: FAMILY MEDICINE CLINIC | Facility: CLINIC | Age: 62
End: 2022-07-21

## 2022-07-21 NOTE — TELEPHONE ENCOUNTER
from [place] named Paramjit called in because she's been trying to reach her but hasn't  Last time she spoke to her was 6/29 and her dietitian hasn't heard anything as well  Paramjit wants to know if pcp wants to take further actions  as in cordination of services that he wants to add in her plan of care   [phone]

## 2022-07-21 NOTE — TELEPHONE ENCOUNTER
I called patient to follow up with her after [address] called our office earlier today that she had not been able to reach patient and dietician had not either  I spoke to patient and she reports that she spoke to the [employer] about 15 or 20 minutes prior to my call (after she called our office she must have reached patient)

## 2022-07-24 DIAGNOSIS — L23.7 POISON IVY DERMATITIS: ICD-10-CM

## 2022-07-24 RX ORDER — TRIAMCINOLONE ACETONIDE 1 MG/G
CREAM TOPICAL
Qty: 80 G | Refills: 1 | Status: SHIPPED | OUTPATIENT
Start: 2022-07-24

## 2022-08-18 DIAGNOSIS — J45.30 MILD PERSISTENT ASTHMA, UNSPECIFIED WHETHER COMPLICATED: ICD-10-CM

## 2022-08-19 RX ORDER — ALBUTEROL SULFATE 90 UG/1
AEROSOL, METERED RESPIRATORY (INHALATION)
Qty: 25.5 G | Refills: 1 | Status: SHIPPED | OUTPATIENT
Start: 2022-08-19

## 2022-08-22 DIAGNOSIS — K21.00 GASTROESOPHAGEAL REFLUX DISEASE WITH ESOPHAGITIS: ICD-10-CM

## 2022-08-22 DIAGNOSIS — E78.5 HYPERLIPIDEMIA ASSOCIATED WITH TYPE 2 DIABETES MELLITUS (HCC): ICD-10-CM

## 2022-08-22 DIAGNOSIS — E11.69 HYPERLIPIDEMIA ASSOCIATED WITH TYPE 2 DIABETES MELLITUS (HCC): ICD-10-CM

## 2022-08-23 RX ORDER — ATORVASTATIN CALCIUM 40 MG/1
TABLET, FILM COATED ORAL
Qty: 90 TABLET | Refills: 0 | Status: SHIPPED | OUTPATIENT
Start: 2022-08-23

## 2022-08-23 RX ORDER — OMEPRAZOLE 40 MG/1
CAPSULE, DELAYED RELEASE ORAL
Qty: 90 CAPSULE | Refills: 3 | Status: SHIPPED | OUTPATIENT
Start: 2022-08-23

## 2022-08-25 ENCOUNTER — RA CDI HCC (OUTPATIENT)
Dept: OTHER | Facility: HOSPITAL | Age: 62
End: 2022-08-25

## 2022-08-25 NOTE — PROGRESS NOTES
[facility] [place] [facility]  coding opportunities       Chart reviewed, no opportunity found: CHART REVIEWED, NO OPPORTUNITY FOUND        Patients Insurance        Commercial Insurance: [employer]

## 2022-08-31 ENCOUNTER — HOSPITAL ENCOUNTER (OUTPATIENT)
Dept: ULTRASOUND IMAGING | Facility: HOSPITAL | Age: 62
Discharge: HOME | End: 2022-08-31
Attending: ORTHOPAEDIC SURGERY
Payer: COMMERCIAL

## 2022-08-31 DIAGNOSIS — R20.0 BILATERAL HAND NUMBNESS: ICD-10-CM

## 2022-08-31 PROCEDURE — 76882 US LMTD JT/FCL EVL NVASC XTR: CPT

## 2022-09-01 ENCOUNTER — OFFICE VISIT (OUTPATIENT)
Dept: FAMILY MEDICINE CLINIC | Facility: CLINIC | Age: 62
End: 2022-09-01
Payer: COMMERCIAL

## 2022-09-01 VITALS
WEIGHT: 168 LBS | HEART RATE: 72 BPM | DIASTOLIC BLOOD PRESSURE: 78 MMHG | SYSTOLIC BLOOD PRESSURE: 138 MMHG | OXYGEN SATURATION: 98 % | BODY MASS INDEX: 28.84 KG/M2

## 2022-09-01 DIAGNOSIS — E11.9 TYPE 2 DIABETES MELLITUS WITHOUT COMPLICATION, WITHOUT LONG-TERM CURRENT USE OF INSULIN (HCC): ICD-10-CM

## 2022-09-01 DIAGNOSIS — Z79.4 TYPE 2 DIABETES MELLITUS WITH DIABETIC NEUROPATHY, WITH LONG-TERM CURRENT USE OF INSULIN (HCC): ICD-10-CM

## 2022-09-01 DIAGNOSIS — G62.9 PERIPHERAL POLYNEUROPATHY: ICD-10-CM

## 2022-09-01 DIAGNOSIS — F11.20 NARCOTIC DEPENDENCY, CONTINUOUS (HCC): ICD-10-CM

## 2022-09-01 DIAGNOSIS — M51.36 DEGENERATION OF LUMBAR INTERVERTEBRAL DISC: ICD-10-CM

## 2022-09-01 DIAGNOSIS — M54.17 THORACIC AND LUMBOSACRAL NEURITIS: ICD-10-CM

## 2022-09-01 DIAGNOSIS — E11.69 HYPERLIPIDEMIA ASSOCIATED WITH TYPE 2 DIABETES MELLITUS (HCC): ICD-10-CM

## 2022-09-01 DIAGNOSIS — M54.16 LUMBAR RADICULOPATHY: ICD-10-CM

## 2022-09-01 DIAGNOSIS — E78.5 HYPERLIPIDEMIA ASSOCIATED WITH TYPE 2 DIABETES MELLITUS (HCC): ICD-10-CM

## 2022-09-01 DIAGNOSIS — M47.814 THORACIC SPONDYLOSIS WITHOUT MYELOPATHY: ICD-10-CM

## 2022-09-01 DIAGNOSIS — M54.14 THORACIC AND LUMBOSACRAL NEURITIS: ICD-10-CM

## 2022-09-01 DIAGNOSIS — M25.559 PAIN IN JOINT INVOLVING PELVIC REGION AND THIGH, UNSPECIFIED LATERALITY: ICD-10-CM

## 2022-09-01 DIAGNOSIS — M47.816 LUMBAR SPONDYLOSIS: Primary | ICD-10-CM

## 2022-09-01 DIAGNOSIS — I10 BENIGN ESSENTIAL HYPERTENSION: ICD-10-CM

## 2022-09-01 DIAGNOSIS — E11.40 TYPE 2 DIABETES MELLITUS WITH DIABETIC NEUROPATHY, WITH LONG-TERM CURRENT USE OF INSULIN (HCC): ICD-10-CM

## 2022-09-01 PROCEDURE — 99214 OFFICE O/P EST MOD 30 MIN: CPT | Performed by: FAMILY MEDICINE

## 2022-09-01 RX ORDER — BLOOD SUGAR DIAGNOSTIC
STRIP MISCELLANEOUS
Qty: 100 STRIP | Refills: 3 | Status: SHIPPED | OUTPATIENT
Start: 2022-09-01

## 2022-09-01 NOTE — PROGRESS NOTES
[address]        NAME: Radha Lim is a 58 y o  female  : 1960    MRN: [id number]  DATE: 2022  TIME: 11:19 AM    Assessment and Plan   Lumbar spondylosis [M47 816]  1  Lumbar spondylosis     2  Lumbar radiculopathy     3  Peripheral polyneuropathy     4  Thoracic and lumbosacral neuritis     5  Thoracic spondylosis without myelopathy     6  Degeneration of lumbar intervertebral disc     7  Pain in joint involving pelvic region and thigh, unspecified laterality     8  Narcotic dependency, continuous ([address] )     9  Type 2 diabetes mellitus with diabetic neuropathy, with long-term current use of insulin (HCC)  Hemoglobin A1C With EAG    Comprehensive metabolic panel    Lipid Panel with Direct LDL reflex    Hemoglobin A1C With EAG    Comprehensive metabolic panel    Lipid Panel with Direct LDL reflex   10  Hyperlipidemia associated with type 2 diabetes mellitus (HCC)  Comprehensive metabolic panel    Lipid Panel with Direct LDL reflex    Comprehensive metabolic panel    Lipid Panel with Direct LDL reflex   11  Benign essential hypertension  CBC and differential    Comprehensive metabolic panel    CBC and differential    Comprehensive metabolic panel       No problem-specific Assessment & Plan notes found for this encounter  Patient Instructions     Patient Instructions   Forms are completed  Patient will continue to follow with pain management for her treatment regimen  Continue care for diabetes here  Continue current medications pending labs ordered today  Chief Complaint     Chief Complaint   Patient presents with    Follow-up     Needs Disability forms completed         History of Present Illness       Patient comes back in today for form completion for long-term disability application  Continues to work with pain management  Continues to work with providers at [facility] in [address] [place] [address]  Medications are reviewed  They state they do not fill out disability forms  Continues on oral medications along with pain pump dispensing morphine  Review of Systems   Review of Systems   Constitutional: Positive for activity change and fatigue  Genitourinary: Negative for difficulty urinating, frequency and hematuria  Musculoskeletal: Positive for arthralgias, back pain, gait problem, myalgias and neck pain  Neurological: Positive for dizziness, weakness, light-headedness and headaches  Negative for speech difficulty  Psychiatric/Behavioral: Positive for sleep disturbance  Negative for dysphoric mood  The patient is not nervous/anxious            Current Medications       Current Outpatient Medications:     Morphine Sulfate (MORPHINE 0 05 MG/ML SYRINGE, NICU/PICU,, CMPD ORDER,), , Disp: , Rfl:     Accu-Chek FastClix Lancets MISC, TEST TWO TIMES A DAY, Disp: 204 each, Rfl: 3    Accu-Chek Guide test strip, TEST TWO TIMES A DAY, Disp: 100 strip, Rfl: 3    albuterol (2 5 mg/3 mL) 0 083 % nebulizer solution, Take 3 mL (2 5 mg total) by nebulization every 6 (six) hours as needed for wheezing or shortness of breath, Disp: 300 mL, Rfl: 1    albuterol (PROVENTIL HFA,VENTOLIN HFA) 90 mcg/act inhaler, USE 2 INHALATIONS ORALLY   EVERY 6 HOURS AS NEEDED FORWHEEZING, Disp: 25 5 g, Rfl: 1    atorvastatin (LIPITOR) 40 mg tablet, TAKE 1 TABLET DAILY, Disp: 90 tablet, Rfl: 0    Ferrous Fumarate 63 (20 Fe) MG TABS, Take 65 mg by mouth daily, Disp: , Rfl:     HYDROcodone-acetaminophen (NORCO)  mg per tablet, , Disp: , Rfl:     insulin glargine (Basaglar KwikPen) 100 units/mL injection pen, Inject 33 Units under the skin daily at bedtime INJECT 33  UNITS UNDER THE SKIN AT BEDTIME, Disp: 15 mL, Rfl: 2    Insulin Pen Needle (B-D UF III MINI PEN NEEDLES) 31G X 5 MM MISC, TEST ONCE DAILY, Disp: 90 each, Rfl: 1    lidocaine (XYLOCAINE) 5 % ointment, apply topically to affected area three times a day, Disp: , Rfl:     lisinopril (ZESTRIL) 20 mg tablet, Take 1 tablet (20 mg total) by mouth daily, Disp: 90 tablet, Rfl: 3    LYRICA 200 MG capsule, Take 200 mg by mouth 3 (three) times a day , Disp: , Rfl: 0    metFORMIN (GLUCOPHAGE) 1000 MG tablet, TAKE 1 TABLET TWICE A DAY, Disp: 180 tablet, Rfl: 0    mometasone (NASONEX) 50 mcg/act nasal spray, 2 sprays into each nostril daily, Disp: 17 g, Rfl: 1    montelukast (SINGULAIR) 10 mg tablet, Take 1 tablet (10 mg total) by mouth daily at bedtime, Disp: 90 tablet, Rfl: 1    omeprazole (PriLOSEC) 40 MG capsule, TAKE 1 CAPSULE EVERY       MORNING, Disp: 90 capsule, Rfl: 3    ondansetron (ZOFRAN) 4 mg tablet, Take 1 tablet (4 mg total) by mouth every 8 (eight) hours as needed for nausea or vomiting, Disp: 30 tablet, Rfl: 1    sitaGLIPtin (Januvia) 100 mg tablet, Take 1 tablet (100 mg total) by mouth in the morning , Disp: 90 tablet, Rfl: 3    traMADol HCl ER, Biphasic, 300 MG 24 hr tablet, Take 300 mg by mouth daily, Disp: , Rfl:     triamcinolone (KENALOG) 0 1 % cream, APPLY TO AFFECTED AREA TWICE A DAY, Disp: 80 g, Rfl: 1    VITAMIN D PO, , Disp: , Rfl:     Ziconotide Acetate (PRIALT IT), by Intrathecal route continuous, Disp: , Rfl:     zolpidem (AMBIEN) 5 mg tablet, Take 1 tablet (5 mg total) by mouth daily at bedtime as needed for sleep, Disp: 30 tablet, Rfl: 2    Current Allergies     Allergies as of 09/01/2022 - Reviewed 09/01/2022   Allergen Reaction Noted    Nsaids Other (See Comments) 09/23/2017    Percocet [oxycodone-acetaminophen] GI Intolerance 09/23/2017            The following portions of the patient's history were reviewed and updated as appropriate: allergies, current medications, past family history, past medical history, past social history, past surgical history and problem list      Past Medical History:   Diagnosis Date    [facility] ) 11/04/2021    Allergic     Anemia     Anxiety     Arthritis     Asthma     Breast lump     Resolved: 2017     Chronic pain     back    Diabetes mellitus ([address] )     Diverticulitis of colon     GERD (gastroesophageal reflux disease)     Hepatitis     History of stomach ulcers     HL (hearing loss)     Estimated    Hyperlipidemia     Hypertension     Hypotension 2021    Infectious viral hepatitis     Hepatitiss A    Memory loss     Estimated    Stomach problems        Past Surgical History:   Procedure Laterality Date    APPENDECTOMY      BACK SURGERY       SECTION      CHOLECYSTECTOMY      GASTRIC BYPASS      GASTRIC BYPASS      INFUSION PUMP IMPLANTATION      SHOULDER SURGERY      SPINAL CORD STIMULATOR IMPLANT         Family History   Problem Relation Age of Onset    Diabetes Mother         Mellitus    Hyperlipidemia Mother     Hypertension Mother     Colon cancer Mother     Pancreatic cancer Mother     Melanoma Mother     Cancer Father         Bladder     Alcohol abuse Father     Lung cancer Father     Prostate cancer Father     Diabetes Brother         Mellitus    Hyperlipidemia Brother     Hypertension Brother     Stomach cancer Paternal Grandmother     Lung cancer Paternal Grandfather     Diabetes Brother     Breast cancer Maternal Aunt 28    Arthritis Family     Mental illness Neg Hx          Medications have been verified  Objective   /78   Pulse 72   Wt 76 2 kg (168 lb)   LMP  (LMP Unknown)   SpO2 98%   BMI 28 84 kg/m²        Physical Exam     Physical Exam  Constitutional:       General: She is in acute distress  Appearance: Normal appearance  She is not ill-appearing  HENT:      Head: Normocephalic and atraumatic  Nose: Nose normal    Eyes:      Extraocular Movements: Extraocular movements intact  Pupils: Pupils are equal, round, and reactive to light  Neck:      Vascular: No carotid bruit  Cardiovascular:      Rate and Rhythm: Normal rate and regular rhythm        Pulses: no weak pulses          Dorsalis pedis pulses are 2+ on the right side and 2+ on the left side  Posterior tibial pulses are 2+ on the right side and 2+ on the left side  Heart sounds: Normal heart sounds  Pulmonary:      Effort: Pulmonary effort is normal       Breath sounds: Normal breath sounds  Musculoskeletal:      Cervical back: Tenderness present  Pain with movement present  Decreased range of motion  Thoracic back: Tenderness present  Decreased range of motion  Lumbar back: Spasms, tenderness and bony tenderness present  No deformity  Decreased range of motion  Right lower leg: No edema  Left lower leg: No edema  Feet:      Right foot:      Skin integrity: No ulcer, skin breakdown, erythema, warmth, callus or dry skin  Left foot:      Skin integrity: No ulcer, skin breakdown, erythema, warmth, callus or dry skin  Lymphadenopathy:      Cervical: No cervical adenopathy  Skin:     Findings: No erythema or rash  Neurological:      Mental Status: She is alert  Patient's shoes and socks removed  Right Foot/Ankle   Right Foot Inspection  Skin Exam: skin normal and skin intact  No dry skin, no warmth, no callus, no erythema, no maceration, no abnormal color, no pre-ulcer, no ulcer and no callus  Toe Exam: ROM and strength within normal limits  Sensory   Vibration: diminished  Proprioception: diminished  Monofilament testing: diminished    Vascular  Capillary refills: < 3 seconds  The right DP pulse is 2+  The right PT pulse is 2+  Left Foot/Ankle  Left Foot Inspection  Skin Exam: skin normal and skin intact  No dry skin, no warmth, no erythema, no maceration, normal color, no pre-ulcer, no ulcer and no callus  Toe Exam: ROM and strength within normal limits  Sensory   Vibration: intact  Proprioception: intact  Monofilament testing: intact    Vascular  Capillary refills: < 3 seconds  The left DP pulse is 2+  The left PT pulse is 2+  Assign Risk Category  No deformity present  No loss of protective sensation  No weak pulses  Risk: 0

## 2022-09-01 NOTE — PATIENT INSTRUCTIONS
Forms are completed  Patient will continue to follow with pain management for her treatment regimen  Continue care for diabetes here  Continue current medications pending labs ordered today

## 2022-09-07 ENCOUNTER — OFFICE VISIT (OUTPATIENT)
Dept: NEUROLOGY | Facility: CLINIC | Age: 62
End: 2022-09-07
Payer: COMMERCIAL

## 2022-09-07 VITALS
HEIGHT: 64 IN | DIASTOLIC BLOOD PRESSURE: 94 MMHG | SYSTOLIC BLOOD PRESSURE: 154 MMHG | WEIGHT: 168 LBS | BODY MASS INDEX: 28.68 KG/M2 | HEART RATE: 77 BPM | TEMPERATURE: 96.6 F

## 2022-09-07 DIAGNOSIS — I10 BENIGN ESSENTIAL HYPERTENSION: ICD-10-CM

## 2022-09-07 DIAGNOSIS — G62.9 PERIPHERAL POLYNEUROPATHY: ICD-10-CM

## 2022-09-07 DIAGNOSIS — Z79.4 TYPE 2 DIABETES MELLITUS WITH DIABETIC NEUROPATHY, WITH LONG-TERM CURRENT USE OF INSULIN (HCC): ICD-10-CM

## 2022-09-07 DIAGNOSIS — R33.9 BLADDER RETENTION: Primary | ICD-10-CM

## 2022-09-07 DIAGNOSIS — R41.3 MEMORY DYSFUNCTION: ICD-10-CM

## 2022-09-07 DIAGNOSIS — E11.40 TYPE 2 DIABETES MELLITUS WITH DIABETIC NEUROPATHY, WITH LONG-TERM CURRENT USE OF INSULIN (HCC): ICD-10-CM

## 2022-09-07 DIAGNOSIS — M54.16 CHRONIC LUMBAR RADICULOPATHY: ICD-10-CM

## 2022-09-07 PROCEDURE — 99214 OFFICE O/P EST MOD 30 MIN: CPT | Performed by: PSYCHIATRY & NEUROLOGY

## 2022-09-07 NOTE — ASSESSMENT & PLAN NOTE
No significant changes in  Mood or adls or concentration  Will update vit b12 and mma levels  Previously deficient in 2018 and 2019  Was previously on im injections  None recently due to higher levels  Will repeat to see baseline status  Pt not on oral supplement currently  Also pt with known gastric surgery- question absorption or rapid transit

## 2022-09-07 NOTE — ASSESSMENT & PLAN NOTE
Pt notes she is currently working with her pcp for occ fluctuations in her bp  Pt will follow up with pcp today as well due to elevated bp  Pt notes she has had issues with too low of bp as well and working with pcp for balance of her bp meds

## 2022-09-07 NOTE — PROGRESS NOTES
Patient ID: Marc Lopez is a 58 y o  female  Assessment/Plan:    Type 2 diabetes mellitus with diabetic neuropathy, with long-term current use of insulin (HCC)  Pt here today for neuro follow up  Overall doing well  No new sxs  No new nocturnal sxs  No falls or trips  No recent hospitalizations  Exam stable  Already on lyrica from pain mx  Lab Results   Component Value Date    HGBA1C 5 7 03/01/2022       Chronic lumbar radiculopathy  Follows with pain mx  No acute radicular or myelopathic sxs today    Memory dysfunction  No significant changes in  Mood or adls or concentration  Will update vit b12 and mma levels  Previously deficient in 2018 and 2019  Was previously on im injections  None recently due to higher levels  Will repeat to see baseline status  Pt not on oral supplement currently  Also pt with known gastric surgery- question absorption or rapid transit    Benign essential hypertension  Pt notes she is currently working with her pcp for occ fluctuations in her bp  Pt will follow up with pcp today as well due to elevated bp  Pt notes she has had issues with too low of bp as well and working with pcp for balance of her bp meds       Diagnoses and all orders for this visit:    Bladder retention  -     Ambulatory Referral to Urology; Future    Peripheral polyneuropathy  -     Vitamin B12; Future  -     Methylmalonic acid, serum; Future    Type 2 diabetes mellitus with diabetic neuropathy, with long-term current use of insulin (HCC)    Chronic lumbar radiculopathy    Memory dysfunction    Benign essential hypertension           Subjective:    HPI    Pt is a 57 yo f with pmh of gastric bypass, dm type 2, asthma, vit b12 deficiency, neuropathy, hypercholesterolemia, htn, chronic pain, lumbar spondylosis, prior lumbar surgery 26 yrs ago, SCS about one yr ago who presents today for neuro follow up     pt last seen on 3/4/22   Per my last note "Pt notes sxs for the past several yrs, more apparent past 2 yrs   Pt notes no tia or cva like sxs   Pt notes history of headaches    Pt notes occ feeling of food getting stuck   No diff with articulation of speech   No incontinence   Pt notes long standing diff with directions, ever since learning to drive   Not new   Pt denies any significant financial issues    Pt notes occ positional dizziness    Pt notes diff with recall of specific details  Pt has noted specifically on job, issues with keeping up on same level of detail   Worked for same company for 24 yrs     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 to her    Pt notes family also noted issues with stm and repeating self  Concetta Ramirez family reminds her of events from their youth   Pt with supportive family and lives close to them    Pt works in IT banking field    Pt with known history of gastric bypass   Pt also with known history of vit b12 deficiency   Pt more recently with im replacement   Rev labs dating back to 2018 April 2018 vit b12 at 292    Oct 2019 level 144, and now on 6/15/21 level 738 "  Kept above paragraph due to complexities of pt case  pt here today for her neuro follow up  Pt here today for follow up of her neuropathy  Pt notes no recent falls or trips  Pt notes she is more careful especially when on the lawn doing yard work  Reminded pt to be mindful of uneven surfaces as well as slippery surfaces like drive way and bathroom  Pt currently denies issue iwht balance  No new nocturnal sxs  No sxs into upper ext  Pt denies any new radicular sxs, pt follows closely with pain mx  Pt with known pain pump  Pt goes for regular adjustments  Pt is also having some fluctuations to date in her bp readings  Pt notes bp was too low for period of time and now occ higher readings  Rev with pt need to follow up with pcp this week for elevated bp in office    Pt notes since some loss of wieght recently she had been doing better from medical standpoint  Pt notes blood sugars also much improved  Pt denies any new issues with memory or concentration  No issues currently with judgement or adls  Pt was previously on vit b12 replacement due to deficient levels in 2018 and 2019  Pt then had elevated level in oct 2021 over 1800  Pt currently not taking any oral or im supplement  srongly recommend repeat levels due to her history of deficiency and gastric bypass  Question issue with rapid transit  Pt also following with dr Celso Lock for cts  Currently no neck pain or cervical radicular sxs  Pt has noted occ urinary hesitancy  Pt strongly recommended to address with pcp and I also made referral to urology  No change in bowels  Also kept for future reference; Neurocogntive eval from dr Zachery Ivan  from oct 2021  Per Dr Gurmeet Pratt from 10/26/21 "Discussed cognitive profile with intact processing speed, executive functioning, verbal memory, verbal reasoning, and visual reasoning, and mild deficiencies in attention and visual memory  Discussed attentional issues in the context of her experienced cognitive difficulties  Explained that the etiology is not readily apparent, though reassured her that her cognitive profile is not suggestive of a neurodegenerative condition at this time"              The following portions of the patient's history were reviewed and updated as appropriate: allergies, current medications, past family history, past medical history, past social history, past surgical history and problem list and med rec and ros  Objective:    Blood pressure 154/94, pulse 77, temperature (!) 96 6 °F (35 9 °C), height 5' 4" (1 626 m), weight 76 2 kg (168 lb)  Physical Exam  Constitutional:       General: She is not in acute distress  Appearance: She is not ill-appearing  Eyes:      General: Lids are normal       Extraocular Movements: Extraocular movements intact        Pupils: Pupils are equal, round, and reactive to light  Musculoskeletal:      Right lower leg: No edema  Left lower leg: No edema  Neurological:      Deep Tendon Reflexes: Strength normal       Reflex Scores:       Tricep reflexes are 2+ on the right side and 2+ on the left side  Bicep reflexes are 2+ on the right side and 2+ on the left side  Brachioradialis reflexes are 2+ on the right side and 2+ on the left side  Patellar reflexes are 2+ on the right side and 2+ on the left side  Achilles reflexes are 1+ on the right side and 1+ on the left side  Psychiatric:         Speech: Speech normal          Neurological Exam  Mental Status  Awake, alert and oriented to person, place and time  Speech is normal  Language is fluent with no aphasia  Able to perform serial calculations  Able to spell words backwards  Fund of knowledge is appropriate for level of education  Cranial Nerves  CN II: Visual acuity is normal  Visual fields full to confrontation  CN III, IV, VI: Extraocular movements intact bilaterally  Normal lids and orbits bilaterally  Pupils equal round and reactive to light bilaterally  CN V: Facial sensation is normal   CN VII: Full and symmetric facial movement  CN VIII: Hearing is normal   CN IX, X: Palate elevates symmetrically  Normal gag reflex  CN XI: Shoulder shrug strength is normal   CN XII: Tongue midline without atrophy or fasciculations  Motor  Normal muscle bulk throughout  Normal muscle tone  No abnormal involuntary movements  Strength is 5/5 throughout all four extremities  Sensory  Dec vib patti lowers      Reflexes                                            Right                      Left  Brachioradialis                    2+                         2+  Biceps                                 2+                         2+  Triceps                                2+                         2+  Finger flex                           2+ 2+  Hamstring                            2+                         2+  Patellar                                2+                         2+  Achilles                                1+                         1+  Plantar                           Downgoing                Downgoing    Coordination  Right: Finger-to-nose normal  Rapid alternating movement normal Left: Finger-to-nose normal  Heel-to-shin normal     Gait  Casual gait is normal including stance, stride, and arm swing  ROS:    Review of Systems   Constitutional: Negative  Negative for appetite change and fever  HENT: Negative  Negative for hearing loss, tinnitus, trouble swallowing and voice change  Eyes: Negative  Negative for photophobia and pain  Respiratory: Negative  Negative for shortness of breath  Cardiovascular: Negative  Negative for palpitations  Gastrointestinal: Negative  Negative for nausea and vomiting  Endocrine: Negative  Negative for cold intolerance  Genitourinary: Negative  Negative for dysuria, frequency and urgency  Musculoskeletal: Negative  Negative for myalgias and neck pain  Skin: Negative  Negative for rash  Allergic/Immunologic: Negative  Neurological: Negative  Negative for dizziness, tremors, seizures, syncope, facial asymmetry, speech difficulty, weakness, light-headedness, numbness and headaches  Hematological: Negative  Does not bruise/bleed easily  Psychiatric/Behavioral: Positive for confusion  Negative for hallucinations and sleep disturbance  All other systems reviewed and are negative      Patient states there are no new issues to address

## 2022-09-07 NOTE — ASSESSMENT & PLAN NOTE
Pt here today for neuro follow up  Overall doing well  No new sxs  No new nocturnal sxs  No falls or trips  No recent hospitalizations  Exam stable  Already on lyrica from pain mx  Lab Results   Component Value Date    HGBA1C 5 7 03/01/2022

## 2022-09-08 DIAGNOSIS — J01.00 ACUTE NON-RECURRENT MAXILLARY SINUSITIS: ICD-10-CM

## 2022-09-08 RX ORDER — MOMETASONE FUROATE 50 UG/1
SPRAY, METERED NASAL
Qty: 17 G | Refills: 1 | Status: SHIPPED | OUTPATIENT
Start: 2022-09-08

## 2022-09-11 DIAGNOSIS — E11.9 TYPE 2 DIABETES MELLITUS WITHOUT COMPLICATION, WITHOUT LONG-TERM CURRENT USE OF INSULIN (HCC): ICD-10-CM

## 2022-09-15 ENCOUNTER — TELEPHONE (OUTPATIENT)
Dept: NEUROLOGY | Facility: CLINIC | Age: 62
End: 2022-09-15

## 2022-09-15 NOTE — TELEPHONE ENCOUNTER
Fax received from [facility] requesting medical records  Scanned into media and sent to [facility]

## 2022-09-16 ENCOUNTER — TELEPHONE (OUTPATIENT)
Dept: FAMILY MEDICINE CLINIC | Facility: CLINIC | Age: 62
End: 2022-09-16

## 2022-09-16 LAB
ALBUMIN SERPL-MCNC: 3.7 G/DL (ref 3.6–5.1)
ALBUMIN/GLOB SERPL: 1.6 (CALC) (ref 1–2.5)
ALP SERPL-CCNC: 76 U/L (ref 37–153)
ALT SERPL-CCNC: 19 U/L (ref 6–29)
AST SERPL-CCNC: 24 U/L (ref 10–35)
BASOPHILS # BLD AUTO: 30 CELLS/UL (ref 0–200)
BASOPHILS NFR BLD AUTO: 0.3 %
BILIRUB SERPL-MCNC: 0.6 MG/DL (ref 0.2–1.2)
BUN SERPL-MCNC: 26 MG/DL (ref 7–25)
BUN/CREAT SERPL: 34 (CALC) (ref 6–22)
CALCIUM SERPL-MCNC: 9.3 MG/DL (ref 8.6–10.4)
CHLORIDE SERPL-SCNC: 106 MMOL/L (ref 98–110)
CHOLEST SERPL-MCNC: 119 MG/DL
CHOLEST/HDLC SERPL: 3.1 (CALC)
CO2 SERPL-SCNC: 30 MMOL/L (ref 20–32)
CREAT SERPL-MCNC: 0.77 MG/DL (ref 0.5–1.05)
EOSINOPHIL # BLD AUTO: 178 CELLS/UL (ref 15–500)
EOSINOPHIL NFR BLD AUTO: 1.8 %
ERYTHROCYTE [DISTWIDTH] IN BLOOD BY AUTOMATED COUNT: 15.8 % (ref 11–15)
EST. AVERAGE GLUCOSE BLD GHB EST-MCNC: 114 MG/DL
EST. AVERAGE GLUCOSE BLD GHB EST-SCNC: 6.3 MMOL/L
GFR/BSA.PRED SERPLBLD CYS-BASED-ARV: 87 ML/MIN/1.73M2
GLOBULIN SER CALC-MCNC: 2.3 G/DL (CALC) (ref 1.9–3.7)
GLUCOSE SERPL-MCNC: 77 MG/DL (ref 65–99)
HBA1C MFR BLD: 5.6 % OF TOTAL HGB
HCT VFR BLD AUTO: 34 % (ref 35–45)
HDLC SERPL-MCNC: 39 MG/DL
HGB BLD-MCNC: 10.7 G/DL (ref 11.7–15.5)
LDLC SERPL CALC-MCNC: 64 MG/DL (CALC)
LYMPHOCYTES # BLD AUTO: 2594 CELLS/UL (ref 850–3900)
LYMPHOCYTES NFR BLD AUTO: 26.2 %
MCH RBC QN AUTO: 25 PG (ref 27–33)
MCHC RBC AUTO-ENTMCNC: 31.5 G/DL (ref 32–36)
MCV RBC AUTO: 79.4 FL (ref 80–100)
METHYLMALONATE SERPL-SCNC: 497 NMOL/L (ref 87–318)
MONOCYTES # BLD AUTO: 525 CELLS/UL (ref 200–950)
MONOCYTES NFR BLD AUTO: 5.3 %
NEUTROPHILS # BLD AUTO: 6574 CELLS/UL (ref 1500–7800)
NEUTROPHILS NFR BLD AUTO: 66.4 %
NONHDLC SERPL-MCNC: 80 MG/DL (CALC)
PLATELET # BLD AUTO: 218 THOUSAND/UL (ref 140–400)
PMV BLD REES-ECKER: 12.8 FL (ref 7.5–12.5)
POTASSIUM SERPL-SCNC: 4.3 MMOL/L (ref 3.5–5.3)
PROT SERPL-MCNC: 6 G/DL (ref 6.1–8.1)
RBC # BLD AUTO: 4.28 MILLION/UL (ref 3.8–5.1)
SODIUM SERPL-SCNC: 145 MMOL/L (ref 135–146)
TRIGL SERPL-MCNC: 84 MG/DL
VIT B12 SERPL-MCNC: 206 PG/ML (ref 200–1100)
WBC # BLD AUTO: 9.9 THOUSAND/UL (ref 3.8–10.8)

## 2022-09-16 PROCEDURE — 3044F HG A1C LEVEL LT 7.0%: CPT | Performed by: FAMILY MEDICINE

## 2022-09-16 NOTE — RESULT ENCOUNTER NOTE
Call patient with lab result-labs look OK, slight drecrease Hbg  A1C is good   Will review at [address] 9/26/22

## 2022-09-16 NOTE — TELEPHONE ENCOUNTER
----- Message from Hailee Munson MD sent at 9/16/2022  2:53 PM EDT -----  Call patient with lab result-labs look OK, slight drecrease Hbg  A1C is good   Will review at [address] 9/26/22

## 2022-09-19 ENCOUNTER — OFFICE VISIT (OUTPATIENT)
Dept: OBGYN CLINIC | Facility: CLINIC | Age: 62
End: 2022-09-19
Payer: COMMERCIAL

## 2022-09-19 VITALS
SYSTOLIC BLOOD PRESSURE: 132 MMHG | BODY MASS INDEX: 28.31 KG/M2 | DIASTOLIC BLOOD PRESSURE: 66 MMHG | HEIGHT: 64 IN | WEIGHT: 165.8 LBS

## 2022-09-19 DIAGNOSIS — G56.01 CARPAL TUNNEL SYNDROME ON RIGHT: Primary | ICD-10-CM

## 2022-09-19 PROCEDURE — 99214 OFFICE O/P EST MOD 30 MIN: CPT | Performed by: ORTHOPAEDIC SURGERY

## 2022-09-19 RX ORDER — LIDOCAINE HYDROCHLORIDE AND EPINEPHRINE 10; 10 MG/ML; UG/ML
20 INJECTION, SOLUTION INFILTRATION; PERINEURAL ONCE
OUTPATIENT
Start: 2022-09-19 | End: 2022-09-19

## 2022-09-19 NOTE — PROGRESS NOTES
ASSESSMENT/PLAN:    Assessment:   Carpal Tunnel Syndrome right     Plan:   Endoscopic Carpal Tunnel Release  left    Follow Up: After Surgery    To Do Next Visit:    and Sutures out    General Discussions:  Carpal Tunnel Syndrome: The anatomy and physiology of carpal tunnel syndrome was discussed with the patient today  Increase pressure localized under the transverse carpal ligament can cause pain, numbness, tingling, or dysesthesias within the median nerve distribution as well as feelings of fatigue, clumsiness, or awkwardness  These symptoms typically occur at night and worse in the morning upon waking  Eventually, untreated carpal tunnel syndrome can result in weakness and permanent loss of muscle within the thenar compartment of the hand  Treatment options were discussed with the patient  Conservative treatment includes nocturnal resting splints to keep the nerve in a neutral position, ergonomic changes within the work or home environment, activity modification, and tendon gliding exercises  Steroid injections within the carpal canal can help a majority of patients, however this is often self-limited in a majority of patients  Surgical intervention to divide the transverse carpal ligament typically results in a long-lasting relief of the patient's complaints, with the recurrence rate of less than 1%  Operative Discussions:     Cubital Tunnel Release: The anatomy and physiology of cubital tunnel syndrome were discussed with the patient today in the office  Typically, increased elbow flexion activities decrease blood flow within the intraneural spaces, resulting in a feeling of numbness, tingling, weakness, or clumsiness within the hand and fingers  Occasionally, anatomic structures such as medial elbow osteophytes, the medial head of the triceps, were subluxing ulnar nerve may result in increased pressure or aggravation at the cubital tunnel    Typical signs and symptoms usually include numbness and tingling within the ring and small finger, weakness with , and weakness with pinch  Conservative treatment and includes nocturnal bracing to keep the elbow in a semi-extended position, activity modification, therapy, and avoiding excessive elbow flexion activities  A majority of patients typically respond to conservative treatment over a period of approximately 3-6 months  EMG/NCV testing of the ulnar nerve at the elbow is not as reliable as carpal tunnel syndrome  Surgical intervention in the form of in situ release of the ulnar nerve at the elbow or ulnar nerve transposition may be required in up to 20% of patients  The patient has elected to undergo an cubital tunnel release  The possibility of converting to an open procedure, or a subcutaneous or submuscular ulnar nerve transposition depending on the nerve stability was discussed with the patient  Typically, in the postoperative period, light activities are allowed immediately, driving is allowed when narcotic medications have stopped, and the incision may get wet after 5 days  Heavy activities will be allowed after follow up appointment in 1-2 weeks  Anti-inflammatory medications should be held for approximately 5 days postoperative  While the pain within the ring and small finger of the hands generally improves rapidly, the numbness and tingling, as well as the strength, will slowly improve over a period of weeks to months  Total recovery can take up to 18 months from the time of surgery  Numbness and tingling near the incision, or near the medial aspect of the forearm was discussed with the patient  The patient has an understanding of the above mentioned discussion  The risks and benefits of the procedure were explained to the patient, which include, but are not limited to: Bleeding, infection, recurrence, pain, scar, damage to tendons, damage to nerves, and damage to blood vessels, failure to give desired results and complications related to anesthesia   These risks, along with alternative conservative treatment options, and postoperative protocols were voiced back and understood by the patient   All questions were answered to the patient's satisfaction   The patient agrees to comply with a standard postoperative protocol, and is willing to proceed   Education was provided via written and auditory forms   There were no barriers to learning  Written handouts regarding wound care, incision and scar care, and general preoperative information was provided to the patient   Prior to surgery, the patient may be requested to stop all anti-inflammatory medications   Prophylactic aspirin, Plavix, and Coumadin may be allowed to be continued   Medications including vitamin E , ginkgo, and fish oil are requested to be stopped approximately one week prior to surgery   Hypertensive medications and beta blockers, if taken, should be continued  _____________________________________________________  CHIEF COMPLAINT:  Chief Complaint   Patient presents with    Left Wrist - Follow-up, Results     U/S 8/31/22     Right Wrist - Follow-up, Results     U/S 8/31/22         SUBJECTIVE:  Clarance Hodgkins is a 58 y o  female who presents for follow up regarding imaging to rule out Carpal Tunnel Syndrome bilaterally  Since last visit, Clarance Hodgkins has tried Tylenol and bracing with only partial relief  Patient reports that her main complaint is having pins and needles sensation within the tips of the thumb index and long finger  Patient states that when she has the sensation 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  Patient notes that she did see her podiatrist 2 weeks ago who provided her on an oral course prednisone  Patient states that since taking the prednisone she has noticed an improvement to her symptoms in combination with utilizing her bracing  Patient denies going to formal physical therapy for the bilateral hands    Patient states that she has attended physical therapy multiple times over the years for various joints  More specifically she states that she is on medication that does not allow her to drive making it difficult for her to attend therapy as an outpatient  Numbness to the right index finger, long finger and thumb      Radiation: None  Associated symptoms: Pain  Moderate  Intermittant  Sharp and Numbness  Handedness: right      PAST MEDICAL HISTORY:  Past Medical History:   Diagnosis Date    PABLO (acute kidney injury) ([place] [id number] ) 2021    Allergic     Anemia     Anxiety     Arthritis     Asthma     Breast lump     Resolved: 2017     Chronic pain     back    Diabetes mellitus ([facility] )     Diverticulitis of colon     GERD (gastroesophageal reflux disease)     Hepatitis     History of stomach ulcers     HL (hearing loss)     Estimated    Hyperlipidemia     Hypertension     Hypotension 2021    Infectious viral hepatitis     Hepatitiss A    Memory loss     Estimated    Stomach problems        PAST SURGICAL HISTORY:  Past Surgical History:   Procedure Laterality Date    APPENDECTOMY      BACK SURGERY       SECTION      CHOLECYSTECTOMY      GASTRIC BYPASS      GASTRIC BYPASS      INFUSION PUMP IMPLANTATION      SHOULDER SURGERY      SPINAL CORD STIMULATOR IMPLANT         FAMILY HISTORY:  Family History   Problem Relation Age of Onset    Diabetes Mother         Mellitus    Hyperlipidemia Mother     Hypertension Mother     Colon cancer Mother     Pancreatic cancer Mother     Melanoma Mother     Cancer Father         Bladder     Alcohol abuse Father     Lung cancer Father     Prostate cancer Father     Diabetes Brother         Mellitus    Hyperlipidemia Brother     Hypertension Brother     Stomach cancer Paternal Grandmother     Lung cancer Paternal Grandfather     Diabetes Brother     Breast cancer Maternal Aunt 28    Arthritis Family     Mental illness Neg Hx SOCIAL HISTORY:  Social History     Tobacco Use    Smoking status: Former Smoker     Packs/day: 1 00     Years: 34 00     Pack years: 34 00     Types: Cigarettes, Cigarettes     Start date: 1/1/1973     Quit date: 1/1/2007     Years since quitting: 15 7    Smokeless tobacco: Never Used    Tobacco comment: 15yrs ago   Vaping Use    Vaping Use: Never used   Substance Use Topics    Alcohol use: No    Drug use: No       MEDICATIONS:    Current Outpatient Medications:     Accu-Chek FastClix Lancets MISC, TEST TWO TIMES A DAY, Disp: 204 each, Rfl: 3    Accu-Chek Guide test strip, TEST TWO TIMES A DAY, Disp: 100 strip, Rfl: 3    albuterol (2 5 mg/3 mL) 0 083 % nebulizer solution, Take 3 mL (2 5 mg total) by nebulization every 6 (six) hours as needed for wheezing or shortness of breath, Disp: 300 mL, Rfl: 1    albuterol (PROVENTIL HFA,VENTOLIN HFA) 90 mcg/act inhaler, USE 2 INHALATIONS ORALLY   EVERY 6 HOURS AS NEEDED FORWHEEZING, Disp: 25 5 g, Rfl: 1    atorvastatin (LIPITOR) 40 mg tablet, TAKE 1 TABLET DAILY, Disp: 90 tablet, Rfl: 0    Basaglar KwikPen 100 units/mL SOPN, INJECT 33 UNITS            SUBCUTANEOUSLY AT BEDTIME (Patient taking differently: Inject 15 Units under the skin daily at bedtime), Disp: 15 mL, Rfl: 0    Ferrous Fumarate 63 (20 Fe) MG TABS, Take 65 mg by mouth daily, Disp: , Rfl:     HYDROcodone-acetaminophen (NORCO)  mg per tablet, , Disp: , Rfl:     Insulin Pen Needle (B-D UF III MINI PEN NEEDLES) 31G X 5 MM MISC, TEST ONCE DAILY, Disp: 90 each, Rfl: 1    lidocaine (XYLOCAINE) 5 % ointment, apply topically to affected area three times a day, Disp: , Rfl:     lisinopril (ZESTRIL) 20 mg tablet, Take 1 tablet (20 mg total) by mouth daily, Disp: 90 tablet, Rfl: 3    LYRICA 200 MG capsule, Take 200 mg by mouth 3 (three) times a day , Disp: , Rfl: 0    metFORMIN (GLUCOPHAGE) 1000 MG tablet, TAKE 1 TABLET TWICE A DAY, Disp: 180 tablet, Rfl: 0    mometasone (NASONEX) 50 mcg/act nasal spray, USE 2 SPRAYS IN EACH       NOSTRIL DAILY, Disp: 17 g, Rfl: 1    montelukast (SINGULAIR) 10 mg tablet, Take 1 tablet (10 mg total) by mouth daily at bedtime, Disp: 90 tablet, Rfl: 1    Morphine Sulfate (MORPHINE 0 05 MG/ML SYRINGE, NICU/PICU,, CMPD ORDER,), , Disp: , Rfl:     omeprazole (PriLOSEC) 40 MG capsule, TAKE 1 CAPSULE EVERY       MORNING, Disp: 90 capsule, Rfl: 3    ondansetron (ZOFRAN) 4 mg tablet, Take 1 tablet (4 mg total) by mouth every 8 (eight) hours as needed for nausea or vomiting, Disp: 30 tablet, Rfl: 1    sitaGLIPtin (Januvia) 100 mg tablet, Take 1 tablet (100 mg total) by mouth in the morning , Disp: 90 tablet, Rfl: 3    traMADol HCl ER, Biphasic, 300 MG 24 hr tablet, Take 300 mg by mouth daily, Disp: , Rfl:     VITAMIN D PO, , Disp: , Rfl:     Ziconotide Acetate (PRIALT IT), by Intrathecal route continuous, Disp: , Rfl:     zolpidem (AMBIEN) 5 mg tablet, Take 1 tablet (5 mg total) by mouth daily at bedtime as needed for sleep, Disp: 30 tablet, Rfl: 2    triamcinolone (KENALOG) 0 1 % cream, APPLY TO AFFECTED AREA TWICE A DAY (Patient not taking: Reported on 9/7/2022), Disp: 80 g, Rfl: 1    ALLERGIES:  Allergies   Allergen Reactions    Nsaids Other (See Comments)     Cannot take NSAIDS secondary to having gastric bypass    Percocet [Oxycodone-Acetaminophen] GI Intolerance       REVIEW OF SYSTEMS:  Pertinent items are noted in HPI  A comprehensive review of systems was negative      LABS:  HgA1c:   Lab Results   Component Value Date    HGBA1C 5 6 09/13/2022     BMP:         _____________________________________________________  PHYSICAL EXAMINATION:  Vital signs: /66   Ht 5' 4" (1 626 m)   Wt 75 2 kg (165 lb 12 8 oz)   LMP  (LMP Unknown)   BMI 28 46 kg/m²   General: well developed and well nourished, alert, oriented times 3 and appears comfortable  Psychiatric: Normal  HEENT: Trachea Midline, No torticollis  Cardiovascular: No discernable arrhythmia  Pulmonary: No wheezing or stridor  Abdomen: No rebound or guarding  Extremities: No peripheral edema  Skin: No masses, erythema, lacerations, fluctation, ulcerations  Neurovascular: Sensation Intact to the Median, Ulnar, Radial Nerve, Motor Intact to the Median, Ulnar, Radial Nerve and Pulses Intact    MUSCULOSKELETAL EXAMINATION:  RIGHT SIDE:  No erythema or ecchymosis visualized about the right hand  Able to make a full active composite fist  Biceps 5/5, triceps 5/5, wrist flexion, 5/5, wrist extension 5/5, AIN 5/5, APB 4/5,  Postive Tinel's     LEFT SIDE:  No erythema or ecchymosis visualized about the left hand    Able to make a full active composite fist  Biceps 5/5, triceps 5/5, wrist flexion, 5/5, wrist extension 5/5, AIN 5/5, APB 4+/5,  Postive Tinel's    _____________________________________________________  STUDIES REVIEWED:  Impression of US MSK limited demonstrates bilateral carpal tunnel      PROCEDURES PERFORMED:  Procedures  No Procedures performed today   Scribe Attestation    I,:  [employer] am acting as a scribe while in the presence of the attending physician :       I,:  Lily Valverde MD personally performed the services described in this documentation    as scribed in my presence :

## 2022-09-26 ENCOUNTER — OFFICE VISIT (OUTPATIENT)
Dept: FAMILY MEDICINE CLINIC | Facility: CLINIC | Age: 62
End: 2022-09-26
Payer: COMMERCIAL

## 2022-09-26 VITALS
SYSTOLIC BLOOD PRESSURE: 106 MMHG | HEART RATE: 88 BPM | WEIGHT: 165 LBS | DIASTOLIC BLOOD PRESSURE: 64 MMHG | OXYGEN SATURATION: 93 % | HEIGHT: 64 IN | BODY MASS INDEX: 28.17 KG/M2

## 2022-09-26 DIAGNOSIS — E11.40 TYPE 2 DIABETES MELLITUS WITH DIABETIC NEUROPATHY, WITH LONG-TERM CURRENT USE OF INSULIN (HCC): Primary | ICD-10-CM

## 2022-09-26 DIAGNOSIS — E11.69 HYPERLIPIDEMIA ASSOCIATED WITH TYPE 2 DIABETES MELLITUS (HCC): ICD-10-CM

## 2022-09-26 DIAGNOSIS — Z79.4 TYPE 2 DIABETES MELLITUS WITH DIABETIC NEUROPATHY, WITH LONG-TERM CURRENT USE OF INSULIN (HCC): Primary | ICD-10-CM

## 2022-09-26 DIAGNOSIS — E78.5 HYPERLIPIDEMIA ASSOCIATED WITH TYPE 2 DIABETES MELLITUS (HCC): ICD-10-CM

## 2022-09-26 DIAGNOSIS — I10 BENIGN ESSENTIAL HYPERTENSION: ICD-10-CM

## 2022-09-26 PROCEDURE — 3078F DIAST BP <80 MM HG: CPT | Performed by: FAMILY MEDICINE

## 2022-09-26 PROCEDURE — 3725F SCREEN DEPRESSION PERFORMED: CPT | Performed by: FAMILY MEDICINE

## 2022-09-26 PROCEDURE — 99214 OFFICE O/P EST MOD 30 MIN: CPT | Performed by: FAMILY MEDICINE

## 2022-09-26 PROCEDURE — 3074F SYST BP LT 130 MM HG: CPT | Performed by: FAMILY MEDICINE

## 2022-09-26 NOTE — PROGRESS NOTES
[id number]        NAME: Clarance Hodgkins is a 58 y o  female  : 1960    MRN: [id number]  DATE: 2022  TIME: 5:44 PM    Assessment and Plan   Type 2 diabetes mellitus with diabetic neuropathy, with long-term current use of insulin (HCC) [E11 40, Z79 4]  1  Type 2 diabetes mellitus with diabetic neuropathy, with long-term current use of insulin (HCC)  Microalbumin, Random Urine (W/Creatinine) ([facility])   2  Hyperlipidemia associated with type 2 diabetes mellitus ([facility] [address] )     3  Benign essential hypertension         No problem-specific Assessment & Plan notes found for this encounter  Patient Instructions       Decreased basal insulin to 12 units in the evening  Increase your fluids  Track your blood pressures as to whether lows occur in relation to other medications  If your blood pressures below 100/60 in the morning, take only half dose of lisinopril 20 mg  Chief Complaint     Chief Complaint   Patient presents with    Follow-up     Review bloodwork      Blood Pressure Check         History of Present Illness       Patient comes in to evaluate blood pressure readings  Had been slightly high in the past at Neurology office  Patient does monitor and has had very low readings less than 90/60 at times  She does not correlate this with any particular medication  Only hypertensive medication is lisinopril 20 mg we had recently cut this back  She does note some low sugar readings wished to make her feel lightheaded  Last A1c was 5 5%  Patient has risk up which was correlated with are wall blood pressure monitors and is fairly consistent  She was very labile here in the office  Review of Systems   Review of Systems   Constitutional: Negative for appetite change, chills, diaphoresis, fatigue and fever  HENT: Negative for congestion, ear pain, rhinorrhea, sinus pressure and sore throat  Eyes: Negative for discharge, redness and itching  Respiratory: Negative for cough, shortness of breath and wheezing  Cardiovascular: Negative for chest pain and palpitations  Rapid or slow heart rate   Gastrointestinal: Negative for diarrhea, nausea and vomiting  Neurological: Positive for light-headedness  Negative for dizziness and headaches           Current Medications       Current Outpatient Medications:     Accu-Chek FastClix Lancets MISC, TEST TWO TIMES A DAY, Disp: 204 each, Rfl: 3    Accu-Chek Guide test strip, TEST TWO TIMES A DAY, Disp: 100 strip, Rfl: 3    albuterol (2 5 mg/3 mL) 0 083 % nebulizer solution, Take 3 mL (2 5 mg total) by nebulization every 6 (six) hours as needed for wheezing or shortness of breath, Disp: 300 mL, Rfl: 1    albuterol (PROVENTIL HFA,VENTOLIN HFA) 90 mcg/act inhaler, USE 2 INHALATIONS ORALLY   EVERY 6 HOURS AS NEEDED FORWHEEZING, Disp: 25 5 g, Rfl: 1    atorvastatin (LIPITOR) 40 mg tablet, TAKE 1 TABLET DAILY, Disp: 90 tablet, Rfl: 0    Basaglar KwikPen 100 units/mL SOPN, INJECT 33 UNITS            SUBCUTANEOUSLY AT BEDTIME (Patient taking differently: Inject 15 Units under the skin daily at bedtime), Disp: 15 mL, Rfl: 0    Ferrous Fumarate 63 (20 Fe) MG TABS, Take 65 mg by mouth daily, Disp: , Rfl:     HYDROcodone-acetaminophen (NORCO)  mg per tablet, , Disp: , Rfl:     Insulin Pen Needle (B-D UF III MINI PEN NEEDLES) 31G X 5 MM MISC, TEST ONCE DAILY, Disp: 90 each, Rfl: 1    lidocaine (XYLOCAINE) 5 % ointment, apply topically to affected area three times a day, Disp: , Rfl:     lisinopril (ZESTRIL) 20 mg tablet, Take 1 tablet (20 mg total) by mouth daily, Disp: 90 tablet, Rfl: 3    LYRICA 200 MG capsule, Take 200 mg by mouth 3 (three) times a day , Disp: , Rfl: 0    metFORMIN (GLUCOPHAGE) 1000 MG tablet, TAKE 1 TABLET TWICE A DAY, Disp: 180 tablet, Rfl: 0    mometasone (NASONEX) 50 mcg/act nasal spray, USE 2 SPRAYS IN EACH       NOSTRIL DAILY, Disp: 17 g, Rfl: 1    montelukast (SINGULAIR) 10 mg tablet, Take 1 tablet (10 mg total) by mouth daily at bedtime, Disp: 90 tablet, Rfl: 1    Morphine Sulfate (MORPHINE 0 05 MG/ML SYRINGE, NICU/PICU,, CMPD ORDER,), , Disp: , Rfl:     omeprazole (PriLOSEC) 40 MG capsule, TAKE 1 CAPSULE EVERY       MORNING, Disp: 90 capsule, Rfl: 3    ondansetron (ZOFRAN) 4 mg tablet, Take 1 tablet (4 mg total) by mouth every 8 (eight) hours as needed for nausea or vomiting, Disp: 30 tablet, Rfl: 1    sitaGLIPtin (Januvia) 100 mg tablet, Take 1 tablet (100 mg total) by mouth in the morning , Disp: 90 tablet, Rfl: 3    traMADol HCl ER, Biphasic, 300 MG 24 hr tablet, Take 300 mg by mouth daily, Disp: , Rfl:     VITAMIN D PO, , Disp: , Rfl:     Ziconotide Acetate (PRIALT IT), by Intrathecal route continuous, Disp: , Rfl:     zolpidem (AMBIEN) 5 mg tablet, Take 1 tablet (5 mg total) by mouth daily at bedtime as needed for sleep, Disp: 30 tablet, Rfl: 2    triamcinolone (KENALOG) 0 1 % cream, APPLY TO AFFECTED AREA TWICE A DAY (Patient not taking: Reported on 9/26/2022), Disp: 80 g, Rfl: 1    Current Allergies     Allergies as of 09/26/2022 - Reviewed 09/26/2022   Allergen Reaction Noted    Nsaids Other (See Comments) 09/23/2017    Percocet [oxycodone-acetaminophen] GI Intolerance 09/23/2017            The following portions of the patient's history were reviewed and updated as appropriate: allergies, current medications, past family history, past medical history, past social history, past surgical history and problem list      Past Medical History:   Diagnosis Date    PABLO (acute kidney injury) ([facility] [address] ) 11/04/2021    Allergic     Anemia     Anxiety     Arthritis     Asthma     Breast lump     Resolved: 8/1/2017     Chronic pain     back    Diabetes mellitus ([facility] [address] )     Diverticulitis of colon     GERD (gastroesophageal reflux disease)     Hepatitis     History of stomach ulcers     HL (hearing loss) 2015    Estimated    Hyperlipidemia     Hypertension     Hypotension 2021    Infectious viral hepatitis     Hepatitiss A    Memory loss     Estimated    Stomach problems        Past Surgical History:   Procedure Laterality Date    APPENDECTOMY      BACK SURGERY       SECTION      CHOLECYSTECTOMY      GASTRIC BYPASS      GASTRIC BYPASS      INFUSION PUMP IMPLANTATION      SHOULDER SURGERY      SPINAL CORD STIMULATOR IMPLANT         Family History   Problem Relation Age of Onset    Diabetes Mother         Mellitus    Hyperlipidemia Mother     Hypertension Mother     Colon cancer Mother     Pancreatic cancer Mother     Melanoma Mother     Cancer Father         Bladder     Alcohol abuse Father     Lung cancer Father     Prostate cancer Father     Diabetes Brother         Mellitus    Hyperlipidemia Brother     Hypertension Brother     Stomach cancer Paternal Grandmother     Lung cancer Paternal Grandfather     Diabetes Brother     Breast cancer Maternal Aunt 28    Arthritis Family     Mental illness Neg Hx          Medications have been verified  Objective   /64   Pulse 88   Ht 5' 4" (1 626 m)   Wt 74 8 kg (165 lb)   LMP  (LMP Unknown)   SpO2 93%   BMI 28 32 kg/m²        Physical Exam     Physical Exam  Vitals reviewed  Constitutional:       General: She is not in acute distress  Appearance: She is well-developed  She is not ill-appearing  HENT:      Head: Normocephalic and atraumatic  Right Ear: Tympanic membrane and external ear normal  No drainage  Left Ear: Tympanic membrane normal  No drainage  Nose: Congestion present  Mouth/Throat:      Pharynx: No oropharyngeal exudate or posterior oropharyngeal erythema  Eyes:      General:         Right eye: No discharge  Left eye: No discharge  Extraocular Movements: Extraocular movements intact  Conjunctiva/sclera: Conjunctivae normal       Pupils: Pupils are equal, round, and reactive to light     Neck: Thyroid: No thyromegaly  Cardiovascular:      Rate and Rhythm: Normal rate and regular rhythm  Heart sounds: Normal heart sounds  Pulmonary:      Effort: Pulmonary effort is normal  No respiratory distress  Breath sounds: No wheezing or rales  Musculoskeletal:      Cervical back: Normal range of motion and neck supple  Lymphadenopathy:      Cervical: No cervical adenopathy  Skin:     Findings: No rash  Neurological:      Mental Status: She is alert     Psychiatric:         Mood and Affect: Mood normal          Behavior: Behavior normal

## 2022-09-26 NOTE — PATIENT INSTRUCTIONS
Decreased basal insulin to 12 units in the evening  Increase your fluids  Track your blood pressures as to whether lows occur in relation to other medications  If your blood pressures below 100/60 in the morning, take only half dose of lisinopril 20 mg

## 2022-09-29 LAB
ALBUMIN/CREAT UR: 9 MCG/MG CREAT
CREAT UR-MCNC: 44 MG/DL (ref 20–275)
MICROALBUMIN UR-MCNC: 0.4 MG/DL

## 2022-09-29 PROCEDURE — 3061F NEG MICROALBUMINURIA REV: CPT | Performed by: FAMILY MEDICINE

## 2022-09-30 NOTE — TELEPHONE ENCOUNTER
2ND REQUEST    Fax received from [facility] requesting medical records  Scanned into media and sent to [facility]

## 2022-10-02 DIAGNOSIS — I10 BENIGN ESSENTIAL HYPERTENSION: ICD-10-CM

## 2022-10-02 DIAGNOSIS — E11.40 TYPE 2 DIABETES MELLITUS WITH DIABETIC NEUROPATHY, WITH LONG-TERM CURRENT USE OF INSULIN (HCC): ICD-10-CM

## 2022-10-02 DIAGNOSIS — Z79.4 TYPE 2 DIABETES MELLITUS WITH DIABETIC NEUROPATHY, WITH LONG-TERM CURRENT USE OF INSULIN (HCC): ICD-10-CM

## 2022-10-02 RX ORDER — LISINOPRIL 20 MG/1
TABLET ORAL
Qty: 90 TABLET | Refills: 3 | Status: SHIPPED | OUTPATIENT
Start: 2022-10-02

## 2022-10-03 ENCOUNTER — ANNUAL EXAM (OUTPATIENT)
Dept: FAMILY MEDICINE CLINIC | Facility: CLINIC | Age: 62
End: 2022-10-03
Payer: COMMERCIAL

## 2022-10-03 VITALS
OXYGEN SATURATION: 96 % | TEMPERATURE: 97.1 F | SYSTOLIC BLOOD PRESSURE: 138 MMHG | HEIGHT: 64 IN | DIASTOLIC BLOOD PRESSURE: 81 MMHG | BODY MASS INDEX: 28.2 KG/M2 | HEART RATE: 86 BPM | WEIGHT: 165.2 LBS

## 2022-10-03 DIAGNOSIS — Z23 NEED FOR INFLUENZA VACCINATION: ICD-10-CM

## 2022-10-03 DIAGNOSIS — Z01.419 ENCOUNTER FOR GYNECOLOGICAL EXAMINATION WITHOUT ABNORMAL FINDING: Primary | ICD-10-CM

## 2022-10-03 DIAGNOSIS — N95.2 VAGINAL ATROPHY: ICD-10-CM

## 2022-10-03 PROCEDURE — 96372 THER/PROPH/DIAG INJ SC/IM: CPT | Performed by: NURSE PRACTITIONER

## 2022-10-03 PROCEDURE — 90471 IMMUNIZATION ADMIN: CPT

## 2022-10-03 PROCEDURE — S0612 ANNUAL GYNECOLOGICAL EXAMINA: HCPCS | Performed by: NURSE PRACTITIONER

## 2022-10-03 PROCEDURE — 99213 OFFICE O/P EST LOW 20 MIN: CPT | Performed by: NURSE PRACTITIONER

## 2022-10-03 PROCEDURE — 90682 RIV4 VACC RECOMBINANT DNA IM: CPT

## 2022-10-03 NOTE — PATIENT INSTRUCTIONS
Gyn examine performed today, Pap collected and sent to lab  Call with any problems/concerns  Annual physical is due 12/6/22    Vaginal Atrophy   AMBULATORY CARE:   Vaginal atrophy  is a condition that causes thinning, drying, and inflammation of vaginal tissue  This condition is caused by decreased levels of estrogen (a female sex hormone)  Vaginal atrophy can increase your risk for vaginal and urinary tract infections  Vaginal atrophy can worsen over time if not treated  Common signs and symptoms include the following:   Vaginal dryness, itching, and burning    Vaginal discharge    Pain or discomfort during sex    Light bleeding after sex    Burning during urination    Frequent, sudden, strong urges to urinate    Urinary incontinence (loss of control of your bladder)    Contact your healthcare provider if:   You have a foul-smelling odor coming from your vagina  You have a thick, cheese-like discharge from your vagina  You have itching, swelling, or redness in your vagina  You have pain or burning when you urinate  Your urine smells bad  Your symptoms do not improve, or they get worse  You have questions or concerns about your condition or care  Treatment:   Over-the counter vaginal moisturizers  can help reduce dryness  Your healthcare provider may recommend that you use a vaginal moisturizer several times each week and during sex  Only use creams that are made for vaginal use  Do  not  use petroleum jelly  Lubricants can be used during sex to decrease pain and discomfort  Estrogen  may help decrease dryness  It may also lower your risk of vaginal infections if you are going through menopause  It can also help to relieve urinary symptoms  Estrogen may be prescribed in the form of a cream, tablet, or ring  These medicines can be applied or inserted into the vagina  Estrogen can also be prescribed in the form of a pill      Follow up with your doctor as directed:  Write down your questions so you remember to ask them during your visits  © Copyright [employer] 2022 Information is for End User's use only and may not be sold, redistributed or otherwise used for commercial purposes  All illustrations and images included in CareNotes® are the copyrighted property of A D A M , Inc  or [employer]  The above information is an  only  It is not intended as medical advice for individual conditions or treatments  Talk to your doctor, nurse or pharmacist before following any medical regimen to see if it is safe and effective for you

## 2022-10-03 NOTE — PROGRESS NOTES
[facility]        NAME: Dean Pierce is a 58 y o  female  : 1960    MRN: [id number]  DATE: October 3, 2022  TIME: 12:41 PM    Assessment and Plan   Encounter for gynecological examination without abnormal finding [Z01 419]  1  Encounter for gynecological examination without abnormal finding  Thinprep Tis and HPV mRNA E6/E7   2  Need for influenza vaccination  influenza vaccine, quadrivalent, recombinant, PF, 0 5 mL, for patients 18 yr+ (FLUBLOK)   3  Vaginal atrophy           Patient Instructions     Patient Instructions   Gyn examine performed today, Pap collected and sent to lab  Call with any problems/concerns  Annual physical is due 22    Vaginal Atrophy   AMBULATORY CARE:   Vaginal atrophy  is a condition that causes thinning, drying, and inflammation of vaginal tissue  This condition is caused by decreased levels of estrogen (a female sex hormone)  Vaginal atrophy can increase your risk for vaginal and urinary tract infections  Vaginal atrophy can worsen over time if not treated  Common signs and symptoms include the following:   · Vaginal dryness, itching, and burning    · Vaginal discharge    · Pain or discomfort during sex    · Light bleeding after sex    · Burning during urination    · Frequent, sudden, strong urges to urinate    · Urinary incontinence (loss of control of your bladder)    Contact your healthcare provider if:   · You have a foul-smelling odor coming from your vagina  · You have a thick, cheese-like discharge from your vagina  · You have itching, swelling, or redness in your vagina  · You have pain or burning when you urinate  · Your urine smells bad  · Your symptoms do not improve, or they get worse  · You have questions or concerns about your condition or care  Treatment:   · Over-the counter vaginal moisturizers  can help reduce dryness   Your healthcare provider may recommend that you use a vaginal moisturizer several times each week and during sex  Only use creams that are made for vaginal use  Do  not  use petroleum jelly  Lubricants can be used during sex to decrease pain and discomfort  · Estrogen  may help decrease dryness  It may also lower your risk of vaginal infections if you are going through menopause  It can also help to relieve urinary symptoms  Estrogen may be prescribed in the form of a cream, tablet, or ring  These medicines can be applied or inserted into the vagina  Estrogen can also be prescribed in the form of a pill  Follow up with your doctor as directed:  Write down your questions so you remember to ask them during your visits  © Copyright [employer] 2022 Information is for End User's use only and may not be sold, redistributed or otherwise used for commercial purposes  All illustrations and images included in CareNotes® are the copyrighted property of A D A M , Inc  or [employer]  The above information is an  only  It is not intended as medical advice for individual conditions or treatments  Talk to your doctor, nurse or pharmacist before following any medical regimen to see if it is safe and effective for you  Chief Complaint     Chief Complaint   Patient presents with    Gynecologic Exam     Pt is in the office for her PAP/gyn exam    Chief complaint - vaginal mucosa is sensitive - pt reports pain upon contact such with Monistat cream application (suppository)  Agreeable to flu vaccine  Lab - [facility]         History of Present Illness       Here for GYN/PAP  Post-menopausal  Not sexually active  C/o vaginal dryness, pain with last pap and painful when using a Monistat  vaginal suppository  Mammogram up-to-date  Review of Systems   Review of Systems   Constitutional: Negative for activity change, appetite change, fatigue, fever and unexpected weight change  Respiratory: Negative for chest tightness, shortness of breath and wheezing  Cardiovascular: Negative for chest pain and palpitations  Gastrointestinal: Negative for abdominal distention, abdominal pain, constipation, diarrhea and nausea  Genitourinary: Negative for decreased urine volume, difficulty urinating, dyspareunia, dysuria, flank pain, frequency, hematuria, menstrual problem, pelvic pain, urgency, vaginal bleeding, vaginal discharge and vaginal pain  Vaginal dryness  Skin: Negative for color change and rash  Neurological: Negative for weakness  Psychiatric/Behavioral: Negative for behavioral problems, dysphoric mood, sleep disturbance and suicidal ideas  The patient is not nervous/anxious  Current Medications       Current Outpatient Medications:     Accu-Chek FastClix Lancets MISC, TEST TWO TIMES A DAY, Disp: 204 each, Rfl: 3    Accu-Chek Guide test strip, TEST TWO TIMES A DAY, Disp: 100 strip, Rfl: 3    albuterol (2 5 mg/3 mL) 0 083 % nebulizer solution, Take 3 mL (2 5 mg total) by nebulization every 6 (six) hours as needed for wheezing or shortness of breath, Disp: 300 mL, Rfl: 1    albuterol (PROVENTIL HFA,VENTOLIN HFA) 90 mcg/act inhaler, USE 2 INHALATIONS ORALLY   EVERY 6 HOURS AS NEEDED FORWHEEZING, Disp: 25 5 g, Rfl: 1    atorvastatin (LIPITOR) 40 mg tablet, TAKE 1 TABLET DAILY, Disp: 90 tablet, Rfl: 0    Basaglar KwikPen 100 units/mL SOPN, INJECT 33 UNITS            SUBCUTANEOUSLY AT BEDTIME (Patient taking differently: Inject 12 Units under the skin daily at bedtime), Disp: 15 mL, Rfl: 0    Ferrous Fumarate 63 (20 Fe) MG TABS, Take 65 mg by mouth daily, Disp: , Rfl:     HYDROcodone-acetaminophen (NORCO)  mg per tablet, , Disp: , Rfl:     Insulin Pen Needle (B-D UF III MINI PEN NEEDLES) 31G X 5 MM MISC, TEST ONCE DAILY, Disp: 90 each, Rfl: 1    lidocaine (XYLOCAINE) 5 % ointment, apply topically to affected area three times a day, Disp: , Rfl:     lisinopril (ZESTRIL) 20 mg tablet, TAKE 1 TABLET DAILY         REPLACE LOTREL DUE TO LOW  BLOOD PRESSURE, Disp: 90 tablet, Rfl: 3    LYRICA 200 MG capsule, Take 200 mg by mouth 3 (three) times a day , Disp: , Rfl: 0    metFORMIN (GLUCOPHAGE) 1000 MG tablet, TAKE 1 TABLET TWICE A DAY, Disp: 180 tablet, Rfl: 0    mometasone (NASONEX) 50 mcg/act nasal spray, USE 2 SPRAYS IN EACH       NOSTRIL DAILY, Disp: 17 g, Rfl: 1    montelukast (SINGULAIR) 10 mg tablet, Take 1 tablet (10 mg total) by mouth daily at bedtime, Disp: 90 tablet, Rfl: 1    Morphine Sulfate (MORPHINE 0 05 MG/ML SYRINGE, NICU/PICU,, CMPD ORDER,), , Disp: , Rfl:     omeprazole (PriLOSEC) 40 MG capsule, TAKE 1 CAPSULE EVERY       MORNING, Disp: 90 capsule, Rfl: 3    ondansetron (ZOFRAN) 4 mg tablet, Take 1 tablet (4 mg total) by mouth every 8 (eight) hours as needed for nausea or vomiting, Disp: 30 tablet, Rfl: 1    sitaGLIPtin (Januvia) 100 mg tablet, Take 1 tablet (100 mg total) by mouth in the morning , Disp: 90 tablet, Rfl: 3    traMADol HCl ER, Biphasic, 300 MG 24 hr tablet, Take 300 mg by mouth daily, Disp: , Rfl:     VITAMIN D PO, , Disp: , Rfl:     Ziconotide Acetate (PRIALT IT), by Intrathecal route continuous, Disp: , Rfl:     zolpidem (AMBIEN) 5 mg tablet, Take 1 tablet (5 mg total) by mouth daily at bedtime as needed for sleep, Disp: 30 tablet, Rfl: 2    triamcinolone (KENALOG) 0 1 % cream, APPLY TO AFFECTED AREA TWICE A DAY (Patient not taking: No sig reported), Disp: 80 g, Rfl: 1    Current Allergies     Allergies as of 10/03/2022 - Reviewed 10/03/2022   Allergen Reaction Noted    Nsaids Other (See Comments) 09/23/2017    Percocet [oxycodone-acetaminophen] GI Intolerance 09/23/2017            The following portions of the patient's history were reviewed and updated as appropriate: allergies, current medications, past family history, past medical history, past social history, past surgical history and problem list      Past Medical History:   Diagnosis Date    PABLO (acute kidney injury) ([address] ) 11/04/2021    Allergic     Anemia     Anxiety     Arthritis     Asthma     Breast lump     Resolved: 2017     Chronic pain     back    Diabetes mellitus ([address] )     Diverticulitis of colon     GERD (gastroesophageal reflux disease)     Hepatitis     History of stomach ulcers     HL (hearing loss)     Estimated    Hyperlipidemia     Hypertension     Hypotension 2021    Infectious viral hepatitis     Hepatitiss A    Memory loss     Estimated    Stomach problems        Past Surgical History:   Procedure Laterality Date    APPENDECTOMY      BACK SURGERY       SECTION      CHOLECYSTECTOMY      GASTRIC BYPASS      GASTRIC BYPASS      INFUSION PUMP IMPLANTATION      SHOULDER SURGERY      SPINAL CORD STIMULATOR IMPLANT         Family History   Problem Relation Age of Onset    Diabetes Mother         Mellitus    Hyperlipidemia Mother     Hypertension Mother     Colon cancer Mother     Pancreatic cancer Mother     Melanoma Mother     Cancer Father         Bladder     Alcohol abuse Father     Lung cancer Father     Prostate cancer Father     Diabetes Brother         Mellitus    Hyperlipidemia Brother     Hypertension Brother     Stomach cancer Paternal Grandmother     Lung cancer Paternal Grandfather     Diabetes Brother     Breast cancer Maternal Aunt 28    Arthritis Family     Mental illness Neg Hx          Medications have been verified  Objective   /81 (BP Location: Left arm, Patient Position: Sitting, Cuff Size: Standard)   Pulse 86   Temp (!) 97 1 °F (36 2 °C) (Temporal)   Ht 5' 4" (1 626 m)   Wt 74 9 kg (165 lb 3 2 oz)   LMP  (LMP Unknown)   SpO2 96%   BMI 28 36 kg/m²        Physical Exam     Physical Exam  Vitals and nursing note reviewed  Constitutional:       General: She is not in acute distress  Appearance: Normal appearance  She is well-developed  She is not ill-appearing or diaphoretic     Neck:      Thyroid: No thyroid mass or thyromegaly  Trachea: No tracheal deviation  Comments: Thyroid: no nodules  Cardiovascular:      Rate and Rhythm: Normal rate and regular rhythm  Heart sounds: No murmur heard  No friction rub  No gallop  Pulmonary:      Effort: Pulmonary effort is normal  No respiratory distress  Breath sounds: Normal breath sounds  No wheezing or rales  Chest:      Chest wall: No tenderness  Abdominal:      General: Bowel sounds are normal  There is no distension  Palpations: Abdomen is soft  There is no mass  Tenderness: There is no abdominal tenderness  There is no guarding or rebound  Genitourinary:     Labia:         Right: No rash, tenderness, lesion or injury  Left: No rash, tenderness, lesion or injury  Vagina: Normal  No signs of injury and foreign body  No vaginal discharge, erythema, tenderness or bleeding  Cervix: No cervical motion tenderness, discharge, friability, lesion, erythema or cervical bleeding  Uterus: Not deviated, not enlarged, not fixed and not tender  Adnexa:         Right: No mass, tenderness or fullness  Left: No mass, tenderness or fullness  Musculoskeletal:         General: Normal range of motion  Cervical back: Normal range of motion and neck supple  Skin:     General: Skin is warm and dry  Coloration: Skin is not pale  Findings: No erythema  Neurological:      Mental Status: She is alert and oriented to person, place, and time  Psychiatric:         Behavior: Behavior normal          Thought Content:  Thought content normal          Judgment: Judgment normal          PHQ-2/9 Depression Screening    Little interest or pleasure in doing things: 0 - not at all  Feeling down, depressed, or hopeless: 0 - not at all  PHQ-2 Score: 0  PHQ-2 Interpretation: Negative depression screen

## 2022-10-04 ENCOUNTER — TELEPHONE (OUTPATIENT)
Dept: FAMILY MEDICINE CLINIC | Facility: CLINIC | Age: 62
End: 2022-10-04

## 2022-10-04 NOTE — TELEPHONE ENCOUNTER
----- Message from Dahiana Villalba MD sent at 10/4/2022  1:26 PM EDT -----  Call patient with lab result-no significant protein spilling in urine

## 2022-10-05 LAB
CLINICAL INFO: NORMAL
CYTOLOGY CMNT CVX/VAG CYTO-IMP: NORMAL
DATE PREVIOUS BX: NORMAL
HPV E6+E7 MRNA CVX QL NAA+PROBE: NOT DETECTED
LMP START DATE: NORMAL
SL AMB PREV. PAP:: NORMAL
SPECIMEN SOURCE CVX/VAG CYTO: NORMAL
STAT OF ADQ CVX/VAG CYTO-IMP: NORMAL

## 2022-10-06 ENCOUNTER — TELEPHONE (OUTPATIENT)
Dept: FAMILY MEDICINE CLINIC | Facility: CLINIC | Age: 62
End: 2022-10-06

## 2022-10-06 NOTE — TELEPHONE ENCOUNTER
----- Message from [address] [place] [address],[address] sent at 10/5/2022  3:54 PM EDT -----  Normal pap

## 2022-10-07 DIAGNOSIS — J45.30 MILD PERSISTENT ASTHMA WITHOUT COMPLICATION: ICD-10-CM

## 2022-10-08 RX ORDER — MONTELUKAST SODIUM 10 MG/1
TABLET ORAL
Qty: 90 TABLET | Refills: 1 | Status: SHIPPED | OUTPATIENT
Start: 2022-10-08

## 2022-10-13 ENCOUNTER — TELEPHONE (OUTPATIENT)
Dept: OBGYN CLINIC | Facility: HOSPITAL | Age: 62
End: 2022-10-13

## 2022-10-13 NOTE — TELEPHONE ENCOUNTER
Caller: Ruthy Hurley from [facility]    Doctor: Leatha Solano    Reason for call: Ruthy Hurley from [facility] is calling in regard to the Physician Consultant Report  She is stating that if Dr Leatha Solano agrees with assessment on forms dated 10/8/22 he can give a verbal agreement  If he disagrees he must present it in writing and provide additional medical evidence as to why he disagrees      Call back#: [phone] ext [phone] from [facility]

## 2022-10-15 ENCOUNTER — OFFICE VISIT (OUTPATIENT)
Dept: URGENT CARE | Facility: CLINIC | Age: 62
End: 2022-10-15
Payer: COMMERCIAL

## 2022-10-15 VITALS
HEART RATE: 75 BPM | SYSTOLIC BLOOD PRESSURE: 160 MMHG | HEIGHT: 64 IN | OXYGEN SATURATION: 96 % | WEIGHT: 164.4 LBS | TEMPERATURE: 96.8 F | RESPIRATION RATE: 16 BRPM | BODY MASS INDEX: 28.07 KG/M2 | DIASTOLIC BLOOD PRESSURE: 63 MMHG

## 2022-10-15 DIAGNOSIS — S00.81XA FACIAL ABRASION, INITIAL ENCOUNTER: Primary | ICD-10-CM

## 2022-10-15 PROCEDURE — G0382 LEV 3 HOSP TYPE B ED VISIT: HCPCS | Performed by: FAMILY MEDICINE

## 2022-10-15 NOTE — PROGRESS NOTES
[employer]        NAME: Asher Cannon is a 58 y o  female  : 1960    MRN: [id number]  DATE: October 15, 2022  TIME: 4:49 PM    Assessment and Plan   Facial abrasion, initial encounter [S00 [id number]]  1  Facial abrasion, initial encounter           Patient Instructions       Follow up with PCP in 3-5 days  Proceed to  ER if symptoms worsen  Chief Complaint     Chief Complaint   Patient presents with   • Facial Swelling     Pt states she has had right cheek swelling since this am-thinks she may have been bitten by insect-1/10 burning pain on cheek         History of Present Illness       71-year-old female with redness to her right cheek  Denies any injuries or trauma  Denies any burns  Review of Systems   Review of Systems   Constitutional: Negative  HENT: Negative  Eyes: Negative  Respiratory: Negative  Cardiovascular: Negative  Gastrointestinal: Negative  Genitourinary: Negative  Skin: Positive for wound  Allergic/Immunologic: Negative  Neurological: Negative  Hematological: Negative  Psychiatric/Behavioral: Negative            Current Medications       Current Outpatient Medications:   •  Accu-Chek FastClix Lancets MISC, TEST TWO TIMES A DAY, Disp: 204 each, Rfl: 3  •  Accu-Chek Guide test strip, TEST TWO TIMES A DAY, Disp: 100 strip, Rfl: 3  •  albuterol (2 5 mg/3 mL) 0 083 % nebulizer solution, Take 3 mL (2 5 mg total) by nebulization every 6 (six) hours as needed for wheezing or shortness of breath, Disp: 300 mL, Rfl: 1  •  albuterol (PROVENTIL HFA,VENTOLIN HFA) 90 mcg/act inhaler, USE 2 INHALATIONS ORALLY   EVERY 6 HOURS AS NEEDED FORWHEEZING, Disp: 25 5 g, Rfl: 1  •  atorvastatin (LIPITOR) 40 mg tablet, TAKE 1 TABLET DAILY, Disp: 90 tablet, Rfl: 0  •  Basaglar KwikPen 100 units/mL SOPN, INJECT 33 UNITS            SUBCUTANEOUSLY AT BEDTIME (Patient taking differently: Inject 12 Units under the skin daily at bedtime), Disp: 15 mL, Rfl: 0  •  Ferrous Fumarate 63 (20 Fe) MG TABS, Take 65 mg by mouth daily, Disp: , Rfl:   •  HYDROcodone-acetaminophen (NORCO)  mg per tablet, , Disp: , Rfl:   •  Insulin Pen Needle (B-D UF III MINI PEN NEEDLES) 31G X 5 MM MISC, TEST ONCE DAILY, Disp: 90 each, Rfl: 1  •  lidocaine (XYLOCAINE) 5 % ointment, apply topically to affected area three times a day, Disp: , Rfl:   •  lisinopril (ZESTRIL) 20 mg tablet, TAKE 1 TABLET DAILY         REPLACE LOTREL DUE TO LOW  BLOOD PRESSURE, Disp: 90 tablet, Rfl: 3  •  LYRICA 200 MG capsule, Take 200 mg by mouth 3 (three) times a day , Disp: , Rfl: 0  •  metFORMIN (GLUCOPHAGE) 1000 MG tablet, TAKE 1 TABLET TWICE A DAY, Disp: 180 tablet, Rfl: 0  •  mometasone (NASONEX) 50 mcg/act nasal spray, USE 2 SPRAYS IN EACH       NOSTRIL DAILY, Disp: 17 g, Rfl: 1  •  montelukast (SINGULAIR) 10 mg tablet, TAKE 1 TABLET DAILY AT     BEDTIME, Disp: 90 tablet, Rfl: 1  •  Morphine Sulfate (MORPHINE 0 05 MG/ML SYRINGE, NICU/PICU,, CMPD ORDER,), , Disp: , Rfl:   •  omeprazole (PriLOSEC) 40 MG capsule, TAKE 1 CAPSULE EVERY       MORNING, Disp: 90 capsule, Rfl: 3  •  ondansetron (ZOFRAN) 4 mg tablet, Take 1 tablet (4 mg total) by mouth every 8 (eight) hours as needed for nausea or vomiting, Disp: 30 tablet, Rfl: 1  •  sitaGLIPtin (Januvia) 100 mg tablet, Take 1 tablet (100 mg total) by mouth in the morning , Disp: 90 tablet, Rfl: 3  •  traMADol HCl ER, Biphasic, 300 MG 24 hr tablet, Take 300 mg by mouth daily, Disp: , Rfl:   •  VITAMIN D PO, , Disp: , Rfl:   •  Ziconotide Acetate (PRIALT IT), by Intrathecal route continuous, Disp: , Rfl:   •  zolpidem (AMBIEN) 5 mg tablet, Take 1 tablet (5 mg total) by mouth daily at bedtime as needed for sleep, Disp: 30 tablet, Rfl: 2  •  triamcinolone (KENALOG) 0 1 % cream, APPLY TO AFFECTED AREA TWICE A DAY (Patient not taking: No sig reported), Disp: 80 g, Rfl: 1    Current Allergies     Allergies as of 10/15/2022 - Reviewed 10/15/2022   Allergen Reaction Noted   • Nsaids Other (See Comments) 2017   • Percocet [oxycodone-acetaminophen] GI Intolerance 2017            The following portions of the patient's history were reviewed and updated as appropriate: allergies, current medications, past family history, past medical history, past social history, past surgical history and problem list      Past Medical History:   Diagnosis Date   • PABLO (acute kidney injury) ([place] [address] ) 2021   • Allergic    • Anemia    • Anxiety    • Arthritis    • Asthma    • Breast lump     Resolved: 2017    • Chronic pain     back   • Diabetes mellitus ([place] [address] )    • Diverticulitis of colon    • GERD (gastroesophageal reflux disease)    • Hepatitis    • History of stomach ulcers    • HL (hearing loss)     Estimated   • Hyperlipidemia    • Hypertension    • Hypotension 2021   • Infectious viral hepatitis     Hepatitiss A   • Memory loss     Estimated   • Stomach problems        Past Surgical History:   Procedure Laterality Date   • APPENDECTOMY     • BACK SURGERY     •  SECTION     • CHOLECYSTECTOMY     • GASTRIC BYPASS     • GASTRIC BYPASS     • INFUSION PUMP IMPLANTATION     • SHOULDER SURGERY     • SPINAL CORD STIMULATOR IMPLANT         Family History   Problem Relation Age of Onset   • Diabetes Mother         Mellitus   • Hyperlipidemia Mother    • Hypertension Mother    • Colon cancer Mother    • Pancreatic cancer Mother    • Melanoma Mother    • Cancer Father         Bladder    • Alcohol abuse Father    • Lung cancer Father    • Prostate cancer Father    • Diabetes Brother         Mellitus   • Hyperlipidemia Brother    • Hypertension Brother    • Stomach cancer Paternal Grandmother    • Lung cancer Paternal Grandfather    • Diabetes Brother    • Breast cancer Maternal Aunt 32   • Arthritis Family    • Mental illness Neg Hx          Medications have been verified          Objective   /63   Pulse 75   Temp (!) 96 8 °F (36 °C)   Resp 16   Ht 5' 4" (1 626 m) Wt 74 6 kg (164 lb 6 4 oz)   LMP  (LMP Unknown)   SpO2 96%   BMI 28 22 kg/m²   No LMP recorded (lmp unknown)  Patient is postmenopausal        Physical Exam     Physical Exam  Vitals and nursing note reviewed  Constitutional:       Appearance: She is well-developed  HENT:      Head: Normocephalic  Eyes:      Pupils: Pupils are equal, round, and reactive to light  Pulmonary:      Effort: Pulmonary effort is normal    Musculoskeletal:         General: Normal range of motion  Skin:     General: Skin is warm and dry  Findings: Abrasion present  Neurological:      Mental Status: She is alert and oriented to person, place, and time

## 2022-10-18 ENCOUNTER — TELEPHONE (OUTPATIENT)
Dept: UROLOGY | Facility: AMBULATORY SURGERY CENTER | Age: 62
End: 2022-10-18

## 2022-10-18 NOTE — TELEPHONE ENCOUNTER
Please Triage  New Patient    What is the reason for the patient’s appointment? Patient being referred for urinary retention  She stated she will have to sit for 5 minutes to get the urine out and her urine stream is weak and she will have pain where her urine comes out  She said it has been going on for a year and will cause her at times to get UTIs    What office location does the patient prefer? [place]    Imaging/Lab Results: n/a    Do we accept the patient's insurance or is the patient Self-Pay? Yes, Justin Provider:  Plan Type/Number:  Member ID#: Has the patient had any previous Urologist(s)? No    Have patient records been requested? If not are records showing in Epic:     Has the patient had any outside testing done? n/a    Does the patient have a personal history of cancer?  No    Patient can be reached [phone]

## 2022-10-18 NOTE — TELEPHONE ENCOUNTER
Called spoke with patient, states having retention for over a year or so  Neurologist recommended seeing us  Some discomfort at times, no chills/fever  Reports has to sit for more than 5 minutes for urine to come out, reports feels like not urinating enough, reports good hydration  No blood in urine  Appt scheduled for 11/08/22   ED precautions reviewed

## 2022-10-27 ENCOUNTER — TELEPHONE (OUTPATIENT)
Dept: OBGYN CLINIC | Facility: HOSPITAL | Age: 62
End: 2022-10-27

## 2022-10-27 NOTE — TELEPHONE ENCOUNTER
Caller: RAFFAELE    Doctor: [facility]      Reason for call: patient has to eat something to keep blood sugar up as she is a diabetic is this ok?  Please advise pt states she was advised not to eat anything 12/1/22 day of surgery     Call back#: [phone]

## 2022-10-27 NOTE — TELEPHONE ENCOUNTER
Caller: patient    Doctor: Dr Venus Robles    Reason for call: Patient has changed mind would like sx under anesthesia       Call back#: [phone]

## 2022-10-28 NOTE — TELEPHONE ENCOUNTER
If she needs to eat something prior to her surgery, we can only do it under local   She would have to remain not eating after midnight if she would like IV sedation  Thank you

## 2022-10-29 NOTE — TELEPHONE ENCOUNTER
Message left for patient's mother Lexi to call. Offered an appointment with Dr Pandey 4/19/17 at 8am (Arrival at 7:45am)   Sounds good  Thank you

## 2022-10-31 DIAGNOSIS — E11.9 CONTROLLED TYPE 2 DIABETES MELLITUS WITHOUT COMPLICATION, WITHOUT LONG-TERM CURRENT USE OF INSULIN (HCC): ICD-10-CM

## 2022-10-31 RX ORDER — PEN NEEDLE, DIABETIC 31 GX5/16"
NEEDLE, DISPOSABLE MISCELLANEOUS
Qty: 90 EACH | Refills: 1 | Status: SHIPPED | OUTPATIENT
Start: 2022-10-31 | End: 2022-11-01

## 2022-11-03 ENCOUNTER — OFFICE VISIT (OUTPATIENT)
Dept: FAMILY MEDICINE CLINIC | Facility: CLINIC | Age: 62
End: 2022-11-03

## 2022-11-03 VITALS
HEIGHT: 64 IN | BODY MASS INDEX: 27.83 KG/M2 | OXYGEN SATURATION: 96 % | WEIGHT: 163 LBS | DIASTOLIC BLOOD PRESSURE: 64 MMHG | RESPIRATION RATE: 16 BRPM | HEART RATE: 73 BPM | SYSTOLIC BLOOD PRESSURE: 132 MMHG

## 2022-11-03 DIAGNOSIS — E11.40 TYPE 2 DIABETES MELLITUS WITH DIABETIC NEUROPATHY, WITH LONG-TERM CURRENT USE OF INSULIN (HCC): Primary | ICD-10-CM

## 2022-11-03 DIAGNOSIS — E78.5 HYPERLIPIDEMIA ASSOCIATED WITH TYPE 2 DIABETES MELLITUS (HCC): ICD-10-CM

## 2022-11-03 DIAGNOSIS — B35.4 TINEA CORPORIS: ICD-10-CM

## 2022-11-03 DIAGNOSIS — Z79.4 TYPE 2 DIABETES MELLITUS WITH DIABETIC NEUROPATHY, WITH LONG-TERM CURRENT USE OF INSULIN (HCC): Primary | ICD-10-CM

## 2022-11-03 DIAGNOSIS — E11.69 HYPERLIPIDEMIA ASSOCIATED WITH TYPE 2 DIABETES MELLITUS (HCC): ICD-10-CM

## 2022-11-03 DIAGNOSIS — I10 BENIGN ESSENTIAL HYPERTENSION: ICD-10-CM

## 2022-11-03 DIAGNOSIS — Z23 NEED FOR PNEUMOCOCCAL VACCINATION: ICD-10-CM

## 2022-11-03 NOTE — PROGRESS NOTES
[address]        NAME: Clarissa Bryant is a 58 y o  female  : 1960    MRN: [id number]  DATE: November 3, 2022  TIME: 12:04 PM    Assessment and Plan   Type 2 diabetes mellitus with diabetic neuropathy, with long-term current use of insulin (HCC) [E11 40, Z79 4]  1  Type 2 diabetes mellitus with diabetic neuropathy, with long-term current use of insulin ([address] )     2  Hyperlipidemia associated with type 2 diabetes mellitus ([address] )     3  Benign essential hypertension     4  Tinea corporis     5  Need for pneumococcal vaccination  Pneumococcal Conjugate Vaccine 20-valent (Pcv20)       No problem-specific Assessment & Plan notes found for this encounter  Patient Instructions     Patient Instructions   Trial of decreasing lisinopril to 10 mg once daily  If you find over the next 7-10 days her levels are consistently above 135/85, I would take the other 10 mg in the afternoon approximately 10 hours after your morning dose  Trial of Lamisil AT 1-2 times daily to the rash in the gluteal area  Continue to monitor blood sugars and blood pressure  Prevnar -20 vaccine given today  Consider COVID booster later on in December  Chief Complaint     Chief Complaint   Patient presents with   • Follow-up     Follow up after pain management visit- elevated B/P and pulse rate, episode of vomiting during visit 10/20/22         History of Present Illness       Patient comes in for medical management-patient has log book of recent blood sugars and blood pressures  Generally the blood sugars are in better control without the lowest that she was having before  Blood pressure generally the readings are good  However, she still has some lower readings below 100/60  These are associated with some lightheadedness and also increased heart rate  Only blood pressure medicine she is taking is the lisinopril 20 mg once daily  Patient also has an area of a rash in the gluteal fold  That responded to over-the-counter steroid or moisturizing lotions  Review of Systems   Review of Systems   Constitutional: Negative for appetite change, chills, diaphoresis and fever  HENT: Negative for congestion, ear pain, rhinorrhea, sinus pressure and sore throat  Eyes: Negative for discharge, redness and itching  Respiratory: Negative for cough, shortness of breath and wheezing  Cardiovascular: Negative for chest pain and palpitations  Rapid or slow heart rate   Gastrointestinal: Negative for diarrhea, nausea and vomiting  Skin: Positive for color change and rash  Neurological: Positive for dizziness and light-headedness  Negative for headaches           Current Medications       Current Outpatient Medications:   •  Accu-Chek FastClix Lancets MISC, TEST TWO TIMES A DAY, Disp: 204 each, Rfl: 3  •  Accu-Chek Guide test strip, TEST TWO TIMES A DAY, Disp: 100 strip, Rfl: 3  •  albuterol (2 5 mg/3 mL) 0 083 % nebulizer solution, Take 3 mL (2 5 mg total) by nebulization every 6 (six) hours as needed for wheezing or shortness of breath, Disp: 300 mL, Rfl: 1  •  albuterol (PROVENTIL HFA,VENTOLIN HFA) 90 mcg/act inhaler, USE 2 INHALATIONS ORALLY   EVERY 6 HOURS AS NEEDED FORWHEEZING, Disp: 25 5 g, Rfl: 1  •  atorvastatin (LIPITOR) 40 mg tablet, TAKE 1 TABLET DAILY, Disp: 90 tablet, Rfl: 0  •  Basaglar KwikPen 100 units/mL SOPN, INJECT 33 UNITS            SUBCUTANEOUSLY AT BEDTIME (Patient taking differently: Inject 12 Units under the skin daily at bedtime), Disp: 15 mL, Rfl: 0  •  Ferrous Fumarate 63 (20 Fe) MG TABS, Take 65 mg by mouth daily, Disp: , Rfl:   •  HYDROcodone-acetaminophen (NORCO)  mg per tablet, , Disp: , Rfl:   •  Insulin Pen Needle (B-D UF III MINI PEN NEEDLES) 31G X 5 MM MISC, ONCE DAILY basal insulin injection, Disp: 90 each, Rfl: 1  •  lidocaine (XYLOCAINE) 5 % ointment, apply topically to affected area three times a day, Disp: , Rfl:   •  lisinopril (ZESTRIL) 20 mg tablet, TAKE 1 TABLET DAILY         REPLACE LOTREL DUE TO LOW  BLOOD PRESSURE, Disp: 90 tablet, Rfl: 3  •  LYRICA 200 MG capsule, Take 200 mg by mouth 3 (three) times a day , Disp: , Rfl: 0  •  metFORMIN (GLUCOPHAGE) 1000 MG tablet, TAKE 1 TABLET TWICE A DAY, Disp: 180 tablet, Rfl: 0  •  mometasone (NASONEX) 50 mcg/act nasal spray, USE 2 SPRAYS IN EACH       NOSTRIL DAILY, Disp: 17 g, Rfl: 1  •  montelukast (SINGULAIR) 10 mg tablet, TAKE 1 TABLET DAILY AT     BEDTIME, Disp: 90 tablet, Rfl: 1  •  Morphine Sulfate (MORPHINE 0 05 MG/ML SYRINGE, NICU/PICU,, CMPD ORDER,), , Disp: , Rfl:   •  omeprazole (PriLOSEC) 40 MG capsule, TAKE 1 CAPSULE EVERY       MORNING, Disp: 90 capsule, Rfl: 3  •  ondansetron (ZOFRAN) 4 mg tablet, Take 1 tablet (4 mg total) by mouth every 8 (eight) hours as needed for nausea or vomiting, Disp: 30 tablet, Rfl: 1  •  sitaGLIPtin (Januvia) 100 mg tablet, Take 1 tablet (100 mg total) by mouth in the morning , Disp: 90 tablet, Rfl: 3  •  traMADol HCl ER, Biphasic, 300 MG 24 hr tablet, Take 300 mg by mouth daily, Disp: , Rfl:   •  triamcinolone (KENALOG) 0 1 % cream, APPLY TO AFFECTED AREA TWICE A DAY (Patient not taking: No sig reported), Disp: 80 g, Rfl: 1  •  VITAMIN D PO, , Disp: , Rfl:   •  Ziconotide Acetate (PRIALT IT), by Intrathecal route continuous, Disp: , Rfl:   •  zolpidem (AMBIEN) 5 mg tablet, Take 1 tablet (5 mg total) by mouth daily at bedtime as needed for sleep, Disp: 30 tablet, Rfl: 2    Current Allergies     Allergies as of 11/03/2022 - Reviewed 11/03/2022   Allergen Reaction Noted   • Nsaids Other (See Comments) 09/23/2017   • Percocet [oxycodone-acetaminophen] GI Intolerance 09/23/2017            The following portions of the patient's history were reviewed and updated as appropriate: allergies, current medications, past family history, past medical history, past social history, past surgical history and problem list      Past Medical History:   Diagnosis Date   • PABLO (acute kidney injury) ([facility] ) 2021   • Allergic    • Anemia    • Anxiety    • Arthritis    • Asthma    • Breast lump     Resolved: 2017    • Chronic pain     back   • Diabetes mellitus ([facility] )    • Diverticulitis of colon    • GERD (gastroesophageal reflux disease)    • Hepatitis    • History of stomach ulcers    • HL (hearing loss)     Estimated   • Hyperlipidemia    • Hypertension    • Hypotension 2021   • Infectious viral hepatitis     Hepatitiss A   • Memory loss     Estimated   • Stomach problems        Past Surgical History:   Procedure Laterality Date   • APPENDECTOMY     • BACK SURGERY     •  SECTION     • CHOLECYSTECTOMY     • GASTRIC BYPASS     • GASTRIC BYPASS     • INFUSION PUMP IMPLANTATION     • SHOULDER SURGERY     • SPINAL CORD STIMULATOR IMPLANT         Family History   Problem Relation Age of Onset   • Diabetes Mother         Mellitus   • Hyperlipidemia Mother    • Hypertension Mother    • Colon cancer Mother    • Pancreatic cancer Mother    • Melanoma Mother    • Cancer Father         Bladder    • Alcohol abuse Father    • Lung cancer Father    • Prostate cancer Father    • Diabetes Brother         Mellitus   • Hyperlipidemia Brother    • Hypertension Brother    • Stomach cancer Paternal Grandmother    • Lung cancer Paternal Grandfather    • Diabetes Brother    • Breast cancer Maternal Aunt 32   • Arthritis Family    • Mental illness Neg Hx          Medications have been verified  Objective   /64   Pulse 73   Resp 16   Ht 5' 4" (1 626 m)   Wt 73 9 kg (163 lb)   LMP  (LMP Unknown)   SpO2 96%   BMI 27 98 kg/m²        Physical Exam     Physical Exam  Vitals reviewed  Constitutional:       General: She is not in acute distress  Appearance: She is well-developed  She is not ill-appearing  HENT:      Head: Normocephalic and atraumatic  Right Ear: Tympanic membrane and external ear normal  No drainage        Left Ear: Tympanic membrane normal  No drainage  Nose: No congestion  Mouth/Throat:      Pharynx: No oropharyngeal exudate or posterior oropharyngeal erythema  Eyes:      General:         Right eye: No discharge  Left eye: No discharge  Extraocular Movements: Extraocular movements intact  Conjunctiva/sclera: Conjunctivae normal       Pupils: Pupils are equal, round, and reactive to light  Neck:      Thyroid: No thyromegaly  Cardiovascular:      Rate and Rhythm: Normal rate and regular rhythm  Heart sounds: Normal heart sounds  Pulmonary:      Effort: Pulmonary effort is normal  No respiratory distress  Breath sounds: No wheezing or rales  Musculoskeletal:      Cervical back: Normal range of motion and neck supple  Lymphadenopathy:      Cervical: No cervical adenopathy  Skin:     Findings: Rash present  Neurological:      General: No focal deficit present  Mental Status: She is alert and oriented to person, place, and time     Psychiatric:         Mood and Affect: Mood normal          Behavior: Behavior normal

## 2022-11-03 NOTE — PATIENT INSTRUCTIONS
Trial of decreasing lisinopril to 10 mg once daily  If you find over the next 7-10 days her levels are consistently above 135/85, I would take the other 10 mg in the afternoon approximately 10 hours after your morning dose  Trial of Lamisil AT 1-2 times daily to the rash in the gluteal area  Continue to monitor blood sugars and blood pressure  Prevnar -20 vaccine given today  Consider COVID booster later on in December

## 2022-11-07 DIAGNOSIS — E11.69 HYPERLIPIDEMIA ASSOCIATED WITH TYPE 2 DIABETES MELLITUS (HCC): ICD-10-CM

## 2022-11-07 DIAGNOSIS — E78.5 HYPERLIPIDEMIA ASSOCIATED WITH TYPE 2 DIABETES MELLITUS (HCC): ICD-10-CM

## 2022-11-07 RX ORDER — ATORVASTATIN CALCIUM 40 MG/1
TABLET, FILM COATED ORAL
Qty: 90 TABLET | Refills: 0 | Status: SHIPPED | OUTPATIENT
Start: 2022-11-07

## 2022-11-08 ENCOUNTER — OFFICE VISIT (OUTPATIENT)
Dept: UROLOGY | Facility: HOSPITAL | Age: 62
End: 2022-11-08

## 2022-11-08 VITALS
DIASTOLIC BLOOD PRESSURE: 80 MMHG | BODY MASS INDEX: 27.83 KG/M2 | HEART RATE: 73 BPM | HEIGHT: 64 IN | WEIGHT: 163 LBS | SYSTOLIC BLOOD PRESSURE: 160 MMHG | OXYGEN SATURATION: 98 %

## 2022-11-08 DIAGNOSIS — R30.0 DYSURIA: Primary | ICD-10-CM

## 2022-11-08 LAB
BACTERIA UR QL AUTO: NORMAL /HPF
BILIRUB UR QL STRIP: NEGATIVE
CLARITY UR: CLEAR
COLOR UR: NORMAL
GLUCOSE UR STRIP-MCNC: NEGATIVE MG/DL
HGB UR QL STRIP.AUTO: NEGATIVE
KETONES UR STRIP-MCNC: NEGATIVE MG/DL
LEUKOCYTE ESTERASE UR QL STRIP: NEGATIVE
NITRITE UR QL STRIP: NEGATIVE
NON-SQ EPI CELLS URNS QL MICRO: NORMAL /HPF
PH UR STRIP.AUTO: 5.5 [PH]
PROT UR STRIP-MCNC: NEGATIVE MG/DL
RBC #/AREA URNS AUTO: NORMAL /HPF
SL AMB  POCT GLUCOSE, UA: NORMAL
SL AMB LEUKOCYTE ESTERASE,UA: NORMAL
SL AMB POCT BILIRUBIN,UA: NORMAL
SL AMB POCT BLOOD,UA: NORMAL
SL AMB POCT CLARITY,UA: CLEAR
SL AMB POCT COLOR,UA: YELLOW
SL AMB POCT KETONES,UA: NORMAL
SL AMB POCT NITRITE,UA: NORMAL
SL AMB POCT PH,UA: 5
SL AMB POCT SPECIFIC GRAVITY,UA: 1.01
SL AMB POCT URINE PROTEIN: NORMAL
SL AMB POCT UROBILINOGEN: 0.2
SP GR UR STRIP.AUTO: 1.01 (ref 1–1.03)
UROBILINOGEN UR STRIP-ACNC: <2 MG/DL
WBC #/AREA URNS AUTO: NORMAL /HPF

## 2022-11-08 NOTE — PROGRESS NOTES
11/08/22    [facility]   1960   [id number]     Assessment  1 Dysuria  2 Atrophic vaginitis    Discussion/Plan  1 Dysuria   Hemoglobin A1c 5 6%   Renal US with PVR ordered   Consider urodynamics    UA micro and culture   2 Atrophic vaginitis   Replens or coconut oil     Follow up in 4 weeks with imaging  Subjective  HPI   Mak Ace is a 58year old female who presents in consultation for evaluation of dysuria and vaginal dryness  She endorses a sensation of incomplete emptying and "fullness" in her urethra  She double voids to empty  She has history of stones and UTI  She endorses constipation issues that she regulates with her diet  She consumes 64 oz water daily  She has an implanted pain pump of Morphine in her left abdomen and a spinal stimulator  If the pain pump helps alleviate her pain, she will be able to wean off oral medications  She has chronic pain in her back, spine and legs  Her father had bladder cancer and he was a smoker  Patient quit smoking 14 years ago  She smoked 1 ppd x 30 years  UTI history shows ESBL E coli  Component      Latest Ref Rng & Units 11/4/2021 11/5/2021 12/11/2021 9/13/2022          11:17 AM  4:59 AM  9:07 PM  9:01 AM   BUN      7 - 25 mg/dL 33 (H) 29 (H) 28 (H) 26 (H)   Creatinine      0 50 - 1 05 mg/dL 2 22 (H) 0 99 1 13 0 77     Review of Systems - History obtained from chart review and the patient  General ROS: negative  Psychological ROS: negative  Respiratory ROS: no cough, shortness of breath, or wheezing  Cardiovascular ROS: no chest pain or dyspnea on exertion  Gastrointestinal ROS: positive for - constipation  Genito-Urinary ROS: positive for - change in urinary stream, dysuria, nocturia and urinary frequency/urgency  Musculoskeletal ROS: negative  Neurological ROS: negative  Dermatological ROS: negative       Objective  Physical Exam  Vitals and nursing note reviewed  Constitutional:       General: She is not in acute distress       Appearance: Normal appearance  She is not ill-appearing, toxic-appearing or diaphoretic  HENT:      Head: Normocephalic and atraumatic  Pulmonary:      Effort: Pulmonary effort is normal  No respiratory distress  Abdominal:      Tenderness: There is no right CVA tenderness or left CVA tenderness  Musculoskeletal:         General: Normal range of motion  Cervical back: Normal range of motion  Skin:     General: Skin is warm and dry  Neurological:      General: No focal deficit present  Mental Status: She is alert and oriented to person, place, and time  Mental status is at baseline  Psychiatric:         Mood and Affect: Mood normal          Behavior: Behavior normal          Thought Content: Thought content normal          Judgment: Judgment normal            Shyrl Orn     I have spent 15 minutes with Patient  today in which greater than 50% of this time was spent in counseling/coordination of care regarding Intructions for management

## 2022-11-10 LAB — BACTERIA UR CULT: NORMAL

## 2022-11-11 ENCOUNTER — HOSPITAL ENCOUNTER (OUTPATIENT)
Dept: ULTRASOUND IMAGING | Facility: HOSPITAL | Age: 62
End: 2022-11-11

## 2022-11-11 DIAGNOSIS — R30.0 DYSURIA: ICD-10-CM

## 2022-11-18 DIAGNOSIS — G47.00 INSOMNIA, UNSPECIFIED TYPE: ICD-10-CM

## 2022-11-19 RX ORDER — ZOLPIDEM TARTRATE 5 MG/1
5 TABLET ORAL
Qty: 30 TABLET | Refills: 2 | Status: SHIPPED | OUTPATIENT
Start: 2022-11-19

## 2022-11-22 DIAGNOSIS — E11.9 TYPE 2 DIABETES MELLITUS WITHOUT COMPLICATION, WITHOUT LONG-TERM CURRENT USE OF INSULIN (HCC): ICD-10-CM

## 2022-12-01 ENCOUNTER — HOSPITAL ENCOUNTER (OUTPATIENT)
Facility: HOSPITAL | Age: 62
Setting detail: OUTPATIENT SURGERY
Discharge: HOME | End: 2022-12-01
Attending: ORTHOPAEDIC SURGERY | Admitting: ORTHOPAEDIC SURGERY

## 2022-12-01 VITALS
OXYGEN SATURATION: 96 % | DIASTOLIC BLOOD PRESSURE: 67 MMHG | SYSTOLIC BLOOD PRESSURE: 138 MMHG | RESPIRATION RATE: 18 BRPM | HEART RATE: 80 BPM | TEMPERATURE: 98.1 F

## 2022-12-01 DIAGNOSIS — G56.01 CARPAL TUNNEL SYNDROME ON RIGHT: Primary | ICD-10-CM

## 2022-12-01 RX ORDER — ONDANSETRON 2 MG/ML
4 INJECTION INTRAMUSCULAR; INTRAVENOUS EVERY 6 HOURS PRN
Status: DISCONTINUED | OUTPATIENT
Start: 2022-12-01 | End: 2022-12-01 | Stop reason: HOSPADM

## 2022-12-01 RX ORDER — SENNOSIDES 8.6 MG
650 CAPSULE ORAL EVERY 8 HOURS PRN
Qty: 30 TABLET | Refills: 0 | Status: SHIPPED | OUTPATIENT
Start: 2022-12-01

## 2022-12-01 RX ORDER — LIDOCAINE HYDROCHLORIDE AND EPINEPHRINE 10; 10 MG/ML; UG/ML
20 INJECTION, SOLUTION INFILTRATION; PERINEURAL ONCE
Status: DISCONTINUED | OUTPATIENT
Start: 2022-12-01 | End: 2022-12-01

## 2022-12-01 RX ORDER — TRAMADOL HYDROCHLORIDE 50 MG/1
50 TABLET ORAL EVERY 6 HOURS PRN
Status: DISCONTINUED | OUTPATIENT
Start: 2022-12-01 | End: 2022-12-01 | Stop reason: HOSPADM

## 2022-12-01 RX ORDER — MAGNESIUM HYDROXIDE 1200 MG/15ML
LIQUID ORAL AS NEEDED
Status: DISCONTINUED | OUTPATIENT
Start: 2022-12-01 | End: 2022-12-01 | Stop reason: HOSPADM

## 2022-12-01 RX ORDER — LIDOCAINE HYDROCHLORIDE AND EPINEPHRINE 10; 10 MG/ML; UG/ML
INJECTION, SOLUTION INFILTRATION; PERINEURAL
Status: COMPLETED
Start: 2022-12-01 | End: 2022-12-01

## 2022-12-01 RX ORDER — ACETAMINOPHEN 325 MG/1
650 TABLET ORAL EVERY 6 HOURS PRN
Status: DISCONTINUED | OUTPATIENT
Start: 2022-12-01 | End: 2022-12-01 | Stop reason: HOSPADM

## 2022-12-01 RX ADMIN — LIDOCAINE HYDROCHLORIDE,EPINEPHRINE BITARTRATE 17 ML: 10; .01 INJECTION, SOLUTION INFILTRATION; PERINEURAL at 09:00

## 2022-12-01 NOTE — H&P
H&P Exam - Orthopedics   Jameel Venegas 58 y o  female MRN: [id number]  Unit/Bed#: [facility] 05    Assessment/Plan   Assessment:  Right carpal tunnel syndrome    Plan:  Right endoscopic carpal tunnel release    History of Present Illness   HPI:  Jameel Venegas is a 58 y o  female who presents with bilateral hand numbness and tingling  She states it is more in the right compared to left  She states she has numbness and tingling into the median nerve distribution  She has tried conservative treatment without relief of her symptoms  She would like to proceed with surgical intervention  Sridhar Avilez     Historical Information  Review Of Systems:   · Skin: Normal  · Neuro: See HPI  · Musculoskeletal: See HPI  · 14 point review of systems negative except as stated above     Past Medical History:   Past Medical History:   Diagnosis Date   • PABLO (acute kidney injury) ([facility] ) 2021   • Allergic    • Anemia    • Anxiety    • Arthritis    • Asthma    • Breast lump     Resolved: 2017    • Chronic pain     back   • Diabetes mellitus ([facility] )    • Diverticulitis of colon    • GERD (gastroesophageal reflux disease)    • Hepatitis    • History of stomach ulcers    • HL (hearing loss)     Estimated   • Hyperlipidemia    • Hypertension    • Hypotension 2021   • Infectious viral hepatitis     Hepatitiss A   • Memory loss     Estimated   • Stomach problems        Past Surgical History:   Past Surgical History:   Procedure Laterality Date   • APPENDECTOMY     • BACK SURGERY     •  SECTION     • CHOLECYSTECTOMY     • GASTRIC BYPASS     • GASTRIC BYPASS     • INFUSION PUMP IMPLANTATION     • SHOULDER SURGERY     • SPINAL CORD STIMULATOR IMPLANT         Family History:  Family history reviewed and non-contributory  Family History   Problem Relation Age of Onset   • Diabetes Mother         Mellitus   • Hyperlipidemia Mother    • Hypertension Mother    • Colon cancer Mother    • Pancreatic cancer Mother    • Melanoma Mother    • Cancer Father         Bladder    • Alcohol abuse Father    • Lung cancer Father    • Prostate cancer Father    • Diabetes Brother         Mellitus   • Hyperlipidemia Brother    • Hypertension Brother    • Stomach cancer Paternal Grandmother    • Lung cancer Paternal Grandfather    • Diabetes Brother    • Breast cancer Maternal Aunt 32   • Arthritis Family    • Mental illness Neg Hx        Social History:  Social History     Socioeconomic History   • Marital status:      Spouse name: None   • Number of children: 3   • Years of education: None   • Highest education level: None   Occupational History   • None   Tobacco Use   • Smoking status: Former     Packs/day: 1 00     Years: 34 00     Pack years: 34 00     Types: Cigarettes     Start date: 1/1/1973     Quit date: 1/1/2007     Years since quitting: 15 9   • Smokeless tobacco: Never   • Tobacco comments:     15yrs ago   Vaping Use   • Vaping Use: Never used   Substance and Sexual Activity   • Alcohol use: No   • Drug use: No   • Sexual activity: Not Currently     Partners: Male     Birth control/protection: Post-menopausal   Other Topics Concern   • None   Social History Narrative   • None     Social Determinants of Health     Financial Resource Strain: Not on file   Food Insecurity: Not on file   Transportation Needs: Not on file   Physical Activity: Not on file   Stress: Not on file   Social Connections: Not on file   Intimate Partner Violence: Not on file   Housing Stability: Not on file       Allergies:    Allergies   Allergen Reactions   • Nsaids Other (See Comments)     Cannot take NSAIDS secondary to having gastric bypass   • Percocet [Oxycodone-Acetaminophen] GI Intolerance           Labs:  0   Lab Value Date/Time    HCT 34 0 (L) 09/13/2022 0901    HCT 36 7 12/11/2021 2107    HCT 34 8 11/05/2021 0459    HCT 41 3 11/04/2021 1117    HGB 10 7 (L) 09/13/2022 0901    HGB 11 1 (L) 12/11/2021 2107    HGB 10 5 (L) 11/05/2021 0459    HGB 12 5 11/04/2021 1117    INR 0 98 01/01/2019 1110    WBC 9 9 09/13/2022 0901    WBC 10 08 12/11/2021 2107    WBC 5 96 11/05/2021 0459    WBC 12 52 (H) 11/04/2021 1117    ESR 11 10/11/2019 1107       Meds:    Current Facility-Administered Medications:   •  lidocaine-epinephrine (XYLOCAINE/EPINEPHRINE) 1 %-1:100,000 injection 20 mL, 20 mL, Infiltration, Once, Lm Bass MD    Blood Culture:   Lab Results   Component Value Date    BLOODCX No Growth After 5 Days  01/01/2019       Wound Culture:   No results found for: WOUNDCULT    Ins and Outs:  No intake/output data recorded  Physical Exam  /67   Pulse 74   Temp (!) 97 2 °F (36 2 °C) (Temporal)   Resp 16   LMP  (LMP Unknown)   SpO2 93%   /67   Pulse 74   Temp (!) 97 2 °F (36 2 °C) (Temporal)   Resp 16   LMP  (LMP Unknown)   SpO2 93%   Gen: No acute distress, resting comfortably in bed  HEENT: Eyes clear, moist mucus membranes, hearing intact  Respiratory: No audible wheezing or stridor  Cardiovascular: Well Perfused peripherally, 2+ distal pulse  Abdomen: nondistended, no peritoneal signs  Ortho Exam: Right Carpal Tunnel Exam:    Negative thenar atrophy  Positive phalen's test  Positive carpal tunnel compression  Positive tinels over median nerve at the wrist   Opposition strength 5/5  Abduction strength 5/5  Neuro Exam:  Patient is neurovascular intact from median, ulnar and radial nerve distribution    Capillary refill less than 2 seconds    Lab Results: Reviewed  Imaging: Reviewed

## 2022-12-01 NOTE — OP NOTE
OPERATIVE REPORT  PATIENT NAME: Beatrice Franks  :  1960  MRN: [id number]  Pt Location: [facility] MAIN OR    SURGERY DATE: 22    Surgeon(s) and Role:     * Edil Diaz MD - Primary     * Lam Rodríguez PA-C - Assisting    Pre-Op Diagnosis:  Carpal tunnel syndrome on right [G56 01]    Post-Op Diagnosis:   Carpal tunnel syndrome on right [G56 01]    Procedure(s) (LRB):  Right endoscopic carpal tunnel release (Right)    Specimen(s):  * No orders in the log *    Estimated Blood Loss:   Minimal      Anesthesia Type:   Local    Operative Indications: The patient has a history of Carpal Tunnel Syndrome  right that was recalcitrant to conservative management  The decision was made to bring the patient to the operating room for Endoscopic Carpal Tunnel Release  right  Risks of the procedure were explained which include, but are not limited to bleeding; infection; damage to nerves, arteries,veins, tendons; scar; pain; need for reoperation; failure to give desired result; and risks of anaesthesia  All questions were answered to satisfaction and they were willing to proceed  Operative Findings:  Right carpal tunnel syndrome    Complications:   None    Procedure and Technique:  After the patient, site, and procedure were identified, the patient was brought into the operating room in a supine position  Local anaesthesia was adminstered in the preoperative holding area  A tourniquet was not used  The  right upper extremity was then prepped and drapped in a normal, sterile, orthopedic fashion  After reconfirmation of the patient, site, and surgical procedure, which was agreed upon by the entire surgical team, attention was turned to the right wrist   The sites of the proximal and distal incisions were marked    The anette of the proximal incision was placed horizontally at the midline of the wrist   The distal incision anette was longitudinal extending distally from the point of intersection of the line between the long finger and ring finger and the line along the distal border of the fully abducted thumb  The proximal incision was performed  Subcutaneous tissues were dissected  Then the transverse volar antebrachial fascia was perforated with a scalpel  The edges of the skin incision where retracted and the forearm fascia was incised for approximately 1 5 cm proximally with care taken to identify and protect the median nerve  Retractors were used to inspect the transverse carpal ligament distally  A curved Osorio dissector was used to glide under the transverse carpal ligament and superficial to the median nerve with confirmation via the washboard feeling  Then the curved Osorio was pushed into the palm toward the distal incision site  When the location of the distal skin anette was adequate, the distal incision was made  Then with retraction of the skin, further dissection and perforation of the palmar fascia was performed with the use of tenotomy scissors  The curved Osorio was guided from proximal to distal out the distal incisions without any twisting to allow for dilation of the tract  The curved Osorio was removed, and the cannula for the camera was inserted along the same tract, making sure to keep the alignment post on the cannula perpendicular to the plane of the hand without twisting  Then while keeping the wrist in extension, and holding the cannula of the camera in place, the wrist was placed on the hyperextension board  The scope was inserted distally, and a cotton-tip applicator was used proximally to clean the tract as well as the scope  A curved cutting knife was introduced from proximal to distal while keeping visualization with the use of the camera  Without twisting of the canula, the knife was used to cut the transverse carpal ligament completely, making sure there were no remnant fibers  Then after this was accomplished, the hand was removed from the extension block    Three maneuvers were used to confirm the full release of the transverse carpal ligament  First, the ease of twisting the trocar of the camera confirmed the release of the ligament  Second, the curved Osorio was introduced to make sure there were no remnant fibers that could be felt palmarly  Third, the scope was introduced again to visualize that the whole ligament was released proximally to distally  Additional confirmation of full release included retraction and inspection in the distal and proximal incisions to make sure there were no remnant fibers distally or proximally respectively  At the completion of the procedure, hemostasis was obtained with cautery and direct pressure  The wounds were copiously irrigated with sterile solution  The wounds were closed with Prolene  Sterile dressings were applied, including Xeroform, gauze, tweeners, webril, ACE  Please note, all sponge, needle, and instrument counts were correct prior to closure  Loupe magnification was utilized  The patient tolerated the procedure well       I was present for all critical portions of the procedure, A qualified resident physician was not available and A physician assistant was required during the procedure for retraction tissue handling,dissection and suturing    Patient Disposition:  APU and hemodynamically stable    SIGNATURE: Jocelyne De La Rosa MD  DATE: 12/01/22  TIME: 9:32 AM

## 2022-12-01 NOTE — DISCHARGE INSTRUCTIONS
Post Operative Instructions    You have had surgery on your arm today, please read and follow the information below:  Elevate your hand above your elbow during the next 24-48 hours to help with swelling  Place your hand and arm over your head with motion at your shoulder three times a day  Do not apply any cream/ointment/oil to your incisions including antibiotics  Do not soak your hands in standing water (dishwater, tubs, Jacuzzi's, pools, etc ) until given permission (typically 2-3 weeks after injury)    Call the office if you notice any:  Increased numbness or tingling of your hand or fingers that is not relieved with elevation  Increasing pain that is not controlled with medication  Difficulty chewing, breathing, swallowing  Chest pains or shortness of breath  Fever over 101 4 degrees  Bandage: Remove bandage after 5 days  Motion: Move fingers into a fist 5 times a day, DO NOT move any splinted fingers  Weight bearing status: Avoid heavy lifting (>5 pounds) with the extremity that was operated on until follow up appointment  Normal activities of daily living are OK  Ice: Ice for 10 minutes every hour as needed for swelling x 24 hours  Sling: No sling necessary  Medications:   Tylenol Extended Release 650 mg every 8 hours     Follow-up Appointment: 7-10 days  Please call the office if you have any questions or concerns regarding your post-operative care

## 2022-12-07 ENCOUNTER — OFFICE VISIT (OUTPATIENT)
Dept: FAMILY MEDICINE CLINIC | Facility: CLINIC | Age: 62
End: 2022-12-07

## 2022-12-07 VITALS
DIASTOLIC BLOOD PRESSURE: 76 MMHG | BODY MASS INDEX: 27.45 KG/M2 | HEIGHT: 64 IN | OXYGEN SATURATION: 98 % | HEART RATE: 74 BPM | WEIGHT: 160.8 LBS | SYSTOLIC BLOOD PRESSURE: 122 MMHG

## 2022-12-07 DIAGNOSIS — J45.30 MILD PERSISTENT ASTHMA WITHOUT COMPLICATION: ICD-10-CM

## 2022-12-07 DIAGNOSIS — Z79.4 TYPE 2 DIABETES MELLITUS WITH DIABETIC NEUROPATHY, WITH LONG-TERM CURRENT USE OF INSULIN (HCC): ICD-10-CM

## 2022-12-07 DIAGNOSIS — I10 BENIGN ESSENTIAL HYPERTENSION: ICD-10-CM

## 2022-12-07 DIAGNOSIS — Z00.00 ANNUAL PHYSICAL EXAM: Primary | ICD-10-CM

## 2022-12-07 DIAGNOSIS — K21.9 GERD WITHOUT ESOPHAGITIS: ICD-10-CM

## 2022-12-07 DIAGNOSIS — E78.5 HYPERLIPIDEMIA ASSOCIATED WITH TYPE 2 DIABETES MELLITUS (HCC): ICD-10-CM

## 2022-12-07 DIAGNOSIS — E11.69 HYPERLIPIDEMIA ASSOCIATED WITH TYPE 2 DIABETES MELLITUS (HCC): ICD-10-CM

## 2022-12-07 DIAGNOSIS — E11.40 TYPE 2 DIABETES MELLITUS WITH DIABETIC NEUROPATHY, WITH LONG-TERM CURRENT USE OF INSULIN (HCC): ICD-10-CM

## 2022-12-07 RX ORDER — UMECLIDINIUM BROMIDE AND VILANTEROL TRIFENATATE 62.5; 25 UG/1; UG/1
1 POWDER RESPIRATORY (INHALATION) DAILY
Qty: 180 BLISTER | Refills: 1 | Status: SHIPPED | OUTPATIENT
Start: 2022-12-07

## 2022-12-07 RX ORDER — LISINOPRIL 20 MG/1
10 TABLET ORAL 2 TIMES DAILY
Qty: 180 TABLET | Refills: 3 | Status: SHIPPED | OUTPATIENT
Start: 2022-12-07

## 2022-12-07 NOTE — PROGRESS NOTES
[address]    NAME: Shu Townsend  AGE: 58 y o  SEX: female  : 1960     DATE: 2022     Assessment and Plan:     Problem List Items Addressed This Visit    None      Immunizations and preventive care screenings were discussed with patient today  Appropriate education was printed on patient's after visit summary  Counseling:  Alcohol/drug use: discussed moderation in alcohol intake, the recommendations for healthy alcohol use, and avoidance of illicit drug use  Dental Health: discussed importance of regular tooth brushing, flossing, and dental visits  · Exercise: the importance of regular exercise/physical activity was discussed  Recommend exercise 3-5 times per week for at least 30 minutes  Depression Screening and Follow-up Plan: Patient was screened for depression during today's encounter  They screened negative with a PHQ-2 score of 0  No follow-ups on file  Chief Complaint:     Chief Complaint   Patient presents with   • Physical Exam      History of Present Illness:     Adult Annual Physical   Patient here for a comprehensive physical exam  The patient reports see list     Diet and Physical Activity  · Diet/Nutrition: well balanced diet, limited junk food and consuming 3-5 servings of fruits/vegetables daily  · Exercise: walking  Depression Screening  PHQ-2/9 Depression Screening    Little interest or pleasure in doing things: 0 - not at all  Feeling down, depressed, or hopeless: 0 - not at all  PHQ-2 Score: 0  PHQ-2 Interpretation: Negative depression screen       General Health  · Sleep: ambien prn  · Hearing: requires use of hearing aids  · Vision: wears glasses  · Dental: regular dental visits, brushes teeth once daily and flosses teeth occasionally  /GYN Health  · Patient is: postmenopausal  · Last menstrual period: -  · Contraceptive method: n/a       Review of Systems:     Review of Systems   Constitutional: Negative for activity change, appetite change, diaphoresis and fatigue  HENT: Negative for congestion, sinus pressure and sore throat  Respiratory: Negative for cough, chest tightness, shortness of breath and wheezing  Cardiovascular: Negative for chest pain, palpitations and leg swelling  Fast or slow heart rate   Gastrointestinal: Negative for abdominal pain, blood in stool, constipation, diarrhea, nausea and vomiting  Genitourinary: Negative for difficulty urinating, dysuria, frequency and hematuria  Musculoskeletal: Negative for arthralgias, gait problem, joint swelling and myalgias  Neurological: Negative for dizziness, light-headedness and headaches  Psychiatric/Behavioral: Negative for agitation, confusion, dysphoric mood and sleep disturbance  The patient is not nervous/anxious         Past Medical History:     Past Medical History:   Diagnosis Date   • PABLO (acute kidney injury) ([facility] ) 2021   • Allergic    • Anemia    • Anxiety    • Arthritis    • Asthma    • Breast lump     Resolved: 2017    • Chronic pain     back   • Diabetes mellitus ([facility] )    • Diverticulitis of colon    • GERD (gastroesophageal reflux disease)    • Hepatitis    • History of stomach ulcers    • HL (hearing loss)     Estimated   • Hyperlipidemia    • Hypertension    • Hypotension 2021   • Infectious viral hepatitis     Hepatitiss A   • Memory loss     Estimated   • Stomach problems       Past Surgical History:     Past Surgical History:   Procedure Laterality Date   • APPENDECTOMY     • BACK SURGERY     •  SECTION     • CHOLECYSTECTOMY     • GASTRIC BYPASS     • GASTRIC BYPASS     • INFUSION PUMP IMPLANTATION     • MS WRIST [employer] [place] LIG Right 2022    Procedure: Right endoscopic carpal tunnel release;  Surgeon: Therese Mcnally MD;  Location:  MAIN OR;  Service: Orthopedics   • SHOULDER SURGERY     • SPINAL CORD STIMULATOR IMPLANT  2020      Social History:     Social History     Socioeconomic History   • Marital status:      Spouse name: None   • Number of children: 3   • Years of education: None   • Highest education level: None   Occupational History   • Occupation: Disability   Tobacco Use   • Smoking status: Former     Packs/day: 1 00     Years: 34 00     Pack years: 34 00     Types: Cigarettes     Start date: 1/1/1973     Quit date: 1/1/2007     Years since quitting: 15 9   • Smokeless tobacco: Never   • Tobacco comments:     15yrs ago   Vaping Use   • Vaping Use: Never used   Substance and Sexual Activity   • Alcohol use: No   • Drug use: No   • Sexual activity: Not Currently     Partners: Male     Birth control/protection: Post-menopausal   Other Topics Concern   • None   Social History Narrative   • None     Social Determinants of Health     Financial Resource Strain: Not on file   Food Insecurity: Not on file   Transportation Needs: Not on file   Physical Activity: Not on file   Stress: Not on file   Social Connections: Not on file   Intimate Partner Violence: Not on file   Housing Stability: Not on file      Family History:     Family History   Problem Relation Age of Onset   • Diabetes Mother         Mellitus   • Hyperlipidemia Mother    • Hypertension Mother    • Colon cancer Mother    • Pancreatic cancer Mother    • Melanoma Mother    • Cancer Father         Bladder    • Alcohol abuse Father    • Lung cancer Father    • Prostate cancer Father    • Diabetes Brother         Mellitus   • Hyperlipidemia Brother    • Hypertension Brother    • Stomach cancer Paternal Grandmother    • Lung cancer Paternal Grandfather    • Diabetes Brother    • Breast cancer Maternal Aunt    • Arthritis Family    • Mental illness Neg Hx       Current Medications:     Current Outpatient Medications   Medication Sig Dispense Refill   • Accu-Chek FastClix Lancets MISC TEST TWO TIMES A  each 3   • Accu-Chek Guide test strip TEST TWO TIMES A  strip 3   • acetaminophen (TYLENOL) 650 mg CR tablet Take 1 tablet (650 mg total) by mouth every 8 (eight) hours as needed for mild pain 30 tablet 0   • albuterol (2 5 mg/3 mL) 0 083 % nebulizer solution Take 3 mL (2 5 mg total) by nebulization every 6 (six) hours as needed for wheezing or shortness of breath 300 mL 1   • albuterol (PROVENTIL HFA,VENTOLIN HFA) 90 mcg/act inhaler USE 2 INHALATIONS ORALLY   EVERY 6 HOURS AS NEEDED FORWHEEZING 25 5 g 1   • atorvastatin (LIPITOR) 40 mg tablet TAKE 1 TABLET DAILY 90 tablet 0   • Basaglar KwikPen 100 units/mL SOPN INJECT 33 UNITS            SUBCUTANEOUSLY AT BEDTIME (Patient taking differently: Inject 12 Units under the skin daily at bedtime) 15 mL 0   • Ferrous Fumarate 63 (20 Fe) MG TABS Take 65 mg by mouth daily     • HYDROcodone-acetaminophen (NORCO)  mg per tablet      • Insulin Pen Needle (B-D UF III MINI PEN NEEDLES) 31G X 5 MM MISC ONCE DAILY basal insulin injection 90 each 1   • lidocaine (XYLOCAINE) 5 % ointment apply topically to affected area three times a day     • lisinopril (ZESTRIL) 20 mg tablet TAKE 1 TABLET DAILY  REPLACE LOTREL DUE TO LOW  BLOOD PRESSURE 90 tablet 3   • LYRICA 200 MG capsule Take 200 mg by mouth 3 (three) times a day   0   • metFORMIN (GLUCOPHAGE) 1000 MG tablet TAKE 1 TABLET TWICE A  tablet 0   • mometasone (NASONEX) 50 mcg/act nasal spray USE 2 SPRAYS IN EACH       NOSTRIL DAILY 17 g 1   • montelukast (SINGULAIR) 10 mg tablet TAKE 1 TABLET DAILY AT     BEDTIME 90 tablet 1   • Morphine Sulfate (MORPHINE 0 05 MG/ML SYRINGE, NICU/PICU,, CMPD ORDER,)      • omeprazole (PriLOSEC) 40 MG capsule TAKE 1 CAPSULE EVERY       MORNING 90 capsule 3   • ondansetron (ZOFRAN) 4 mg tablet Take 1 tablet (4 mg total) by mouth every 8 (eight) hours as needed for nausea or vomiting 30 tablet 1   • sitaGLIPtin (Januvia) 100 mg tablet Take 1 tablet (100 mg total) by mouth in the morning   90 tablet 3   • traMADol HCl ER, Biphasic, 300 MG 24 hr tablet Take 300 mg by mouth daily     • triamcinolone (KENALOG) 0 1 % cream APPLY TO AFFECTED AREA TWICE A DAY 80 g 1   • VITAMIN D PO      • Ziconotide Acetate (PRIALT IT) by Intrathecal route continuous     • zolpidem (AMBIEN) 5 mg tablet Take 1 tablet (5 mg total) by mouth daily at bedtime as needed for sleep 30 tablet 2     No current facility-administered medications for this visit  Allergies: Allergies   Allergen Reactions   • Nsaids Other (See Comments)     Cannot take NSAIDS secondary to having gastric bypass   • Percocet [Oxycodone-Acetaminophen] GI Intolerance      Physical Exam:     /76 (BP Location: Right arm, Patient Position: Sitting, Cuff Size: Standard)   Pulse 74   Ht 5' 4" (1 626 m)   Wt 72 9 kg (160 lb 12 8 oz)   LMP  (LMP Unknown)   SpO2 98%   BMI 27 60 kg/m²     Physical Exam  Vitals and nursing note reviewed  Constitutional:       General: She is not in acute distress  Appearance: She is not ill-appearing  HENT:      Head: Normocephalic and atraumatic  Right Ear: Tympanic membrane, ear canal and external ear normal       Left Ear: Tympanic membrane, ear canal and external ear normal       Nose: Nose normal       Mouth/Throat:      Pharynx: No posterior oropharyngeal erythema  Eyes:      General:         Right eye: No discharge  Left eye: No discharge  Extraocular Movements: Extraocular movements intact  Conjunctiva/sclera: Conjunctivae normal       Pupils: Pupils are equal, round, and reactive to light  Neck:      Thyroid: No thyromegaly  Vascular: No carotid bruit  Trachea: No tracheal deviation  Cardiovascular:      Rate and Rhythm: Normal rate and regular rhythm  Heart sounds: Normal heart sounds  No murmur heard  Pulmonary:      Effort: Pulmonary effort is normal  No respiratory distress  Breath sounds: Normal breath sounds  No wheezing     Abdominal:      General: Bowel sounds are normal  There is no distension  Palpations: Abdomen is soft  There is no mass  Tenderness: There is no abdominal tenderness  There is no guarding  Musculoskeletal:      Cervical back: Normal range of motion and neck supple  Right lower leg: No edema  Left lower leg: No edema  Lymphadenopathy:      Cervical: No cervical adenopathy  Skin:     Findings: No rash  Neurological:      General: No focal deficit present  Mental Status: She is alert and oriented to person, place, and time  Cranial Nerves: No cranial nerve deficit        Deep Tendon Reflexes: Reflexes normal    Psychiatric:         Mood and Affect: Mood normal          Behavior: Behavior normal           Leta Cannon MD  [id number]

## 2022-12-07 NOTE — PROGRESS NOTES
Depression Screening and Follow-up Plan: Patient was screened for depression during today's encounter  They screened negative with a PHQ-2 score of 0  Subjective:   Chief Complaint   Patient presents with   • Physical Exam        Patient ID: Brandon Grigsby is a 58 y o  female  Medical management-multiple issues  Medications reviewed  Recent labs were reviewed  1) diabetes-good control  Patient having some low sugars  Currently taking Basaglar 12 units daily  2) hyperlipidemia-good levels  3) asthma-stable with current meds  4) hypertension-good control  5) GERD-ongoing symptoms  Does see GI  The following portions of the patient's history were reviewed and updated as appropriate: allergies, current medications, past family history, past medical history, past social history, past surgical history and problem list     Review of Systems   Constitutional: Negative for appetite change, chills, diaphoresis and fever  HENT: Negative for congestion, ear pain, rhinorrhea, sinus pressure and sore throat  Eyes: Negative for discharge, redness and itching  Respiratory: Negative for cough, shortness of breath and wheezing  Cardiovascular: Negative for chest pain and palpitations  Rapid or slow heart rate   Gastrointestinal: Negative for diarrhea, nausea and vomiting  Objective:  Vitals:    12/07/22 0730   BP: 122/76   BP Location: Right arm   Patient Position: Sitting   Cuff Size: Standard   Pulse: 74   SpO2: 98%   Weight: 72 9 kg (160 lb 12 8 oz)   Height: 5' 4" (1 626 m)      Physical Exam  Vitals reviewed  Constitutional:       General: She is not in acute distress  Appearance: She is well-developed  She is not ill-appearing  HENT:      Head: Normocephalic and atraumatic  Right Ear: Tympanic membrane and external ear normal  No drainage  Left Ear: Tympanic membrane normal  No drainage  Nose: No congestion        Mouth/Throat:      Pharynx: No oropharyngeal exudate or posterior oropharyngeal erythema  Eyes:      General:         Right eye: No discharge  Left eye: No discharge  Extraocular Movements: Extraocular movements intact  Conjunctiva/sclera: Conjunctivae normal       Pupils: Pupils are equal, round, and reactive to light  Neck:      Thyroid: No thyromegaly  Cardiovascular:      Rate and Rhythm: Normal rate and regular rhythm  Heart sounds: Normal heart sounds  Pulmonary:      Effort: Pulmonary effort is normal  No respiratory distress  Breath sounds: No wheezing or rales  Musculoskeletal:      Cervical back: Normal range of motion and neck supple  Right lower leg: No edema  Left lower leg: No edema  Lymphadenopathy:      Cervical: No cervical adenopathy  Skin:     Findings: No rash  Neurological:      Mental Status: She is alert  Psychiatric:         Behavior: Behavior normal            Assessment/Plan:    No problem-specific Assessment & Plan notes found for this encounter  Diagnoses and all orders for this visit:    Annual physical exam    Type 2 diabetes mellitus with diabetic neuropathy, with long-term current use of insulin (HCC)  -     lisinopril (ZESTRIL) 20 mg tablet; Take 0 5 tablets (10 mg total) by mouth 2 (two) times a day    Hyperlipidemia associated with type 2 diabetes mellitus (HCC)    Mild persistent asthma without complication  -     umeclidinium-vilanterol (Anoro Ellipta) 62 5-25 mcg/actuation inhaler; Inhale 1 puff daily    GERD without esophagitis    Benign essential hypertension  -     lisinopril (ZESTRIL) 20 mg tablet; Take 0 5 tablets (10 mg total) by mouth 2 (two) times a day          Continue current medication  Trial of adding Anoro 1 inhalation daily to decrease use of albuterol nebulizer  Prescription for lisinopril 10 mg twice a day as patient feels better and blood pressure better controlled with this dosing    Decrease basal insulin to 10 units and monitor blood sugars  Repeat labs in March 2023  Please call so I can review chart ordered appropriately  Consider COVID-19 booster at pharmacy

## 2022-12-07 NOTE — PATIENT INSTRUCTIONS
Continue current medication  Trial of adding Anoro 1 inhalation daily to decrease use of albuterol nebulizer  Prescription for lisinopril 10 mg twice a day as patient feels better and blood pressure better controlled with this dosing  Decrease basal insulin to 10 units and monitor blood sugars  Repeat labs in March 2023  Please call so I can review chart ordered appropriately  Consider COVID-19 booster at pharmacy  Wellness Visit for Adults   AMBULATORY CARE:   A wellness visit  is when you see your healthcare provider to get screened for health problems  Your healthcare provider will also give you advice on how to stay healthy  Write down your questions so you remember to ask them  Ask your healthcare provider how often you should have a wellness visit  What happens at a wellness visit:  Your healthcare provider will ask about your health, and your family history of health problems  This includes high blood pressure, heart disease, and cancer  He or she will ask if you have symptoms that concern you, if you smoke, and about your mood  You may also be asked about your intake of medicines, supplements, food, and alcohol  Any of the following may be done: Your weight  will be checked  Your height may also be checked so your body mass index (BMI) can be calculated  Your BMI shows if you are at a healthy weight  Your blood pressure  and heart rate will be checked  Your temperature may also be checked  Blood and urine tests  may be done  Blood tests may be done to check your cholesterol levels  Abnormal cholesterol levels increase your risk for heart disease and stroke  You may also need a blood or urine test to check for diabetes if you are at increased risk  Urine tests may be done to look for signs of an infection or kidney disease  A physical exam  includes checking your heartbeat and lungs with a stethoscope  Your healthcare provider may also check your skin to look for sun damage      Screening tests  may be recommended  A screening test is done to check for diseases that may not cause symptoms  The screening tests you may need depend on your age, gender, family history, and lifestyle habits  For example, colorectal screening may be recommended if you are 48years old or older  Screening tests you need if you are a woman:   A Pap smear  is used to screen for cervical cancer  Pap smears are usually done every 3 to 5 years depending on your age  You may need them more often if you have had abnormal Pap smear test results in the past  Ask your healthcare provider how often you should have a Pap smear  A mammogram  is an x-ray of your breasts to screen for breast cancer  Experts recommend mammograms every 2 years starting at age 48 years  You may need a mammogram at age 52 years or younger if you have an increased risk for breast cancer  Talk to your healthcare provider about when you should start having mammograms and how often you need them  Vaccines you may need:   Get an influenza vaccine  every year  The influenza vaccine protects you from the flu  Several types of viruses cause the flu  The viruses change over time, so new vaccines are made each year  Get a tetanus-diphtheria (Td) booster vaccine  every 10 years  This vaccine protects you against tetanus and diphtheria  Tetanus is a severe infection that may cause painful muscle spasms and lockjaw  Diphtheria is a severe bacterial infection that causes a thick covering in the back of your mouth and throat  Get a human papillomavirus (HPV) vaccine  if you are female and aged 23 to 32 or male 23 to 24 and never received it  This vaccine protects you from HPV infection  HPV is the most common infection spread by sexual contact  HPV may also cause vaginal, penile, and anal cancers  Get a pneumococcal vaccine  if you are aged 72 years or older  The pneumococcal vaccine is an injection given to protect you from pneumococcal disease  Pneumococcal disease is an infection caused by pneumococcal bacteria  The infection may cause pneumonia, meningitis, or an ear infection  Get a shingles vaccine  if you are 60 or older, even if you have had shingles before  The shingles vaccine is an injection to protect you from the varicella-zoster virus  This is the same virus that causes chickenpox  Shingles is a painful rash that develops in people who had chickenpox or have been exposed to the virus  How to eat healthy:  My Plate is a model for planning healthy meals  It shows the types and amounts of foods that should go on your plate  Fruits and vegetables make up about half of your plate, and grains and protein make up the other half  A serving of dairy is included on the side of your plate  The amount of calories and serving sizes you need depends on your age, gender, weight, and height  Examples of healthy foods are listed below:  Eat a variety of vegetables  such as dark green, red, and orange vegetables  You can also include canned vegetables low in sodium (salt) and frozen vegetables without added butter or sauces  Eat a variety of fresh fruits , canned fruit in 100% juice, frozen fruit, and dried fruit  Include whole grains  At least half of the grains you eat should be whole grains  Examples include whole-wheat bread, wheat pasta, brown rice, and whole-grain cereals such as oatmeal     Eat a variety of protein foods such as seafood (fish and shellfish), lean meat, and poultry without skin (turkey and chicken)  Examples of lean meats include pork leg, shoulder, or tenderloin, and beef round, sirloin, tenderloin, and extra lean ground beef  Other protein foods include eggs and egg substitutes, beans, peas, soy products, nuts, and seeds  Choose low-fat dairy products such as skim or 1% milk or low-fat yogurt, cheese, and cottage cheese  Limit unhealthy fats  such as butter, hard margarine, and shortening         Exercise:  Exercise at least 30 minutes per day on most days of the week  Some examples of exercise include walking, biking, dancing, and swimming  You can also fit in more physical activity by taking the stairs instead of the elevator or parking farther away from stores  Include muscle strengthening activities 2 days each week  Regular exercise provides many health benefits  It helps you manage your weight, and decreases your risk for type 2 diabetes, heart disease, stroke, and high blood pressure  Exercise can also help improve your mood  Ask your healthcare provider about the best exercise plan for you  General health and safety guidelines:   Do not smoke  Nicotine and other chemicals in cigarettes and cigars can cause lung damage  Ask your healthcare provider for information if you currently smoke and need help to quit  E-cigarettes or smokeless tobacco still contain nicotine  Talk to your healthcare provider before you use these products  Limit alcohol  A drink of alcohol is 12 ounces of beer, 5 ounces of wine, or 1½ ounces of liquor  Lose weight, if needed  Being overweight increases your risk of certain health conditions  These include heart disease, high blood pressure, type 2 diabetes, and certain types of cancer  Protect your skin  Do not sunbathe or use tanning beds  Use sunscreen with a SPF 15 or higher  Apply sunscreen at least 15 minutes before you go outside  Reapply sunscreen every 2 hours  Wear protective clothing, hats, and sunglasses when you are outside  Drive safely  Always wear your seatbelt  Make sure everyone in your car wears a seatbelt  A seatbelt can save your life if you are in an accident  Do not use your cell phone when you are driving  This could distract you and cause an accident  Pull over if you need to make a call or send a text message  Practice safe sex  Use latex condoms if are sexually active and have more than one partner   Your healthcare provider may recommend screening tests for sexually transmitted infections (STIs)  Wear helmets, lifejackets, and protective gear  Always wear a helmet when you ride a bike or motorcycle, go skiing, or play sports that could cause a head injury  Wear protective equipment when you play sports  Wear a lifejacket when you are on a boat or doing water sports  © Copyright [employer] 2022 Information is for End User's use only and may not be sold, redistributed or otherwise used for commercial purposes  All illustrations and images included in CareNotes® are the copyrighted property of [employer]  or [facility] [place]   The above information is an  only  It is not intended as medical advice for individual conditions or treatments  Talk to your doctor, nurse or pharmacist before following any medical regimen to see if it is safe and effective for you

## 2022-12-11 DIAGNOSIS — J01.00 ACUTE NON-RECURRENT MAXILLARY SINUSITIS: ICD-10-CM

## 2022-12-11 RX ORDER — MOMETASONE FUROATE 50 UG/1
SPRAY, METERED NASAL
Qty: 17 G | Refills: 1 | Status: SHIPPED | OUTPATIENT
Start: 2022-12-11

## 2022-12-12 ENCOUNTER — OFFICE VISIT (OUTPATIENT)
Dept: OBGYN CLINIC | Facility: CLINIC | Age: 62
End: 2022-12-12

## 2022-12-12 VITALS
BODY MASS INDEX: 27.31 KG/M2 | DIASTOLIC BLOOD PRESSURE: 87 MMHG | HEIGHT: 64 IN | SYSTOLIC BLOOD PRESSURE: 127 MMHG | WEIGHT: 160 LBS | HEART RATE: 75 BPM

## 2022-12-12 DIAGNOSIS — G56.01 CARPAL TUNNEL SYNDROME ON RIGHT: Primary | ICD-10-CM

## 2022-12-19 ENCOUNTER — OFFICE VISIT (OUTPATIENT)
Dept: UROLOGY | Facility: HOSPITAL | Age: 62
End: 2022-12-19

## 2022-12-19 VITALS
BODY MASS INDEX: 27.81 KG/M2 | OXYGEN SATURATION: 96 % | SYSTOLIC BLOOD PRESSURE: 166 MMHG | HEART RATE: 78 BPM | DIASTOLIC BLOOD PRESSURE: 80 MMHG | WEIGHT: 162 LBS

## 2022-12-19 DIAGNOSIS — R30.0 DYSURIA: Primary | ICD-10-CM

## 2022-12-19 NOTE — PROGRESS NOTES
12/19/22    Janessa An   1960   [id number]     Assessment  1 Dysuria  2 Atrophic vaginitis     Discussion/Plan  1 Dysuria              Hemoglobin A1c 5 6%              Renal US with PVR Bilateral renal cortical cysts at least one with thin internal septation on the left  No hydronephrosis  No renal colic Prevoid: 944 5 Measured post void volume 507 mL  Consider urodynamics, cystoscopy, or pelvic physical therapy    Double void, hydration with water, avoid constipation               UA micro and culture - negative    BUN 26, Cr0 77, eGFR 87  2 Atrophic vaginitis              Replens or coconut oil      She does not wish to be more aggressive with care  We will monitor PVR and BMP every 6 months  Monitor for infection  We discussed urodynamics, cystoscopy, CIC or pelvic PT as options in her care  Return in 6 months or sooner if needed  Subjective  HPI   Breonna Jung is a 58year old female who presents in follow up for evaluation of dysuria and vaginal dryness  She endorses a sensation of incomplete emptying and "fullness" in her urethra  She double voids to empty  Renal US confirmed incomplete emptying with large PVR  She has history of kidney stones and UTI  She endorses constipation issues that she regulates with her diet  She consumes 64 oz water daily  She has an implanted pain pump of Morphine in her left abdomen and a spinal stimulator  If the pain pump helps alleviate her pain, she will be able to wean off oral medications  She has chronic pain in her back, spine and legs  Her father had bladder cancer and he was a smoker  Patient quit smoking 14 years ago  She smoked 1 ppd x 30 years  UTI history shows ESBL E coli       Review of Systems - History obtained from chart review and the patient  General ROS: negative  Psychological ROS: negative   Respiratory ROS: no cough, shortness of breath, or wheezing  Cardiovascular ROS: no chest pain or dyspnea on exertion  Gastrointestinal ROS: no abdominal pain, change in bowel habits, or black or bloody stools  Genito-Urinary ROS: positive for - dysuria   Musculoskeletal ROS: negative  Neurological ROS: negative  Dermatological ROS: negative       Objective  Physical Exam  Vitals and nursing note reviewed  Constitutional:       General: She is awake  She is not in acute distress  Appearance: Normal appearance  She is well-developed, well-groomed and normal weight  She is not ill-appearing, toxic-appearing or diaphoretic  HENT:      Head: Normocephalic and atraumatic  Pulmonary:      Effort: Pulmonary effort is normal  No respiratory distress  Abdominal:      Tenderness: There is no right CVA tenderness or left CVA tenderness  Musculoskeletal:         General: Normal range of motion  Cervical back: Normal range of motion and neck supple  Skin:     General: Skin is warm and dry  Coloration: Skin is pale  Neurological:      General: No focal deficit present  Mental Status: She is alert and oriented to person, place, and time  Mental status is at baseline  Psychiatric:         Attention and Perception: Attention normal          Mood and Affect: Mood normal          Speech: Speech normal          Behavior: Behavior normal  Behavior is cooperative  Thought Content: Thought content normal          Cognition and Memory: Cognition normal          Judgment: Judgment normal            RENAL ULTRASOUND WITH PVR     INDICATION:   R30 0: Dysuria      COMPARISON: None     TECHNIQUE:   Ultrasound of the retroperitoneum was performed with a curvilinear transducer utilizing volumetric sweeps and still imaging techniques       FINDINGS:     KIDNEYS:  Symmetric and normal size  Right kidney:  12 2 x 4 5 x 5 7 cm  Volume 163 4 mL  Left kidney:  11 7 x 6 8 x 5 4 cm  Volume 223 7 mL     Right kidney  Normal echogenicity and contour  No mass is identified  Upper pole simple cyst measuring 0 6 cm    Upper interpolar simple cyst measuring 0 6 cm  No hydronephrosis  No shadowing calculi  No perinephric fluid collections      Left kidney  Normal echogenicity and contour  No mass is identified  Upper pole simple cyst measuring 1 5 cm  Lower pole thinly septated cyst measuring 1 4 cm  No hydronephrosis  No shadowing calculi  No perinephric fluid collections      URETERS:  Nonvisualized      BLADDER:   Normally distended  Prevoid bladder volume 435 mL  No focal thickening or mass lesions  Bilateral ureteral jets detected  Prevoid: 435 1   Measured post void volume 507 mL         IMPRESSION:  Bilateral renal cortical cysts at least one with thin internal septation on the left  No hydronephrosis  No renal colic  Post void bladder volume is larger than the prevoid bladder volume  CHITRA Maynard     I have spent 15 minutes with Patient  today in which greater than 50% of this time was spent in counseling/coordination of care regarding Diagnostic results

## 2022-12-20 ENCOUNTER — RA CDI HCC (OUTPATIENT)
Dept: OTHER | Facility: HOSPITAL | Age: 62
End: 2022-12-20

## 2022-12-20 NOTE — PROGRESS NOTES
Leslie[address]en [address] [facility] DOS 9/30/21    Neurology    G62 9 documented and billed        As per MD "likley related to history of dm" E11 40 on problem list, not billed on visit

## 2022-12-29 ENCOUNTER — TELEPHONE (OUTPATIENT)
Dept: NEUROLOGY | Facility: CLINIC | Age: 62
End: 2022-12-29

## 2023-01-02 DIAGNOSIS — E11.9 TYPE 2 DIABETES MELLITUS WITHOUT COMPLICATION, WITHOUT LONG-TERM CURRENT USE OF INSULIN (HCC): ICD-10-CM

## 2023-01-02 DIAGNOSIS — E78.5 HYPERLIPIDEMIA ASSOCIATED WITH TYPE 2 DIABETES MELLITUS (HCC): ICD-10-CM

## 2023-01-02 DIAGNOSIS — J45.30 MILD PERSISTENT ASTHMA WITHOUT COMPLICATION: ICD-10-CM

## 2023-01-02 DIAGNOSIS — E11.69 HYPERLIPIDEMIA ASSOCIATED WITH TYPE 2 DIABETES MELLITUS (HCC): ICD-10-CM

## 2023-01-02 RX ORDER — MONTELUKAST SODIUM 10 MG/1
TABLET ORAL
Qty: 90 TABLET | Refills: 1 | Status: SHIPPED | OUTPATIENT
Start: 2023-01-02

## 2023-01-02 RX ORDER — SITAGLIPTIN 100 MG/1
TABLET, FILM COATED ORAL
Qty: 90 TABLET | Refills: 3 | Status: SHIPPED | OUTPATIENT
Start: 2023-01-02

## 2023-01-02 RX ORDER — ATORVASTATIN CALCIUM 40 MG/1
TABLET, FILM COATED ORAL
Qty: 90 TABLET | Refills: 0 | Status: SHIPPED | OUTPATIENT
Start: 2023-01-02

## 2023-01-16 ENCOUNTER — TELEPHONE (OUTPATIENT)
Dept: NEUROLOGY | Facility: CLINIC | Age: 63
End: 2023-01-16

## 2023-01-16 NOTE — TELEPHONE ENCOUNTER
Fax received from [facility] requesting medical records  Scanned into media and sent to [facility]

## 2023-01-22 DIAGNOSIS — J45.30 MILD PERSISTENT ASTHMA, UNSPECIFIED WHETHER COMPLICATED: ICD-10-CM

## 2023-01-23 ENCOUNTER — OFFICE VISIT (OUTPATIENT)
Dept: FAMILY MEDICINE CLINIC | Facility: CLINIC | Age: 63
End: 2023-01-23

## 2023-01-23 VITALS
WEIGHT: 156 LBS | TEMPERATURE: 97.5 F | BODY MASS INDEX: 26.78 KG/M2 | HEART RATE: 95 BPM | OXYGEN SATURATION: 95 % | SYSTOLIC BLOOD PRESSURE: 140 MMHG | DIASTOLIC BLOOD PRESSURE: 65 MMHG

## 2023-01-23 DIAGNOSIS — I10 BENIGN ESSENTIAL HYPERTENSION: ICD-10-CM

## 2023-01-23 DIAGNOSIS — F11.20 NARCOTIC DEPENDENCY, CONTINUOUS (HCC): ICD-10-CM

## 2023-01-23 DIAGNOSIS — E11.69 HYPERLIPIDEMIA ASSOCIATED WITH TYPE 2 DIABETES MELLITUS (HCC): ICD-10-CM

## 2023-01-23 DIAGNOSIS — E78.5 HYPERLIPIDEMIA ASSOCIATED WITH TYPE 2 DIABETES MELLITUS (HCC): ICD-10-CM

## 2023-01-23 DIAGNOSIS — I10 ELEVATED BLOOD PRESSURE READING WITH DIAGNOSIS OF HYPERTENSION: Primary | ICD-10-CM

## 2023-01-23 NOTE — PROGRESS NOTES
[facility]        NAME: Jamia Guajardo is a 61 y o  female  : 1960    MRN: [id number]  DATE: 2023  TIME: 11:48 AM    Assessment and Plan   Elevated blood pressure reading with diagnosis of hypertension [I10]  1  Elevated blood pressure reading with diagnosis of hypertension  amLODIPine (NORVASC) 2 5 mg tablet      2  Narcotic dependency, continuous ([address] )        3  Hyperlipidemia associated with type 2 diabetes mellitus ([address] )        4  Benign essential hypertension  amLODIPine (NORVASC) 2 5 mg tablet            Patient Instructions     Patient Instructions     As per my discussion with Dr Booker Schrader, Stop Lisinopril  Start Amlodipine 2 5 mg daily  Take with food at dinnertime  Continue to monitor BP and f/up in 2 weeks for BP/Medication check  Call with problems/concerns  Chief Complaint     Chief Complaint   Patient presents with   • Hypertension     Med check         History of Present Illness       Here for BP check  BP stable today in office 140/65  Readings range from 140s-170s/50s-80s  She had a couple readings 190s  She also had a few low readings 80s/50s  She denies chest pain, no shortness of breath  Hx of chronic pain-has a morphine pain pump  Hx of type 2 diabetes,  hyperlipidemia-taking atorvastatin 40 mg daily  Last HA1C 5 6  Patient was on Lotrel before but it was stopped when they changed the pain medication in her pain pump since Bps at that time were running low  Pain pump was changed to morphine last month and BP readings were high  Dr Booker Schrader changed her Lisinopril to 10 mg BID  Review of Systems   Review of Systems   Constitutional: Negative for activity change and diaphoresis  Eyes: Negative for visual disturbance  Respiratory: Negative for chest tightness and shortness of breath  Cardiovascular: Negative for chest pain, palpitations and leg swelling  Genitourinary: Negative for difficulty urinating     Skin: Negative for color change and pallor  Neurological: Negative for dizziness and weakness           Current Medications       Current Outpatient Medications:   •  Accu-Chek FastClix Lancets MISC, TEST TWO TIMES A DAY, Disp: 204 each, Rfl: 3  •  Accu-Chek Guide test strip, TEST TWO TIMES A DAY, Disp: 100 strip, Rfl: 3  •  albuterol (2 5 mg/3 mL) 0 083 % nebulizer solution, Take 3 mL (2 5 mg total) by nebulization every 6 (six) hours as needed for wheezing or shortness of breath, Disp: 300 mL, Rfl: 1  •  albuterol (PROVENTIL HFA,VENTOLIN HFA) 90 mcg/act inhaler, USE 2 INHALATIONS ORALLY   EVERY 6 HOURS AS NEEDED FORWHEEZING, Disp: 25 5 g, Rfl: 1  •  amLODIPine (NORVASC) 2 5 mg tablet, Take 1 tablet (2 5 mg total) by mouth daily, Disp: 30 tablet, Rfl: 1  •  atorvastatin (LIPITOR) 40 mg tablet, TAKE 1 TABLET DAILY, Disp: 90 tablet, Rfl: 0  •  Ferrous Fumarate 63 (20 Fe) MG TABS, Take 65 mg by mouth daily, Disp: , Rfl:   •  HYDROcodone-acetaminophen (NORCO)  mg per tablet, , Disp: , Rfl:   •  Insulin Pen Needle (B-D UF III MINI PEN NEEDLES) 31G X 5 MM MISC, ONCE DAILY basal insulin injection, Disp: 90 each, Rfl: 1  •  Januvia 100 MG tablet, TAKE 1 TABLET IN THE       MORNING, Disp: 90 tablet, Rfl: 3  •  lidocaine (XYLOCAINE) 5 % ointment, apply topically to affected area three times a day, Disp: , Rfl:   •  LYRICA 200 MG capsule, Take 200 mg by mouth 3 (three) times a day , Disp: , Rfl: 0  •  metFORMIN (GLUCOPHAGE) 1000 MG tablet, TAKE 1 TABLET TWICE A DAY, Disp: 180 tablet, Rfl: 0  •  mometasone (NASONEX) 50 mcg/act nasal spray, USE 2 SPRAYS IN EACH       NOSTRIL DAILY, Disp: 17 g, Rfl: 1  •  montelukast (SINGULAIR) 10 mg tablet, TAKE 1 TABLET AT BEDTIME, Disp: 90 tablet, Rfl: 1  •  Morphine Sulfate (MORPHINE 0 05 MG/ML SYRINGE, NICU/PICU,, CMPD ORDER,), , Disp: , Rfl:   •  omeprazole (PriLOSEC) 40 MG capsule, TAKE 1 CAPSULE EVERY       MORNING, Disp: 90 capsule, Rfl: 3  •  ondansetron (ZOFRAN) 4 mg tablet, Take 1 tablet (4 mg total) by mouth every 8 (eight) hours as needed for nausea or vomiting, Disp: 30 tablet, Rfl: 1  •  traMADol HCl ER, Biphasic, 300 MG 24 hr tablet, Take 300 mg by mouth daily, Disp: , Rfl:   •  umeclidinium-vilanterol (Anoro Ellipta) 62 5-25 mcg/actuation inhaler, Inhale 1 puff daily, Disp: 180 blister, Rfl: 1  •  VITAMIN D PO, , Disp: , Rfl:   •  zolpidem (AMBIEN) 5 mg tablet, Take 1 tablet (5 mg total) by mouth daily at bedtime as needed for sleep, Disp: 30 tablet, Rfl: 2  •  acetaminophen (TYLENOL) 650 mg CR tablet, Take 1 tablet (650 mg total) by mouth every 8 (eight) hours as needed for mild pain (Patient not taking: Reported on 1/23/2023), Disp: 30 tablet, Rfl: 0  •  Basaglar KwikPen 100 units/mL SOPN, INJECT 33 UNITS            SUBCUTANEOUSLY AT BEDTIME (Patient taking differently: Inject 12 Units under the skin daily at bedtime), Disp: 15 mL, Rfl: 0  •  triamcinolone (KENALOG) 0 1 % cream, APPLY TO AFFECTED AREA TWICE A DAY (Patient not taking: Reported on 1/23/2023), Disp: 80 g, Rfl: 1    Current Allergies     Allergies as of 01/23/2023 - Reviewed 01/23/2023   Allergen Reaction Noted   • Nsaids Other (See Comments) 09/23/2017   • Percocet [oxycodone-acetaminophen] GI Intolerance 09/23/2017            The following portions of the patient's history were reviewed and updated as appropriate: allergies, current medications, past family history, past medical history, past social history, past surgical history and problem list      Past Medical History:   Diagnosis Date   • PABLO (acute kidney injury) ([facility] ) 11/04/2021   • Allergic    • Anemia    • Anxiety    • Arthritis    • Asthma    • Breast lump     Resolved: 8/1/2017    • Chronic pain     back   • Diabetes mellitus [facility] )    • Diverticulitis of colon    • GERD (gastroesophageal reflux disease)    • Hepatitis    • History of stomach ulcers    • HL (hearing loss) 2015    Estimated   • Hyperlipidemia    • Hypertension    • Hypotension 11/04/2021   • Infectious viral hepatitis     Hepatitiss A   • Memory loss     Estimated   • Stomach problems        Past Surgical History:   Procedure Laterality Date   • APPENDECTOMY     • BACK SURGERY     •  SECTION     • CHOLECYSTECTOMY     • GASTRIC BYPASS     • GASTRIC BYPASS     • INFUSION PUMP IMPLANTATION     • AL NDSC WRST SURG W/RLS TRANSVRS CARPL LIGM Right 2022    Procedure: Right endoscopic carpal tunnel release;  Surgeon: Evelyne Johnson MD;  Location:  MAIN OR;  Service: Orthopedics   • SHOULDER SURGERY     • SPINAL CORD STIMULATOR IMPLANT         Family History   Problem Relation Age of Onset   • Diabetes Mother         Mellitus   • Hyperlipidemia Mother    • Hypertension Mother    • Colon cancer Mother    • Pancreatic cancer Mother    • Melanoma Mother    • Cancer Father         Bladder    • Alcohol abuse Father    • Lung cancer Father    • Prostate cancer Father    • Diabetes Brother         Mellitus   • Hyperlipidemia Brother    • Hypertension Brother    • Stomach cancer Paternal Grandmother    • Lung cancer Paternal Grandfather    • Diabetes Brother    • Breast cancer Maternal Aunt    • Arthritis Family    • Mental illness Neg Hx          Medications have been verified  Objective   /65 (BP Location: Left arm, Patient Position: Sitting, Cuff Size: Standard)   Pulse 95   Temp 97 5 °F (36 4 °C) (Tympanic)   Wt 70 8 kg (156 lb)   LMP  (LMP Unknown)   SpO2 95%   BMI 26 78 kg/m²        Physical Exam     Physical Exam  Vitals and nursing note reviewed  Constitutional:       General: She is not in acute distress  Appearance: Normal appearance  She is not ill-appearing, toxic-appearing or diaphoretic  HENT:      Head: Normocephalic  Eyes:      Extraocular Movements: Extraocular movements intact  Cardiovascular:      Rate and Rhythm: Normal rate and regular rhythm  Heart sounds: Normal heart sounds  No murmur heard  No friction rub  No gallop     Pulmonary: Effort: Pulmonary effort is normal  No respiratory distress  Breath sounds: Normal breath sounds  No wheezing or rhonchi  Chest:      Chest wall: No tenderness  Musculoskeletal:         General: Normal range of motion  Cervical back: Normal range of motion and neck supple  Skin:     General: Skin is warm and dry  Coloration: Skin is not pale  Findings: No erythema  Neurological:      Mental Status: She is alert and oriented to person, place, and time     Psychiatric:         Mood and Affect: Mood normal          Behavior: Behavior normal

## 2023-01-24 ENCOUNTER — TELEPHONE (OUTPATIENT)
Dept: FAMILY MEDICINE CLINIC | Facility: CLINIC | Age: 63
End: 2023-01-24

## 2023-01-24 RX ORDER — ALBUTEROL SULFATE 90 UG/1
AEROSOL, METERED RESPIRATORY (INHALATION)
Qty: 25.5 G | Refills: 1 | Status: SHIPPED | OUTPATIENT
Start: 2023-01-24

## 2023-01-24 RX ORDER — AMLODIPINE BESYLATE 2.5 MG/1
2.5 TABLET ORAL DAILY
Qty: 30 TABLET | Refills: 1 | Status: SHIPPED | OUTPATIENT
Start: 2023-01-24 | End: 2023-01-24

## 2023-01-24 RX ORDER — AMLODIPINE BESYLATE 2.5 MG/1
2.5 TABLET ORAL DAILY
Qty: 30 TABLET | Refills: 1 | Status: SHIPPED | OUTPATIENT
Start: 2023-01-24

## 2023-01-24 NOTE — PATIENT INSTRUCTIONS
As per my discussion with Dr Fidela Goldmann, Stop Lisinopril  Start Amlodipine 2 5 mg daily  Take with food at dinnertime  Continue to monitor BP and f/up in 2 weeks for BP/Medication check  Call with problems/concerns

## 2023-02-06 DIAGNOSIS — E11.69 HYPERLIPIDEMIA ASSOCIATED WITH TYPE 2 DIABETES MELLITUS (HCC): ICD-10-CM

## 2023-02-06 DIAGNOSIS — E78.5 HYPERLIPIDEMIA ASSOCIATED WITH TYPE 2 DIABETES MELLITUS (HCC): ICD-10-CM

## 2023-02-06 DIAGNOSIS — J01.00 ACUTE NON-RECURRENT MAXILLARY SINUSITIS: ICD-10-CM

## 2023-02-06 RX ORDER — MOMETASONE FUROATE 50 UG/1
SPRAY, METERED NASAL
Qty: 17 G | Refills: 1 | Status: SHIPPED | OUTPATIENT
Start: 2023-02-06

## 2023-02-06 RX ORDER — ATORVASTATIN CALCIUM 40 MG/1
TABLET, FILM COATED ORAL
Qty: 90 TABLET | Refills: 0 | Status: SHIPPED | OUTPATIENT
Start: 2023-02-06

## 2023-02-07 ENCOUNTER — OFFICE VISIT (OUTPATIENT)
Dept: FAMILY MEDICINE CLINIC | Facility: CLINIC | Age: 63
End: 2023-02-07

## 2023-02-07 VITALS
RESPIRATION RATE: 16 BRPM | DIASTOLIC BLOOD PRESSURE: 62 MMHG | HEART RATE: 94 BPM | WEIGHT: 158.8 LBS | SYSTOLIC BLOOD PRESSURE: 118 MMHG | OXYGEN SATURATION: 97 % | HEIGHT: 64 IN | BODY MASS INDEX: 27.11 KG/M2

## 2023-02-07 DIAGNOSIS — E11.9 TYPE 2 DIABETES MELLITUS WITHOUT COMPLICATION, WITHOUT LONG-TERM CURRENT USE OF INSULIN (HCC): ICD-10-CM

## 2023-02-07 DIAGNOSIS — E78.5 HYPERLIPIDEMIA ASSOCIATED WITH TYPE 2 DIABETES MELLITUS (HCC): ICD-10-CM

## 2023-02-07 DIAGNOSIS — J45.30 MILD PERSISTENT ASTHMA WITHOUT COMPLICATION: ICD-10-CM

## 2023-02-07 DIAGNOSIS — I10 BENIGN ESSENTIAL HYPERTENSION: ICD-10-CM

## 2023-02-07 DIAGNOSIS — E11.69 HYPERLIPIDEMIA ASSOCIATED WITH TYPE 2 DIABETES MELLITUS (HCC): ICD-10-CM

## 2023-02-07 DIAGNOSIS — Z79.4 TYPE 2 DIABETES MELLITUS WITH DIABETIC NEUROPATHY, WITH LONG-TERM CURRENT USE OF INSULIN (HCC): Primary | ICD-10-CM

## 2023-02-07 DIAGNOSIS — E53.8 B12 DEFICIENCY: ICD-10-CM

## 2023-02-07 DIAGNOSIS — E11.40 TYPE 2 DIABETES MELLITUS WITH DIABETIC NEUROPATHY, WITH LONG-TERM CURRENT USE OF INSULIN (HCC): Primary | ICD-10-CM

## 2023-02-07 RX ORDER — BLOOD SUGAR DIAGNOSTIC
STRIP MISCELLANEOUS
Qty: 100 STRIP | Refills: 3 | Status: SHIPPED | OUTPATIENT
Start: 2023-02-07 | End: 2023-02-10 | Stop reason: SDUPTHER

## 2023-02-07 RX ORDER — BLOOD SUGAR DIAGNOSTIC
STRIP MISCELLANEOUS
Refills: 3 | OUTPATIENT
Start: 2023-02-07

## 2023-02-07 NOTE — PROGRESS NOTES
[id number]        NAME: Karen Green is a 61 y o  female  : 1960    MRN: [id number]  DATE: 2023  TIME: 10:04 AM    Assessment and Plan   Type 2 diabetes mellitus with diabetic neuropathy, with long-term current use of insulin (HCC) [E11 40, Z79 4]  1  Type 2 diabetes mellitus with diabetic neuropathy, with long-term current use of insulin (HCC)  Hemoglobin A1C With EAG    Comprehensive metabolic panel    Lipid Panel with Direct LDL reflex    Microalbumin, Random Urine (W/Creatinine)    Hemoglobin A1C With EAG    Comprehensive metabolic panel    Lipid Panel with Direct LDL reflex    Microalbumin, Random Urine (W/Creatinine)      2  Hyperlipidemia associated with type 2 diabetes mellitus (HCC)  Lipid Panel with Direct LDL reflex    Lipid Panel with Direct LDL reflex      3  Mild persistent asthma without complication        4  Benign essential hypertension  Comprehensive metabolic panel    CBC and differential    Comprehensive metabolic panel    CBC and differential      5  B12 deficiency  Vitamin B12    Vitamin B12          Type 2 diabetes mellitus with diabetic neuropathy, with long-term current use of insulin (HCC)  Fasting glucometer readings are good  Continue same medication  Check labs in mid March  Lab Results   Component Value Date    HGBA1C 5 6 2022       Hyperlipidemia associated with type 2 diabetes mellitus ([address] )  Continues on atorvastatin  Labs due in March  Lab Results   Component Value Date    HGBA1C 5 6 2022           Patient Instructions     Patient Instructions   Continue current medications  Continue to monitor blood pressure and sugars  Get labs as ordered mid-March  Chief Complaint     Chief Complaint   Patient presents with   • Follow-up     Blood Pressure check         History of Present Illness       Patient for medical follow-up    1) hypertension-generally blood pressures are better controlled with low-dose amlodipine 2 5 mg daily  Does have occasional low reading 3 to 4 hours after dosing  2) diabetes-will be due for labs in March  Sugar readings in the a m  are good  Currently insulin 12 units daily  3) hyperlipidemia-due to have levels checked  Stable on statin  4) history of vitamin D deficiency  Patient had a previous gastric bypass surgery  5) chronic pain syndrome on pain pump with currently morphine  Also takes oral tramadol or hydrocodone  6) asthma-stable  Review of Systems   Review of Systems   Constitutional: Negative for activity change, appetite change, diaphoresis and fatigue  HENT: Negative for congestion, sinus pressure and sore throat  Respiratory: Negative for cough, chest tightness, shortness of breath and wheezing  Cardiovascular: Negative for chest pain, palpitations and leg swelling  Fast or slow heart rate   Gastrointestinal: Negative for abdominal pain, blood in stool, constipation, diarrhea, nausea and vomiting  Genitourinary: Negative for difficulty urinating, dysuria, frequency and hematuria  Musculoskeletal: Positive for arthralgias, back pain and myalgias  Negative for gait problem and joint swelling  Neurological: Positive for light-headedness  Negative for dizziness and headaches  Psychiatric/Behavioral: Negative for agitation, confusion, dysphoric mood and sleep disturbance  The patient is not nervous/anxious            Current Medications       Current Outpatient Medications:   •  Accu-Chek FastClix Lancets MISC, TEST TWO TIMES A DAY, Disp: 204 each, Rfl: 3  •  Accu-Chek Guide test strip, TEST TWO TIMES A DAY, Disp: 100 strip, Rfl: 3  •  acetaminophen (TYLENOL) 650 mg CR tablet, Take 1 tablet (650 mg total) by mouth every 8 (eight) hours as needed for mild pain (Patient not taking: Reported on 1/23/2023), Disp: 30 tablet, Rfl: 0  •  albuterol (2 5 mg/3 mL) 0 083 % nebulizer solution, Take 3 mL (2 5 mg total) by nebulization every 6 (six) hours as needed for wheezing or shortness of breath, Disp: 300 mL, Rfl: 1  •  albuterol (PROVENTIL HFA,VENTOLIN HFA) 90 mcg/act inhaler, USE 2 INHALATIONS ORALLY   EVERY 6 HOURS AS NEEDED FORWHEEZING, Disp: 25 5 g, Rfl: 1  •  amLODIPine (NORVASC) 2 5 mg tablet, Take 1 tablet (2 5 mg total) by mouth daily, Disp: 30 tablet, Rfl: 1  •  atorvastatin (LIPITOR) 40 mg tablet, TAKE 1 TABLET DAILY, Disp: 90 tablet, Rfl: 0  •  Basaglar KwikPen 100 units/mL SOPN, INJECT 33 UNITS            SUBCUTANEOUSLY AT BEDTIME (Patient taking differently: Inject 12 Units under the skin daily at bedtime), Disp: 15 mL, Rfl: 0  •  Ferrous Fumarate 63 (20 Fe) MG TABS, Take 65 mg by mouth daily, Disp: , Rfl:   •  HYDROcodone-acetaminophen (NORCO)  mg per tablet, , Disp: , Rfl:   •  Insulin Pen Needle (B-D UF III MINI PEN NEEDLES) 31G X 5 MM MISC, ONCE DAILY basal insulin injection, Disp: 90 each, Rfl: 1  •  Januvia 100 MG tablet, TAKE 1 TABLET IN THE       MORNING, Disp: 90 tablet, Rfl: 3  •  lidocaine (XYLOCAINE) 5 % ointment, apply topically to affected area three times a day, Disp: , Rfl:   •  LYRICA 200 MG capsule, Take 200 mg by mouth 3 (three) times a day , Disp: , Rfl: 0  •  metFORMIN (GLUCOPHAGE) 1000 MG tablet, TAKE 1 TABLET TWICE A DAY, Disp: 180 tablet, Rfl: 0  •  mometasone (NASONEX) 50 mcg/act nasal spray, USE 2 SPRAYS IN EACH       NOSTRIL DAILY, Disp: 17 g, Rfl: 1  •  montelukast (SINGULAIR) 10 mg tablet, TAKE 1 TABLET AT BEDTIME, Disp: 90 tablet, Rfl: 1  •  Morphine Sulfate (MORPHINE 0 05 MG/ML SYRINGE, NICU/PICU,, CMPD ORDER,), , Disp: , Rfl:   •  omeprazole (PriLOSEC) 40 MG capsule, TAKE 1 CAPSULE EVERY       MORNING, Disp: 90 capsule, Rfl: 3  •  ondansetron (ZOFRAN) 4 mg tablet, Take 1 tablet (4 mg total) by mouth every 8 (eight) hours as needed for nausea or vomiting, Disp: 30 tablet, Rfl: 1  •  traMADol HCl ER, Biphasic, 300 MG 24 hr tablet, Take 300 mg by mouth daily, Disp: , Rfl:   •  umeclidinium-vilanterol (Anoro Ellipta) 62 5-25 mcg/actuation inhaler, Inhale 1 puff daily, Disp: 180 blister, Rfl: 1  •  VITAMIN D PO, , Disp: , Rfl:   •  zolpidem (AMBIEN) 5 mg tablet, Take 1 tablet (5 mg total) by mouth daily at bedtime as needed for sleep, Disp: 30 tablet, Rfl: 2    Current Allergies     Allergies as of 2023 - Reviewed 2023   Allergen Reaction Noted   • Nsaids Other (See Comments) 2017   • Percocet [oxycodone-acetaminophen] GI Intolerance 2017            The following portions of the patient's history were reviewed and updated as appropriate: allergies, current medications, past family history, past medical history, past social history, past surgical history and problem list      Past Medical History:   Diagnosis Date   • [facility] 2021   • Allergic    • Anemia    • Anxiety    • Arthritis    • Asthma    • Breast lump     Resolved: 2017    • Chronic pain     back   • Diabetes mellitus ([facility] )    • Diverticulitis of colon    • GERD (gastroesophageal reflux disease)    • Hepatitis    • History of stomach ulcers    • HL (hearing loss)     Estimated   • Hyperlipidemia    • Hypertension    • Hypotension 2021   • Infectious viral hepatitis     Hepatitiss A   • Memory loss     Estimated   • Stomach problems        Past Surgical History:   Procedure Laterality Date   • APPENDECTOMY     • BACK SURGERY     •  SECTION     • CHOLECYSTECTOMY     • GASTRIC BYPASS     • GASTRIC BYPASS     • INFUSION PUMP IMPLANTATION     • [place] NDSC WRST SURG W/RLS TRANSVRS CARPL LIGM Right 2022    Procedure: Right endoscopic carpal tunnel release;  Surgeon: Anant Blake MD;  Location:  MAIN OR;  Service: Orthopedics   • SHOULDER SURGERY     • SPINAL CORD STIMULATOR IMPLANT         Family History   Problem Relation Age of Onset   • Diabetes Mother         Mellitus   • Hyperlipidemia Mother    • Hypertension Mother    • Colon cancer Mother    • Pancreatic cancer Mother • Melanoma Mother    • Cancer Father         Bladder    • Alcohol abuse Father    • Lung cancer Father    • Prostate cancer Father    • Diabetes Brother         Mellitus   • Hyperlipidemia Brother    • Hypertension Brother    • Stomach cancer Paternal Grandmother    • Lung cancer Paternal Grandfather    • Diabetes Brother    • Breast cancer Maternal Aunt    • Arthritis Family    • Mental illness Neg Hx          Medications have been verified  Objective   /62 (BP Location: Left arm, Patient Position: Sitting, Cuff Size: Standard)   Pulse 94   Resp 16   Ht 5' 4" (1 626 m)   Wt 72 kg (158 lb 12 8 oz)   LMP  (LMP Unknown)   SpO2 97%   BMI 27 26 kg/m²        Physical Exam     Physical Exam  Vitals reviewed  Constitutional:       General: She is not in acute distress  Appearance: She is well-developed  She is not diaphoretic  HENT:      Head: Normocephalic and atraumatic  Nose: Nose normal  No mucosal edema or rhinorrhea  Right Sinus: No maxillary sinus tenderness  Left Sinus: No maxillary sinus tenderness  Mouth/Throat:      Mouth: Mucous membranes are not pale and not dry  Dentition: Normal dentition  Pharynx: Uvula midline  No oropharyngeal exudate  Eyes:      General:         Right eye: No discharge  Left eye: No discharge  Extraocular Movements: Extraocular movements intact  Conjunctiva/sclera: Conjunctivae normal       Pupils: Pupils are equal, round, and reactive to light  Neck:      Thyroid: No thyromegaly  Cardiovascular:      Rate and Rhythm: Normal rate and regular rhythm  Heart sounds: Normal heart sounds  No murmur heard  Pulmonary:      Effort: Pulmonary effort is normal  No respiratory distress  Breath sounds: Normal breath sounds  No wheezing or rales  Musculoskeletal:      Cervical back: Normal range of motion and neck supple  Right lower leg: No edema  Left lower leg: No edema     Lymphadenopathy: Cervical: No cervical adenopathy  Neurological:      Mental Status: She is alert and oriented to person, place, and time  Cranial Nerves: No cranial nerve deficit     Psychiatric:         Mood and Affect: Mood normal          Behavior: Behavior normal

## 2023-02-07 NOTE — ASSESSMENT & PLAN NOTE
Fasting glucometer readings are good  Continue same medication  Check labs in mid March    Lab Results   Component Value Date    HGBA1C 5 6 09/13/2022

## 2023-02-07 NOTE — PATIENT INSTRUCTIONS
Continue current medications  Continue to monitor blood pressure and sugars  Get labs as ordered mid-March

## 2023-02-07 NOTE — ASSESSMENT & PLAN NOTE
Continues on atorvastatin  Labs due in March    Lab Results   Component Value Date    HGBA1C 5 6 09/13/2022

## 2023-02-10 RX ORDER — BLOOD SUGAR DIAGNOSTIC
STRIP MISCELLANEOUS
Qty: 100 STRIP | Refills: 3 | Status: SHIPPED | OUTPATIENT
Start: 2023-02-10

## 2023-02-17 DIAGNOSIS — I10 ELEVATED BLOOD PRESSURE READING WITH DIAGNOSIS OF HYPERTENSION: ICD-10-CM

## 2023-02-17 DIAGNOSIS — I10 BENIGN ESSENTIAL HYPERTENSION: ICD-10-CM

## 2023-02-17 RX ORDER — AMLODIPINE BESYLATE 2.5 MG/1
TABLET ORAL
Qty: 90 TABLET | Refills: 1 | Status: SHIPPED | OUTPATIENT
Start: 2023-02-17 | End: 2023-02-20 | Stop reason: SDUPTHER

## 2023-02-20 DIAGNOSIS — I10 BENIGN ESSENTIAL HYPERTENSION: ICD-10-CM

## 2023-02-20 DIAGNOSIS — G47.00 INSOMNIA, UNSPECIFIED TYPE: ICD-10-CM

## 2023-02-20 DIAGNOSIS — I10 ELEVATED BLOOD PRESSURE READING WITH DIAGNOSIS OF HYPERTENSION: ICD-10-CM

## 2023-02-21 RX ORDER — AMLODIPINE BESYLATE 2.5 MG/1
2.5 TABLET ORAL DAILY
Qty: 90 TABLET | Refills: 1 | Status: SHIPPED | OUTPATIENT
Start: 2023-02-21

## 2023-02-21 RX ORDER — ZOLPIDEM TARTRATE 5 MG/1
5 TABLET ORAL
Qty: 30 TABLET | Refills: 2 | Status: SHIPPED | OUTPATIENT
Start: 2023-02-21

## 2023-02-24 ENCOUNTER — TELEPHONE (OUTPATIENT)
Dept: NEUROLOGY | Facility: CLINIC | Age: 63
End: 2023-02-24

## 2023-03-02 ENCOUNTER — OFFICE VISIT (OUTPATIENT)
Dept: NEUROLOGY | Facility: CLINIC | Age: 63
End: 2023-03-02

## 2023-03-02 ENCOUNTER — TELEPHONE (OUTPATIENT)
Dept: NEUROLOGY | Facility: CLINIC | Age: 63
End: 2023-03-02

## 2023-03-02 VITALS
BODY MASS INDEX: 26.8 KG/M2 | HEIGHT: 64 IN | SYSTOLIC BLOOD PRESSURE: 126 MMHG | DIASTOLIC BLOOD PRESSURE: 74 MMHG | HEART RATE: 86 BPM | TEMPERATURE: 96.9 F | WEIGHT: 157 LBS

## 2023-03-02 DIAGNOSIS — R41.3 MEMORY DYSFUNCTION: ICD-10-CM

## 2023-03-02 DIAGNOSIS — G62.9 PERIPHERAL POLYNEUROPATHY: Primary | ICD-10-CM

## 2023-03-02 NOTE — PROGRESS NOTES
Patient ID: Amanda Olivera is a 61 y o  female      Assessment/Plan:    Peripheral polyneuropathy  Pt here today for neuro follow up  Pt notes some increase during the day of her neuorpathic sxs  Pt remains on lyrcia from pain mx  Pt notes recent changes in her pain pump meds and waiting to see if help with both legs and feet  Pt notes no new nocturnal sxs  No falls or trips  Pt did have vit b12 and mma labs for pcp in sept  Rev trending back to 2018 and pt with issues with vit b12 deficency  No recent im therapies  Last im therapy in 2021 per pt  Pt also notes history of anemia as well  mma elevated on sept labs  Pt has rx from pcp visit in feb for updated labs this month including vit b12  Strongly recommend if vit b12 less than 400, to resume im vit b12 injections  Pt with known history of gastric surgery, question related to rapid transit  Pt with known dm for past 15 yrs  Likely neuropathy contributory to both issues      Memory dysfunction  Question correlate with lower vit b12 levels  Pt to discuss vit b12 replacement with pcp at appt in 2 weeks  Pt also with weight loss of about 40 lbs in the past 1 5 yrs  Pt is following with pcp and gi but unsure if mentionned this weight loss which is unintentional and associated with dec appetite and nausea  Strongly rec pt to reconnect with both pcp and gi  Pt seen by gi in sept 2022 due to flatulence but pt did not bring up about her unexplained weight loss  Pt to call for gi re visit   Will also alert pcp with this note today       Diagnoses and all orders for this visit:    Peripheral polyneuropathy    Memory dysfunction           Subjective:    HPI      Pt is a 59 yo f with pmh of gastric bypass, dm type 2, asthma, vit b12 deficiency, neuropathy, hypercholesterolemia, htn, chronic pain, lumbar spondylosis, prior lumbar surgery 26 yrs ago, SCS about one yr ago who presents today for neuro follow up     pt last seen on 9/7/22 by me   Per my last note "Pt notes sxs for the past several yrs, more apparent past 2 yrs   Pt notes no tia or cva like sxs   Pt notes history of headaches    Pt notes occ feeling of food getting stuck   No diff with articulation of speech   No incontinence   Pt notes long standing diff with directions, ever since learning to drive   Not new   Pt denies any significant financial issues    Pt notes occ positional dizziness    Pt notes diff with recall of specific details  Pt has noted specifically on job, issues with keeping up on same level of detail   Worked for same company for 24 yrs     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 to her    Pt notes family also noted issues with stm and repeating self  John Valderrama family reminds her of events from their youth   Pt with supportive family and lives close to them    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    Pt with known history of gastric bypass   Pt also with known history of vit b12 deficiency   Pt more recently with im replacement   Rev labs dating back to 2018 April 2018 vit b12 at 292    Oct 2019 level 144, and now on 6/15/21 level 738 "  Pt here today for follow up of her neuropathy  No new memory issues at this time  Pt with issues with some increased lower leg pain and paresthesias  Pt has been seeing pain mx for many yrs   Pt with prior back surgery many yrs ago  Pt has had long standing issus with her legs and back pain  Pt had surgery and then also needed SCS and pain pump  Pt has been going to the same pain mx facility for many yrs ,     Pt sees [facility]  Pt did [facility]x at [facility] in past but wished to stay with her long standing pain mx team  Pt is on lyrical from her pain team  Pt just had recent change in her pain med in her pain pump  Pt is hoping that this will offer her some increased help sariah with her legs  no falls or trips  No loc  No sz  No change in vision    No loss of vision  no change in bowel or bladder  No change in vision  No loss of vision  Pt notes some increased fatigue  Pt also with history of anemia as well  Rev last set of labs from sept and pt with low vit b12 at 202 and elevated mma over 400  Rev with pt manifestations of vit b12 deficiency  Pt was last on im replacement in 2021 and levels better at that time  Due to memory and neuorpathy strongly rec replacement to be consistently above level of 400  Pt seeing pcp in 2 weeks  Also pt with known gastric surgery yrs ago  Pt also sees gi due to family history of colon cancer  Pt just seen by gi in sept 2022 for severe flatulence  Unfortunately pt did not mention her unexplained and non intentional weight loss of about 40 lbs over the past 1 5 yrs  Pt thinks she mentionned to pcp but unsure  I stronlgy rec pt to discuss this wieght loss with her gi and pcp teams  Pt will call and make an appt with gi as well  Pt seeing pcp in 2 weeks  Pt feels her bp is now under much better control  bp good in office today  Pt also with fairly good control of her blood sugars as well which will help with her neuropathy  Re rev 2021 neuro cogntivitve eval which was not suggestive of neurodegenerative condition  Currently no new issues  The following portions of the patient's history were reviewed and updated as appropriate: allergies, current medications, past family history, past medical history, past social history, past surgical history and problem list and med rec and ros rev  Objective:    Blood pressure 126/74, pulse 86, temperature (!) 96 9 °F (36 1 °C), height 5' 4" (1 626 m), weight 71 2 kg (157 lb)  Physical Exam  Constitutional:       General: She is not in acute distress  Appearance: She is not ill-appearing  Eyes:      General: Lids are normal       Extraocular Movements: Extraocular movements intact  Pupils: Pupils are equal, round, and reactive to light     Musculoskeletal: Left lower leg: No edema  Neurological:      Mental Status: She is alert  Motor: Motor strength is normal       Deep Tendon Reflexes:      Reflex Scores:       Tricep reflexes are 2+ on the right side and 2+ on the left side  Bicep reflexes are 2+ on the right side and 2+ on the left side  Brachioradialis reflexes are 2+ on the right side and 2+ on the left side  Patellar reflexes are 1+ on the right side and 1+ on the left side  Achilles reflexes are 1+ on the right side and 1+ on the left side  Psychiatric:         Speech: Speech normal          Neurological Exam  Mental Status  Alert  Speech is normal  Language is fluent with no aphasia  Attention and concentration are normal  Fund of knowledge is appropriate for level of education  Cranial Nerves  CN II: Visual acuity is normal  Visual fields full to confrontation  CN III, IV, VI: Extraocular movements intact bilaterally  Normal lids and orbits bilaterally  Pupils equal round and reactive to light bilaterally  CN V: Facial sensation is normal   CN VII: Full and symmetric facial movement  CN VIII: Hearing is normal   CN IX, X: Palate elevates symmetrically  Normal gag reflex  CN XI: Shoulder shrug strength is normal   CN XII: Tongue midline without atrophy or fasciculations  Motor  Normal muscle bulk throughout  Normal muscle tone  No abnormal involuntary movements  Strength is 5/5 throughout all four extremities  Sensory  Dec vib patti lowers      Reflexes                                            Right                      Left  Brachioradialis                    2+                         2+  Biceps                                 2+                         2+  Triceps                                2+                         2+  Finger flex                           2+                         2+  Hamstring                            2+                         2+  Patellar                                1+ 1+  Achilles                                1+                         1+  Plantar                           Downgoing                Downgoing    Coordination  Right: Finger-to-nose normal  Rapid alternating movement normal Left: Finger-to-nose normal  Heel-to-shin normal     Gait  Casual gait is normal including stance, stride, and arm swing  ROS:    Review of Systems   Constitutional: Negative  Negative for appetite change and fever  HENT: Negative  Negative for hearing loss, tinnitus, trouble swallowing and voice change  Eyes: Negative  Negative for photophobia, pain and visual disturbance  Respiratory: Negative  Negative for shortness of breath  Cardiovascular: Negative  Negative for palpitations  Gastrointestinal: Negative  Negative for nausea and vomiting  Endocrine: Negative  Negative for cold intolerance  Genitourinary: Negative  Negative for dysuria, frequency and urgency  Musculoskeletal: Negative  Negative for gait problem, myalgias and neck pain  Skin: Negative  Negative for rash  Allergic/Immunologic: Negative  Neurological: Positive for dizziness  Negative for tremors, seizures, syncope, facial asymmetry, speech difficulty, weakness, light-headedness, numbness and headaches  Hematological: Negative  Does not bruise/bleed easily  Psychiatric/Behavioral: Negative  Negative for confusion, hallucinations and sleep disturbance  All other systems reviewed and are negative      No issues to address

## 2023-03-02 NOTE — TELEPHONE ENCOUNTER
Pt seen today for her routine neuro visit for neuropathy  Please see full consult  Pt did note that she has had a 40 lb weight loss over the past 1 5 yrs which has been unintentional  Pt notes extreme nausea and no interest in eating  Pt is unsure if she has mentionned to you in recent visits  Pt also saw gi a few months ago for flatulence and known family history of colon cancer  Pt is unsure if she mentionned this unexplained weight loss to gi as well  Pt has an upcoming appt with you in march  I told pt I would alert you to these concerns  Also rec re eval for vit b12 deficiency and im replacement    See full note from this am  thanks

## 2023-03-02 NOTE — ASSESSMENT & PLAN NOTE
Question correlate with lower vit b12 levels  Pt to discuss vit b12 replacement with pcp at appt in 2 weeks  Pt also with weight loss of about 40 lbs in the past 1 5 yrs  Pt is following with pcp and gi but unsure if mentionned this weight loss which is unintentional and associated with dec appetite and nausea  Strongly rec pt to reconnect with both pcp and gi  Pt seen by gi in sept 2022 due to flatulence but pt did not bring up about her unexplained weight loss  Pt to call for gi re visit   Will also alert pcp with this note today

## 2023-03-02 NOTE — ASSESSMENT & PLAN NOTE
Pt here today for neuro follow up  Pt notes some increase during the day of her neuorpathic sxs  Pt remains on lyrcia from pain mx  Pt notes recent changes in her pain pump meds and waiting to see if help with both legs and feet  Pt notes no new nocturnal sxs  No falls or trips  Pt did have vit b12 and mma labs for pcp in sept  Rev trending back to 2018 and pt with issues with vit b12 deficency  No recent im therapies  Last im therapy in 2021 per pt  Pt also notes history of anemia as well  mma elevated on sept labs  Pt has rx from pcp visit in feb for updated labs this month including vit b12  Strongly recommend if vit b12 less than 400, to resume im vit b12 injections  Pt with known history of gastric surgery, question related to rapid transit  Pt with known dm for past 15 yrs  Likely neuropathy contributory to both issues

## 2023-03-07 LAB
ALBUMIN SERPL-MCNC: 3.5 G/DL (ref 3.6–5.1)
ALBUMIN/CREAT UR: 7 MCG/MG CREAT
ALBUMIN/GLOB SERPL: 1.3 (CALC) (ref 1–2.5)
ALP SERPL-CCNC: 67 U/L (ref 37–153)
ALT SERPL-CCNC: 22 U/L (ref 6–29)
AST SERPL-CCNC: 25 U/L (ref 10–35)
BASOPHILS # BLD AUTO: 39 CELLS/UL (ref 0–200)
BASOPHILS NFR BLD AUTO: 0.5 %
BILIRUB SERPL-MCNC: 0.4 MG/DL (ref 0.2–1.2)
BUN SERPL-MCNC: 26 MG/DL (ref 7–25)
BUN/CREAT SERPL: 33 (CALC) (ref 6–22)
CALCIUM SERPL-MCNC: 9.4 MG/DL (ref 8.6–10.4)
CHLORIDE SERPL-SCNC: 102 MMOL/L (ref 98–110)
CHOLEST SERPL-MCNC: 134 MG/DL
CHOLEST/HDLC SERPL: 3 (CALC)
CO2 SERPL-SCNC: 31 MMOL/L (ref 20–32)
CREAT SERPL-MCNC: 0.79 MG/DL (ref 0.5–1.05)
CREAT UR-MCNC: 42 MG/DL (ref 20–275)
EOSINOPHIL # BLD AUTO: 172 CELLS/UL (ref 15–500)
EOSINOPHIL NFR BLD AUTO: 2.2 %
ERYTHROCYTE [DISTWIDTH] IN BLOOD BY AUTOMATED COUNT: 13.6 % (ref 11–15)
EST. AVERAGE GLUCOSE BLD GHB EST-MCNC: 105 MG/DL
EST. AVERAGE GLUCOSE BLD GHB EST-SCNC: 5.8 MMOL/L
GFR/BSA.PRED SERPLBLD CYS-BASED-ARV: 84 ML/MIN/1.73M2
GLOBULIN SER CALC-MCNC: 2.6 G/DL (CALC) (ref 1.9–3.7)
GLUCOSE SERPL-MCNC: 99 MG/DL (ref 65–99)
HBA1C MFR BLD: 5.3 % OF TOTAL HGB
HCT VFR BLD AUTO: 32.4 % (ref 35–45)
HDLC SERPL-MCNC: 44 MG/DL
HGB BLD-MCNC: 10.3 G/DL (ref 11.7–15.5)
LDLC SERPL CALC-MCNC: 72 MG/DL (CALC)
LYMPHOCYTES # BLD AUTO: 1888 CELLS/UL (ref 850–3900)
LYMPHOCYTES NFR BLD AUTO: 24.2 %
MCH RBC QN AUTO: 25.9 PG (ref 27–33)
MCHC RBC AUTO-ENTMCNC: 31.8 G/DL (ref 32–36)
MCV RBC AUTO: 81.6 FL (ref 80–100)
MICROALBUMIN UR-MCNC: 0.3 MG/DL
MONOCYTES # BLD AUTO: 452 CELLS/UL (ref 200–950)
MONOCYTES NFR BLD AUTO: 5.8 %
NEUTROPHILS # BLD AUTO: 5249 CELLS/UL (ref 1500–7800)
NEUTROPHILS NFR BLD AUTO: 67.3 %
NONHDLC SERPL-MCNC: 90 MG/DL (CALC)
PLATELET # BLD AUTO: 251 THOUSAND/UL (ref 140–400)
PMV BLD REES-ECKER: 12.7 FL (ref 7.5–12.5)
POTASSIUM SERPL-SCNC: 4.1 MMOL/L (ref 3.5–5.3)
PROT SERPL-MCNC: 6.1 G/DL (ref 6.1–8.1)
RBC # BLD AUTO: 3.97 MILLION/UL (ref 3.8–5.1)
SODIUM SERPL-SCNC: 140 MMOL/L (ref 135–146)
TRIGL SERPL-MCNC: 101 MG/DL
VIT B12 SERPL-MCNC: 204 PG/ML (ref 200–1100)
WBC # BLD AUTO: 7.8 THOUSAND/UL (ref 3.8–10.8)

## 2023-03-08 ENCOUNTER — TELEPHONE (OUTPATIENT)
Dept: FAMILY MEDICINE CLINIC | Facility: CLINIC | Age: 63
End: 2023-03-08

## 2023-03-08 NOTE — TELEPHONE ENCOUNTER
Medication sent    Patient would like medication sent for nausea sent to [facility]

## 2023-03-08 NOTE — TELEPHONE ENCOUNTER
Patient aware of results  Her sugar levels are stable  Today,3/8/23, it was 108       ----- Message from Pricilla Nuñez MD sent at 3/8/2023  9:44 AM EST -----  Call patient with lab result-generally labs look good  A1c down the nondiabetic range  Mild anemia similar to previous  Will review at office visit  What are her sugars doing and what is her current medication dosing?

## 2023-03-09 DIAGNOSIS — R11.0 NAUSEA: ICD-10-CM

## 2023-03-09 RX ORDER — ONDANSETRON 4 MG/1
4 TABLET, FILM COATED ORAL EVERY 8 HOURS PRN
Qty: 30 TABLET | Refills: 1 | Status: SHIPPED | OUTPATIENT
Start: 2023-03-09

## 2023-03-10 ENCOUNTER — NEW PATIENT (OUTPATIENT)
Dept: URBAN - METROPOLITAN AREA CLINIC 79 | Facility: CLINIC | Age: 63
End: 2023-03-10

## 2023-03-10 DIAGNOSIS — H25.13: ICD-10-CM

## 2023-03-10 LAB
LEFT EYE DIABETIC RETINOPATHY: NORMAL
RIGHT EYE DIABETIC RETINOPATHY: NORMAL

## 2023-03-10 PROCEDURE — 92025 CPTRIZED CORNEAL TOPOGRAPHY: CPT | Mod: NC

## 2023-03-10 PROCEDURE — 92134 CPTRZ OPH DX IMG PST SGM RTA: CPT | Mod: NC

## 2023-03-10 PROCEDURE — 2023F DILAT RTA XM W/O RTNOPTHY: CPT | Performed by: FAMILY MEDICINE

## 2023-03-10 PROCEDURE — 99204 OFFICE O/P NEW MOD 45 MIN: CPT

## 2023-03-10 ASSESSMENT — TONOMETRY
OD_IOP_MMHG: 16
OS_IOP_MMHG: 15

## 2023-03-10 ASSESSMENT — VISUAL ACUITY
OD_SC: 20/50-1
OD_CC: 20/30-1
OD_CC: J3
OS_PH: 20/30+2
OS_CC: 20/40
OS_SC: 20/50-1
OS_CC: J2

## 2023-03-15 ENCOUNTER — OFFICE VISIT (OUTPATIENT)
Dept: FAMILY MEDICINE CLINIC | Facility: CLINIC | Age: 63
End: 2023-03-15

## 2023-03-15 VITALS
HEART RATE: 86 BPM | RESPIRATION RATE: 16 BRPM | SYSTOLIC BLOOD PRESSURE: 136 MMHG | BODY MASS INDEX: 27.31 KG/M2 | DIASTOLIC BLOOD PRESSURE: 64 MMHG | WEIGHT: 160 LBS | OXYGEN SATURATION: 94 % | HEIGHT: 64 IN

## 2023-03-15 DIAGNOSIS — Z79.4 TYPE 2 DIABETES MELLITUS WITH DIABETIC NEUROPATHY, WITH LONG-TERM CURRENT USE OF INSULIN (HCC): ICD-10-CM

## 2023-03-15 DIAGNOSIS — F11.20 NARCOTIC DEPENDENCY, CONTINUOUS (HCC): ICD-10-CM

## 2023-03-15 DIAGNOSIS — G89.29 CHRONIC THORACIC SPINE PAIN: ICD-10-CM

## 2023-03-15 DIAGNOSIS — E78.5 HYPERLIPIDEMIA ASSOCIATED WITH TYPE 2 DIABETES MELLITUS (HCC): ICD-10-CM

## 2023-03-15 DIAGNOSIS — E11.40 TYPE 2 DIABETES MELLITUS WITH DIABETIC NEUROPATHY, WITH LONG-TERM CURRENT USE OF INSULIN (HCC): ICD-10-CM

## 2023-03-15 DIAGNOSIS — E11.69 HYPERLIPIDEMIA ASSOCIATED WITH TYPE 2 DIABETES MELLITUS (HCC): ICD-10-CM

## 2023-03-15 DIAGNOSIS — M54.6 CHRONIC THORACIC SPINE PAIN: ICD-10-CM

## 2023-03-15 DIAGNOSIS — I10 BENIGN ESSENTIAL HYPERTENSION: Primary | ICD-10-CM

## 2023-03-15 DIAGNOSIS — R41.3 MEMORY DYSFUNCTION: ICD-10-CM

## 2023-03-15 RX ORDER — AMLODIPINE BESYLATE 2.5 MG/1
2.5 TABLET ORAL 2 TIMES DAILY
Qty: 180 TABLET | Refills: 1 | Status: SHIPPED | OUTPATIENT
Start: 2023-03-15

## 2023-03-15 RX ORDER — INSULIN GLARGINE 100 [IU]/ML
8 INJECTION, SOLUTION SUBCUTANEOUS
Qty: 15 ML | Refills: 0 | Status: SHIPPED | OUTPATIENT
Start: 2023-03-15

## 2023-03-15 NOTE — PATIENT INSTRUCTIONS
Trial of increasing amlodipine to 2 5 mg twice a day  Continue current dose of insulin 8 units daily  Disability forms updated for employer  Follow-up with pain management  Recheck back here 2 to 3 months

## 2023-03-15 NOTE — ASSESSMENT & PLAN NOTE
Continues to see pain management  Has pain pump in addition to oral medications  These are all managed by pain management  Does have Narcan
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
## 2023-03-20 DIAGNOSIS — E11.9 CONTROLLED TYPE 2 DIABETES MELLITUS WITHOUT COMPLICATION, WITHOUT LONG-TERM CURRENT USE OF INSULIN (HCC): ICD-10-CM

## 2023-03-20 RX ORDER — FLURBIPROFEN SODIUM 0.3 MG/ML
SOLUTION/ DROPS OPHTHALMIC
Qty: 90 EACH | Refills: 1 | Status: SHIPPED | OUTPATIENT
Start: 2023-03-20

## 2023-03-20 NOTE — MISCELLANEOUS
Message  The patient called because she had to miss her appointment last week because both her mother and father  within a few days of each other because she needed a prescription for her Lyrica  She confirmed that she is taking Lyrica 150 mg twice a day, with relief, without side effects  I advised her that I would call in a prescription for a 30 day supply with one refill to get her to her office visit scheduled in   The patient was agreeable and verbalized an understanding  I then called the prescription into her [facility] in [facility] as instructed and she is to follow-up as scheduled        Plan  Chronic pain syndrome, Diabetic peripheral neuropathy    · Lyrica 150 MG Oral Capsule; TAKE 1 CAPSULE TWICE DAILY    Signatures   Electronically signed by : CHITRA Woodward; May 15 2017  4:15PM EST                       (Author)
Patient

## 2023-03-22 DIAGNOSIS — E11.9 TYPE 2 DIABETES MELLITUS WITHOUT COMPLICATION, WITHOUT LONG-TERM CURRENT USE OF INSULIN (HCC): ICD-10-CM

## 2023-03-24 RX ORDER — LANCETS
EACH MISCELLANEOUS
Qty: 204 EACH | Refills: 3 | Status: SHIPPED | OUTPATIENT
Start: 2023-03-24

## 2023-03-24 NOTE — TELEPHONE ENCOUNTER
Chart search shows accu-check fastclix lancets misc as the lancets she has been getting since 01/19/2021

## 2023-04-24 ENCOUNTER — OFFICE VISIT (OUTPATIENT)
Dept: FAMILY MEDICINE CLINIC | Facility: CLINIC | Age: 63
End: 2023-04-24

## 2023-04-24 VITALS
OXYGEN SATURATION: 96 % | SYSTOLIC BLOOD PRESSURE: 142 MMHG | TEMPERATURE: 97.7 F | HEART RATE: 76 BPM | BODY MASS INDEX: 28.95 KG/M2 | WEIGHT: 169.6 LBS | DIASTOLIC BLOOD PRESSURE: 80 MMHG | HEIGHT: 64 IN

## 2023-04-24 DIAGNOSIS — Z79.4 TYPE 2 DIABETES MELLITUS WITH DIABETIC NEUROPATHY, WITH LONG-TERM CURRENT USE OF INSULIN (HCC): ICD-10-CM

## 2023-04-24 DIAGNOSIS — H26.9 CATARACT OF LEFT EYE, UNSPECIFIED CATARACT TYPE: ICD-10-CM

## 2023-04-24 DIAGNOSIS — E11.40 TYPE 2 DIABETES MELLITUS WITH DIABETIC NEUROPATHY, WITH LONG-TERM CURRENT USE OF INSULIN (HCC): ICD-10-CM

## 2023-04-24 DIAGNOSIS — Z01.818 PRE-OP EXAMINATION: Primary | ICD-10-CM

## 2023-04-24 DIAGNOSIS — J45.30 MILD PERSISTENT ASTHMA WITHOUT COMPLICATION: ICD-10-CM

## 2023-04-24 DIAGNOSIS — I10 BENIGN ESSENTIAL HYPERTENSION: ICD-10-CM

## 2023-04-24 NOTE — PROGRESS NOTES
[address]        NAME: Antonio Hagan is a 61 y o  female  : 1960    MRN: [id number]  DATE: 2023  TIME: 12:07 PM    Assessment and Plan   Pre-op examination [Z01 818]  1  Pre-op examination        2  Cataract of left eye, unspecified cataract type        3  Benign essential hypertension        4  Type 2 diabetes mellitus with diabetic neuropathy, with long-term current use of insulin ([address] )        5  Mild persistent asthma without complication            No problem-specific Assessment & Plan notes found for this encounter  Patient Instructions     Patient Instructions   Patient is medically stable  Diabetes optimal control  Blood pressure under good control  Asthma has been stable  Patient will be cleared for proposed cataract procedure  Chief Complaint     Chief Complaint   Patient presents with   • Pre-op Exam     Patient is in the office c/o pre-op clearance (cataract surgery)  Patient brought form and BP and Blood Sugar log for review  Stated concerns - 1  Please review and discuss recent BP and BS numbers  Annual - due after 2023  Lab - [facility]         History of Present Illness       Patient here for preoperative valuation for planned cataract procedure  Last A1c good control as to her diabetes  Procedure to be formed at [facility] [place] [facility]  Operating surgeon is [address]  Review of Systems   Review of Systems   Constitutional: Negative for activity change, appetite change, diaphoresis and fatigue  HENT: Negative for congestion, sinus pressure and sore throat  Respiratory: Negative for cough, chest tightness, shortness of breath and wheezing  Cardiovascular: Negative for chest pain, palpitations and leg swelling  Fast or slow heart rate   Gastrointestinal: Negative for abdominal pain, blood in stool, constipation, diarrhea, nausea and vomiting     Genitourinary: Negative for difficulty urinating, dysuria, frequency and hematuria  Musculoskeletal: Positive for back pain  Negative for arthralgias, gait problem, joint swelling and myalgias  Neurological: Negative for dizziness, light-headedness and headaches  Psychiatric/Behavioral: Negative for agitation, confusion, dysphoric mood and sleep disturbance  The patient is not nervous/anxious  Current Medications       Current Outpatient Medications:   •  Accu-Chek FastClix Lancets MISC, Use to test blood glucose levels twice daily for diabetes E11 9, Disp: 204 each, Rfl: 3  •  albuterol (2 5 mg/3 mL) 0 083 % nebulizer solution, Take 3 mL (2 5 mg total) by nebulization every 6 (six) hours as needed for wheezing or shortness of breath, Disp: 300 mL, Rfl: 1  •  albuterol (PROVENTIL HFA,VENTOLIN HFA) 90 mcg/act inhaler, USE 2 INHALATIONS ORALLY   EVERY 6 HOURS AS NEEDED FORWHEEZING, Disp: 25 5 g, Rfl: 1  •  amLODIPine (NORVASC) 2 5 mg tablet, Take 1 tablet (2 5 mg total) by mouth 2 (two) times a day, Disp: 180 tablet, Rfl: 1  •  atorvastatin (LIPITOR) 40 mg tablet, TAKE 1 TABLET DAILY, Disp: 90 tablet, Rfl: 0  •  Ferrous Fumarate 63 (20 Fe) MG TABS, Take 2 tablets by mouth daily Take two tablets once daily  , Disp: , Rfl:   •  glucose blood (Accu-Chek Guide) test strip, Test, Disp: 100 strip, Rfl: 3  •  HYDROcodone-acetaminophen (NORCO)  mg per tablet, , Disp: , Rfl:   •  Insulin Glargine Solostar (Basaglar KwikPen) 100 UNIT/ML SOPN, Inject 0 08 mL (8 Units total) under the skin daily at bedtime, Disp: 15 mL, Rfl: 0  •  Insulin Pen Needle (B-D UF III MINI PEN NEEDLES) 31G X 5 MM MISC, USE ONCE DAILY FOR BASAL   INSULIN INJECTION, Disp: 90 each, Rfl: 1  •  Januvia 100 MG tablet, TAKE 1 TABLET IN THE       MORNING, Disp: 90 tablet, Rfl: 3  •  lidocaine (XYLOCAINE) 5 % ointment, apply topically to affected area three times a day, Disp: , Rfl:   •  LYRICA 200 MG capsule, Take 200 mg by mouth 3 (three) times a day , Disp: , Rfl: 0  •  metFORMIN (GLUCOPHAGE) 1000 MG tablet, TAKE 1 TABLET TWICE A DAY, Disp: 180 tablet, Rfl: 0  •  mometasone (NASONEX) 50 mcg/act nasal spray, USE 2 SPRAYS IN EACH       NOSTRIL DAILY, Disp: 17 g, Rfl: 1  •  montelukast (SINGULAIR) 10 mg tablet, TAKE 1 TABLET AT BEDTIME, Disp: 90 tablet, Rfl: 1  •  Morphine Sulfate (MORPHINE 0 05 MG/ML SYRINGE, NICU/PICU,, CMPD ORDER,), , Disp: , Rfl:   •  omeprazole (PriLOSEC) 40 MG capsule, TAKE 1 CAPSULE EVERY       MORNING, Disp: 90 capsule, Rfl: 3  •  ondansetron (ZOFRAN) 4 mg tablet, Take 1 tablet (4 mg total) by mouth every 8 (eight) hours as needed for nausea or vomiting, Disp: 30 tablet, Rfl: 1  •  VITAMIN D PO, , Disp: , Rfl:   •  zolpidem (AMBIEN) 5 mg tablet, Take 1 tablet (5 mg total) by mouth daily at bedtime as needed for sleep, Disp: 30 tablet, Rfl: 2  •  acetaminophen (TYLENOL) 650 mg CR tablet, Take 1 tablet (650 mg total) by mouth every 8 (eight) hours as needed for mild pain (Patient not taking: Reported on 1/23/2023), Disp: 30 tablet, Rfl: 0  •  traMADol HCl ER, Biphasic, 300 MG 24 hr tablet, Take 300 mg by mouth daily (Patient not taking: Reported on 4/24/2023), Disp: , Rfl:   •  umeclidinium-vilanterol (Anoro Ellipta) 62 5-25 mcg/actuation inhaler, Inhale 1 puff daily (Patient not taking: Reported on 3/2/2023), Disp: 180 blister, Rfl: 1    Current Allergies     Allergies as of 04/24/2023 - Reviewed 04/24/2023   Allergen Reaction Noted   • Nsaids Other (See Comments) 09/23/2017   • Percocet [oxycodone-acetaminophen] GI Intolerance 09/23/2017            The following portions of the patient's history were reviewed and updated as appropriate: allergies, current medications, past family history, past medical history, past social history, past surgical history and problem list      Past Medical History:   Diagnosis Date   • PABLO (acute kidney injury) ([facility] [address] ) 11/04/2021   • Allergic    • Anemia    • Anxiety    • Arthritis    • Asthma    • Breast lump     Resolved: 8/1/2017    • Chronic pain     back "  • Diabetes mellitus ([facility] [address] )    • Diverticulitis of colon    • GERD (gastroesophageal reflux disease)    • Hepatitis    • History of stomach ulcers    • HL (hearing loss)     Estimated   • Hyperlipidemia    • Hypertension    • Hypotension 2021   • Infectious viral hepatitis     Hepatitiss A   • Memory loss     Estimated   • Stomach problems        Past Surgical History:   Procedure Laterality Date   • APPENDECTOMY     • BACK SURGERY     •  SECTION     • CHOLECYSTECTOMY     • GASTRIC BYPASS     • GASTRIC BYPASS     • INFUSION PUMP IMPLANTATION     • NC NDSC WRST SURG W/RLS TRANSVRS CARPL LIGM Right 2022    Procedure: Right endoscopic carpal tunnel release;  Surgeon: Terra Horowitz MD;  Location:  MAIN OR;  Service: Orthopedics   • SHOULDER SURGERY     • SPINAL CORD STIMULATOR IMPLANT         Family History   Problem Relation Age of Onset   • Diabetes Mother         Mellitus   • Hyperlipidemia Mother    • Hypertension Mother    • Colon cancer Mother    • Pancreatic cancer Mother    • Melanoma Mother    • Cancer Father         Bladder    • Alcohol abuse Father    • Lung cancer Father    • Prostate cancer Father    • Diabetes Brother         Mellitus   • Hyperlipidemia Brother    • Hypertension Brother    • Stomach cancer Paternal Grandmother    • Lung cancer Paternal Grandfather    • Diabetes Brother    • Breast cancer Maternal Aunt    • Arthritis Family    • Mental illness Neg Hx          Medications have been verified  Objective   /80   Pulse 76   Temp 97 7 °F (36 5 °C) (Tympanic)   Ht 5' 4\" (1 626 m)   Wt 76 9 kg (169 lb 9 6 oz)   LMP  (LMP Unknown)   SpO2 96%   BMI 29 11 kg/m²        Physical Exam     Physical Exam  Vitals reviewed  Constitutional:       General: She is not in acute distress  Appearance: She is well-developed  She is not diaphoretic  HENT:      Head: Normocephalic and atraumatic        Right Ear: Tympanic membrane, ear canal and " external ear normal       Left Ear: Tympanic membrane, ear canal and external ear normal       Nose: Nose normal  No mucosal edema or rhinorrhea  Right Sinus: No maxillary sinus tenderness  Left Sinus: No maxillary sinus tenderness  Mouth/Throat:      Mouth: Mucous membranes are not pale and not dry  Dentition: Normal dentition  Pharynx: Uvula midline  No oropharyngeal exudate  Eyes:      General:         Right eye: No discharge  Left eye: No discharge  Extraocular Movements: Extraocular movements intact  Conjunctiva/sclera: Conjunctivae normal       Pupils: Pupils are equal, round, and reactive to light  Neck:      Thyroid: No thyromegaly  Cardiovascular:      Rate and Rhythm: Normal rate and regular rhythm  Heart sounds: Normal heart sounds  No murmur heard  Pulmonary:      Effort: Pulmonary effort is normal  No respiratory distress  Breath sounds: Normal breath sounds  No wheezing or rales  Musculoskeletal:      Cervical back: Normal range of motion and neck supple  Right lower leg: No edema  Left lower leg: No edema  Lymphadenopathy:      Cervical: No cervical adenopathy  Neurological:      Mental Status: She is alert and oriented to person, place, and time  Cranial Nerves: No cranial nerve deficit     Psychiatric:         Mood and Affect: Mood normal          Behavior: Behavior normal

## 2023-04-24 NOTE — PATIENT INSTRUCTIONS
Patient is medically stable  Diabetes optimal control  Blood pressure under good control  Asthma has been stable  Patient will be cleared for proposed cataract procedure

## 2023-04-28 ENCOUNTER — A-SCAN (OUTPATIENT)
Dept: URBAN - METROPOLITAN AREA CLINIC 79 | Facility: CLINIC | Age: 63
End: 2023-04-28

## 2023-04-28 DIAGNOSIS — H25.13: ICD-10-CM

## 2023-04-28 LAB
LEFT EYE DIABETIC RETINOPATHY: NORMAL
RIGHT EYE DIABETIC RETINOPATHY: NORMAL

## 2023-04-28 PROCEDURE — 99213 OFFICE O/P EST LOW 20 MIN: CPT

## 2023-04-28 PROCEDURE — 92136 OPHTHALMIC BIOMETRY: CPT

## 2023-04-28 PROCEDURE — 92025 CPTRIZED CORNEAL TOPOGRAPHY: CPT | Mod: NC

## 2023-04-28 ASSESSMENT — TONOMETRY
OS_IOP_MMHG: 13
OD_IOP_MMHG: 14

## 2023-04-28 ASSESSMENT — VISUAL ACUITY
OD_CC: 20/25
OS_CC: 20/25

## 2023-05-11 ENCOUNTER — SURGERY/PROCEDURE (OUTPATIENT)
Dept: URBAN - METROPOLITAN AREA SURGICAL CENTER 17 | Facility: SURGICAL CENTER | Age: 63
End: 2023-05-11

## 2023-05-11 DIAGNOSIS — H25.13: ICD-10-CM

## 2023-05-11 PROCEDURE — 66984 XCAPSL CTRC RMVL W/O ECP: CPT | Mod: 54,LT

## 2023-05-12 ENCOUNTER — 1 DAY POST-OP (OUTPATIENT)
Dept: URBAN - METROPOLITAN AREA CLINIC 79 | Facility: CLINIC | Age: 63
End: 2023-05-12

## 2023-05-12 DIAGNOSIS — Z96.1: ICD-10-CM

## 2023-05-12 PROCEDURE — 99024 POSTOP FOLLOW-UP VISIT: CPT

## 2023-05-12 ASSESSMENT — TONOMETRY: OS_IOP_MMHG: 17

## 2023-05-12 ASSESSMENT — VISUAL ACUITY
OD_CC: 20/25-2
OS_SC: 20/20-1

## 2023-05-15 ENCOUNTER — OFFICE VISIT (OUTPATIENT)
Dept: FAMILY MEDICINE CLINIC | Facility: CLINIC | Age: 63
End: 2023-05-15

## 2023-05-15 VITALS
HEART RATE: 89 BPM | SYSTOLIC BLOOD PRESSURE: 126 MMHG | WEIGHT: 157 LBS | OXYGEN SATURATION: 99 % | BODY MASS INDEX: 26.8 KG/M2 | HEIGHT: 64 IN | TEMPERATURE: 97.5 F | DIASTOLIC BLOOD PRESSURE: 78 MMHG

## 2023-05-15 DIAGNOSIS — Z01.818 PRE-OP EXAMINATION: Primary | ICD-10-CM

## 2023-05-15 DIAGNOSIS — G89.29 CHRONIC BILATERAL LOW BACK PAIN, UNSPECIFIED WHETHER SCIATICA PRESENT: ICD-10-CM

## 2023-05-15 DIAGNOSIS — E78.5 HYPERLIPIDEMIA ASSOCIATED WITH TYPE 2 DIABETES MELLITUS (HCC): ICD-10-CM

## 2023-05-15 DIAGNOSIS — E11.69 HYPERLIPIDEMIA ASSOCIATED WITH TYPE 2 DIABETES MELLITUS (HCC): ICD-10-CM

## 2023-05-15 DIAGNOSIS — Z79.4 TYPE 2 DIABETES MELLITUS WITH DIABETIC NEUROPATHY, WITH LONG-TERM CURRENT USE OF INSULIN (HCC): ICD-10-CM

## 2023-05-15 DIAGNOSIS — M54.50 CHRONIC BILATERAL LOW BACK PAIN, UNSPECIFIED WHETHER SCIATICA PRESENT: ICD-10-CM

## 2023-05-15 DIAGNOSIS — H26.9 CATARACT OF RIGHT EYE, UNSPECIFIED CATARACT TYPE: ICD-10-CM

## 2023-05-15 DIAGNOSIS — I10 BENIGN ESSENTIAL HYPERTENSION: ICD-10-CM

## 2023-05-15 DIAGNOSIS — E11.40 TYPE 2 DIABETES MELLITUS WITH DIABETIC NEUROPATHY, WITH LONG-TERM CURRENT USE OF INSULIN (HCC): ICD-10-CM

## 2023-05-15 NOTE — PROGRESS NOTES
[address]        NAME: Altagracia Bahena is a 61 y o  female  : 1960    MRN: [id number]  DATE: May 15, 2023  TIME: 10:52 AM    Assessment and Plan   Pre-op examination [V60 694]  1  Pre-op examination        2  Cataract of right eye, unspecified cataract type        3  Type 2 diabetes mellitus with diabetic neuropathy, with long-term current use of insulin ([facility] [address] )        4  Hyperlipidemia associated with type 2 diabetes mellitus ([facility] [address] )        5  Benign essential hypertension        6  Chronic bilateral low back pain, unspecified whether sciatica present            No problem-specific Assessment & Plan notes found for this encounter  Patient Instructions     Patient Instructions   Patient is medically stable cleared for proposed cataract surgery  Forms will be faxed  Chief Complaint     Chief Complaint   Patient presents with   • Pre-op Exam     Surgery 23         History of Present Illness       Patient here for preoperative evaluation for planned right cataract procedure at [facility]  Tentative date of service 2023  Had left eye done recently without issue  No preadmission testing requested  Diabetes under good control  Hypertension also controlled  Chronic back pain with pain pump and spinal stimulator in place  Review of Systems   Review of Systems   Constitutional: Negative for activity change, appetite change, diaphoresis and fatigue  HENT: Negative for congestion, sinus pressure and sore throat  Eyes: Positive for visual disturbance  Negative for pain and discharge  Respiratory: Negative for cough, chest tightness, shortness of breath and wheezing  Cardiovascular: Negative for chest pain, palpitations and leg swelling  Fast or slow heart rate   Gastrointestinal: Negative for abdominal pain, blood in stool, constipation, diarrhea, nausea and vomiting     Genitourinary: Negative for difficulty urinating, dysuria, frequency and hematuria  Musculoskeletal: Negative for arthralgias, gait problem, joint swelling and myalgias  Neurological: Negative for dizziness, light-headedness and headaches  Psychiatric/Behavioral: Negative for agitation, confusion, dysphoric mood and sleep disturbance  The patient is not nervous/anxious  Current Medications       Current Outpatient Medications:   •  Accu-Chek FastClix Lancets MISC, Use to test blood glucose levels twice daily for diabetes E11 9, Disp: 204 each, Rfl: 3  •  albuterol (2 5 mg/3 mL) 0 083 % nebulizer solution, Take 3 mL (2 5 mg total) by nebulization every 6 (six) hours as needed for wheezing or shortness of breath, Disp: 300 mL, Rfl: 1  •  albuterol (PROVENTIL HFA,VENTOLIN HFA) 90 mcg/act inhaler, USE 2 INHALATIONS ORALLY   EVERY 6 HOURS AS NEEDED FORWHEEZING, Disp: 25 5 g, Rfl: 1  •  amLODIPine (NORVASC) 2 5 mg tablet, Take 1 tablet (2 5 mg total) by mouth 2 (two) times a day, Disp: 180 tablet, Rfl: 1  •  atorvastatin (LIPITOR) 40 mg tablet, TAKE 1 TABLET DAILY, Disp: 90 tablet, Rfl: 0  •  Ferrous Fumarate 63 (20 Fe) MG TABS, Take 2 tablets by mouth daily Take two tablets once daily  , Disp: , Rfl:   •  glucose blood (Accu-Chek Guide) test strip, Test, Disp: 100 strip, Rfl: 3  •  HYDROcodone-acetaminophen (NORCO)  mg per tablet, , Disp: , Rfl:   •  Insulin Glargine Solostar (Basaglar KwikPen) 100 UNIT/ML SOPN, Inject 0 08 mL (8 Units total) under the skin daily at bedtime, Disp: 15 mL, Rfl: 0  •  Insulin Pen Needle (B-D UF III MINI PEN NEEDLES) 31G X 5 MM MISC, USE ONCE DAILY FOR BASAL   INSULIN INJECTION, Disp: 90 each, Rfl: 1  •  Januvia 100 MG tablet, TAKE 1 TABLET IN THE       MORNING, Disp: 90 tablet, Rfl: 3  •  lidocaine (XYLOCAINE) 5 % ointment, apply topically to affected area three times a day, Disp: , Rfl:   •  LYRICA 200 MG capsule, Take 200 mg by mouth 3 (three) times a day , Disp: , Rfl: 0  •  metFORMIN (GLUCOPHAGE) 1000 MG tablet, TAKE 1 TABLET TWICE A DAY, Disp: 180 tablet, Rfl: 0  •  mometasone (NASONEX) 50 mcg/act nasal spray, USE 2 SPRAYS IN EACH       NOSTRIL DAILY, Disp: 17 g, Rfl: 1  •  montelukast (SINGULAIR) 10 mg tablet, TAKE 1 TABLET AT BEDTIME, Disp: 90 tablet, Rfl: 1  •  Morphine Sulfate (MORPHINE 0 05 MG/ML SYRINGE, NICU/PICU,, CMPD ORDER,), , Disp: , Rfl:   •  omeprazole (PriLOSEC) 40 MG capsule, TAKE 1 CAPSULE EVERY       MORNING, Disp: 90 capsule, Rfl: 3  •  ondansetron (ZOFRAN) 4 mg tablet, Take 1 tablet (4 mg total) by mouth every 8 (eight) hours as needed for nausea or vomiting, Disp: 30 tablet, Rfl: 1  •  VITAMIN D PO, , Disp: , Rfl:   •  zolpidem (AMBIEN) 5 mg tablet, Take 1 tablet (5 mg total) by mouth daily at bedtime as needed for sleep, Disp: 30 tablet, Rfl: 2  •  acetaminophen (TYLENOL) 650 mg CR tablet, Take 1 tablet (650 mg total) by mouth every 8 (eight) hours as needed for mild pain (Patient not taking: Reported on 1/23/2023), Disp: 30 tablet, Rfl: 0  •  traMADol HCl ER, Biphasic, 300 MG 24 hr tablet, Take 300 mg by mouth daily (Patient not taking: Reported on 4/24/2023), Disp: , Rfl:   •  umeclidinium-vilanterol (Anoro Ellipta) 62 5-25 mcg/actuation inhaler, Inhale 1 puff daily (Patient not taking: Reported on 3/2/2023), Disp: 180 blister, Rfl: 1    Current Allergies     Allergies as of 05/15/2023 - Reviewed 05/15/2023   Allergen Reaction Noted   • Nsaids Other (See Comments) 09/23/2017   • Percocet [oxycodone-acetaminophen] GI Intolerance 09/23/2017            The following portions of the patient's history were reviewed and updated as appropriate: allergies, current medications, past family history, past medical history, past social history, past surgical history and problem list      Past Medical History:   Diagnosis Date   • PABLO (acute kidney injury) ([facility] [address] ) 11/04/2021   • Allergic    • Anemia    • Anxiety    • Arthritis    • Asthma    • Breast lump     Resolved: 8/1/2017    • Chronic pain     back   • Diabetes "mellitus ([facility] [address] )    • Diverticulitis of colon    • GERD (gastroesophageal reflux disease)    • Hepatitis    • History of stomach ulcers    • HL (hearing loss)     Estimated   • Hyperlipidemia    • Hypertension    • Hypotension 2021   • Infectious viral hepatitis     Hepatitiss A   • Memory loss     Estimated   • Stomach problems        Past Surgical History:   Procedure Laterality Date   • APPENDECTOMY     • BACK SURGERY     •  SECTION     • CHOLECYSTECTOMY     • GASTRIC BYPASS     • GASTRIC BYPASS     • INFUSION PUMP IMPLANTATION     • OR NDSC WRST SURG W/RLS TRANSVRS CARPL LIGM Right 2022    Procedure: Right endoscopic carpal tunnel release;  Surgeon: Ghanshyam Sanchez MD;  Location:  MAIN OR;  Service: Orthopedics   • SHOULDER SURGERY     • SPINAL CORD STIMULATOR IMPLANT         Family History   Problem Relation Age of Onset   • Diabetes Mother         Mellitus   • Hyperlipidemia Mother    • Hypertension Mother    • Colon cancer Mother    • Pancreatic cancer Mother    • Melanoma Mother    • Cancer Father         Bladder    • Alcohol abuse Father    • Lung cancer Father    • Prostate cancer Father    • Diabetes Brother         Mellitus   • Hyperlipidemia Brother    • Hypertension Brother    • Stomach cancer Paternal Grandmother    • Lung cancer Paternal Grandfather    • Diabetes Brother    • Breast cancer Maternal Aunt    • Arthritis Family    • Mental illness Neg Hx          Medications have been verified  Objective   /78 (BP Location: Left arm, Patient Position: Sitting, Cuff Size: Standard)   Pulse 89   Temp 97 5 °F (36 4 °C) (Tympanic)   Ht 5' 4\" (1 626 m)   Wt 71 2 kg (157 lb)   LMP  (LMP Unknown)   SpO2 99%   BMI 26 95 kg/m²        Physical Exam     Physical Exam  Vitals reviewed  Constitutional:       General: She is not in acute distress  Appearance: She is well-developed  She is not diaphoretic     HENT:      Head: Normocephalic and " atraumatic  Right Ear: Tympanic membrane, ear canal and external ear normal       Left Ear: Tympanic membrane, ear canal and external ear normal       Nose: Nose normal  No mucosal edema or rhinorrhea  Right Sinus: No maxillary sinus tenderness  Left Sinus: No maxillary sinus tenderness  Mouth/Throat:      Mouth: Mucous membranes are moist  Mucous membranes are not pale and not dry  Dentition: Normal dentition  Pharynx: Uvula midline  No oropharyngeal exudate  Eyes:      General:         Right eye: No discharge  Left eye: No discharge  Extraocular Movements: Extraocular movements intact  Conjunctiva/sclera: Conjunctivae normal       Pupils: Pupils are equal, round, and reactive to light  Neck:      Thyroid: No thyromegaly  Cardiovascular:      Rate and Rhythm: Normal rate and regular rhythm  Heart sounds: Normal heart sounds  No murmur heard  Pulmonary:      Effort: Pulmonary effort is normal  No respiratory distress  Breath sounds: Normal breath sounds  No wheezing or rales  Abdominal:      Palpations: Abdomen is soft  Tenderness: There is no abdominal tenderness  Musculoskeletal:      Cervical back: Normal range of motion and neck supple  Right lower leg: No edema  Left lower leg: No edema  Lymphadenopathy:      Cervical: No cervical adenopathy  Neurological:      Mental Status: She is alert and oriented to person, place, and time  Cranial Nerves: No cranial nerve deficit     Psychiatric:         Mood and Affect: Mood normal          Behavior: Behavior normal

## 2023-05-19 ENCOUNTER — A-SCAN (OUTPATIENT)
Dept: URBAN - METROPOLITAN AREA CLINIC 79 | Facility: CLINIC | Age: 63
End: 2023-05-19

## 2023-05-19 DIAGNOSIS — J45.30 MILD PERSISTENT ASTHMA, UNSPECIFIED WHETHER COMPLICATED: ICD-10-CM

## 2023-05-19 DIAGNOSIS — H25.11: ICD-10-CM

## 2023-05-19 DIAGNOSIS — G47.00 INSOMNIA, UNSPECIFIED TYPE: ICD-10-CM

## 2023-05-19 PROCEDURE — 76519 ECHO EXAM OF EYE: CPT | Mod: 26,RT

## 2023-05-19 PROCEDURE — 99213 OFFICE O/P EST LOW 20 MIN: CPT | Mod: 24

## 2023-05-19 ASSESSMENT — TONOMETRY
OD_IOP_MMHG: 15
OS_IOP_MMHG: 15

## 2023-05-19 ASSESSMENT — VISUAL ACUITY
OD_CC: 20/25-3
OS_SC: 20/20

## 2023-05-20 RX ORDER — ALBUTEROL SULFATE 90 UG/1
AEROSOL, METERED RESPIRATORY (INHALATION)
Qty: 25.5 G | Refills: 1 | Status: SHIPPED | OUTPATIENT
Start: 2023-05-20

## 2023-05-22 DIAGNOSIS — G47.00 INSOMNIA, UNSPECIFIED TYPE: ICD-10-CM

## 2023-05-22 RX ORDER — ZOLPIDEM TARTRATE 5 MG/1
TABLET ORAL
Qty: 30 TABLET | Refills: 2 | OUTPATIENT
Start: 2023-05-22

## 2023-05-22 RX ORDER — ZOLPIDEM TARTRATE 5 MG/1
TABLET ORAL
Qty: 30 TABLET | Refills: 2 | Status: SHIPPED | OUTPATIENT
Start: 2023-05-22

## 2023-05-23 ENCOUNTER — PATIENT MESSAGE (OUTPATIENT)
Dept: FAMILY MEDICINE CLINIC | Facility: CLINIC | Age: 63
End: 2023-05-23

## 2023-05-23 DIAGNOSIS — Z12.31 SCREENING MAMMOGRAM, ENCOUNTER FOR: Primary | ICD-10-CM

## 2023-06-01 DIAGNOSIS — E11.69 HYPERLIPIDEMIA ASSOCIATED WITH TYPE 2 DIABETES MELLITUS (HCC): ICD-10-CM

## 2023-06-01 DIAGNOSIS — K21.00 GASTROESOPHAGEAL REFLUX DISEASE WITH ESOPHAGITIS: ICD-10-CM

## 2023-06-01 DIAGNOSIS — E78.5 HYPERLIPIDEMIA ASSOCIATED WITH TYPE 2 DIABETES MELLITUS (HCC): ICD-10-CM

## 2023-06-02 RX ORDER — OMEPRAZOLE 40 MG/1
CAPSULE, DELAYED RELEASE ORAL
Qty: 90 CAPSULE | Refills: 3 | Status: SHIPPED | OUTPATIENT
Start: 2023-06-02

## 2023-06-02 RX ORDER — ATORVASTATIN CALCIUM 40 MG/1
TABLET, FILM COATED ORAL
Qty: 90 TABLET | Refills: 0 | Status: SHIPPED | OUTPATIENT
Start: 2023-06-02

## 2023-06-12 ENCOUNTER — SURGERY/PROCEDURE (OUTPATIENT)
Dept: URBAN - METROPOLITAN AREA SURGICAL CENTER 17 | Facility: SURGICAL CENTER | Age: 63
End: 2023-06-12

## 2023-06-12 DIAGNOSIS — H25.11: ICD-10-CM

## 2023-06-12 PROCEDURE — 66984 XCAPSL CTRC RMVL W/O ECP: CPT | Mod: 54,RT,79,RT

## 2023-06-13 ENCOUNTER — 1 DAY POST-OP (OUTPATIENT)
Dept: URBAN - METROPOLITAN AREA CLINIC 79 | Facility: CLINIC | Age: 63
End: 2023-06-13

## 2023-06-13 DIAGNOSIS — Z96.1: ICD-10-CM

## 2023-06-13 PROCEDURE — 99024 POSTOP FOLLOW-UP VISIT: CPT

## 2023-06-13 ASSESSMENT — TONOMETRY
OD_IOP_MMHG: 19
OS_IOP_MMHG: 15

## 2023-06-13 ASSESSMENT — VISUAL ACUITY
OS_SC: 20/20
OD_SC: 20/25

## 2023-06-14 DIAGNOSIS — J01.00 ACUTE NON-RECURRENT MAXILLARY SINUSITIS: ICD-10-CM

## 2023-06-14 RX ORDER — MOMETASONE FUROATE 50 UG/1
SPRAY, METERED NASAL
Qty: 17 G | Refills: 1 | Status: SHIPPED | OUTPATIENT
Start: 2023-06-14

## 2023-06-19 ENCOUNTER — OFFICE VISIT (OUTPATIENT)
Dept: FAMILY MEDICINE CLINIC | Facility: HOSPITAL | Age: 63
End: 2023-06-19
Payer: COMMERCIAL

## 2023-06-19 VITALS
SYSTOLIC BLOOD PRESSURE: 170 MMHG | OXYGEN SATURATION: 96 % | BODY MASS INDEX: 27.14 KG/M2 | WEIGHT: 159 LBS | DIASTOLIC BLOOD PRESSURE: 94 MMHG | HEART RATE: 86 BPM | HEIGHT: 64 IN

## 2023-06-19 DIAGNOSIS — R41.3 MEMORY DYSFUNCTION: ICD-10-CM

## 2023-06-19 DIAGNOSIS — I10 BENIGN ESSENTIAL HYPERTENSION: ICD-10-CM

## 2023-06-19 DIAGNOSIS — E61.1 IRON DEFICIENCY: ICD-10-CM

## 2023-06-19 DIAGNOSIS — J45.30 MILD PERSISTENT ASTHMA WITHOUT COMPLICATION: ICD-10-CM

## 2023-06-19 DIAGNOSIS — R63.4 WEIGHT LOSS, UNINTENTIONAL: ICD-10-CM

## 2023-06-19 DIAGNOSIS — E53.8 B12 DEFICIENCY: ICD-10-CM

## 2023-06-19 DIAGNOSIS — Z76.89 ESTABLISHING CARE WITH NEW DOCTOR, ENCOUNTER FOR: Primary | ICD-10-CM

## 2023-06-19 DIAGNOSIS — Z11.4 SCREENING FOR HIV (HUMAN IMMUNODEFICIENCY VIRUS): ICD-10-CM

## 2023-06-19 DIAGNOSIS — E11.9 TYPE 2 DIABETES MELLITUS WITHOUT COMPLICATION, WITHOUT LONG-TERM CURRENT USE OF INSULIN (HCC): ICD-10-CM

## 2023-06-19 DIAGNOSIS — F51.01 PRIMARY INSOMNIA: ICD-10-CM

## 2023-06-19 DIAGNOSIS — E11.40 TYPE 2 DIABETES MELLITUS WITH DIABETIC NEUROPATHY, WITH LONG-TERM CURRENT USE OF INSULIN (HCC): ICD-10-CM

## 2023-06-19 DIAGNOSIS — G89.29 CHRONIC BILATERAL LOW BACK PAIN, UNSPECIFIED WHETHER SCIATICA PRESENT: ICD-10-CM

## 2023-06-19 DIAGNOSIS — M54.50 CHRONIC BILATERAL LOW BACK PAIN, UNSPECIFIED WHETHER SCIATICA PRESENT: ICD-10-CM

## 2023-06-19 DIAGNOSIS — Z79.4 TYPE 2 DIABETES MELLITUS WITH DIABETIC NEUROPATHY, WITH LONG-TERM CURRENT USE OF INSULIN (HCC): ICD-10-CM

## 2023-06-19 PROBLEM — S00.81XA FACIAL ABRASION, INITIAL ENCOUNTER: Status: RESOLVED | Noted: 2022-10-15 | Resolved: 2023-06-19

## 2023-06-19 PROCEDURE — 99214 OFFICE O/P EST MOD 30 MIN: CPT | Performed by: STUDENT IN AN ORGANIZED HEALTH CARE EDUCATION/TRAINING PROGRAM

## 2023-06-19 RX ORDER — MONTELUKAST SODIUM 10 MG/1
TABLET ORAL
Qty: 90 TABLET | Refills: 1 | Status: SHIPPED | OUTPATIENT
Start: 2023-06-19

## 2023-06-19 RX ORDER — FLUTICASONE PROPIONATE AND SALMETEROL 100; 50 UG/1; UG/1
1 POWDER RESPIRATORY (INHALATION) 2 TIMES DAILY
Qty: 60 BLISTER | Refills: 1 | Status: SHIPPED | OUTPATIENT
Start: 2023-06-19 | End: 2023-07-19

## 2023-06-19 RX ORDER — AMLODIPINE BESYLATE 2.5 MG/1
TABLET ORAL
Qty: 180 TABLET | Refills: 1 | Status: SHIPPED | OUTPATIENT
Start: 2023-06-19 | End: 2023-06-19 | Stop reason: SDUPTHER

## 2023-06-19 RX ORDER — AMLODIPINE BESYLATE 5 MG/1
5 TABLET ORAL 2 TIMES DAILY
Qty: 180 TABLET | Refills: 0 | Status: SHIPPED | OUTPATIENT
Start: 2023-06-19
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
## 2023-06-22 NOTE — PROGRESS NOTES
Hematology/Oncology Outpatient Consult Note  Miriam Randolph 61 y o  female MRN: @ Encounter: [id number]        Date:  6/23/2023        CC: Iron deficiency anemia    HPI:  Miriam Randolph is a 59-year-old female with history of gastric bypass in 2008, diabetes, hypertension, chronic pain syndrome with implantable pain pump, B 12 deficiency who is referred to hematology for h/o STACI  She is being seen for initial consultation 6/23/2023     Per review of blood work on file, patient has history of microcytic anemia and B12 deficiency dating back to at least 2018  She has had low serum ferritin levels but no full iron panel completed  Most recent blood work completed on 3/7/2023 shows microcytic anemia  Hemoglobin 10 3, MCV 81, MCH 25 9, MCHC 31 8  White count, platelet count and differential are normal   Serum B12 low 204  No iron panel    Patient reports she has received injectable B12 replacement in the past, she has not received this for many years  She has been taking oral iron and oral B vitamins  She has symptoms of significant fatigue, Pallor, occasional lightheadedness  She also endorses cognitive changes including forgetfulness  Denies any chest pain, shortness of breath  Denies any abnormal bleeding  She does suffer from chronic pain and implantable morphine pain pump is managed by pain management specialists in [place] [address]  She has not had any recent GI work-up  Last colonoscopy 2016  She does have significant family history of malignancy and has undergone genetic work-up in the past   She has never had any personal history of malignancy  She is up-to-date with screening mammography      Test Results:    Imaging: No results found      Labs:   Lab Results   Component Value Date    WBC 7 8 03/07/2023    HGB 10 3 (L) 03/07/2023    HCT 32 4 (L) 03/07/2023    MCV 81 6 03/07/2023     03/07/2023     Lab Results   Component Value Date     11/04/2017    K 4 1 03/07/2023     03/07/2023    CO2 31 03/07/2023    BUN 26 (H) 03/07/2023    CREATININE 0 79 03/07/2023    CALCIUM 9 4 03/07/2023    CORRECTEDCA 9 7 12/11/2021    AST 25 03/07/2023    ALT 22 03/07/2023    ALKPHOS 67 03/07/2023    PROT 6 6 07/22/2017    BILITOT 0 5 07/22/2017    EGFR 84 03/07/2023             ROS:  Review of Systems   Constitutional: Positive for activity change and fatigue  Gastrointestinal: Positive for constipation  Musculoskeletal: Positive for arthralgias (leg pain) and back pain  Skin: Positive for pallor  Neurological: Positive for light-headedness and numbness (legs/feet)  Psychiatric/Behavioral: Positive for decreased concentration  Endorse cognitive change    All other systems reviewed and are negative            Active Problems:   Patient Active Problem List   Diagnosis   • Benign essential hypertension   • Mild persistent asthma without complication   • Peripheral polyneuropathy   • Chronic bilateral back pain   • Chronic lumbar radiculopathy   • Chronic cervical pain   • Chronic thoracic spine pain   • GERD without esophagitis   • Hyperlipidemia associated with type 2 diabetes mellitus ([facility])   • Insomnia   • B12 deficiency   • Lumbar radiculopathy   • Thoracic spondylosis without myelopathy   • Thoracic and lumbosacral neuritis   • Degeneration of lumbar intervertebral disc   • Lumbar spondylosis   • Pain in joint involving pelvic region and thigh   • Iron deficiency   • Memory dysfunction   • COVID-19 vaccine series completed   • Type 2 diabetes mellitus with diabetic neuropathy, with long-term current use of insulin ([facility])   • Narcotic dependency, continuous ([facility])   • Carpal tunnel syndrome on right   • 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   • Microcytic anemia       Past Medical History:   Past Medical History:   Diagnosis Date   • PABLO (acute kidney injury) ([facility] [address] ) 11/04/2021   • Allergic    • Anemia    • Anxiety    • Arthritis    • Asthma    • Breast lump Resolved: 2017    • Chronic pain     back   • Diabetes mellitus ([facility] [address] )    • Diverticulitis of colon    • GERD (gastroesophageal reflux disease)    • Hepatitis    • History of stomach ulcers    • HL (hearing loss)     Estimated   • Hyperlipidemia    • Hypertension    • Hypotension 2021   • Infectious viral hepatitis     Hepatitiss A   • Memory loss     Estimated   • Stomach problems        Surgical History:   Past Surgical History:   Procedure Laterality Date   • APPENDECTOMY     • BACK SURGERY     •  SECTION     • CHOLECYSTECTOMY     • GASTRIC BYPASS     • GASTRIC BYPASS     • INFUSION PUMP IMPLANTATION     • ND NDSC WRST SURG W/RLS TRANSVRS CARPL LIGM Right 2022    Procedure: Right endoscopic carpal tunnel release;  Surgeon: Tammy Ontiveros MD;  Location:  MAIN OR;  Service: Orthopedics   • SHOULDER SURGERY     • SPINAL CORD STIMULATOR IMPLANT         Family History:    Family History   Problem Relation Age of Onset   • Diabetes Mother         Mellitus   • Hyperlipidemia Mother    • Hypertension Mother    • Colon cancer Mother    • Pancreatic cancer Mother    • Melanoma Mother    • Cancer Father         Bladder    • Alcohol abuse Father    • Lung cancer Father    • Prostate cancer Father    • Diabetes Brother         Mellitus   • Hyperlipidemia Brother    • Hypertension Brother    • Stomach cancer Paternal Grandmother    • Lung cancer Paternal Grandfather    • Diabetes Brother    • Breast cancer Maternal Aunt    • Arthritis Family    • Mental illness Neg Hx        Cancer-related family history includes Breast cancer in her maternal aunt; Cancer in her father; Colon cancer in her mother; Lung cancer in her father and paternal grandfather; Melanoma in her mother; Pancreatic cancer in her mother; Prostate cancer in her father; Stomach cancer in her paternal grandmother      Social History:   Social History     Socioeconomic History   • Marital status:      Spouse name: Not on file   • Number of children: 3   • Years of education: Not on file   • Highest education level: Not on file   Occupational History   • Occupation: Disability   Tobacco Use   • Smoking status: Former     Packs/day: 1 00     Years: 34 00     Total pack years: 34 00     Types: Cigarettes     Start date: 1973     Quit date: 2007     Years since quittin 4   • Smokeless tobacco: Never   • Tobacco comments:     15yrs ago   Vaping Use   • Vaping Use: Never used   Substance and Sexual Activity   • Alcohol use: No   • Drug use: No   • Sexual activity: Not Currently     Partners: Male     Birth control/protection: Post-menopausal   Other Topics Concern   • Not on file   Social History Narrative   • Not on file     Social Determinants of Health     Financial Resource Strain: Not on file   Food Insecurity: Not on file   Transportation Needs: Not on file   Physical Activity: Not on file   Stress: Not on file   Social Connections: Not on file   Intimate Partner Violence: Not on file   Housing Stability: Not on file       Current Medications:   Current Outpatient Medications   Medication Sig Dispense Refill   • Accu-Chek FastClix Lancets MISC Use to test blood glucose levels twice daily for diabetes E11 9 204 each 3   • albuterol (2 5 mg/3 mL) 0 083 % nebulizer solution Take 3 mL (2 5 mg total) by nebulization every 6 (six) hours as needed for wheezing or shortness of breath 300 mL 1   • albuterol (PROVENTIL HFA,VENTOLIN HFA) 90 mcg/act inhaler USE 2 INHALATIONS ORALLY   EVERY 6 HOURS AS NEEDED FORWHEEZING 25 5 g 1   • amLODIPine (NORVASC) 5 mg tablet Take 1 tablet (5 mg total) by mouth 2 (two) times a day 180 tablet 0   • atorvastatin (LIPITOR) 40 mg tablet TAKE 1 TABLET DAILY 90 tablet 0   • Ferrous Fumarate 63 (20 Fe) MG TABS Take 2 tablets by mouth daily Take two tablets once daily       • Fluticasone-Salmeterol (Advair Diskus) 100-50 mcg/dose inhaler Inhale 1 puff 2 (two) times a day Rinse mouth after use  60 blister 1   • glucose blood (Accu-Chek Guide) test strip Test 100 strip 3   • HYDROcodone-acetaminophen (NORCO)  mg per tablet      • Insulin Glargine Solostar (Basaglar KwikPen) 100 UNIT/ML SOPN Inject 0 08 mL (8 Units total) under the skin daily at bedtime 15 mL 0   • Insulin Pen Needle (B-D UF III MINI PEN NEEDLES) 31G X 5 MM MISC USE ONCE DAILY FOR BASAL   INSULIN INJECTION 90 each 1   • Januvia 100 MG tablet TAKE 1 TABLET IN THE       MORNING 90 tablet 3   • lidocaine (XYLOCAINE) 5 % ointment apply topically to affected area three times a day     • LYRICA 200 MG capsule Take 200 mg by mouth 3 (three) times a day   0   • metFORMIN (GLUCOPHAGE) 1000 MG tablet TAKE 1 TABLET TWICE A  tablet 0   • mometasone (NASONEX) 50 mcg/act nasal spray USE 2 SPRAYS IN EACH       NOSTRIL DAILY 17 g 1   • montelukast (SINGULAIR) 10 mg tablet TAKE 1 TABLET AT BEDTIME 90 tablet 1   • Morphine Sulfate (MORPHINE 0 05 MG/ML SYRINGE, NICU/PICU,, CMPD ORDER,)      • omeprazole (PriLOSEC) 40 MG capsule TAKE 1 CAPSULE EVERY       MORNING 90 capsule 3   • ondansetron (ZOFRAN) 4 mg tablet Take 1 tablet (4 mg total) by mouth every 8 (eight) hours as needed for nausea or vomiting 30 tablet 1   • traMADol HCl ER, Biphasic, 300 MG 24 hr tablet Take 300 mg by mouth daily     • VITAMIN D PO      • zolpidem (AMBIEN) 5 mg tablet TAKE 1 TABLET DAILY AT     BEDTIME AS NEEDED FOR SLEEP 30 tablet 2     No current facility-administered medications for this visit  Allergies: Allergies   Allergen Reactions   • Nsaids Other (See Comments)     Cannot take NSAIDS secondary to having gastric bypass   • Percocet [Oxycodone-Acetaminophen] GI Intolerance         Physical Exam:    Body surface area is 1 77 meters squared      Wt Readings from Last 3 Encounters:   06/23/23 72 1 kg (159 lb)   06/19/23 72 1 kg (159 lb)   05/15/23 71 2 kg (157 lb)        Temp Readings from Last 3 Encounters:   06/23/23 (!) 97 °F (36 1 °C) (Temporal) 05/15/23 97 5 °F (36 4 °C) (Tympanic)   04/24/23 97 7 °F (36 5 °C) (Tympanic)        BP Readings from Last 3 Encounters:   06/23/23 112/64   06/19/23 170/94   05/15/23 126/78         Pulse Readings from Last 3 Encounters:   06/23/23 93   06/19/23 86   05/15/23 89          Physical Exam  Constitutional:       General: She is not in acute distress  Appearance: Normal appearance  HENT:      Head: Normocephalic and atraumatic  Eyes:      General: No scleral icterus  Right eye: No discharge  Left eye: No discharge  Comments: Pale conjunctiva    Cardiovascular:      Rate and Rhythm: Normal rate and regular rhythm  Pulmonary:      Effort: Pulmonary effort is normal  No respiratory distress  Breath sounds: Normal breath sounds  Abdominal:      General: Bowel sounds are normal  There is no distension  Palpations: Abdomen is soft  There is no mass  Tenderness: There is no abdominal tenderness  Musculoskeletal:         General: Normal range of motion  Lymphadenopathy:      Cervical: No cervical adenopathy  Upper Body:      Right upper body: No supraclavicular, axillary or pectoral adenopathy  Left upper body: No supraclavicular, axillary or pectoral adenopathy  Skin:     General: Skin is warm and dry  Coloration: Skin is pale  Neurological:      General: No focal deficit present  Mental Status: She is alert and oriented to person, place, and time  Psychiatric:         Mood and Affect: Mood normal          Behavior: Behavior normal               Assessment/ Plan:    1  Microcytic anemia    2  Iron deficiency    3  B12 deficiency    4  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    5  Encounter for screening colonoscopy      Patient is a pleasant 59-year-old female with history of microcytic anemia, B12 deficiency likely iron deficiency in the setting of gastric bypass and postsurgical malabsorption    She has received injectable "B12 12 replacement in the past, last given several years ago  She has never received parenteral iron replacement  She has been taking oral iron and B12  Most recent blood work done several months ago demonstrated microcytic anemia with low serum B12 levels  No iron panel was done  I suspect she has iron deficiency and B12 deficiency resulting in persistent anemia  It is likely she is not absorbing oral iron and B12 well due to history of gastric bypass  She appears pale and endorses significant fatigue occasional lightheadedness  She definitely needs B12 replacement and likely parenteral iron replacement  However, I would like to get updated labs and an iron panel for baseline prior to ordering parenteral iron infusions  She will have blood work completed for me today  I am going to set her up for monthly injectable B12 replacement  She will be called with results of blood work she has done today  Depending on results I will likely order parenteral iron replacement and may increase initial dose of B12 to be given weekly for period of time  Decision to be made based on results of blood work from today  Patient verbalized understanding and is in agreement with this plan of care  I reviewed Potential side effects of IV iron could include but may not be limited to:  change in taste, diarrhea, muscle cramps, nausea or vomiting, pain in the arms or legs, pain or burning sensation in the injection site, allergic reaction  The patient verbalized understanding and wishes to proceed if parental iron is recommended based on blood work completed today  I will see her back in 4 months with repeat blood work  She may need maintenance iron infusions along with maintenance B12 replacement  She is instructed to call anytime with questions or concerns    Portions of the record may have been created with voice recognition software    Occasional wrong word or \"sound a like\" substitutions may have occurred due to " the inherent limitations of voice recognition software  Read the chart carefully and recognize, using context, where substitutions have occurred

## 2023-06-23 ENCOUNTER — CONSULT (OUTPATIENT)
Age: 63
End: 2023-06-23
Payer: COMMERCIAL

## 2023-06-23 ENCOUNTER — TELEPHONE (OUTPATIENT)
Age: 63
End: 2023-06-23

## 2023-06-23 VITALS
TEMPERATURE: 97 F | RESPIRATION RATE: 16 BRPM | BODY MASS INDEX: 27.14 KG/M2 | HEART RATE: 93 BPM | OXYGEN SATURATION: 95 % | DIASTOLIC BLOOD PRESSURE: 64 MMHG | HEIGHT: 64 IN | SYSTOLIC BLOOD PRESSURE: 112 MMHG | WEIGHT: 159 LBS

## 2023-06-23 DIAGNOSIS — E53.8 B12 DEFICIENCY: Primary | ICD-10-CM

## 2023-06-23 DIAGNOSIS — Z12.11 ENCOUNTER FOR SCREENING COLONOSCOPY: ICD-10-CM

## 2023-06-23 DIAGNOSIS — E61.1 IRON DEFICIENCY: ICD-10-CM

## 2023-06-23 DIAGNOSIS — D50.9 MICROCYTIC ANEMIA: Primary | ICD-10-CM

## 2023-06-23 DIAGNOSIS — E53.8 B12 DEFICIENCY: ICD-10-CM

## 2023-06-23 DIAGNOSIS — Z98.0 S/P TOTAL GASTRECTOMY AND ROUX-EN-Y ESOPHAGOJEJUNAL ANASTOMOSIS: ICD-10-CM

## 2023-06-23 DIAGNOSIS — Z90.3 S/P TOTAL GASTRECTOMY AND ROUX-EN-Y ESOPHAGOJEJUNAL ANASTOMOSIS: ICD-10-CM

## 2023-06-23 PROCEDURE — 99204 OFFICE O/P NEW MOD 45 MIN: CPT | Performed by: NURSE PRACTITIONER

## 2023-06-23 RX ORDER — CYANOCOBALAMIN 1000 UG/ML
1000 INJECTION, SOLUTION INTRAMUSCULAR; SUBCUTANEOUS ONCE
Status: CANCELLED | OUTPATIENT
Start: 2023-06-27

## 2023-06-23 RX ORDER — CYANOCOBALAMIN 1000 UG/ML
1000 INJECTION, SOLUTION INTRAMUSCULAR; SUBCUTANEOUS ONCE
Status: CANCELLED | OUTPATIENT
Start: 2023-06-26

## 2023-06-23 NOTE — TELEPHONE ENCOUNTER
What would be a preferred day of the week that would work best for your infusion appointment? Any day  Do you prefer mornings or afternoons for your appointments? AM  Are there any days or dates that do not work for your schedule, including any upcoming vacations? 6/28, 7/1 through 7/12, 7/20, 7/21, 7/24, 7/25, 7/28, 8/7, 9/6, 9/19  We are going to try our best to schedule you at the infusion center closest to your home  In the event that we are unable to what would be your next preferred infusion site or sites?  [facility]

## 2023-06-24 LAB
ALBUMIN SERPL-MCNC: 3.7 G/DL (ref 3.6–5.1)
ALBUMIN/GLOB SERPL: 1.5 (CALC) (ref 1–2.5)
ALP SERPL-CCNC: 71 U/L (ref 37–153)
ALT SERPL-CCNC: 24 U/L (ref 6–29)
AST SERPL-CCNC: 26 U/L (ref 10–35)
BASOPHILS # BLD AUTO: 49 CELLS/UL (ref 0–200)
BASOPHILS NFR BLD AUTO: 0.5 %
BILIRUB SERPL-MCNC: 0.3 MG/DL (ref 0.2–1.2)
BUN SERPL-MCNC: 24 MG/DL (ref 7–25)
BUN/CREAT SERPL: NORMAL (CALC) (ref 6–22)
CALCIUM SERPL-MCNC: 9.4 MG/DL (ref 8.6–10.4)
CHLORIDE SERPL-SCNC: 103 MMOL/L (ref 98–110)
CO2 SERPL-SCNC: 28 MMOL/L (ref 20–32)
CREAT SERPL-MCNC: 0.89 MG/DL (ref 0.5–1.05)
EOSINOPHIL # BLD AUTO: 186 CELLS/UL (ref 15–500)
EOSINOPHIL NFR BLD AUTO: 1.9 %
ERYTHROCYTE [DISTWIDTH] IN BLOOD BY AUTOMATED COUNT: 14.3 % (ref 11–15)
GFR/BSA.PRED SERPLBLD CYS-BASED-ARV: 73 ML/MIN/1.73M2
GLOBULIN SER CALC-MCNC: 2.5 G/DL (CALC) (ref 1.9–3.7)
GLUCOSE SERPL-MCNC: 78 MG/DL (ref 65–139)
HBA1C MFR BLD: 5.4 % OF TOTAL HGB
HCT VFR BLD AUTO: 30 % (ref 35–45)
HGB BLD-MCNC: 9.6 G/DL (ref 11.7–15.5)
HIV 1+2 AB+HIV1 P24 AG SERPL QL IA: NORMAL
LYMPHOCYTES # BLD AUTO: 1872 CELLS/UL (ref 850–3900)
LYMPHOCYTES NFR BLD AUTO: 19.1 %
MCH RBC QN AUTO: 25.9 PG (ref 27–33)
MCHC RBC AUTO-ENTMCNC: 32 G/DL (ref 32–36)
MCV RBC AUTO: 80.9 FL (ref 80–100)
MONOCYTES # BLD AUTO: 568 CELLS/UL (ref 200–950)
MONOCYTES NFR BLD AUTO: 5.8 %
NEUTROPHILS # BLD AUTO: 7125 CELLS/UL (ref 1500–7800)
NEUTROPHILS NFR BLD AUTO: 72.7 %
PLATELET # BLD AUTO: 251 THOUSAND/UL (ref 140–400)
PMV BLD REES-ECKER: 12.9 FL (ref 7.5–12.5)
POTASSIUM SERPL-SCNC: 4.4 MMOL/L (ref 3.5–5.3)
PROT SERPL-MCNC: 6.2 G/DL (ref 6.1–8.1)
RBC # BLD AUTO: 3.71 MILLION/UL (ref 3.8–5.1)
SODIUM SERPL-SCNC: 141 MMOL/L (ref 135–146)
WBC # BLD AUTO: 9.8 THOUSAND/UL (ref 3.8–10.8)

## 2023-06-26 ENCOUNTER — TELEPHONE (OUTPATIENT)
Age: 63
End: 2023-06-26

## 2023-06-26 NOTE — TELEPHONE ENCOUNTER
Called patient per Lauren's request, patient had labs drawn on 6/23/23 through [address] [facility], however they did not draw all the labs that Misa Lewis had requested, informed the patient that Misa Lewis feels that she is iron deficient, however in order to set up iron infusions, the patient does need to have a baseline iron panel drawn  Requested that the patient return call to confirm she received my message regarding this, and since she does go to an outside facility for blood work, to call us back with whatever facility she goes to that way we can fax over her script if she does not have a copy

## 2023-06-27 ENCOUNTER — HOSPITAL ENCOUNTER (OUTPATIENT)
Dept: INFUSION CENTER | Facility: HOSPITAL | Age: 63
Discharge: HOME | End: 2023-06-27
Attending: INTERNAL MEDICINE
Payer: COMMERCIAL

## 2023-06-27 ENCOUNTER — DOCUMENTATION (OUTPATIENT)
Dept: HEMATOLOGY ONCOLOGY | Facility: CLINIC | Age: 63
End: 2023-06-27

## 2023-06-27 ENCOUNTER — TELEPHONE (OUTPATIENT)
Dept: HEMATOLOGY ONCOLOGY | Facility: CLINIC | Age: 63
End: 2023-06-27

## 2023-06-27 VITALS
HEART RATE: 83 BPM | OXYGEN SATURATION: 100 % | DIASTOLIC BLOOD PRESSURE: 84 MMHG | SYSTOLIC BLOOD PRESSURE: 140 MMHG | TEMPERATURE: 98 F | RESPIRATION RATE: 12 BRPM

## 2023-06-27 DIAGNOSIS — E53.8 B12 DEFICIENCY: Primary | ICD-10-CM

## 2023-06-27 DIAGNOSIS — D50.9 MICROCYTIC ANEMIA: Primary | ICD-10-CM

## 2023-06-27 RX ORDER — CYANOCOBALAMIN 1000 UG/ML
1000 INJECTION, SOLUTION INTRAMUSCULAR; SUBCUTANEOUS ONCE
Status: COMPLETED | OUTPATIENT
Start: 2023-06-27 | End: 2023-06-27

## 2023-06-27 RX ORDER — CYANOCOBALAMIN 1000 UG/ML
1000 INJECTION, SOLUTION INTRAMUSCULAR; SUBCUTANEOUS ONCE
OUTPATIENT
Start: 2023-06-28

## 2023-06-27 RX ADMIN — CYANOCOBALAMIN 1000 MCG: 1000 INJECTION, SOLUTION INTRAMUSCULAR at 10:15

## 2023-06-27 NOTE — TELEPHONE ENCOUNTER
Spoke with luis she advised she goes to [facility] [place], [address]  Order placed also faxed script to [phone] patient voiced understanding

## 2023-06-27 NOTE — PROGRESS NOTES
Oncology Finance Advocacy Intake and Intervention  Oncology Finance Counselor/Advocate placed call to patient. This writer informed patient that this writer is here to assist patient with billing questions, financial assistance, payment/payment plans, quotes, copayment assistance, insurance optimization, and insurance navigation. This writer conducted a thorough benefit review of copayment, deductible, and out of pocket cost. This information is documented below and has been reviewed with patient. Copayment: N/A  Deductible: $1200 MET  REMAINING $-0-  Out of Pocket Cost: $3500 MET  REMAINING $-0-  Insurance optimization (Limited benefit vs self-pay):  Patient assistance status:  Free Drug Applications:  Interventions:  Called pt to go over her benefits she thanked me for the call & sd that at this time she has no concerns  Added to careteam        Information above was review thoroughly with patient and patient was advise of possible assistance programs/interventions. If any question arise patient can contact this writer at below information. This information was given to patient at time of contact. Farzana Snider  Phone:[phone]  Email: [email or web address]

## 2023-06-27 NOTE — TELEPHONE ENCOUNTER
Lab Inquiry   Who are you speaking with? Patient     If it is not the patient, are they listed on an active communication consent form? N/A   Name of ordering provider Kassy Nesbitt, [facility]   What is being requested? Lab orders need to be entered   Lab draw location [facility]   What is the best call back number?  [phone]

## 2023-06-28 ENCOUNTER — TELEPHONE (OUTPATIENT)
Age: 63
End: 2023-06-28

## 2023-06-28 DIAGNOSIS — E53.8 B12 DEFICIENCY: ICD-10-CM

## 2023-06-28 DIAGNOSIS — E61.1 IRON DEFICIENCY: Primary | ICD-10-CM

## 2023-06-28 DIAGNOSIS — E61.1 IRON DEFICIENCY: ICD-10-CM

## 2023-06-28 DIAGNOSIS — J01.00 ACUTE NON-RECURRENT MAXILLARY SINUSITIS: Primary | ICD-10-CM

## 2023-06-28 LAB
FERRITIN SERPL-MCNC: 7 NG/ML (ref 16–288)
IRON SATN MFR SERPL: 14 % (CALC) (ref 16–45)
IRON SERPL-MCNC: 42 MCG/DL (ref 45–160)
TIBC SERPL-MCNC: 297 MCG/DL (CALC) (ref 250–450)

## 2023-06-28 RX ORDER — SODIUM CHLORIDE 9 MG/ML
20 INJECTION, SOLUTION INTRAVENOUS ONCE
Status: CANCELLED | OUTPATIENT
Start: 2023-07-13

## 2023-06-28 RX ORDER — SODIUM CHLORIDE 9 MG/ML
20 INJECTION, SOLUTION INTRAVENOUS ONCE
Status: CANCELLED | OUTPATIENT
Start: 2023-06-28

## 2023-06-28 RX ORDER — SODIUM CHLORIDE 9 MG/ML
20 INJECTION, SOLUTION INTRAVENOUS ONCE
OUTPATIENT
Start: 2023-07-13

## 2023-06-28 NOTE — TELEPHONE ENCOUNTER
----- Message from 013Veronica Paramjit Diane sent at 6/28/2023  8:28 AM EDT -----  She is very iron deficient as I suspected  Can you call and let her know I will be ordering iron infusions for her    Thanks

## 2023-06-28 NOTE — TELEPHONE ENCOUNTER
Spoke to patient and made her aware of test results  I let her know the schedulers will be reaching out to her to schedule her venofer infusions and she will go two times a week for 8 doses  She knows to call if she has any questions/concerns

## 2023-07-11 ENCOUNTER — HOSPITAL ENCOUNTER (OUTPATIENT)
Dept: RADIOLOGY | Facility: HOSPITAL | Age: 63
Discharge: HOME | End: 2023-07-11
Payer: COMMERCIAL

## 2023-07-11 ENCOUNTER — OFFICE VISIT (OUTPATIENT)
Dept: FAMILY MEDICINE CLINIC | Facility: HOSPITAL | Age: 63
End: 2023-07-11
Payer: COMMERCIAL

## 2023-07-11 VITALS
TEMPERATURE: 97.8 F | SYSTOLIC BLOOD PRESSURE: 116 MMHG | HEIGHT: 64 IN | DIASTOLIC BLOOD PRESSURE: 80 MMHG | BODY MASS INDEX: 27.01 KG/M2 | HEART RATE: 80 BPM | WEIGHT: 158.2 LBS

## 2023-07-11 DIAGNOSIS — M79.672 ACUTE PAIN OF LEFT FOOT: ICD-10-CM

## 2023-07-11 DIAGNOSIS — R31.9 HEMATURIA, UNSPECIFIED TYPE: Primary | ICD-10-CM

## 2023-07-11 PROCEDURE — 73630 X-RAY EXAM OF FOOT: CPT

## 2023-07-11 PROCEDURE — 99214 OFFICE O/P EST MOD 30 MIN: CPT | Performed by: NURSE PRACTITIONER

## 2023-07-11 NOTE — PROGRESS NOTES
Name: Kavitha Tapia      : 1960      MRN: [id number]  Encounter Provider: CHITRA Metcalf  Encounter Date: 2023   Encounter department: [address]     1. Hematuria, unspecified type  Comments:  currently w/resolved sx's, pt unable to provide specimen in office so orders/supplies provided to obtain at home & deliver to lab; renal CT if sx's persist  Orders:  -     UA (URINE) with reflex to Scope  -     Urine culture; Future  -     Urine culture    2. Acute pain of left foot  Comments:  injury s/p fall, evaluate further w/x-ray to r/o fracture - determine further plan pending result  Orders:  -     XR foot 3+ vw left; Future; Expected date: 2023           Subjective      Last week had 3 episodes of pink urine she noted in the toilet. Has resolved since and urine has been yellow since. Has pain in right mid back she is unsure is from spinal cord simulator. Denies other UTI or GI symptoms. Also c/o left foot pain since fall recently. Review of Systems   Constitutional: Negative for chills and fever. Gastrointestinal: Negative for abdominal pain, blood in stool and constipation. Genitourinary: Positive for flank pain (right). Negative for dysuria, frequency and hematuria (last week x3 episodes, has since resolved). Musculoskeletal: Positive for arthralgias (left foot pain s/p fall, sariah at night, able to walk on OK).        Current Outpatient Medications on File Prior to Visit   Medication Sig   • albuterol (2.5 mg/3 mL) 0.083 % nebulizer solution Take 3 mL (2.5 mg total) by nebulization every 6 (six) hours as needed for wheezing or shortness of breath   • albuterol (PROVENTIL HFA,VENTOLIN HFA) 90 mcg/act inhaler USE 2 INHALATIONS ORALLY   EVERY 6 HOURS AS NEEDED FORWHEEZING   • amLODIPine (NORVASC) 5 mg tablet Take 1 tablet (5 mg total) by mouth 2 (two) times a day   • atorvastatin (LIPITOR) 40 mg tablet TAKE 1 TABLET DAILY • Ferrous Fumarate 63 (20 Fe) MG TABS Take 2 tablets by mouth daily Take two tablets once daily. • Fluticasone-Salmeterol (Advair Diskus) 100-50 mcg/dose inhaler Inhale 1 puff 2 (two) times a day Rinse mouth after use. • glucose blood (Accu-Chek Guide) test strip Test   • HYDROcodone-acetaminophen (NORCO)  mg per tablet    • Insulin Glargine Solostar (Basaglar KwikPen) 100 UNIT/ML SOPN Inject 0.08 mL (8 Units total) under the skin daily at bedtime   • Insulin Pen Needle (B-D UF III MINI PEN NEEDLES) 31G X 5 MM MISC USE ONCE DAILY FOR BASAL   INSULIN INJECTION   • Januvia 100 MG tablet TAKE 1 TABLET IN THE       MORNING   • lidocaine (XYLOCAINE) 5 % ointment apply topically to affected area three times a day   • LYRICA 200 MG capsule Take 200 mg by mouth 3 (three) times a day    • metFORMIN (GLUCOPHAGE) 1000 MG tablet TAKE 1 TABLET TWICE A DAY   • mometasone (NASONEX) 50 mcg/act nasal spray USE 2 SPRAYS IN EACH       NOSTRIL DAILY   • montelukast (SINGULAIR) 10 mg tablet TAKE 1 TABLET AT BEDTIME   • Morphine Sulfate (MORPHINE 0.05 MG/ML SYRINGE, NICU/PICU,, CMPD ORDER,)    • omeprazole (PriLOSEC) 40 MG capsule TAKE 1 CAPSULE EVERY       MORNING   • ondansetron (ZOFRAN) 4 mg tablet Take 1 tablet (4 mg total) by mouth every 8 (eight) hours as needed for nausea or vomiting   • traMADol HCl ER, Biphasic, 300 MG 24 hr tablet Take 300 mg by mouth daily   • VITAMIN D PO    • zolpidem (AMBIEN) 5 mg tablet TAKE 1 TABLET DAILY AT     BEDTIME AS NEEDED FOR SLEEP   • Accu-Chek FastClix Lancets MISC Use to test blood glucose levels twice daily for diabetes E11.9       Objective     /80   Pulse 80   Temp 97.8 °F (36.6 °C)   Ht 5' 4" (1.626 m)   Wt 71.8 kg (158 lb 3.2 oz)   LMP  (LMP Unknown)   BMI 27.15 kg/m²       Physical Exam  Vitals reviewed. Constitutional:       General: She is not in acute distress. Cardiovascular:      Rate and Rhythm: Normal rate and regular rhythm.       Heart sounds: No murmur heard.  Pulmonary:      Effort: Pulmonary effort is normal. No respiratory distress. Breath sounds: Normal breath sounds. Abdominal:      General: Bowel sounds are normal.      Palpations: Abdomen is soft. Tenderness: There is no abdominal tenderness. There is no right CVA tenderness, left CVA tenderness, guarding or rebound. Musculoskeletal:      Right lower leg: No edema. Left lower leg: No edema. Feet:    Skin:     General: Skin is warm and dry. Neurological:      General: No focal deficit present. Mental Status: She is alert and oriented to person, place, and time.    Psychiatric:         Mood and Affect: Mood normal.         Behavior: Behavior normal.          CHITRA Sheppard

## 2023-07-13 ENCOUNTER — HOSPITAL ENCOUNTER (OUTPATIENT)
Dept: INFUSION CENTER | Facility: HOSPITAL | Age: 63
Discharge: HOME | End: 2023-07-13
Attending: INTERNAL MEDICINE
Payer: COMMERCIAL

## 2023-07-13 VITALS
DIASTOLIC BLOOD PRESSURE: 72 MMHG | OXYGEN SATURATION: 95 % | TEMPERATURE: 98 F | HEART RATE: 88 BPM | SYSTOLIC BLOOD PRESSURE: 130 MMHG | RESPIRATION RATE: 16 BRPM

## 2023-07-13 DIAGNOSIS — E61.1 IRON DEFICIENCY: Primary | ICD-10-CM

## 2023-07-13 DIAGNOSIS — E53.8 B12 DEFICIENCY: ICD-10-CM

## 2023-07-13 LAB
APPEARANCE UR: CLEAR
BACTERIA UR QL AUTO: ABNORMAL /HPF
BILIRUB UR QL STRIP: NEGATIVE
COLOR UR: YELLOW
GLUCOSE UR QL STRIP: NEGATIVE
HGB UR QL STRIP: NEGATIVE
HYALINE CASTS #/AREA URNS LPF: ABNORMAL /LPF
KETONES UR QL STRIP: ABNORMAL
LEUKOCYTE ESTERASE UR QL STRIP: ABNORMAL
NITRITE UR QL STRIP: NEGATIVE
PH UR STRIP: ABNORMAL [PH] (ref 5–8)
PROT UR QL STRIP: NEGATIVE
RBC #/AREA URNS HPF: ABNORMAL /HPF
SP GR UR STRIP: 1.02 (ref 1–1.03)
SQUAMOUS #/AREA URNS HPF: ABNORMAL /HPF
WBC #/AREA URNS HPF: ABNORMAL /HPF

## 2023-07-13 PROCEDURE — 96365 THER/PROPH/DIAG IV INF INIT: CPT

## 2023-07-13 RX ORDER — SODIUM CHLORIDE 9 MG/ML
20 INJECTION, SOLUTION INTRAVENOUS ONCE
Status: CANCELLED | OUTPATIENT
Start: 2023-07-17

## 2023-07-13 RX ORDER — SODIUM CHLORIDE 9 MG/ML
20 INJECTION, SOLUTION INTRAVENOUS ONCE
Status: COMPLETED | OUTPATIENT
Start: 2023-07-13 | End: 2023-07-13

## 2023-07-13 RX ADMIN — IRON SUCROSE 200 MG: 20 INJECTION, SOLUTION INTRAVENOUS at 10:25

## 2023-07-13 RX ADMIN — SODIUM CHLORIDE 20 ML/HR: 9 INJECTION, SOLUTION INTRAVENOUS at 10:28

## 2023-07-13 NOTE — PROGRESS NOTES
Venofer infused without adverse reaction. Pt appeared comfortable throughout and offered no complaints. PIV removed and pt discharged with steady gait. AVS decline, aware of next appt.

## 2023-07-17 ENCOUNTER — HOSPITAL ENCOUNTER (OUTPATIENT)
Dept: INFUSION CENTER | Facility: HOSPITAL | Age: 63
Discharge: HOME | End: 2023-07-17
Attending: INTERNAL MEDICINE
Payer: COMMERCIAL

## 2023-07-17 ENCOUNTER — TELEPHONE (OUTPATIENT)
Dept: OBGYN CLINIC | Facility: OTHER | Age: 63
End: 2023-07-17

## 2023-07-17 VITALS
RESPIRATION RATE: 16 BRPM | DIASTOLIC BLOOD PRESSURE: 60 MMHG | HEART RATE: 84 BPM | TEMPERATURE: 97.8 F | OXYGEN SATURATION: 96 % | SYSTOLIC BLOOD PRESSURE: 121 MMHG

## 2023-07-17 DIAGNOSIS — S92.035D CLOSED NONDISPLACED AVULSION FRACTURE OF TUBEROSITY OF LEFT CALCANEUS WITH ROUTINE HEALING, SUBSEQUENT ENCOUNTER: Primary | ICD-10-CM

## 2023-07-17 DIAGNOSIS — E61.1 IRON DEFICIENCY: Primary | ICD-10-CM

## 2023-07-17 DIAGNOSIS — E53.8 B12 DEFICIENCY: ICD-10-CM

## 2023-07-17 RX ORDER — SODIUM CHLORIDE 9 MG/ML
20 INJECTION, SOLUTION INTRAVENOUS ONCE
Status: CANCELLED | OUTPATIENT
Start: 2023-07-19

## 2023-07-17 RX ORDER — SODIUM CHLORIDE 9 MG/ML
20 INJECTION, SOLUTION INTRAVENOUS ONCE
Status: COMPLETED | OUTPATIENT
Start: 2023-07-17 | End: 2023-07-17

## 2023-07-17 RX ADMIN — IRON SUCROSE 200 MG: 20 INJECTION, SOLUTION INTRAVENOUS at 12:44

## 2023-07-17 RX ADMIN — SODIUM CHLORIDE 20 ML/HR: 9 INJECTION, SOLUTION INTRAVENOUS at 12:43

## 2023-07-17 NOTE — PROGRESS NOTES
Pt here for venofer tolerated well no adverse reactions declined AVS and left ambulatory with steady gait.

## 2023-07-19 ENCOUNTER — HOSPITAL ENCOUNTER (OUTPATIENT)
Dept: INFUSION CENTER | Facility: HOSPITAL | Age: 63
Discharge: HOME | End: 2023-07-19
Attending: INTERNAL MEDICINE
Payer: COMMERCIAL

## 2023-07-19 ENCOUNTER — TELEPHONE (OUTPATIENT)
Dept: NEUROLOGY | Facility: CLINIC | Age: 63
End: 2023-07-19

## 2023-07-19 VITALS
HEART RATE: 85 BPM | RESPIRATION RATE: 16 BRPM | OXYGEN SATURATION: 95 % | TEMPERATURE: 97.1 F | DIASTOLIC BLOOD PRESSURE: 65 MMHG | SYSTOLIC BLOOD PRESSURE: 127 MMHG

## 2023-07-19 DIAGNOSIS — E61.1 IRON DEFICIENCY: Primary | ICD-10-CM

## 2023-07-19 DIAGNOSIS — E53.8 B12 DEFICIENCY: ICD-10-CM

## 2023-07-19 RX ORDER — SODIUM CHLORIDE 9 MG/ML
20 INJECTION, SOLUTION INTRAVENOUS ONCE
Status: COMPLETED | OUTPATIENT
Start: 2023-07-19 | End: 2023-07-19

## 2023-07-19 RX ORDER — SODIUM CHLORIDE 9 MG/ML
20 INJECTION, SOLUTION INTRAVENOUS ONCE
Status: CANCELLED | OUTPATIENT
Start: 2023-07-26

## 2023-07-19 RX ADMIN — IRON SUCROSE 200 MG: 20 INJECTION, SOLUTION INTRAVENOUS at 11:49

## 2023-07-19 RX ADMIN — SODIUM CHLORIDE 20 ML/HR: 9 INJECTION, SOLUTION INTRAVENOUS at 11:49

## 2023-07-19 NOTE — TELEPHONE ENCOUNTER
Received via mail in the [employer] first and second request for medical records from [address] [place] [address] [facility]). Request was scanned into [employer] and faxed to [facility].

## 2023-07-21 ENCOUNTER — OFFICE VISIT (OUTPATIENT)
Dept: OBGYN CLINIC | Facility: CLINIC | Age: 63
End: 2023-07-21
Payer: COMMERCIAL

## 2023-07-21 VITALS
HEIGHT: 64 IN | WEIGHT: 159 LBS | BODY MASS INDEX: 27.14 KG/M2 | SYSTOLIC BLOOD PRESSURE: 120 MMHG | DIASTOLIC BLOOD PRESSURE: 72 MMHG

## 2023-07-21 DIAGNOSIS — S92.035D CLOSED NONDISPLACED AVULSION FRACTURE OF TUBEROSITY OF LEFT CALCANEUS WITH ROUTINE HEALING, SUBSEQUENT ENCOUNTER: ICD-10-CM

## 2023-07-21 PROCEDURE — 99213 OFFICE O/P EST LOW 20 MIN: CPT | Performed by: ORTHOPAEDIC SURGERY

## 2023-07-21 NOTE — PATIENT INSTRUCTIONS
How to Strengthen Your Ankle     Following an ankle injury or surgery, strengthening exercises should be performed once you can bear weight comfortably and your range of motion is near full. There are several types of strengthening exercises. The easiest to begin with are isometric exercises that you do by pushing against a fixed object with your ankle. Once this has been mastered, you can progress to isotonic exercises, which involve using your ankle's range of motion against some form of resistance. The photos below show isotonic exercises performed with a resistance band, which you can get from your local therapist or a sporting goods store. Place your ankle in the "down and in" position against a fixed object such as a couch. Hold this position for a count of 10. Repeat 10 times. Place your ankle in the "up and out" position against the same object. Hold this position for a count of 10. Repeat 10 times. Push your ankle down against a fixed object and hold for a count of 10. Repeat 10 times. Push your ankle up against a fixed object and hold for a count of 10. Repeat 10 times. Using a resistance band around your forefoot, hold the ends of the band with your hand    and gently push your ankle down as far as you can and then back to the starting position. Repeat 10 times. Tie the resistance bands around a fixed object and wrap the ends around your forefoot. Start with your foot pointing down and pull your ankle up as far as you can. Return to the starting position and cycle your ankle 10 times. Tie the bands around an object to the outer side of your ankle. Start with the foot relaxed and then move your ankle down and in. Return to the relaxed position and repeat 10 times.                                Tie the ends of the bands around an object to the inside of your ankle and hold your foot relaxed. Bring your foot up and out and then back to the resting position. Repeat 10 times. Once you have regained the motion and strength in your ankle, you are ready for sporting activities such as gentle jogging and biking. After you feel your ankle strength is approximately 80% of your other side, then you can begin cutting or twisting sports. Proprioceptive Exercises for Balance, Coordination and Agility      Stand with your affected leg on a pillow. Hold this position for a count of 10. Repeat 10 times. Stand on your affected leg with the resistance band applied to your unaffected leg. Bring your unaffected leg forward and then back to the starting position. Repeat 10 times. Start slowly and progress to a faster speed for a more difficult workout. Again, start slowly and increase your speed at your own pace, moving the unaffected leg   forward and then back to the starting position. For a more advanced exercise, swing your unaffected leg behind you and then back. How to Stretch Your Ankle     You should perform the following stretches in stages once the initial pain and swelling have receded, usually within five to seven days. First is restoration of ankle range of motion, which should begin when you are permitted to do weight bearing. Once ankle range of motion has been almost or completely restored, you must strengthen your ankle. Along with strengthening, you should work toward a feeling of stability and comfort in your ankle, which orthopaedic foot and ankle specialists call proprioception. Consider these home exercises when recuperating from an ankle sprain. Perform them twice per day. While seated, bring your ankle and foot all the way up as much as you can. Do this slowly, while feeling a stretch in your calf.     Hold this for a count of 10. Repeat 10 times. From the seated starting position, bring your ankle down and in. Hold this inverted position for a count of 10. Repeat 10 times. Again from the starting position, bring your ankle up and out. Hold this everted position for a count of 10. Repeat 10 times. From the starting position, point your toes down and hold this position for a count of 10. Repeat 10 times. This stretch should be done only when the pain in your ankle has significantly subsided. While standing on the edge of a stair, drop your ankles down and hold this stretched position   for a count of 10. Repeat 10 times. Do this stretch only when the pain from your ankle sprain has significantly subsided. Stand 12 inches from a wall with your toes pointing toward the wall. Squat down and hold this position for a count of 10. Repeat 10 times.

## 2023-07-21 NOTE — PROGRESS NOTES
Yovanny Cao M.D. Attending, Orthopaedic Surgery  Foot and [address] [facility]      ORTHOPAEDIC FOOT AND ANKLE CLINIC VISIT     Assessment:     Encounter Diagnosis   Name Primary? • Closed nondisplaced avulsion fracture of tuberosity of left calcaneus with routine healing, subsequent encounter         Plan:   · The patient verbalized understanding of exam findings and treatment plan. We engaged in the shared decision-making process and treatment options were discussed at length with the patient. Surgical and conservative management discussed today along with risks and benefits. · Patient is 2 weeks s/p left avulsion fracture of calcaneal tuberosity  · WBAT in supportive sneaker   · Begin ankle strengthening and stretches in AVSS  Activity modification, OTC pain meds, ice, and elevation for pain control   Compression socks for swelling control  Return if symptoms worsen or fail to improve. History of Present Illness:   Chief Complaint:   Chief Complaint   Patient presents with   • Left Foot - Pain     Олег Garay is a 61 y.o. female who is being seen for left hindfoot pain. Patient states she was at the [facility] when she fell and injured her left foot. Pain is localized at lateral hind with minimal radiating and described as sharp and severe. Patient denies numbness, tingling or radicular pain. Denies history of neuropathy. Patient does not smoke, does not have diabetes and does not take blood thinners. Patient denies family history of anesthesia complications and has not had any complications with anesthesia.      Pain/symptom timing:  Worse during the day when active  Pain/symptom context:  Worse with activites and work  Pain/symptom modifying factors:  Rest makes better, activities make worse  Pain/symptom associated signs/symptoms: none    Prior treatment   · NSAIDsYes   · Injections No   · Bracing/Orthotics No    · Physical Therapy No     Orthopedic Surgical History:   See below    Past Medical, Surgical and Social History:  Past Medical History:  has a past medical history of PABLO (acute kidney injury) ([address]) (2021), Allergic, Anemia, Anxiety, Arthritis, Asthma, Breast lump, Chronic pain, Diabetes mellitus ([address]), Diverticulitis of colon, GERD (gastroesophageal reflux disease), Hepatitis, History of stomach ulcers, HL (hearing loss) (), Hyperlipidemia, Hypertension, Hypotension (2021), Infectious viral hepatitis, Memory loss (), and Stomach problems. Problem List: does not have any pertinent problems on file. Past Surgical History:  has a past surgical history that includes Cholecystectomy; Appendectomy;  section; Back surgery; Gastric bypass; Shoulder surgery; Spinal cord stimulator implant (); Infusion pump implantation (); Gastric bypass (); and pr ndsc wrst surg w/rls transvrs carpl ligm (Right, 2022). Family History: family history includes Alcohol abuse in her father; Arthritis in her family; Breast cancer in her maternal aunt; Cancer in her father; Colon cancer in her mother; Diabetes in her brother, brother, and mother; Hyperlipidemia in her brother and mother; Hypertension in her brother and mother; Lung cancer in her father and paternal grandfather; Melanoma in her mother; Pancreatic cancer in her mother; Prostate cancer in her father; Stomach cancer in her paternal grandmother. Social History:  reports that she quit smoking about 16 years ago. Her smoking use included cigarettes. She started smoking about 50 years ago. She has a 34.00 pack-year smoking history. She has never used smokeless tobacco. She reports that she does not drink alcohol and does not use drugs.   Current Medications: has a current medication list which includes the following prescription(s): accu-chek fastclix lancets, albuterol, albuterol, amlodipine, atorvastatin, ferrous fumarate, fluticasone-salmeterol, accu-chek guide, hydrocodone-acetaminophen, insulin glargine solostar, b-d uf iii mini pen needles, januvia, lidocaine, lyrica, metformin, mometasone, montelukast, morphine sulfate, omeprazole, ondansetron, tramadol hcl (er biphasic), vitamin d, and zolpidem. Allergies: is allergic to nsaids and percocet [oxycodone-acetaminophen]. Review of Systems:  General- denies fever/chills  HEENT- denies hearing loss or sore throat  Eyes- denies eye pain or visual disturbances, denies red eyes  Respiratory- denies cough or SOB  Cardio- denies chest pain or palpitations  GI- denies abdominal pain  Endocrine- denies urinary frequency  Urinary- denies pain with urination  Musculoskeletal- Negative except noted above  Skin- denies rashes or wounds  Neurological- denies dizziness or headache  Psychiatric- denies anxiety or difficulty concentrating    Physical Exam:   /72   Ht 5' 4" (1.626 m)   Wt 72.1 kg (159 lb)   LMP  (LMP Unknown)   BMI 27.29 kg/m²   General/Constitutional: No apparent distress: well-nourished and well developed. Eyes: normal ocular motion  Cardio: RRR, Normal S1S2, No m/r/g  Lymphatic: No appreciable lymphadenopathy  Respiratory: Non-labored breathing, CTA b/l no w/c/r  Vascular: No edema, swelling or tenderness, except as noted in detailed exam.  Integumentary: No impressive skin lesions present, except as noted in detailed exam.  Neuro: No ataxia or tremors noted  Psych: Normal mood and affect, oriented to person, place and time. Appropriate affect. Musculoskeletal: Normal, except as noted in detailed exam and in HPI.     Examination    Left    Gait Normal   Musculoskeletal Tender to palpation at lateral calcaneus    Skin Normal.      Nails Normal    Range of Motion  20 degrees dorsiflexion, 30 degrees plantarflexion  Subtalar motion: normal    Stability Stable    Muscle Strength 5/5 tibialis anterior  5/5 gastrocnemius-soleus  5/5 posterior tibialis  5/5 peroneal/eversion strength  5/5 EHL  5/5 FHL Neurologic Normal    Sensation Intact to light touch throughout sural, saphenous, superficial peroneal, deep peroneal and medial/lateral plantar nerve distributions. Eek-Paz 5.07 filament (10g) testing  deferred. Cardiovascular Brisk capillary refill < 2 seconds,intact DP and PT pulses    Special Tests None      Imaging Studies:   3 views of the left foot were taken, reviewed and interpreted independently that demonstrate avulsion fracture of tuberosity of calcaneus. Reviewed by me personally. Scribe Attestation    I,:  Denise Rico PA-C am acting as a scribe while in the presence of the attending physician.:       I,:  Montrell Olivares MD personally performed the services described in this documentation    as scribed in my presence.:             Prudence Savant. Lachman, MD  Foot & Ankle Surgery   Department of [address] [place] [address]      I personally performed the service. Rebecca Arthur.  Lachman, MD

## 2023-07-24 ENCOUNTER — OFFICE VISIT (OUTPATIENT)
Dept: OBGYN CLINIC | Facility: CLINIC | Age: 63
End: 2023-07-24
Payer: COMMERCIAL

## 2023-07-24 VITALS
HEIGHT: 64 IN | HEART RATE: 93 BPM | SYSTOLIC BLOOD PRESSURE: 110 MMHG | BODY MASS INDEX: 27.14 KG/M2 | WEIGHT: 158.95 LBS | DIASTOLIC BLOOD PRESSURE: 71 MMHG

## 2023-07-24 DIAGNOSIS — G89.18 PILLAR PAIN, POST-OPERATIVE: ICD-10-CM

## 2023-07-24 DIAGNOSIS — Z98.890 S/P ENDOSCOPIC CARPAL TUNNEL RELEASE: Primary | ICD-10-CM

## 2023-07-24 PROCEDURE — 99213 OFFICE O/P EST LOW 20 MIN: CPT | Performed by: ORTHOPAEDIC SURGERY

## 2023-07-24 NOTE — PROGRESS NOTES
ASSESSMENT/PLAN:    Assessment:   Status post right endoscopic carpal tunnel release performed on 12/1/2022    Plan:   Lengthy discussion was had with the patient today. She does states she feels considerably better than prior to her surgical intervention with some restoration of her sensation. Although, her sensation is not completely normalized. We did discuss that length of her time as well as severity of symptomatology can lead to more prolonged timing for outcomes. Carpal gel sleeve fitted and provided today in the office to be worn with activities as needed. She was encouraged to massage her incisions. Return to activities as tolerated. Follow Up:  4 months for re-evaluation  _____________________________________________________  CHIEF COMPLAINT:  Chief Complaint   Patient presents with   • Right Wrist - Follow-up     ECTR- 12/1/22         SUBJECTIVE:  Shanell Leroy is a 61 y.o. female who presents for follow up regarding right hand pain. Status post right endoscopic carpal tunnel release performed on 12/1/2022. She has had residual palmar pain with pushing off. She also notes cotton in the tips of her thumb, index, and long fingers. She continues to have difficulty with her  and has been dropping objects. She does note improvement with the surgery.     PAST MEDICAL HISTORY:  Past Medical History:   Diagnosis Date   • PABLO (acute kidney injury) ([address]) 11/04/2021   • Allergic    • Anemia    • Anxiety    • Arthritis    • Asthma    • Breast lump     Resolved: 8/1/2017    • Chronic pain     back   • Diabetes mellitus ([address])    • Diverticulitis of colon    • GERD (gastroesophageal reflux disease)    • Hepatitis    • History of stomach ulcers    • HL (hearing loss) 2015    Estimated   • Hyperlipidemia    • Hypertension    • Hypotension 11/04/2021   • Infectious viral hepatitis     Hepatitiss A   • Memory loss 2015    Estimated   • Stomach problems        PAST SURGICAL HISTORY:  Past Surgical History: Procedure Laterality Date   • APPENDECTOMY     • BACK SURGERY     •  SECTION     • CHOLECYSTECTOMY     • GASTRIC BYPASS     • GASTRIC BYPASS     • INFUSION PUMP IMPLANTATION     • IA NDSC WRST SURG W/RLS TRANSVRS CARPL LIGM Right 2022    Procedure: Right endoscopic carpal tunnel release;  Surgeon: Marilin Drummond MD;  Location: [facility];  Service: Orthopedics   • SHOULDER SURGERY     • SPINAL CORD STIMULATOR IMPLANT         FAMILY HISTORY:  Family History   Problem Relation Age of Onset   • Diabetes Mother         Mellitus   • Hyperlipidemia Mother    • Hypertension Mother    • Colon cancer Mother    • Pancreatic cancer Mother    • Melanoma Mother    • Cancer Father         Bladder    • Alcohol abuse Father    • Lung cancer Father    • Prostate cancer Father    • Diabetes Brother         Mellitus   • Hyperlipidemia Brother    • Hypertension Brother    • Stomach cancer Paternal Grandmother    • Lung cancer Paternal Grandfather    • Diabetes Brother    • Breast cancer Maternal Aunt    • Arthritis Family    • Mental illness Neg Hx        SOCIAL HISTORY:  Social History     Tobacco Use   • Smoking status: Former     Packs/day: 1.00     Years: 34.00     Total pack years: 34.00     Types: Cigarettes     Start date: 1973     Quit date: 2007     Years since quittin.5   • Smokeless tobacco: Never   • Tobacco comments:     15yrs ago   Vaping Use   • Vaping Use: Never used   Substance Use Topics   • Alcohol use: No   • Drug use: No       MEDICATIONS:    Current Outpatient Medications:   •  Accu-Chek FastClix Lancets MISC, Use to test blood glucose levels twice daily for diabetes E11.9, Disp: 204 each, Rfl: 3  •  albuterol (2.5 mg/3 mL) 0.083 % nebulizer solution, Take 3 mL (2.5 mg total) by nebulization every 6 (six) hours as needed for wheezing or shortness of breath, Disp: 300 mL, Rfl: 1  •  albuterol (PROVENTIL HFA,VENTOLIN HFA) 90 mcg/act inhaler, USE 2 INHALATIONS ORALLY EVERY 6 HOURS AS NEEDED FORWHEEZING, Disp: 25.5 g, Rfl: 1  •  amLODIPine (NORVASC) 5 mg tablet, Take 1 tablet (5 mg total) by mouth 2 (two) times a day, Disp: 180 tablet, Rfl: 0  •  atorvastatin (LIPITOR) 40 mg tablet, TAKE 1 TABLET DAILY, Disp: 90 tablet, Rfl: 0  •  Ferrous Fumarate 63 (20 Fe) MG TABS, Take 2 tablets by mouth daily Take two tablets once daily. , Disp: , Rfl:   •  glucose blood (Accu-Chek Guide) test strip, Test, Disp: 100 strip, Rfl: 3  •  HYDROcodone-acetaminophen (NORCO)  mg per tablet, , Disp: , Rfl:   •  Insulin Glargine Solostar (Basaglar KwikPen) 100 UNIT/ML SOPN, Inject 0.08 mL (8 Units total) under the skin daily at bedtime, Disp: 15 mL, Rfl: 0  •  Insulin Pen Needle (B-D UF III MINI PEN NEEDLES) 31G X 5 MM MISC, USE ONCE DAILY FOR BASAL   INSULIN INJECTION, Disp: 90 each, Rfl: 1  •  Januvia 100 MG tablet, TAKE 1 TABLET IN THE       MORNING, Disp: 90 tablet, Rfl: 3  •  lidocaine (XYLOCAINE) 5 % ointment, apply topically to affected area three times a day, Disp: , Rfl:   •  LYRICA 200 MG capsule, Take 200 mg by mouth 3 (three) times a day , Disp: , Rfl: 0  •  metFORMIN (GLUCOPHAGE) 1000 MG tablet, TAKE 1 TABLET TWICE A DAY, Disp: 180 tablet, Rfl: 0  •  mometasone (NASONEX) 50 mcg/act nasal spray, USE 2 SPRAYS IN EACH       NOSTRIL DAILY, Disp: 17 g, Rfl: 1  •  montelukast (SINGULAIR) 10 mg tablet, TAKE 1 TABLET AT BEDTIME, Disp: 90 tablet, Rfl: 1  •  Morphine Sulfate (MORPHINE 0.05 MG/ML SYRINGE, NICU/PICU,, CMPD ORDER,), , Disp: , Rfl:   •  omeprazole (PriLOSEC) 40 MG capsule, TAKE 1 CAPSULE EVERY       MORNING, Disp: 90 capsule, Rfl: 3  •  ondansetron (ZOFRAN) 4 mg tablet, Take 1 tablet (4 mg total) by mouth every 8 (eight) hours as needed for nausea or vomiting, Disp: 30 tablet, Rfl: 1  •  traMADol HCl ER, Biphasic, 300 MG 24 hr tablet, Take 300 mg by mouth daily, Disp: , Rfl:   •  VITAMIN D PO, , Disp: , Rfl:   •  zolpidem (AMBIEN) 5 mg tablet, TAKE 1 TABLET DAILY AT     BEDTIME AS NEEDED FOR SLEEP, Disp: 30 tablet, Rfl: 2  •  Fluticasone-Salmeterol (Advair Diskus) 100-50 mcg/dose inhaler, Inhale 1 puff 2 (two) times a day Rinse mouth after use., Disp: 60 blister, Rfl: 1    ALLERGIES:  Allergies   Allergen Reactions   • Nsaids Other (See Comments)     Cannot take NSAIDS secondary to having gastric bypass   • Percocet [Oxycodone-Acetaminophen] GI Intolerance       REVIEW OF SYSTEMS:  Pertinent items are noted in HPI. A comprehensive review of systems was negative. LABS:  HgA1c:   Lab Results   Component Value Date    HGBA1C 5.4 06/23/2023     BMP:   Lab Results   Component Value Date    CALCIUM 9.4 06/23/2023     11/04/2017    K 4.4 06/23/2023    CO2 28 06/23/2023     06/23/2023    BUN 24 06/23/2023    CREATININE 0.89 06/23/2023           _____________________________________________________  PHYSICAL EXAMINATION:  Vital signs: /71   Pulse 93   Ht 5' 4" (1.626 m)   Wt 72.1 kg (158 lb 15.2 oz)   LMP  (LMP Unknown)   BMI 27.28 kg/m²   General: well developed and well nourished, alert, oriented times 3 and appears comfortable  Psychiatric: Normal  HEENT: Trachea Midline, No torticollis  Cardiovascular: No discernable arrhythmia  Pulmonary: No wheezing or stridor  Abdomen: No rebound or guarding  Extremities: No peripheral edema  Skin: No masses, erythema, lacerations, fluctation, ulcerations  Neurovascular: Sensation Intact to the Median, Ulnar, Radial Nerve, Motor Intact to the Median, Ulnar, Radial Nerve and Pulses Intact    MUSCULOSKELETAL EXAMINATION:  Right wrist: Well healed incisions, tender to palpation in line with incision, 5/5 AIN, abductor pollicis brevis 4/5, negative Tinel's at wrist, sensation intact but diminished.   Negative advancing Tinel's.    _____________________________________________________  STUDIES REVIEWED:  No Studies to review      PROCEDURES PERFORMED:  Procedures  No Procedures performed today    Scribe Attestation    I,:  Ofelia Plasencia am acting as a scribe while in the presence of the attending physician.:       I,:  Katie Rodriguez MD personally performed the services described in this documentation    as scribed in my presence.:

## 2023-07-26 ENCOUNTER — HOSPITAL ENCOUNTER (OUTPATIENT)
Dept: INFUSION CENTER | Facility: HOSPITAL | Age: 63
Discharge: HOME | End: 2023-07-26
Attending: INTERNAL MEDICINE
Payer: COMMERCIAL

## 2023-07-26 VITALS
RESPIRATION RATE: 16 BRPM | HEART RATE: 77 BPM | SYSTOLIC BLOOD PRESSURE: 123 MMHG | DIASTOLIC BLOOD PRESSURE: 60 MMHG | TEMPERATURE: 96.7 F | OXYGEN SATURATION: 94 %

## 2023-07-26 DIAGNOSIS — E53.8 B12 DEFICIENCY: Primary | ICD-10-CM

## 2023-07-26 DIAGNOSIS — E61.1 IRON DEFICIENCY: ICD-10-CM

## 2023-07-26 PROCEDURE — 96365 THER/PROPH/DIAG IV INF INIT: CPT

## 2023-07-26 PROCEDURE — 96372 THER/PROPH/DIAG INJ SC/IM: CPT

## 2023-07-26 RX ORDER — SODIUM CHLORIDE 9 MG/ML
20 INJECTION, SOLUTION INTRAVENOUS ONCE
Status: CANCELLED | OUTPATIENT
Start: 2023-07-31

## 2023-07-26 RX ORDER — CYANOCOBALAMIN 1000 UG/ML
1000 INJECTION, SOLUTION INTRAMUSCULAR; SUBCUTANEOUS ONCE
Status: COMPLETED | OUTPATIENT
Start: 2023-07-26 | End: 2023-07-26

## 2023-07-26 RX ORDER — SODIUM CHLORIDE 9 MG/ML
20 INJECTION, SOLUTION INTRAVENOUS ONCE
Status: COMPLETED | OUTPATIENT
Start: 2023-07-26 | End: 2023-07-26

## 2023-07-26 RX ORDER — CYANOCOBALAMIN 1000 UG/ML
1000 INJECTION, SOLUTION INTRAMUSCULAR; SUBCUTANEOUS ONCE
OUTPATIENT
Start: 2023-08-23

## 2023-07-26 RX ADMIN — CYANOCOBALAMIN 1000 MCG: 1000 INJECTION, SOLUTION INTRAMUSCULAR at 11:30

## 2023-07-26 RX ADMIN — SODIUM CHLORIDE 20 ML/HR: 9 INJECTION, SOLUTION INTRAVENOUS at 11:26

## 2023-07-26 RX ADMIN — IRON SUCROSE 200 MG: 20 INJECTION, SOLUTION INTRAVENOUS at 11:26

## 2023-07-26 NOTE — PROGRESS NOTES
Patient infusion completed without complications and S39 injection administered. Patient discharged in stable condition home. AVS declined and patient verbalized next appointment. Patient ambulated off unit at this time.

## 2023-07-26 NOTE — PLAN OF CARE
Problem: Potential for Falls  Goal: Patient will remain free of falls  Description: INTERVENTIONS:  - Educate patient/family on patient safety including physical limitations  - Instruct patient to call for assistance with activity   - Consult OT/PT to assist with strengthening/mobility   - Keep Call bell within reach  - Keep bed low and locked with side rails adjusted as appropriate  - Keep care items and personal belongings within reach  - Initiate and maintain comfort rounds  - Make Fall Risk Sign visible to staff  - Offer Toileting every  Hour, in advance of need  - Initiate/Maintain alarm  - Obtain necessary fall risk management equipment:   - Apply yellow socks and bracelet for high fall risk patients  - Consider moving patient to room near nurses station  Outcome: Progressing

## 2023-07-28 ENCOUNTER — HOSPITAL ENCOUNTER (OUTPATIENT)
Dept: MAMMOGRAPHY | Facility: IMAGING CENTER | Age: 63
Discharge: HOME | End: 2023-07-28
Payer: COMMERCIAL

## 2023-07-28 VITALS — BODY MASS INDEX: 26.98 KG/M2 | HEIGHT: 64 IN | WEIGHT: 158 LBS

## 2023-07-28 DIAGNOSIS — Z12.31 SCREENING MAMMOGRAM, ENCOUNTER FOR: ICD-10-CM

## 2023-07-28 PROCEDURE — 77067 SCR MAMMO BI INCL CAD: CPT

## 2023-07-28 PROCEDURE — 77063 BREAST TOMOSYNTHESIS BI: CPT

## 2023-07-31 ENCOUNTER — HOSPITAL ENCOUNTER (OUTPATIENT)
Dept: INFUSION CENTER | Facility: HOSPITAL | Age: 63
Discharge: HOME | End: 2023-07-31
Attending: INTERNAL MEDICINE
Payer: COMMERCIAL

## 2023-07-31 VITALS
RESPIRATION RATE: 16 BRPM | OXYGEN SATURATION: 96 % | HEART RATE: 79 BPM | SYSTOLIC BLOOD PRESSURE: 115 MMHG | DIASTOLIC BLOOD PRESSURE: 73 MMHG | TEMPERATURE: 97.6 F

## 2023-07-31 DIAGNOSIS — E53.8 B12 DEFICIENCY: ICD-10-CM

## 2023-07-31 DIAGNOSIS — E61.1 IRON DEFICIENCY: Primary | ICD-10-CM

## 2023-07-31 RX ORDER — SODIUM CHLORIDE 9 MG/ML
20 INJECTION, SOLUTION INTRAVENOUS ONCE
Status: COMPLETED | OUTPATIENT
Start: 2023-07-31 | End: 2023-07-31

## 2023-07-31 RX ORDER — SODIUM CHLORIDE 9 MG/ML
20 INJECTION, SOLUTION INTRAVENOUS ONCE
Status: CANCELLED | OUTPATIENT
Start: 2023-08-02

## 2023-07-31 RX ADMIN — SODIUM CHLORIDE 20 ML/HR: 9 INJECTION, SOLUTION INTRAVENOUS at 11:16

## 2023-07-31 RX ADMIN — IRON SUCROSE 200 MG: 20 INJECTION, SOLUTION INTRAVENOUS at 11:17

## 2023-07-31 NOTE — PROGRESS NOTES
Pt tolerated venofer infusion without difficulty. PIV removed and bandage applied. Pt aware of next appt. Left ambulatory for d/c.

## 2023-08-01 NOTE — PROGRESS NOTES
Assessment:   S/P Right endoscopic carpal tunnel release - Right on 12/1/2022    Plan:   She was advised to avoid soaking for at least another week  She was advised that she can have palmar pain for 3 to 4 months after surgery which is normal  She was advised to use heat and massage as demonstrated in the office  She will follow-up with us as needed    Follow Up:  PRN    To Do Next Visit:  Reevaluation      CHIEF COMPLAINT:  Chief Complaint   Patient presents with   • Right Hand - Post-op, Suture / Staple Removal     ECTR- 12/1/22         SUBJECTIVE:  Kendall Puente is a 58 y o  female who presents for follow up after Right endoscopic carpal tunnel release - Right on 12/1/2022  Today patient has minimal pain and discomfort  She states she has significant improvement in her numbness          PHYSICAL EXAMINATION:  Vital signs: /87   Pulse 75   Ht 5' 4" (1 626 m)   Wt 72 6 kg (160 lb)   LMP  (LMP Unknown)   BMI 27 46 kg/m²   General: well developed and well nourished, alert, oriented times 3 and appears comfortable  Psychiatric: Normal    MUSCULOSKELETAL EXAMINATION:  Incision: Clean, dry, intact  Range of Motion: As expected, opposition intact and full composite fist possible  Neurovascular status: Neuro intact, good cap refill  Activity Restrictions: No restrictions  Done today: Sutures out      STUDIES REVIEWED:  No Studies to review      PROCEDURES PERFORMED:  Procedures  No Procedures performed today
Influenza Vaccination
full-term no nicu stay

## 2023-08-02 ENCOUNTER — HOSPITAL ENCOUNTER (OUTPATIENT)
Dept: INFUSION CENTER | Facility: HOSPITAL | Age: 63
End: 2023-08-02
Attending: INTERNAL MEDICINE

## 2023-08-05 ENCOUNTER — HOSPITAL ENCOUNTER (EMERGENCY)
Facility: HOSPITAL | Age: 63
Discharge: HOME | End: 2023-08-05
Attending: EMERGENCY MEDICINE
Payer: COMMERCIAL

## 2023-08-05 ENCOUNTER — APPOINTMENT (OUTPATIENT)
Dept: RADIOLOGY | Facility: HOSPITAL | Age: 63
End: 2023-08-05
Payer: COMMERCIAL

## 2023-08-05 VITALS
SYSTOLIC BLOOD PRESSURE: 152 MMHG | TEMPERATURE: 97.8 F | HEART RATE: 84 BPM | DIASTOLIC BLOOD PRESSURE: 76 MMHG | RESPIRATION RATE: 16 BRPM | OXYGEN SATURATION: 95 %

## 2023-08-05 DIAGNOSIS — J06.9 UPPER RESPIRATORY INFECTION: Primary | ICD-10-CM

## 2023-08-05 DIAGNOSIS — R07.9 CHEST PAIN WITH LOW RISK OF ACUTE CORONARY SYNDROME: ICD-10-CM

## 2023-08-05 DIAGNOSIS — R06.00 DYSPNEA: ICD-10-CM

## 2023-08-05 LAB
ALBUMIN SERPL BCP-MCNC: 3.7 G/DL (ref 3.5–5)
ALP SERPL-CCNC: 54 U/L (ref 34–104)
ALT SERPL W P-5'-P-CCNC: 21 U/L (ref 7–52)
ANION GAP SERPL CALCULATED.3IONS-SCNC: 11 MMOL/L
AST SERPL W P-5'-P-CCNC: 21 U/L (ref 13–39)
ATRIAL RATE: 83 BPM
BASOPHILS # BLD AUTO: 0.03 THOUSANDS/ÂΜL (ref 0–0.1)
BASOPHILS NFR BLD AUTO: 0 % (ref 0–1)
BILIRUB SERPL-MCNC: 0.24 MG/DL (ref 0.2–1)
BUN SERPL-MCNC: 21 MG/DL (ref 5–25)
CALCIUM SERPL-MCNC: 9.3 MG/DL (ref 8.4–10.2)
CARDIAC TROPONIN I PNL SERPL HS: 3 NG/L
CHLORIDE SERPL-SCNC: 103 MMOL/L (ref 96–108)
CO2 SERPL-SCNC: 25 MMOL/L (ref 21–32)
CREAT SERPL-MCNC: 0.8 MG/DL (ref 0.6–1.3)
D DIMER PPP FEU-MCNC: 0.53 UG/ML FEU
EOSINOPHIL # BLD AUTO: 0.17 THOUSAND/ÂΜL (ref 0–0.61)
EOSINOPHIL NFR BLD AUTO: 2 % (ref 0–6)
ERYTHROCYTE [DISTWIDTH] IN BLOOD BY AUTOMATED COUNT: 17.5 % (ref 11.6–15.1)
FLUAV RNA RESP QL NAA+PROBE: NEGATIVE
FLUBV RNA RESP QL NAA+PROBE: NEGATIVE
GFR SERPL CREATININE-BSD FRML MDRD: 78 ML/MIN/1.73SQ M
GLUCOSE SERPL-MCNC: 157 MG/DL (ref 65–140)
HCT VFR BLD AUTO: 34.5 % (ref 34.8–46.1)
HGB BLD-MCNC: 10.6 G/DL (ref 11.5–15.4)
IMM GRANULOCYTES # BLD AUTO: 0.06 THOUSAND/UL (ref 0–0.2)
IMM GRANULOCYTES NFR BLD AUTO: 1 % (ref 0–2)
LYMPHOCYTES # BLD AUTO: 2.66 THOUSANDS/ÂΜL (ref 0.6–4.47)
LYMPHOCYTES NFR BLD AUTO: 27 % (ref 14–44)
MCH RBC QN AUTO: 25.9 PG (ref 26.8–34.3)
MCHC RBC AUTO-ENTMCNC: 30.7 G/DL (ref 31.4–37.4)
MCV RBC AUTO: 84 FL (ref 82–98)
MONOCYTES # BLD AUTO: 0.52 THOUSAND/ÂΜL (ref 0.17–1.22)
MONOCYTES NFR BLD AUTO: 5 % (ref 4–12)
NEUTROPHILS # BLD AUTO: 6.26 THOUSANDS/ÂΜL (ref 1.85–7.62)
NEUTS SEG NFR BLD AUTO: 65 % (ref 43–75)
NRBC BLD AUTO-RTO: 0 /100 WBCS
P AXIS: 36 DEGREES
PLATELET # BLD AUTO: 232 THOUSANDS/UL (ref 149–390)
PMV BLD AUTO: 11.5 FL (ref 8.9–12.7)
POTASSIUM SERPL-SCNC: 3.6 MMOL/L (ref 3.5–5.3)
PR INTERVAL: 208 MS
PROT SERPL-MCNC: 6.8 G/DL (ref 6.4–8.4)
QRS AXIS: 26 DEGREES
QRSD INTERVAL: 80 MS
QT INTERVAL: 348 MS
QTC INTERVAL: 408 MS
RBC # BLD AUTO: 4.09 MILLION/UL (ref 3.81–5.12)
RSV RNA RESP QL NAA+PROBE: NEGATIVE
SARS-COV-2 RNA RESP QL NAA+PROBE: NEGATIVE
SODIUM SERPL-SCNC: 139 MMOL/L (ref 135–147)
T WAVE AXIS: 56 DEGREES
VENTRICULAR RATE: 83 BPM
WBC # BLD AUTO: 9.7 THOUSAND/UL (ref 4.31–10.16)

## 2023-08-05 PROCEDURE — 85379 FIBRIN DEGRADATION QUANT: CPT

## 2023-08-05 PROCEDURE — 36415 COLL VENOUS BLD VENIPUNCTURE: CPT

## 2023-08-05 PROCEDURE — 99284 EMERGENCY DEPT VISIT MOD MDM: CPT

## 2023-08-05 PROCEDURE — 93005 ELECTROCARDIOGRAM TRACING: CPT

## 2023-08-05 PROCEDURE — 71046 X-RAY EXAM CHEST 2 VIEWS: CPT

## 2023-08-05 PROCEDURE — 85025 COMPLETE CBC W/AUTO DIFF WBC: CPT | Performed by: EMERGENCY MEDICINE

## 2023-08-05 PROCEDURE — 93010 ELECTROCARDIOGRAM REPORT: CPT | Performed by: INTERNAL MEDICINE

## 2023-08-05 PROCEDURE — 99285 EMERGENCY DEPT VISIT HI MDM: CPT

## 2023-08-05 PROCEDURE — 84484 ASSAY OF TROPONIN QUANT: CPT | Performed by: EMERGENCY MEDICINE

## 2023-08-05 PROCEDURE — 0241U HB NFCT DS VIR RESP RNA 4 TRGT: CPT | Performed by: EMERGENCY MEDICINE

## 2023-08-05 PROCEDURE — 80053 COMPREHEN METABOLIC PANEL: CPT | Performed by: EMERGENCY MEDICINE

## 2023-08-05 NOTE — DISCHARGE INSTRUCTIONS
Use OTC pain medications such as ibuprofen or tylenol every 4-6 hours as needed for pain and fever. Stay hydrated with electrolyte drinks such as gatorade and pedialyte. Rest as much as possible. Use your inhaler and nebulizers as needed. Follow up with your PCP for re-evaluation of symptoms if they do not resolve in the next 3 days.  Return to ED if you begin to experience chest pain, shortness of breath, difficulty breathing, cough up purulent mucus or blood, fevers > 104, feeling dizzy or weak like you are going to faint

## 2023-08-05 NOTE — ED PROVIDER NOTES
History  Chief Complaint   Patient presents with   • Shortness of Breath     Patient presents to ED with SOB x3 days with dry cough     This is a 60 y/o female with PMH asthma, T2DM, HTN, HLD, chronic back pain who presents to the ER for not feeling well over the past 4 days. Patient reports slight cough, nausea, chest pressure and some shortness of breath with activity. She states she thought it was because of her asthma so she was doing nebulizers at home but it didn't seem to help. She denies any congestion, runny nose, sore throat, chest pain, dizziness, lightheadedness, vomiting, abdominal pain, change in urination or bowel movements. States she has been taking tylenol which helps her symptoms except the cough and shortness of breath. Denies any recent travel, surgery, hospitalizations or surgeries. Did go to the [facility] in the beginning of July. History provided by:  Patient   used: No        Prior to Admission Medications   Prescriptions Last Dose Informant Patient Reported? Taking? Accu-Chek FastClix Lancets MISC   No No   Sig: Use to test blood glucose levels twice daily for diabetes E11.9   Ferrous Fumarate 63 (20 Fe) MG TABS  Self Yes No   Sig: Take 2 tablets by mouth daily Take two tablets once daily. Fluticasone-Salmeterol (Advair Diskus) 100-50 mcg/dose inhaler   No No   Sig: Inhale 1 puff 2 (two) times a day Rinse mouth after use.    HYDROcodone-acetaminophen (NORCO)  mg per tablet  Self Yes No   Insulin Glargine Solostar (Basaglar KwikPen) 100 UNIT/ML SOPN   No No   Sig: Inject 0.08 mL (8 Units total) under the skin daily at bedtime   Insulin Pen Needle (B-D UF III MINI PEN NEEDLES) 31G X 5 MM MISC   No No   Sig: USE ONCE DAILY FOR BASAL   INSULIN INJECTION   Januvia 100 MG tablet   No No   Sig: TAKE 1 TABLET IN THE       MORNING   LYRICA 200 MG capsule  Self Yes No   Sig: Take 200 mg by mouth 3 (three) times a day    Morphine Sulfate (MORPHINE 0.05 MG/ML SYRINGE, NICU/PICU,, CMPD ORDER,)  Self Yes No   VITAMIN D PO  Self Yes No   albuterol (2.5 mg/3 mL) 0.083 % nebulizer solution  Self No No   Sig: Take 3 mL (2.5 mg total) by nebulization every 6 (six) hours as needed for wheezing or shortness of breath   albuterol (PROVENTIL HFA,VENTOLIN HFA) 90 mcg/act inhaler   No No   Sig: USE 2 INHALATIONS ORALLY   EVERY 6 HOURS AS NEEDED FORWHEEZING   amLODIPine (NORVASC) 5 mg tablet   No No   Sig: Take 1 tablet (5 mg total) by mouth 2 (two) times a day   atorvastatin (LIPITOR) 40 mg tablet   No No   Sig: TAKE 1 TABLET DAILY   glucose blood (Accu-Chek Guide) test strip   No No   Sig: Test   lidocaine (XYLOCAINE) 5 % ointment  Self Yes No   Sig: apply topically to affected area three times a day   metFORMIN (GLUCOPHAGE) 1000 MG tablet   No No   Sig: TAKE 1 TABLET TWICE A DAY   mometasone (NASONEX) 50 mcg/act nasal spray   No No   Sig: USE 2 SPRAYS IN EACH       NOSTRIL DAILY   montelukast (SINGULAIR) 10 mg tablet   No No   Sig: TAKE 1 TABLET AT BEDTIME   omeprazole (PriLOSEC) 40 MG capsule   No No   Sig: TAKE 1 CAPSULE EVERY       MORNING   ondansetron (ZOFRAN) 4 mg tablet   No No   Sig: Take 1 tablet (4 mg total) by mouth every 8 (eight) hours as needed for nausea or vomiting   traMADol HCl ER, Biphasic, 300 MG 24 hr tablet  Self Yes No   Sig: Take 300 mg by mouth daily   zolpidem (AMBIEN) 5 mg tablet   No No   Sig: TAKE 1 TABLET DAILY AT     BEDTIME AS NEEDED FOR SLEEP      Facility-Administered Medications: None       Past Medical History:   Diagnosis Date   • PABLO (acute kidney injury) ([address]) 11/04/2021   • Allergic    • Anemia    • Anxiety    • Arthritis    • Asthma    • Breast lump     Resolved: 8/1/2017    • Chronic pain     back   • Diabetes mellitus (HCC)    • Diverticulitis of colon    • GERD (gastroesophageal reflux disease)    • Hepatitis    • History of stomach ulcers    • HL (hearing loss) 2015    Estimated   • Hyperlipidemia    • Hypertension    • Hypotension 11/04/2021   • Infectious viral hepatitis     Hepatitiss A   • Memory loss     Estimated   • Stomach problems        Past Surgical History:   Procedure Laterality Date   • APPENDECTOMY     • BACK SURGERY     •  SECTION     • CHOLECYSTECTOMY     • GASTRIC BYPASS     • GASTRIC BYPASS     • INFUSION PUMP IMPLANTATION     • MD NDSC WRST SURG W/RLS TRANSVRS CARPL LIGM Right 2022    Procedure: Right endoscopic carpal tunnel release;  Surgeon: Bridget Owusu MD;  Location:  MAIN OR;  Service: Orthopedics   • SHOULDER SURGERY     • SPINAL CORD STIMULATOR IMPLANT         Family History   Problem Relation Age of Onset   • Diabetes Mother         Mellitus   • Hyperlipidemia Mother    • Hypertension Mother    • Colon cancer Mother 66   • Pancreatic cancer Mother 80   • Melanoma Mother 68   • Cancer Father 66        Bladder ca   • Alcohol abuse Father    • Lung cancer Father 66   • Prostate cancer Father 66   • No Known Problems Daughter    • No Known Problems Maternal Grandmother    • No Known Problems Maternal Grandfather    • Stomach cancer Paternal Grandmother         63's   • Lung cancer Paternal Grandfather 68   • Diabetes Brother         Mellitus   • Hyperlipidemia Brother    • Hypertension Brother    • Diabetes Brother    • No Known Problems Brother    • No Known Problems Son    • No Known Problems Son    • Breast cancer Maternal Aunt         Unknown age   • No Known Problems Paternal Aunt    • No Known Problems Paternal Aunt    • No Known Problems Paternal Aunt    • No Known Problems Paternal Aunt    • Arthritis Family    • Mental illness Neg Hx      I have reviewed and agree with the history as documented.     E-Cigarette/Vaping   • E-Cigarette Use Never User      E-Cigarette/Vaping Substances   • Nicotine No    • THC No    • CBD No    • Flavoring No    • Other No    • Unknown No      Social History     Tobacco Use   • Smoking status: Former     Packs/day: 1.00     Years: 34.00     Total pack years: 34.00     Types: Cigarettes     Start date: 1973     Quit date: 2007     Years since quittin.6   • Smokeless tobacco: Never   • Tobacco comments:     15yrs ago   Vaping Use   • Vaping Use: Never used   Substance Use Topics   • Alcohol use: No   • Drug use: No       Review of Systems   Constitutional: Negative for chills and fever. HENT: Negative for congestion, rhinorrhea and sore throat. Respiratory: Positive for cough and shortness of breath. Negative for chest tightness. Cardiovascular: Negative for chest pain. Gastrointestinal: Positive for nausea. Negative for abdominal pain and vomiting. Musculoskeletal: Positive for myalgias. Skin: Negative for color change. Neurological: Positive for headaches. Negative for dizziness and light-headedness. Psychiatric/Behavioral: Negative for behavioral problems and sleep disturbance. All other systems reviewed and are negative. Physical Exam  Physical Exam  Vitals and nursing note reviewed. Constitutional:       General: She is awake. Appearance: Normal appearance. She is well-developed. HENT:      Head: Normocephalic and atraumatic. Right Ear: External ear normal.      Left Ear: External ear normal.      Nose: Nose normal.      Mouth/Throat:      Lips: Pink. Pharynx: Oropharynx is clear. Uvula midline. Tonsils: No tonsillar exudate. Eyes:      General: No scleral icterus. Extraocular Movements: Extraocular movements intact. Cardiovascular:      Rate and Rhythm: Normal rate and regular rhythm. Heart sounds: Normal heart sounds, S1 normal and S2 normal. No murmur heard. No gallop. Pulmonary:      Effort: Pulmonary effort is normal.      Breath sounds: Normal breath sounds. No wheezing, rhonchi or rales. Musculoskeletal:         General: Normal range of motion. Cervical back: Normal range of motion. Skin:     General: Skin is warm and dry.    Neurological:      General: No focal deficit present. Mental Status: She is alert. Psychiatric:         Attention and Perception: Attention and perception normal.         Mood and Affect: Mood normal.         Behavior: Behavior normal. Behavior is cooperative. Vital Signs  ED Triage Vitals [08/05/23 1559]   Temperature Pulse Respirations Blood Pressure SpO2   97.8 °F (36.6 °C) 84 16 152/76 95 %      Temp Source Heart Rate Source Patient Position - Orthostatic VS BP Location FiO2 (%)   Temporal Monitor Sitting Right arm --      Pain Score       No Pain           Vitals:    08/05/23 1559   BP: 152/76   Pulse: 84   Patient Position - Orthostatic VS: Sitting         Visual Acuity      ED Medications  Medications - No data to display    Diagnostic Studies  Results Reviewed     Procedure Component Value Units Date/Time    D-dimer, quantitative [[id number]]  (Abnormal) Collected: 08/05/23 1602    Lab Status: Final result Specimen: Blood from Arm, Right Updated: 08/05/23 1905     D-Dimer, Quant 0.53 ug/ml FEU     Narrative: In the evaluation for possible pulmonary embolism, in the appropriate (Well's Score of 4 or less) patient, the age adjusted d-dimer cutoff for this patient can be calculated as:    Age x 0.01 (in ug/mL) for Age-adjusted D-dimer exclusion threshold for a patient over 50 years. FLU/RSV/COVID - if FLU/RSV clinically relevant [[id number]]  (Normal) Collected: 08/05/23 1602    Lab Status: Final result Specimen: Nares from Nasopharyngeal Swab Updated: 08/05/23 1718     SARS-CoV-2 Negative     INFLUENZA A PCR Negative     INFLUENZA B PCR Negative     RSV PCR Negative    Narrative:      FOR PEDIATRIC PATIENTS - copy/paste COVID Guidelines URL to browser: [email or web address]    SARS-CoV-2 assay is a Nucleic Acid Amplification assay intended for the  qualitative detection of nucleic acid from SARS-CoV-2 in nasopharyngeal  swabs.  Results are for the presumptive identification of SARS-CoV-2 RNA. Positive results are indicative of infection with SARS-CoV-2, the virus  causing COVID-19, but do not rule out bacterial infection or co-infection  with other viruses. Laboratories within the [facility] and its  territories are required to report all positive results to the appropriate  public health authorities. Negative results do not preclude SARS-CoV-2  infection and should not be used as the sole basis for treatment or other  patient management decisions. Negative results must be combined with  clinical observations, patient history, and epidemiological information. This test has not been FDA cleared or approved. This test has been authorized by FDA under an Emergency Use Authorization  (EUA). This test is only authorized for the duration of time the  declaration that circumstances exist justifying the authorization of the  emergency use of an in vitro diagnostic tests for detection of SARS-CoV-2  virus and/or diagnosis of COVID-19 infection under section 564(b)(1) of  the Act, 21 U. S.C. 521ZKR-8(Y)(0), unless the authorization is terminated  or revoked sooner. The test has been validated but independent review by FDA  and CLIA is pending. Test performed using [employer] GeneXpert: This RT-PCR assay targets N2,  a region unique to SARS-CoV-2. A conserved region in the E-gene was chosen  for pan-Sarbecovirus detection which includes SARS-CoV-2. According to CMS-2020-01-R, this platform meets the definition of high-throughput technology.     HS Troponin 0hr (reflex protocol) [[id number]]  (Normal) Collected: 08/05/23 1602    Lab Status: Final result Specimen: Blood from Arm, Right Updated: 08/05/23 1649     hs TnI 0hr 3 ng/L     Comprehensive metabolic panel [[id number]]  (Abnormal) Collected: 08/05/23 1602    Lab Status: Final result Specimen: Blood from Arm, Right Updated: 08/05/23 1643     Sodium 139 mmol/L      Potassium 3.6 mmol/L      Chloride 103 mmol/L      CO2 25 mmol/L ANION GAP 11 mmol/L      BUN 21 mg/dL      Creatinine 0.80 mg/dL      Glucose 157 mg/dL      Calcium 9.3 mg/dL      AST 21 U/L      ALT 21 U/L      Alkaline Phosphatase 54 U/L      Total Protein 6.8 g/dL      Albumin 3.7 g/dL      Total Bilirubin 0.24 mg/dL      eGFR 78 ml/min/1.73sq m     Narrative:      [facility] guidelines for Chronic Kidney Disease (CKD):   •  Stage 1 with normal or high GFR (GFR > 90 mL/min/1.73 square meters)  •  Stage 2 Mild CKD (GFR = 60-89 mL/min/1.73 square meters)  •  Stage 3A Moderate CKD (GFR = 45-59 mL/min/1.73 square meters)  •  Stage 3B Moderate CKD (GFR = 30-44 mL/min/1.73 square meters)  •  Stage 4 Severe CKD (GFR = 15-29 mL/min/1.73 square meters)  •  Stage 5 End Stage CKD (GFR <15 mL/min/1.73 square meters)  Note: GFR calculation is accurate only with a steady state creatinine    CBC and differential [[id number]]  (Abnormal) Collected: 08/05/23 1602    Lab Status: Final result Specimen: Blood from Arm, Right Updated: 08/05/23 1625     WBC 9.70 Thousand/uL      RBC 4.09 Million/uL      Hemoglobin 10.6 g/dL      Hematocrit 34.5 %      MCV 84 fL      MCH 25.9 pg      MCHC 30.7 g/dL      RDW 17.5 %      MPV 11.5 fL      Platelets 730 Thousands/uL      nRBC 0 /100 WBCs      Neutrophils Relative 65 %      Immat GRANS % 1 %      Lymphocytes Relative 27 %      Monocytes Relative 5 %      Eosinophils Relative 2 %      Basophils Relative 0 %      Neutrophils Absolute 6.26 Thousands/µL      Immature Grans Absolute 0.06 Thousand/uL      Lymphocytes Absolute 2.66 Thousands/µL      Monocytes Absolute 0.52 Thousand/µL      Eosinophils Absolute 0.17 Thousand/µL      Basophils Absolute 0.03 Thousands/µL                  XR chest 2 views   ED Interpretation by Enrrique Lara PA-C (08/05 1912)   No acute cardiopulmonary disease.  Similar to CXR 11/4/2021                 Procedures  ECG 12 Lead Documentation Only    Date/Time: 8/5/2023 3:58 PM    Performed by: Lebron Heurta Jennifer Delgado PA-C  Authorized by: Chris Barry PA-C    Indications / Diagnosis:  Shortness of breath   Patient location:  ED  Previous ECG:     Previous ECG:  Unavailable  Interpretation:     Interpretation: normal    Rate:     ECG rate:  83    ECG rate assessment: normal    Rhythm:     Rhythm: sinus rhythm    Ectopy:     Ectopy: none    ST segments:     ST segments:  Normal             ED Course  ED Course as of 08/05/23 2108   Sat Aug 05, 2023   1906 D-Dimer, Quant(!): 0.53  Age adjusted . 63. HEART Risk Score    Flowsheet Row Most Recent Value   Heart Score Risk Calculator    History 0 Filed at: 08/05/2023 2106   ECG 0 Filed at: 08/05/2023 2106   Age 1 Filed at: 08/05/2023 2106   Risk Factors 1 Filed at: 08/05/2023 2106   Troponin 0 Filed at: 08/05/2023 2106   HEART Score 2 Filed at: 08/05/2023 2106                        SBIRT 22yo+    Flowsheet Row Most Recent Value   Initial Alcohol Screen: US AUDIT-C     1. How often do you have a drink containing alcohol? 0 Filed at: 08/05/2023 1554   2. How many drinks containing alcohol do you have on a typical day you are drinking? 0 Filed at: 08/05/2023 1554   3a. Male UNDER 65: How often do you have five or more drinks on one occasion? 0 Filed at: 08/05/2023 1554   3b. FEMALE Any Age, or MALE 65+: How often do you have 4 or more drinks on one occassion? 0 Filed at: 08/05/2023 1554   Audit-C Score 0 Filed at: 08/05/2023 1554   ALVARO: How many times in the past year have you. .. Used an illegal drug or used a prescription medication for non-medical reasons? Never Filed at: 08/05/2023 1554                    Medical Decision Making  60 y/o female here for URI symptoms, shortness of breath  Differential diagnosis including but not limited to: ACS, arrhythmia, PE, pneumonia, bronchitis, viral syndrome, covid, flu, rsv   Assessment: chest pain low risk of ACS, dyspnea, URI  Plan: heart score of 2. Age adjusted d-dimer negative.  Labs and CXR otherwise unremarkable. Suspect URI. PCP f/u. Supportive care. Patient  was given strict return to ER precautions both verbally and in discharge papers. Patient verbalized understanding and agrees with plan. Amount and/or Complexity of Data Reviewed  Labs: ordered. Decision-making details documented in ED Course. Radiology: ordered and independent interpretation performed. Disposition  Final diagnoses:   Upper respiratory infection   Dyspnea   Chest pain with low risk of acute coronary syndrome     Time reflects when diagnosis was documented in both MDM as applicable and the Disposition within this note     Time User Action Codes Description Comment    8/5/2023  7:45 PM Nelta Koko Add [J06.9] Upper respiratory infection     8/5/2023  7:46 PM Nelta Koko Add [R06.00] Dyspnea     8/5/2023  7:46 PM Nelta Koko Add [R07.9] Chest pain with low risk of acute coronary syndrome       ED Disposition     ED Disposition   Discharge    Condition   Stable    Date/Time   Sat Aug 5, 2023  7:45 PM    Comment   Soraida Hunt discharge to home/self care.                Follow-up Information     Follow up With Specialties Details Why Contact Info Additional Information    Karina Bermudez,  Family Medicine Call  As needed [address] [facility]  [address] [facility] [address] [place] [address] Emergency Department Emergency Medicine Go to  if you begin to experience chest pain, shortness of breath, difficulty breathing, cough up purulent mucus or blood, fevers > 104, feeling dizzy or weak like you are going to faint [address] [facility] Emergency Department, [address] [facility], [address] [place] [address], [address]          Discharge Medication List as of 8/5/2023  7:48 PM      CONTINUE these medications which have NOT CHANGED    Details   Accu-Chek FastClix Lancets MISC Use to test blood glucose levels twice daily for diabetes E11.9, Normal      albuterol (2.5 mg/3 mL) 0.083 % nebulizer solution Take 3 mL (2.5 mg total) by nebulization every 6 (six) hours as needed for wheezing or shortness of breath, Starting u 9/16/2021, Normal      albuterol (PROVENTIL HFA,VENTOLIN HFA) 90 mcg/act inhaler USE 2 INHALATIONS ORALLY   EVERY 6 HOURS AS NEEDED FORWHEEZING, Normal      amLODIPine (NORVASC) 5 mg tablet Take 1 tablet (5 mg total) by mouth 2 (two) times a day, Starting Mon 6/19/2023, Normal      atorvastatin (LIPITOR) 40 mg tablet TAKE 1 TABLET DAILY, Normal      Ferrous Fumarate 63 (20 Fe) MG TABS Take 2 tablets by mouth daily Take two tablets once daily. , Historical Med      Fluticasone-Salmeterol (Advair Diskus) 100-50 mcg/dose inhaler Inhale 1 puff 2 (two) times a day Rinse mouth after use., Starting Mon 6/19/2023, Until Wed 7/19/2023, Normal      glucose blood (Accu-Chek Guide) test strip Test, Normal      HYDROcodone-acetaminophen (NORCO)  mg per tablet Starting Sat 12/4/2021, Historical Med      Insulin Glargine Solostar (Basaglar KwikPen) 100 UNIT/ML SOPN Inject 0.08 mL (8 Units total) under the skin daily at bedtime, Starting Wed 3/15/2023, Normal      Insulin Pen Needle (B-D UF III MINI PEN NEEDLES) 31G X 5 MM MISC USE ONCE DAILY FOR BASAL   INSULIN INJECTION, Normal      Januvia 100 MG tablet TAKE 1 TABLET IN THE       MORNING, Normal      lidocaine (XYLOCAINE) 5 % ointment apply topically to affected area three times a day, Historical Med      LYRICA 200 MG capsule Take 200 mg by mouth 3 (three) times a day , Starting Wed 12/19/2018, Historical Med      metFORMIN (GLUCOPHAGE) 1000 MG tablet TAKE 1 TABLET TWICE A DAY, Normal      mometasone (NASONEX) 50 mcg/act nasal spray USE 2 SPRAYS IN EACH       NOSTRIL DAILY, Normal      montelukast (SINGULAIR) 10 mg tablet TAKE 1 TABLET AT BEDTIME, Normal      Morphine Sulfate (MORPHINE 0.05 MG/ML SYRINGE, NICU/PICU,, CMPD ORDER,) Starting Tue 3/1/2022, Historical Med      omeprazole (PriLOSEC) 40 MG capsule TAKE 1 CAPSULE EVERY       MORNING, Normal      ondansetron (ZOFRAN) 4 mg tablet Take 1 tablet (4 mg total) by mouth every 8 (eight) hours as needed for nausea or vomiting, Starting Thu 3/9/2023, Normal      traMADol HCl ER, Biphasic, 300 MG 24 hr tablet Take 300 mg by mouth daily, Starting Tue 11/23/2021, Historical Med      VITAMIN D PO Historical Med      zolpidem (AMBIEN) 5 mg tablet TAKE 1 TABLET DAILY AT     BEDTIME AS NEEDED FOR SLEEP, Normal             No discharge procedures on file.     PDMP Review       Value Time User    PDMP Reviewed  Yes 6/19/2023  4:35 PM Shen Young DO          ED Provider  Electronically Signed by           Misael Hernandez PA-C  08/05/23 [id number]

## 2023-08-08 ENCOUNTER — HOSPITAL ENCOUNTER (OUTPATIENT)
Dept: INFUSION CENTER | Facility: HOSPITAL | Age: 63
Discharge: HOME | End: 2023-08-08
Attending: INTERNAL MEDICINE
Payer: COMMERCIAL

## 2023-08-08 VITALS
TEMPERATURE: 97.4 F | OXYGEN SATURATION: 95 % | SYSTOLIC BLOOD PRESSURE: 151 MMHG | RESPIRATION RATE: 16 BRPM | HEART RATE: 81 BPM | DIASTOLIC BLOOD PRESSURE: 78 MMHG

## 2023-08-08 DIAGNOSIS — J45.30 MILD PERSISTENT ASTHMA WITHOUT COMPLICATION: ICD-10-CM

## 2023-08-08 DIAGNOSIS — E61.1 IRON DEFICIENCY: Primary | ICD-10-CM

## 2023-08-08 DIAGNOSIS — E53.8 B12 DEFICIENCY: ICD-10-CM

## 2023-08-08 RX ORDER — FLUTICASONE PROPIONATE AND SALMETEROL 50; 100 UG/1; UG/1
POWDER RESPIRATORY (INHALATION)
Qty: 60 BLISTER | Refills: 1 | Status: SHIPPED | OUTPATIENT
Start: 2023-08-08

## 2023-08-08 RX ORDER — SODIUM CHLORIDE 9 MG/ML
20 INJECTION, SOLUTION INTRAVENOUS ONCE
Status: COMPLETED | OUTPATIENT
Start: 2023-08-08 | End: 2023-08-08

## 2023-08-08 RX ORDER — SODIUM CHLORIDE 9 MG/ML
20 INJECTION, SOLUTION INTRAVENOUS ONCE
Status: CANCELLED | OUTPATIENT
Start: 2023-08-10

## 2023-08-08 RX ADMIN — IRON SUCROSE 200 MG: 20 INJECTION, SOLUTION INTRAVENOUS at 11:44

## 2023-08-08 RX ADMIN — SODIUM CHLORIDE 20 ML/HR: 9 INJECTION, SOLUTION INTRAVENOUS at 11:44

## 2023-08-10 ENCOUNTER — HOSPITAL ENCOUNTER (OUTPATIENT)
Dept: INFUSION CENTER | Facility: HOSPITAL | Age: 63
Discharge: HOME | End: 2023-08-10
Attending: INTERNAL MEDICINE
Payer: COMMERCIAL

## 2023-08-10 VITALS
RESPIRATION RATE: 16 BRPM | SYSTOLIC BLOOD PRESSURE: 117 MMHG | HEART RATE: 89 BPM | DIASTOLIC BLOOD PRESSURE: 68 MMHG | TEMPERATURE: 96.8 F | OXYGEN SATURATION: 95 %

## 2023-08-10 DIAGNOSIS — E78.5 HYPERLIPIDEMIA ASSOCIATED WITH TYPE 2 DIABETES MELLITUS (HCC): ICD-10-CM

## 2023-08-10 DIAGNOSIS — E11.69 HYPERLIPIDEMIA ASSOCIATED WITH TYPE 2 DIABETES MELLITUS (HCC): ICD-10-CM

## 2023-08-10 DIAGNOSIS — E61.1 IRON DEFICIENCY: Primary | ICD-10-CM

## 2023-08-10 DIAGNOSIS — E53.8 B12 DEFICIENCY: ICD-10-CM

## 2023-08-10 PROCEDURE — 96365 THER/PROPH/DIAG IV INF INIT: CPT

## 2023-08-10 RX ORDER — SODIUM CHLORIDE 9 MG/ML
20 INJECTION, SOLUTION INTRAVENOUS ONCE
Status: COMPLETED | OUTPATIENT
Start: 2023-08-10 | End: 2023-08-10

## 2023-08-10 RX ORDER — SODIUM CHLORIDE 9 MG/ML
20 INJECTION, SOLUTION INTRAVENOUS ONCE
Status: CANCELLED | OUTPATIENT
Start: 2023-08-15

## 2023-08-10 RX ADMIN — SODIUM CHLORIDE 20 ML/HR: 9 INJECTION, SOLUTION INTRAVENOUS at 11:44

## 2023-08-10 RX ADMIN — IRON SUCROSE 200 MG: 20 INJECTION, SOLUTION INTRAVENOUS at 11:44

## 2023-08-13 DIAGNOSIS — G47.00 INSOMNIA, UNSPECIFIED TYPE: ICD-10-CM

## 2023-08-14 RX ORDER — ZOLPIDEM TARTRATE 5 MG/1
TABLET ORAL
Qty: 30 TABLET | Refills: 2 | Status: SHIPPED | OUTPATIENT
Start: 2023-08-14

## 2023-08-14 RX ORDER — ATORVASTATIN CALCIUM 40 MG/1
TABLET, FILM COATED ORAL
Qty: 90 TABLET | Refills: 0 | Status: SHIPPED | OUTPATIENT
Start: 2023-08-14

## 2023-08-15 ENCOUNTER — HOSPITAL ENCOUNTER (OUTPATIENT)
Dept: INFUSION CENTER | Facility: HOSPITAL | Age: 63
End: 2023-08-15
Attending: INTERNAL MEDICINE

## 2023-08-17 DIAGNOSIS — E11.9 CONTROLLED TYPE 2 DIABETES MELLITUS WITHOUT COMPLICATION, WITHOUT LONG-TERM CURRENT USE OF INSULIN (HCC): ICD-10-CM

## 2023-08-17 RX ORDER — FLURBIPROFEN SODIUM 0.3 MG/ML
SOLUTION/ DROPS OPHTHALMIC
Qty: 90 EACH | Refills: 1 | Status: SHIPPED | OUTPATIENT
Start: 2023-08-17

## 2023-08-24 ENCOUNTER — HOSPITAL ENCOUNTER (OUTPATIENT)
Dept: INFUSION CENTER | Facility: HOSPITAL | Age: 63
Discharge: HOME | End: 2023-08-24
Attending: INTERNAL MEDICINE
Payer: COMMERCIAL

## 2023-08-24 VITALS
SYSTOLIC BLOOD PRESSURE: 133 MMHG | HEART RATE: 95 BPM | OXYGEN SATURATION: 96 % | DIASTOLIC BLOOD PRESSURE: 78 MMHG | RESPIRATION RATE: 16 BRPM | TEMPERATURE: 96.6 F

## 2023-08-24 DIAGNOSIS — E53.8 B12 DEFICIENCY: Primary | ICD-10-CM

## 2023-08-24 DIAGNOSIS — E61.1 IRON DEFICIENCY: ICD-10-CM

## 2023-08-24 PROCEDURE — 96365 THER/PROPH/DIAG IV INF INIT: CPT

## 2023-08-24 PROCEDURE — 96372 THER/PROPH/DIAG INJ SC/IM: CPT

## 2023-08-24 RX ORDER — CYANOCOBALAMIN 1000 UG/ML
1000 INJECTION, SOLUTION INTRAMUSCULAR; SUBCUTANEOUS ONCE
Status: COMPLETED | OUTPATIENT
Start: 2023-08-24 | End: 2023-08-24

## 2023-08-24 RX ORDER — SODIUM CHLORIDE 9 MG/ML
20 INJECTION, SOLUTION INTRAVENOUS ONCE
Status: CANCELLED | OUTPATIENT
Start: 2023-08-24

## 2023-08-24 RX ORDER — CYANOCOBALAMIN 1000 UG/ML
1000 INJECTION, SOLUTION INTRAMUSCULAR; SUBCUTANEOUS ONCE
OUTPATIENT
Start: 2023-09-20

## 2023-08-24 RX ORDER — SODIUM CHLORIDE 9 MG/ML
20 INJECTION, SOLUTION INTRAVENOUS ONCE
Status: COMPLETED | OUTPATIENT
Start: 2023-08-24 | End: 2023-08-24

## 2023-08-24 RX ADMIN — IRON SUCROSE 200 MG: 20 INJECTION, SOLUTION INTRAVENOUS at 13:20

## 2023-08-24 RX ADMIN — SODIUM CHLORIDE 20 ML/HR: 9 INJECTION, SOLUTION INTRAVENOUS at 13:20

## 2023-08-24 RX ADMIN — CYANOCOBALAMIN 1000 MCG: 1000 INJECTION, SOLUTION INTRAMUSCULAR at 13:20

## 2023-08-24 NOTE — PROGRESS NOTES
Pt tolerated IV Venofer infusion without adverse reaction. Pt left unit ambulatory with steady gait.  Refused AVS.

## 2023-08-29 ENCOUNTER — TELEPHONE (OUTPATIENT)
Dept: NEUROLOGY | Facility: CLINIC | Age: 63
End: 2023-08-29

## 2023-08-30 DIAGNOSIS — E11.9 TYPE 2 DIABETES MELLITUS WITHOUT COMPLICATION, WITHOUT LONG-TERM CURRENT USE OF INSULIN (HCC): ICD-10-CM

## 2023-08-30 DIAGNOSIS — I10 BENIGN ESSENTIAL HYPERTENSION: ICD-10-CM

## 2023-08-30 RX ORDER — AMLODIPINE BESYLATE 5 MG/1
5 TABLET ORAL 2 TIMES DAILY
Qty: 180 TABLET | Refills: 0 | Status: SHIPPED | OUTPATIENT
Start: 2023-08-30

## 2023-09-06 ENCOUNTER — OFFICE VISIT (OUTPATIENT)
Dept: NEUROLOGY | Facility: CLINIC | Age: 63
End: 2023-09-06
Payer: COMMERCIAL

## 2023-09-06 VITALS
TEMPERATURE: 97.8 F | DIASTOLIC BLOOD PRESSURE: 70 MMHG | WEIGHT: 159 LBS | HEART RATE: 84 BPM | BODY MASS INDEX: 27.14 KG/M2 | SYSTOLIC BLOOD PRESSURE: 118 MMHG | HEIGHT: 64 IN

## 2023-09-06 DIAGNOSIS — G62.9 PERIPHERAL POLYNEUROPATHY: Primary | ICD-10-CM

## 2023-09-06 DIAGNOSIS — R41.3 MEMORY DYSFUNCTION: Primary | ICD-10-CM

## 2023-09-06 DIAGNOSIS — S09.90XD INJURY OF HEAD, SUBSEQUENT ENCOUNTER: ICD-10-CM

## 2023-09-06 DIAGNOSIS — R41.3 MEMORY DYSFUNCTION: ICD-10-CM

## 2023-09-06 PROCEDURE — 99214 OFFICE O/P EST MOD 30 MIN: CPT | Performed by: PSYCHIATRY & NEUROLOGY

## 2023-09-06 NOTE — ASSESSMENT & PLAN NOTE
Pt here today for neuro follow up  Pt last seen in March  Since last visit, no recent hospitalizations  No recent infections  Pt did have one fall in July, tripped over something  Pt notes landed on right hand and also forehead  No loc  Pt is on asa, rec ct head as bruised and did not seek medical attention to few weeks later  Update ct head due to fall and memory concerns  No new nocturnal sxs  Pt with known SCS, follows with pain mx for 20 plus years  Pt on lyrica  Pt follows with pcp for blood sugars  Pt also with known vit b12 deficiency at least noted thru labs dating back to 2018  Pt now following with hematology for both iron infusion and vit b12 monthly injections  pcp also sent pt to gi referal, upcoming first appt this month  Exam stable

## 2023-09-06 NOTE — PROGRESS NOTES
Patient ID: Kameron Watson is a 61 y.o. female. Assessment/Plan:    Peripheral polyneuropathy  Pt here today for neuro follow up  Pt last seen in March  Since last visit, no recent hospitalizations  No recent infections  Pt did have one fall in July, tripped over something  Pt notes landed on right hand and also forehead  No loc  Pt is on asa, rec ct head as bruised and did not seek medical attention to few weeks later  Update ct head due to fall and memory concerns  No new nocturnal sxs  Pt with known SCS, follows with pain mx for 20 plus years  Pt on lyrica  Pt follows with pcp for blood sugars  Pt also with known vit b12 deficiency at least noted thru labs dating back to 2018  Pt now following with hematology for both iron infusion and vit b12 monthly injections  pcp also sent pt to gi referal, upcoming first appt this month  Exam stable    Memory dysfunction  Pt notes memory about the same  Pt had prior formal neurocogntive eval in 2021   re rev study results with pt  At that time, no clear underlying neurodegenerative process noted  No significant changes in adls  No issues with safety   Pt with overall good judgement  rec cont Vit b12 replacement, heme following  Pt to call for any new sxs  Question if any pain meds also contributing to memory/ fatigue  Pt to also address with pain mx team       Diagnoses and all orders for this visit:    Peripheral polyneuropathy    Memory dysfunction    Other orders  -     Cyanocobalamin (B-12 IJ);  Inject as directed every 30 (thirty) days           Subjective:    HPI    Pt is a 59 yo f with pmh of gastric bypass, dm type 2, asthma, vit b12 deficiency, neuropathy, hypercholesterolemia, htn, chronic pain, lumbar spondylosis, prior lumbar surgery 26 yrs ago, SCS about one yr ago who presents today for neuro follow up.    pt last seen on 93/2/23 by me.  Per my last note "Pt notes sxs for the past several yrs, more apparent past 2 yrs.  Pt notes no tia or cva like sxs.  Pt notes history of headaches.   Pt notes occ feeling of food getting stuck.  No diff with articulation of speech.  No incontinence.  Pt notes long standing diff with directions, ever since learning to drive.  Not new.  Pt denies any significant financial issues.   Pt notes occ positional dizziness.   Pt notes diff with recall of specific details. Pt has noted specifically on job, issues with keeping up on same level of detail.  Worked for same company for 24 yrs.    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   Pt notes family also noted issues with stm and repeating self. Dee Harrison family reminds her of events from their youth.  Pt with supportive family and lives close to them.   Pt works in IT banking field.   Pt with known history of gastric bypass.  Pt also with known history of vit b12 deficiency.  Pt more recently with im replacement.  Rev labs dating back to 2018.  April 2018 vit b12 at 292.   Oct 2019 level 144, and now on 6/15/21 level 738."  Pt here today for follow up of her neuropathy. Pt notes no new neuropathic sxs. Pt follows closely with pain mx team of 20 plus years. Pt has known SCS. Pt notes no new nocturnal sxs. No new sxs in other extremities. Pt did have one fall since last visit. Fall was in July. Pt notes she tripped over something but did land on her right wrist and forehead. Pt was bruised over right side of forehead. Pt did not have any loc. Pt did not seek medical attn. Pt did report it to her pcp a few weeks later. Pt did not think she had headache or any focal sxs at time. Pt is on asa therapy. Due to memory issues and fall, will update ct head. Pt also with known vit b12 deficiency. Followed with pt vit b12 level trending dating back to 2018. Pt with levels of 292 in April 2018, oct 2019 144, June 2021 738, sept 2022 back down to 206 and March 2023 , level 204.   Pt notes she is back on injections of vit B12 since July. Pt is following closely with hematology for vit B12 injections as well as iron infusions. Rev with pt the vit B12 deficiency may be contributory to some of the memory and neuropathy sxs. Pt also with known DM. Pt's pcp following her blood sugars. Rev with pt prior formal neurocognttive testing from 2021, no neurodegenerative process found. Some issues with attention. Pt takes great notes and tries to keep up on her calendar with any dates or testing. Pt has son next door and son that lives in basement apt as well. Family is close by. Pt does all her own finances , driving and cooking on own. Pt denies any safety issues. No complaints from family as well. Pt has had long standing issus with her legs and back pain. Pt had surgery and then also needed SCS and pain pump. Pt has been going to the same pain mx facility for many yrs.,     Pt sees [facility]. At time of last appt, sent task to pcp re unexplained and non intentional weight loss of about 40 lbs over the past 1.5 yrs. pt did discuss with pcp since last visit. Pt has an upcoming appt with gi as a new pt appt this month. Also rec to discuss with her hematology team as well. The following portions of the patient's history were reviewed and updated as appropriate: allergies, current medications, past family history, past medical history, past social history, past surgical history and problem list and med rec and ros rev. Objective:    Blood pressure 118/70, pulse 84, temperature 97.8 °F (36.6 °C), height 5' 4" (1.626 m), weight 72.1 kg (159 lb). Physical Exam  Constitutional:       General: She is not in acute distress. Appearance: She is not ill-appearing. Eyes:      General: Lids are normal.      Extraocular Movements: Extraocular movements intact. Pupils: Pupils are equal, round, and reactive to light. Musculoskeletal:      Right lower leg: No edema. Left lower leg: No edema. Neurological:      Mental Status: She is alert. Motor: Motor strength is normal.     Deep Tendon Reflexes:      Reflex Scores:       Tricep reflexes are 2+ on the right side and 2+ on the left side. Bicep reflexes are 2+ on the right side and 2+ on the left side. Brachioradialis reflexes are 2+ on the right side and 2+ on the left side. Patellar reflexes are 2+ on the right side and 2+ on the left side. Achilles reflexes are 1+ on the right side and 1+ on the left side. Psychiatric:         Speech: Speech normal.         Neurological Exam  Mental Status  Alert. Recent and remote memory are intact. Speech is normal. Language is fluent with no aphasia. Attention and concentration are normal. Fund of knowledge is appropriate for level of education. No issues with current events  No issues with 4 part commands  No aphasia  No dysarthria  . Cranial Nerves  CN II: Visual acuity is normal. Visual fields full to confrontation. CN III, IV, VI: Extraocular movements intact bilaterally. Normal lids and orbits bilaterally. Pupils equal round and reactive to light bilaterally. CN V: Facial sensation is normal.  CN VII: Full and symmetric facial movement. CN VIII: Hearing is normal.  CN IX, X: Palate elevates symmetrically. Normal gag reflex. CN XI: Shoulder shrug strength is normal.  CN XII: Tongue midline without atrophy or fasciculations. Motor  Normal muscle bulk throughout. Normal muscle tone. No abnormal involuntary movements. Strength is 5/5 throughout all four extremities. Sensory  Dec vib patti lowers.     Reflexes                                            Right                      Left  Brachioradialis                    2+                         2+  Biceps                                 2+                         2+  Triceps                                2+                         2+  Finger flex                           2+ 2+  Hamstring                            2+                         2+  Patellar                                2+                         2+  Achilles                                1+                         1+  Right Plantar: downgoing  Left Plantar: downgoing    Coordination  Right: Finger-to-nose normal. Rapid alternating movement normal.Left: Finger-to-nose normal. Heel-to-shin normal.    Gait  Casual gait is normal including stance, stride, and arm swing. ROS:    Review of Systems   Constitutional: Negative for appetite change, fatigue and fever. HENT: Negative. Negative for hearing loss, tinnitus, trouble swallowing and voice change. Eyes: Negative. Negative for photophobia, pain and visual disturbance. Respiratory: Negative. Negative for shortness of breath. Cardiovascular: Negative. Negative for palpitations. Gastrointestinal: Negative. Negative for nausea and vomiting. Endocrine: Negative. Negative for cold intolerance. Genitourinary: Negative. Negative for dysuria, frequency and urgency. Musculoskeletal: Positive for gait problem. Negative for back pain, myalgias and neck pain. Hugh Rosales in July, tripped over something. Went to PCP sent her to ortho. Did hit head but no LOC/ER   Skin: Negative. Negative for rash. Allergic/Immunologic: Negative. Neurological: Negative for dizziness, tremors, seizures, syncope, facial asymmetry, speech difficulty, weakness, light-headedness, numbness and headaches. Hematological: Negative. Does not bruise/bleed easily. Psychiatric/Behavioral: Negative. Negative for confusion, hallucinations and sleep disturbance. All other systems reviewed and are negative.     No new issues to address

## 2023-09-06 NOTE — ASSESSMENT & PLAN NOTE
Pt notes memory about the same  Pt had prior formal neurocogntive eval in 2021   re rev study results with pt  At that time, no clear underlying neurodegenerative process noted  No significant changes in adls  No issues with safety   Pt with overall good judgement  rec cont Vit b12 replacement, heme following  Pt to call for any new sxs  Question if any pain meds also contributing to memory/ fatigue  Pt to also address with pain mx team

## 2023-09-07 LAB
ALBUMIN SERPL-MCNC: 3.7 G/DL (ref 3.6–5.1)
ALBUMIN/GLOB SERPL: 1.5 (CALC) (ref 1–2.5)
ALP SERPL-CCNC: 60 U/L (ref 37–153)
ALT SERPL-CCNC: 22 U/L (ref 6–29)
AST SERPL-CCNC: 23 U/L (ref 10–35)
BASOPHILS # BLD AUTO: 32 CELLS/UL (ref 0–200)
BASOPHILS NFR BLD AUTO: 0.4 %
BILIRUB SERPL-MCNC: 0.5 MG/DL (ref 0.2–1.2)
BUN SERPL-MCNC: 30 MG/DL (ref 7–25)
BUN/CREAT SERPL: 36 (CALC) (ref 6–22)
CALCIUM SERPL-MCNC: 9.5 MG/DL (ref 8.6–10.4)
CHLORIDE SERPL-SCNC: 103 MMOL/L (ref 98–110)
CO2 SERPL-SCNC: 28 MMOL/L (ref 20–32)
CREAT SERPL-MCNC: 0.83 MG/DL (ref 0.5–1.05)
EOSINOPHIL # BLD AUTO: 142 CELLS/UL (ref 15–500)
EOSINOPHIL NFR BLD AUTO: 1.8 %
ERYTHROCYTE [DISTWIDTH] IN BLOOD BY AUTOMATED COUNT: 16 % (ref 11–15)
GFR/BSA.PRED SERPLBLD CYS-BASED-ARV: 79 ML/MIN/1.73M2
GLOBULIN SER CALC-MCNC: 2.5 G/DL (CALC) (ref 1.9–3.7)
GLUCOSE SERPL-MCNC: 78 MG/DL (ref 65–99)
HBA1C MFR BLD: 5.1 % OF TOTAL HGB
HCT VFR BLD AUTO: 33.4 % (ref 35–45)
HGB BLD-MCNC: 10.8 G/DL (ref 11.7–15.5)
HIV 1+2 AB+HIV1 P24 AG SERPL QL IA: NORMAL
LYMPHOCYTES # BLD AUTO: 1699 CELLS/UL (ref 850–3900)
LYMPHOCYTES NFR BLD AUTO: 21.5 %
MCH RBC QN AUTO: 27.4 PG (ref 27–33)
MCHC RBC AUTO-ENTMCNC: 32.3 G/DL (ref 32–36)
MCV RBC AUTO: 84.8 FL (ref 80–100)
MONOCYTES # BLD AUTO: 529 CELLS/UL (ref 200–950)
MONOCYTES NFR BLD AUTO: 6.7 %
NEUTROPHILS # BLD AUTO: 5498 CELLS/UL (ref 1500–7800)
NEUTROPHILS NFR BLD AUTO: 69.6 %
PLATELET # BLD AUTO: 227 THOUSAND/UL (ref 140–400)
PMV BLD REES-ECKER: 12.4 FL (ref 7.5–12.5)
POTASSIUM SERPL-SCNC: 4.8 MMOL/L (ref 3.5–5.3)
PROT SERPL-MCNC: 6.2 G/DL (ref 6.1–8.1)
RBC # BLD AUTO: 3.94 MILLION/UL (ref 3.8–5.1)
SODIUM SERPL-SCNC: 141 MMOL/L (ref 135–146)
WBC # BLD AUTO: 7.9 THOUSAND/UL (ref 3.8–10.8)

## 2023-09-08 ENCOUNTER — RA CDI HCC (OUTPATIENT)
Dept: OTHER | Facility: HOSPITAL | Age: 63
End: 2023-09-08

## 2023-09-08 NOTE — PROGRESS NOTES
[facility] coding opportunities       Chart reviewed, no opportunity found: CHART REVIEWED, NO OPPORTUNITY FOUND        Patients Insurance        Commercial Insurance: [employer]

## 2023-09-13 ENCOUNTER — OFFICE VISIT (OUTPATIENT)
Dept: FAMILY MEDICINE CLINIC | Facility: HOSPITAL | Age: 63
End: 2023-09-13
Payer: COMMERCIAL

## 2023-09-13 VITALS
HEIGHT: 64 IN | SYSTOLIC BLOOD PRESSURE: 130 MMHG | OXYGEN SATURATION: 96 % | WEIGHT: 155.6 LBS | DIASTOLIC BLOOD PRESSURE: 70 MMHG | HEART RATE: 86 BPM | BODY MASS INDEX: 26.56 KG/M2

## 2023-09-13 DIAGNOSIS — R22.31 LOCALIZED SWELLING, MASS, OR LUMP OF RIGHT UPPER EXTREMITY: Primary | ICD-10-CM

## 2023-09-13 DIAGNOSIS — R63.4 WEIGHT LOSS, UNINTENTIONAL: ICD-10-CM

## 2023-09-13 DIAGNOSIS — J45.30 MILD PERSISTENT ASTHMA WITHOUT COMPLICATION: ICD-10-CM

## 2023-09-13 DIAGNOSIS — Z87.891 PERSONAL HISTORY OF TOBACCO USE: ICD-10-CM

## 2023-09-13 DIAGNOSIS — R11.0 NAUSEA: ICD-10-CM

## 2023-09-13 PROCEDURE — 99214 OFFICE O/P EST MOD 30 MIN: CPT | Performed by: STUDENT IN AN ORGANIZED HEALTH CARE EDUCATION/TRAINING PROGRAM

## 2023-09-13 RX ORDER — FLUTICASONE PROPIONATE AND SALMETEROL 250; 50 UG/1; UG/1
1 POWDER RESPIRATORY (INHALATION) 2 TIMES DAILY
Qty: 60 BLISTER | Refills: 2 | Status: SHIPPED | OUTPATIENT
Start: 2023-09-13

## 2023-09-13 NOTE — PROGRESS NOTES
[address], [place]    Assessment/Plan:      Diagnosis ICD-10-CM Associated Orders   1. Localized swelling, mass, or lump of right upper extremity  R22.31 US extremity soft tissue     XR humerus right     CT chest wo contrast      2. Mild persistent asthma without complication  W11.61 Fluticasone-Salmeterol (Advair Diskus) 250-50 mcg/dose inhaler      3. Nausea  R11.0 US abdomen complete     CT chest wo contrast      4. Weight loss, unintentional  R63.4 US abdomen complete     CT chest wo contrast      5. Personal history of tobacco use  Z87.891 CT chest wo contrast        • Start with XR & RUE US. Most likely lipoma, but r/o any other lesions. • Inc dose on advair discus, asthma not yet well controlled. Still needing rescue qid. Allergies & cat provoked. • GI appt in 10d. • Start with US abdomen - Ongoing N/V. No diarrhea, but is on iron. • Dark stools from iron, no obvious blood. Likely needs to be scoped & Ct Ab/Pelv. CT lung - r/o malignancy in lung. • Heme appt next month. Return if symptoms worsen or fail to improve. • Patient may call or return to office with any questions or concerns. ______________________________________________________________________  Subjective:     Patient ID: Rosi  is a 61 y.o. female. HPI  Rosi   Chief Complaint   Patient presents with   • Mass     Patient found a lump on her right arm about 3 months ago     Right medial proximal biceps. Noted small lump, thought it had ridges to it. Or possibly two isolated spots. Did not seem to grow. Non tender. No rash. No insect bites. Never had this before. No hx of lipoma. No recent vaccines. No swollen nodes. Used to be on much more insulin 35 U. Now on 8U. Used to be 204 lbs. No changes to life. Did have chronic pain pump issues.      Wt Readings from Last 3 Encounters:   09/22/23 71.7 kg (158 lb)   09/13/23 70.6 kg (155 lb 9.6 oz)   09/06/23 72.1 kg (159 lb)        The following portions of the patient's history were reviewed and updated as appropriate: allergies, current medications, past medical history, and problem list.    Review of Systems   Constitutional: Positive for unexpected weight change (more slow over 2 yrs). Negative for appetite change, chills, diaphoresis, fatigue and fever. Respiratory: Positive for cough (mild with asthma). Negative for shortness of breath. Cardiovascular: Negative for chest pain and palpitations. Gastrointestinal: Positive for nausea (chronic with GI issues, appt upcoming) and vomiting. Skin: Negative for color change, rash and wound. Objective:      Vitals:    09/13/23 1236   BP: 130/70   Pulse: 86   SpO2: 96%      Physical Exam  Vitals reviewed. Constitutional:       General: She is not in acute distress. Appearance: Normal appearance. She is well-developed. She is not ill-appearing. HENT:      Head: Normocephalic and atraumatic. Eyes:      General: No scleral icterus. Right eye: No discharge. Left eye: No discharge. Cardiovascular:      Rate and Rhythm: Normal rate and regular rhythm. Pulses: Normal pulses. Heart sounds: Normal heart sounds. No murmur heard. Pulmonary:      Effort: Pulmonary effort is normal. No respiratory distress. Breath sounds: Normal breath sounds. No stridor. No wheezing. Musculoskeletal:         General: No tenderness. Cervical back: Normal range of motion. No rigidity. Right lower leg: No edema. Left lower leg: No edema. Skin:     General: Skin is warm and dry. Findings: No bruising, erythema, lesion or rash. Comments: Solid, mass under skin on R upper medial biceps, about 4-5 inches below shoulder jt. Appears to feel like 1-2 inches wide & 1 cm tall. Mobile, but firm. Neurological:      Mental Status: She is alert and oriented to person, place, and time.       Gait: Gait normal.   Psychiatric:         Mood and Affect: Mood normal.         Behavior: Behavior normal.         Thought Content: Thought content normal.         Judgment: Judgment normal.           Portions of the record may have been created with voice recognition software. Occasional wrong word or "sound alike" substitutions may have occurred due to the inherent limitations of voice recognition software. Please review the chart carefully and recognize, using context, where substitutions/typographical errors may have occurred.

## 2023-09-17 DIAGNOSIS — E11.9 TYPE 2 DIABETES MELLITUS WITHOUT COMPLICATION, WITHOUT LONG-TERM CURRENT USE OF INSULIN (HCC): ICD-10-CM

## 2023-09-17 DIAGNOSIS — R11.0 NAUSEA: ICD-10-CM

## 2023-09-17 DIAGNOSIS — E78.5 HYPERLIPIDEMIA ASSOCIATED WITH TYPE 2 DIABETES MELLITUS: ICD-10-CM

## 2023-09-17 DIAGNOSIS — J01.00 ACUTE NON-RECURRENT MAXILLARY SINUSITIS: ICD-10-CM

## 2023-09-17 DIAGNOSIS — E11.69 HYPERLIPIDEMIA ASSOCIATED WITH TYPE 2 DIABETES MELLITUS: ICD-10-CM

## 2023-09-17 DIAGNOSIS — I10 BENIGN ESSENTIAL HYPERTENSION: ICD-10-CM

## 2023-09-17 RX ORDER — ATORVASTATIN CALCIUM 40 MG/1
TABLET, FILM COATED ORAL
Qty: 90 TABLET | Refills: 0 | Status: SHIPPED | OUTPATIENT
Start: 2023-09-17

## 2023-09-17 RX ORDER — ONDANSETRON 4 MG/1
TABLET, FILM COATED ORAL
Qty: 30 TABLET | Refills: 1 | Status: SHIPPED | OUTPATIENT
Start: 2023-09-17

## 2023-09-17 RX ORDER — AMLODIPINE BESYLATE 5 MG/1
5 TABLET ORAL 2 TIMES DAILY
Qty: 180 TABLET | Refills: 0 | Status: SHIPPED | OUTPATIENT
Start: 2023-09-17

## 2023-09-17 RX ORDER — MOMETASONE FUROATE 50 UG/1
SPRAY, METERED NASAL
Qty: 17 G | Refills: 1 | Status: SHIPPED | OUTPATIENT
Start: 2023-09-17

## 2023-09-18 ENCOUNTER — HOSPITAL ENCOUNTER (OUTPATIENT)
Dept: CT IMAGING | Facility: HOSPITAL | Age: 63
Discharge: HOME | End: 2023-09-18
Attending: PSYCHIATRY & NEUROLOGY
Payer: COMMERCIAL

## 2023-09-18 DIAGNOSIS — S09.90XD INJURY OF HEAD, SUBSEQUENT ENCOUNTER: ICD-10-CM

## 2023-09-18 DIAGNOSIS — R41.3 MEMORY DYSFUNCTION: ICD-10-CM

## 2023-09-18 PROCEDURE — 70450 CT HEAD/BRAIN W/O DYE: CPT

## 2023-09-18 PROCEDURE — G1004 CDSM NDSC: HCPCS

## 2023-09-21 ENCOUNTER — HOSPITAL ENCOUNTER (OUTPATIENT)
Dept: INFUSION CENTER | Facility: HOSPITAL | Age: 63
Discharge: HOME | End: 2023-09-21
Attending: INTERNAL MEDICINE
Payer: COMMERCIAL

## 2023-09-21 VITALS
RESPIRATION RATE: 16 BRPM | OXYGEN SATURATION: 98 % | SYSTOLIC BLOOD PRESSURE: 124 MMHG | TEMPERATURE: 97.3 F | DIASTOLIC BLOOD PRESSURE: 75 MMHG | HEART RATE: 85 BPM

## 2023-09-21 DIAGNOSIS — E53.8 B12 DEFICIENCY: Primary | ICD-10-CM

## 2023-09-21 PROCEDURE — 96372 THER/PROPH/DIAG INJ SC/IM: CPT

## 2023-09-21 RX ORDER — CYANOCOBALAMIN 1000 UG/ML
1000 INJECTION, SOLUTION INTRAMUSCULAR; SUBCUTANEOUS ONCE
Status: COMPLETED | OUTPATIENT
Start: 2023-09-21 | End: 2023-09-21

## 2023-09-21 RX ORDER — CYANOCOBALAMIN 1000 UG/ML
1000 INJECTION, SOLUTION INTRAMUSCULAR; SUBCUTANEOUS ONCE
OUTPATIENT
Start: 2023-10-19

## 2023-09-21 RX ADMIN — CYANOCOBALAMIN 1000 MCG: 1000 INJECTION, SOLUTION INTRAMUSCULAR at 10:12

## 2023-09-21 NOTE — PROGRESS NOTES
Pt tolerated B12 injection without any adverse reaction. Band-aid applied and intact. Pt ambulated with steady gait off unit.  Declined AVS

## 2023-09-22 ENCOUNTER — CONSULT (OUTPATIENT)
Dept: GASTROENTEROLOGY | Facility: CLINIC | Age: 63
End: 2023-09-22
Payer: COMMERCIAL

## 2023-09-22 ENCOUNTER — TELEPHONE (OUTPATIENT)
Dept: GASTROENTEROLOGY | Facility: CLINIC | Age: 63
End: 2023-09-22

## 2023-09-22 VITALS
SYSTOLIC BLOOD PRESSURE: 141 MMHG | HEIGHT: 64 IN | HEART RATE: 78 BPM | DIASTOLIC BLOOD PRESSURE: 75 MMHG | WEIGHT: 158 LBS | BODY MASS INDEX: 26.98 KG/M2

## 2023-09-22 DIAGNOSIS — R63.4 UNINTENTIONAL WEIGHT LOSS: ICD-10-CM

## 2023-09-22 DIAGNOSIS — R11.0 NAUSEA: ICD-10-CM

## 2023-09-22 DIAGNOSIS — K59.09 OTHER CONSTIPATION: ICD-10-CM

## 2023-09-22 DIAGNOSIS — E61.1 IRON DEFICIENCY: ICD-10-CM

## 2023-09-22 DIAGNOSIS — R10.12 LEFT UPPER QUADRANT ABDOMINAL PAIN: Primary | ICD-10-CM

## 2023-09-22 PROCEDURE — 99204 OFFICE O/P NEW MOD 45 MIN: CPT | Performed by: STUDENT IN AN ORGANIZED HEALTH CARE EDUCATION/TRAINING PROGRAM

## 2023-09-22 RX ORDER — POLYETHYLENE GLYCOL 3350 17 G/17G
17 POWDER, FOR SOLUTION ORAL 2 TIMES DAILY
Qty: 1020 G | Refills: 5 | Status: SHIPPED | OUTPATIENT
Start: 2023-09-22 | End: 2024-03-20

## 2023-09-22 NOTE — PATIENT INSTRUCTIONS
We will plan for colonoscopy. You will need to be on a clear liquid diet the day before your procedure and have nothing to eat/drink after midnight except your bowel prep. Please take your bowel prep the evening before your procedure as instructed. You should get a phone call the week leading up to your procedure to go over these instructions. You will need a ride to and from the procedure unit on the day of your procedure. Please do not drive for the rest of the day after your procedure.

## 2023-09-22 NOTE — TELEPHONE ENCOUNTER
Scheduled date of combo (as of today):  Physician performing colonoscopy: Dr. Yasemin Ayala  Location of colonoscopy: [address] [place] [address]  Bowel prep reviewed with patient: Clenpiq  Instructions reviewed with patient by: Caleb Ramirez  Clearances: none

## 2023-09-22 NOTE — PROGRESS NOTES
Consultation - [address] [place] [address] Gastroenterology     Lior Garcia 61 y.o. female MRN: [id number]  Unit/Bed#:  Encounter: [id number]    Consults    ASSESSMENT and PLAN    1. Iron deficiency  Chronic issue for the patient and she is on iron infusions. Last colonoscopy on record from 2016 with no polyps and reportedly had another in 2019. No overt blood loss has been seen. She has imaging pending by her primary care doctor. - Ambulatory referral to Gastroenterology    2. Left upper quadrant abdominal pain  I suspect her pain is being driven by constipation in setting of p.o. iron and opiates. 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 We will investigate further with upper endoscopy and colonoscopy. 3. Nausea  No clear cause at this time though there is association with certain foods including steak and pork. She does not have any overt blood loss to suggest bleeding ulcer however this should be investigated with upper endoscopy. In addition as above, her opiates are likely playing a role as was discussed with her. She was advised to break open her omeprazole capsule and take with applesauce to improve absorption. EGD scheduled. 4. Other constipation  Likely secondary to iron and opiates which she needs for her chronic pain. She has not had a bowel movement in 4 days. She takes senna as needed. Recommend starting MiraLAX twice daily for her constipation symptoms and also to improve likelihood of a successful bowel prep. - polyethylene glycol (GLYCOLAX) 17 GM/SCOOP powder; Take 17 g by mouth 2 (two) times a day  Dispense: 1020 g; Refill: 5    5. Unintentional weight loss  This etiology is uncertain. We will perform upper endoscopy and colonoscopy to ensure there are no gastric or colonic malignancies. She has additional work-up pending including imaging studies.   We will follow-up in the office after her procedures to determine if any other testing is warranted. - Colonoscopy; Future  - EGD; Future  - sodium picosulfate, magnesium oxide, citric acid (Clenpiq) oral solution; Take 175 mL (1 bottle) the evening before the colonoscopy, between 5 PM and 9 PM, followed by a second 175 mL bottle 5 hours before the colonoscopy. Dispense: 350 mL; Refill: 0      Chief Complaint   Patient presents with   • Abdominal Pain   • Nausea          • Weight Loss       Physician Requesting Consult: No att. providers found    HPI  Rachel Callaway is a 61y.o. 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 She reports that she has lost 50 lbs over the past few years unintentionally. She carries a diagnosis of iron deficiency anemia and just had an iron infusion. She also gets B12 shots. She has a history of gastric bypass a few years ago and lost 50 pounds at that time. She is nauseated most days and depending on what she eats she will get stomach pain. Pork and steak can be quite painful. She had a bleeding ulcer in the past, EGD in [place] about 15 years ago (before her bypass). This was discovered incidentally for evaluation of anemia. She had not been taking NSAIDs at that time. She quit tobacco 16 years ago. She has a history of GERD and is on omeprazole chronically. She reports regurgitation issues with occasional aspiration. She previously saw Dr. Luca Mabry at [facility] and had a colonoscopy in 2019. The last on our system is in 2016, but she is fairly certain she had one again in 2019. Her mother had colon cancer in her 80s, so she gets colonoscopies every 5 years. She has a pain pump and spinal cord stimulator after an accident. She has morphine in the pain pump in addition to hydrocodone PRN. Also on Lyrica and tramadol.   She has resulting constipation, moving her bowels every 4 days or so with some difficulty passing. She takes senna as needed. Patient stated no reported fevers, chills, hematemesis, odynophagia, dysphagia,SOB, CP, melena, hematochezia. GI History:  Prior Colonoscopy: 2016 on our record, no polyps; though reports another in 2019  Prior EGD:  15 years ago at [facility] with ulcer found  Family hx:  Mother with colon cancer in her 80s  Surgical hx: RNYGB  Blood thinners: Denies antiplatelet or anticoagulation use  NSAID use: Denies regular NSAID use  DM meds: 8 units basaglar at night  Social Hx: No regular ETOH, smoking, or drug use.           Historical Information   Past Medical History:   Diagnosis Date   • PABLO (acute kidney injury) ([address]) 2021   • Allergic    • Anemia    • Anxiety    • Arthritis    • Asthma    • Breast lump     Resolved: 2017    • Chronic pain     back   • Diabetes mellitus ([address])    • Diverticulitis of colon    • GERD (gastroesophageal reflux disease)    • Hepatitis    • History of stomach ulcers    • HL (hearing loss)     Estimated   • Hyperlipidemia    • Hypertension    • Hypotension 2021   • Infectious viral hepatitis     Hepatitiss A   • Memory loss     Estimated   • Stomach problems      Past Surgical History:   Procedure Laterality Date   • APPENDECTOMY     • BACK SURGERY     • CATARACT EXTRACTION, BILATERAL     •  SECTION     • CHOLECYSTECTOMY     • GASTRIC BYPASS     • GASTRIC BYPASS     • INFUSION PUMP IMPLANTATION     • IA NDSC WRST SURG W/RLS TRANSVRS CARPL LIGM Right 2022    Procedure: Right endoscopic carpal tunnel release;  Surgeon: Maris Azevedo MD;  Location:  MAIN OR;  Service: Orthopedics   • SHOULDER SURGERY     • SPINAL CORD STIMULATOR IMPLANT       Social History   Social History     Substance and Sexual Activity   Alcohol Use No     Social History     Substance and Sexual Activity   Drug Use No     Social History     Tobacco Use   Smoking Status Former   • Packs/day: 1.00   • Years: 34.00   • Total pack years: 34.00   • Types: Cigarettes   • Start date: 1973   • Quit date: 2007   • Years since quittin.7   Smokeless Tobacco Never   Tobacco Comments    15yrs ago     Family History   Problem Relation Age of Onset   • Diabetes Mother         Mellitus   • Hyperlipidemia Mother    • Hypertension Mother    • Colon cancer Mother 66   • Pancreatic cancer Mother 80   • Melanoma Mother 68   • Cancer Father 66        Bladder ca   • Alcohol abuse Father    • Lung cancer Father 66   • Prostate cancer Father 66   • No Known Problems Daughter    • No Known Problems Maternal Grandmother    • No Known Problems Maternal Grandfather    • Stomach cancer Paternal Grandmother         63's   • Lung cancer Paternal Grandfather 68   • Diabetes Brother         Mellitus   • Hyperlipidemia Brother    • Hypertension Brother    • Diabetes Brother    • No Known Problems Brother    • No Known Problems Son    • No Known Problems Son    • Breast cancer Maternal Aunt         Unknown age   • No Known Problems Paternal Aunt    • No Known Problems Paternal Aunt    • No Known Problems Paternal Aunt    • No Known Problems Paternal Aunt    • Arthritis Family    • Mental illness Neg Hx        Meds/Allergies     No current facility-administered medications for this visit. (Not in a hospital admission)      Allergies   Allergen Reactions   • Nsaids Other (See Comments)     Cannot take NSAIDS secondary to having gastric bypass   • Percocet [Oxycodone-Acetaminophen] GI Intolerance       PHYSICAL EXAM    Visit Vitals  /75   Pulse 78   Ht 5' 4" (1.626 m)   Wt 71.7 kg (158 lb)   LMP  (LMP Unknown)   BMI 27.12 kg/m²   OB Status Postmenopausal   Smoking Status Former   BSA 1.77 m²     Body mass index is 27.12 kg/m².   General Appearance: NAD, cooperative, alert  Eyes: Anicteric, EOMI  ENT: Normocephalic, atraumatic, normal mucosa  Neck: symmetrical, trachea midline  Lungs: clear to auscultation, non-labored respirations on room air, no wheezing  CV: S1 S2+ radial pulses bilaterally  GI:  Soft, non-tender, non-distended; normal bowel sounds; no masses, no organomegaly   Rectal: Deferred  Musculoskeletal: No gross deformities or pitting edema  Skin:  No jaundice, no rashes  Neurologic: awake, alert, oriented, no gross deficits    Lab Results   Component Value Date    CALCIUM 9.5 09/07/2023     11/04/2017    K 4.8 09/07/2023    CO2 28 09/07/2023     09/07/2023    BUN 30 (H) 09/07/2023    CREATININE 0.83 09/07/2023    CREATININE 0.80 08/05/2023    CREATININE 0.89 06/23/2023     Lab Results   Component Value Date    WBC 7.9 09/07/2023    WBC 9.70 08/05/2023    WBC 9.8 06/23/2023    HGB 10.8 (L) 09/07/2023    HGB 10.6 (L) 08/05/2023    HGB 9.6 (L) 06/23/2023    MCV 84.8 09/07/2023     09/07/2023     08/05/2023     06/23/2023     Lab Results   Component Value Date    ALT 22 09/07/2023    ALT 21 08/05/2023    ALT 24 06/23/2023    AST 23 09/07/2023    AST 21 08/05/2023    AST 26 06/23/2023    ALKPHOS 60 09/07/2023    ALKPHOS 54 08/05/2023    ALKPHOS 71 06/23/2023    TBILI 0.5 09/07/2023    TBILI 0.24 08/05/2023    TBILI 0.3 06/23/2023     No results found for: "AMYLASE"  Lab Results   Component Value Date    LIPASE 113 09/23/2017     Lab Results   Component Value Date    IRON 42 (L) 06/27/2023    TIBC 297 06/27/2023    FERRITIN 7 (L) 06/27/2023     Lab Results   Component Value Date    INR 0.98 01/01/2019       CT head wo contrast    Result Date: 9/19/2023  Narrative: CT BRAIN - WITHOUT CONTRAST INDICATION:   R41.3: Other amnesia S09. 90XD: Unspecified injury of head, subsequent encounter. COMPARISON: 7/2/2021, 12/11/2021 TECHNIQUE:  CT examination of the brain was performed. Multiplanar 2D reformatted images were created from the source data. Radiation dose length product (DLP) for this visit:  856.73 mGy-cm .   This examination, like all CT scans performed in the [facility], was performed utilizing techniques to minimize radiation dose exposure, including the use of iterative  reconstruction and automated exposure control. IMAGE QUALITY:  Diagnostic. FINDINGS: PARENCHYMA: Decreased attenuation is noted in periventricular and subcortical white matter demonstrating an appearance that is statistically most likely to represent moderate microangiopathic change. No CT signs of acute infarction. No intracranial mass, mass effect or midline shift. No acute parenchymal hemorrhage. Atherosclerotic calcifications noted. VENTRICLES AND EXTRA-AXIAL SPACES:  Normal for the patient's age. VISUALIZED ORBITS: Bilateral lens replacements. PARANASAL SINUSES: Normal visualized paranasal sinuses. CALVARIUM AND EXTRACRANIAL SOFT TISSUES:  Normal.     Impression: No intracranial hemorrhage or calvarial fracture. Moderate, chronic microangiopathy redemonstrated. Resident: Kaykay Bahena, the attending radiologist, have reviewed the images and agree with the final report above. Workstation performed: [id number]       Imaging Studies: I have personally reviewed pertinent reports. Pathology, and Other Studies: I have personally reviewed pertinent reports.       REVIEW OF SYSTEMS    CONSTITUTIONAL: negative unless stated in HPI  GASTROINTESTINAL: As noted in the HPI

## 2023-09-25 ENCOUNTER — HOSPITAL ENCOUNTER (OUTPATIENT)
Dept: ULTRASOUND IMAGING | Facility: HOSPITAL | Age: 63
Discharge: HOME | End: 2023-09-25
Attending: STUDENT IN AN ORGANIZED HEALTH CARE EDUCATION/TRAINING PROGRAM
Payer: COMMERCIAL

## 2023-09-25 DIAGNOSIS — R22.31 LOCALIZED SWELLING, MASS, OR LUMP OF RIGHT UPPER EXTREMITY: ICD-10-CM

## 2023-09-25 PROCEDURE — 76882 US LMTD JT/FCL EVL NVASC XTR: CPT

## 2023-09-27 ENCOUNTER — APPOINTMENT (OUTPATIENT)
Dept: CT IMAGING | Facility: HOSPITAL | Age: 63
End: 2023-09-27
Attending: STUDENT IN AN ORGANIZED HEALTH CARE EDUCATION/TRAINING PROGRAM
Payer: COMMERCIAL

## 2023-09-27 ENCOUNTER — HOSPITAL ENCOUNTER (OUTPATIENT)
Dept: ULTRASOUND IMAGING | Facility: HOSPITAL | Age: 63
Discharge: HOME | End: 2023-09-27
Attending: STUDENT IN AN ORGANIZED HEALTH CARE EDUCATION/TRAINING PROGRAM
Payer: COMMERCIAL

## 2023-09-27 DIAGNOSIS — R11.0 NAUSEA: ICD-10-CM

## 2023-09-27 DIAGNOSIS — R63.4 WEIGHT LOSS, UNINTENTIONAL: ICD-10-CM

## 2023-09-27 PROCEDURE — 76700 US EXAM ABDOM COMPLETE: CPT

## 2023-09-29 ENCOUNTER — HOSPITAL ENCOUNTER (OUTPATIENT)
Dept: CT IMAGING | Facility: HOSPITAL | Age: 63
Discharge: HOME | End: 2023-09-29
Attending: STUDENT IN AN ORGANIZED HEALTH CARE EDUCATION/TRAINING PROGRAM
Payer: COMMERCIAL

## 2023-09-29 DIAGNOSIS — R11.0 NAUSEA: ICD-10-CM

## 2023-09-29 DIAGNOSIS — R63.4 WEIGHT LOSS, UNINTENTIONAL: ICD-10-CM

## 2023-09-29 DIAGNOSIS — R22.31 LOCALIZED SWELLING, MASS, OR LUMP OF RIGHT UPPER EXTREMITY: ICD-10-CM

## 2023-09-29 DIAGNOSIS — Z87.891 PERSONAL HISTORY OF TOBACCO USE: ICD-10-CM

## 2023-09-29 PROCEDURE — G1004 CDSM NDSC: HCPCS

## 2023-09-29 PROCEDURE — 71250 CT THORAX DX C-: CPT

## 2023-10-01 DIAGNOSIS — K86.89 PANCREATIC MASS: Primary | ICD-10-CM

## 2023-10-07 ENCOUNTER — HOSPITAL ENCOUNTER (OUTPATIENT)
Dept: CT IMAGING | Facility: HOSPITAL | Age: 63
Discharge: HOME | End: 2023-10-07
Attending: STUDENT IN AN ORGANIZED HEALTH CARE EDUCATION/TRAINING PROGRAM
Payer: COMMERCIAL

## 2023-10-07 DIAGNOSIS — K86.89 PANCREATIC MASS: ICD-10-CM

## 2023-10-07 PROCEDURE — G1004 CDSM NDSC: HCPCS

## 2023-10-07 PROCEDURE — 74177 CT ABD & PELVIS W/CONTRAST: CPT

## 2023-10-07 RX ADMIN — IOHEXOL 100 ML: 350 INJECTION, SOLUTION INTRAVENOUS at 19:25

## 2023-10-13 ENCOUNTER — ANESTHESIA (OUTPATIENT)
Dept: ANESTHESIOLOGY | Facility: AMBULATORY SURGERY CENTER | Age: 63
End: 2023-10-13

## 2023-10-13 ENCOUNTER — ANESTHESIA EVENT (OUTPATIENT)
Dept: ANESTHESIOLOGY | Facility: AMBULATORY SURGERY CENTER | Age: 63
End: 2023-10-13

## 2023-10-13 ENCOUNTER — TELEPHONE (OUTPATIENT)
Dept: FAMILY MEDICINE CLINIC | Facility: HOSPITAL | Age: 63
End: 2023-10-13

## 2023-10-16 ENCOUNTER — TELEPHONE (OUTPATIENT)
Dept: FAMILY MEDICINE CLINIC | Facility: HOSPITAL | Age: 63
End: 2023-10-16

## 2023-10-23 ENCOUNTER — TELEPHONE (OUTPATIENT)
Dept: HEMATOLOGY ONCOLOGY | Facility: CLINIC | Age: 63
End: 2023-10-23

## 2023-10-23 ENCOUNTER — TELEMEDICINE (OUTPATIENT)
Dept: FAMILY MEDICINE CLINIC | Facility: HOSPITAL | Age: 63
End: 2023-10-23
Payer: COMMERCIAL

## 2023-10-23 VITALS — HEIGHT: 64 IN | BODY MASS INDEX: 26.98 KG/M2 | WEIGHT: 158 LBS

## 2023-10-23 DIAGNOSIS — R11.0 NAUSEA: ICD-10-CM

## 2023-10-23 DIAGNOSIS — U07.1 COVID-19 VIRUS INFECTION: Primary | ICD-10-CM

## 2023-10-23 PROCEDURE — 99213 OFFICE O/P EST LOW 20 MIN: CPT | Performed by: INTERNAL MEDICINE

## 2023-10-23 RX ORDER — NIRMATRELVIR AND RITONAVIR 300-100 MG
3 KIT ORAL 2 TIMES DAILY
Qty: 30 TABLET | Refills: 0 | Status: SHIPPED | OUTPATIENT
Start: 2023-10-23 | End: 2023-10-28

## 2023-10-23 RX ORDER — ONDANSETRON 4 MG/1
4 TABLET, FILM COATED ORAL EVERY 8 HOURS PRN
Qty: 30 TABLET | Refills: 1 | Status: SHIPPED | OUTPATIENT
Start: 2023-10-23

## 2023-10-23 NOTE — PROGRESS NOTES
COVID-19 Outpatient Progress Note    Assessment/Plan:    Problem List Items Addressed This Visit    None  Visit Diagnoses     COVID-19 virus infection    -  Primary    Relevant Medications    nirmatrelvir & ritonavir (Paxlovid, 300/100,) tablet therapy pack    Nausea        Relevant Medications    ondansetron (ZOFRAN) 4 mg tablet         Disposition:     Discussed symptom directed medication options with patient. Discussed vitamin D, vitamin C, and/or zinc supplementation with patient. Patient meets criteria for Paxlovid and they have been counseled appropriately regarding risks, benefits, side effects, and alternative treatment options. After discussion, patient agrees to treatment. Possible side effects of Paxlovid? Possible side effects of Paxlovid are:  - Liver Problems. Notify us right away if you start to experience loss of appetite, yellowing of your skin and the whites of eyes (jaundice), dark-colored urine, pale colored stools and itchy skin, stomach area (abdominal) pain. - Resistance to HIV Medicines. If you have untreated HIV infection, Paxlovid may lead to some HIV medicines not working as well in the future. - Other possible side effects include: altered sense of taste, diarrhea, high blood pressure, or muscle aches. I have spent a total time of 15 minutes on the day of the encounter for this patient including risks and benefits of treatment options, instructions for management, documenting in the medical record, reviewing/ordering tests, medicine, procedures and obtaining or reviewing history.        Encounter provider: Gracie Zaman MD     Provider located at: [address] [place] [id number]     Recent Visits  Date Type Provider Dept   10/16/23 Telephone Jonatan Prieto DO [facility] [address] [facility] [place] [address]   Showing recent visits within past 7 days and meeting all other requirements  Today's Visits  Date Type Provider Dept   10/23/23 Telemedicine Claudia Lo MD [facility] Primary [facility] 101   Showing today's visits and meeting all other requirements  Future Appointments  No visits were found meeting these conditions. Showing future appointments within next 150 days and meeting all other requirements     This virtual check-in was done via [facility] and patient was informed that this is a secure, HIPAA-compliant platform. She agrees to proceed. Patient agrees to participate in a virtual check in via telephone or video visit instead of presenting to the office to address urgent/immediate medical needs. Patient is aware this is a billable service. She acknowledged consent and understanding of privacy and security of the video platform. The patient has agreed to participate and understands they can discontinue the visit at any time. After connecting through [facility], the patient was identified by name and date of birth. Mahin Rodriguez was informed that this was a telemedicine visit and that the exam was being conducted confidentially over secure lines. Mahin Rodriguez acknowledged consent and understanding of privacy and security of the telemedicine visit. I informed the patient that I have reviewed her record in Epic and presented the opportunity for her to ask any questions regarding the visit today. The patient agreed to participate. Verification of patient location:  Patient is located in the following state in which I hold an active license: [place]    Subjective:   Mahin Rodriguez is a 61 y.o. female who is concerned about COVID-19. Patient's symptoms include fatigue, nasal congestion, sore throat, cough, shortness of breath, nausea, myalgias and headache. Patient denies fever, malaise, rhinorrhea, abdominal pain and diarrhea.      - Date of symptom onset: 10/19/2023      COVID-19 vaccination status: Fully vaccinated with booster    Lab Results   Component Value Date    SARSCOV2 Negative 08/05/2023    SARSCOV2 Not Detected 05/06/2020       Review of Systems   Constitutional:  Positive for fatigue. Negative for fever. HENT:  Positive for congestion and sore throat. Negative for rhinorrhea. Respiratory:  Positive for cough and shortness of breath. Gastrointestinal:  Positive for nausea. Negative for abdominal pain and diarrhea. Musculoskeletal:  Positive for myalgias. Neurological:  Positive for headaches. Current Outpatient Medications on File Prior to Visit   Medication Sig   • Accu-Chek FastClix Lancets MISC Use to test blood glucose levels twice daily for diabetes E11.9   • albuterol (2.5 mg/3 mL) 0.083 % nebulizer solution Take 3 mL (2.5 mg total) by nebulization every 6 (six) hours as needed for wheezing or shortness of breath   • albuterol (PROVENTIL HFA,VENTOLIN HFA) 90 mcg/act inhaler USE 2 INHALATIONS ORALLY   EVERY 6 HOURS AS NEEDED FORWHEEZING   • amLODIPine (NORVASC) 5 mg tablet TAKE 1 TABLET TWICE A DAY   • atorvastatin (LIPITOR) 40 mg tablet TAKE 1 TABLET DAILY   • B-D UF III MINI PEN NEEDLES 31G X 5 MM MISC USE ONCE DAILY FOR BASAL   INSULIN INJECTION   • Cyanocobalamin (B-12 IJ) Inject as directed every 30 (thirty) days   • Ferrous Fumarate 63 (20 Fe) MG TABS Take 2 tablets by mouth daily Take two tablets once daily. • Fluticasone-Salmeterol (Advair Diskus) 250-50 mcg/dose inhaler Inhale 1 puff 2 (two) times a day Rinse mouth after use.    • glucose blood (Accu-Chek Guide) test strip Test   • HYDROcodone-acetaminophen (NORCO)  mg per tablet    • Insulin Glargine Solostar (Basaglar KwikPen) 100 UNIT/ML SOPN Inject 0.08 mL (8 Units total) under the skin daily at bedtime   • Januvia 100 MG tablet TAKE 1 TABLET IN THE       MORNING   • lidocaine (XYLOCAINE) 5 % ointment apply topically to affected area three times a day   • LYRICA 200 MG capsule Take 200 mg by mouth 3 (three) times a day    • metFORMIN (GLUCOPHAGE) 1000 MG tablet TAKE 1 TABLET TWICE A DAY   • mometasone (NASONEX) 50 mcg/act nasal spray USE 2 SPRAYS IN EACH       NOSTRIL DAILY   • montelukast (SINGULAIR) 10 mg tablet TAKE 1 TABLET AT BEDTIME   • Morphine Sulfate (MORPHINE 0.05 MG/ML SYRINGE, NICU/PICU,, CMPD ORDER,)    • omeprazole (PriLOSEC) 40 MG capsule TAKE 1 CAPSULE EVERY       MORNING   • polyethylene glycol (GLYCOLAX) 17 GM/SCOOP powder Take 17 g by mouth 2 (two) times a day   • traMADol HCl ER, Biphasic, 300 MG 24 hr tablet Take 300 mg by mouth daily   • VITAMIN D PO    • zolpidem (AMBIEN) 5 mg tablet TAKE 1 TABLET DAILY AT     BEDTIME AS NEEDED FOR SLEEP   • [DISCONTINUED] ondansetron (ZOFRAN) 4 mg tablet TAKE 1 TABLET EVERY 8 HOURSAS NEEDED FOR NAUSEA OR    VOMITING   • sodium picosulfate, magnesium oxide, citric acid (Clenpiq) oral solution Take 175 mL (1 bottle) the evening before the colonoscopy, between 5 PM and 9 PM, followed by a second 175 mL bottle 5 hours before the colonoscopy. (Patient not taking: Reported on 10/23/2023)       Objective:    Ht 5' 4" (1.626 m)   Wt 71.7 kg (158 lb)   LMP  (LMP Unknown)   BMI 27.12 kg/m²        Physical Exam  Constitutional:       General: She is not in acute distress. Appearance: She is not ill-appearing or toxic-appearing. Pulmonary:      Effort: Pulmonary effort is normal. No respiratory distress. Neurological:      Mental Status: She is alert and oriented to person, place, and time. Cranial Nerves: No cranial nerve deficit. Motor: No weakness.    Psychiatric:         Mood and Affect: Mood normal.       Humberto Velazquez MD

## 2023-10-23 NOTE — TELEPHONE ENCOUNTER
Appointment Change  Cancel, Reschedule, Change to Virtual      Who are you speaking with? Patient   If it is not the patient, is the caller listed on the communication consent form? N/A   Which provider is the appointment scheduled with? CHITRA Clemente   When was the original appointment scheduled? Please list date and time 10/25/23 9am   At which location is the appointment scheduled to take place? [place]   Was the appointment rescheduled? Was the appointment changed from an in person visit to a virtual visit? If so, please list the details of the change. 11/2/23 10am   What is the reason for the appointment change? Pt has covid       Was [facility] transport scheduled? N/A   Does [facility] transport need to be scheduled for the new visit (if applicable) N/A   Does the patient need an infusion appointment rescheduled? N/A   Does the patient have an upcoming infusion appointment scheduled? If so, when? No   Is the patient undergoing chemotherapy? N/A   For appointments cancelled with less than 24 hours:  Was the no-show policy reviewed?  N/A

## 2023-10-28 ENCOUNTER — PREP FOR PROCEDURE (OUTPATIENT)
Dept: GASTROENTEROLOGY | Facility: MEDICAL CENTER | Age: 63
End: 2023-10-28

## 2023-10-28 DIAGNOSIS — K31.89 DUODENAL MASS: ICD-10-CM

## 2023-10-28 DIAGNOSIS — Z90.3 S/P TOTAL GASTRECTOMY AND ROUX-EN-Y ESOPHAGOJEJUNAL ANASTOMOSIS: Primary | ICD-10-CM

## 2023-10-28 DIAGNOSIS — Z98.0 S/P TOTAL GASTRECTOMY AND ROUX-EN-Y ESOPHAGOJEJUNAL ANASTOMOSIS: Primary | ICD-10-CM

## 2023-10-29 DIAGNOSIS — J45.30 MILD PERSISTENT ASTHMA, UNSPECIFIED WHETHER COMPLICATED: ICD-10-CM

## 2023-10-29 RX ORDER — ALBUTEROL SULFATE 90 UG/1
AEROSOL, METERED RESPIRATORY (INHALATION)
Qty: 25.5 G | Refills: 1 | Status: SHIPPED | OUTPATIENT
Start: 2023-10-29

## 2023-11-01 NOTE — PROGRESS NOTES
HEMATOLOGY / [address] [place] [address] FOLLOW UP NOTE    Primary Care Provider: Perla Julio DO  Referring Provider:    MRN: [id number]  : 1960    Reason for Encounter: Follow-up for iron and B12 deficiency anemia       Interval History: Patient presents for follow-up visit of iron and B12 deficiency in the setting of postgastrectomy malabsorption. She completed iron infusions on 8/10/2023 and was recommended to receive monthly injectable B12 replacement. Last received a dose of B12 on 2023. Since her visit with me she has had unintentional weight loss of about 50 pounds, nausea and intermittent abdominal pain  She was seen in evaluation by GI on 2023 and recommended to undergo EGD and colonoscopy. However, she had CT imaging of abdomen and pelvis completed on 10/7 which demonstrated a masslike soft tissue density filling in the duodenal lumen measuring approximately 6.7 x 3.1 cm concerning for indeterminate duodenal mass or polypoid lesion. This does not appear to be pancreatic in origin. Diffuse pancreatic atrophy similar to prior   study 2017  Due to history of gastric bypass, she was referred to our advanced endoscopy team for further evaluation. Now scheduled for EUS with biopsy on 2023 with Dr. Brittany Griffin. Per notations, given her history of gastric bypass she will need EDGE procedure  "With EDGE it is slightly more invasive and we have to create a fistula between the pouch and gastric remnant. We may not be able to access her stomach the same day and may have to call ehr back in 2 - 3 weeks after that"    Blood work completed on 10/12/2023 demonstrates mild anemia, hemoglobin 11.4. Serum iron 50, ferritin 208, serum B12 242,     She does have some abdominal pain after eating certain foods. Denies any bleeding. No melena, hematochezia. She has nausea for which she uses Zofran as needed.   No vomiting  Decreased appetite    REVIEW OF SYSTEMS:  Please note that a 14-point review of systems was performed to include Constitutional, HEENT, Respiratory, CVS, GI, , Musculoskeletal, Integumentary, Neurologic, Rheumatologic, Endocrinologic, Psychiatric, Lymphatic, and Hematologic/Oncologic systems were reviewed and are negative unless otherwise stated in HPI. Positive and negative findings pertinent to this evaluation are incorporated into the history of present illness. ECOG PS: 0    PROBLEM LIST:  Patient Active Problem List   Diagnosis    Benign essential hypertension    Mild persistent asthma without complication    Peripheral polyneuropathy    Chronic bilateral back pain    Chronic lumbar radiculopathy    Chronic cervical pain    Chronic thoracic spine pain    GERD without esophagitis    Hyperlipidemia associated with type 2 diabetes mellitus     Insomnia    B12 deficiency    Lumbar radiculopathy    Thoracic spondylosis without myelopathy    Thoracic and lumbosacral neuritis    Degeneration of lumbar intervertebral disc    Lumbar spondylosis    Pain in joint involving pelvic region and thigh    Iron deficiency    Memory dysfunction    COVID-19 vaccine series completed    Type 2 diabetes mellitus with diabetic neuropathy, with long-term current use of insulin (HCC)    Narcotic dependency, continuous (HCC)    Carpal tunnel syndrome on right    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 Mass of duodenum    2. B12 deficiency    3. Iron deficiency      Patient was referred to hematology for management of iron deficiency and B12 deficiency anemia in the setting of gastric bypass and postsurgical malabsorption. She was treated with a course of IV Venofer and has been on maintenance B12 replacement. Since her initial consultation, she has been experiencing unintentional weight loss, and intermittent abdominal pain.   She has undergone a work-up and CT imaging is demonstrating findings concerning for malignancy with a  6.7 x 3.1 cm duodenal mass. She is planned for EUS and biopsy with Dr. Claudy Fagan. Patient understands she may have cancer. Biopsy will give us a true tissue diagnosis. I will arrange for her to see Dr. Danay Starr in follow-up after her pathology has resulted for further discussion. She will continue on maintenance B12 injections. I will provide her with Ensure samples today. Time spent providing supportive listening. She is instructed that should she develop any severe abdominal pain, worsening nausea/vomiting she should proceed to ED at [facility] for further evaluation where they have the ability to do advanced endoscopy while inpatient. Patient verbalized understanding. I spent 30 minutes on chart review, face to face counseling time, coordination of care and documentation. Past Medical History:   has a past medical history of PABLO (acute kidney injury) ([address]) (2021), Allergic, Anemia, Anxiety, Arthritis, Asthma, Breast lump, Chronic pain, Diabetes mellitus ([address]), Diverticulitis of colon, GERD (gastroesophageal reflux disease), Hepatitis, History of stomach ulcers, HL (hearing loss) (), Hyperlipidemia, Hypertension, Hypotension (2021), Infectious viral hepatitis, Memory loss (), and Stomach problems. PAST SURGICAL HISTORY:   has a past surgical history that includes Cholecystectomy; Appendectomy;  section; Back surgery; Gastric bypass; Shoulder surgery; Spinal cord stimulator implant (); Infusion pump implantation ();  Gastric bypass (); pr ndsc wrst surg w/rls transvrs carpl ligm (Right, 2022); and Cataract extraction, bilateral.    CURRENT MEDICATIONS  Current Outpatient Medications   Medication Sig Dispense Refill    albuterol (2.5 mg/3 mL) 0.083 % nebulizer solution Take 3 mL (2.5 mg total) by nebulization every 6 (six) hours as needed for wheezing or shortness of breath 300 mL 1    albuterol (PROVENTIL HFA,VENTOLIN HFA) 90 mcg/act inhaler USE 2 INHALATIONS ORALLY   EVERY 6 HOURS AS NEEDED FORWHEEZING 25.5 g 1    amLODIPine (NORVASC) 5 mg tablet TAKE 1 TABLET TWICE A  tablet 0    atorvastatin (LIPITOR) 40 mg tablet TAKE 1 TABLET DAILY 90 tablet 0    Cyanocobalamin (B-12 IJ) Inject as directed every 30 (thirty) days      Ferrous Fumarate 63 (20 Fe) MG TABS Take 2 tablets by mouth daily Take two tablets once daily. Fluticasone-Salmeterol (Advair Diskus) 250-50 mcg/dose inhaler Inhale 1 puff 2 (two) times a day Rinse mouth after use.  60 blister 2    HYDROcodone-acetaminophen (NORCO)  mg per tablet       Januvia 100 MG tablet TAKE 1 TABLET IN THE       MORNING 90 tablet 3    lidocaine (XYLOCAINE) 5 % ointment apply topically to affected area three times a day      LYRICA 200 MG capsule Take 200 mg by mouth 3 (three) times a day   0    metFORMIN (GLUCOPHAGE) 1000 MG tablet TAKE 1 TABLET TWICE A  tablet 0    mometasone (NASONEX) 50 mcg/act nasal spray USE 2 SPRAYS IN EACH       NOSTRIL DAILY 17 g 1    montelukast (SINGULAIR) 10 mg tablet TAKE 1 TABLET AT BEDTIME 90 tablet 1    omeprazole (PriLOSEC) 40 MG capsule TAKE 1 CAPSULE EVERY       MORNING 90 capsule 3    ondansetron (ZOFRAN) 4 mg tablet Take 1 tablet (4 mg total) by mouth every 8 (eight) hours as needed for nausea or vomiting 30 tablet 1    traMADol HCl ER, Biphasic, 300 MG 24 hr tablet Take 300 mg by mouth daily      VITAMIN D PO       zolpidem (AMBIEN) 5 mg tablet TAKE 1 TABLET DAILY AT     BEDTIME AS NEEDED FOR SLEEP 30 tablet 2    Accu-Chek FastClix Lancets MISC Use to test blood glucose levels twice daily for diabetes E11.9 204 each 3    B-D UF III MINI PEN NEEDLES 31G X 5 MM MISC USE ONCE DAILY FOR BASAL   INSULIN INJECTION 90 each 1    glucose blood (Accu-Chek Guide) test strip Test 100 strip 3    Insulin Glargine Solostar (Basaglar KwikPen) 100 UNIT/ML SOPN Inject 0.08 mL (8 Units total) under the skin daily at bedtime 15 mL 0    Morphine Sulfate (MORPHINE 0.05 MG/ML SYRINGE, NICU/PICU,, CMPD ORDER,)       polyethylene glycol (GLYCOLAX) 17 GM/SCOOP powder Take 17 g by mouth 2 (two) times a day (Patient not taking: Reported on 11/2/2023) 1020 g 5    sodium picosulfate, magnesium oxide, citric acid (Clenpiq) oral solution Take 175 mL (1 bottle) the evening before the colonoscopy, between 5 PM and 9 PM, followed by a second 175 mL bottle 5 hours before the colonoscopy. (Patient not taking: Reported on 10/23/2023) 350 mL 0     No current facility-administered medications for this visit. [unfilled]    SOCIAL HISTORY:   reports that she quit smoking about 16 years ago. Her smoking use included cigarettes. She started smoking about 50 years ago. She has a 34.00 pack-year smoking history. She has never used smokeless tobacco. She reports that she does not drink alcohol and does not use drugs. FAMILY HISTORY:  family history includes Alcohol abuse in her father; Arthritis in her family; Breast cancer in her maternal aunt; Cancer (age of onset: 66) in her father; Colon cancer (age of onset: 66) in her mother; Diabetes in her brother, brother, and mother; Hyperlipidemia in her brother and mother; Hypertension in her brother and mother; Lung cancer (age of onset: 68) in her paternal grandfather; Lung cancer (age of onset: 66) in her father; Melanoma (age of onset: 68) in her mother; No Known Problems in her brother, daughter, maternal grandfather, maternal grandmother, paternal aunt, paternal aunt, paternal aunt, paternal aunt, son, and son; Pancreatic cancer (age of onset: 80) in her mother; Prostate cancer (age of onset: 66) in her father; Stomach cancer in her paternal grandmother. ALLERGIES:  is allergic to nsaids and percocet [oxycodone-acetaminophen].       Physical Exam:  Vital Signs:   Visit Vitals  /78 (BP Location: Left arm, Patient Position: Sitting, Cuff Size: Standard)   Pulse 80   Temp (!) 97.3 °F (36.3 °C) (Temporal)   Resp 16   Ht 5' 4" (1.626 m)   Wt 70.8 kg (156 lb)   LMP  (LMP Unknown)   SpO2 95%   BMI 26.78 kg/m²   OB Status Postmenopausal   Smoking Status Former   BSA 1.76 m²     Body mass index is 26.78 kg/m². Body surface area is 1.76 meters squared. Physical Exam  Constitutional:       General: She is not in acute distress. Appearance: Normal appearance. HENT:      Head: Normocephalic and atraumatic. Eyes:      General: No scleral icterus. Right eye: No discharge. Left eye: No discharge. Conjunctiva/sclera: Conjunctivae normal.   Cardiovascular:      Rate and Rhythm: Normal rate and regular rhythm. Pulmonary:      Effort: Pulmonary effort is normal. No respiratory distress. Breath sounds: Normal breath sounds. Abdominal:      General: Bowel sounds are normal. There is no distension. Palpations: Abdomen is soft. There is no mass. Tenderness: There is no abdominal tenderness. Musculoskeletal:         General: Normal range of motion. Lymphadenopathy:      Cervical: No cervical adenopathy. Upper Body:      Right upper body: No supraclavicular, axillary or pectoral adenopathy. Left upper body: No supraclavicular, axillary or pectoral adenopathy. Skin:     General: Skin is warm and dry. Coloration: Skin is pale. Neurological:      General: No focal deficit present. Mental Status: She is alert and oriented to person, place, and time.    Psychiatric:         Mood and Affect: Mood normal.         Behavior: Behavior normal.         Labs:  Lab Results   Component Value Date    WBC 7.9 09/07/2023    HGB 10.8 (L) 09/07/2023    HCT 33.4 (L) 09/07/2023    MCV 84.8 09/07/2023     09/07/2023     Lab Results   Component Value Date     11/04/2017    SODIUM 141 09/07/2023    K 4.8 09/07/2023     09/07/2023    CO2 28 09/07/2023    AGAP 11 08/05/2023    BUN 30 (H) 09/07/2023    CREATININE 0.83 09/07/2023    GLUC 78 09/07/2023    CALCIUM 9.5 09/07/2023    AST 23 09/07/2023    ALT 22 09/07/2023    ALKPHOS 60 09/07/2023    PROT 6.6 07/22/2017    TP 6.2 09/07/2023    BILITOT 0.5 07/22/2017    TBILI 0.5 09/07/2023    EGFR 79 09/07/2023

## 2023-11-02 ENCOUNTER — OFFICE VISIT (OUTPATIENT)
Age: 63
End: 2023-11-02
Payer: COMMERCIAL

## 2023-11-02 VITALS
SYSTOLIC BLOOD PRESSURE: 120 MMHG | OXYGEN SATURATION: 95 % | TEMPERATURE: 97.3 F | DIASTOLIC BLOOD PRESSURE: 78 MMHG | WEIGHT: 156 LBS | BODY MASS INDEX: 26.63 KG/M2 | HEART RATE: 80 BPM | HEIGHT: 64 IN | RESPIRATION RATE: 16 BRPM

## 2023-11-02 DIAGNOSIS — K31.89 MASS OF DUODENUM: Primary | ICD-10-CM

## 2023-11-02 DIAGNOSIS — E53.8 B12 DEFICIENCY: ICD-10-CM

## 2023-11-02 DIAGNOSIS — E53.8 B12 DEFICIENCY: Primary | ICD-10-CM

## 2023-11-02 DIAGNOSIS — E61.1 IRON DEFICIENCY: ICD-10-CM

## 2023-11-02 PROCEDURE — 99214 OFFICE O/P EST MOD 30 MIN: CPT | Performed by: NURSE PRACTITIONER

## 2023-11-02 RX ORDER — CYANOCOBALAMIN 1000 UG/ML
1000 INJECTION, SOLUTION INTRAMUSCULAR; SUBCUTANEOUS ONCE
OUTPATIENT
Start: 2023-11-09

## 2023-11-09 ENCOUNTER — HOSPITAL ENCOUNTER (OUTPATIENT)
Dept: INFUSION CENTER | Facility: HOSPITAL | Age: 63
Discharge: HOME | End: 2023-11-09
Attending: INTERNAL MEDICINE
Payer: COMMERCIAL

## 2023-11-09 ENCOUNTER — TELEPHONE (OUTPATIENT)
Dept: GASTROENTEROLOGY | Facility: MEDICAL CENTER | Age: 63
End: 2023-11-09

## 2023-11-09 VITALS
HEART RATE: 75 BPM | DIASTOLIC BLOOD PRESSURE: 87 MMHG | TEMPERATURE: 97.5 F | OXYGEN SATURATION: 94 % | RESPIRATION RATE: 16 BRPM | SYSTOLIC BLOOD PRESSURE: 132 MMHG

## 2023-11-09 DIAGNOSIS — E53.8 B12 DEFICIENCY: Primary | ICD-10-CM

## 2023-11-09 PROCEDURE — 96372 THER/PROPH/DIAG INJ SC/IM: CPT

## 2023-11-09 RX ORDER — CYANOCOBALAMIN 1000 UG/ML
1000 INJECTION, SOLUTION INTRAMUSCULAR; SUBCUTANEOUS ONCE
OUTPATIENT
Start: 2023-12-07

## 2023-11-09 RX ORDER — CYANOCOBALAMIN 1000 UG/ML
1000 INJECTION, SOLUTION INTRAMUSCULAR; SUBCUTANEOUS ONCE
Status: COMPLETED | OUTPATIENT
Start: 2023-11-09 | End: 2023-11-09

## 2023-11-09 RX ADMIN — CYANOCOBALAMIN 1000 MCG: 1000 INJECTION INTRAMUSCULAR; SUBCUTANEOUS at 10:18

## 2023-11-09 NOTE — TELEPHONE ENCOUNTER
Spoke to patient confirming upcoming procedure. Patient was instructed to call [phone] if they have any questions or concerns about the prep instructions or if they need to change or cancel the procedure. She is aware of her diabetic instructions.

## 2023-11-14 NOTE — PROGRESS NOTES
Referring physician : Dr. Quinton Galvan    Diagnosis : Small bowel lesion    Discussed with patient : YES/NO: yes    Symptoms : weight loss     Labs : reviewed      Imaging : CT scan     Prior endoscopy : yes - gastric bypass    Anticoagulation/ Antiplatelet therapy :  aspirin    Assessment :   1. Duodenal mass/ weight loss/ pancreatic atrophy and ductal dilatoin. 2. Gastric bypass    Plan :   1. EUS - EDGE part 1  2. May consider doing an EGD at the same time to evaluate the duodenum and biopsy.      Order placed : YES/NO: yes    Informed scheduling staff : YES/NO: yes     Discussed with GI lab : YES/NO: no

## 2023-11-14 NOTE — H&P (VIEW-ONLY)
Referring physician : Dr. Marcelino Broussard    Diagnosis : Small bowel lesion    Discussed with patient : YES/NO: yes    Symptoms : weight loss     Labs : reviewed      Imaging : CT scan     Prior endoscopy : yes - gastric bypass    Anticoagulation/ Antiplatelet therapy :  aspirin    Assessment :   1. Duodenal mass/ weight loss/ pancreatic atrophy and ductal dilatoin. 2. Gastric bypass    Plan :   1. EUS - EDGE part 1  2. May consider doing an EGD at the same time to evaluate the duodenum and biopsy.      Order placed : YES/NO: yes    Informed scheduling staff : YES/NO: yes     Discussed with GI lab : YES/NO: no

## 2023-11-15 ENCOUNTER — HOSPITAL ENCOUNTER (OUTPATIENT)
Dept: MRI IMAGING | Facility: HOSPITAL | Age: 63
Discharge: HOME | End: 2023-11-15
Payer: COMMERCIAL

## 2023-11-15 DIAGNOSIS — C72.0 MALIGNANT NEOPLASM OF SPINAL CORD (HCC): ICD-10-CM

## 2023-11-15 DIAGNOSIS — C17.9 MALIGNANT NEOPLASM OF SMALL INTESTINE, UNSPECIFIED (HCC): ICD-10-CM

## 2023-11-15 PROCEDURE — 72148 MRI LUMBAR SPINE W/O DYE: CPT

## 2023-11-15 PROCEDURE — G1004 CDSM NDSC: HCPCS

## 2023-11-20 DIAGNOSIS — G47.00 INSOMNIA, UNSPECIFIED TYPE: ICD-10-CM

## 2023-11-21 ENCOUNTER — HOSPITAL ENCOUNTER (OUTPATIENT)
Dept: GASTROENTEROLOGY | Facility: HOSPITAL | Age: 63
Setting detail: OUTPATIENT SURGERY
Discharge: HOME | End: 2023-11-21
Attending: INTERNAL MEDICINE
Payer: COMMERCIAL

## 2023-11-21 ENCOUNTER — ANESTHESIA (OUTPATIENT)
Dept: GASTROENTEROLOGY | Facility: HOSPITAL | Age: 63
End: 2023-11-21

## 2023-11-21 ENCOUNTER — ANESTHESIA EVENT (OUTPATIENT)
Dept: GASTROENTEROLOGY | Facility: HOSPITAL | Age: 63
End: 2023-11-21

## 2023-11-21 ENCOUNTER — HOSPITAL ENCOUNTER (OUTPATIENT)
Dept: RADIOLOGY | Facility: HOSPITAL | Age: 63
Setting detail: OUTPATIENT SURGERY
Discharge: HOME | End: 2023-11-21
Payer: COMMERCIAL

## 2023-11-21 VITALS
HEIGHT: 64 IN | OXYGEN SATURATION: 96 % | TEMPERATURE: 96.7 F | HEART RATE: 80 BPM | RESPIRATION RATE: 18 BRPM | WEIGHT: 158 LBS | BODY MASS INDEX: 26.98 KG/M2 | DIASTOLIC BLOOD PRESSURE: 72 MMHG | SYSTOLIC BLOOD PRESSURE: 148 MMHG

## 2023-11-21 DIAGNOSIS — Z98.0 S/P TOTAL GASTRECTOMY AND ROUX-EN-Y ESOPHAGOJEJUNAL ANASTOMOSIS: ICD-10-CM

## 2023-11-21 DIAGNOSIS — K31.89 DUODENAL MASS: ICD-10-CM

## 2023-11-21 DIAGNOSIS — Z90.3 S/P TOTAL GASTRECTOMY AND ROUX-EN-Y ESOPHAGOJEJUNAL ANASTOMOSIS: ICD-10-CM

## 2023-11-21 LAB — GLUCOSE SERPL-MCNC: 88 MG/DL (ref 65–140)

## 2023-11-21 PROCEDURE — C1889 IMPLANT/INSERT DEVICE, NOC: HCPCS

## 2023-11-21 PROCEDURE — C1874 STENT, COATED/COV W/DEL SYS: HCPCS

## 2023-11-21 PROCEDURE — 43266 EGD ENDOSCOPIC STENT PLACE: CPT | Performed by: INTERNAL MEDICINE

## 2023-11-21 PROCEDURE — C1769 GUIDE WIRE: HCPCS

## 2023-11-21 PROCEDURE — C1726 CATH, BAL DIL, NON-VASCULAR: HCPCS

## 2023-11-21 PROCEDURE — 82948 REAGENT STRIP/BLOOD GLUCOSE: CPT

## 2023-11-21 PROCEDURE — 43253 EGD US TRANSMURAL INJXN/MARK: CPT | Performed by: INTERNAL MEDICINE

## 2023-11-21 PROCEDURE — 74018 RADEX ABDOMEN 1 VIEW: CPT

## 2023-11-21 RX ORDER — ONDANSETRON 2 MG/ML
INJECTION INTRAMUSCULAR; INTRAVENOUS AS NEEDED
Status: DISCONTINUED | OUTPATIENT
Start: 2023-11-21 | End: 2023-11-21

## 2023-11-21 RX ORDER — FENTANYL CITRATE 50 UG/ML
INJECTION, SOLUTION INTRAMUSCULAR; INTRAVENOUS AS NEEDED
Status: DISCONTINUED | OUTPATIENT
Start: 2023-11-21 | End: 2023-11-21

## 2023-11-21 RX ORDER — PROPOFOL 10 MG/ML
INJECTION, EMULSION INTRAVENOUS AS NEEDED
Status: DISCONTINUED | OUTPATIENT
Start: 2023-11-21 | End: 2023-11-21

## 2023-11-21 RX ORDER — ZOLPIDEM TARTRATE 5 MG/1
5 TABLET ORAL
Qty: 90 TABLET | Refills: 0 | Status: SHIPPED | OUTPATIENT
Start: 2023-11-21

## 2023-11-21 RX ORDER — KETAMINE HCL IN NACL, ISO-OSM 100MG/10ML
SYRINGE (ML) INJECTION AS NEEDED
Status: DISCONTINUED | OUTPATIENT
Start: 2023-11-21 | End: 2023-11-21

## 2023-11-21 RX ORDER — SODIUM CHLORIDE 9 MG/ML
INJECTION, SOLUTION INTRAVENOUS CONTINUOUS PRN
Status: DISCONTINUED | OUTPATIENT
Start: 2023-11-21 | End: 2023-11-21

## 2023-11-21 RX ORDER — LIDOCAINE HYDROCHLORIDE 10 MG/ML
INJECTION, SOLUTION EPIDURAL; INFILTRATION; INTRACAUDAL; PERINEURAL AS NEEDED
Status: DISCONTINUED | OUTPATIENT
Start: 2023-11-21 | End: 2023-11-21

## 2023-11-21 RX ORDER — EPINEPHRINE 0.1 MG/ML
INJECTION INTRAVENOUS AS NEEDED
Status: COMPLETED | OUTPATIENT
Start: 2023-11-21 | End: 2023-11-21

## 2023-11-21 RX ORDER — SUCCINYLCHOLINE/SOD CL,ISO/PF 100 MG/5ML
SYRINGE (ML) INTRAVENOUS AS NEEDED
Status: DISCONTINUED | OUTPATIENT
Start: 2023-11-21 | End: 2023-11-21

## 2023-11-21 RX ADMIN — Medication 30 MG: at 10:42

## 2023-11-21 RX ADMIN — FENTANYL CITRATE 25 MCG: 50 INJECTION INTRAMUSCULAR; INTRAVENOUS at 11:15

## 2023-11-21 RX ADMIN — FENTANYL CITRATE 25 MCG: 50 INJECTION INTRAMUSCULAR; INTRAVENOUS at 10:42

## 2023-11-21 RX ADMIN — IOHEXOL 30 ML: 240 INJECTION, SOLUTION INTRATHECAL; INTRAVASCULAR; INTRAVENOUS; ORAL at 11:00

## 2023-11-21 RX ADMIN — SODIUM CHLORIDE: 0.9 INJECTION, SOLUTION INTRAVENOUS at 10:28

## 2023-11-21 RX ADMIN — EPINEPHRINE 0.25 MG: 0.1 INJECTION INTRAVENOUS at 11:36

## 2023-11-21 RX ADMIN — LIDOCAINE HYDROCHLORIDE 50 MG: 10 INJECTION, SOLUTION EPIDURAL; INFILTRATION; INTRACAUDAL; PERINEURAL at 10:33

## 2023-11-21 RX ADMIN — PROPOFOL 170 MG: 10 INJECTION, EMULSION INTRAVENOUS at 10:33

## 2023-11-21 RX ADMIN — SODIUM CHLORIDE: 0.9 INJECTION, SOLUTION INTRAVENOUS at 11:10

## 2023-11-21 RX ADMIN — PROPOFOL 30 MG: 10 INJECTION, EMULSION INTRAVENOUS at 11:30

## 2023-11-21 RX ADMIN — Medication 100 MG: at 10:33

## 2023-11-21 RX ADMIN — ONDANSETRON 4 MG: 2 INJECTION INTRAMUSCULAR; INTRAVENOUS at 12:00

## 2023-11-21 RX ADMIN — FENTANYL CITRATE 50 MCG: 50 INJECTION INTRAMUSCULAR; INTRAVENOUS at 10:33

## 2023-11-21 RX ADMIN — GLUCAGON 0.5 MG: 1 INJECTION, POWDER, LYOPHILIZED, FOR SOLUTION INTRAMUSCULAR; INTRAVENOUS at 10:45

## 2023-11-21 RX ADMIN — Medication 10 MG: at 11:22

## 2023-11-21 RX ADMIN — GLUCAGON 0.5 MG: 1 INJECTION, POWDER, LYOPHILIZED, FOR SOLUTION INTRAMUSCULAR; INTRAVENOUS at 10:59

## 2023-11-21 RX ADMIN — Medication 10 MG: at 10:59

## 2023-11-21 NOTE — ANESTHESIA POSTPROCEDURE EVALUATION
Post-Op Assessment Note            No anethesia notable event occurred.                 /98 (11/21/23 1216)    Temp     Pulse 83 (11/21/23 1216)   Resp 18 (11/21/23 1216)    SpO2 91 % (11/21/23 1216)

## 2023-11-21 NOTE — ANESTHESIA POSTPROCEDURE EVALUATION
Post-Op Assessment Note    CV Status:  Stable  Pain Score: 0    Pain management: adequate       Mental Status:  Alert and awake   Hydration Status:  Euvolemic and stable   PONV Controlled:  Controlled   Airway Patency:  Patent and adequate     Post Op Vitals Reviewed: Yes      Staff: CRNA               /81 (11/21/23 1156)    Temp (!) 96.7 °F (35.9 °C) (11/21/23 1156)    Pulse 94 (11/21/23 1156)   Resp 18 (11/21/23 1156)    SpO2 97 % (11/21/23 1156)

## 2023-11-21 NOTE — ANESTHESIA PREPROCEDURE EVALUATION
Procedure:  ENDOSCOPIC ULTRASOUND (UPPER)    Relevant Problems   CARDIO   (+) Benign essential hypertension   (+) Chronic thoracic spine pain   (+) Hyperlipidemia associated with type 2 diabetes mellitus       ENDO   (+) Type 2 diabetes mellitus with diabetic neuropathy, with long-term current use of insulin (HCC)      GI/HEPATIC   (+) GERD without esophagitis      HEMATOLOGY   (+) Microcytic anemia      MUSCULOSKELETAL   (+) Chronic bilateral back pain   (+) Chronic thoracic spine pain   (+) Degeneration of lumbar intervertebral disc   (+) Lumbar spondylosis   (+) Thoracic spondylosis without myelopathy      NEURO/PSYCH   (+) Chronic bilateral back pain   (+) Chronic cervical pain      PULMONARY   (+) Mild persistent asthma without complication        Physical Exam    Airway    Mallampati score: II  TM Distance: >3 FB  Neck ROM: full     Dental   No notable dental hx     Cardiovascular  Rhythm: regular, Rate: normal, Cardiovascular exam normal    Pulmonary  Pulmonary exam normal Breath sounds clear to auscultation    Other Findings  Symptomatic COVID on 10/23/23 - took Paxlovid. No URI symptoms presently. Has spinal cord stimulator - did not bring remote  Also has intrathecal pump - she believes its morphine, unknown rate? post-pubertal.      Anesthesia Plan  ASA Score- 3     Anesthesia Type- general with ASA Monitors. Additional Monitors:     Airway Plan:            Plan Factors-Exercise tolerance (METS): <4 METS. Chart reviewed. EKG reviewed. Imaging results reviewed. Existing labs reviewed. Patient summary reviewed. Patient is not a current smoker (Smoking Status: Former - 34 pack years Quit Smokin07 Smokeless Tobacco Status: Never Comments: 15yrs ago Alcohol use: No Drug use: No). Patient did not smoke on day of surgery. Induction- rapid sequence induction. Postoperative Plan-     Informed Consent- Anesthetic plan and risks discussed with patient.   I personally reviewed this patient with the CRNA. Discussed and agreed on the Anesthesia Plan with the CRNA. Kendrick Lennox

## 2023-11-24 DIAGNOSIS — E11.9 TYPE 2 DIABETES MELLITUS WITHOUT COMPLICATION, WITHOUT LONG-TERM CURRENT USE OF INSULIN (HCC): ICD-10-CM

## 2023-11-25 DIAGNOSIS — J45.30 MILD PERSISTENT ASTHMA WITHOUT COMPLICATION: ICD-10-CM

## 2023-11-26 RX ORDER — FLUTICASONE PROPIONATE AND SALMETEROL 50; 250 UG/1; UG/1
POWDER RESPIRATORY (INHALATION)
Qty: 30 BLISTER | Refills: 2 | Status: SHIPPED | OUTPATIENT
Start: 2023-11-26 | End: 2023-11-27

## 2023-11-26 RX ORDER — SITAGLIPTIN 100 MG/1
100 TABLET, FILM COATED ORAL EVERY MORNING
Qty: 90 TABLET | Refills: 3 | Status: SHIPPED | OUTPATIENT
Start: 2023-11-26

## 2023-11-27 ENCOUNTER — TELEPHONE (OUTPATIENT)
Age: 63
End: 2023-11-27

## 2023-11-27 DIAGNOSIS — J45.30 MILD PERSISTENT ASTHMA WITHOUT COMPLICATION: ICD-10-CM

## 2023-11-27 RX ORDER — FLUTICASONE PROPIONATE AND SALMETEROL 250; 50 UG/1; UG/1
1 POWDER RESPIRATORY (INHALATION) 2 TIMES DAILY
Qty: 180 BLISTER | Refills: 2 | Status: SHIPPED | OUTPATIENT
Start: 2023-11-27

## 2023-11-27 RX ORDER — FLUTICASONE PROPIONATE AND SALMETEROL 50; 250 UG/1; UG/1
POWDER RESPIRATORY (INHALATION)
Qty: 30 BLISTER | Refills: 2 | Status: SHIPPED | OUTPATIENT
Start: 2023-11-27 | End: 2023-11-27 | Stop reason: SDUPTHER

## 2023-11-27 NOTE — TELEPHONE ENCOUNTER
Patients GI provider:  Dr. Db Sharif    Number to return call: [phone]    Reason for call: Pt calling to sched EGD w/EUS per Dr. Fransisca Esparza recommendation on last EUS. Please call pt to help sched.     Scheduled procedure/appointment date if applicable: Apt 90/48/4358

## 2023-11-28 ENCOUNTER — PREP FOR PROCEDURE (OUTPATIENT)
Dept: GASTROENTEROLOGY | Facility: CLINIC | Age: 63
End: 2023-11-28

## 2023-11-28 DIAGNOSIS — Z90.3 S/P TOTAL GASTRECTOMY AND ROUX-EN-Y ESOPHAGOJEJUNAL ANASTOMOSIS: Primary | ICD-10-CM

## 2023-11-28 DIAGNOSIS — Z98.0 S/P TOTAL GASTRECTOMY AND ROUX-EN-Y ESOPHAGOJEJUNAL ANASTOMOSIS: Primary | ICD-10-CM

## 2023-11-29 ENCOUNTER — HOSPITAL ENCOUNTER (EMERGENCY)
Facility: HOSPITAL | Age: 63
Discharge: HOME | End: 2023-11-29
Attending: EMERGENCY MEDICINE
Payer: COMMERCIAL

## 2023-11-29 ENCOUNTER — APPOINTMENT (OUTPATIENT)
Dept: CT IMAGING | Facility: HOSPITAL | Age: 63
End: 2023-11-29
Payer: COMMERCIAL

## 2023-11-29 VITALS
RESPIRATION RATE: 16 BRPM | OXYGEN SATURATION: 92 % | HEART RATE: 93 BPM | SYSTOLIC BLOOD PRESSURE: 166 MMHG | DIASTOLIC BLOOD PRESSURE: 88 MMHG | TEMPERATURE: 97.2 F

## 2023-11-29 DIAGNOSIS — Z98.84 HISTORY OF GASTRIC BYPASS: ICD-10-CM

## 2023-11-29 DIAGNOSIS — S16.1XXA STRAIN OF NECK MUSCLE, INITIAL ENCOUNTER: Primary | ICD-10-CM

## 2023-11-29 DIAGNOSIS — M54.2 NECK PAIN: ICD-10-CM

## 2023-11-29 DIAGNOSIS — Z87.898 HISTORY OF ULCER DISEASE: ICD-10-CM

## 2023-11-29 PROCEDURE — G1004 CDSM NDSC: HCPCS

## 2023-11-29 PROCEDURE — 99283 EMERGENCY DEPT VISIT LOW MDM: CPT

## 2023-11-29 PROCEDURE — 99284 EMERGENCY DEPT VISIT MOD MDM: CPT | Performed by: PHYSICIAN ASSISTANT

## 2023-11-29 PROCEDURE — 96372 THER/PROPH/DIAG INJ SC/IM: CPT

## 2023-11-29 PROCEDURE — 72125 CT NECK SPINE W/O DYE: CPT

## 2023-11-29 RX ORDER — SUCRALFATE 1 G/1
1 TABLET ORAL 4 TIMES DAILY
Qty: 20 TABLET | Refills: 0 | Status: SHIPPED | OUTPATIENT
Start: 2023-11-29 | End: 2023-11-29 | Stop reason: SDUPTHER

## 2023-11-29 RX ORDER — KETOROLAC TROMETHAMINE 10 MG/1
10 TABLET, FILM COATED ORAL EVERY 6 HOURS PRN
Qty: 8 TABLET | Refills: 0 | Status: SHIPPED | OUTPATIENT
Start: 2023-11-29 | End: 2023-12-01

## 2023-11-29 RX ORDER — KETOROLAC TROMETHAMINE 10 MG/1
10 TABLET, FILM COATED ORAL EVERY 6 HOURS PRN
Qty: 8 TABLET | Refills: 0 | Status: SHIPPED | OUTPATIENT
Start: 2023-11-29 | End: 2023-11-29 | Stop reason: SDUPTHER

## 2023-11-29 RX ORDER — METHOCARBAMOL 500 MG/1
500 TABLET, FILM COATED ORAL 3 TIMES DAILY
Qty: 20 TABLET | Refills: 0 | Status: SHIPPED | OUTPATIENT
Start: 2023-11-29 | End: 2023-12-13

## 2023-11-29 RX ORDER — FAMOTIDINE 20 MG/1
20 TABLET, FILM COATED ORAL 2 TIMES DAILY
Qty: 30 TABLET | Refills: 0 | Status: SHIPPED | OUTPATIENT
Start: 2023-11-29

## 2023-11-29 RX ORDER — KETOROLAC TROMETHAMINE 30 MG/ML
30 INJECTION, SOLUTION INTRAMUSCULAR; INTRAVENOUS ONCE
Status: COMPLETED | OUTPATIENT
Start: 2023-11-29 | End: 2023-11-29

## 2023-11-29 RX ORDER — SUCRALFATE 1 G/1
1 TABLET ORAL 4 TIMES DAILY
Qty: 20 TABLET | Refills: 0 | Status: SHIPPED | OUTPATIENT
Start: 2023-11-29 | End: 2023-12-04

## 2023-11-29 RX ORDER — FAMOTIDINE 20 MG/1
20 TABLET, FILM COATED ORAL 2 TIMES DAILY
Qty: 30 TABLET | Refills: 0 | Status: SHIPPED | OUTPATIENT
Start: 2023-11-29 | End: 2023-11-29 | Stop reason: SDUPTHER

## 2023-11-29 RX ORDER — METHOCARBAMOL 500 MG/1
500 TABLET, FILM COATED ORAL 3 TIMES DAILY
Qty: 20 TABLET | Refills: 0 | Status: SHIPPED | OUTPATIENT
Start: 2023-11-29 | End: 2023-11-29 | Stop reason: SDUPTHER

## 2023-11-29 RX ADMIN — KETOROLAC TROMETHAMINE 30 MG: 30 INJECTION, SOLUTION INTRAMUSCULAR; INTRAVENOUS at 14:45

## 2023-11-29 NOTE — ED PROVIDER NOTES
History  Chief Complaint   Patient presents with    Neck Pain     Neck pain since , hx of neck injury. Had sugery last week for mass in stomach so I think they moved me weird to aggrovate it. Pt has soft collar on mellissae that son bought andhas been wearing since Monday pt has pain pump that gives her morphine, also took tramadol this Am and hydyrocodon at 930am     HPI    Coralyn Doom 61 y.o. female with GERD, DM2, Hx remote HAV, chronic pain w/ pain pump PMHX presents to the ED for NECK PAIN. History provided by patient. She states pain has been ongoing since after her recent encdoscopy on  for GERD. She states she has had difficulty ranging her neck. No fevers or HA. No trauma noted. No visual changes, sick contacts. No skin rashes. No neurologic paresthesia or weakness     DDX: Muscle strain / osteophyte disruption / Cervcal DJD.     Past Medical History:   Diagnosis Date    PABLO (acute kidney injury) ([address]) 2021    Allergic     Anemia     Anxiety     Arthritis     Asthma     Breast lump     Resolved: 2017     Chronic pain     back    Diabetes mellitus ([address])     Diverticulitis of colon     GERD (gastroesophageal reflux disease)     Hepatitis     History of stomach ulcers     HL (hearing loss)     Estimated    Hyperlipidemia     Hypertension     Hypotension 2021    Infectious viral hepatitis     Hepatitiss A    Memory loss     Estimated    Stomach problems         Past Surgical History:   Procedure Laterality Date    APPENDECTOMY      BACK SURGERY      CATARACT EXTRACTION, BILATERAL       SECTION      CHOLECYSTECTOMY      GASTRIC BYPASS      GASTRIC BYPASS      INFUSION PUMP IMPLANTATION      TX NDSC WRST SURG W/RLS TRANSVRS CARPL LIGM Right 2022    Procedure: Right endoscopic carpal tunnel release;  Surgeon: Adan Bhakta MD;  Location:  MAIN OR;  Service: [address] [place] [address] Road IMPLANT   Prior to Admission Medications   Prescriptions Last Dose Informant Patient Reported? Taking? Accu-Chek FastClix Lancets MISC  Self No No   Sig: Use to test blood glucose levels twice daily for diabetes E11.9   B-D UF III MINI PEN NEEDLES 31G X 5 MM MISC  Self No No   Sig: USE ONCE DAILY FOR BASAL   INSULIN INJECTION   Cyanocobalamin (B-12 IJ)  Self Yes No   Sig: Inject as directed every 30 (thirty) days   Ferrous Fumarate 63 (20 Fe) MG TABS  Self Yes No   Sig: Take 2 tablets by mouth daily Take two tablets once daily. Fluticasone-Salmeterol (Advair Diskus) 250-50 mcg/dose inhaler   No No   Sig: Inhale 1 puff 2 (two) times a day Rinse mouth after use.    HYDROcodone-acetaminophen (NORCO)  mg per tablet  Self Yes No   Insulin Glargine Solostar (Basaglar KwikPen) 100 UNIT/ML SOPN  Self No No   Sig: Inject 0.08 mL (8 Units total) under the skin daily at bedtime   Januvia 100 MG tablet   No No   Sig: TAKE 1 TABLET EVERY MORNING   LYRICA 200 MG capsule  Self Yes No   Sig: Take 200 mg by mouth 3 (three) times a day    Morphine Sulfate (MORPHINE 0.05 MG/ML SYRINGE, NICU/PICU,, CMPD ORDER,)  Self Yes No   VITAMIN D PO  Self Yes No   albuterol (2.5 mg/3 mL) 0.083 % nebulizer solution  Self No No   Sig: Take 3 mL (2.5 mg total) by nebulization every 6 (six) hours as needed for wheezing or shortness of breath   albuterol (PROVENTIL HFA,VENTOLIN HFA) 90 mcg/act inhaler   No No   Sig: USE 2 INHALATIONS ORALLY   EVERY 6 HOURS AS NEEDED FORWHEEZING   amLODIPine (NORVASC) 5 mg tablet  Self No No   Sig: TAKE 1 TABLET TWICE A DAY   aspirin (ECOTRIN LOW STRENGTH) 81 mg EC tablet  Self Yes No   Sig: Take 81 mg by mouth daily   atorvastatin (LIPITOR) 40 mg tablet  Self No No   Sig: TAKE 1 TABLET DAILY   glucose blood (Accu-Chek Guide) test strip  Self No No   Sig: Test   lidocaine (XYLOCAINE) 5 % ointment  Self Yes No   Sig: apply topically to affected area three times a day   metFORMIN (GLUCOPHAGE) 1000 MG tablet Self No No   Sig: TAKE 1 TABLET TWICE A DAY   mometasone (NASONEX) 50 mcg/act nasal spray  Self No No   Sig: USE 2 SPRAYS IN EACH       NOSTRIL DAILY   montelukast (SINGULAIR) 10 mg tablet  Self No No   Sig: TAKE 1 TABLET AT BEDTIME   omeprazole (PriLOSEC) 40 MG capsule  Self No No   Sig: TAKE 1 CAPSULE EVERY       MORNING   ondansetron (ZOFRAN) 4 mg tablet   No No   Sig: Take 1 tablet (4 mg total) by mouth every 8 (eight) hours as needed for nausea or vomiting   traMADol HCl ER, Biphasic, 300 MG 24 hr tablet  Self Yes No   Sig: Take 300 mg by mouth daily   zolpidem (AMBIEN) 5 mg tablet   No No   Sig: Take 1 tablet (5 mg total) by mouth daily at bedtime as needed for sleep      Facility-Administered Medications: None       Past Medical History:   Diagnosis Date    PABLO (acute kidney injury) ([address]) 2021    Allergic     Anemia     Anxiety     Arthritis     Asthma     Breast lump     Resolved: 2017     Chronic pain     back    Diabetes mellitus ([facility])     Diverticulitis of colon     GERD (gastroesophageal reflux disease)     Hepatitis     History of stomach ulcers     HL (hearing loss)     Estimated    Hyperlipidemia     Hypertension     Hypotension 2021    Infectious viral hepatitis     Hepatitiss A    Memory loss     Estimated    Stomach problems        Past Surgical History:   Procedure Laterality Date    APPENDECTOMY      BACK SURGERY      CATARACT EXTRACTION, BILATERAL       SECTION      CHOLECYSTECTOMY      GASTRIC BYPASS      GASTRIC BYPASS      INFUSION PUMP IMPLANTATION      [place] NDSC WRST SURG W/RLS TRANSVRS CARPL LIGM Right 2022    Procedure: Right endoscopic carpal tunnel release;  Surgeon: Canary Goodpasture, MD;  Location:  MAIN OR;  Service: Orthopedics    SHOULDER SURGERY      SPINAL CORD STIMULATOR IMPLANT         Family History   Problem Relation Age of Onset    Diabetes Mother         Mellitus    Hyperlipidemia Mother     Hypertension Mother     Colon cancer Mother 66    Pancreatic cancer Mother 80    Melanoma Mother 68    Cancer Father 66        Bladder ca    Alcohol abuse Father     Lung cancer Father 66    Prostate cancer Father 66    No Known Problems Daughter     No Known Problems Maternal Grandmother     No Known Problems Maternal Grandfather     Stomach cancer Paternal Grandmother         63's    Lung cancer Paternal Grandfather 68    Diabetes Brother         Mellitus    Hyperlipidemia Brother     Hypertension Brother     Diabetes Brother     No Known Problems Brother     No Known Problems Son     No Known Problems Son     Breast cancer Maternal Aunt         Unknown age    No Known Problems Paternal Aunt     No Known Problems Paternal Aunt     No Known Problems Paternal Aunt     No Known Problems Paternal Aunt     Arthritis Family     Mental illness Neg Hx      I have reviewed and agree with the history as documented. E-Cigarette/Vaping    E-Cigarette Use Never User      E-Cigarette/Vaping Substances    Nicotine No     THC No     CBD No     Flavoring No     Other No     Unknown No      Social History     Tobacco Use    Smoking status: Former     Packs/day: 1.00     Years: 34.00     Total pack years: 34.00     Types: Cigarettes     Start date: 1973     Quit date: 2007     Years since quittin.9    Smokeless tobacco: Never    Tobacco comments:     15yrs ago   Vaping Use    Vaping Use: Never used   Substance Use Topics    Alcohol use: No    Drug use: No       Review of Systems   Constitutional:  Negative for activity change, appetite change, chills, fatigue and fever. HENT:  Negative for postnasal drip, rhinorrhea, sinus pressure and sinus pain. Eyes:  Negative for visual disturbance. Respiratory:  Negative for apnea, cough, chest tightness, shortness of breath, wheezing and stridor. Cardiovascular:  Negative for chest pain and leg swelling. Gastrointestinal:  Negative for diarrhea, nausea and vomiting.    Endocrine: Negative for cold intolerance and heat intolerance. Musculoskeletal:  Positive for neck pain. Negative for arthralgias, back pain, joint swelling and myalgias. Skin:  Negative for color change. Neurological:  Negative for syncope and light-headedness. Hematological:  Negative for adenopathy. Psychiatric/Behavioral:  Negative for confusion and sleep disturbance. Physical Exam  Physical Exam  Vitals and nursing note reviewed. Constitutional:       General: She is not in acute distress. Appearance: She is well-developed. HENT:      Head: Normocephalic and atraumatic. Eyes:      General: Gaze aligned appropriately. Extraocular Movements: Extraocular movements intact. Conjunctiva/sclera: Conjunctivae normal.      Pupils: Pupils are equal, round, and reactive to light. Visual Fields: Right eye visual fields normal and left eye visual fields normal.   Cardiovascular:      Rate and Rhythm: Normal rate and regular rhythm. Heart sounds: No murmur heard. Pulmonary:      Effort: Pulmonary effort is normal. No respiratory distress. Breath sounds: Normal breath sounds. Abdominal:      Palpations: Abdomen is soft. Tenderness: There is no abdominal tenderness. Musculoskeletal:      Cervical back: Neck supple. No rigidity or crepitus. Pain with movement and muscular tenderness (mild R lateral neck muscular tenderness to palp) present. No spinous process tenderness. Decreased range of motion (moderate limited ROM R and L lateral neck flexion. Maintained neck anterior and posterior flexion of neck. ). Skin:     General: Skin is warm and dry. Neurological:      General: No focal deficit present. Mental Status: She is alert and oriented to person, place, and time. GCS: GCS eye subscore is 4. GCS verbal subscore is 5. GCS motor subscore is 6. Sensory: Sensation is intact. Motor: Motor function is intact. Coordination: Coordination is intact.       Gait: Gait is intact. Deep Tendon Reflexes: Reflexes are normal and symmetric. Psychiatric:         Mood and Affect: Mood normal.         Vital Signs  ED Triage Vitals [11/29/23 1138]   Temperature Pulse Respirations Blood Pressure SpO2   (!) 97.2 °F (36.2 °C) 93 16 166/88 92 %      Temp Source Heart Rate Source Patient Position - Orthostatic VS BP Location FiO2 (%)   Temporal Monitor Sitting Left arm --      Pain Score       6           Vitals:    11/29/23 1138   BP: 166/88   Pulse: 93   Patient Position - Orthostatic VS: Sitting         Visual Acuity      ED Medications  Medications   ketorolac (TORADOL) injection 30 mg (30 mg Intramuscular Given by Other 11/29/23 1445)       Diagnostic Studies  Results Reviewed       None                   CT cervical spine without contrast   Final Result by [address] [place] [address],  (11/29 1252)      No cervical spine fracture or traumatic malalignment. Minimal degenerative changes are noted. Workstation performed: [id number]                    Procedures  Procedures         ED Course  ED Course as of 12/01/23 0942   Wed Nov 29, 2023   1432 Pt w/ clinical s/s c/w neck muscular strain. Planned to administer IM toradol. No clinical s/s of other etiology of either neck injury / fx or meningeal signs. Medical Decision Making  Reviewed CT neck w/o acute changes and some elements of chronic DJD. S/s c/w neck muscular strain. No emergent condition exists. Planned f/u w/ PCP for ongoing management and further f/u w/ pain management if warranted. Risk  Prescription drug management.              Disposition  Final diagnoses:   Strain of neck muscle, initial encounter   Neck pain   History of ulcer disease   History of gastric bypass     Time reflects when diagnosis was documented in both MDM as applicable and the Disposition within this note       Time User Action Codes Description Comment    11/29/2023  2:33 PM Anastacio Metzger PARI Add [O06. 1XXA] Strain of neck muscle, initial encounter     11/29/2023  2:33 PM Elfida Han Add [M54.2] Neck pain     11/29/2023  2:40 PM Elfida Han Add [Y73.578] History of ulcer disease     11/29/2023  2:41 PM Elfida Han Add [I77.28] History of gastric bypass           ED Disposition       ED Disposition   Discharge    Condition   Stable    Date/Time   Wed Nov 29, 2023  2:34 PM    [facility] discharge to home/self care.                    Follow-up Information       Follow up With Specialties Details Why [address], DO [facility] Call today for routine follow up. [id number] [address]  [phone]              Discharge Medication List as of 11/29/2023  2:43 PM        START taking these medications    Details   famotidine (PEPCID) 20 mg tablet Take 1 tablet (20 mg total) by mouth 2 (two) times a day, Starting Wed 11/29/2023, Normal      ketorolac (TORADOL) 10 mg tablet Take 1 tablet (10 mg total) by mouth every 6 (six) hours as needed for moderate pain for up to 2 days, Starting Wed 11/29/2023, Until Fri 12/1/2023 at 2359, Normal      methocarbamol (ROBAXIN) 500 mg tablet Take 1 tablet (500 mg total) by mouth 3 (three) times a day for 5 days, Starting Wed 11/29/2023, Until Mon 12/4/2023, Normal      sucralfate (CARAFATE) 1 g tablet Take 1 tablet (1 g total) by mouth 4 (four) times a day for 5 days, Starting Wed 11/29/2023, Until Mon 12/4/2023, Normal           CONTINUE these medications which have NOT CHANGED    Details   Fluticasone-Salmeterol (Advair Diskus) 250-50 mcg/dose inhaler Inhale 1 puff 2 (two) times a day Rinse mouth after use., Starting Mon 11/27/2023, Normal      Accu-Chek FastClix Lancets MISC Use to test blood glucose levels twice daily for diabetes E11.9, Normal      albuterol (2.5 mg/3 mL) 0.083 % nebulizer solution Take 3 mL (2.5 mg total) by nebulization every 6 (six) hours as needed for wheezing or shortness of breath, Starting Thu 9/16/2021, Normal      albuterol (PROVENTIL HFA,VENTOLIN HFA) 90 mcg/act inhaler USE 2 INHALATIONS ORALLY   EVERY 6 HOURS AS NEEDED FORWHEEZING, Normal      amLODIPine (NORVASC) 5 mg tablet TAKE 1 TABLET TWICE A DAY, Starting Sun 9/17/2023, Normal      aspirin (ECOTRIN LOW STRENGTH) 81 mg EC tablet Take 81 mg by mouth daily, Historical Med      atorvastatin (LIPITOR) 40 mg tablet TAKE 1 TABLET DAILY, Normal      B-D UF III MINI PEN NEEDLES 31G X 5 MM MISC USE ONCE DAILY FOR BASAL   INSULIN INJECTION, Normal      Cyanocobalamin (B-12 IJ) Inject as directed every 30 (thirty) days, Historical Med      Ferrous Fumarate 63 (20 Fe) MG TABS Take 2 tablets by mouth daily Take two tablets once daily. , Historical Med      glucose blood (Accu-Chek Guide) test strip Test, Normal      HYDROcodone-acetaminophen (NORCO)  mg per tablet Starting Sat 12/4/2021, Historical Med      Insulin Glargine Solostar (Basaglar KwikPen) 100 UNIT/ML SOPN Inject 0.08 mL (8 Units total) under the skin daily at bedtime, Starting Wed 3/15/2023, Normal      Januvia 100 MG tablet TAKE 1 TABLET EVERY MORNING, Starting Sun 11/26/2023, Normal      lidocaine (XYLOCAINE) 5 % ointment apply topically to affected area three times a day, Historical Med      LYRICA 200 MG capsule Take 200 mg by mouth 3 (three) times a day , Starting Wed 12/19/2018, Historical Med      metFORMIN (GLUCOPHAGE) 1000 MG tablet TAKE 1 TABLET TWICE A DAY, Normal      mometasone (NASONEX) 50 mcg/act nasal spray USE 2 SPRAYS IN EACH       NOSTRIL DAILY, Normal      montelukast (SINGULAIR) 10 mg tablet TAKE 1 TABLET AT BEDTIME, Normal      Morphine Sulfate (MORPHINE 0.05 MG/ML SYRINGE, NICU/PICU,, CMPD ORDER,) Starting Tue 3/1/2022, Historical Med      omeprazole (PriLOSEC) 40 MG capsule TAKE 1 CAPSULE EVERY       MORNING, Normal      ondansetron (ZOFRAN) 4 mg tablet Take 1 tablet (4 mg total) by mouth every 8 (eight) hours as needed for nausea or vomiting, Starting Mon 10/23/2023, Normal      traMADol HCl ER, Biphasic, 300 MG 24 hr tablet Take 300 mg by mouth daily, Starting Tue 11/23/2021, Historical Med      VITAMIN D PO Historical Med      zolpidem (AMBIEN) 5 mg tablet Take 1 tablet (5 mg total) by mouth daily at bedtime as needed for sleep, Starting Tue 11/21/2023, Normal             No discharge procedures on file.     PDMP Review         Value Time User    PDMP Reviewed  Yes 11/26/2023 11:49 PM Terrance Rashid, DO            ED Provider  Electronically Signed by             Angeline Barker PA-C  12/01/23 4451

## 2023-11-29 NOTE — DISCHARGE INSTRUCTIONS
Avoid use of any sedating pain medications or sedatives when operating any type of machinery or any vehicles while on these medications. Please see all prescription warnings and inserts regarding the use of analgesics or sedatives.     Call to schedule a follow up appointment post ED follow up to address non emergent follow up findings and or to schedule follow up exam.

## 2023-11-30 ENCOUNTER — NURSE TRIAGE (OUTPATIENT)
Age: 63
End: 2023-11-30

## 2023-11-30 NOTE — TELEPHONE ENCOUNTER
Pt. Calling in with concern about waiting to have 2nd half of endoscopic ultrasound procedure done on 1/11/23, pt. Had first half done by Dr. Belinda Rodriguez on 11/28/23, was told to schedule in 2-3 weeks to have 2nd procedure done, pt. Was told by central scheduling  they were unable to get her in sooner and is having her procedure on 1/11/23, pt. Is wanting to be done sooner than January because of concern for cancer, please advise     Reason for Disposition   Nursing judgment    Answer Assessment - Initial Assessment Questions  1. REASON FOR CALL or QUESTION:     Pt. Calling in wanting to schedule her endoscopic US sooner than 1/11/24, central scheduling was unable to get her a sooner appointment    Protocols used:  Information Only Call - No Triage-ADULT-[place]

## 2023-12-04 ENCOUNTER — OFFICE VISIT (OUTPATIENT)
Dept: OBGYN CLINIC | Facility: CLINIC | Age: 63
End: 2023-12-04
Payer: COMMERCIAL

## 2023-12-04 ENCOUNTER — TELEPHONE (OUTPATIENT)
Dept: GASTROENTEROLOGY | Facility: CLINIC | Age: 63
End: 2023-12-04

## 2023-12-04 VITALS
BODY MASS INDEX: 26.8 KG/M2 | HEIGHT: 64 IN | WEIGHT: 157 LBS | SYSTOLIC BLOOD PRESSURE: 143 MMHG | DIASTOLIC BLOOD PRESSURE: 78 MMHG

## 2023-12-04 DIAGNOSIS — Z98.890 S/P ENDOSCOPIC CARPAL TUNNEL RELEASE: Primary | ICD-10-CM

## 2023-12-04 PROCEDURE — 99213 OFFICE O/P EST LOW 20 MIN: CPT | Performed by: ORTHOPAEDIC SURGERY

## 2023-12-04 NOTE — PROGRESS NOTES
ASSESSMENT/PLAN:    Assessment:   S/p right endoscopic carpal tunnel release performed 12/1/2022 with resolution of symptoms  Left carpal tunnel syndrome    Plan:   Patient states that she has had significant improvement over time since her right endoscopic carpal tunnel release  She states she still has some numbness and tingling into the left hand but would like to hold off on any surgical intervention at this time  She was encouraged to continue with bracing at nighttime, B12 vitamins, did offer a cortisone injection however she declined at this time  She will follow-up with us as needed    Follow Up:  PRN    To Do Next Visit:  Reevaluation    General Discussions:  Carpal Tunnel Syndrome: The anatomy and physiology of carpal tunnel syndrome was discussed with the patient today. Increase pressure localized under the transverse carpal ligament can cause pain, numbness, tingling, or dysesthesias within the median nerve distribution as well as feelings of fatigue, clumsiness, or awkwardness. These symptoms typically occur at night and worse in the morning upon waking. Eventually, untreated carpal tunnel syndrome can result in weakness and permanent loss of muscle within the thenar compartment of the hand. Treatment options were discussed with the patient. Conservative treatment includes nocturnal resting splints to keep the nerve in a neutral position, ergonomic changes within the work or home environment, activity modification, and tendon gliding exercises. Steroid injections within the carpal canal can help a majority of patients, however this is often self-limited in a majority of patients.   Surgical intervention to divide the transverse carpal ligament typically results in a long-lasting relief of the patient's complaints, with the recurrence rate of less than 1%.         _____________________________________________________  CHIEF COMPLAINT:  Chief Complaint   Patient presents with    Right Wrist - Follow-up     S/P ECTR 22 4 month F/U 80% improvement     Left Wrist - Numbness         SUBJECTIVE:  Sowmya Decker is a 61 y.o. female who presents for follow-up regarding s/p right endoscopic carpal tunnel release performed 2022. She states that she had significant improvement in her numbness and tingling symptoms but still notes some decrease sensation which she states may be related to her diabetes. She states that her left hand has become bothersome with some numbness and tingling that occasionally wakes her from sleep at night. She states that this point in time she would like to avoid surgery at this time.      PAST MEDICAL HISTORY:  Past Medical History:   Diagnosis Date    PABLO (acute kidney injury) ([address]) 2021    Allergic     Anemia     Anxiety     Arthritis     Asthma     Breast lump     Resolved: 2017     Chronic pain     back    Diabetes mellitus ([address])     Diverticulitis of colon     GERD (gastroesophageal reflux disease)     Hepatitis     History of stomach ulcers     HL (hearing loss)     Estimated    Hyperlipidemia     Hypertension     Hypotension 2021    Infectious viral hepatitis     Hepatitiss A    Memory loss     Estimated    Stomach problems        PAST SURGICAL HISTORY:  Past Surgical History:   Procedure Laterality Date    APPENDECTOMY      BACK SURGERY      CATARACT EXTRACTION, BILATERAL       SECTION      CHOLECYSTECTOMY      GASTRIC BYPASS      GASTRIC BYPASS  2010    INFUSION PUMP IMPLANTATION      [place] [address] WRST SURG W/RLS TRANSVRS CARPL LIGM Right 2022    Procedure: Right endoscopic carpal tunnel release;  Surgeon: Bran Wakefield MD;  Location:  MAIN OR;  Service: Orthopedics    SHOULDER SURGERY      SPINAL CORD STIMULATOR IMPLANT         FAMILY HISTORY:  Family History   Problem Relation Age of Onset    Diabetes Mother         Mellitus    Hyperlipidemia Mother     Hypertension Mother     Colon cancer Mother 66    Pancreatic cancer Mother 80    Melanoma Mother 68    Cancer Father 66        Bladder ca    Alcohol abuse Father     Lung cancer Father 66    Prostate cancer Father 66    No Known Problems Daughter     No Known Problems Maternal Grandmother     No Known Problems Maternal Grandfather     Stomach cancer Paternal Grandmother         63's    Lung cancer Paternal Grandfather 68    Diabetes Brother         Mellitus    Hyperlipidemia Brother     Hypertension Brother     Diabetes Brother     No Known Problems Brother     No Known Problems Son     No Known Problems Son     Breast cancer Maternal Aunt         Unknown age    No Known Problems Paternal Aunt     No Known Problems Paternal Aunt     No Known Problems Paternal Aunt     No Known Problems Paternal Aunt     Arthritis Family     Mental illness Neg Hx        SOCIAL HISTORY:  Social History     Tobacco Use    Smoking status: Former     Packs/day: 1.00     Years: 34.00     Total pack years: 34.00     Types: Cigarettes     Start date: 1973     Quit date: 2007     Years since quittin.9    Smokeless tobacco: Never    Tobacco comments:     15yrs ago   Vaping Use    Vaping Use: Never used   Substance Use Topics    Alcohol use: No    Drug use: No       MEDICATIONS:    Current Outpatient Medications:     Fluticasone-Salmeterol (Advair Diskus) 250-50 mcg/dose inhaler, Inhale 1 puff 2 (two) times a day Rinse mouth after use., Disp: 180 blister, Rfl: 2    Accu-Chek FastClix Lancets MISC, Use to test blood glucose levels twice daily for diabetes E11.9, Disp: 204 each, Rfl: 3    albuterol (2.5 mg/3 mL) 0.083 % nebulizer solution, Take 3 mL (2.5 mg total) by nebulization every 6 (six) hours as needed for wheezing or shortness of breath, Disp: 300 mL, Rfl: 1    albuterol (PROVENTIL HFA,VENTOLIN HFA) 90 mcg/act inhaler, USE 2 INHALATIONS ORALLY   EVERY 6 HOURS AS NEEDED FORWHEEZING, Disp: 25.5 g, Rfl: 1    amLODIPine (NORVASC) 5 mg tablet, TAKE 1 TABLET TWICE A DAY, Disp: 180 tablet, Rfl: 0    aspirin (ECOTRIN LOW STRENGTH) 81 mg EC tablet, Take 81 mg by mouth daily, Disp: , Rfl:     atorvastatin (LIPITOR) 40 mg tablet, TAKE 1 TABLET DAILY, Disp: 90 tablet, Rfl: 0    B-D UF III MINI PEN NEEDLES 31G X 5 MM MISC, USE ONCE DAILY FOR BASAL   INSULIN INJECTION, Disp: 90 each, Rfl: 1    Cyanocobalamin (B-12 IJ), Inject as directed every 30 (thirty) days, Disp: , Rfl:     famotidine (PEPCID) 20 mg tablet, Take 1 tablet (20 mg total) by mouth 2 (two) times a day, Disp: 30 tablet, Rfl: 0    Ferrous Fumarate 63 (20 Fe) MG TABS, Take 2 tablets by mouth daily Take two tablets once daily. , Disp: , Rfl:     glucose blood (Accu-Chek Guide) test strip, Test, Disp: 100 strip, Rfl: 3    HYDROcodone-acetaminophen (NORCO)  mg per tablet, , Disp: , Rfl:     Insulin Glargine Solostar (Basaglar KwikPen) 100 UNIT/ML SOPN, Inject 0.08 mL (8 Units total) under the skin daily at bedtime, Disp: 15 mL, Rfl: 0    Januvia 100 MG tablet, TAKE 1 TABLET EVERY MORNING, Disp: 90 tablet, Rfl: 3    ketorolac (TORADOL) 10 mg tablet, Take 1 tablet (10 mg total) by mouth every 6 (six) hours as needed for moderate pain for up to 2 days, Disp: 8 tablet, Rfl: 0    lidocaine (XYLOCAINE) 5 % ointment, apply topically to affected area three times a day, Disp: , Rfl:     LYRICA 200 MG capsule, Take 200 mg by mouth 3 (three) times a day , Disp: , Rfl: 0    metFORMIN (GLUCOPHAGE) 1000 MG tablet, TAKE 1 TABLET TWICE A DAY, Disp: 180 tablet, Rfl: 0    methocarbamol (ROBAXIN) 500 mg tablet, Take 1 tablet (500 mg total) by mouth 3 (three) times a day for 5 days, Disp: 20 tablet, Rfl: 0    mometasone (NASONEX) 50 mcg/act nasal spray, USE 2 SPRAYS IN EACH       NOSTRIL DAILY, Disp: 17 g, Rfl: 1    montelukast (SINGULAIR) 10 mg tablet, TAKE 1 TABLET AT BEDTIME, Disp: 90 tablet, Rfl: 1    Morphine Sulfate (MORPHINE 0.05 MG/ML SYRINGE, NICU/PICU,, CMPD ORDER,), , Disp: , Rfl:     omeprazole (PriLOSEC) 40 MG capsule, TAKE 1 CAPSULE EVERY MORNING, Disp: 90 capsule, Rfl: 3    ondansetron (ZOFRAN) 4 mg tablet, Take 1 tablet (4 mg total) by mouth every 8 (eight) hours as needed for nausea or vomiting, Disp: 30 tablet, Rfl: 1    sucralfate (CARAFATE) 1 g tablet, Take 1 tablet (1 g total) by mouth 4 (four) times a day for 5 days, Disp: 20 tablet, Rfl: 0    traMADol HCl ER, Biphasic, 300 MG 24 hr tablet, Take 300 mg by mouth daily, Disp: , Rfl:     VITAMIN D PO, , Disp: , Rfl:     zolpidem (AMBIEN) 5 mg tablet, Take 1 tablet (5 mg total) by mouth daily at bedtime as needed for sleep, Disp: 90 tablet, Rfl: 0    ALLERGIES:  Allergies   Allergen Reactions    Nsaids Other (See Comments)     Cannot take NSAIDS secondary to having gastric bypass    Percocet [Oxycodone-Acetaminophen] GI Intolerance       REVIEW OF SYSTEMS:  Pertinent items are noted in HPI. A comprehensive review of systems was negative. LABS:  HgA1c:   Lab Results   Component Value Date    HGBA1C 5.1 09/07/2023     BMP:   Lab Results   Component Value Date    CALCIUM 9.5 09/07/2023     11/04/2017    K 4.8 09/07/2023    CO2 28 09/07/2023     09/07/2023    BUN 30 (H) 09/07/2023    CREATININE 0.83 09/07/2023       _____________________________________________________  PHYSICAL EXAMINATION:  Vital signs: /78   Ht 5' 4" (1.626 m)   Wt 71.2 kg (157 lb)   LMP  (LMP Unknown)   BMI 26.95 kg/m²   General: well developed and well nourished, alert, oriented times 3, and appears comfortable  Psychiatric: Normal  HEENT: Trachea Midline, No torticollis  Cardiovascular: No discernable arrhythmia  Pulmonary: No wheezing or stridor  Abdomen: No rebound or guarding  Extremities: No peripheral edema  Skin: No masses, erythema, lacerations, fluctation, ulcerations  Neurovascular: Sensation Intact to the Median, Ulnar, Radial Nerve, Motor Intact to the Median, Ulnar, Radial Nerve, and Pulses Intact    MUSCULOSKELETAL EXAMINATION:  Right Carpal Tunnel Exam:    Negative thenar atrophy. Negative phalen's test. Negative carpal tunnel compression. Negative tinels over median nerve at the wrist.  Opposition strength 5/5. Abduction strength 5/5.      2 point discrimination is 4 mm throughout. Left Carpal Tunnel Exam:    Positive thenar atrophy. Negative phalen's test. Positive carpal tunnel compression.  Positive tinels over median nerve at the wrist.  APB 4/5, AIN 5/5, intrinsics 5/5        _____________________________________________________  STUDIES REVIEWED:  No Studies to review      PROCEDURES PERFORMED:  Procedures  No Procedures performed today  '

## 2023-12-04 NOTE — TELEPHONE ENCOUNTER
Scheduled date of EUS(as of today):12/13/2023  Physician performing EUS: Lacey Silva  Location of EUS: [place] reviewed with patient by: Aly Jasso and sent thru [facility] and in the mail  Clearances:  NONE    Patient is DIABETIC DM instructions sent

## 2023-12-04 NOTE — TELEPHONE ENCOUNTER
Patients appointment has been moved up to 12/13/2023.   Called and confirmed with patient and sent instructions through [facility]  Scheduled date of EUS(as of today):12/13/*2023  Physician performing EUS: Denise Bennett  Location of EUS: [facility]  Instructions reviewed with patient by: Dario Ewing and sent in [facility] and in the mail  Clearances:  NONE    Patient is DIABETIC and was sent instructions

## 2023-12-07 ENCOUNTER — HOSPITAL ENCOUNTER (OUTPATIENT)
Dept: INFUSION CENTER | Facility: HOSPITAL | Age: 63
Discharge: HOME | End: 2023-12-07
Attending: INTERNAL MEDICINE
Payer: COMMERCIAL

## 2023-12-07 VITALS
OXYGEN SATURATION: 95 % | TEMPERATURE: 97.2 F | RESPIRATION RATE: 16 BRPM | DIASTOLIC BLOOD PRESSURE: 58 MMHG | SYSTOLIC BLOOD PRESSURE: 114 MMHG | HEART RATE: 101 BPM

## 2023-12-07 DIAGNOSIS — E53.8 B12 DEFICIENCY: Primary | ICD-10-CM

## 2023-12-07 DIAGNOSIS — J45.30 MILD PERSISTENT ASTHMA WITHOUT COMPLICATION: ICD-10-CM

## 2023-12-07 PROCEDURE — 96372 THER/PROPH/DIAG INJ SC/IM: CPT

## 2023-12-07 RX ORDER — CYANOCOBALAMIN 1000 UG/ML
1000 INJECTION, SOLUTION INTRAMUSCULAR; SUBCUTANEOUS ONCE
Status: COMPLETED | OUTPATIENT
Start: 2023-12-07 | End: 2023-12-07

## 2023-12-07 RX ORDER — CYANOCOBALAMIN 1000 UG/ML
1000 INJECTION, SOLUTION INTRAMUSCULAR; SUBCUTANEOUS ONCE
OUTPATIENT
Start: 2024-01-04

## 2023-12-07 RX ADMIN — CYANOCOBALAMIN 1000 MCG: 1000 INJECTION INTRAMUSCULAR; SUBCUTANEOUS at 11:16

## 2023-12-07 NOTE — PROGRESS NOTES
Patient received B12 injection without complication. Patient discharged in stable condition to home. Patient verified next appointment and declined AVS at this time.

## 2023-12-08 RX ORDER — MONTELUKAST SODIUM 10 MG/1
TABLET ORAL
Qty: 90 TABLET | Refills: 1 | Status: SHIPPED | OUTPATIENT
Start: 2023-12-08

## 2023-12-13 ENCOUNTER — ANESTHESIA EVENT (OUTPATIENT)
Dept: GASTROENTEROLOGY | Facility: HOSPITAL | Age: 63
End: 2023-12-13

## 2023-12-13 ENCOUNTER — ANESTHESIA (OUTPATIENT)
Dept: GASTROENTEROLOGY | Facility: HOSPITAL | Age: 63
End: 2023-12-13

## 2023-12-13 ENCOUNTER — HOSPITAL ENCOUNTER (OUTPATIENT)
Dept: GASTROENTEROLOGY | Facility: HOSPITAL | Age: 63
Setting detail: OUTPATIENT SURGERY
Discharge: HOME | End: 2023-12-13
Attending: INTERNAL MEDICINE
Payer: COMMERCIAL

## 2023-12-13 VITALS
HEIGHT: 64 IN | SYSTOLIC BLOOD PRESSURE: 116 MMHG | DIASTOLIC BLOOD PRESSURE: 61 MMHG | RESPIRATION RATE: 16 BRPM | WEIGHT: 153 LBS | BODY MASS INDEX: 26.12 KG/M2 | OXYGEN SATURATION: 93 % | TEMPERATURE: 97.1 F | HEART RATE: 81 BPM

## 2023-12-13 DIAGNOSIS — K31.89 DUODENAL MASS: Primary | ICD-10-CM

## 2023-12-13 DIAGNOSIS — Z90.3 S/P TOTAL GASTRECTOMY AND ROUX-EN-Y ESOPHAGOJEJUNAL ANASTOMOSIS: ICD-10-CM

## 2023-12-13 DIAGNOSIS — Z98.0 S/P TOTAL GASTRECTOMY AND ROUX-EN-Y ESOPHAGOJEJUNAL ANASTOMOSIS: ICD-10-CM

## 2023-12-13 PROBLEM — R11.2 PONV (POSTOPERATIVE NAUSEA AND VOMITING): Status: ACTIVE | Noted: 2023-12-13

## 2023-12-13 PROBLEM — Z98.890 PONV (POSTOPERATIVE NAUSEA AND VOMITING): Status: ACTIVE | Noted: 2023-12-13

## 2023-12-13 LAB — GLUCOSE SERPL-MCNC: 125 MG/DL (ref 65–140)

## 2023-12-13 PROCEDURE — 88305 TISSUE EXAM BY PATHOLOGIST: CPT | Performed by: PATHOLOGY

## 2023-12-13 PROCEDURE — 43239 EGD BIOPSY SINGLE/MULTIPLE: CPT | Performed by: INTERNAL MEDICINE

## 2023-12-13 PROCEDURE — 82948 REAGENT STRIP/BLOOD GLUCOSE: CPT

## 2023-12-13 RX ORDER — SODIUM CHLORIDE 9 MG/ML
INJECTION, SOLUTION INTRAVENOUS CONTINUOUS PRN
Status: DISCONTINUED | OUTPATIENT
Start: 2023-12-13 | End: 2023-12-13

## 2023-12-13 RX ORDER — PROPOFOL 10 MG/ML
INJECTION, EMULSION INTRAVENOUS AS NEEDED
Status: DISCONTINUED | OUTPATIENT
Start: 2023-12-13 | End: 2023-12-13

## 2023-12-13 RX ORDER — SUCCINYLCHOLINE/SOD CL,ISO/PF 100 MG/5ML
SYRINGE (ML) INTRAVENOUS AS NEEDED
Status: DISCONTINUED | OUTPATIENT
Start: 2023-12-13 | End: 2023-12-13

## 2023-12-13 RX ORDER — PROPOFOL 10 MG/ML
INJECTION, EMULSION INTRAVENOUS CONTINUOUS PRN
Status: DISCONTINUED | OUTPATIENT
Start: 2023-12-13 | End: 2023-12-13

## 2023-12-13 RX ORDER — LIDOCAINE HYDROCHLORIDE 10 MG/ML
INJECTION, SOLUTION EPIDURAL; INFILTRATION; INTRACAUDAL; PERINEURAL AS NEEDED
Status: DISCONTINUED | OUTPATIENT
Start: 2023-12-13 | End: 2023-12-13

## 2023-12-13 RX ORDER — ONDANSETRON 2 MG/ML
4 INJECTION INTRAMUSCULAR; INTRAVENOUS ONCE AS NEEDED
Status: DISCONTINUED | OUTPATIENT
Start: 2023-12-13 | End: 2023-12-17 | Stop reason: HOSPADM

## 2023-12-13 RX ORDER — FENTANYL CITRATE 50 UG/ML
INJECTION, SOLUTION INTRAMUSCULAR; INTRAVENOUS AS NEEDED
Status: DISCONTINUED | OUTPATIENT
Start: 2023-12-13 | End: 2023-12-13

## 2023-12-13 RX ORDER — ONDANSETRON 2 MG/ML
INJECTION INTRAMUSCULAR; INTRAVENOUS AS NEEDED
Status: DISCONTINUED | OUTPATIENT
Start: 2023-12-13 | End: 2023-12-13

## 2023-12-13 RX ORDER — METOCLOPRAMIDE HYDROCHLORIDE 5 MG/ML
10 INJECTION INTRAMUSCULAR; INTRAVENOUS ONCE AS NEEDED
Status: COMPLETED | OUTPATIENT
Start: 2023-12-13 | End: 2023-12-13

## 2023-12-13 RX ORDER — DEXAMETHASONE SODIUM PHOSPHATE 10 MG/ML
INJECTION, SOLUTION INTRAMUSCULAR; INTRAVENOUS AS NEEDED
Status: DISCONTINUED | OUTPATIENT
Start: 2023-12-13 | End: 2023-12-13

## 2023-12-13 RX ADMIN — FENTANYL CITRATE 50 MCG: 50 INJECTION INTRAMUSCULAR; INTRAVENOUS at 10:33

## 2023-12-13 RX ADMIN — FENTANYL CITRATE 50 MCG: 50 INJECTION INTRAMUSCULAR; INTRAVENOUS at 10:26

## 2023-12-13 RX ADMIN — DEXAMETHASONE SODIUM PHOSPHATE 5 MG: 10 INJECTION, SOLUTION INTRAMUSCULAR; INTRAVENOUS at 11:38

## 2023-12-13 RX ADMIN — METOCLOPRAMIDE 10 MG: 5 INJECTION, SOLUTION INTRAMUSCULAR; INTRAVENOUS at 12:04

## 2023-12-13 RX ADMIN — SODIUM CHLORIDE: 0.9 INJECTION, SOLUTION INTRAVENOUS at 10:46

## 2023-12-13 RX ADMIN — LIDOCAINE HYDROCHLORIDE 50 MG: 10 INJECTION, SOLUTION EPIDURAL; INFILTRATION; INTRACAUDAL; PERINEURAL at 10:29

## 2023-12-13 RX ADMIN — PROPOFOL 140 MG: 10 INJECTION, EMULSION INTRAVENOUS at 10:29

## 2023-12-13 RX ADMIN — SODIUM CHLORIDE: 0.9 INJECTION, SOLUTION INTRAVENOUS at 10:20

## 2023-12-13 RX ADMIN — ONDANSETRON 4 MG: 2 INJECTION INTRAMUSCULAR; INTRAVENOUS at 10:33

## 2023-12-13 RX ADMIN — PROPOFOL 140 MCG/KG/MIN: 10 INJECTION, EMULSION INTRAVENOUS at 10:31

## 2023-12-13 RX ADMIN — Medication 100 MG: at 10:29

## 2023-12-13 NOTE — ANESTHESIA PREPROCEDURE EVALUATION
Procedure:  ENDOSCOPIC ULTRASOUND (UPPER)    Relevant Problems   ANESTHESIA   (+) PONV (postoperative nausea and vomiting)      CARDIO   (+) Benign essential hypertension   (+) Chronic thoracic spine pain   (+) Hyperlipidemia associated with type 2 diabetes mellitus       ENDO   (+) Type 2 diabetes mellitus with diabetic neuropathy, with long-term current use of insulin (HCC)      GI/HEPATIC   (+) GERD without esophagitis      HEMATOLOGY   (+) Microcytic anemia      MUSCULOSKELETAL   (+) Chronic bilateral back pain (Intrathecal morphine pump  Spinal cord stimulator)   (+) Chronic thoracic spine pain   (+) Degeneration of lumbar intervertebral disc   (+) Lumbar spondylosis   (+) Thoracic spondylosis without myelopathy      NEURO/PSYCH   (+) Chronic bilateral back pain (Intrathecal morphine pump  Spinal cord stimulator)   (+) Chronic cervical pain      PULMONARY   (+) Mild persistent asthma without complication      Other   (+) 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      Anesthesia Plan  ASA Score- 3     Anesthesia Type- general with ASA Monitors. Additional Monitors:     Airway Plan: ETT. Comment: TIVA. Plan Factors-Exercise tolerance (METS): <4 METS. Chart reviewed. EKG reviewed. Existing labs reviewed. Patient summary reviewed. Patient is not a current smoker. Induction- intravenous. Postoperative Plan- . Planned trial extubation    Informed Consent- Anesthetic plan and risks discussed with patient. I personally reviewed this patient with the CRNA. Discussed and agreed on the Anesthesia Plan with the CRNA. Sherrell Pereira

## 2023-12-13 NOTE — ANESTHESIA POSTPROCEDURE EVALUATION
Post-Op Assessment Note    CV Status:  Stable  Pain Score: 0    Pain management: adequate       Mental Status:  Sleepy   Hydration Status:  Stable   PONV Controlled:  Controlled   Airway Patency:  Patent     Post Op Vitals Reviewed: Yes      Staff: Anesthesiologist, CRNA               /62 (12/13/23 1157)    Temp (!) 97.1 °F (36.2 °C) (12/13/23 1152)    Pulse 86 (12/13/23 1157)   Resp 16 (12/13/23 1157)    SpO2 95 % (12/13/23 1157)

## 2023-12-14 ENCOUNTER — DOCUMENTATION (OUTPATIENT)
Dept: HEMATOLOGY ONCOLOGY | Facility: CLINIC | Age: 63
End: 2023-12-14

## 2023-12-14 NOTE — PROGRESS NOTES
Intake received/ Chart reviewed for services completed outside of [facility]    Pathology completed: 12/13/2023 in process    Imaging completed: 10/7/2023 CT abd/pelvis, 9/29/2023 CT chest    All records needed are in patients chart. No records retrieval needed at this time.

## 2023-12-14 NOTE — PROGRESS NOTES
Intake received and chart reviewed for pathway workup evaluation. Patient being seen by medical oncology for microcytic anemia. Referring provider- Dr. Marcelina Blake      EUS date-  12/13      Impression-  Previously placed Axios stent was in place. Upper endoscope was advanced through the stent into the remnant stomach. Food bolus the remnant stomach was removed using Sommers net. Difficult entrance into the duodenal bulb requiring several scope changes. The duodenal bulb appeared normal.  Second portion of the duodenum had large villous appearing mass likely arising from the ampulla. This was biopsied with jumbo biopsy forceps and cold snare. Tissue was sent for pathology      Pathology-  Duodenum - Pending      Labs-  11/2/23 - CMP and CBC      Imaging-  10/07/23 - CT ABDOMEN AND PELVIS WITH IV CONTRAST    1. Masslike soft tissue density filling the duodenal lumen measuring approximately 6.7 x 3.1 cm concerning for indeterminate duodenal mass or polypoid lesion. This does not appear to be pancreatic in origin. Diffuse pancreatic atrophy similar to prior   study 9/23/2017. Recommend further evaluation with contrast-enhanced MR abdomen with MRCP. 2. Dilation of the common bile duct measuring 1.2 cm and mild intrahepatic biliary ductal dilation similar to prior CT 9/23/2017.      9/29  - CT CHEST WITHOUT IV CONTRAST   IMPRESSION:     1. There are 2 new adjacent left upper lobe pulmonary nodules measuring 0.5 and 0.4 cm. Adjacent location favors inflammatory change. However, given that the findings are new and there is a past smoking history, recommend close reassessment with   noncontrast CT chest, in 3 to 6 months time  2. Stable mild right lower lobe bronchiectasis and scarring  3.  Coronary artery calcification and small hiatal hernia      Scheduled appointments-    12/21 - Dr. Andriy Aguero  12/26 - Dr. Shaylee Keating

## 2023-12-19 ENCOUNTER — TELEPHONE (OUTPATIENT)
Dept: HEMATOLOGY ONCOLOGY | Facility: CLINIC | Age: 63
End: 2023-12-19

## 2023-12-19 PROBLEM — K31.89 DUODENAL MASS: Status: ACTIVE | Noted: 2023-12-19

## 2023-12-19 NOTE — TELEPHONE ENCOUNTER
Appointment Confirmation   Who are you speaking with? Patient   If it is not the patient, are they listed on an active communication consent form? N/A   Which provider is the appointment scheduled with?  Dr. Cruz   When is the appointment scheduled?  Please list date and time 12/21/23 @ 10:30am   At which location is the appointment scheduled to take place? [place]   Did caller verbalize understanding of appointment details? Yes

## 2023-12-21 ENCOUNTER — CONSULT (OUTPATIENT)
Dept: SURGICAL ONCOLOGY | Facility: CLINIC | Age: 63
End: 2023-12-21
Payer: COMMERCIAL

## 2023-12-21 VITALS
BODY MASS INDEX: 27.14 KG/M2 | HEART RATE: 94 BPM | WEIGHT: 159 LBS | TEMPERATURE: 98.6 F | SYSTOLIC BLOOD PRESSURE: 120 MMHG | OXYGEN SATURATION: 94 % | HEIGHT: 64 IN | DIASTOLIC BLOOD PRESSURE: 76 MMHG | RESPIRATION RATE: 15 BRPM

## 2023-12-21 DIAGNOSIS — K31.89 DUODENAL MASS: Primary | ICD-10-CM

## 2023-12-21 PROCEDURE — 99205 OFFICE O/P NEW HI 60 MIN: CPT | Performed by: STUDENT IN AN ORGANIZED HEALTH CARE EDUCATION/TRAINING PROGRAM

## 2023-12-21 RX ORDER — DIAZEPAM 2 MG/1
TABLET ORAL
COMMUNITY
Start: 2023-11-07

## 2023-12-21 RX ORDER — PREDNISONE 10 MG/1
10 TABLET ORAL 2 TIMES DAILY
COMMUNITY
Start: 2023-11-30

## 2023-12-21 NOTE — PROGRESS NOTES
Surgical Oncology Consultation    [facility] [place]  [address] [facility] [id number] [phone]    Patient:  Aleida Hammond  1960  [id number]    Primary Care provider:  Tara Colon DO  [facility]  [address]  [facility] [address]    Referring provider:  Katalina Flynn MD  [address] [place] [address]    Diagnoses and all orders for this visit:    Duodenal mass  -     Ambulatory Referral to Surgical Oncology  -     Case request operating room: WHIPPLE PROCEDURE/PANCREATICO-DUODENECTOMY; Standing  -     Ambulatory referral to Family Practice; Future  -     Ambulatory referral to surgical optimization; Future  -     Type and screen; Future  -     Prepare Leukoreduced RBC: 2 Units; Future  -     Comprehensive metabolic panel; Future  -     CBC and differential; Future  -     APTT; Future  -     Protime-INR; Future  -     HEMOGLOBIN A1C W/ EAG ESTIMATION; Future  -     Comprehensive metabolic panel  -     CBC and differential  -     APTT  -     Protime-INR  -     Case request operating room: WHIPPLE PROCEDURE/PANCREATICO-DUODENECTOMY    Other orders  -     predniSONE 10 mg tablet; Take 10 mg by mouth 2 (two) times a day  -     diazepam (VALIUM) 2 mg tablet; 1-2 TABS BY MOUTH 60 MINUTES BEFORE MRI AND REPEAT 1 TAB BY MOUTH 30 MIN BEFORE PROCEDURE.  -     Incentive spirometry; Standing  -     Insert and maintain IV line; Standing  -     Void On-Call to O.R.; Standing  -     Place sequential compression device; Standing        Chief Complaint   Patient presents with    New Patient Visit       No follow-ups on file.    Oncology History    No history exists.       History of Present Illness  :   64 yo female who presents for consult regarding duo adenoma. Briefly, the past has had years of vague nausea and abdominal discomfort since her RNY gastric bypass. Most recently, these symptoms intensified and she started losing more  weight, prompting imaging eval of the abd. US and then CT in Oct 2023 revealed a large intra-duo mass occupying much of the lumen and causing biliary dilation. No lab abnormalities were detected at the time. She underwent EGD with axios placement to the remnant stomach for later eval of the duo, followed by another endoscopic eval revealing a large polyp within D2-D3. Bx of fragments revealed adenoma. The angulation of the pylorus and axios did not allow for EUS eval. Today she presents for surgical recs. The patient has a hx of malabsorption and nutritional deficiencies since RNY. She also has two separate spinal infusion catheters for chronic pain related to an accident years ago. One resevoir is within the left anterior abdomen and the other within the right flank. She also uses PO narcotics daily for help with pain control. She denies jaundice, pale stools, dark urine. No cardiopulm history of note. Also with hx hep A and insulin dependent DM.     Review of Systems  Complete ROS Surg Onc:   Constitutional: The patient denies new or recent history of general fatigue, no recent weight loss, no change in appetite.   Eyes: No complaints of visual problems, no scleral icterus.   ENT: No complaints of ear pain, no hoarseness, no difficulty swallowing,  no tinnitus and no new masses in head, oral cavity, or neck.   Cardiovascular: No complaints of chest pain, no palpitations, no ankle edema.   Respiratory: No complaints of shortness of breath, no cough.   Gastrointestinal: No complaints of jaundice, no bloody stools, no pale stools.   Genitourinary: No complaints of dysuria, no hematuria, no nocturia, no frequent urination, no urethral discharge.   Musculoskeletal: No complaints of weakness, paralysis, joint stiffness or arthralgias.  Integumentary: No complaints of rash, no new lesions.   Neurological: No complaints of convulsions, no seizures, no dizziness.   Hematologic/Lymphatic: No complaints of easy bruising.    Endocrine:  No hot or cold intolerance.  No polydipsia, polyphagia, or polyuria.  Allergy/immunology:  No environmental allergies.  No food allergies.  Not immunocompromised.      Patient Active Problem List   Diagnosis    Benign essential hypertension    Mild persistent asthma without complication    Peripheral polyneuropathy    Chronic bilateral back pain    Chronic lumbar radiculopathy    Chronic cervical pain    Chronic thoracic spine pain    GERD without esophagitis    Hyperlipidemia associated with type 2 diabetes mellitus     Insomnia    B12 deficiency    Lumbar radiculopathy    Thoracic spondylosis without myelopathy    Thoracic and lumbosacral neuritis    Degeneration of lumbar intervertebral disc    Lumbar spondylosis    Pain in joint involving pelvic region and thigh    Iron deficiency    Memory dysfunction    COVID-19 vaccine series completed    Type 2 diabetes mellitus with diabetic neuropathy, with long-term current use of insulin (HCC)    Narcotic dependency, continuous (HCC)    Carpal tunnel syndrome on right    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    Microcytic anemia    S/P endoscopic carpal tunnel release    Pillar pain, post-operative    PONV (postoperative nausea and vomiting)    Duodenal mass     Past Medical History:   Diagnosis Date    PABLO (acute kidney injury) (HCC) 11/04/2021    Allergic     Anemia     Anxiety     Arthritis     Asthma     Breast lump     Resolved: 8/1/2017     Chronic pain     back    Diabetes mellitus (HCC)     Diverticulitis of colon     GERD (gastroesophageal reflux disease)     Hepatitis     History of stomach ulcers     HL (hearing loss) 2015    Estimated    Hyperlipidemia     Hypertension     Hypotension 11/04/2021    Infectious viral hepatitis     Hepatitiss A    Memory loss 2015    Estimated    Stomach problems      Past Surgical History:   Procedure Laterality Date    APPENDECTOMY      BACK SURGERY      CATARACT EXTRACTION, BILATERAL        SECTION      CHOLECYSTECTOMY      GASTRIC BYPASS      GASTRIC BYPASS      INFUSION PUMP IMPLANTATION      HI NDSC WRST SURG W/RLS TRANSVRS CARPL LIGM Right 2022    Procedure: Right endoscopic carpal tunnel release;  Surgeon: Papo Peguero MD;  Location:  MAIN OR;  Service: Orthopedics    SHOULDER SURGERY      SPINAL CORD STIMULATOR IMPLANT       Family History   Problem Relation Age of Onset    Diabetes Mother         Mellitus    Hyperlipidemia Mother     Hypertension Mother     Colon cancer Mother 78    Pancreatic cancer Mother 84    Melanoma Mother 76    Cancer Father 78        Bladder ca    Alcohol abuse Father     Lung cancer Father 78    Prostate cancer Father 78    No Known Problems Daughter     No Known Problems Maternal Grandmother     No Known Problems Maternal Grandfather     Stomach cancer Paternal Grandmother         60's    Lung cancer Paternal Grandfather 76    Diabetes Brother         Mellitus    Hyperlipidemia Brother     Hypertension Brother     Diabetes Brother     No Known Problems Brother     No Known Problems Son     No Known Problems Son     Breast cancer Maternal Aunt 32        Unknown age    No Known Problems Paternal Aunt     No Known Problems Paternal Aunt     No Known Problems Paternal Aunt     No Known Problems Paternal Aunt     Arthritis Family     Mental illness Neg Hx      Social History     Socioeconomic History    Marital status:      Spouse name: Not on file    Number of children: 3    Years of education: Not on file    Highest education level: Not on file   Occupational History    Occupation: Disability   Tobacco Use    Smoking status: Former     Current packs/day: 0.00     Average packs/day: 1 pack/day for 34.0 years (34.0 ttl pk-yrs)     Types: Cigarettes     Start date: 1973     Quit date: 2007     Years since quittin.9    Smokeless tobacco: Never    Tobacco comments:     15yrs ago   Vaping Use    Vaping status: Never  Used   Substance and Sexual Activity    Alcohol use: No    Drug use: No    Sexual activity: Not Currently     Partners: Male     Birth control/protection: Post-menopausal   Other Topics Concern    Not on file   Social History Narrative    Not on file     Social Determinants of Health     Financial Resource Strain: Not on file   Food Insecurity: Not on file   Transportation Needs: Not on file   Physical Activity: Not on file   Stress: Not on file   Social Connections: Not on file   Intimate Partner Violence: Not on file   Housing Stability: Not on file       Current Outpatient Medications:     Accu-Chek FastClix Lancets MISC, Use to test blood glucose levels twice daily for diabetes E11.9, Disp: 204 each, Rfl: 3    albuterol (2.5 mg/3 mL) 0.083 % nebulizer solution, Take 3 mL (2.5 mg total) by nebulization every 6 (six) hours as needed for wheezing or shortness of breath, Disp: 300 mL, Rfl: 1    albuterol (PROVENTIL HFA,VENTOLIN HFA) 90 mcg/act inhaler, USE 2 INHALATIONS ORALLY   EVERY 6 HOURS AS NEEDED FORWHEEZING, Disp: 25.5 g, Rfl: 1    amLODIPine (NORVASC) 5 mg tablet, TAKE 1 TABLET TWICE A DAY, Disp: 180 tablet, Rfl: 0    aspirin (ECOTRIN LOW STRENGTH) 81 mg EC tablet, Take 81 mg by mouth daily, Disp: , Rfl:     atorvastatin (LIPITOR) 40 mg tablet, TAKE 1 TABLET DAILY, Disp: 90 tablet, Rfl: 0    B-D UF III MINI PEN NEEDLES 31G X 5 MM MISC, USE ONCE DAILY FOR BASAL   INSULIN INJECTION, Disp: 90 each, Rfl: 1    Cyanocobalamin (B-12 IJ), Inject as directed every 30 (thirty) days, Disp: , Rfl:     diazepam (VALIUM) 2 mg tablet, 1-2 TABS BY MOUTH 60 MINUTES BEFORE MRI AND REPEAT 1 TAB BY MOUTH 30 MIN BEFORE PROCEDURE., Disp: , Rfl:     Ferrous Fumarate 63 (20 Fe) MG TABS, Take 2 tablets by mouth daily Take two tablets once daily., Disp: , Rfl:     Fluticasone-Salmeterol (Advair Diskus) 250-50 mcg/dose inhaler, Inhale 1 puff 2 (two) times a day Rinse mouth after use., Disp: 180 blister, Rfl: 2    glucose blood  (Accu-Chek Guide) test strip, Test, Disp: 100 strip, Rfl: 3    HYDROcodone-acetaminophen (NORCO)  mg per tablet, , Disp: , Rfl:     Insulin Glargine Solostar (Basaglar KwikPen) 100 UNIT/ML SOPN, Inject 0.08 mL (8 Units total) under the skin daily at bedtime, Disp: 15 mL, Rfl: 0    Januvia 100 MG tablet, TAKE 1 TABLET EVERY MORNING, Disp: 90 tablet, Rfl: 3    lidocaine (XYLOCAINE) 5 % ointment, apply topically to affected area three times a day, Disp: , Rfl:     LYRICA 200 MG capsule, Take 200 mg by mouth 3 (three) times a day , Disp: , Rfl: 0    metFORMIN (GLUCOPHAGE) 1000 MG tablet, TAKE 1 TABLET TWICE A DAY, Disp: 180 tablet, Rfl: 0    methocarbamol (ROBAXIN) 500 mg tablet, Take 1 tablet (500 mg total) by mouth 3 (three) times a day for 5 days, Disp: 20 tablet, Rfl: 0    mometasone (NASONEX) 50 mcg/act nasal spray, USE 2 SPRAYS IN EACH       NOSTRIL DAILY, Disp: 17 g, Rfl: 1    montelukast (SINGULAIR) 10 mg tablet, TAKE 1 TABLET AT BEDTIME, Disp: 90 tablet, Rfl: 1    Morphine Sulfate (MORPHINE 0.05 MG/ML SYRINGE, NICU/PICU,, CMPD ORDER,), , Disp: , Rfl:     omeprazole (PriLOSEC) 40 MG capsule, TAKE 1 CAPSULE EVERY       MORNING, Disp: 90 capsule, Rfl: 3    ondansetron (ZOFRAN) 4 mg tablet, Take 1 tablet (4 mg total) by mouth every 8 (eight) hours as needed for nausea or vomiting, Disp: 30 tablet, Rfl: 1    predniSONE 10 mg tablet, Take 10 mg by mouth 2 (two) times a day, Disp: , Rfl:     traMADol HCl ER, Biphasic, 300 MG 24 hr tablet, Take 300 mg by mouth daily, Disp: , Rfl:     VITAMIN D PO, , Disp: , Rfl:     zolpidem (AMBIEN) 5 mg tablet, Take 1 tablet (5 mg total) by mouth daily at bedtime as needed for sleep, Disp: 90 tablet, Rfl: 0    famotidine (PEPCID) 20 mg tablet, Take 1 tablet (20 mg total) by mouth 2 (two) times a day, Disp: 30 tablet, Rfl: 0    ketorolac (TORADOL) 10 mg tablet, Take 1 tablet (10 mg total) by mouth every 6 (six) hours as needed for moderate pain for up to 2 days, Disp: 8 tablet,  Rfl: 0    sucralfate (CARAFATE) 1 g tablet, Take 1 tablet (1 g total) by mouth 4 (four) times a day for 5 days, Disp: 20 tablet, Rfl: 0  Allergies   Allergen Reactions    Nsaids Other (See Comments)     Cannot take NSAIDS secondary to having gastric bypass    Percocet [Oxycodone-Acetaminophen] GI Intolerance       Vitals:    12/21/23 1017   BP: 120/76   Pulse: 94   Resp: 15   Temp: 98.6 °F (37 °C)   SpO2: 94%       Physical Exam   General: Appears well, appears stated age  Skin: Warm, anicteric  HEENT: Normocephalic, atraumatic; sclera aniceteric, mucous membranes moist; cervical nodes without adenopathy  Cardiopulmonary: RRR, Easy WOB, no BLE edema  Abd: Flat and soft, nontender, no masses appreciated, no hepatosplenomegaly  MSK: Symmetric, no cyanosis, no overt weakness  Lymphatic: No cervical, axillary or inguinal lymphadenopathy  Neuro: Affect appropriate, no gross motor abnormalities      Pathology:  Final Diagnosis   A. Duodenal mass, biopsy:  -   Fragments of duodenal adenoma.  -   Negative for high grade dysplasia and malignancy.          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 PREPROCEDURE: Informed consent was obtained for the procedure, including sedation.  Risks of perforation, hemorrhage, adverse drug reaction and aspiration were discussed. The patient was placed in the left lateral decubitus position. Patient was explained about the risks and benefits of the procedure. Risks including but not limited to bleeding, infection, and perforation were explained in detail. Also explained about less than 100% sensitivity with the exam  and other alternatives. PROCEDURE: EUS UPPER DETAILS OF PROCEDURE: Patient was taken to the procedure room where a time out was performed to confirm correct patient and correct procedure. The patient underwent monitored anesthesia care, which was administered by an anesthesia professional. The patient's blood pressure, heart rate, oxygen, respirations, level of consciousness and ECG were monitored throughout the procedure. The endoscope was introduced through the mouth and advanced to the second part of the duodenum. Retroflexion was performed in the fundus. The patient experienced no blood loss. The procedure was not difficult. The patient tolerated the procedure well. There were no apparent adverse events. ANESTHESIA INFORMATION: ASA: III Anesthesia Type: General MEDICATIONS: No administrations occurring from 1025 to 1150 on 12/13/23 FINDINGS: The esophagus appeared normal. History of Khurram-en-Y gastric bypass.  Gastric pouch appeared normal.  At the anastomosis and Axios, double lumen opposing stent, was present entering into the remnant stomach.  The scope was advanced through the Axios into the remnant stomach.  There was bolus of fluid present in the fundus.  Food bolus was removed using Sommers net. The angulation to the pylorus was extremely tight as the Axios stent entered the stomach along the incisura and was adjacent to the pylorus.  Several attempts to initially enter the duodenum were unsuccessful including changing to slim scope and SIF enteroscope.  Eventually with the regular upper endoscope we were able to enter the duodenal bulb.  Duodenal bulb appeared normal.  In the second portion of the duodenum there was a large fungating villous appearing mass.  This likely was arising from the ampullary area.  Biopsies were taken with jumbo biopsy forceps and cold snare was used to snare off parts of the mass and this wa sent for pathology. Mass did appear to involve 1/3-1/2 of the lumen circumference.   Majority of the duodenal lumen was occluded by the mass. Endoscopic ultrasound was not performed due to unlikely ability of the scope advancing into the duodenal lumen and tight angulation at the pylorus. SPECIMENS: ID Type Source Tests Collected by Time Destination 1 : duodenal mass Tissue Duodenum TISSUE EXAM Katalina Flynn MD 12/13/2023 11:40 AM   IMPRESSION: Previously placed Axios stent was in place.  Upper endoscope was advanced through the stent into the remnant stomach.  Food bolus the remnant stomach was removed using Sommers net. Difficult entrance into the duodenal bulb requiring several scope changes.  The duodenal bulb appeared normal.  Second portion of the duodenum had large villous appearing mass likely arising from the ampulla.  This was biopsied with jumbo biopsy forceps and cold snare.  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  Katalina Flynn MD     Result Date: 12/13/2023  Narrative: Table formatting from the original result was not included. [facility] [address] [facility] [address] [phone] DATE OF SERVICE: 12/13/23 PHYSICIAN(S): Attending: Katalina Flynn MD Fellow: No Staff Documented INDICATION: 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 POST-OP DIAGNOSIS: See the impression below. PREPROCEDURE: Informed consent was obtained for the procedure, including sedation.  Risks of perforation, hemorrhage, adverse drug reaction and aspiration were discussed. The patient was placed in the left lateral decubitus position. Patient was explained about the risks and benefits of the procedure. Risks including but not limited to bleeding, infection, and perforation were explained in detail. Also explained about less than 100% sensitivity with the exam and other  alternatives. PROCEDURE: EUS UPPER DETAILS OF PROCEDURE: Patient was taken to the procedure room where a time out was performed to confirm correct patient and correct procedure. The patient underwent monitored anesthesia care, which was administered by an anesthesia professional. The patient's blood pressure, heart rate, oxygen, respirations, level of consciousness and ECG were monitored throughout the procedure. The endoscope was introduced through the mouth and advanced to the second part of the duodenum. Retroflexion was performed in the fundus. The patient experienced no blood loss. The procedure was not difficult. The patient tolerated the procedure well. There were no apparent adverse events. ANESTHESIA INFORMATION: ASA: III Anesthesia Type: General MEDICATIONS: No administrations occurring from 1025 to 1150 on 12/13/23 FINDINGS: The esophagus appeared normal. History of Khurram-en-Y gastric bypass.  Gastric pouch appeared normal.  At the anastomosis and Axios, double lumen opposing stent, was present entering into the remnant stomach.  The scope was advanced through the Axios into the remnant stomach.  There was bolus of fluid present in the fundus.  Food bolus was removed using Sommers net. The angulation to the pylorus was extremely tight as the Axios stent entered the stomach along the incisura and was adjacent to the pylorus.  Several attempts to initially enter the duodenum were unsuccessful including changing to slim scope and SIF enteroscope.  Eventually with the regular upper endoscope we were able to enter the duodenal bulb.  Duodenal bulb appeared normal.  In the second portion of the duodenum there was a large fungating villous appearing mass.  This likely was arising from the ampullary area.  Biopsies were taken with jumbo biopsy forceps and cold snare was used to snare off parts of the mass and this was sent for pathology. Endoscopic ultrasound was not performed due to unlikely ability of the scope  advancing into the duodenal lumen and tight angulation at the pylorus. SPECIMENS: ID Type Source Tests Collected by Time Destination 1 : duodenal mass Tissue Duodenum TISSUE EXAM Katalina Flynn MD 12/13/2023 11:40 AM       Impression: Previously placed Axios stent was in place.  Upper endoscope was advanced through the stent into the remnant stomach.  Food bolus the remnant stomach was removed using Sommers net. Difficult entrance into the duodenal bulb requiring several scope changes.  The duodenal bulb appeared normal.  Second portion of the duodenum had large villous appearing mass likely arising from the ampulla.  This was biopsied with jumbo biopsy forceps and cold snare.  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  Katalina Flynn MD     CT cervical spine without contrast    Result Date: 11/29/2023  Narrative: CT CERVICAL SPINE - WITHOUT CONTRAST INDICATION:   Neck pain, chronic neck pain. COMPARISON:  None. TECHNIQUE:  CT examination of the cervical spine was performed without intravenous contrast.  Contiguous axial images were obtained. Multiplanar 2D reformatted images were created from the source data. Radiation dose length product (DLP) for this visit:  457.78 mGy-cm .  This examination, like all CT scans performed in the [facility], was performed utilizing techniques to minimize radiation dose exposure, including the use of iterative  reconstruction and automated exposure control. IMAGE QUALITY:  Diagnostic. FINDINGS: ALIGNMENT:  Normal alignment of the cervical spine. No subluxation. VERTEBRAE:  No fracture. DEGENERATIVE CHANGES: Ligamentous calcification posteriorly at C4-5, C6-7, and C7-T1 bilaterally contributes to minimal central stenosis. Mild left-sided facet degenerative change C3-4. PREVERTEBRAL  AND PARASPINAL SOFT TISSUES: Unremarkable THORACIC INLET:  Normal.     Impression: No cervical spine fracture or traumatic malalignment. Minimal degenerative changes are noted. Workstation performed: [id number]       I independently reviewed and interpreted the above laboratory and imaging data incl CT, EGD, path, nutrition notes, xrays. Discussed with Dr Flynn      Discussion/Summary:   62 yo female with obstructing ampullary mass, adenoma vs more invasive disease. On my review of CT, it does appear possible that this represents a malignancy. Today I discussed with the patient duodenotomy with intra-op US to better visualize the mass as EUS could not be performed, with resection of adenoma vs ampullectomy vs pancreatoduodenectomy depending on the appearance. The risks were explained including bleeding, infection, recurrence, need for further surgery, wound complications, adjacent organ injury, pancreas and biliary fistula, worsening of diabetes, sepsis, mi, DVT, stroke, pulmonary embolism, and death. Given the patient's hx of insulin dependent diabetes and nutritional deficiencies 2/2 RNY gastric bypass, she is at increased risk. We discussed the possibility of difficult to control DM following surgery. We discussed increasing protein intake and exercise in the periop period. We discussed options for pain control given her spinal stim and high narcotic requirement. She will see SOC for further discussion. We discussed the expected operation, postop course and recovery. We also discussed that her symptoms of pain and nausea may or may not be related to the presence of this mass and I am not optimistic that they will improve after surgery. The patient understands these risks and would like to proceed.

## 2023-12-21 NOTE — LETTER
December 21, 2023     Katalina Flynn MD  [address] [place] [id number]    Patient: Aleida Hammond   YOB: 1960   Date of Visit: 12/21/2023       Dear Dr. Flynn:    Thank you for referring Aleida Hammond to me for evaluation. Below are my notes for this consultation.    If you have questions, please do not hesitate to call me. I look forward to following your patient along with you.         Sincerely,        Karlene Cruz MD        CC: MD Karlene Bautista MD  12/21/2023  1:37 PM  Sign when Signing Visit  Surgical Oncology Consultation    [facility] [address] [address]  [phone]    Patient:  Aleida Hammnod  1960  [id number]    Primary Care provider:  Tara Colon DO  [address] [place] [address]    Referring provider:  Katalina Flynn MD  [address] [place] [id number]    Diagnoses and all orders for this visit:    Duodenal mass  -     Ambulatory Referral to Surgical Oncology  -     Case request operating room: WHIPPLE PROCEDURE/PANCREATICO-DUODENECTOMY; Standing  -     Ambulatory referral to Family Practice; Future  -     Ambulatory referral to surgical optimization; Future  -     Type and screen; Future  -     Prepare Leukoreduced RBC: 2 Units; Future  -     Comprehensive metabolic panel; Future  -     CBC and differential; Future  -     APTT; Future  -     Protime-INR; Future  -     HEMOGLOBIN A1C W/ EAG ESTIMATION; Future  -     Comprehensive metabolic panel  -     CBC and differential  -     APTT  -     Protime-INR  -     Case request operating room: WHIPPLE PROCEDURE/PANCREATICO-DUODENECTOMY    Other orders  -     predniSONE 10 mg tablet; Take 10 mg by mouth 2 (two) times a day  -     diazepam (VALIUM) 2 mg tablet; 1-2 TABS BY MOUTH 60 MINUTES BEFORE MRI AND REPEAT 1 TAB BY MOUTH 30 MIN BEFORE PROCEDURE.  -     Incentive spirometry; Standing  -      Insert and maintain IV line; Standing  -     Void On-Call to O.R.; Standing  -     Place sequential compression device; Standing        Chief Complaint   Patient presents with   • New Patient Visit       No follow-ups on file.    Oncology History    No history exists.       History of Present Illness  :   64 yo female who presents for consult regarding duo adenoma. Briefly, the past has had years of vague nausea and abdominal discomfort since her RNY gastric bypass. Most recently, these symptoms intensified and she started losing more weight, prompting imaging eval of the abd. US and then CT in Oct 2023 revealed a large intra-duo mass occupying much of the lumen and causing biliary dilation. No lab abnormalities were detected at the time. She underwent EGD with axios placement to the remnant stomach for later eval of the duo, followed by another endoscopic eval revealing a large polyp within D2-D3. Bx of fragments revealed adenoma. The angulation of the pylorus and axios did not allow for EUS eval. Today she presents for surgical recs. The patient has a hx of malabsorption and nutritional deficiencies since RNY. She also has two separate spinal infusion catheters for chronic pain related to an accident years ago. One resevoir is within the left anterior abdomen and the other within the right flank. She also uses PO narcotics daily for help with pain control. She denies jaundice, pale stools, dark urine. No cardiopulm history of note. Also with hx hep A and insulin dependent DM.     Review of Systems  Complete ROS Surg Onc:   Constitutional: The patient denies new or recent history of general fatigue, no recent weight loss, no change in appetite.   Eyes: No complaints of visual problems, no scleral icterus.   ENT: No complaints of ear pain, no hoarseness, no difficulty swallowing,  no tinnitus and no new masses in head, oral cavity, or neck.   Cardiovascular: No complaints of chest pain, no palpitations, no ankle  edema.   Respiratory: No complaints of shortness of breath, no cough.   Gastrointestinal: No complaints of jaundice, no bloody stools, no pale stools.   Genitourinary: No complaints of dysuria, no hematuria, no nocturia, no frequent urination, no urethral discharge.   Musculoskeletal: No complaints of weakness, paralysis, joint stiffness or arthralgias.  Integumentary: No complaints of rash, no new lesions.   Neurological: No complaints of convulsions, no seizures, no dizziness.   Hematologic/Lymphatic: No complaints of easy bruising.   Endocrine:  No hot or cold intolerance.  No polydipsia, polyphagia, or polyuria.  Allergy/immunology:  No environmental allergies.  No food allergies.  Not immunocompromised.      Patient Active Problem List   Diagnosis   • Benign essential hypertension   • Mild persistent asthma without complication   • Peripheral polyneuropathy   • Chronic bilateral back pain   • Chronic lumbar radiculopathy   • Chronic cervical pain   • Chronic thoracic spine pain   • GERD without esophagitis   • Hyperlipidemia associated with type 2 diabetes mellitus    • Insomnia   • B12 deficiency   • Lumbar radiculopathy   • Thoracic spondylosis without myelopathy   • Thoracic and lumbosacral neuritis   • Degeneration of lumbar intervertebral disc   • Lumbar spondylosis   • Pain in joint involving pelvic region and thigh   • Iron deficiency   • Memory dysfunction   • COVID-19 vaccine series completed   • Type 2 diabetes mellitus with diabetic neuropathy, with long-term current use of insulin ([facility])   • Narcotic dependency, continuous ([facility])   • Carpal tunnel syndrome on right   • 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   • Microcytic anemia   • S/P endoscopic carpal tunnel release   • Pillar pain, post-operative   • PONV (postoperative nausea and vomiting)   • Duodenal mass     Past Medical History:   Diagnosis Date   • PABLO (acute kidney injury) (Former[facility]) 11/04/2021   • Allergic    • Anemia     • Anxiety    • Arthritis    • Asthma    • Breast lump     Resolved: 2017    • Chronic pain     back   • Diabetes mellitus (HCC)    • Diverticulitis of colon    • GERD (gastroesophageal reflux disease)    • Hepatitis    • History of stomach ulcers    • HL (hearing loss)     Estimated   • Hyperlipidemia    • Hypertension    • Hypotension 2021   • Infectious viral hepatitis     Hepatitiss A   • Memory loss     Estimated   • Stomach problems      Past Surgical History:   Procedure Laterality Date   • APPENDECTOMY     • BACK SURGERY     • CATARACT EXTRACTION, BILATERAL     •  SECTION     • CHOLECYSTECTOMY     • GASTRIC BYPASS     • GASTRIC BYPASS     • INFUSION PUMP IMPLANTATION     • NC NDSC WRST SURG W/RLS TRANSVRS CARPL LIGM Right 2022    Procedure: Right endoscopic carpal tunnel release;  Surgeon: Papo Peguero MD;  Location:  MAIN OR;  Service: Orthopedics   • SHOULDER SURGERY     • SPINAL CORD STIMULATOR IMPLANT       Family History   Problem Relation Age of Onset   • Diabetes Mother         Mellitus   • Hyperlipidemia Mother    • Hypertension Mother    • Colon cancer Mother 78   • Pancreatic cancer Mother 84   • Melanoma Mother 76   • Cancer Father 78        Bladder ca   • Alcohol abuse Father    • Lung cancer Father 78   • Prostate cancer Father 78   • No Known Problems Daughter    • No Known Problems Maternal Grandmother    • No Known Problems Maternal Grandfather    • Stomach cancer Paternal Grandmother         60's   • Lung cancer Paternal Grandfather 76   • Diabetes Brother         Mellitus   • Hyperlipidemia Brother    • Hypertension Brother    • Diabetes Brother    • No Known Problems Brother    • No Known Problems Son    • No Known Problems Son    • Breast cancer Maternal Aunt 32        Unknown age   • No Known Problems Paternal Aunt    • No Known Problems Paternal Aunt    • No Known Problems Paternal Aunt    • No Known Problems Paternal Aunt    •  Arthritis Family    • Mental illness Neg Hx      Social History     Socioeconomic History   • Marital status:      Spouse name: Not on file   • Number of children: 3   • Years of education: Not on file   • Highest education level: Not on file   Occupational History   • Occupation: Disability   Tobacco Use   • Smoking status: Former     Current packs/day: 0.00     Average packs/day: 1 pack/day for 34.0 years (34.0 ttl pk-yrs)     Types: Cigarettes     Start date: 1973     Quit date: 2007     Years since quittin.9   • Smokeless tobacco: Never   • Tobacco comments:     15yrs ago   Vaping Use   • Vaping status: Never Used   Substance and Sexual Activity   • Alcohol use: No   • Drug use: No   • Sexual activity: Not Currently     Partners: Male     Birth control/protection: Post-menopausal   Other Topics Concern   • Not on file   Social History Narrative   • Not on file     Social Determinants of Health     Financial Resource Strain: Not on file   Food Insecurity: Not on file   Transportation Needs: Not on file   Physical Activity: Not on file   Stress: Not on file   Social Connections: Not on file   Intimate Partner Violence: Not on file   Housing Stability: Not on file       Current Outpatient Medications:   •  Accu-Chek FastClix Lancets MISC, Use to test blood glucose levels twice daily for diabetes E11.9, Disp: 204 each, Rfl: 3  •  albuterol (2.5 mg/3 mL) 0.083 % nebulizer solution, Take 3 mL (2.5 mg total) by nebulization every 6 (six) hours as needed for wheezing or shortness of breath, Disp: 300 mL, Rfl: 1  •  albuterol (PROVENTIL HFA,VENTOLIN HFA) 90 mcg/act inhaler, USE 2 INHALATIONS ORALLY   EVERY 6 HOURS AS NEEDED FORWHEEZING, Disp: 25.5 g, Rfl: 1  •  amLODIPine (NORVASC) 5 mg tablet, TAKE 1 TABLET TWICE A DAY, Disp: 180 tablet, Rfl: 0  •  aspirin (ECOTRIN LOW STRENGTH) 81 mg EC tablet, Take 81 mg by mouth daily, Disp: , Rfl:   •  atorvastatin (LIPITOR) 40 mg tablet, TAKE 1 TABLET DAILY,  Disp: 90 tablet, Rfl: 0  •  B-D UF III MINI PEN NEEDLES 31G X 5 MM MISC, USE ONCE DAILY FOR BASAL   INSULIN INJECTION, Disp: 90 each, Rfl: 1  •  Cyanocobalamin (B-12 IJ), Inject as directed every 30 (thirty) days, Disp: , Rfl:   •  diazepam (VALIUM) 2 mg tablet, 1-2 TABS BY MOUTH 60 MINUTES BEFORE MRI AND REPEAT 1 TAB BY MOUTH 30 MIN BEFORE PROCEDURE., Disp: , Rfl:   •  Ferrous Fumarate 63 (20 Fe) MG TABS, Take 2 tablets by mouth daily Take two tablets once daily., Disp: , Rfl:   •  Fluticasone-Salmeterol (Advair Diskus) 250-50 mcg/dose inhaler, Inhale 1 puff 2 (two) times a day Rinse mouth after use., Disp: 180 blister, Rfl: 2  •  glucose blood (Accu-Chek Guide) test strip, Test, Disp: 100 strip, Rfl: 3  •  HYDROcodone-acetaminophen (NORCO)  mg per tablet, , Disp: , Rfl:   •  Insulin Glargine Solostar (Basaglar KwikPen) 100 UNIT/ML SOPN, Inject 0.08 mL (8 Units total) under the skin daily at bedtime, Disp: 15 mL, Rfl: 0  •  Januvia 100 MG tablet, TAKE 1 TABLET EVERY MORNING, Disp: 90 tablet, Rfl: 3  •  lidocaine (XYLOCAINE) 5 % ointment, apply topically to affected area three times a day, Disp: , Rfl:   •  LYRICA 200 MG capsule, Take 200 mg by mouth 3 (three) times a day , Disp: , Rfl: 0  •  metFORMIN (GLUCOPHAGE) 1000 MG tablet, TAKE 1 TABLET TWICE A DAY, Disp: 180 tablet, Rfl: 0  •  methocarbamol (ROBAXIN) 500 mg tablet, Take 1 tablet (500 mg total) by mouth 3 (three) times a day for 5 days, Disp: 20 tablet, Rfl: 0  •  mometasone (NASONEX) 50 mcg/act nasal spray, USE 2 SPRAYS IN EACH       NOSTRIL DAILY, Disp: 17 g, Rfl: 1  •  montelukast (SINGULAIR) 10 mg tablet, TAKE 1 TABLET AT BEDTIME, Disp: 90 tablet, Rfl: 1  •  Morphine Sulfate (MORPHINE 0.05 MG/ML SYRINGE, NICU/PICU,, CMPD ORDER,), , Disp: , Rfl:   •  omeprazole (PriLOSEC) 40 MG capsule, TAKE 1 CAPSULE EVERY       MORNING, Disp: 90 capsule, Rfl: 3  •  ondansetron (ZOFRAN) 4 mg tablet, Take 1 tablet (4 mg total) by mouth every 8 (eight) hours as  needed for nausea or vomiting, Disp: 30 tablet, Rfl: 1  •  predniSONE 10 mg tablet, Take 10 mg by mouth 2 (two) times a day, Disp: , Rfl:   •  traMADol HCl ER, Biphasic, 300 MG 24 hr tablet, Take 300 mg by mouth daily, Disp: , Rfl:   •  VITAMIN D PO, , Disp: , Rfl:   •  zolpidem (AMBIEN) 5 mg tablet, Take 1 tablet (5 mg total) by mouth daily at bedtime as needed for sleep, Disp: 90 tablet, Rfl: 0  •  famotidine (PEPCID) 20 mg tablet, Take 1 tablet (20 mg total) by mouth 2 (two) times a day, Disp: 30 tablet, Rfl: 0  •  ketorolac (TORADOL) 10 mg tablet, Take 1 tablet (10 mg total) by mouth every 6 (six) hours as needed for moderate pain for up to 2 days, Disp: 8 tablet, Rfl: 0  •  sucralfate (CARAFATE) 1 g tablet, Take 1 tablet (1 g total) by mouth 4 (four) times a day for 5 days, Disp: 20 tablet, Rfl: 0  Allergies   Allergen Reactions   • Nsaids Other (See Comments)     Cannot take NSAIDS secondary to having gastric bypass   • Percocet [Oxycodone-Acetaminophen] GI Intolerance       Vitals:    12/21/23 1017   BP: 120/76   Pulse: 94   Resp: 15   Temp: 98.6 °F (37 °C)   SpO2: 94%       Physical Exam   General: Appears well, appears stated age  Skin: Warm, anicteric  HEENT: Normocephalic, atraumatic; sclera aniceteric, mucous membranes moist; cervical nodes without adenopathy  Cardiopulmonary: RRR, Easy WOB, no BLE edema  Abd: Flat and soft, nontender, no masses appreciated, no hepatosplenomegaly  MSK: Symmetric, no cyanosis, no overt weakness  Lymphatic: No cervical, axillary or inguinal lymphadenopathy  Neuro: Affect appropriate, no gross motor abnormalities      Pathology:  Final Diagnosis   A. Duodenal mass, biopsy:  -   Fragments of duodenal adenoma.  -   Negative for high grade dysplasia and malignancy.          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 PREPROCEDURE: Informed consent was obtained for the procedure, including sedation.  Risks of perforation, hemorrhage, adverse drug reaction and aspiration were discussed. The patient was placed in the left lateral decubitus position. Patient was explained about the risks and benefits of the procedure. Risks including but not limited to bleeding, infection, and perforation were explained in detail. Also explained about less than 100% sensitivity with the exam and other alternatives. PROCEDURE: EUS UPPER DETAILS OF PROCEDURE: Patient was taken to the procedure room where a time out was performed to confirm correct patient and correct procedure. The patient underwent monitored anesthesia care, which was administered by an anesthesia professional. The patient's blood pressure, heart rate, oxygen, respirations, level of consciousness and ECG were monitored throughout the procedure. The endoscope was introduced through the mouth and advanced to the second part of the duodenum. Retroflexion was performed in the fundus. The patient experienced no blood loss. The procedure was not difficult. The patient tolerated the procedure well. There were no apparent adverse events. ANESTHESIA INFORMATION: ASA: III Anesthesia Type: General MEDICATIONS: No administrations occurring from 1025 to 1150 on 12/13/23 FINDINGS: The esophagus appeared normal. History of Khurram-en-Y gastric bypass.  Gastric pouch appeared normal.  At the anastomosis and Axios, double lumen opposing stent, was present entering into the remnant stomach.  The scope was advanced through the Axios into the remnant stomach.  There was bolus of fluid present in the fundus.  Food bolus was removed using Sommers net. The angulation to the pylorus was extremely tight  as the Axios stent entered the stomach along the incisura and was adjacent to the pylorus.  Several attempts to initially enter the duodenum were unsuccessful including changing to slim scope and SIF enteroscope.  Eventually with the regular upper endoscope we were able to enter the duodenal bulb.  Duodenal bulb appeared normal.  In the second portion of the duodenum there was a large fungating villous appearing mass.  This likely was arising from the ampullary area.  Biopsies were taken with jumbo biopsy forceps and cold snare was used to snare off parts of the mass and this wa sent for pathology. Mass did appear to involve 1/3-1/2 of the lumen circumference.  Majority of the duodenal lumen was occluded by the mass. Endoscopic ultrasound was not performed due to unlikely ability of the scope advancing into the duodenal lumen and tight angulation at the pylorus. SPECIMENS: ID Type Source Tests Collected by Time Destination 1 : duodenal mass Tissue Duodenum TISSUE EXAM Katalina Flynn MD 12/13/2023 11:40 AM   IMPRESSION: Previously placed Axios stent was in place.  Upper endoscope was advanced through the stent into the remnant stomach.  Food bolus the remnant stomach was removed using Sommers net. Difficult entrance into the duodenal bulb requiring several scope changes.  The duodenal bulb appeared normal.  Second portion of the duodenum had large villous appearing mass likely arising from the ampulla.  This was biopsied with jumbo biopsy forceps and cold snare.  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  Katalina Flynn MD     Result Date: 12/13/2023  Narrative: Table formatting from the original result was not included. 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 PREPROCEDURE: Informed consent was obtained for the procedure, including sedation.  Risks of perforation, hemorrhage, adverse drug reaction and aspiration were discussed. The patient was placed in the left lateral decubitus position. Patient was explained about the risks and benefits of the procedure. Risks including but not limited to bleeding, infection, and perforation were explained in detail. Also explained about less than 100% sensitivity with the exam and other alternatives. PROCEDURE: EUS UPPER DETAILS OF PROCEDURE: Patient was taken to the procedure room where a time out was performed to confirm correct patient and correct procedure. The patient underwent monitored anesthesia care, which was administered by an anesthesia professional. The patient's blood pressure, heart rate, oxygen, respirations, level of consciousness and ECG were monitored throughout the procedure. The endoscope was introduced through the mouth and advanced to the second part of the duodenum. Retroflexion was performed in the fundus. The patient experienced no blood loss. The procedure was not difficult. The patient tolerated the procedure well. There were no apparent adverse events. ANESTHESIA INFORMATION: ASA: III Anesthesia Type: General MEDICATIONS: No administrations occurring from 1025 to 1150 on 12/13/23 FINDINGS: The esophagus appeared normal. History of Khurram-en-Y gastric bypass.  Gastric pouch appeared normal.  At the anastomosis and Axios, double lumen opposing stent, was present entering into the remnant stomach.  The scope was advanced through the Axios into the remnant stomach.  There was bolus of fluid present in the fundus.  Food bolus was removed using Sommers net. The angulation to the pylorus was extremely tight as the Axios stent  entered the stomach along the incisura and was adjacent to the pylorus.  Several attempts to initially enter the duodenum were unsuccessful including changing to slim scope and SIF enteroscope.  Eventually with the regular upper endoscope we were able to enter the duodenal bulb.  Duodenal bulb appeared normal.  In the second portion of the duodenum there was a large fungating villous appearing mass.  This likely was arising from the ampullary area.  Biopsies were taken with jumbo biopsy forceps and cold snare was used to snare off parts of the mass and this was sent for pathology. Endoscopic ultrasound was not performed due to unlikely ability of the scope advancing into the duodenal lumen and tight angulation at the pylorus. SPECIMENS: ID Type Source Tests Collected by Time Destination 1 : duodenal mass Tissue Duodenum TISSUE EXAM Katalina Flynn MD 12/13/2023 11:40 AM       Impression: Previously placed Axios stent was in place.  Upper endoscope was advanced through the stent into the remnant stomach.  Food bolus the remnant stomach was removed using Sommers net. Difficult entrance into the duodenal bulb requiring several scope changes.  The duodenal bulb appeared normal.  Second portion of the duodenum had large villous appearing mass likely arising from the ampulla.  This was biopsied with jumbo biopsy forceps and cold snare.  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  Katalina Flynn MD     CT cervical spine without contrast    Result Date: 11/29/2023  Narrative: CT CERVICAL SPINE - WITHOUT CONTRAST INDICATION:   Neck pain, chronic neck pain. COMPARISON:  None. TECHNIQUE:  CT examination of the cervical spine was performed without intravenous contrast.  Contiguous axial images were obtained. Multiplanar 2D reformatted images  were created from the source data. Radiation dose length product (DLP) for this visit:  457.78 mGy-cm .  This examination, like all CT scans performed in the [facility], was performed utilizing techniques to minimize radiation dose exposure, including the use of iterative  reconstruction and automated exposure control. IMAGE QUALITY:  Diagnostic. FINDINGS: ALIGNMENT:  Normal alignment of the cervical spine. No subluxation. VERTEBRAE:  No fracture. DEGENERATIVE CHANGES: Ligamentous calcification posteriorly at C4-5, C6-7, and C7-T1 bilaterally contributes to minimal central stenosis. Mild left-sided facet degenerative change C3-4. PREVERTEBRAL AND PARASPINAL SOFT TISSUES: Unremarkable THORACIC INLET:  Normal.     Impression: No cervical spine fracture or traumatic malalignment. Minimal degenerative changes are noted. Workstation performed: [id number]       I independently reviewed and interpreted the above laboratory and imaging data incl CT, EGD, path, nutrition notes, xrays. Discussed with Dr Flynn      Discussion/Summary:   62 yo female with obstructing ampullary mass, adenoma vs more invasive disease. On my review of CT, it does appear possible that this represents a malignancy. Today I discussed with the patient duodenotomy with intra-op US to better visualize the mass as EUS could not be performed, with resection of adenoma vs ampullectomy vs pancreatoduodenectomy depending on the appearance. The risks were explained including bleeding, infection, recurrence, need for further surgery, wound complications, adjacent organ injury, pancreas and biliary fistula, worsening of diabetes, sepsis, mi, DVT, stroke, pulmonary embolism, and death. Given the patient's hx of insulin dependent diabetes and nutritional deficiencies 2/2 RNY gastric bypass, she is at increased risk. We discussed the possibility of difficult to control DM following surgery. We discussed increasing protein intake and exercise in  the periop period. We discussed options for pain control given her spinal stim and high narcotic requirement. She will see SOC for further discussion. We discussed the expected operation, postop course and recovery. We also discussed that her symptoms of pain and nausea may or may not be related to the presence of this mass and I am not optimistic that they will improve after surgery. The patient understands these risks and would like to proceed.

## 2023-12-22 ENCOUNTER — OFFICE VISIT (OUTPATIENT)
Dept: FAMILY MEDICINE CLINIC | Facility: HOSPITAL | Age: 63
End: 2023-12-22
Payer: COMMERCIAL

## 2023-12-22 VITALS
HEIGHT: 64 IN | WEIGHT: 158 LBS | OXYGEN SATURATION: 94 % | BODY MASS INDEX: 26.98 KG/M2 | HEART RATE: 86 BPM | SYSTOLIC BLOOD PRESSURE: 112 MMHG | DIASTOLIC BLOOD PRESSURE: 60 MMHG

## 2023-12-22 DIAGNOSIS — Z79.4 TYPE 2 DIABETES MELLITUS WITH DIABETIC NEUROPATHY, WITH LONG-TERM CURRENT USE OF INSULIN (HCC): ICD-10-CM

## 2023-12-22 DIAGNOSIS — Z00.00 ROUTINE ADULT HEALTH MAINTENANCE: Primary | ICD-10-CM

## 2023-12-22 DIAGNOSIS — E11.40 TYPE 2 DIABETES MELLITUS WITH DIABETIC NEUROPATHY, WITH LONG-TERM CURRENT USE OF INSULIN (HCC): ICD-10-CM

## 2023-12-22 DIAGNOSIS — Z90.3 S/P TOTAL GASTRECTOMY AND ROUX-EN-Y ESOPHAGOJEJUNAL ANASTOMOSIS: ICD-10-CM

## 2023-12-22 DIAGNOSIS — I10 BENIGN ESSENTIAL HYPERTENSION: ICD-10-CM

## 2023-12-22 DIAGNOSIS — Z98.0 S/P TOTAL GASTRECTOMY AND ROUX-EN-Y ESOPHAGOJEJUNAL ANASTOMOSIS: ICD-10-CM

## 2023-12-22 DIAGNOSIS — C17.0 ADENOCARCINOMA OF DUODENUM (HCC): ICD-10-CM

## 2023-12-22 PROCEDURE — 99396 PREV VISIT EST AGE 40-64: CPT | Performed by: STUDENT IN AN ORGANIZED HEALTH CARE EDUCATION/TRAINING PROGRAM

## 2023-12-22 NOTE — PATIENT INSTRUCTIONS
Goal BP <130/80 on average.     BP Readings from Last 3 Encounters:   12/22/23 112/60   12/21/23 120/76   12/13/23 116/61     Watch for lightheadedness, dizziness, & headaches.   Check 3x/wk.   Can take either 7.5 mg of amlodipine or 5 mg should be enough.

## 2023-12-22 NOTE — PROGRESS NOTES
ADULT ANNUAL PHYSICAL  [facility] [address]    NAME: Aleida Hammond  AGE: 64 y.o. SEX: female  : 1960      Assessment and Plan:      Diagnosis ICD-10-CM Associated Orders   1. Routine adult health maintenance  Z00.00       2. Type 2 diabetes mellitus with diabetic neuropathy, with long-term current use of insulin (HCC)  E11.40     Z79.4       3. Benign essential hypertension  I10       4. Adenocarcinoma of duodenum (HCC)  C17.0       5. 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  Z90.3     Z98.0           Immunizations and preventive care screenings were discussed with patient today. Appropriate education was printed on patient's after visit summary.    Counseling:  Alcohol/drug use: discussed moderation in alcohol intake, the recommendations for healthy alcohol use, and avoidance of illicit drug use.  Dental Health: discussed importance of regular tooth brushing, flossing, and dental visits.  Injury prevention: discussed safety/seat belts, safety helmets, smoke detectors, carbon dioxide detectors, and smoking near bedding or upholstery.  Sexual health: discussed sexually transmitted diseases, partner selection, use of condoms, avoidance of unintended pregnancy, and contraceptive alternatives.  Exercise: the importance of regular exercise/physical activity was discussed. Recommend exercise 3-5 times per week for at least 30 minutes.      F/U in 3 months or sooner as needed     Chief Complaint:     Chief Complaint   Patient presents with    Physical Exam     Last week January Whipple or resection       History of Present Illness:     Adult Annual Physical   Patient here for a comprehensive physical exam.     Diet and Physical Activity  Diet/Nutrition:  limited by nausea, still 3 meals a day .   Exercise: no formal exercise and back & leg pain too much right now .   No Drug use.   Tobacco use:  reports that she quit smoking  about 17 years ago. Her smoking use included cigarettes. She started smoking about 51 years ago. She has a 34.0 pack-year smoking history. She has never used smokeless tobacco.  Alcohol use - no    Wt Readings from Last 3 Encounters:   24 65.3 kg (144 lb)   24 65.8 kg (145 lb)   24 71.7 kg (158 lb)     Temp Readings from Last 3 Encounters:   24 (!) 97.2 °F (36.2 °C) (Temporal)   24 98 °F (36.7 °C)   01/10/24 (!) 97.1 °F (36.2 °C) (Temporal)     BP Readings from Last 3 Encounters:   24 118/70   24 120/78   24 144/62     Pulse Readings from Last 3 Encounters:   24 (!) 119   24 101   24 87     General Health  Sleep: sleeps well.   Hearing: requires use of hearing aids.  Vision: goes for regular eye exams and wears glasses.   Dental: regular dental visits and brushes teeth twice daily.       /GYN Health  Follows with gynecology? no   Patient is: postmenopausal  PAP & MAMMO UTD    Advanced Care Planning  Do you have an advanced directive? yes  Do you have a durable medical power of ? yes     Review of Systems:     Review of Systems   Constitutional:  Positive for appetite change (dec from prior, but stable now). Negative for chills and fever.   Respiratory:  Negative for cough and shortness of breath.    Cardiovascular:  Negative for chest pain and palpitations.   Gastrointestinal:         Reflux is bad, bloated, constipated, and n/v   Genitourinary:  Negative for dysuria and frequency.   Neurological:  Positive for light-headedness (episodes, brief). Negative for dizziness and headaches.   Psychiatric/Behavioral:  Negative for dysphoric mood. The patient is not nervous/anxious.         Situational stress, but hopeful.       Past Medical History:     Past Medical History:   Diagnosis Date    PABLO (acute kidney injury) (HCC) 2021    Allergic     Anemia     Anesthesia complication     Aspiration pneumonia after     Anxiety      Arthritis     Asthma     Uses Albuterol daily    Breast lump     Resolved: 2017     Chronic pain     back    Chronic pain disorder     Back, legs    Diabetes mellitus (HCC)     Diverticulitis of colon     Dizziness     GERD (gastroesophageal reflux disease)     Hepatitis     History of stomach ulcers     HL (hearing loss)     Estimated    Hyperlipidemia     Hypertension     Hypotension 2021    Infectious viral hepatitis     Hepatitiss A    Memory loss     Estimated    Pneumonia     aspiration pneumonia    PONV (postoperative nausea and vomiting)     Stomach problems       Past Surgical History:     Past Surgical History:   Procedure Laterality Date    APPENDECTOMY      BACK SURGERY      CATARACT EXTRACTION, BILATERAL       SECTION      CHOLECYSTECTOMY      GASTRECTOMY N/A 2024    Procedure: COMPLETION GASTRECTOMY;  Surgeon: Karlene Cruz MD;  Location: [facility];  Service: Surgical Oncology    GASTRIC BYPASS      GASTRIC BYPASS      INFUSION PUMP IMPLANTATION      LAPAROTOMY N/A 2024    Procedure: DUODENAL EXPLORATION;  Surgeon: Karlene Cruz MD;  Location: [facility];  Service: Surgical Oncology    NY NDSC WRST SURG W/RLS TRANSVRS CARPL LIGM Right 2022    Procedure: Right endoscopic carpal tunnel release;  Surgeon: Papo Peguero MD;  Location: [facility];  Service: Orthopedics    SHOULDER SURGERY      SMALL INTESTINE SURGERY N/A 2024    Procedure: INTRAOPERATIVE ULTRASOUND;  Surgeon: Karlene Cruz MD;  Location: [facility];  Service: Surgical Oncology    SPINAL CORD STIMULATOR IMPLANT      WHIPPLE PROCEDURE/PANCREATICO-DUODENECTOMY N/A 2024    Procedure: WHIPPLE;  Surgeon: Karlene Cruz MD;  Location: [facility];  Service: Surgical Oncology      Social History:     Social History     Socioeconomic History    Marital status:      Spouse name: None    Number of children: 3    Years of education: None    Highest education  level: None   Occupational History    Occupation: Disability   Tobacco Use    Smoking status: Former     Current packs/day: 0.00     Average packs/day: 1 pack/day for 34.0 years (34.0 ttl pk-yrs)     Types: Cigarettes     Start date: 1973     Quit date: 2007     Years since quittin.1    Smokeless tobacco: Never    Tobacco comments:     15yrs ago   Vaping Use    Vaping status: Never Used   Substance and Sexual Activity    Alcohol use: No    Drug use: No    Sexual activity: Not Currently     Partners: Male     Birth control/protection: Post-menopausal   Other Topics Concern    None   Social History Narrative    None     Social Determinants of Health     Financial Resource Strain: Not on file   Food Insecurity: Not on file   Transportation Needs: Not on file   Physical Activity: Not on file   Stress: Not on file   Social Connections: Not on file   Intimate Partner Violence: Not on file   Housing Stability: Not on file      Family History:     Family History   Problem Relation Age of Onset    Diabetes Mother         Mellitus    Hyperlipidemia Mother     Hypertension Mother     Colon cancer Mother 78    Pancreatic cancer Mother 84    Melanoma Mother 76    Cancer Father 78        Bladder ca    Alcohol abuse Father     Lung cancer Father 78    Prostate cancer Father 78    No Known Problems Daughter     No Known Problems Maternal Grandmother     No Known Problems Maternal Grandfather     Stomach cancer Paternal Grandmother         60's    Lung cancer Paternal Grandfather 76    Diabetes Brother         Mellitus    Hyperlipidemia Brother     Hypertension Brother     Diabetes Brother     No Known Problems Brother     No Known Problems Son     No Known Problems Son     Breast cancer Maternal Aunt 32        Unknown age    No Known Problems Paternal Aunt     No Known Problems Paternal Aunt     No Known Problems Paternal Aunt     No Known Problems Paternal Aunt     Arthritis Family     Mental illness Neg Hx        Current Medications:     Current Outpatient Medications   Medication Sig Dispense Refill    albuterol (2.5 mg/3 mL) 0.083 % nebulizer solution Take 3 mL (2.5 mg total) by nebulization every 6 (six) hours as needed for wheezing or shortness of breath 300 mL 1    aspirin (ECOTRIN LOW STRENGTH) 81 mg EC tablet Take 81 mg by mouth daily      Cyanocobalamin (B-12 IJ) Inject as directed every 30 (thirty) days      Ferrous Fumarate 63 (20 Fe) MG TABS Take 2 tablets by mouth daily Take two tablets once daily.      Fluticasone-Salmeterol (Advair Diskus) 250-50 mcg/dose inhaler Inhale 1 puff 2 (two) times a day Rinse mouth after use. 180 blister 2    glucose blood (Accu-Chek Guide) test strip Test 100 strip 3    Januvia 100 MG tablet TAKE 1 TABLET EVERY MORNING 90 tablet 3    lidocaine (XYLOCAINE) 5 % ointment apply topically to affected area three times a day      montelukast (SINGULAIR) 10 mg tablet TAKE 1 TABLET AT BEDTIME 90 tablet 1    Morphine Sulfate (MORPHINE 0.05 MG/ML SYRINGE, NICU/PICU,, CMPD ORDER,)       VITAMIN D PO       Accu-Chek FastClix Lancets MISC USE TO TEST BLOOD GLUCOSE  LEVELS TWO TIMES A DAY FOR DIABETES 204 each 3    albuterol (PROVENTIL HFA,VENTOLIN HFA) 90 mcg/act inhaler USE 2 INHALATIONS ORALLY   EVERY 6 HOURS AS NEEDED FORWHEEZING 25.5 g 1    amLODIPine (NORVASC) 10 mg tablet Take 0.5 tablets (5 mg total) by mouth 2 (two) times a day 90 tablet 2    atorvastatin (LIPITOR) 40 mg tablet TAKE 1 TABLET DAILY 90 tablet 0    B-D UF III MINI PEN NEEDLES 31G X 5 MM MISC USE ONCE DAILY FOR BASAL   INSULIN INJECTION 90 each 1    metFORMIN (GLUCOPHAGE-XR) 500 mg 24 hr tablet Take 1 tablet (500 mg total) by mouth daily with breakfast 90 tablet 0    mometasone (NASONEX) 50 mcg/act nasal spray Use 2 sprays in each nostril daily 17 g 1    omeprazole (PriLOSEC) 40 MG capsule Take 1 capsule (40 mg total) by mouth daily 90 capsule 3    ondansetron (ZOFRAN) 4 mg tablet Take 1 tablet (4 mg total) by mouth every 8  "(eight) hours as needed for nausea or vomiting 90 tablet 1    traMADol (ULTRAM-ER) 300 MG 24 hr tablet Take 300 mg by mouth daily      zolpidem (AMBIEN) 5 mg tablet TAKE 1 TABLET DAILY AT     BEDTIME AS NEEDED FOR SLEEP 90 tablet 0     No current facility-administered medications for this visit.      Allergies:     Allergies   Allergen Reactions    Nsaids Other (See Comments)     Cannot take NSAIDS secondary to having gastric bypass    Percocet [Oxycodone-Acetaminophen] GI Intolerance      Physical Exam:     /60   Pulse 86   Ht 5' 4\" (1.626 m)   Wt 71.7 kg (158 lb)   LMP  (LMP Unknown)   SpO2 94%   BMI 27.12 kg/m²     Physical Exam  Vitals reviewed.   Constitutional:       General: She is not in acute distress.     Appearance: Normal appearance. She is normal weight. She is not ill-appearing.   HENT:      Head: Normocephalic and atraumatic.      Right Ear: Tympanic membrane, ear canal and external ear normal. There is no impacted cerumen.      Left Ear: Tympanic membrane, ear canal and external ear normal. There is no impacted cerumen.      Nose: Nose normal. No congestion or rhinorrhea.      Mouth/Throat:      Mouth: Mucous membranes are moist.      Pharynx: Oropharynx is clear. No oropharyngeal exudate or posterior oropharyngeal erythema.   Eyes:      General: No scleral icterus.        Right eye: No discharge.         Left eye: No discharge.      Conjunctiva/sclera: Conjunctivae normal.   Cardiovascular:      Rate and Rhythm: Normal rate and regular rhythm.      Pulses: Normal pulses.      Heart sounds: Normal heart sounds. No murmur heard.     No friction rub. No gallop.   Pulmonary:      Effort: Pulmonary effort is normal. No respiratory distress.      Breath sounds: Normal breath sounds. No stridor. No wheezing.   Musculoskeletal:         General: Normal range of motion.      Cervical back: Normal range of motion and neck supple. No rigidity or tenderness.      Right lower leg: No edema.      Left " lower leg: No edema.   Lymphadenopathy:      Cervical: No cervical adenopathy.   Skin:     General: Skin is warm and dry.      Capillary Refill: Capillary refill takes less than 2 seconds.      Coloration: Skin is not jaundiced or pale.   Neurological:      Mental Status: She is alert and oriented to person, place, and time.      Gait: Gait normal.   Psychiatric:         Mood and Affect: Mood normal.         Behavior: Behavior normal.         Thought Content: Thought content normal.         Judgment: Judgment normal.          Tara Colon DO  [facility] [address]

## 2023-12-26 ENCOUNTER — OFFICE VISIT (OUTPATIENT)
Age: 63
End: 2023-12-26
Payer: COMMERCIAL

## 2023-12-26 VITALS
DIASTOLIC BLOOD PRESSURE: 64 MMHG | SYSTOLIC BLOOD PRESSURE: 120 MMHG | HEIGHT: 64 IN | RESPIRATION RATE: 17 BRPM | WEIGHT: 160 LBS | HEART RATE: 96 BPM | OXYGEN SATURATION: 94 % | BODY MASS INDEX: 27.31 KG/M2 | TEMPERATURE: 97.3 F

## 2023-12-26 DIAGNOSIS — E61.1 IRON DEFICIENCY: ICD-10-CM

## 2023-12-26 DIAGNOSIS — K31.89 DUODENAL MASS: Primary | ICD-10-CM

## 2023-12-26 DIAGNOSIS — E53.8 B12 DEFICIENCY: ICD-10-CM

## 2023-12-26 PROCEDURE — 99203 OFFICE O/P NEW LOW 30 MIN: CPT | Performed by: INTERNAL MEDICINE

## 2023-12-26 NOTE — PROGRESS NOTES
[facility]  [address]  [facility] [address]  [place] [address] [phone]     Date of Visit: 12/26/2023  Name: Aleida Hammond   YOB: 1960         Assessment/Plan    This is a 63-year-old lady with a history of gastric bypass, now presenting with a duodenal mass and an inconclusive biopsy. She will undergo resection some time in January 2024 with Dr. Cruz. I will see her back a few weeks after that to review path and to see if she would need any additional treatment.  In the meantime, we will also recheck her iron studies as it has not been checked since June 2023, when she was iron deficient.   She will also continue with vitamin B12 injections monthly.   Continue Zofran as needed for nausea.  She will need continued monitoring of the CT chest.  Last one from September 2019 reports 2 new left upper lobe pulmonary nodules measuring 0.5 and 0.4 cm.  Of note, patient is a former smoker.    Return in about 2 months (around 2/29/2024) for Office Visit.     All the patient's questions were answered to their apparent satisfaction.  Patient has been strongly urged to call with any questions or concerns.     Total time spent reviewing EMR, seeing patient in clinic visit, documenting visit, placing treatment orders, and communicating with other medical providers:   25 mins      Oncology History    No history exists.      Cancer Staging   No matching staging information was found for the patient.     Treatment Details   Treatment goal [No plan goal]   Plan Name VITAMIN B12 INJECTION (CYANOCOBALAMIN)   Status Active   Start Date 6/27/2023   End Date Until discontinued   Provider CHITRA Mancini   Chemotherapy cyanocobalamin, 1,000 mcg, Intramuscular, Once, 1 of 1 cycle  Administration: 1,000 mcg (6/27/2023), 1,000 mcg (7/26/2023), 1,000 mcg (8/24/2023), 1,000 mcg (9/21/2023), 1,000 mcg (11/9/2023), 1,000 mcg (12/7/2023)       Treatment Details    Treatment goal [No plan goal]   Plan Name VENOFER + B12 COMBINATION   Status Inactive   Start Date 6/28/2023   End Date    Provider CHITRA Mancini   Chemotherapy [No matching medication found in this treatment plan]     Treatment Details   Treatment goal [No plan goal]   Plan Name IRON SUCROSE (VENOFER) TWICE WEEKLY   Status Inactive   Start Date 7/13/2023   End Date 8/24/2023   Provider CHITRA Mancini   Chemotherapy iron sucrose (VENOFER), 200 mg, Intravenous, Once, 1 of 1 cycle  Administration: 200 mg (7/13/2023), 200 mg (7/17/2023), 200 mg (7/19/2023), 200 mg (7/26/2023), 200 mg (7/31/2023), 200 mg (8/8/2023), 200 mg (8/10/2023), 200 mg (8/24/2023)          HISTORY OF PRESENT ILLNESS:   Aleida Hammond is a 63 y.o. 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  Patient states that over the last 2 years, she has experienced over 50 pound weight loss, nausea, poor appetite and abdominal discomfort.    She previously followed in our office for iron deficiency anemia and vitamin B12 injections, which she continues to get the latter monthly.    She had a CT chest on September 29, 2023 which showed  2 new adjacent left upper lobe pulmonary nodules measuring 0.5 and 0.4 cm.  CT abdomen and pelvis from October 7 showed Masslike soft tissue density filling the duodenal lumen measuring approximately 6.7 x 3.1 cm concerning for indeterminate duodenal mass or polypoid lesion.  On October 28, she underwent an EUS with Axios stent placement.  A repeat EUS from November 2018 showed a large villous appearing mass in the second part of the duodenum which was biopsied.  Biopsy was negative for malignancy.  Patient was seen by Dr. Cruz on December 21, and plan is for resection versusampullectomy vs Whipple.    Subjective  Symptoms as noted above.    REVIEW OF SYSTEMS:  She has chronic pain and has 2 separate spinal infusion catheters, and is  also on p.o. morphine and tramadol.  She denies any blood in the stools or black stools.  She complains of nausea, and take Zofran as needed.  14 point review of systems is otherwise negative.      Current Outpatient Medications:     Accu-Chek FastClix Lancets MISC, Use to test blood glucose levels twice daily for diabetes E11.9, Disp: 204 each, Rfl: 3    albuterol (2.5 mg/3 mL) 0.083 % nebulizer solution, Take 3 mL (2.5 mg total) by nebulization every 6 (six) hours as needed for wheezing or shortness of breath, Disp: 300 mL, Rfl: 1    albuterol (PROVENTIL HFA,VENTOLIN HFA) 90 mcg/act inhaler, USE 2 INHALATIONS ORALLY   EVERY 6 HOURS AS NEEDED FORWHEEZING, Disp: 25.5 g, Rfl: 1    amLODIPine (NORVASC) 5 mg tablet, TAKE 1 TABLET TWICE A DAY, Disp: 180 tablet, Rfl: 0    aspirin (ECOTRIN LOW STRENGTH) 81 mg EC tablet, Take 81 mg by mouth daily, Disp: , Rfl:     atorvastatin (LIPITOR) 40 mg tablet, TAKE 1 TABLET DAILY, Disp: 90 tablet, Rfl: 0    B-D UF III MINI PEN NEEDLES 31G X 5 MM MISC, USE ONCE DAILY FOR BASAL   INSULIN INJECTION, Disp: 90 each, Rfl: 1    Cyanocobalamin (B-12 IJ), Inject as directed every 30 (thirty) days, Disp: , Rfl:     diazepam (VALIUM) 2 mg tablet, 1-2 TABS BY MOUTH 60 MINUTES BEFORE MRI AND REPEAT 1 TAB BY MOUTH 30 MIN BEFORE PROCEDURE., Disp: , Rfl:     Ferrous Fumarate 63 (20 Fe) MG TABS, Take 2 tablets by mouth daily Take two tablets once daily., Disp: , Rfl:     Fluticasone-Salmeterol (Advair Diskus) 250-50 mcg/dose inhaler, Inhale 1 puff 2 (two) times a day Rinse mouth after use., Disp: 180 blister, Rfl: 2    glucose blood (Accu-Chek Guide) test strip, Test, Disp: 100 strip, Rfl: 3    HYDROcodone-acetaminophen (NORCO)  mg per tablet, , Disp: , Rfl:     Insulin Glargine Solostar (Basaglar KwikPen) 100 UNIT/ML SOPN, Inject 0.08 mL (8 Units total) under the skin daily at bedtime, Disp: 15 mL, Rfl: 0    Januvia 100 MG tablet, TAKE 1 TABLET EVERY MORNING, Disp: 90 tablet, Rfl: 3     lidocaine (XYLOCAINE) 5 % ointment, apply topically to affected area three times a day, Disp: , Rfl:     LYRICA 200 MG capsule, Take 200 mg by mouth 3 (three) times a day , Disp: , Rfl: 0    metFORMIN (GLUCOPHAGE) 1000 MG tablet, TAKE 1 TABLET TWICE A DAY, Disp: 180 tablet, Rfl: 0    methocarbamol (ROBAXIN) 500 mg tablet, Take 1 tablet (500 mg total) by mouth 3 (three) times a day for 5 days, Disp: 20 tablet, Rfl: 0    mometasone (NASONEX) 50 mcg/act nasal spray, USE 2 SPRAYS IN EACH       NOSTRIL DAILY, Disp: 17 g, Rfl: 1    montelukast (SINGULAIR) 10 mg tablet, TAKE 1 TABLET AT BEDTIME, Disp: 90 tablet, Rfl: 1    Morphine Sulfate (MORPHINE 0.05 MG/ML SYRINGE, NICU/PICU,, CMPD ORDER,), , Disp: , Rfl:     omeprazole (PriLOSEC) 40 MG capsule, TAKE 1 CAPSULE EVERY       MORNING, Disp: 90 capsule, Rfl: 3    ondansetron (ZOFRAN) 4 mg tablet, Take 1 tablet (4 mg total) by mouth every 8 (eight) hours as needed for nausea or vomiting, Disp: 30 tablet, Rfl: 1    predniSONE 10 mg tablet, Take 10 mg by mouth 2 (two) times a day, Disp: , Rfl:     traMADol HCl ER, Biphasic, 300 MG 24 hr tablet, Take 300 mg by mouth daily, Disp: , Rfl:     VITAMIN D PO, , Disp: , Rfl:     zolpidem (AMBIEN) 5 mg tablet, Take 1 tablet (5 mg total) by mouth daily at bedtime as needed for sleep, Disp: 90 tablet, Rfl: 0     Allergies   Allergen Reactions    Nsaids Other (See Comments)     Cannot take NSAIDS secondary to having gastric bypass    Percocet [Oxycodone-Acetaminophen] GI Intolerance        ACTIVE PROBLEMS:  Patient Active Problem List   Diagnosis    Benign essential hypertension    Mild persistent asthma without complication    Peripheral polyneuropathy    Chronic bilateral back pain    Chronic lumbar radiculopathy    Chronic cervical pain    Chronic thoracic spine pain    GERD without esophagitis    Hyperlipidemia associated with type 2 diabetes mellitus     Insomnia    B12 deficiency    Lumbar radiculopathy    Thoracic spondylosis  without myelopathy    Thoracic and lumbosacral neuritis    Degeneration of lumbar intervertebral disc    Lumbar spondylosis    Pain in joint involving pelvic region and thigh    Iron deficiency    Memory dysfunction    COVID-19 vaccine series completed    Type 2 diabetes mellitus with diabetic neuropathy, with long-term current use of insulin (HCC)    Narcotic dependency, continuous (HCC)    Carpal tunnel syndrome on right    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    Microcytic anemia    S/P endoscopic carpal tunnel release    Pillar pain, post-operative    PONV (postoperative nausea and vomiting)    Duodenal mass          Past Medical History:   Diagnosis Date    PABLO (acute kidney injury) (HCC) 2021    Allergic     Anemia     Anxiety     Arthritis     Asthma     Breast lump     Resolved: 2017     Chronic pain     back    Diabetes mellitus (HCC)     Diverticulitis of colon     GERD (gastroesophageal reflux disease)     Hepatitis     History of stomach ulcers     HL (hearing loss)     Estimated    Hyperlipidemia     Hypertension     Hypotension 2021    Infectious viral hepatitis     Hepatitiss A    Memory loss     Estimated    Stomach problems         Past Surgical History:   Procedure Laterality Date    APPENDECTOMY      BACK SURGERY      CATARACT EXTRACTION, BILATERAL       SECTION      CHOLECYSTECTOMY      GASTRIC BYPASS      GASTRIC BYPASS      INFUSION PUMP IMPLANTATION      ME NDSC WRST SURG W/RLS TRANSVRS CARPL LIGM Right 2022    Procedure: Right endoscopic carpal tunnel release;  Surgeon: Papo Peguero MD;  Location:  MAIN OR;  Service: Orthopedics    SHOULDER SURGERY      SPINAL CORD STIMULATOR IMPLANT          Social History     Socioeconomic History    Marital status:      Spouse name: None    Number of children: 3    Years of education: None    Highest education level: None   Occupational History    Occupation:  Disability   Tobacco Use    Smoking status: Former     Current packs/day: 0.00     Average packs/day: 1 pack/day for 34.0 years (34.0 ttl pk-yrs)     Types: Cigarettes     Start date: 1973     Quit date: 2007     Years since quittin.9    Smokeless tobacco: Never    Tobacco comments:     15yrs ago   Vaping Use    Vaping status: Never Used   Substance and Sexual Activity    Alcohol use: No    Drug use: No    Sexual activity: Not Currently     Partners: Male     Birth control/protection: Post-menopausal   Other Topics Concern    None   Social History Narrative    None     Social Determinants of Health     Financial Resource Strain: Not on file   Food Insecurity: Not on file   Transportation Needs: Not on file   Physical Activity: Not on file   Stress: Not on file   Social Connections: Not on file   Intimate Partner Violence: Not on file   Housing Stability: Not on file        Family History   Problem Relation Age of Onset    Diabetes Mother         Mellitus    Hyperlipidemia Mother     Hypertension Mother     Colon cancer Mother 78    Pancreatic cancer Mother 84    Melanoma Mother 76    Cancer Father 78        Bladder ca    Alcohol abuse Father     Lung cancer Father 78    Prostate cancer Father 78    No Known Problems Daughter     No Known Problems Maternal Grandmother     No Known Problems Maternal Grandfather     Stomach cancer Paternal Grandmother         60's    Lung cancer Paternal Grandfather 76    Diabetes Brother         Mellitus    Hyperlipidemia Brother     Hypertension Brother     Diabetes Brother     No Known Problems Brother     No Known Problems Son     No Known Problems Son     Breast cancer Maternal Aunt 32        Unknown age    No Known Problems Paternal Aunt     No Known Problems Paternal Aunt     No Known Problems Paternal Aunt     No Known Problems Paternal Aunt     Arthritis Family     Mental illness Neg Hx         Objective        Physical Exam  ECOG performance status: 0  Blood  "pressure 120/64, pulse 96, temperature (!) 97.3 °F (36.3 °C), resp. rate 17, height 5' 4\" (1.626 m), weight 72.6 kg (160 lb), SpO2 94%.     General appearance: Not in acute distress, well appearing and well nourished.  Alert and oriented.  She appears pale.        I reviewed lab data in the chart as noted above.  Additional labs as noted below    WBC   Date Value Ref Range Status   08/05/2023 9.70 4.31 - 10.16 Thousand/uL Final   12/11/2021 10.08 4.31 - 10.16 Thousand/uL Final   11/05/2021 5.96 4.31 - 10.16 Thousand/uL Final     White Blood Cell Count   Date Value Ref Range Status   09/07/2023 7.9 3.8 - 10.8 Thousand/uL Final   06/23/2023 9.8 3.8 - 10.8 Thousand/uL Final   03/07/2023 7.8 3.8 - 10.8 Thousand/uL Final     Hemoglobin   Date Value Ref Range Status   09/07/2023 10.8 (L) 11.7 - 15.5 g/dL Final   08/05/2023 10.6 (L) 11.5 - 15.4 g/dL Final   06/23/2023 9.6 (L) 11.7 - 15.5 g/dL Final   03/07/2023 10.3 (L) 11.7 - 15.5 g/dL Final   12/11/2021 11.1 (L) 11.5 - 15.4 g/dL Final   11/05/2021 10.5 (L) 11.5 - 15.4 g/dL Final     Platelet Count   Date Value Ref Range Status   09/07/2023 227 140 - 400 Thousand/uL Final   06/23/2023 251 140 - 400 Thousand/uL Final   03/07/2023 251 140 - 400 Thousand/uL Final     Platelets   Date Value Ref Range Status   08/05/2023 232 149 - 390 Thousands/uL Final   12/11/2021 256 149 - 390 Thousands/uL Final   11/05/2021 144 (L) 149 - 390 Thousands/uL Final     MCV   Date Value Ref Range Status   09/07/2023 84.8 80.0 - 100.0 fL Final   08/05/2023 84 82 - 98 fL Final   06/23/2023 80.9 80.0 - 100.0 fL Final   03/07/2023 81.6 80.0 - 100.0 fL Final   12/11/2021 83 82 - 98 fL Final   11/05/2021 83 82 - 98 fL Final      Sodium   Date Value Ref Range Status   11/04/2017 143 135 - 146 mmol/L Final   07/22/2017 142 135 - 146 mmol/L Final     Potassium   Date Value Ref Range Status   09/07/2023 4.8 3.5 - 5.3 mmol/L Final   08/05/2023 3.6 3.5 - 5.3 mmol/L Final   06/23/2023 4.4 3.5 - 5.3 mmol/L " Final   03/07/2023 4.1 3.5 - 5.3 mmol/L Final     Chloride   Date Value Ref Range Status   09/07/2023 103 98 - 110 mmol/L Final   08/05/2023 103 96 - 108 mmol/L Final   06/23/2023 103 98 - 110 mmol/L Final   03/07/2023 102 98 - 110 mmol/L Final     CO2   Date Value Ref Range Status   09/07/2023 28 20 - 32 mmol/L Final   08/05/2023 25 21 - 32 mmol/L Final   06/23/2023 28 20 - 32 mmol/L Final   03/07/2023 31 20 - 32 mmol/L Final     BUN   Date Value Ref Range Status   09/07/2023 30 (H) 7 - 25 mg/dL Final   08/05/2023 21 5 - 25 mg/dL Final   06/23/2023 24 7 - 25 mg/dL Final   03/07/2023 26 (H) 7 - 25 mg/dL Final     Creatinine   Date Value Ref Range Status   09/07/2023 0.83 0.50 - 1.05 mg/dL Final   08/05/2023 0.80 0.60 - 1.30 mg/dL Final     Comment:     Standardized to IDMS reference method   06/23/2023 0.89 0.50 - 1.05 mg/dL Final   03/07/2023 0.79 0.50 - 1.05 mg/dL Final   12/11/2021 1.13 0.60 - 1.30 mg/dL Final     Comment:     Standardized to IDMS reference method   11/05/2021 0.99 0.60 - 1.30 mg/dL Final     Comment:     Standardized to IDMS reference method   11/04/2017 0.63 0.50 - 1.05 mg/dL Final     Comment:     Result Comment: For patients >49 years of age, the reference limit  for Creatinine is approximately 13% higher for people  identified as -American.     07/22/2017 0.69 0.50 - 1.05 mg/dL Final     Comment:     Result Comment: For patients >49 years of age, the reference limit  for Creatinine is approximately 13% higher for people  identified as -American.       Glucose, Random   Date Value Ref Range Status   09/07/2023 78 65 - 99 mg/dL Final     Comment:                   Fasting reference interval        06/23/2023 78 65 - 139 mg/dL Final     Comment:               Non-fasting reference interval        03/07/2023 99 65 - 99 mg/dL Final     Comment:                   Fasting reference interval          Glucose   Date Value Ref Range Status   08/05/2023 157 (H) 65 - 140 mg/dL Final      Comment:     If the patient is fasting, the ADA then defines impaired fasting glucose as > 100 mg/dL and diabetes as > or equal to 123 mg/dL.     eGFR    Date Value Ref Range Status   10/13/2021 109 > OR = 60 mL/min/1.73m2 Final   06/15/2021 116 > OR = 60 mL/min/1.73m2 Final   03/16/2021 110 > OR = 60 mL/min/1.73m2 Final     Calcium   Date Value Ref Range Status   09/07/2023 9.5 8.6 - 10.4 mg/dL Final   08/05/2023 9.3 8.4 - 10.2 mg/dL Final   06/23/2023 9.4 8.6 - 10.4 mg/dL Final   03/07/2023 9.4 8.6 - 10.4 mg/dL Final     Total Protein   Date Value Ref Range Status   07/22/2017 6.6 6.1 - 8.1 g/dL Final     Albumin   Date Value Ref Range Status   09/07/2023 3.7 3.6 - 5.1 g/dL Final   08/05/2023 3.7 3.5 - 5.0 g/dL Final   06/23/2023 3.7 3.6 - 5.1 g/dL Final   03/07/2023 3.5 (L) 3.6 - 5.1 g/dL Final   12/11/2021 3.3 (L) 3.5 - 5.0 g/dL Final   11/04/2021 3.0 (L) 3.5 - 5.0 g/dL Final   07/22/2017 4.0 3.6 - 5.1 g/dL Final     TOTAL BILIRUBIN   Date Value Ref Range Status   09/07/2023 0.5 0.2 - 1.2 mg/dL Final   06/23/2023 0.3 0.2 - 1.2 mg/dL Final   03/07/2023 0.4 0.2 - 1.2 mg/dL Final     Total Bilirubin   Date Value Ref Range Status   08/05/2023 0.24 0.20 - 1.00 mg/dL Final     Comment:     Use of this assay is not recommended for patients undergoing treatment with eltrombopag due to the potential for falsely elevated results.  N-acetyl-p-benzoquinone imine (metabolite of Acetaminophen) will generate erroneously low results in samples for patients that have taken an overdose of Acetaminophen.     Alkaline Phosphatase   Date Value Ref Range Status   09/07/2023 60 37 - 153 U/L Final   08/05/2023 54 34 - 104 U/L Final   06/23/2023 71 37 - 153 U/L Final   03/07/2023 67 37 - 153 U/L Final     AST   Date Value Ref Range Status   09/07/2023 23 10 - 35 U/L Final   08/05/2023 21 13 - 39 U/L Final   06/23/2023 26 10 - 35 U/L Final   03/07/2023 25 10 - 35 U/L Final     ALT   Date Value Ref Range Status  "  09/07/2023 22 6 - 29 U/L Final   08/05/2023 21 7 - 52 U/L Final     Comment:     Specimen collection should occur prior to Sulfasalazine administration due to the potential for falsely depressed results.    06/23/2023 24 6 - 29 U/L Final   03/07/2023 22 6 - 29 U/L Final      No results found for: \"LDH\"  No results found for: \"TSH\"  No results found for: \"X2CZTOD\"   No results found for: \"FREET4\"      RECENT IMAGING:  Procedure: Endoscopic ultrasonography, GI (Upper)    Addendum Date: 12/13/2023 Addendum:   [facility] End[facility] [address] [facility] [address] [phone] DATE OF SERVICE: 12/13/23 PHYSICIAN(S): Attending: Katalina Flynn MD Fellow: No Staff Documented INDICATION: 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 POST-OP DIAGNOSIS: See the impression below. PREPROCEDURE: Informed consent was obtained for the procedure, including sedation.  Risks of perforation, hemorrhage, adverse drug reaction and aspiration were discussed. The patient was placed in the left lateral decubitus position. Patient was explained about the risks and benefits of the procedure. Risks including but not limited to bleeding, infection, and perforation were explained in detail. Also explained about less than 100% sensitivity with the exam and other alternatives. PROCEDURE: EUS UPPER DETAILS OF PROCEDURE: Patient was taken to the procedure room where a time out was performed to confirm correct patient and correct procedure. The patient underwent monitored anesthesia care, which was administered by an anesthesia professional. The patient's blood pressure, heart rate, oxygen, respirations, level of consciousness and ECG were monitored throughout the procedure. The endoscope was introduced through the mouth and advanced to the second part of the duodenum. Retroflexion was performed in the fundus. The patient experienced no blood loss. The procedure was not difficult. The patient tolerated the " procedure well. There were no apparent adverse events. ANESTHESIA INFORMATION: ASA: III Anesthesia Type: General MEDICATIONS: No administrations occurring from 1025 to 1150 on 12/13/23 FINDINGS: The esophagus appeared normal. History of Khurram-en-Y gastric bypass.  Gastric pouch appeared normal.  At the anastomosis and Axios, double lumen opposing stent, was present entering into the remnant stomach.  The scope was advanced through the Axios into the remnant stomach.  There was bolus of fluid present in the fundus.  Food bolus was removed using Sommers net. The angulation to the pylorus was extremely tight as the Axios stent entered the stomach along the incisura and was adjacent to the pylorus.  Several attempts to initially enter the duodenum were unsuccessful including changing to slim scope and SIF enteroscope.  Eventually with the regular upper endoscope we were able to enter the duodenal bulb.  Duodenal bulb appeared normal.  In the second portion of the duodenum there was a large fungating villous appearing mass.  This likely was arising from the ampullary area.  Biopsies were taken with jumbo biopsy forceps and cold snare was used to snare off parts of the mass and this wa sent for pathology. Mass did appear to involve 1/3-1/2 of the lumen circumference.  Majority of the duodenal lumen was occluded by the mass. Endoscopic ultrasound was not performed due to unlikely ability of the scope advancing into the duodenal lumen and tight angulation at the pylorus. SPECIMENS: ID Type Source Tests Collected by Time Destination 1 : duodenal mass Tissue Duodenum TISSUE EXAM Katalina Flynn MD 12/13/2023 11:40 AM   IMPRESSION: Previously placed Axios stent was in place.  Upper endoscope was advanced through the stent into the remnant stomach.  Food bolus the remnant stomach was removed using Sommers net. Difficult entrance into the duodenal bulb requiring several scope changes.  The duodenal bulb appeared normal.  Second portion  of the duodenum had large villous appearing mass likely arising from the ampulla.  This was biopsied with jumbo biopsy forceps and cold snare.  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  Katalina Flynn MD     Result Date: 12/13/2023  Narrative: Table formatting from the original result was not included. [facility] Endoscopy [address] [facility] [address] [phone] DATE OF SERVICE: 12/13/23 PHYSICIAN(S): Attending: Katalina Flynn MD Fellow: No Staff Documented INDICATION: 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 POST-OP DIAGNOSIS: See the impression below. PREPROCEDURE: Informed consent was obtained for the procedure, including sedation.  Risks of perforation, hemorrhage, adverse drug reaction and aspiration were discussed. The patient was placed in the left lateral decubitus position. Patient was explained about the risks and benefits of the procedure. Risks including but not limited to bleeding, infection, and perforation were explained in detail. Also explained about less than 100% sensitivity with the exam and other alternatives. PROCEDURE: EUS UPPER DETAILS OF PROCEDURE: Patient was taken to the procedure room where a time out was performed to confirm correct patient and correct procedure. The patient underwent monitored anesthesia care, which was administered by an anesthesia professional. The patient's blood pressure, heart rate, oxygen, respirations, level of consciousness and ECG were monitored throughout the procedure. The endoscope was introduced through the mouth and advanced to the second part of the duodenum. Retroflexion was performed in the fundus. The patient experienced no blood loss. The procedure was not difficult. The patient tolerated the procedure well. There  were no apparent adverse events. ANESTHESIA INFORMATION: ASA: III Anesthesia Type: General MEDICATIONS: No administrations occurring from 1025 to 1150 on 12/13/23 FINDINGS: The esophagus appeared normal. History of Khurram-en-Y gastric bypass.  Gastric pouch appeared normal.  At the anastomosis and Axios, double lumen opposing stent, was present entering into the remnant stomach.  The scope was advanced through the Axios into the remnant stomach.  There was bolus of fluid present in the fundus.  Food bolus was removed using Sommers net. The angulation to the pylorus was extremely tight as the Axios stent entered the stomach along the incisura and was adjacent to the pylorus.  Several attempts to initially enter the duodenum were unsuccessful including changing to slim scope and SIF enteroscope.  Eventually with the regular upper endoscope we were able to enter the duodenal bulb.  Duodenal bulb appeared normal.  In the second portion of the duodenum there was a large fungating villous appearing mass.  This likely was arising from the ampullary area.  Biopsies were taken with jumbo biopsy forceps and cold snare was used to snare off parts of the mass and this was sent for pathology. Endoscopic ultrasound was not performed due to unlikely ability of the scope advancing into the duodenal lumen and tight angulation at the pylorus. SPECIMENS: ID Type Source Tests Collected by Time Destination 1 : duodenal mass Tissue Duodenum TISSUE EXAM Katalina Flynn MD 12/13/2023 11:40 AM       Impression: Previously placed Axios stent was in place.  Upper endoscope was advanced through the stent into the remnant stomach.  Food bolus the remnant stomach was removed using Sommers net. Difficult entrance into the duodenal bulb requiring several scope changes.  The duodenal bulb appeared normal.  Second portion of the duodenum had large villous appearing mass likely arising from the ampulla.  This was biopsied with jumbo biopsy forceps and cold  "snare.  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  Katalina Flynn MD     Procedure: CT cervical spine without contrast    Result Date: 11/29/2023  Narrative: CT CERVICAL SPINE - WITHOUT CONTRAST INDICATION:   Neck pain, chronic neck pain. COMPARISON:  None. TECHNIQUE:  CT examination of the cervical spine was performed without intravenous contrast.  Contiguous axial images were obtained. Multiplanar 2D reformatted images were created from the source data. Radiation dose length product (DLP) for this visit:  457.78 mGy-cm .  This examination, like all CT scans performed in the [facility], was performed utilizing techniques to minimize radiation dose exposure, including the use of iterative  reconstruction and automated exposure control. IMAGE QUALITY:  Diagnostic. FINDINGS: ALIGNMENT:  Normal alignment of the cervical spine. No subluxation. VERTEBRAE:  No fracture. DEGENERATIVE CHANGES: Ligamentous calcification posteriorly at C4-5, C6-7, and C7-T1 bilaterally contributes to minimal central stenosis. Mild left-sided facet degenerative change C3-4. PREVERTEBRAL AND PARASPINAL SOFT TISSUES: Unremarkable THORACIC INLET:  Normal.     Impression: No cervical spine fracture or traumatic malalignment. Minimal degenerative changes are noted. Workstation performed: [id number]         Portions of the record may have been created with voice recognition software.  Occasional wrong word or \"sound a like\" substitutions may have occurred due to the inherent limitations of voice recognition software.  Read the chart carefully and recognize, using context, where substitutions have occurred.    "

## 2023-12-26 NOTE — H&P (VIEW-ONLY)
[facility]  [address]  [facility] [address]  [place] [address] [phone]     Date of Visit: 12/26/2023  Name: Aleiad Hammond   YOB: 1960         Assessment/Plan    This is a 63-year-old lady with a history of gastric bypass, now presenting with a duodenal mass and an inconclusive biopsy. She will undergo resection some time in January 2024 with Dr. Cruz. I will see her back a few weeks after that to review path and to see if she would need any additional treatment.  In the meantime, we will also recheck her iron studies as it has not been checked since June 2023, when she was iron deficient.   She will also continue with vitamin B12 injections monthly.   Continue Zofran as needed for nausea.  She will need continued monitoring of the CT chest.  Last one from September 2019 reports 2 new left upper lobe pulmonary nodules measuring 0.5 and 0.4 cm.  Of note, patient is a former smoker.    Return in about 2 months (around 2/29/2024) for Office Visit.     All the patient's questions were answered to their apparent satisfaction.  Patient has been strongly urged to call with any questions or concerns.     Total time spent reviewing EMR, seeing patient in clinic visit, documenting visit, placing treatment orders, and communicating with other medical providers:   25 mins      Oncology History    No history exists.      Cancer Staging   No matching staging information was found for the patient.     Treatment Details   Treatment goal [No plan goal]   Plan Name VITAMIN B12 INJECTION (CYANOCOBALAMIN)   Status Active   Start Date 6/27/2023   End Date Until discontinued   Provider CHITRA Mancini   Chemotherapy cyanocobalamin, 1,000 mcg, Intramuscular, Once, 1 of 1 cycle  Administration: 1,000 mcg (6/27/2023), 1,000 mcg (7/26/2023), 1,000 mcg (8/24/2023), 1,000 mcg (9/21/2023), 1,000 mcg (11/9/2023), 1,000 mcg (12/7/2023)       Treatment Details    Treatment goal [No plan goal]   Plan Name VENOFER + B12 COMBINATION   Status Inactive   Start Date 6/28/2023   End Date    Provider CHITRA Mancini   Chemotherapy [No matching medication found in this treatment plan]     Treatment Details   Treatment goal [No plan goal]   Plan Name IRON SUCROSE (VENOFER) TWICE WEEKLY   Status Inactive   Start Date 7/13/2023   End Date 8/24/2023   Provider CHITRA Mancini   Chemotherapy iron sucrose (VENOFER), 200 mg, Intravenous, Once, 1 of 1 cycle  Administration: 200 mg (7/13/2023), 200 mg (7/17/2023), 200 mg (7/19/2023), 200 mg (7/26/2023), 200 mg (7/31/2023), 200 mg (8/8/2023), 200 mg (8/10/2023), 200 mg (8/24/2023)          HISTORY OF PRESENT ILLNESS:   Aleida Hammond is a 63 y.o. 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  Patient states that over the last 2 years, she has experienced over 50 pound weight loss, nausea, poor appetite and abdominal discomfort.    She previously followed in our office for iron deficiency anemia and vitamin B12 injections, which she continues to get the latter monthly.    She had a CT chest on September 29, 2023 which showed  2 new adjacent left upper lobe pulmonary nodules measuring 0.5 and 0.4 cm.  CT abdomen and pelvis from October 7 showed Masslike soft tissue density filling the duodenal lumen measuring approximately 6.7 x 3.1 cm concerning for indeterminate duodenal mass or polypoid lesion.  On October 28, she underwent an EUS with Axios stent placement.  A repeat EUS from November 2018 showed a large villous appearing mass in the second part of the duodenum which was biopsied.  Biopsy was negative for malignancy.  Patient was seen by Dr. Cruz on December 21, and plan is for resection versusampullectomy vs Whipple.    Subjective  Symptoms as noted above.    REVIEW OF SYSTEMS:  She has chronic pain and has 2 separate spinal infusion catheters, and is  also on p.o. morphine and tramadol.  She denies any blood in the stools or black stools.  She complains of nausea, and take Zofran as needed.  14 point review of systems is otherwise negative.      Current Outpatient Medications:     Accu-Chek FastClix Lancets MISC, Use to test blood glucose levels twice daily for diabetes E11.9, Disp: 204 each, Rfl: 3    albuterol (2.5 mg/3 mL) 0.083 % nebulizer solution, Take 3 mL (2.5 mg total) by nebulization every 6 (six) hours as needed for wheezing or shortness of breath, Disp: 300 mL, Rfl: 1    albuterol (PROVENTIL HFA,VENTOLIN HFA) 90 mcg/act inhaler, USE 2 INHALATIONS ORALLY   EVERY 6 HOURS AS NEEDED FORWHEEZING, Disp: 25.5 g, Rfl: 1    amLODIPine (NORVASC) 5 mg tablet, TAKE 1 TABLET TWICE A DAY, Disp: 180 tablet, Rfl: 0    aspirin (ECOTRIN LOW STRENGTH) 81 mg EC tablet, Take 81 mg by mouth daily, Disp: , Rfl:     atorvastatin (LIPITOR) 40 mg tablet, TAKE 1 TABLET DAILY, Disp: 90 tablet, Rfl: 0    B-D UF III MINI PEN NEEDLES 31G X 5 MM MISC, USE ONCE DAILY FOR BASAL   INSULIN INJECTION, Disp: 90 each, Rfl: 1    Cyanocobalamin (B-12 IJ), Inject as directed every 30 (thirty) days, Disp: , Rfl:     diazepam (VALIUM) 2 mg tablet, 1-2 TABS BY MOUTH 60 MINUTES BEFORE MRI AND REPEAT 1 TAB BY MOUTH 30 MIN BEFORE PROCEDURE., Disp: , Rfl:     Ferrous Fumarate 63 (20 Fe) MG TABS, Take 2 tablets by mouth daily Take two tablets once daily., Disp: , Rfl:     Fluticasone-Salmeterol (Advair Diskus) 250-50 mcg/dose inhaler, Inhale 1 puff 2 (two) times a day Rinse mouth after use., Disp: 180 blister, Rfl: 2    glucose blood (Accu-Chek Guide) test strip, Test, Disp: 100 strip, Rfl: 3    HYDROcodone-acetaminophen (NORCO)  mg per tablet, , Disp: , Rfl:     Insulin Glargine Solostar (Basaglar KwikPen) 100 UNIT/ML SOPN, Inject 0.08 mL (8 Units total) under the skin daily at bedtime, Disp: 15 mL, Rfl: 0    Januvia 100 MG tablet, TAKE 1 TABLET EVERY MORNING, Disp: 90 tablet, Rfl: 3     lidocaine (XYLOCAINE) 5 % ointment, apply topically to affected area three times a day, Disp: , Rfl:     LYRICA 200 MG capsule, Take 200 mg by mouth 3 (three) times a day , Disp: , Rfl: 0    metFORMIN (GLUCOPHAGE) 1000 MG tablet, TAKE 1 TABLET TWICE A DAY, Disp: 180 tablet, Rfl: 0    methocarbamol (ROBAXIN) 500 mg tablet, Take 1 tablet (500 mg total) by mouth 3 (three) times a day for 5 days, Disp: 20 tablet, Rfl: 0    mometasone (NASONEX) 50 mcg/act nasal spray, USE 2 SPRAYS IN EACH       NOSTRIL DAILY, Disp: 17 g, Rfl: 1    montelukast (SINGULAIR) 10 mg tablet, TAKE 1 TABLET AT BEDTIME, Disp: 90 tablet, Rfl: 1    Morphine Sulfate (MORPHINE 0.05 MG/ML SYRINGE, NICU/PICU,, CMPD ORDER,), , Disp: , Rfl:     omeprazole (PriLOSEC) 40 MG capsule, TAKE 1 CAPSULE EVERY       MORNING, Disp: 90 capsule, Rfl: 3    ondansetron (ZOFRAN) 4 mg tablet, Take 1 tablet (4 mg total) by mouth every 8 (eight) hours as needed for nausea or vomiting, Disp: 30 tablet, Rfl: 1    predniSONE 10 mg tablet, Take 10 mg by mouth 2 (two) times a day, Disp: , Rfl:     traMADol HCl ER, Biphasic, 300 MG 24 hr tablet, Take 300 mg by mouth daily, Disp: , Rfl:     VITAMIN D PO, , Disp: , Rfl:     zolpidem (AMBIEN) 5 mg tablet, Take 1 tablet (5 mg total) by mouth daily at bedtime as needed for sleep, Disp: 90 tablet, Rfl: 0     Allergies   Allergen Reactions    Nsaids Other (See Comments)     Cannot take NSAIDS secondary to having gastric bypass    Percocet [Oxycodone-Acetaminophen] GI Intolerance        ACTIVE PROBLEMS:  Patient Active Problem List   Diagnosis    Benign essential hypertension    Mild persistent asthma without complication    Peripheral polyneuropathy    Chronic bilateral back pain    Chronic lumbar radiculopathy    Chronic cervical pain    Chronic thoracic spine pain    GERD without esophagitis    Hyperlipidemia associated with type 2 diabetes mellitus     Insomnia    B12 deficiency    Lumbar radiculopathy    Thoracic spondylosis  without myelopathy    Thoracic and lumbosacral neuritis    Degeneration of lumbar intervertebral disc    Lumbar spondylosis    Pain in joint involving pelvic region and thigh    Iron deficiency    Memory dysfunction    COVID-19 vaccine series completed    Type 2 diabetes mellitus with diabetic neuropathy, with long-term current use of insulin (HCC)    Narcotic dependency, continuous (HCC)    Carpal tunnel syndrome on right    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    Microcytic anemia    S/P endoscopic carpal tunnel release    Pillar pain, post-operative    PONV (postoperative nausea and vomiting)    Duodenal mass          Past Medical History:   Diagnosis Date    PABLO (acute kidney injury) (HCC) 2021    Allergic     Anemia     Anxiety     Arthritis     Asthma     Breast lump     Resolved: 2017     Chronic pain     back    Diabetes mellitus (HCC)     Diverticulitis of colon     GERD (gastroesophageal reflux disease)     Hepatitis     History of stomach ulcers     HL (hearing loss)     Estimated    Hyperlipidemia     Hypertension     Hypotension 2021    Infectious viral hepatitis     Hepatitiss A    Memory loss     Estimated    Stomach problems         Past Surgical History:   Procedure Laterality Date    APPENDECTOMY      BACK SURGERY      CATARACT EXTRACTION, BILATERAL       SECTION      CHOLECYSTECTOMY      GASTRIC BYPASS      GASTRIC BYPASS      INFUSION PUMP IMPLANTATION      [place] NDSC WRST SURG W/RLS TRANSVRS CARPL LIGM Right 2022    Procedure: Right endoscopic carpal tunnel release;  Surgeon: Papo Peguero MD;  Location:  MAIN OR;  Service: Orthopedics    SHOULDER SURGERY      SPINAL CORD STIMULATOR IMPLANT          Social History     Socioeconomic History    Marital status:      Spouse name: None    Number of children: 3    Years of education: None    Highest education level: None   Occupational History    Occupation:  Disability   Tobacco Use    Smoking status: Former     Current packs/day: 0.00     Average packs/day: 1 pack/day for 34.0 years (34.0 ttl pk-yrs)     Types: Cigarettes     Start date: 1973     Quit date: 2007     Years since quittin.9    Smokeless tobacco: Never    Tobacco comments:     15yrs ago   Vaping Use    Vaping status: Never Used   Substance and Sexual Activity    Alcohol use: No    Drug use: No    Sexual activity: Not Currently     Partners: Male     Birth control/protection: Post-menopausal   Other Topics Concern    None   Social History Narrative    None     Social Determinants of Health     Financial Resource Strain: Not on file   Food Insecurity: Not on file   Transportation Needs: Not on file   Physical Activity: Not on file   Stress: Not on file   Social Connections: Not on file   Intimate Partner Violence: Not on file   Housing Stability: Not on file        Family History   Problem Relation Age of Onset    Diabetes Mother         Mellitus    Hyperlipidemia Mother     Hypertension Mother     Colon cancer Mother 78    Pancreatic cancer Mother 84    Melanoma Mother 76    Cancer Father 78        Bladder ca    Alcohol abuse Father     Lung cancer Father 78    Prostate cancer Father 78    No Known Problems Daughter     No Known Problems Maternal Grandmother     No Known Problems Maternal Grandfather     Stomach cancer Paternal Grandmother         60's    Lung cancer Paternal Grandfather 76    Diabetes Brother         Mellitus    Hyperlipidemia Brother     Hypertension Brother     Diabetes Brother     No Known Problems Brother     No Known Problems Son     No Known Problems Son     Breast cancer Maternal Aunt 32        Unknown age    No Known Problems Paternal Aunt     No Known Problems Paternal Aunt     No Known Problems Paternal Aunt     No Known Problems Paternal Aunt     Arthritis Family     Mental illness Neg Hx         Objective        Physical Exam  ECOG performance status: 0  Blood  "pressure 120/64, pulse 96, temperature (!) 97.3 °F (36.3 °C), resp. rate 17, height 5' 4\" (1.626 m), weight 72.6 kg (160 lb), SpO2 94%.     General appearance: Not in acute distress, well appearing and well nourished.  Alert and oriented.  She appears pale.        I reviewed lab data in the chart as noted above.  Additional labs as noted below    WBC   Date Value Ref Range Status   08/05/2023 9.70 4.31 - 10.16 Thousand/uL Final   12/11/2021 10.08 4.31 - 10.16 Thousand/uL Final   11/05/2021 5.96 4.31 - 10.16 Thousand/uL Final     White Blood Cell Count   Date Value Ref Range Status   09/07/2023 7.9 3.8 - 10.8 Thousand/uL Final   06/23/2023 9.8 3.8 - 10.8 Thousand/uL Final   03/07/2023 7.8 3.8 - 10.8 Thousand/uL Final     Hemoglobin   Date Value Ref Range Status   09/07/2023 10.8 (L) 11.7 - 15.5 g/dL Final   08/05/2023 10.6 (L) 11.5 - 15.4 g/dL Final   06/23/2023 9.6 (L) 11.7 - 15.5 g/dL Final   03/07/2023 10.3 (L) 11.7 - 15.5 g/dL Final   12/11/2021 11.1 (L) 11.5 - 15.4 g/dL Final   11/05/2021 10.5 (L) 11.5 - 15.4 g/dL Final     Platelet Count   Date Value Ref Range Status   09/07/2023 227 140 - 400 Thousand/uL Final   06/23/2023 251 140 - 400 Thousand/uL Final   03/07/2023 251 140 - 400 Thousand/uL Final     Platelets   Date Value Ref Range Status   08/05/2023 232 149 - 390 Thousands/uL Final   12/11/2021 256 149 - 390 Thousands/uL Final   11/05/2021 144 (L) 149 - 390 Thousands/uL Final     MCV   Date Value Ref Range Status   09/07/2023 84.8 80.0 - 100.0 fL Final   08/05/2023 84 82 - 98 fL Final   06/23/2023 80.9 80.0 - 100.0 fL Final   03/07/2023 81.6 80.0 - 100.0 fL Final   12/11/2021 83 82 - 98 fL Final   11/05/2021 83 82 - 98 fL Final      Sodium   Date Value Ref Range Status   11/04/2017 143 135 - 146 mmol/L Final   07/22/2017 142 135 - 146 mmol/L Final     Potassium   Date Value Ref Range Status   09/07/2023 4.8 3.5 - 5.3 mmol/L Final   08/05/2023 3.6 3.5 - 5.3 mmol/L Final   06/23/2023 4.4 3.5 - 5.3 mmol/L " Final   03/07/2023 4.1 3.5 - 5.3 mmol/L Final     Chloride   Date Value Ref Range Status   09/07/2023 103 98 - 110 mmol/L Final   08/05/2023 103 96 - 108 mmol/L Final   06/23/2023 103 98 - 110 mmol/L Final   03/07/2023 102 98 - 110 mmol/L Final     CO2   Date Value Ref Range Status   09/07/2023 28 20 - 32 mmol/L Final   08/05/2023 25 21 - 32 mmol/L Final   06/23/2023 28 20 - 32 mmol/L Final   03/07/2023 31 20 - 32 mmol/L Final     BUN   Date Value Ref Range Status   09/07/2023 30 (H) 7 - 25 mg/dL Final   08/05/2023 21 5 - 25 mg/dL Final   06/23/2023 24 7 - 25 mg/dL Final   03/07/2023 26 (H) 7 - 25 mg/dL Final     Creatinine   Date Value Ref Range Status   09/07/2023 0.83 0.50 - 1.05 mg/dL Final   08/05/2023 0.80 0.60 - 1.30 mg/dL Final     Comment:     Standardized to IDMS reference method   06/23/2023 0.89 0.50 - 1.05 mg/dL Final   03/07/2023 0.79 0.50 - 1.05 mg/dL Final   12/11/2021 1.13 0.60 - 1.30 mg/dL Final     Comment:     Standardized to IDMS reference method   11/05/2021 0.99 0.60 - 1.30 mg/dL Final     Comment:     Standardized to IDMS reference method   11/04/2017 0.63 0.50 - 1.05 mg/dL Final     Comment:     Result Comment: For patients >49 years of age, the reference limit  for Creatinine is approximately 13% higher for people  identified as -American.     07/22/2017 0.69 0.50 - 1.05 mg/dL Final     Comment:     Result Comment: For patients >49 years of age, the reference limit  for Creatinine is approximately 13% higher for people  identified as -American.       Glucose, Random   Date Value Ref Range Status   09/07/2023 78 65 - 99 mg/dL Final     Comment:                   Fasting reference interval        06/23/2023 78 65 - 139 mg/dL Final     Comment:               Non-fasting reference interval        03/07/2023 99 65 - 99 mg/dL Final     Comment:                   Fasting reference interval          Glucose   Date Value Ref Range Status   08/05/2023 157 (H) 65 - 140 mg/dL Final      Comment:     If the patient is fasting, the ADA then defines impaired fasting glucose as > 100 mg/dL and diabetes as > or equal to 123 mg/dL.     eGFR    Date Value Ref Range Status   10/13/2021 109 > OR = 60 mL/min/1.73m2 Final   06/15/2021 116 > OR = 60 mL/min/1.73m2 Final   03/16/2021 110 > OR = 60 mL/min/1.73m2 Final     Calcium   Date Value Ref Range Status   09/07/2023 9.5 8.6 - 10.4 mg/dL Final   08/05/2023 9.3 8.4 - 10.2 mg/dL Final   06/23/2023 9.4 8.6 - 10.4 mg/dL Final   03/07/2023 9.4 8.6 - 10.4 mg/dL Final     Total Protein   Date Value Ref Range Status   07/22/2017 6.6 6.1 - 8.1 g/dL Final     Albumin   Date Value Ref Range Status   09/07/2023 3.7 3.6 - 5.1 g/dL Final   08/05/2023 3.7 3.5 - 5.0 g/dL Final   06/23/2023 3.7 3.6 - 5.1 g/dL Final   03/07/2023 3.5 (L) 3.6 - 5.1 g/dL Final   12/11/2021 3.3 (L) 3.5 - 5.0 g/dL Final   11/04/2021 3.0 (L) 3.5 - 5.0 g/dL Final   07/22/2017 4.0 3.6 - 5.1 g/dL Final     TOTAL BILIRUBIN   Date Value Ref Range Status   09/07/2023 0.5 0.2 - 1.2 mg/dL Final   06/23/2023 0.3 0.2 - 1.2 mg/dL Final   03/07/2023 0.4 0.2 - 1.2 mg/dL Final     Total Bilirubin   Date Value Ref Range Status   08/05/2023 0.24 0.20 - 1.00 mg/dL Final     Comment:     Use of this assay is not recommended for patients undergoing treatment with eltrombopag due to the potential for falsely elevated results.  N-acetyl-p-benzoquinone imine (metabolite of Acetaminophen) will generate erroneously low results in samples for patients that have taken an overdose of Acetaminophen.     Alkaline Phosphatase   Date Value Ref Range Status   09/07/2023 60 37 - 153 U/L Final   08/05/2023 54 34 - 104 U/L Final   06/23/2023 71 37 - 153 U/L Final   03/07/2023 67 37 - 153 U/L Final     AST   Date Value Ref Range Status   09/07/2023 23 10 - 35 U/L Final   08/05/2023 21 13 - 39 U/L Final   06/23/2023 26 10 - 35 U/L Final   03/07/2023 25 10 - 35 U/L Final     ALT   Date Value Ref Range Status  "  09/07/2023 22 6 - 29 U/L Final   08/05/2023 21 7 - 52 U/L Final     Comment:     Specimen collection should occur prior to Sulfasalazine administration due to the potential for falsely depressed results.    06/23/2023 24 6 - 29 U/L Final   03/07/2023 22 6 - 29 U/L Final      No results found for: \"LDH\"  No results found for: \"TSH\"  No results found for: \"W4TBWMD\"   No results found for: \"FREET4\"      RECENT IMAGING:  Procedure: Endoscopic ultrasonography, GI (Upper)    Addendum Date: 12/13/2023 Addendum:   [facility] Endoscopy [address] [facility] [address] [phone] DATE OF SERVICE: 12/13/23 PHYSICIAN(S): Attending: Katalina Flynn MD Fellow: No Staff Documented INDICATION: 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 POST-OP DIAGNOSIS: See the impression below. PREPROCEDURE: Informed consent was obtained for the procedure, including sedation.  Risks of perforation, hemorrhage, adverse drug reaction and aspiration were discussed. The patient was placed in the left lateral decubitus position. Patient was explained about the risks and benefits of the procedure. Risks including but not limited to bleeding, infection, and perforation were explained in detail. Also explained about less than 100% sensitivity with the exam and other alternatives. PROCEDURE: EUS UPPER DETAILS OF PROCEDURE: Patient was taken to the procedure room where a time out was performed to confirm correct patient and correct procedure. The patient underwent monitored anesthesia care, which was administered by an anesthesia professional. The patient's blood pressure, heart rate, oxygen, respirations, level of consciousness and ECG were monitored throughout the procedure. The endoscope was introduced through the mouth and advanced to the second part of the duodenum. Retroflexion was performed in the fundus. The patient experienced no blood loss. The procedure was not difficult. The patient tolerated the " procedure well. There were no apparent adverse events. ANESTHESIA INFORMATION: ASA: III Anesthesia Type: General MEDICATIONS: No administrations occurring from 1025 to 1150 on 12/13/23 FINDINGS: The esophagus appeared normal. History of Khurram-en-Y gastric bypass.  Gastric pouch appeared normal.  At the anastomosis and Axios, double lumen opposing stent, was present entering into the remnant stomach.  The scope was advanced through the Axios into the remnant stomach.  There was bolus of fluid present in the fundus.  Food bolus was removed using Sommers net. The angulation to the pylorus was extremely tight as the Axios stent entered the stomach along the incisura and was adjacent to the pylorus.  Several attempts to initially enter the duodenum were unsuccessful including changing to slim scope and SIF enteroscope.  Eventually with the regular upper endoscope we were able to enter the duodenal bulb.  Duodenal bulb appeared normal.  In the second portion of the duodenum there was a large fungating villous appearing mass.  This likely was arising from the ampullary area.  Biopsies were taken with jumbo biopsy forceps and cold snare was used to snare off parts of the mass and this wa sent for pathology. Mass did appear to involve 1/3-1/2 of the lumen circumference.  Majority of the duodenal lumen was occluded by the mass. Endoscopic ultrasound was not performed due to unlikely ability of the scope advancing into the duodenal lumen and tight angulation at the pylorus. SPECIMENS: ID Type Source Tests Collected by Time Destination 1 : duodenal mass Tissue Duodenum TISSUE EXAM Katalina Flynn MD 12/13/2023 11:40 AM   IMPRESSION: Previously placed Axios stent was in place.  Upper endoscope was advanced through the stent into the remnant stomach.  Food bolus the remnant stomach was removed using Sommers net. Difficult entrance into the duodenal bulb requiring several scope changes.  The duodenal bulb appeared normal.  Second portion  of the duodenum had large villous appearing mass likely arising from the ampulla.  This was biopsied with jumbo biopsy forceps and cold snare.  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  Katalina Flynn MD     Result Date: 12/13/2023  Narrative: Table formatting from the original result was not included. [facility] Endoscopy [address] [facility] [address] [phone] DATE OF SERVICE: 12/13/23 PHYSICIAN(S): Attending: Katalina Flynn MD Fellow: No Staff Documented INDICATION: 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 POST-OP DIAGNOSIS: See the impression below. PREPROCEDURE: Informed consent was obtained for the procedure, including sedation.  Risks of perforation, hemorrhage, adverse drug reaction and aspiration were discussed. The patient was placed in the left lateral decubitus position. Patient was explained about the risks and benefits of the procedure. Risks including but not limited to bleeding, infection, and perforation were explained in detail. Also explained about less than 100% sensitivity with the exam and other alternatives. PROCEDURE: EUS UPPER DETAILS OF PROCEDURE: Patient was taken to the procedure room where a time out was performed to confirm correct patient and correct procedure. The patient underwent monitored anesthesia care, which was administered by an anesthesia professional. The patient's blood pressure, heart rate, oxygen, respirations, level of consciousness and ECG were monitored throughout the procedure. The endoscope was introduced through the mouth and advanced to the second part of the duodenum. Retroflexion was performed in the fundus. The patient experienced no blood loss. The procedure was not difficult. The patient tolerated the procedure well. There  were no apparent adverse events. ANESTHESIA INFORMATION: ASA: III Anesthesia Type: General MEDICATIONS: No administrations occurring from 1025 to 1150 on 12/13/23 FINDINGS: The esophagus appeared normal. History of Khurram-en-Y gastric bypass.  Gastric pouch appeared normal.  At the anastomosis and Axios, double lumen opposing stent, was present entering into the remnant stomach.  The scope was advanced through the Axios into the remnant stomach.  There was bolus of fluid present in the fundus.  Food bolus was removed using Sommers net. The angulation to the pylorus was extremely tight as the Axios stent entered the stomach along the incisura and was adjacent to the pylorus.  Several attempts to initially enter the duodenum were unsuccessful including changing to slim scope and SIF enteroscope.  Eventually with the regular upper endoscope we were able to enter the duodenal bulb.  Duodenal bulb appeared normal.  In the second portion of the duodenum there was a large fungating villous appearing mass.  This likely was arising from the ampullary area.  Biopsies were taken with jumbo biopsy forceps and cold snare was used to snare off parts of the mass and this was sent for pathology. Endoscopic ultrasound was not performed due to unlikely ability of the scope advancing into the duodenal lumen and tight angulation at the pylorus. SPECIMENS: ID Type Source Tests Collected by Time Destination 1 : duodenal mass Tissue Duodenum TISSUE EXAM Katalina Flynn MD 12/13/2023 11:40 AM       Impression: Previously placed Axios stent was in place.  Upper endoscope was advanced through the stent into the remnant stomach.  Food bolus the remnant stomach was removed using Sommers net. Difficult entrance into the duodenal bulb requiring several scope changes.  The duodenal bulb appeared normal.  Second portion of the duodenum had large villous appearing mass likely arising from the ampulla.  This was biopsied with jumbo biopsy forceps and cold  "snare.  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  Katalina Flynn MD     Procedure: CT cervical spine without contrast    Result Date: 11/29/2023  Narrative: CT CERVICAL SPINE - WITHOUT CONTRAST INDICATION:   Neck pain, chronic neck pain. COMPARISON:  None. TECHNIQUE:  CT examination of the cervical spine was performed without intravenous contrast.  Contiguous axial images were obtained. Multiplanar 2D reformatted images were created from the source data. Radiation dose length product (DLP) for this visit:  457.78 mGy-cm .  This examination, like all CT scans performed in the [facility], was performed utilizing techniques to minimize radiation dose exposure, including the use of iterative  reconstruction and automated exposure control. IMAGE QUALITY:  Diagnostic. FINDINGS: ALIGNMENT:  Normal alignment of the cervical spine. No subluxation. VERTEBRAE:  No fracture. DEGENERATIVE CHANGES: Ligamentous calcification posteriorly at C4-5, C6-7, and C7-T1 bilaterally contributes to minimal central stenosis. Mild left-sided facet degenerative change C3-4. PREVERTEBRAL AND PARASPINAL SOFT TISSUES: Unremarkable THORACIC INLET:  Normal.     Impression: No cervical spine fracture or traumatic malalignment. Minimal degenerative changes are noted. Workstation performed: [id number]         Portions of the record may have been created with voice recognition software.  Occasional wrong word or \"sound a like\" substitutions may have occurred due to the inherent limitations of voice recognition software.  Read the chart carefully and recognize, using context, where substitutions have occurred.    "

## 2023-12-27 ENCOUNTER — TELEPHONE (OUTPATIENT)
Dept: HEMATOLOGY ONCOLOGY | Facility: CLINIC | Age: 63
End: 2023-12-27

## 2023-12-27 DIAGNOSIS — R11.0 NAUSEA: ICD-10-CM

## 2023-12-27 RX ORDER — ONDANSETRON 4 MG/1
4 TABLET, FILM COATED ORAL EVERY 8 HOURS PRN
Qty: 90 TABLET | Refills: 1 | Status: SHIPPED | OUTPATIENT
Start: 2023-12-27

## 2023-12-27 NOTE — TELEPHONE ENCOUNTER
I called and spoke with patient as she was on my list of to-do's today. I explained to patient that I still do not have a surgery date and am actively looking for open time to put her case on. I informed patient I will keep her updated every few days until I find a date. Patient would prefer to wait until after 1/23 due to an apt she has been waiting for. I let her know I will call her again on 1/2/24 with an update. Patient was appreciative of the call and had no further questions at this time.

## 2023-12-27 NOTE — TELEPHONE ENCOUNTER
Patient Call    Who are you speaking with? Patient    If it is not the patient, are they listed on an active communication consent form? N/A   What is the reason for this call? Patient is asking when her surgery will be scheduled for    Does this require a call back? Yes   If a call back is required, please list best call back number [phone]   If a call back is required, advise that a message will be forwarded to their care team and someone will return their call as soon as possible.   Did you relay this information to the patient? Yes

## 2024-01-01 DIAGNOSIS — E78.5 HYPERLIPIDEMIA ASSOCIATED WITH TYPE 2 DIABETES MELLITUS: ICD-10-CM

## 2024-01-01 DIAGNOSIS — E11.69 HYPERLIPIDEMIA ASSOCIATED WITH TYPE 2 DIABETES MELLITUS: ICD-10-CM

## 2024-01-01 DIAGNOSIS — E11.9 TYPE 2 DIABETES MELLITUS WITHOUT COMPLICATION, WITHOUT LONG-TERM CURRENT USE OF INSULIN (HCC): ICD-10-CM

## 2024-01-01 RX ORDER — ATORVASTATIN CALCIUM 40 MG/1
TABLET, FILM COATED ORAL
Qty: 90 TABLET | Refills: 0 | Status: SHIPPED | OUTPATIENT
Start: 2024-01-01

## 2024-01-01 RX ORDER — LANCETS
EACH MISCELLANEOUS
Qty: 204 EACH | Refills: 3 | Status: SHIPPED | OUTPATIENT
Start: 2024-01-01

## 2024-01-02 ENCOUNTER — TELEPHONE (OUTPATIENT)
Dept: HEMATOLOGY ONCOLOGY | Facility: CLINIC | Age: 64
End: 2024-01-02

## 2024-01-02 NOTE — TELEPHONE ENCOUNTER
Patient Call    Who are you speaking with? Patient    If it is not the patient, are they listed on an active communication consent form? N/A   What is the reason for this call? Patient calling in regards to her surgery with Dr. Cruz. Patient states she was supposed to receive a call to schedule the surgery.  Patient would like a call back to discuss scheduling her surgery.   Does this require a call back? Yes   If a call back is required, please list best call back number [phone]   If a call back is required, advise that a message will be forwarded to their care team and someone will return their call as soon as possible.   Did you relay this information to the patient? Yes

## 2024-01-03 NOTE — TELEPHONE ENCOUNTER
I called and spoke with patient in regards to her surgery. I explained to her that as of right now I have two dates in mind but currently working with the OR and my manager on getting one of them approved. I apologized that I still don't have an exact date for her and I will call her again on Friday with another update. Patient is very anxious but was very appreciative of the call and update and had no further questions at this time.

## 2024-01-04 DIAGNOSIS — E11.40 TYPE 2 DIABETES MELLITUS WITH DIABETIC NEUROPATHY, WITH LONG-TERM CURRENT USE OF INSULIN (HCC): ICD-10-CM

## 2024-01-04 DIAGNOSIS — Z79.4 TYPE 2 DIABETES MELLITUS WITH DIABETIC NEUROPATHY, WITH LONG-TERM CURRENT USE OF INSULIN (HCC): ICD-10-CM

## 2024-01-04 DIAGNOSIS — E53.8 B12 DEFICIENCY: Primary | ICD-10-CM

## 2024-01-04 RX ORDER — CYANOCOBALAMIN 1000 UG/ML
1000 INJECTION, SOLUTION INTRAMUSCULAR; SUBCUTANEOUS ONCE
OUTPATIENT
Start: 2024-01-10

## 2024-01-04 RX ORDER — INSULIN GLARGINE 100 [IU]/ML
8 INJECTION, SOLUTION SUBCUTANEOUS
Qty: 15 ML | Refills: 1 | Status: SHIPPED | OUTPATIENT
Start: 2024-01-04

## 2024-01-05 ENCOUNTER — TELEPHONE (OUTPATIENT)
Dept: HEMATOLOGY ONCOLOGY | Facility: CLINIC | Age: 64
End: 2024-01-05

## 2024-01-05 NOTE — TELEPHONE ENCOUNTER
Called and spoke with patient again in regards to her surgery. I explained that I still do not have a date for her and doing everything in my power to get her a date. I did let her know I have reached out to other departments and looking to see if they are willing to give us any OR time for this patients surgery and still waiting on responses. I will call her again Monday or Tuesday with another update. Patient is very anxious and overwhelmed but appreciates me trying so hard. No further questions/concerns at this time.

## 2024-01-05 NOTE — TELEPHONE ENCOUNTER
Patient Call    Who are you speaking with? Patient    If it is not the patient, are they listed on an active communication consent form? N/A   What is the reason for this call? Patient hs questions about getting the date set up for her surgery   Does this require a call back? Yes   If a call back is required, please list best call back number [phone]   If a call back is required, advise that a message will be forwarded to their care team and someone will return their call as soon as possible.   Did you relay this information to the patient? Yes

## 2024-01-08 ENCOUNTER — TELEPHONE (OUTPATIENT)
Dept: SURGICAL ONCOLOGY | Facility: CLINIC | Age: 64
End: 2024-01-08

## 2024-01-08 NOTE — TELEPHONE ENCOUNTER
I called and spoke with patient in regards to a surgery date. I explained to patient that I know she has an apt on 1/23/24 that she has been trying to get the last 2 years but mentioned I was given the option for 1/22/24 for her surgery. Patient states that at this point, there is nothing else we can do and is willing to cancel her apt on 1/23/24 and accepted surgery date of 1/22/24 with Dr Cruz. Patient appreciated all the efforts I put into this and will go for blood work today or tomorrow and is seeing her PCP tomorrow. No further questions at his time.

## 2024-01-09 ENCOUNTER — OFFICE VISIT (OUTPATIENT)
Dept: FAMILY MEDICINE CLINIC | Facility: HOSPITAL | Age: 64
End: 2024-01-09
Payer: COMMERCIAL

## 2024-01-09 ENCOUNTER — APPOINTMENT (OUTPATIENT)
Dept: LAB | Facility: HOSPITAL | Age: 64
End: 2024-01-09
Payer: COMMERCIAL

## 2024-01-09 ENCOUNTER — TELEPHONE (OUTPATIENT)
Dept: ANESTHESIOLOGY | Facility: CLINIC | Age: 64
End: 2024-01-09

## 2024-01-09 ENCOUNTER — LAB REQUISITION (OUTPATIENT)
Dept: LAB | Facility: HOSPITAL | Age: 64
End: 2024-01-09
Payer: COMMERCIAL

## 2024-01-09 VITALS
HEART RATE: 91 BPM | SYSTOLIC BLOOD PRESSURE: 128 MMHG | DIASTOLIC BLOOD PRESSURE: 72 MMHG | BODY MASS INDEX: 27.04 KG/M2 | HEIGHT: 64 IN | WEIGHT: 158.4 LBS | TEMPERATURE: 97.7 F

## 2024-01-09 DIAGNOSIS — K31.89 DUODENAL MASS: ICD-10-CM

## 2024-01-09 DIAGNOSIS — E11.9 CONTROLLED TYPE 2 DIABETES MELLITUS WITHOUT COMPLICATION, WITHOUT LONG-TERM CURRENT USE OF INSULIN (HCC): ICD-10-CM

## 2024-01-09 DIAGNOSIS — E61.1 IRON DEFICIENCY: ICD-10-CM

## 2024-01-09 DIAGNOSIS — K31.89: ICD-10-CM

## 2024-01-09 DIAGNOSIS — C72.0 MALIGNANT NEOPLASM OF SPINAL CORD (HCC): ICD-10-CM

## 2024-01-09 DIAGNOSIS — F11.20 NARCOTIC DEPENDENCY, CONTINUOUS (HCC): ICD-10-CM

## 2024-01-09 DIAGNOSIS — Z79.4 TYPE 2 DIABETES MELLITUS WITH OTHER SPECIFIED COMPLICATION, WITH LONG-TERM CURRENT USE OF INSULIN (HCC): ICD-10-CM

## 2024-01-09 DIAGNOSIS — E11.69 TYPE 2 DIABETES MELLITUS WITH OTHER SPECIFIED COMPLICATION, WITH LONG-TERM CURRENT USE OF INSULIN (HCC): ICD-10-CM

## 2024-01-09 DIAGNOSIS — I10 BENIGN ESSENTIAL HYPERTENSION: ICD-10-CM

## 2024-01-09 DIAGNOSIS — K31.89 OTHER DISEASES OF STOMACH AND DUODENUM: ICD-10-CM

## 2024-01-09 DIAGNOSIS — Z01.818 PREOP EXAMINATION: Primary | ICD-10-CM

## 2024-01-09 LAB
ABO GROUP BLD: NORMAL
ALBUMIN SERPL BCP-MCNC: 4.2 G/DL (ref 3.5–5)
ALP SERPL-CCNC: 55 U/L (ref 34–104)
ALT SERPL W P-5'-P-CCNC: 19 U/L (ref 7–52)
ANION GAP SERPL CALCULATED.3IONS-SCNC: 13 MMOL/L
APTT PPP: 26 SECONDS (ref 23–37)
AST SERPL W P-5'-P-CCNC: 21 U/L (ref 13–39)
BASOPHILS # BLD AUTO: 0.03 THOUSANDS/ÂΜL (ref 0–0.1)
BASOPHILS NFR BLD AUTO: 0 % (ref 0–1)
BILIRUB SERPL-MCNC: 0.24 MG/DL (ref 0.2–1)
BLD GP AB SCN SERPL QL: NEGATIVE
BUN SERPL-MCNC: 23 MG/DL (ref 5–25)
CALCIUM SERPL-MCNC: 9.6 MG/DL (ref 8.4–10.2)
CEA SERPL-MCNC: 2.4 NG/ML (ref 0–3)
CHLORIDE SERPL-SCNC: 95 MMOL/L (ref 96–108)
CO2 SERPL-SCNC: 29 MMOL/L (ref 21–32)
CREAT SERPL-MCNC: 0.63 MG/DL (ref 0.6–1.3)
EOSINOPHIL # BLD AUTO: 0.07 THOUSAND/ÂΜL (ref 0–0.61)
EOSINOPHIL NFR BLD AUTO: 1 % (ref 0–6)
ERYTHROCYTE [DISTWIDTH] IN BLOOD BY AUTOMATED COUNT: 15.3 % (ref 11.6–15.1)
EST. AVERAGE GLUCOSE BLD GHB EST-MCNC: 123 MG/DL
FERRITIN SERPL-MCNC: 51 NG/ML (ref 11–307)
GFR SERPL CREATININE-BSD FRML MDRD: 95 ML/MIN/1.73SQ M
GLUCOSE P FAST SERPL-MCNC: 109 MG/DL (ref 65–99)
HBA1C MFR BLD: 5.9 %
HCT VFR BLD AUTO: 30.4 % (ref 34.8–46.1)
HGB BLD-MCNC: 9.2 G/DL (ref 11.5–15.4)
IMM GRANULOCYTES # BLD AUTO: 0.07 THOUSAND/UL (ref 0–0.2)
IMM GRANULOCYTES NFR BLD AUTO: 1 % (ref 0–2)
INR PPP: 0.93 (ref 0.84–1.19)
IRON SATN MFR SERPL: 15 % (ref 15–50)
IRON SERPL-MCNC: 53 UG/DL (ref 50–212)
LYMPHOCYTES # BLD AUTO: 2.06 THOUSANDS/ÂΜL (ref 0.6–4.47)
LYMPHOCYTES NFR BLD AUTO: 17 % (ref 14–44)
MCH RBC QN AUTO: 25.8 PG (ref 26.8–34.3)
MCHC RBC AUTO-ENTMCNC: 30.3 G/DL (ref 31.4–37.4)
MCV RBC AUTO: 85 FL (ref 82–98)
MONOCYTES # BLD AUTO: 0.62 THOUSAND/ÂΜL (ref 0.17–1.22)
MONOCYTES NFR BLD AUTO: 5 % (ref 4–12)
NEUTROPHILS # BLD AUTO: 9.57 THOUSANDS/ÂΜL (ref 1.85–7.62)
NEUTS SEG NFR BLD AUTO: 76 % (ref 43–75)
NRBC BLD AUTO-RTO: 0 /100 WBCS
PLATELET # BLD AUTO: 394 THOUSANDS/UL (ref 149–390)
PMV BLD AUTO: 11.2 FL (ref 8.9–12.7)
POTASSIUM SERPL-SCNC: 4.1 MMOL/L (ref 3.5–5.3)
PROT SERPL-MCNC: 7.4 G/DL (ref 6.4–8.4)
PROTHROMBIN TIME: 12.4 SECONDS (ref 11.6–14.5)
RBC # BLD AUTO: 3.56 MILLION/UL (ref 3.81–5.12)
RH BLD: POSITIVE
SODIUM SERPL-SCNC: 137 MMOL/L (ref 135–147)
SPECIMEN EXPIRATION DATE: NORMAL
TIBC SERPL-MCNC: 345 UG/DL (ref 250–450)
UIBC SERPL-MCNC: 292 UG/DL (ref 155–355)
WBC # BLD AUTO: 12.42 THOUSAND/UL (ref 4.31–10.16)

## 2024-01-09 PROCEDURE — 86850 RBC ANTIBODY SCREEN: CPT | Performed by: STUDENT IN AN ORGANIZED HEALTH CARE EDUCATION/TRAINING PROGRAM

## 2024-01-09 PROCEDURE — 85025 COMPLETE CBC W/AUTO DIFF WBC: CPT

## 2024-01-09 PROCEDURE — 85730 THROMBOPLASTIN TIME PARTIAL: CPT

## 2024-01-09 PROCEDURE — 82378 CARCINOEMBRYONIC ANTIGEN: CPT

## 2024-01-09 PROCEDURE — 86900 BLOOD TYPING SEROLOGIC ABO: CPT | Performed by: STUDENT IN AN ORGANIZED HEALTH CARE EDUCATION/TRAINING PROGRAM

## 2024-01-09 PROCEDURE — 80053 COMPREHEN METABOLIC PANEL: CPT

## 2024-01-09 PROCEDURE — 99214 OFFICE O/P EST MOD 30 MIN: CPT | Performed by: NURSE PRACTITIONER

## 2024-01-09 PROCEDURE — 86901 BLOOD TYPING SEROLOGIC RH(D): CPT | Performed by: STUDENT IN AN ORGANIZED HEALTH CARE EDUCATION/TRAINING PROGRAM

## 2024-01-09 PROCEDURE — 83550 IRON BINDING TEST: CPT

## 2024-01-09 PROCEDURE — 36415 COLL VENOUS BLD VENIPUNCTURE: CPT

## 2024-01-09 PROCEDURE — 85610 PROTHROMBIN TIME: CPT

## 2024-01-09 PROCEDURE — 83036 HEMOGLOBIN GLYCOSYLATED A1C: CPT

## 2024-01-09 PROCEDURE — 82728 ASSAY OF FERRITIN: CPT

## 2024-01-09 PROCEDURE — 83540 ASSAY OF IRON: CPT

## 2024-01-09 NOTE — PROGRESS NOTES
[facility] PRE-OPERATIVE EVALUATION  [facility] - [facility] PRIMARY CARE [address]     NAME: Aleida Hammond  AGE: 64 y.o. SEX: female  : 1960     DATE: 2024    [facility] Pre-Operative Evaluation      Chief Complaint: Pre-operative Evaluation     Surgery: duodenal resection, possible whipple  Anticipated Date of Surgery: 24  Referring Provider: No ref. provider found       History of Present Illness:     Aleida Hammond is a 64 y.o. female who presents to the office today for a preoperative consultation at the request of surgeon, Dr Cruz, who plans on performing duodenal resection, possible whipple on 24. Planned anesthesia is  ? (She states this hasn't been discussed yet) . Patient has a bleeding risk of: use of Ca-channel blockers (see med list). Patient does not have objections to receiving blood products if needed. Current anti-platelet/anti-coagulation medications that the patient is prescribed includes: Aspirin.      Assessment of Chronic Conditions:   - Diabetes Mellitus: IDDM   - Asthma: well controlled on inhalers, no recent exacerbation  - Hypertension: well controlled on medication     Assessment of Cardiac Risk:  Denies unstable or severe angina or MI in the last 6 weeks or history of stent placement in the last year   Denies decompensated heart failure (e.g. New onset heart failure, NYHA functional class IV heart failure, or worsening existing heart failure)  Denies significant arrhythmias such as high grade AV block, symptomatic ventricular arrhythmia, newly recognized ventricular tachycardia, supraventricular tachycardia with resting heart rate >100, or symptomatic bradycardia  Denies severe heart valve disease including aortic stenosis or symptomatic mitral stenosis     Exercise Capacity:  Able to walk 4 blocks without symptoms?: No, due to chronic pain/poor leg strength  Able to walk 2 flights without symptoms?: No    Prior Anesthesia  Reactions: No     Personal history of venous thromboembolic disease? No    History of steroid use for >2 weeks within last year? No, is completing medrol pack today       Review of Systems:     Review of Systems   Constitutional: Negative.    Respiratory: Negative.     Cardiovascular: Negative.    Neurological:  Positive for weakness (bilat legs).       Current Problem List:     Patient Active Problem List   Diagnosis    Benign essential hypertension    Mild persistent asthma without complication    Peripheral polyneuropathy    Chronic bilateral back pain    Chronic lumbar radiculopathy    Chronic cervical pain    Chronic thoracic spine pain    GERD without esophagitis    Hyperlipidemia associated with type 2 diabetes mellitus     Insomnia    B12 deficiency    Lumbar radiculopathy    Thoracic spondylosis without myelopathy    Thoracic and lumbosacral neuritis    Degeneration of lumbar intervertebral disc    Lumbar spondylosis    Pain in joint involving pelvic region and thigh    Iron deficiency    Memory dysfunction    COVID-19 vaccine series completed    Type 2 diabetes mellitus with diabetic neuropathy, with long-term current use of insulin (HCC)    Narcotic dependency, continuous (HCC)    Carpal tunnel syndrome on right    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    Microcytic anemia    S/P endoscopic carpal tunnel release    Pillar pain, post-operative    PONV (postoperative nausea and vomiting)    Duodenal mass    Malignant neoplasm of spinal cord (HCC)       Allergies:     Allergies   Allergen Reactions    Nsaids Other (See Comments)     Cannot take NSAIDS secondary to having gastric bypass    Percocet [Oxycodone-Acetaminophen] GI Intolerance       Current Medications:       Current Outpatient Medications:     albuterol (2.5 mg/3 mL) 0.083 % nebulizer solution, Take 3 mL (2.5 mg total) by nebulization every 6 (six) hours as needed for wheezing or shortness of breath, Disp: 300 mL, Rfl:  1    albuterol (PROVENTIL HFA,VENTOLIN HFA) 90 mcg/act inhaler, USE 2 INHALATIONS ORALLY   EVERY 6 HOURS AS NEEDED FORWHEEZING, Disp: 25.5 g, Rfl: 1    amLODIPine (NORVASC) 5 mg tablet, TAKE 1 TABLET TWICE A DAY, Disp: 180 tablet, Rfl: 0    aspirin (ECOTRIN LOW STRENGTH) 81 mg EC tablet, Take 81 mg by mouth daily, Disp: , Rfl:     atorvastatin (LIPITOR) 40 mg tablet, TAKE 1 TABLET DAILY, Disp: 90 tablet, Rfl: 0    Cyanocobalamin (B-12 IJ), Inject as directed every 30 (thirty) days, Disp: , Rfl:     Ferrous Fumarate 63 (20 Fe) MG TABS, Take 2 tablets by mouth daily Take two tablets once daily., Disp: , Rfl:     Fluticasone-Salmeterol (Advair Diskus) 250-50 mcg/dose inhaler, Inhale 1 puff 2 (two) times a day Rinse mouth after use., Disp: 180 blister, Rfl: 2    glucose blood (Accu-Chek Guide) test strip, Test, Disp: 100 strip, Rfl: 3    HYDROcodone-acetaminophen (NORCO)  mg per tablet, , Disp: , Rfl:     Januvia 100 MG tablet, TAKE 1 TABLET EVERY MORNING, Disp: 90 tablet, Rfl: 3    lidocaine (XYLOCAINE) 5 % ointment, apply topically to affected area three times a day, Disp: , Rfl:     LYRICA 200 MG capsule, Take 200 mg by mouth 3 (three) times a day , Disp: , Rfl: 0    metFORMIN (GLUCOPHAGE) 1000 MG tablet, TAKE 1 TABLET TWICE A DAY, Disp: 180 tablet, Rfl: 0    methocarbamol (ROBAXIN) 500 mg tablet, Take 1 tablet (500 mg total) by mouth 3 (three) times a day for 5 days, Disp: 20 tablet, Rfl: 0    mometasone (NASONEX) 50 mcg/act nasal spray, USE 2 SPRAYS IN EACH       NOSTRIL DAILY, Disp: 17 g, Rfl: 1    montelukast (SINGULAIR) 10 mg tablet, TAKE 1 TABLET AT BEDTIME, Disp: 90 tablet, Rfl: 1    Morphine Sulfate (MORPHINE 0.05 MG/ML SYRINGE, NICU/PICU,, CMPD ORDER,), , Disp: , Rfl:     omeprazole (PriLOSEC) 40 MG capsule, TAKE 1 CAPSULE EVERY       MORNING (Patient taking differently: Takes in PM), Disp: 90 capsule, Rfl: 3    ondansetron (ZOFRAN) 4 mg tablet, Take 1 tablet (4 mg total) by mouth every 8 (eight) hours  as needed for nausea or vomiting, Disp: 90 tablet, Rfl: 1    traMADol HCl ER, Biphasic, 300 MG 24 hr tablet, Take 300 mg by mouth daily, Disp: , Rfl:     VITAMIN D PO, , Disp: , Rfl:     zolpidem (AMBIEN) 5 mg tablet, Take 1 tablet (5 mg total) by mouth daily at bedtime as needed for sleep, Disp: 90 tablet, Rfl: 0    Accu-Chek FastClix Lancets MISC, USE TO TEST BLOOD GLUCOSE  LEVELS TWO TIMES A DAY FOR DIABETES, Disp: 204 each, Rfl: 3    B-D UF III MINI PEN NEEDLES 31G X 5 MM MISC, USE ONCE DAILY FOR BASAL   INSULIN INJECTION, Disp: 90 each, Rfl: 1    Insulin Glargine Solostar (Lantus SoloStar) 100 UNIT/ML SOPN, Inject 0.08 mL (8 Units total) under the skin daily at bedtime, Disp: 15 mL, Rfl: 5    Past Medical History:       Past Medical History:   Diagnosis Date    PABLO (acute kidney injury) (HCC) 2021    Allergic     Anemia     Anesthesia complication     Aspiration pneumonia after     Anxiety     Arthritis     Asthma     Uses Albuterol daily    Breast lump     Resolved: 2017     Chronic pain     back    Chronic pain disorder     Back, legs    Diabetes mellitus (HCC)     Diverticulitis of colon     Dizziness     GERD (gastroesophageal reflux disease)     Hepatitis     History of stomach ulcers     HL (hearing loss)     Estimated    Hyperlipidemia     Hypertension     Hypotension 2021    Infectious viral hepatitis     Hepatitiss A    Memory loss     Estimated    Pneumonia     aspiration pneumonia    PONV (postoperative nausea and vomiting)     Stomach problems         Past Surgical History:   Procedure Laterality Date    APPENDECTOMY      BACK SURGERY      CATARACT EXTRACTION, BILATERAL       SECTION      CHOLECYSTECTOMY      GASTRIC BYPASS      GASTRIC BYPASS  2010    INFUSION PUMP IMPLANTATION      ND NDSC WRST SURG W/RLS TRANSVRS CARPL LIGM Right 2022    Procedure: Right endoscopic carpal tunnel release;  Surgeon: Papo Peguero MD;  Location:  MAIN OR;   Service: Orthopedics    SHOULDER SURGERY      SPINAL CORD STIMULATOR IMPLANT          Family History   Problem Relation Age of Onset    Diabetes Mother         Mellitus    Hyperlipidemia Mother     Hypertension Mother     Colon cancer Mother 78    Pancreatic cancer Mother 84    Melanoma Mother 76    Cancer Father 78        Bladder ca    Alcohol abuse Father     Lung cancer Father 78    Prostate cancer Father 78    No Known Problems Daughter     No Known Problems Maternal Grandmother     No Known Problems Maternal Grandfather     Stomach cancer Paternal Grandmother         60's    Lung cancer Paternal Grandfather 76    Diabetes Brother         Mellitus    Hyperlipidemia Brother     Hypertension Brother     Diabetes Brother     No Known Problems Brother     No Known Problems Son     No Known Problems Son     Breast cancer Maternal Aunt 32        Unknown age    No Known Problems Paternal Aunt     No Known Problems Paternal Aunt     No Known Problems Paternal Aunt     No Known Problems Paternal Aunt     Arthritis Family     Mental illness Neg Hx         Social History     Socioeconomic History    Marital status:      Spouse name: Not on file    Number of children: 3    Years of education: Not on file    Highest education level: Not on file   Occupational History    Occupation: Disability   Tobacco Use    Smoking status: Former     Current packs/day: 0.00     Average packs/day: 1 pack/day for 34.0 years (34.0 ttl pk-yrs)     Types: Cigarettes     Start date: 1973     Quit date: 2007     Years since quittin.0    Smokeless tobacco: Never    Tobacco comments:     15yrs ago   Vaping Use    Vaping status: Never Used   Substance and Sexual Activity    Alcohol use: No    Drug use: No    Sexual activity: Not Currently     Partners: Male     Birth control/protection: Post-menopausal   Other Topics Concern    Not on file   Social History Narrative    Not on file     Social Determinants of Health  "    Financial Resource Strain: Not on file   Food Insecurity: Not on file   Transportation Needs: Not on file   Physical Activity: Not on file   Stress: Not on file   Social Connections: Not on file   Intimate Partner Violence: Not on file   Housing Stability: Not on file        Physical Exam:     /72   Pulse 91   Temp 97.7 °F (36.5 °C)   Ht 5' 4\" (1.626 m)   Wt 71.8 kg (158 lb 6.4 oz)   LMP  (LMP Unknown)   BMI 27.19 kg/m²     Physical Exam  Vitals reviewed.   Constitutional:       General: She is not in acute distress.     Appearance: Normal appearance.   HENT:      Head: Normocephalic.   Eyes:      General: No scleral icterus.  Neck:      Thyroid: No thyromegaly.   Cardiovascular:      Rate and Rhythm: Normal rate and regular rhythm.      Heart sounds: No murmur heard.  Pulmonary:      Effort: Pulmonary effort is normal. No respiratory distress.      Breath sounds: Normal breath sounds.   Musculoskeletal:      Cervical back: Normal range of motion.   Lymphadenopathy:      Cervical: No cervical adenopathy.   Skin:     General: Skin is warm and dry.   Neurological:      General: No focal deficit present.      Mental Status: She is alert and oriented to person, place, and time.   Psychiatric:         Mood and Affect: Mood normal.         Behavior: Behavior normal.          Data:     Pre-operative work-up    Laboratory Results: I have personally reviewed the pertinent laboratory results/reports      EKG:  n/a    Chest x-ray:  n/a      Previous cardiopulmonary studies within the past year:  Echocardiogram: n/a  Cardiac Catheterization: n/a  Stress Test: n/a  Pulmonary Function Testing: n/a      Assessment & Recommendations:     1. Preop examination      preop H&P completed, PAT labs drawn this morning - results pending, will review when available before issuing medical clearance      2. Duodenal mass        3. Iron deficiency        4. Malignant neoplasm of spinal cord (HCC)        5. Narcotic dependency, " continuous (HCC)        6. Type 2 diabetes mellitus with other specified complication, with long-term current use of insulin (HCC)        7. Benign essential hypertension            Pre-Op Evaluation Assessment  64 y.o. female with planned surgery: duodenal resection, possible whipple.    Known risk factors for perioperative complications: Diabetes mellitus.        Current medications which may produce withdrawal symptoms if withheld perioperatively: tramadol, norco.    Pre-Op Evaluation Plan  1. Further preoperative workup as follows:   - None; no further preoperative work-up is required    2. Medication Management/Recommendations:   - Hold meds as advised by anesthesia   - Patient should continue antihypertensive medications up through and including the day of surgery.   - Patient has been instructed to avoid aspirin containing medications or non-steroidal anti-inflammatory drugs for the week preceding surgery.    3. Prophylaxis for cardiac events with perioperative beta-blockers: not indicated.    4. Patient requires further consultation with: None    Clearance  Patient is CLEARED for surgery without any additional cardiac testing.     CHITRA Davey  [facility] PRIMARY CARE [address] [place] [id number]  Phone#  [phone]  Fax#  [phone]

## 2024-01-10 ENCOUNTER — TELEPHONE (OUTPATIENT)
Dept: HEMATOLOGY ONCOLOGY | Facility: CLINIC | Age: 64
End: 2024-01-10

## 2024-01-10 ENCOUNTER — HOSPITAL ENCOUNTER (OUTPATIENT)
Dept: INFUSION CENTER | Facility: HOSPITAL | Age: 64
Discharge: HOME | End: 2024-01-10
Attending: INTERNAL MEDICINE
Payer: COMMERCIAL

## 2024-01-10 VITALS
RESPIRATION RATE: 16 BRPM | SYSTOLIC BLOOD PRESSURE: 134 MMHG | TEMPERATURE: 97.1 F | HEART RATE: 89 BPM | OXYGEN SATURATION: 95 % | DIASTOLIC BLOOD PRESSURE: 74 MMHG

## 2024-01-10 DIAGNOSIS — E53.8 B12 DEFICIENCY: Primary | ICD-10-CM

## 2024-01-10 PROCEDURE — 96372 THER/PROPH/DIAG INJ SC/IM: CPT

## 2024-01-10 RX ORDER — CYANOCOBALAMIN 1000 UG/ML
1000 INJECTION, SOLUTION INTRAMUSCULAR; SUBCUTANEOUS ONCE
OUTPATIENT
Start: 2024-02-07

## 2024-01-10 RX ORDER — FLURBIPROFEN SODIUM 0.3 MG/ML
SOLUTION/ DROPS OPHTHALMIC
Qty: 90 EACH | Refills: 1 | Status: SHIPPED | OUTPATIENT
Start: 2024-01-10

## 2024-01-10 RX ORDER — CYANOCOBALAMIN 1000 UG/ML
1000 INJECTION, SOLUTION INTRAMUSCULAR; SUBCUTANEOUS ONCE
Status: COMPLETED | OUTPATIENT
Start: 2024-01-10 | End: 2024-01-10

## 2024-01-10 RX ADMIN — CYANOCOBALAMIN 1000 MCG: 1000 INJECTION, SOLUTION INTRAMUSCULAR at 15:13

## 2024-01-10 NOTE — TELEPHONE ENCOUNTER
Patient Call    Who are you speaking with? Patient    If it is not the patient, are they listed on an active communication consent form? N/A   What is the reason for this call? Patient is having issues with her bowels and not sure if it will make a difference with her up coming surgery    Does this require a call back? Yes   If a call back is required, please list best call back number [phone]   If a call back is required, advise that a message will be forwarded to their care team and someone will return their call as soon as possible.   Did you relay this information to the patient? Yes

## 2024-01-10 NOTE — PROGRESS NOTES
Pt arrived amb for B12 injection.  States she will be having surgery later this month.  Appears pale.  States she got iron infusions in the past.  B12 given IM Lt deltoid, dsd applied.  Disch amb to home, steady gait.      Pt aware of next B12 appt on 2/7 @ 1430.

## 2024-01-11 ENCOUNTER — TELEPHONE (OUTPATIENT)
Dept: SURGICAL ONCOLOGY | Facility: CLINIC | Age: 64
End: 2024-01-11

## 2024-01-11 ENCOUNTER — TELEPHONE (OUTPATIENT)
Dept: FAMILY MEDICINE CLINIC | Facility: HOSPITAL | Age: 64
End: 2024-01-11

## 2024-01-11 NOTE — TELEPHONE ENCOUNTER
Message on VM:    Hi, this is Lillian calling with [facility]. Dr. Cruz's office calling in regards to patient Stephany FARLEY, date of birth [birth year]. She's scheduled for surgery on January 22nd and had a preop visit with doctor Carlos Alberto Sky on January 9th. I was just looking to see if she signed her note and she did not. For that day I was just calling to see if she was cleared for surgery or not. And if you gubrittanie received the clearance for my faxed over, I can be reached directly at [phone]. Again, my name is Lillian. Thank you.

## 2024-01-11 NOTE — TELEPHONE ENCOUNTER
Left a message with patient's PCP office to confirm if they received the clearance form for medical clearance for upcoming surgery with Dr Cruz on 1/22/23. I saw patient had her pre op apt on 1/9/23 but the note is not completed. Left my direct number to call me back on to discuss.

## 2024-01-11 NOTE — PRE-PROCEDURE INSTRUCTIONS
Pre-Surgery Instructions:   Medication Instructions    albuterol (2.5 mg/3 mL) 0.083 % nebulizer solution Uses PRN- OK to take day of surgery    albuterol (PROVENTIL HFA,VENTOLIN HFA) 90 mcg/act inhaler Uses PRN- OK to take day of surgery    amLODIPine (NORVASC) 5 mg tablet Take day of surgery.    aspirin (ECOTRIN LOW STRENGTH) 81 mg EC tablet Stop taking 7 days prior to surgery.    atorvastatin (LIPITOR) 40 mg tablet Take night before surgery    Cyanocobalamin (B-12 IJ) Continue normal schedule    Ferrous Fumarate 63 (20 Fe) MG TABS Hold day of surgery.    Fluticasone-Salmeterol (Advair Diskus) 250-50 mcg/dose inhaler Take day of surgery.    HYDROcodone-acetaminophen (NORCO)  mg per tablet Uses PRN- OK to take day of surgery    Insulin Glargine Solostar (Basaglar KwikPen) 100 UNIT/ML SOPN Take night before surgery    Januvia 100 MG tablet Hold day of surgery.    lidocaine (XYLOCAINE) 5 % ointment Hold day of surgery.    LYRICA 200 MG capsule Take day of surgery.    metFORMIN (GLUCOPHAGE) 1000 MG tablet Hold day of surgery.    methocarbamol (ROBAXIN) 500 mg tablet Uses PRN- OK to take day of surgery    mometasone (NASONEX) 50 mcg/act nasal spray Take day of surgery.    montelukast (SINGULAIR) 10 mg tablet Take night before surgery    omeprazole (PriLOSEC) 40 MG capsule Take night before surgery    ondansetron (ZOFRAN) 4 mg tablet Uses PRN- OK to take day of surgery    traMADol HCl ER, Biphasic, 300 MG 24 hr tablet Take day of surgery.    VITAMIN D PO Stop taking 7 days prior to surgery.    zolpidem (AMBIEN) 5 mg tablet Take night before surgery   Surgeon's office provided 3 pre-surgical drinks and instructions.    Medication instructions for day surgery reviewed. Please use only a sip of water to take your instructed medications. Avoid all over the counter vitamins, supplements and NSAIDS for one week prior to surgery per anesthesia guidelines. Tylenol is ok to take as needed.     You will receive a call one  business day prior to surgery with an arrival time and hospital directions. If your surgery is scheduled on a Monday, the hospital will be calling you on the Friday prior to your surgery. If you have not heard from anyone by 8pm, please call the hospital supervisor through the hospital  at [phone]. (Hugh [phone]).    Do not eat or drink anything after midnight the night before your surgery, including candy, mints, lifesavers, or chewing gum. Do not drink alcohol 24hrs before your surgery. Try not to smoke at least 24hrs before your surgery.       Follow the pre surgery showering instructions as listed in the “My Surgical Experience Booklet” or otherwise provided by your surgeon's office. Do not use a blade to shave the surgical area 1 week before surgery. It is okay to use a clean electric clippers up to 24 hours before surgery. Do not apply any lotions, creams, including makeup, cologne, deodorant, or perfumes after showering on the day of your surgery. Do not use dry shampoo, hair spray, hair gel, or any type of hair products.     No contact lenses, eye make-up, or artificial eyelashes. Remove nail polish, including gel polish, and any artificial, gel, or acrylic nails if possible. Remove all jewelry including rings and body piercing jewelry.     Wear causal clothing that is easy to take on and off. Consider your type of surgery.    Keep any valuables, jewelry, piercings at home. Please bring any specially ordered equipment (sling, braces) if indicated.    Arrange for a responsible person to drive you to and from the hospital on the day of your surgery. Visitor Guidelines discussed.     Call the surgeon's office with any new illnesses, exposures, or additional questions prior to surgery.    Please reference your “My Surgical Experience Booklet” for additional information to prepare for your upcoming surgery.

## 2024-01-12 ENCOUNTER — TELEMEDICINE (OUTPATIENT)
Dept: ANESTHESIOLOGY | Facility: CLINIC | Age: 64
End: 2024-01-12
Payer: COMMERCIAL

## 2024-01-12 ENCOUNTER — TELEPHONE (OUTPATIENT)
Age: 64
End: 2024-01-12

## 2024-01-12 ENCOUNTER — TELEPHONE (OUTPATIENT)
Dept: GASTROENTEROLOGY | Facility: MEDICAL CENTER | Age: 64
End: 2024-01-12

## 2024-01-12 DIAGNOSIS — E11.69 HYPERLIPIDEMIA ASSOCIATED WITH TYPE 2 DIABETES MELLITUS: ICD-10-CM

## 2024-01-12 DIAGNOSIS — E11.40 TYPE 2 DIABETES MELLITUS WITH DIABETIC NEUROPATHY, WITH LONG-TERM CURRENT USE OF INSULIN (HCC): Primary | ICD-10-CM

## 2024-01-12 DIAGNOSIS — J45.30 MILD PERSISTENT ASTHMA WITHOUT COMPLICATION: ICD-10-CM

## 2024-01-12 DIAGNOSIS — F11.20 NARCOTIC DEPENDENCY, CONTINUOUS (HCC): ICD-10-CM

## 2024-01-12 DIAGNOSIS — G89.4 CHRONIC PAIN SYNDROME: Primary | ICD-10-CM

## 2024-01-12 DIAGNOSIS — K31.89 DUODENAL MASS: ICD-10-CM

## 2024-01-12 DIAGNOSIS — R41.3 MEMORY DYSFUNCTION: ICD-10-CM

## 2024-01-12 DIAGNOSIS — E61.1 IRON DEFICIENCY: ICD-10-CM

## 2024-01-12 DIAGNOSIS — E11.40 TYPE 2 DIABETES MELLITUS WITH DIABETIC NEUROPATHY, WITH LONG-TERM CURRENT USE OF INSULIN (HCC): ICD-10-CM

## 2024-01-12 DIAGNOSIS — Z79.4 TYPE 2 DIABETES MELLITUS WITH DIABETIC NEUROPATHY, WITH LONG-TERM CURRENT USE OF INSULIN (HCC): ICD-10-CM

## 2024-01-12 DIAGNOSIS — I10 BENIGN ESSENTIAL HYPERTENSION: Primary | ICD-10-CM

## 2024-01-12 DIAGNOSIS — Z79.4 TYPE 2 DIABETES MELLITUS WITH DIABETIC NEUROPATHY, WITH LONG-TERM CURRENT USE OF INSULIN (HCC): Primary | ICD-10-CM

## 2024-01-12 DIAGNOSIS — E78.5 HYPERLIPIDEMIA ASSOCIATED WITH TYPE 2 DIABETES MELLITUS: ICD-10-CM

## 2024-01-12 PROCEDURE — 99443 PR PHYS/QHP TELEPHONE EVALUATION 21-30 MIN: CPT | Performed by: NURSE PRACTITIONER

## 2024-01-12 RX ORDER — INSULIN GLARGINE 100 [IU]/ML
8 INJECTION, SOLUTION SUBCUTANEOUS
Qty: 15 ML | Refills: 5 | Status: SHIPPED | OUTPATIENT
Start: 2024-01-12

## 2024-01-12 NOTE — TELEPHONE ENCOUNTER
Patients GI provider:  Dr. Cordero    Number to return call: [phone]    Reason for call: Pt returned call in regard to rescheduling procedure.    Scheduled procedure/appointment date if applicable: N/A

## 2024-01-12 NOTE — PROGRESS NOTES
[facility]  CONSULT: SURGICAL OPTIMIZATION    BRIEF Visit    This Visit is being completed by telephone. The Patient is located at Home and in the following state in which I hold an active license [place]    The patient was identified by name and date of birth. Aleida Hammond was informed that this is a telemedicine visit and that the visit is being conducted through Telephone.  My office door was closed. No one else was in the room.  She acknowledged consent and understanding of privacy and security of the  has agreed to participate and understands they can discontinue the visit at any time.    Patient is aware this is a billable service.     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]   Location: [facility] OR ROOM 07 / [facility]   Surgeons: Karlene Cruz MD     Last anesthesia  No problems   Reports aspiration pneumonia after Caesarian 1970s     Problem List Items Addressed This Visit       Benign essential hypertension -   Stable   Denies concerns   BP today 123/63  Has received MC   METS 5      Mild persistent asthma without complication  Diagnosed years ago   Uses rescue inhaler 2-3 times daily (chronic ROUTINE for years)  PCP manages ASTHMA   No concerns from patient- at her baseline  Has received MC   METS 5      Hyperlipidemia   Stable   Has MC        Iron deficiency  HX anemia  HX of iron infusions-tolerates well   Recommend CONSIDER post-op Iron infusions    Latest Reference Range & Units 01/09/24 07:43   WBC 4.31 - 10.16 Thousand/uL 12.42 (H)   Red Blood Cell Count  3.81 - 5.12 Million/uL 3.56 (L)   Hemoglobin 11.5 - 15.4 g/dL 9.2 (L)   HCT 34.8 - 46.1 % 30.4 (L)   MCV 82 - 98 fL 85   MCH 26.8 - 34.3 pg 25.8 (L)   MCHC 31.4 - 37.4 g/dL 30.3 (L)   RDW 11.6 - 15.1 % 15.3 (H)   Platelet Count 149 - 390 Thousands/uL 394 (H)   MPV 8.9 - 12.7 fL 11.2   nRBC /100 WBCs 0   (H): Data is abnormally high  (L): Data is abnormally low        Memory dysfunction  HIGH RISK for post-op delirium   Admits to [facility]  Had to retire work- bc she had been forgetting her day to day tasks   Family noticed   Assumed related to chronic opioid use   Delirium precautions on admit   APS consult   CONSULT INPATIENT ULI TO HEP MANAGE POST-OP DELIRIUM RISK         Type  2 diabetes mellitus  Stable   No concerns   Little bit elevated 2.2 holidays -per patient  SSI RISK LOW HBA1C 5.9   Latest Reference Range & Units 01/09/24 07:43   Hemoglobin A1C Normal 4.0-5.6%; PreDiabetic 5.7-6.4%; Diabetic >=6.5%; Glycemic control for adults with diabetes <7.0% % 5.9 (H)   eAG, EST AVG Glucose mg/dl 123   (H): Data is abnormally high          Narcotic dependency, continuous (HCC)  ACUTE PAIN SERVICE POST-OP   Pre-eval done         Duodenal mass  SEEN FOR SO   SEEN FOR GERIATRICS  STARTED BEST   METS 5   RECEIVED MC   At risk for post-op PABLO -NO  At risk for post-op SSI -NO  BEST   Breathing- instructed to exercise lungs prior to surgery via deep breathing   Eat- discussed increasing protein intake prior to surgery   Sleep/stress- encouraged 8-10 sleep @ night, stress reduction, avoid sick contacts and handwashing   Train- encouraged to remain active             Recent Visits  Date Type Provider Dept   01/11/24 Telephone DO [facility]   01/09/24 Telephone [facility]   01/09/24 Telephone [facility]   Showing recent visits within past 7 days and meeting all other requirements  Today's Visits  Date Type Provider Dept    01/12/24 Telemedicine [facility]   Showing today's visits and meeting all other requirements  Future Appointments  No visits were found meeting these conditions.  Showing future appointments within next 150 days and meeting all other requirements       ROS  Denies fevers and denies chills  Denies congestion and denies sore throat  Denies chest pain  Denies palpitations  Denies rashes  Denies difficulty urinating  Denies any issues with their urine... example a dark color or an odor  Denies dizziness  Denies headaches  Denies confusion and denies hallucinations    AdmITS TO   HX ASTHMA- uses rescue inhaler 3-4 times daily   Chronic Nausea, Vomit, & Belly ache With food,    Burn index finger - one week old   Scabbed over     Physical Assessment   We did not connect via video  Patient was alert and orientated times 3  Mood appropriate  Thought appropriate    I heard no respiratory distress over the phone today        Jaylin Liu is a 64-year-old female who was referred to [facility] for surgical optimization.  Aleida Hammond explains that her problem began 30 years ago when she fell on ice.  The fall caused her a back injury that created chronic back problems.  She has been on pain medication for chronic back pain.  She was losing weight and did not have good appetite and associated THE symptoms 2.2 pain med use.  She was told it was a side effect.  However, she was discontinued off that medication and put on a new medication.  She still experience poor appetite & weight loss.  She went to her PCP.  He recommended further investigation.  Through further investigation they found a mass.  She is electing to have it removed.    I spoke with patient over the telephone.  We did not connect via video.  She was offered a live visit and declined.  Patient was pleasant and a pleasure to care for.  Her two sons live right next too her and her daughter lives 20 minutes away.  She  is independent with all ADLS.       PATS were reviewed.  She has a HX anemia.  She has had iron infusions in the past & tolerated well. Still anemic.  HGB >8.  Recommend consider post-op iv iron while inpatient. SHE HAS RECEIVED MC.  METS 5.07      Surgical optimization  PABLO RISK-LOW  SSI RISK- LOW   [facility] ANEMIA- CONSIDER POST-OP INFUSIONS   STARTED BEST   POST-OP APS  POST-OP ULI     AS ALWAYS WE STARTED THE BEST PROTOCOL  As always we discussed having your BEST surgery, and BEST recovery.  Surgery goals reviewed today.      Breathing exercises   Patient was encouraged to begin lung exercises today.  This could be accomplished through deep breathing and cough exercises.      Eating/nutrition   Encouraged patient to increase oral protein intake prior to surgery.  This can be accomplished by consuming chicken, fish, tuna fish, cottage cheese, cheese, eggs, Greek yogurt, and protein shakes as needed.  I encouraged use of protein shakes such ENLIVE.  I also recommended making your own protein shakes with protein powder.   Sleep/Stress management  Patient was encouraged to rest their body prior to surgery.  Encouraged attempting to get 8 hours of sleep at night.  Avoid stress.  Avoid sick contacts.  Encouraged to find a relaxing hobby such as reading, meditation, listening to music.  Training exercises  Patient was encouraged to remain active as possible.  Today bilateral lower extremity generic exercises were taught for muscle strengthening and balance.  All exercises to be done sitting down.         Visit Time  Total Visit Duration: 44 MINUTES

## 2024-01-12 NOTE — PROGRESS NOTES
See Assessment below...    METS: 5.07       As always we discussed having your BEST surgery, and BEST recovery.  Surgery goals reviewed today.      Breathing exercises   Patient was encouraged to begin lung exercises today.  This could be accomplished through deep breathing and cough exercises.  .    Eating/nutrition   Encouraged patient to increase oral protein intake prior to surgery.  This can be accomplished by consuming chicken, fish, tuna fish, cottage cheese, cheese, eggs, Greek yogurt, and protein shakes as needed.  I encouraged use of protein shakes such ENLIVE.  I also recommended making your own protein shakes with protein powder.   Sleep/Stress management  Patient was encouraged to rest their body prior to surgery.  Encouraged attempting to get 8 hours of sleep at night.  Avoid stress.  Avoid sick contacts.  Encouraged to find a relaxing hobby such as reading, meditation, listening to music.  Training exercises  Patient was encouraged to remain active as possible.  Today bilateral lower extremity generic exercises were taught for muscle strengthening and balance.  All exercises to be done sitting down.

## 2024-01-15 ENCOUNTER — TELEPHONE (OUTPATIENT)
Dept: SURGICAL ONCOLOGY | Facility: CLINIC | Age: 64
End: 2024-01-15

## 2024-01-15 NOTE — TELEPHONE ENCOUNTER
I called and spoke with patient in regards to update on any other available surgery dates. Explained to patient that as of now, there is nothing else available for her surgery. Patient was okay with this and has agreed to keep her surgery on 1/22/24 with Dr Cruz.     Patient also had questioned about a burn on her finger she has been watching. She states it seems to be getting a little better and no sign of infection. She was wondering what else she could do besides cleaning it with alcohol and putting cream on it. I told her to try neosporin and continue what she's doing but will also run it by our nurse Whitney. Patient states Whitney can send her a my chart message if that would be easier for her. Patient was appreciative of the call and had no further questions at this time.

## 2024-01-15 NOTE — TELEPHONE ENCOUNTER
Patient calling in to speak directly with Karen regarding an EUS procedure. Patient was transferred to the office and Nhi took over the call.

## 2024-01-17 DIAGNOSIS — I10 BENIGN ESSENTIAL HYPERTENSION: ICD-10-CM

## 2024-01-17 DIAGNOSIS — E11.9 TYPE 2 DIABETES MELLITUS WITHOUT COMPLICATION, WITHOUT LONG-TERM CURRENT USE OF INSULIN (HCC): ICD-10-CM

## 2024-01-17 RX ORDER — AMLODIPINE BESYLATE 5 MG/1
5 TABLET ORAL 2 TIMES DAILY
Qty: 180 TABLET | Refills: 0 | Status: SHIPPED | OUTPATIENT
Start: 2024-01-17

## 2024-01-19 ENCOUNTER — TELEPHONE (OUTPATIENT)
Dept: HEMATOLOGY ONCOLOGY | Facility: CLINIC | Age: 64
End: 2024-01-19

## 2024-01-19 ENCOUNTER — TELEPHONE (OUTPATIENT)
Dept: SURGICAL ONCOLOGY | Facility: CLINIC | Age: 64
End: 2024-01-19

## 2024-01-19 NOTE — TELEPHONE ENCOUNTER
"\"Eulogio Olson, this is Aleida FARLEY. Date of birth January 7th, 1960. My phone number is [phone]. I'm trying to plan around a court date of Tuesday, a day after my surgery for a disability hearing over the phone, the  said. It should be pretty short, but I'm wondering what kind of condition I'm going to be in the day after the surgery. Historically, recalling some surgeries I've had the day after, I'm pretty mentally clear, but I just want to know with you if this is going to be any different, if I should go ahead and keep this, keep the appointment, or after I get some feedback from you. Thank you.\"    "

## 2024-01-19 NOTE — TELEPHONE ENCOUNTER
Call Transfer   Who are you speaking with?  Patient   If it is not the patient, are they listed on an active communication consent form? N/A   Who is the patients HemOnc/SurgOnc provider? Dr. Cruz   What is the reason for this call? She has a disability hearing the day after surgery over the phone and asks if she will be alert enough to do that   Person/Department that the call was transferred to?    Time that call was transferred?    [phone]    Your call will be transferred now. If you receive a voicemail, please leave a detailed message and a member of the team will return your call as soon as possible.    Did you relay this information to the caller?  Yes

## 2024-01-21 ENCOUNTER — ANESTHESIA EVENT (OUTPATIENT)
Dept: PERIOP | Facility: HOSPITAL | Age: 64
DRG: 327 | End: 2024-01-21
Payer: COMMERCIAL

## 2024-01-22 ENCOUNTER — HOSPITAL ENCOUNTER (INPATIENT)
Facility: HOSPITAL | Age: 64
LOS: 5 days | Discharge: HOME HEALTH | DRG: 327 | End: 2024-01-27
Attending: STUDENT IN AN ORGANIZED HEALTH CARE EDUCATION/TRAINING PROGRAM | Admitting: STUDENT IN AN ORGANIZED HEALTH CARE EDUCATION/TRAINING PROGRAM
Payer: COMMERCIAL

## 2024-01-22 ENCOUNTER — ANESTHESIA (OUTPATIENT)
Dept: PERIOP | Facility: HOSPITAL | Age: 64
DRG: 327 | End: 2024-01-22
Payer: COMMERCIAL

## 2024-01-22 DIAGNOSIS — F11.20 NARCOTIC DEPENDENCY, CONTINUOUS (HCC): ICD-10-CM

## 2024-01-22 DIAGNOSIS — M47.816 LUMBAR SPONDYLOSIS: ICD-10-CM

## 2024-01-22 DIAGNOSIS — G89.29 CHRONIC BILATERAL LOW BACK PAIN, UNSPECIFIED WHETHER SCIATICA PRESENT: ICD-10-CM

## 2024-01-22 DIAGNOSIS — M54.50 CHRONIC BILATERAL LOW BACK PAIN, UNSPECIFIED WHETHER SCIATICA PRESENT: ICD-10-CM

## 2024-01-22 DIAGNOSIS — K31.89 DUODENAL MASS: Primary | ICD-10-CM

## 2024-01-22 DIAGNOSIS — M54.2 CHRONIC CERVICAL PAIN: ICD-10-CM

## 2024-01-22 DIAGNOSIS — G89.29 CHRONIC CERVICAL PAIN: ICD-10-CM

## 2024-01-22 DIAGNOSIS — M54.6 CHRONIC THORACIC SPINE PAIN: ICD-10-CM

## 2024-01-22 DIAGNOSIS — G89.29 CHRONIC THORACIC SPINE PAIN: ICD-10-CM

## 2024-01-22 LAB
ABO GROUP BLD: NORMAL
ALBUMIN SERPL BCP-MCNC: 3.3 G/DL (ref 3.5–5)
ALP SERPL-CCNC: 119 U/L (ref 34–104)
ALT SERPL W P-5'-P-CCNC: 242 U/L (ref 7–52)
ANION GAP SERPL CALCULATED.3IONS-SCNC: 9 MMOL/L
ANISOCYTOSIS BLD QL SMEAR: PRESENT
AST SERPL W P-5'-P-CCNC: 466 U/L (ref 13–39)
BASE EXCESS BLDA CALC-SCNC: -0.8 MMOL/L
BASE EXCESS BLDA CALC-SCNC: -1 MMOL/L (ref -2–3)
BASE EXCESS BLDA CALC-SCNC: -2 MMOL/L (ref -2–3)
BASE EXCESS BLDA CALC-SCNC: 1 MMOL/L (ref -2–3)
BASE EXCESS BLDA CALC-SCNC: 2 MMOL/L (ref -2–3)
BASOPHILS # BLD MANUAL: 0 THOUSAND/UL (ref 0–0.1)
BASOPHILS NFR MAR MANUAL: 0 % (ref 0–1)
BILIRUB SERPL-MCNC: 0.5 MG/DL (ref 0.2–1)
BODY TEMPERATURE: 97.4 DEGREES FEHRENHEIT
BUN SERPL-MCNC: 17 MG/DL (ref 5–25)
CA-I BLD-SCNC: 1.06 MMOL/L (ref 1.12–1.32)
CA-I BLD-SCNC: 1.09 MMOL/L (ref 1.12–1.32)
CA-I BLD-SCNC: 1.1 MMOL/L (ref 1.12–1.32)
CA-I BLD-SCNC: 1.16 MMOL/L (ref 1.12–1.32)
CA-I BLD-SCNC: 1.2 MMOL/L (ref 1.12–1.32)
CALCIUM ALBUM COR SERPL-MCNC: 9.2 MG/DL (ref 8.3–10.1)
CALCIUM SERPL-MCNC: 8.6 MG/DL (ref 8.4–10.2)
CHLORIDE SERPL-SCNC: 106 MMOL/L (ref 96–108)
CO2 SERPL-SCNC: 24 MMOL/L (ref 21–32)
CREAT SERPL-MCNC: 0.65 MG/DL (ref 0.6–1.3)
EOSINOPHIL # BLD MANUAL: 0 THOUSAND/UL (ref 0–0.4)
EOSINOPHIL NFR BLD MANUAL: 0 % (ref 0–6)
ERYTHROCYTE [DISTWIDTH] IN BLOOD BY AUTOMATED COUNT: 16.2 % (ref 11.6–15.1)
GFR SERPL CREATININE-BSD FRML MDRD: 94 ML/MIN/1.73SQ M
GLUCOSE SERPL-MCNC: 115 MG/DL (ref 65–140)
GLUCOSE SERPL-MCNC: 128 MG/DL (ref 65–140)
GLUCOSE SERPL-MCNC: 168 MG/DL (ref 65–140)
GLUCOSE SERPL-MCNC: 177 MG/DL (ref 65–140)
GLUCOSE SERPL-MCNC: 181 MG/DL (ref 65–140)
GLUCOSE SERPL-MCNC: 184 MG/DL (ref 65–140)
GLUCOSE SERPL-MCNC: 193 MG/DL (ref 65–140)
GLUCOSE SERPL-MCNC: 194 MG/DL (ref 65–140)
GLUCOSE SERPL-MCNC: 64 MG/DL (ref 65–140)
HCO3 BLDA-SCNC: 22.9 MMOL/L (ref 22–28)
HCO3 BLDA-SCNC: 23.9 MMOL/L (ref 22–28)
HCO3 BLDA-SCNC: 24.1 MMOL/L (ref 22–28)
HCO3 BLDA-SCNC: 25.3 MMOL/L (ref 22–28)
HCO3 BLDA-SCNC: 25.9 MMOL/L (ref 22–28)
HCT VFR BLD AUTO: 25.6 % (ref 34.8–46.1)
HCT VFR BLD CALC: 22 % (ref 34.8–46.1)
HCT VFR BLD CALC: 23 % (ref 34.8–46.1)
HCT VFR BLD CALC: 24 % (ref 34.8–46.1)
HCT VFR BLD CALC: 24 % (ref 34.8–46.1)
HGB BLD-MCNC: 7.9 G/DL (ref 11.5–15.4)
HGB BLDA-MCNC: 7.5 G/DL (ref 11.5–15.4)
HGB BLDA-MCNC: 7.8 G/DL (ref 11.5–15.4)
HGB BLDA-MCNC: 8.2 G/DL (ref 11.5–15.4)
HGB BLDA-MCNC: 8.2 G/DL (ref 11.5–15.4)
HYPERCHROMIA BLD QL SMEAR: PRESENT
LACTATE SERPL-SCNC: 0.8 MMOL/L (ref 0.5–2)
LYMPHOCYTES # BLD AUTO: 0.76 THOUSAND/UL (ref 0.6–4.47)
LYMPHOCYTES # BLD AUTO: 4 % (ref 14–44)
MAGNESIUM SERPL-MCNC: 1 MG/DL (ref 1.9–2.7)
MCH RBC QN AUTO: 25.9 PG (ref 26.8–34.3)
MCHC RBC AUTO-ENTMCNC: 30.9 G/DL (ref 31.4–37.4)
MCV RBC AUTO: 84 FL (ref 82–98)
MONOCYTES # BLD AUTO: 0.76 THOUSAND/UL (ref 0–1.22)
MONOCYTES NFR BLD: 4 % (ref 4–12)
NEUTROPHILS # BLD MANUAL: 17.48 THOUSAND/UL (ref 1.85–7.62)
NEUTS BAND NFR BLD MANUAL: 4 % (ref 0–8)
NEUTS SEG NFR BLD AUTO: 88 % (ref 43–75)
O2 CT BLDA-SCNC: 12.3 ML/DL (ref 16–23)
OXYHGB MFR BLDA: 97.5 % (ref 94–97)
PCO2 BLD: 24 MMOL/L (ref 21–32)
PCO2 BLD: 25 MMOL/L (ref 21–32)
PCO2 BLD: 26 MMOL/L (ref 21–32)
PCO2 BLD: 27 MMOL/L (ref 21–32)
PCO2 BLD: 36.2 MM HG (ref 36–44)
PCO2 BLD: 37.7 MM HG (ref 36–44)
PCO2 BLD: 38.6 MM HG (ref 36–44)
PCO2 BLD: 39 MM HG (ref 36–44)
PCO2 BLDA: 41 MM HG (ref 36–44)
PH BLD: 7.38 [PH] (ref 7.35–7.45)
PH BLD: 7.39 [PH] (ref 7.35–7.45)
PH BLD: 7.43 [PH] (ref 7.35–7.45)
PH BLD: 7.46 [PH] (ref 7.35–7.45)
PH BLDA: 7.39 [PH] (ref 7.35–7.45)
PHOSPHATE SERPL-MCNC: 4.5 MG/DL (ref 2.3–4.1)
PLATELET # BLD AUTO: 319 THOUSANDS/UL (ref 149–390)
PLATELET BLD QL SMEAR: ADEQUATE
PMV BLD AUTO: 10.7 FL (ref 8.9–12.7)
PO2 BLD: 122 MM HG (ref 75–129)
PO2 BLD: 124 MM HG (ref 75–129)
PO2 BLD: 152 MM HG (ref 75–129)
PO2 BLD: 168 MM HG (ref 75–129)
PO2 BLDA: 233 MM HG (ref 75–129)
POLYCHROMASIA BLD QL SMEAR: PRESENT
POTASSIUM BLD-SCNC: 3.6 MMOL/L (ref 3.5–5.3)
POTASSIUM BLD-SCNC: 3.6 MMOL/L (ref 3.5–5.3)
POTASSIUM BLD-SCNC: 3.7 MMOL/L (ref 3.5–5.3)
POTASSIUM BLD-SCNC: 3.8 MMOL/L (ref 3.5–5.3)
POTASSIUM SERPL-SCNC: 4.1 MMOL/L (ref 3.5–5.3)
PROT SERPL-MCNC: 5.6 G/DL (ref 6.4–8.4)
RBC # BLD AUTO: 3.05 MILLION/UL (ref 3.81–5.12)
RBC MORPH BLD: PRESENT
RH BLD: POSITIVE
SAO2 % BLD FROM PO2: 100 % (ref 60–85)
SAO2 % BLD FROM PO2: 99 % (ref 60–85)
SODIUM BLD-SCNC: 141 MMOL/L (ref 136–145)
SODIUM SERPL-SCNC: 139 MMOL/L (ref 135–147)
SPECIMEN SOURCE: ABNORMAL
WBC # BLD AUTO: 19 THOUSAND/UL (ref 4.31–10.16)

## 2024-01-22 PROCEDURE — 82947 ASSAY GLUCOSE BLOOD QUANT: CPT

## 2024-01-22 PROCEDURE — 0FB90ZZ EXCISION OF COMMON BILE DUCT, OPEN APPROACH: ICD-10-PCS | Performed by: STUDENT IN AN ORGANIZED HEALTH CARE EDUCATION/TRAINING PROGRAM

## 2024-01-22 PROCEDURE — 82330 ASSAY OF CALCIUM: CPT

## 2024-01-22 PROCEDURE — 76998 US GUIDE INTRAOP: CPT | Performed by: STUDENT IN AN ORGANIZED HEALTH CARE EDUCATION/TRAINING PROGRAM

## 2024-01-22 PROCEDURE — 88342 IMHCHEM/IMCYTCHM 1ST ANTB: CPT | Performed by: PATHOLOGY

## 2024-01-22 PROCEDURE — 83605 ASSAY OF LACTIC ACID: CPT | Performed by: SURGERY

## 2024-01-22 PROCEDURE — 4A133B1 MONITORING OF ARTERIAL PRESSURE, PERIPHERAL, PERCUTANEOUS APPROACH: ICD-10-PCS | Performed by: STUDENT IN AN ORGANIZED HEALTH CARE EDUCATION/TRAINING PROGRAM

## 2024-01-22 PROCEDURE — 0DT60ZZ RESECTION OF STOMACH, OPEN APPROACH: ICD-10-PCS | Performed by: STUDENT IN AN ORGANIZED HEALTH CARE EDUCATION/TRAINING PROGRAM

## 2024-01-22 PROCEDURE — 88309 TISSUE EXAM BY PATHOLOGIST: CPT | Performed by: PATHOLOGY

## 2024-01-22 PROCEDURE — 86920 COMPATIBILITY TEST SPIN: CPT

## 2024-01-22 PROCEDURE — 88344 IMHCHEM/IMCYTCHM EA MLT ANTB: CPT | Performed by: PATHOLOGY

## 2024-01-22 PROCEDURE — 82803 BLOOD GASES ANY COMBINATION: CPT

## 2024-01-22 PROCEDURE — 84132 ASSAY OF SERUM POTASSIUM: CPT

## 2024-01-22 PROCEDURE — 88341 IMHCHEM/IMCYTCHM EA ADD ANTB: CPT | Performed by: PATHOLOGY

## 2024-01-22 PROCEDURE — 82805 BLOOD GASES W/O2 SATURATION: CPT | Performed by: SURGERY

## 2024-01-22 PROCEDURE — 0DT90ZZ RESECTION OF DUODENUM, OPEN APPROACH: ICD-10-PCS | Performed by: STUDENT IN AN ORGANIZED HEALTH CARE EDUCATION/TRAINING PROGRAM

## 2024-01-22 PROCEDURE — 85007 BL SMEAR W/DIFF WBC COUNT: CPT | Performed by: SURGERY

## 2024-01-22 PROCEDURE — 85014 HEMATOCRIT: CPT

## 2024-01-22 PROCEDURE — 4A133J1 MONITORING OF ARTERIAL PULSE, PERIPHERAL, PERCUTANEOUS APPROACH: ICD-10-PCS | Performed by: STUDENT IN AN ORGANIZED HEALTH CARE EDUCATION/TRAINING PROGRAM

## 2024-01-22 PROCEDURE — 88305 TISSUE EXAM BY PATHOLOGIST: CPT | Performed by: PATHOLOGY

## 2024-01-22 PROCEDURE — 99291 CRITICAL CARE FIRST HOUR: CPT | Performed by: SURGERY

## 2024-01-22 PROCEDURE — 83735 ASSAY OF MAGNESIUM: CPT | Performed by: SURGERY

## 2024-01-22 PROCEDURE — 82948 REAGENT STRIP/BLOOD GLUCOSE: CPT

## 2024-01-22 PROCEDURE — 03HY32Z INSERTION OF MONITORING DEVICE INTO UPPER ARTERY, PERCUTANEOUS APPROACH: ICD-10-PCS | Performed by: STUDENT IN AN ORGANIZED HEALTH CARE EDUCATION/TRAINING PROGRAM

## 2024-01-22 PROCEDURE — 80053 COMPREHEN METABOLIC PANEL: CPT | Performed by: SURGERY

## 2024-01-22 PROCEDURE — 82330 ASSAY OF CALCIUM: CPT | Performed by: SURGERY

## 2024-01-22 PROCEDURE — 48150 PARTIAL REMOVAL OF PANCREAS: CPT | Performed by: STUDENT IN AN ORGANIZED HEALTH CARE EDUCATION/TRAINING PROGRAM

## 2024-01-22 PROCEDURE — 84295 ASSAY OF SERUM SODIUM: CPT

## 2024-01-22 PROCEDURE — 0FBG0ZZ EXCISION OF PANCREAS, OPEN APPROACH: ICD-10-PCS | Performed by: STUDENT IN AN ORGANIZED HEALTH CARE EDUCATION/TRAINING PROGRAM

## 2024-01-22 PROCEDURE — 88307 TISSUE EXAM BY PATHOLOGIST: CPT | Performed by: PATHOLOGY

## 2024-01-22 PROCEDURE — 0FT40ZZ RESECTION OF GALLBLADDER, OPEN APPROACH: ICD-10-PCS | Performed by: STUDENT IN AN ORGANIZED HEALTH CARE EDUCATION/TRAINING PROGRAM

## 2024-01-22 PROCEDURE — 93005 ELECTROCARDIOGRAM TRACING: CPT

## 2024-01-22 PROCEDURE — 85027 COMPLETE CBC AUTOMATED: CPT | Performed by: SURGERY

## 2024-01-22 PROCEDURE — 84100 ASSAY OF PHOSPHORUS: CPT | Performed by: SURGERY

## 2024-01-22 RX ORDER — FENTANYL CITRATE/PF 50 MCG/ML
50 SYRINGE (ML) INJECTION
Status: DISCONTINUED | OUTPATIENT
Start: 2024-01-22 | End: 2024-01-22 | Stop reason: HOSPADM

## 2024-01-22 RX ORDER — SODIUM CHLORIDE, SODIUM LACTATE, POTASSIUM CHLORIDE, CALCIUM CHLORIDE 600; 310; 30; 20 MG/100ML; MG/100ML; MG/100ML; MG/100ML
125 INJECTION, SOLUTION INTRAVENOUS CONTINUOUS
Status: DISCONTINUED | OUTPATIENT
Start: 2024-01-22 | End: 2024-01-22

## 2024-01-22 RX ORDER — PROPOFOL 10 MG/ML
INJECTION, EMULSION INTRAVENOUS AS NEEDED
Status: DISCONTINUED | OUTPATIENT
Start: 2024-01-22 | End: 2024-01-22

## 2024-01-22 RX ORDER — INSULIN LISPRO 100 [IU]/ML
1-5 INJECTION, SOLUTION INTRAVENOUS; SUBCUTANEOUS EVERY 6 HOURS SCHEDULED
Status: DISCONTINUED | OUTPATIENT
Start: 2024-01-23 | End: 2024-01-26

## 2024-01-22 RX ORDER — ROCURONIUM BROMIDE 10 MG/ML
INJECTION, SOLUTION INTRAVENOUS AS NEEDED
Status: DISCONTINUED | OUTPATIENT
Start: 2024-01-22 | End: 2024-01-22

## 2024-01-22 RX ORDER — ALBUTEROL SULFATE 90 UG/1
2 AEROSOL, METERED RESPIRATORY (INHALATION) 4 TIMES DAILY
Status: DISCONTINUED | OUTPATIENT
Start: 2024-01-22 | End: 2024-01-23

## 2024-01-22 RX ORDER — CALCIUM CHLORIDE 100 MG/ML
INJECTION INTRAVENOUS; INTRAVENTRICULAR AS NEEDED
Status: DISCONTINUED | OUTPATIENT
Start: 2024-01-22 | End: 2024-01-22

## 2024-01-22 RX ORDER — PHENYLEPHRINE HCL IN 0.9% NACL 1 MG/10 ML
SYRINGE (ML) INTRAVENOUS AS NEEDED
Status: DISCONTINUED | OUTPATIENT
Start: 2024-01-22 | End: 2024-01-22

## 2024-01-22 RX ORDER — FLUCONAZOLE 2 MG/ML
200 INJECTION, SOLUTION INTRAVENOUS ONCE
Status: COMPLETED | OUTPATIENT
Start: 2024-01-22 | End: 2024-01-22

## 2024-01-22 RX ORDER — ONDANSETRON 2 MG/ML
INJECTION INTRAMUSCULAR; INTRAVENOUS AS NEEDED
Status: DISCONTINUED | OUTPATIENT
Start: 2024-01-22 | End: 2024-01-22

## 2024-01-22 RX ORDER — HYDROMORPHONE HCL/PF 1 MG/ML
SYRINGE (ML) INJECTION AS NEEDED
Status: DISCONTINUED | OUTPATIENT
Start: 2024-01-22 | End: 2024-01-22

## 2024-01-22 RX ORDER — METOCLOPRAMIDE HYDROCHLORIDE 5 MG/ML
10 INJECTION INTRAMUSCULAR; INTRAVENOUS ONCE AS NEEDED
Status: DISCONTINUED | OUTPATIENT
Start: 2024-01-22 | End: 2024-01-22 | Stop reason: HOSPADM

## 2024-01-22 RX ORDER — HYDROMORPHONE HCL/PF 1 MG/ML
0.5 SYRINGE (ML) INJECTION
Status: DISCONTINUED | OUTPATIENT
Start: 2024-01-22 | End: 2024-01-22 | Stop reason: HOSPADM

## 2024-01-22 RX ORDER — MIDAZOLAM HYDROCHLORIDE 2 MG/2ML
INJECTION, SOLUTION INTRAMUSCULAR; INTRAVENOUS AS NEEDED
Status: DISCONTINUED | OUTPATIENT
Start: 2024-01-22 | End: 2024-01-22

## 2024-01-22 RX ORDER — SODIUM CHLORIDE 9 MG/ML
INJECTION, SOLUTION INTRAVENOUS CONTINUOUS PRN
Status: DISCONTINUED | OUTPATIENT
Start: 2024-01-22 | End: 2024-01-22

## 2024-01-22 RX ORDER — ALBUMIN, HUMAN INJ 5% 5 %
SOLUTION INTRAVENOUS CONTINUOUS PRN
Status: DISCONTINUED | OUTPATIENT
Start: 2024-01-22 | End: 2024-01-22

## 2024-01-22 RX ORDER — NALOXONE HYDROCHLORIDE 0.4 MG/ML
0.1 INJECTION, SOLUTION INTRAMUSCULAR; INTRAVENOUS; SUBCUTANEOUS
Status: DISCONTINUED | OUTPATIENT
Start: 2024-01-22 | End: 2024-01-27 | Stop reason: HOSPADM

## 2024-01-22 RX ORDER — FENTANYL CITRATE 50 UG/ML
INJECTION, SOLUTION INTRAMUSCULAR; INTRAVENOUS AS NEEDED
Status: DISCONTINUED | OUTPATIENT
Start: 2024-01-22 | End: 2024-01-22

## 2024-01-22 RX ORDER — SODIUM CHLORIDE, SODIUM LACTATE, POTASSIUM CHLORIDE, CALCIUM CHLORIDE 600; 310; 30; 20 MG/100ML; MG/100ML; MG/100ML; MG/100ML
INJECTION, SOLUTION INTRAVENOUS CONTINUOUS PRN
Status: DISCONTINUED | OUTPATIENT
Start: 2024-01-22 | End: 2024-01-22

## 2024-01-22 RX ORDER — FLUTICASONE FUROATE AND VILANTEROL 200; 25 UG/1; UG/1
1 POWDER RESPIRATORY (INHALATION) DAILY
Status: DISCONTINUED | OUTPATIENT
Start: 2024-01-23 | End: 2024-01-27 | Stop reason: HOSPADM

## 2024-01-22 RX ORDER — DEXAMETHASONE SODIUM PHOSPHATE 10 MG/ML
INJECTION, SOLUTION INTRAMUSCULAR; INTRAVENOUS AS NEEDED
Status: DISCONTINUED | OUTPATIENT
Start: 2024-01-22 | End: 2024-01-22

## 2024-01-22 RX ORDER — MAGNESIUM HYDROXIDE 1200 MG/15ML
LIQUID ORAL AS NEEDED
Status: DISCONTINUED | OUTPATIENT
Start: 2024-01-22 | End: 2024-01-22 | Stop reason: HOSPADM

## 2024-01-22 RX ORDER — FLUCONAZOLE 2 MG/ML
400 INJECTION, SOLUTION INTRAVENOUS EVERY 24 HOURS
Status: COMPLETED | OUTPATIENT
Start: 2024-01-23 | End: 2024-01-23

## 2024-01-22 RX ORDER — ONDANSETRON 2 MG/ML
4 INJECTION INTRAMUSCULAR; INTRAVENOUS EVERY 6 HOURS PRN
Status: DISCONTINUED | OUTPATIENT
Start: 2024-01-22 | End: 2024-01-27 | Stop reason: HOSPADM

## 2024-01-22 RX ORDER — PROPOFOL 10 MG/ML
INJECTION, EMULSION INTRAVENOUS CONTINUOUS PRN
Status: DISCONTINUED | OUTPATIENT
Start: 2024-01-22 | End: 2024-01-22

## 2024-01-22 RX ORDER — ACETAMINOPHEN 10 MG/ML
1000 INJECTION, SOLUTION INTRAVENOUS EVERY 6 HOURS SCHEDULED
Status: DISCONTINUED | OUTPATIENT
Start: 2024-01-22 | End: 2024-01-25

## 2024-01-22 RX ORDER — LIDOCAINE HYDROCHLORIDE 10 MG/ML
INJECTION, SOLUTION EPIDURAL; INFILTRATION; INTRACAUDAL; PERINEURAL AS NEEDED
Status: DISCONTINUED | OUTPATIENT
Start: 2024-01-22 | End: 2024-01-22

## 2024-01-22 RX ORDER — SODIUM CHLORIDE, SODIUM LACTATE, POTASSIUM CHLORIDE, CALCIUM CHLORIDE 600; 310; 30; 20 MG/100ML; MG/100ML; MG/100ML; MG/100ML
84 INJECTION, SOLUTION INTRAVENOUS CONTINUOUS
Status: DISCONTINUED | OUTPATIENT
Start: 2024-01-22 | End: 2024-01-24

## 2024-01-22 RX ORDER — MAGNESIUM SULFATE HEPTAHYDRATE 40 MG/ML
4 INJECTION, SOLUTION INTRAVENOUS ONCE
Status: COMPLETED | OUTPATIENT
Start: 2024-01-22 | End: 2024-01-23

## 2024-01-22 RX ORDER — BUPIVACAINE HYDROCHLORIDE 2.5 MG/ML
INJECTION, SOLUTION EPIDURAL; INFILTRATION; INTRACAUDAL
Status: COMPLETED | OUTPATIENT
Start: 2024-01-22 | End: 2024-01-22

## 2024-01-22 RX ORDER — HEPARIN SODIUM 5000 [USP'U]/ML
5000 INJECTION, SOLUTION INTRAVENOUS; SUBCUTANEOUS EVERY 8 HOURS SCHEDULED
Status: DISCONTINUED | OUTPATIENT
Start: 2024-01-22 | End: 2024-01-26

## 2024-01-22 RX ORDER — ALBUTEROL SULFATE 2.5 MG/3ML
2.5 SOLUTION RESPIRATORY (INHALATION) EVERY 6 HOURS PRN
Status: DISCONTINUED | OUTPATIENT
Start: 2024-01-22 | End: 2024-01-25

## 2024-01-22 RX ORDER — LABETALOL HYDROCHLORIDE 5 MG/ML
10 INJECTION, SOLUTION INTRAVENOUS EVERY 6 HOURS PRN
Status: DISCONTINUED | OUTPATIENT
Start: 2024-01-22 | End: 2024-01-27 | Stop reason: HOSPADM

## 2024-01-22 RX ORDER — PANTOPRAZOLE SODIUM 40 MG/10ML
40 INJECTION, POWDER, LYOPHILIZED, FOR SOLUTION INTRAVENOUS
Status: DISCONTINUED | OUTPATIENT
Start: 2024-01-23 | End: 2024-01-25

## 2024-01-22 RX ADMIN — HYDROMORPHONE HYDROCHLORIDE 0.5 MG: 1 INJECTION, SOLUTION INTRAMUSCULAR; INTRAVENOUS; SUBCUTANEOUS at 13:51

## 2024-01-22 RX ADMIN — SUGAMMADEX 200 MG: 100 INJECTION, SOLUTION INTRAVENOUS at 18:28

## 2024-01-22 RX ADMIN — ONDANSETRON 4 MG: 2 INJECTION INTRAMUSCULAR; INTRAVENOUS at 20:07

## 2024-01-22 RX ADMIN — FENTANYL CITRATE 100 MCG: 50 INJECTION INTRAMUSCULAR; INTRAVENOUS at 10:20

## 2024-01-22 RX ADMIN — FENTANYL CITRATE 50 MCG: 50 INJECTION INTRAMUSCULAR; INTRAVENOUS at 19:04

## 2024-01-22 RX ADMIN — CALCIUM CHLORIDE 0.3 G: 100 INJECTION INTRAVENOUS; INTRAVENTRICULAR at 16:38

## 2024-01-22 RX ADMIN — Medication 100 MCG: at 14:39

## 2024-01-22 RX ADMIN — Medication 100 MCG: at 15:05

## 2024-01-22 RX ADMIN — ROCURONIUM BROMIDE 10 MG: 10 INJECTION, SOLUTION INTRAVENOUS at 12:11

## 2024-01-22 RX ADMIN — PIPERACILLIN SODIUM AND TAZOBACTAM SODIUM 3.38 G: 36; 4.5 INJECTION, POWDER, LYOPHILIZED, FOR SOLUTION INTRAVENOUS at 17:20

## 2024-01-22 RX ADMIN — HYDROMORPHONE HYDROCHLORIDE: 10 INJECTION, SOLUTION INTRAMUSCULAR; INTRAVENOUS; SUBCUTANEOUS at 19:43

## 2024-01-22 RX ADMIN — SODIUM CHLORIDE: 0.9 INJECTION, SOLUTION INTRAVENOUS at 10:24

## 2024-01-22 RX ADMIN — MIDAZOLAM 2 MG: 1 INJECTION INTRAMUSCULAR; INTRAVENOUS at 10:17

## 2024-01-22 RX ADMIN — Medication 100 MCG: at 16:13

## 2024-01-22 RX ADMIN — SODIUM CHLORIDE, SODIUM LACTATE, POTASSIUM CHLORIDE, AND CALCIUM CHLORIDE: .6; .31; .03; .02 INJECTION, SOLUTION INTRAVENOUS at 10:17

## 2024-01-22 RX ADMIN — ROCURONIUM BROMIDE 10 MG: 10 INJECTION, SOLUTION INTRAVENOUS at 12:56

## 2024-01-22 RX ADMIN — PIPERACILLIN SODIUM AND TAZOBACTAM SODIUM 3.38 G: 36; 4.5 INJECTION, POWDER, LYOPHILIZED, FOR SOLUTION INTRAVENOUS at 10:56

## 2024-01-22 RX ADMIN — HEPARIN SODIUM 5000 UNITS: 5000 INJECTION INTRAVENOUS; SUBCUTANEOUS at 21:00

## 2024-01-22 RX ADMIN — ROCURONIUM BROMIDE 10 MG: 10 INJECTION, SOLUTION INTRAVENOUS at 15:18

## 2024-01-22 RX ADMIN — SODIUM CHLORIDE, SODIUM LACTATE, POTASSIUM CHLORIDE, AND CALCIUM CHLORIDE: .6; .31; .03; .02 INJECTION, SOLUTION INTRAVENOUS at 13:58

## 2024-01-22 RX ADMIN — PHENYLEPHRINE HYDROCHLORIDE 50 MCG/MIN: 10 INJECTION INTRAVENOUS at 11:25

## 2024-01-22 RX ADMIN — PIPERACILLIN SODIUM AND TAZOBACTAM SODIUM 3.38 G: 36; 4.5 INJECTION, POWDER, LYOPHILIZED, FOR SOLUTION INTRAVENOUS at 22:39

## 2024-01-22 RX ADMIN — ROCURONIUM BROMIDE 20 MG: 10 INJECTION, SOLUTION INTRAVENOUS at 13:42

## 2024-01-22 RX ADMIN — DEXAMETHASONE SODIUM PHOSPHATE 5 MG: 10 INJECTION, SOLUTION INTRAMUSCULAR; INTRAVENOUS at 10:30

## 2024-01-22 RX ADMIN — FLUCONAZOLE IN SODIUM CHLORIDE 400 MG: 2 INJECTION, SOLUTION INTRAVENOUS at 23:23

## 2024-01-22 RX ADMIN — HYDROMORPHONE HYDROCHLORIDE 0.5 MG: 1 INJECTION, SOLUTION INTRAMUSCULAR; INTRAVENOUS; SUBCUTANEOUS at 11:53

## 2024-01-22 RX ADMIN — ROCURONIUM BROMIDE 20 MG: 10 INJECTION, SOLUTION INTRAVENOUS at 11:48

## 2024-01-22 RX ADMIN — LIDOCAINE HYDROCHLORIDE 50 MG: 10 INJECTION, SOLUTION EPIDURAL; INFILTRATION; INTRACAUDAL; PERINEURAL at 10:20

## 2024-01-22 RX ADMIN — Medication 100 MCG: at 15:09

## 2024-01-22 RX ADMIN — FLUCONAZOLE 200 MG: 2 INJECTION, SOLUTION INTRAVENOUS at 16:10

## 2024-01-22 RX ADMIN — ROCURONIUM BROMIDE 10 MG: 10 INJECTION, SOLUTION INTRAVENOUS at 14:28

## 2024-01-22 RX ADMIN — SODIUM CHLORIDE: 0.9 INJECTION, SOLUTION INTRAVENOUS at 12:31

## 2024-01-22 RX ADMIN — INSULIN HUMAN 5 UNITS: 100 INJECTION, SOLUTION PARENTERAL at 08:34

## 2024-01-22 RX ADMIN — MAGNESIUM SULFATE HEPTAHYDRATE 4 G: 40 INJECTION, SOLUTION INTRAVENOUS at 20:51

## 2024-01-22 RX ADMIN — PIPERACILLIN SODIUM AND TAZOBACTAM SODIUM 3.38 G: 36; 4.5 INJECTION, POWDER, LYOPHILIZED, FOR SOLUTION INTRAVENOUS at 15:27

## 2024-01-22 RX ADMIN — ACETAMINOPHEN 1000 MG: 10 INJECTION INTRAVENOUS at 20:55

## 2024-01-22 RX ADMIN — ROCURONIUM BROMIDE 30 MG: 10 INJECTION, SOLUTION INTRAVENOUS at 11:07

## 2024-01-22 RX ADMIN — ONDANSETRON 4 MG: 2 INJECTION INTRAMUSCULAR; INTRAVENOUS at 10:30

## 2024-01-22 RX ADMIN — Medication 100 MCG: at 14:49

## 2024-01-22 RX ADMIN — HYDROMORPHONE HYDROCHLORIDE 1 MG: 1 INJECTION, SOLUTION INTRAMUSCULAR; INTRAVENOUS; SUBCUTANEOUS at 10:54

## 2024-01-22 RX ADMIN — HYDROMORPHONE HYDROCHLORIDE 0.5 MG: 1 INJECTION, SOLUTION INTRAMUSCULAR; INTRAVENOUS; SUBCUTANEOUS at 19:47

## 2024-01-22 RX ADMIN — PIPERACILLIN SODIUM AND TAZOBACTAM SODIUM 3.38 G: 36; 4.5 INJECTION, POWDER, LYOPHILIZED, FOR SOLUTION INTRAVENOUS at 13:00

## 2024-01-22 RX ADMIN — CALCIUM CHLORIDE 0.2 G: 100 INJECTION INTRAVENOUS; INTRAVENTRICULAR at 16:36

## 2024-01-22 RX ADMIN — ROCURONIUM BROMIDE 30 MG: 10 INJECTION, SOLUTION INTRAVENOUS at 17:08

## 2024-01-22 RX ADMIN — ROCURONIUM BROMIDE 10 MG: 10 INJECTION, SOLUTION INTRAVENOUS at 16:11

## 2024-01-22 RX ADMIN — KETAMINE HYDROCHLORIDE 0.2 MG/KG/HR: 50 INJECTION INTRAMUSCULAR; INTRAVENOUS at 10:40

## 2024-01-22 RX ADMIN — PHENYLEPHRINE HYDROCHLORIDE 2 DROP: 1 SPRAY NASAL at 10:31

## 2024-01-22 RX ADMIN — HYDROMORPHONE HYDROCHLORIDE 0.5 MG: 1 INJECTION, SOLUTION INTRAMUSCULAR; INTRAVENOUS; SUBCUTANEOUS at 19:27

## 2024-01-22 RX ADMIN — SODIUM CHLORIDE: 0.9 INJECTION, SOLUTION INTRAVENOUS at 16:45

## 2024-01-22 RX ADMIN — ALBUMIN (HUMAN): 12.5 INJECTION, SOLUTION INTRAVENOUS at 14:40

## 2024-01-22 RX ADMIN — Medication 100 MCG: at 10:21

## 2024-01-22 RX ADMIN — PROPOFOL 150 MG: 10 INJECTION, EMULSION INTRAVENOUS at 10:20

## 2024-01-22 RX ADMIN — ALBUTEROL SULFATE 2 PUFF: 90 AEROSOL, METERED RESPIRATORY (INHALATION) at 23:05

## 2024-01-22 RX ADMIN — PROPOFOL 50 MCG/KG/MIN: 10 INJECTION, EMULSION INTRAVENOUS at 10:26

## 2024-01-22 RX ADMIN — ALBUMIN (HUMAN): 12.5 INJECTION, SOLUTION INTRAVENOUS at 16:30

## 2024-01-22 RX ADMIN — BUPIVACAINE HYDROCHLORIDE 20 ML: 2.5 INJECTION, SOLUTION EPIDURAL; INFILTRATION; INTRACAUDAL at 18:30

## 2024-01-22 RX ADMIN — FENTANYL CITRATE 50 MCG: 50 INJECTION INTRAMUSCULAR; INTRAVENOUS at 19:22

## 2024-01-22 RX ADMIN — ROCURONIUM BROMIDE 50 MG: 10 INJECTION, SOLUTION INTRAVENOUS at 10:21

## 2024-01-22 RX ADMIN — SODIUM CHLORIDE, SODIUM LACTATE, POTASSIUM CHLORIDE, AND CALCIUM CHLORIDE 125 ML/HR: .6; .31; .03; .02 INJECTION, SOLUTION INTRAVENOUS at 09:06

## 2024-01-22 RX ADMIN — ALBUMIN (HUMAN): 12.5 INJECTION, SOLUTION INTRAVENOUS at 15:01

## 2024-01-22 NOTE — ANESTHESIA PROCEDURE NOTES
Peripheral Block    Patient location during procedure: OR  Reason for block: at surgeon's request and post-op pain management  Staffing  Performed by: Claudette Sloan MD  Authorized by: Claudette Slona MD    Preanesthetic Checklist  Completed: patient identified, IV checked, site marked, risks and benefits discussed, surgical consent, monitors and equipment checked, pre-op evaluation and timeout performed  Peripheral Block  Patient position: supine  Prep: ChloraPrep  Patient monitoring: frequent blood pressure checks, heart rate and continuous pulse oximetry  Block type: TAP  Laterality: right  Injection technique: single-shot  Procedures: ultrasound guided, Ultrasound guidance required for the procedure to increase accuracy and safety of medication placement and decrease risk of complications.bupivacaine (PF) (MARCAINE) 0.25 % injection 20 mL - Perineural   20 mL - 1/22/2024 6:30:00 PM  Needle  Needle type: Stimuplex   Needle gauge: 20 G  Needle length: 4 in  Needle localization: ultrasound guidance  Needle insertion depth: 3 cm  Catheter type: open end  Catheter size: 20 G  Assessment  Injection assessment: frequent aspiration, injected with ease, negative aspiration and needle tip visualized at all times  patient tolerated the procedure well with no immediate complications  Additional Notes  Block done only on right side -- morphine pump on left side

## 2024-01-22 NOTE — INTERVAL H&P NOTE
H&P reviewed. After examining the patient I find no changes in the patients condition since the H&P had been written.    Vitals:    01/22/24 0755   BP:    Pulse:    Resp: 18   Temp:    SpO2:

## 2024-01-22 NOTE — ANESTHESIA PROCEDURE NOTES
"Arterial Line Insertion    Performed by: Helena Malloy CRNA  Authorized by: Ramiro Tidwell MD  Consent: Verbal consent obtained. Written consent obtained.  Risks and benefits: risks, benefits and alternatives were discussed  Consent given by: patient  Patient understanding: patient states understanding of the procedure being performed  Patient consent: the patient's understanding of the procedure matches consent given  Procedure consent: procedure consent matches procedure scheduled  Relevant documents: relevant documents present and verified  Test results: test results available and properly labeled  Site marked: the operative site was marked  Radiology Images: Radiology Images displayed and confirmed. If images not available, report reviewed  Required items: required blood products, implants, devices, and special equipment available  Patient identity confirmed: arm band  Time out: Immediately prior to procedure a \"time out\" was called to verify the correct patient, procedure, equipment, support staff and site/side marked as required.  Preparation: Patient was prepped and draped in the usual sterile fashion.  Indications: hemodynamic monitoring  Orientation:  Right  Location: radial artery  Sedation:  Patient sedated: GETA.    Procedure Details:  Naman's test normal: yes  Needle gauge: 20  Number of attempts: 2    Post-procedure:  Post-procedure: dressing applied  Waveform: good waveform  Post-procedure CNS: unchanged  Patient tolerance: patient tolerated the procedure well with no immediate complications          "

## 2024-01-22 NOTE — ANESTHESIA POSTPROCEDURE EVALUATION
Post-Op Assessment Note    CV Status:  Stable  Pain Score: 0    Pain management: adequate       Mental Status:  Sleepy   Hydration Status:  Stable   PONV Controlled:  None   Airway Patency:  Patent     Post Op Vitals Reviewed: Yes      Staff: Anesthesiologist, CRNA               BP   159/74   Temp (!) 97.4 °F (36.3 °C) (01/22/24 1840)    Pulse 98 (01/22/24 1840)   Resp 16 (01/22/24 1840)    SpO2   100

## 2024-01-22 NOTE — ANESTHESIA PREPROCEDURE EVALUATION
Procedure:  DUODENAL RESECTION, INTRAOPERATIVE ULTRASOUND (Abdomen)  POSSIBLE WHIPPLE (Abdomen)    Relevant Problems   ANESTHESIA   (+) PONV (postoperative nausea and vomiting)      CARDIO   (+) Benign essential hypertension   (+) Chronic thoracic spine pain   (+) Hyperlipidemia associated with type 2 diabetes mellitus       ENDO   (+) Type 2 diabetes mellitus with diabetic neuropathy, with long-term current use of insulin (HCC)      GI/HEPATIC   (+) GERD without esophagitis      HEMATOLOGY   (+) Microcytic anemia      MUSCULOSKELETAL   (+) Chronic bilateral back pain   (+) Chronic thoracic spine pain   (+) Degeneration of lumbar intervertebral disc   (+) Lumbar spondylosis   (+) Thoracic spondylosis without myelopathy      NEURO/PSYCH   (+) Chronic bilateral back pain   (+) Chronic cervical pain   (+) Malignant neoplasm of spinal cord (HCC)      PULMONARY   (+) Mild persistent asthma without complication        Physical Exam    Airway    Mallampati score: II  TM Distance: >3 FB  Neck ROM: full     Dental   No notable dental hx     Cardiovascular      Pulmonary      Other Findings  post-pubertal.      Anesthesia Plan  ASA Score- 3     Anesthesia Type- general with ASA Monitors.         Additional Monitors: arterial line.    Airway Plan: ETT.           Plan Factors-Exercise tolerance (METS): >4 METS.    Chart reviewed. EKG reviewed.  Existing labs reviewed. Patient summary reviewed.    Patient is not a current smoker.      There is medical exclusion for perioperative obstructive sleep apnea risk education.        Induction- intravenous.    Postoperative Plan- Plan for postoperative opioid use.     Informed Consent- Anesthetic plan and risks discussed with patient.  I personally reviewed this patient with the CRNA. Discussed and agreed on the Anesthesia Plan with the CRNA..

## 2024-01-23 LAB
ALBUMIN SERPL BCP-MCNC: 3.1 G/DL (ref 3.5–5)
ALP SERPL-CCNC: 92 U/L (ref 34–104)
ALT SERPL W P-5'-P-CCNC: 184 U/L (ref 7–52)
ANION GAP SERPL CALCULATED.3IONS-SCNC: 9 MMOL/L
AST SERPL W P-5'-P-CCNC: 196 U/L (ref 13–39)
ATRIAL RATE: 111 BPM
BASOPHILS # BLD AUTO: 0.02 THOUSANDS/ÂΜL (ref 0–0.1)
BASOPHILS NFR BLD AUTO: 0 % (ref 0–1)
BILIRUB SERPL-MCNC: 0.31 MG/DL (ref 0.2–1)
BUN SERPL-MCNC: 14 MG/DL (ref 5–25)
CA-I BLD-SCNC: 1.13 MMOL/L (ref 1.12–1.32)
CALCIUM ALBUM COR SERPL-MCNC: 9.5 MG/DL (ref 8.3–10.1)
CALCIUM SERPL-MCNC: 8.8 MG/DL (ref 8.4–10.2)
CHLORIDE SERPL-SCNC: 107 MMOL/L (ref 96–108)
CO2 SERPL-SCNC: 26 MMOL/L (ref 21–32)
CREAT SERPL-MCNC: 0.68 MG/DL (ref 0.6–1.3)
EOSINOPHIL # BLD AUTO: 0.01 THOUSAND/ÂΜL (ref 0–0.61)
EOSINOPHIL NFR BLD AUTO: 0 % (ref 0–6)
ERYTHROCYTE [DISTWIDTH] IN BLOOD BY AUTOMATED COUNT: 16.2 % (ref 11.6–15.1)
GFR SERPL CREATININE-BSD FRML MDRD: 92 ML/MIN/1.73SQ M
GLUCOSE SERPL-MCNC: 127 MG/DL (ref 65–140)
GLUCOSE SERPL-MCNC: 150 MG/DL (ref 65–140)
GLUCOSE SERPL-MCNC: 151 MG/DL (ref 65–140)
GLUCOSE SERPL-MCNC: 167 MG/DL (ref 65–140)
HCT VFR BLD AUTO: 24 % (ref 34.8–46.1)
HGB BLD-MCNC: 7.2 G/DL (ref 11.5–15.4)
IMM GRANULOCYTES # BLD AUTO: 0.08 THOUSAND/UL (ref 0–0.2)
IMM GRANULOCYTES NFR BLD AUTO: 1 % (ref 0–2)
LYMPHOCYTES # BLD AUTO: 0.88 THOUSANDS/ÂΜL (ref 0.6–4.47)
LYMPHOCYTES NFR BLD AUTO: 6 % (ref 14–44)
MAGNESIUM SERPL-MCNC: 2 MG/DL (ref 1.9–2.7)
MCH RBC QN AUTO: 25.4 PG (ref 26.8–34.3)
MCHC RBC AUTO-ENTMCNC: 30 G/DL (ref 31.4–37.4)
MCV RBC AUTO: 85 FL (ref 82–98)
MONOCYTES # BLD AUTO: 0.65 THOUSAND/ÂΜL (ref 0.17–1.22)
MONOCYTES NFR BLD AUTO: 4 % (ref 4–12)
NEUTROPHILS # BLD AUTO: 13.21 THOUSANDS/ÂΜL (ref 1.85–7.62)
NEUTS SEG NFR BLD AUTO: 89 % (ref 43–75)
NRBC BLD AUTO-RTO: 0 /100 WBCS
P AXIS: 54 DEGREES
PHOSPHATE SERPL-MCNC: 4.9 MG/DL (ref 2.3–4.1)
PLATELET # BLD AUTO: 285 THOUSANDS/UL (ref 149–390)
PMV BLD AUTO: 11.1 FL (ref 8.9–12.7)
POTASSIUM SERPL-SCNC: 4.1 MMOL/L (ref 3.5–5.3)
PR INTERVAL: 210 MS
PROT SERPL-MCNC: 5.1 G/DL (ref 6.4–8.4)
QRS AXIS: 26 DEGREES
QRSD INTERVAL: 80 MS
QT INTERVAL: 328 MS
QTC INTERVAL: 446 MS
RBC # BLD AUTO: 2.83 MILLION/UL (ref 3.81–5.12)
SODIUM SERPL-SCNC: 142 MMOL/L (ref 135–147)
T WAVE AXIS: 52 DEGREES
VENTRICULAR RATE: 111 BPM
WBC # BLD AUTO: 14.85 THOUSAND/UL (ref 4.31–10.16)

## 2024-01-23 PROCEDURE — 84100 ASSAY OF PHOSPHORUS: CPT | Performed by: SURGERY

## 2024-01-23 PROCEDURE — 99255 IP/OBS CONSLTJ NEW/EST HI 80: CPT | Performed by: ANESTHESIOLOGY

## 2024-01-23 PROCEDURE — 97163 PT EVAL HIGH COMPLEX 45 MIN: CPT

## 2024-01-23 PROCEDURE — 82948 REAGENT STRIP/BLOOD GLUCOSE: CPT

## 2024-01-23 PROCEDURE — 85025 COMPLETE CBC W/AUTO DIFF WBC: CPT | Performed by: SURGERY

## 2024-01-23 PROCEDURE — C9113 INJ PANTOPRAZOLE SODIUM, VIA: HCPCS | Performed by: SURGERY

## 2024-01-23 PROCEDURE — 82330 ASSAY OF CALCIUM: CPT | Performed by: NURSE PRACTITIONER

## 2024-01-23 PROCEDURE — 83735 ASSAY OF MAGNESIUM: CPT | Performed by: SURGERY

## 2024-01-23 PROCEDURE — 94664 DEMO&/EVAL PT USE INHALER: CPT

## 2024-01-23 PROCEDURE — NC001 PR NO CHARGE: Performed by: PHYSICIAN ASSISTANT

## 2024-01-23 PROCEDURE — 97167 OT EVAL HIGH COMPLEX 60 MIN: CPT

## 2024-01-23 PROCEDURE — 99232 SBSQ HOSP IP/OBS MODERATE 35: CPT | Performed by: SURGERY

## 2024-01-23 PROCEDURE — 80053 COMPREHEN METABOLIC PANEL: CPT | Performed by: SURGERY

## 2024-01-23 PROCEDURE — 99024 POSTOP FOLLOW-UP VISIT: CPT | Performed by: STUDENT IN AN ORGANIZED HEALTH CARE EDUCATION/TRAINING PROGRAM

## 2024-01-23 RX ORDER — CALCIUM GLUCONATE 20 MG/ML
2 INJECTION, SOLUTION INTRAVENOUS ONCE
Status: COMPLETED | OUTPATIENT
Start: 2024-01-23 | End: 2024-01-23

## 2024-01-23 RX ORDER — ECHINACEA PURPUREA EXTRACT 125 MG
1 TABLET ORAL EVERY 2 HOUR PRN
Status: DISCONTINUED | OUTPATIENT
Start: 2024-01-23 | End: 2024-01-27 | Stop reason: HOSPADM

## 2024-01-23 RX ORDER — ALBUTEROL SULFATE 90 UG/1
2 AEROSOL, METERED RESPIRATORY (INHALATION) EVERY 4 HOURS PRN
Status: DISCONTINUED | OUTPATIENT
Start: 2024-01-23 | End: 2024-01-27 | Stop reason: HOSPADM

## 2024-01-23 RX ADMIN — INSULIN LISPRO 1 UNITS: 100 INJECTION, SOLUTION INTRAVENOUS; SUBCUTANEOUS at 05:46

## 2024-01-23 RX ADMIN — ACETAMINOPHEN 1000 MG: 10 INJECTION INTRAVENOUS at 23:49

## 2024-01-23 RX ADMIN — HEPARIN SODIUM 5000 UNITS: 5000 INJECTION INTRAVENOUS; SUBCUTANEOUS at 14:34

## 2024-01-23 RX ADMIN — HEPARIN SODIUM 5000 UNITS: 5000 INJECTION INTRAVENOUS; SUBCUTANEOUS at 05:47

## 2024-01-23 RX ADMIN — SODIUM CHLORIDE, SODIUM LACTATE, POTASSIUM CHLORIDE, AND CALCIUM CHLORIDE 84 ML/HR: .6; .31; .03; .02 INJECTION, SOLUTION INTRAVENOUS at 23:08

## 2024-01-23 RX ADMIN — PIPERACILLIN SODIUM AND TAZOBACTAM SODIUM 3.38 G: 36; 4.5 INJECTION, POWDER, LYOPHILIZED, FOR SOLUTION INTRAVENOUS at 08:56

## 2024-01-23 RX ADMIN — FLUTICASONE FUROATE AND VILANTEROL TRIFENATATE 1 PUFF: 200; 25 POWDER RESPIRATORY (INHALATION) at 08:56

## 2024-01-23 RX ADMIN — INSULIN LISPRO 1 UNITS: 100 INJECTION, SOLUTION INTRAVENOUS; SUBCUTANEOUS at 03:00

## 2024-01-23 RX ADMIN — PANTOPRAZOLE SODIUM 40 MG: 40 INJECTION, POWDER, FOR SOLUTION INTRAVENOUS at 08:56

## 2024-01-23 RX ADMIN — ACETAMINOPHEN 1000 MG: 10 INJECTION INTRAVENOUS at 12:08

## 2024-01-23 RX ADMIN — Medication 1 SPRAY: at 12:08

## 2024-01-23 RX ADMIN — ACETAMINOPHEN 1000 MG: 10 INJECTION INTRAVENOUS at 18:14

## 2024-01-23 RX ADMIN — SODIUM CHLORIDE, SODIUM LACTATE, POTASSIUM CHLORIDE, AND CALCIUM CHLORIDE 125 ML/HR: .6; .31; .03; .02 INJECTION, SOLUTION INTRAVENOUS at 03:58

## 2024-01-23 RX ADMIN — ALBUTEROL SULFATE 2 PUFF: 90 AEROSOL, METERED RESPIRATORY (INHALATION) at 23:14

## 2024-01-23 RX ADMIN — CALCIUM GLUCONATE 2 G: 20 INJECTION, SOLUTION INTRAVENOUS at 01:56

## 2024-01-23 RX ADMIN — INSULIN LISPRO 1 UNITS: 100 INJECTION, SOLUTION INTRAVENOUS; SUBCUTANEOUS at 12:09

## 2024-01-23 RX ADMIN — ONDANSETRON 4 MG: 2 INJECTION INTRAMUSCULAR; INTRAVENOUS at 07:10

## 2024-01-23 RX ADMIN — PIPERACILLIN SODIUM AND TAZOBACTAM SODIUM 3.38 G: 36; 4.5 INJECTION, POWDER, LYOPHILIZED, FOR SOLUTION INTRAVENOUS at 03:24

## 2024-01-23 RX ADMIN — ACETAMINOPHEN 1000 MG: 10 INJECTION INTRAVENOUS at 05:38

## 2024-01-23 RX ADMIN — PIPERACILLIN SODIUM AND TAZOBACTAM SODIUM 3.38 G: 36; 4.5 INJECTION, POWDER, LYOPHILIZED, FOR SOLUTION INTRAVENOUS at 14:31

## 2024-01-23 RX ADMIN — SODIUM CHLORIDE, SODIUM LACTATE, POTASSIUM CHLORIDE, AND CALCIUM CHLORIDE 84 ML/HR: .6; .31; .03; .02 INJECTION, SOLUTION INTRAVENOUS at 12:17

## 2024-01-23 NOTE — INTERIM OP NOTE
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]    Procedure(s) (LRB):  INTRAOPERATIVE ULTRASOUND (N/A)  WHIPPLE (N/A)  COMPLETION GASTRECTOMY (N/A)  DUODENAL EXPLORATION (N/A)    Surgeons and Role:     * Karlene Cruz MD - Primary     * Nino Lei MD - Assisting    Specimens:  ID Type Source Tests Collected by Time Destination   1 : Portacaval lymph node Tissue Lymph Node TISSUE EXAM Karlene Cruz MD 2024 1351    2 : Whipple Tissue Pancreas TISSUE EXAM Karlene Cruz MD 2024 1407    3 : Completion gastrectomy Tissue Stomach TISSUE EXAM Karlene Cruz MD 2024 1541        Estimated Blood Loss:   400 mL    Anesthesia Type:   General     Findings:    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      Complications:   None      SIGNATURE: Nino Lei MD   DATE: 2024   TIME: 6:05 AM

## 2024-01-23 NOTE — PROGRESS NOTES
[facility]  Progress Note: Critical Care  Name: Aleida Hammond 64 y.o. female I MRN: [id number]  Unit/Bed#: PPHP 517-01 I Date of Admission: 1/22/2024   Date of Service: 1/23/2024 I Hospital Day: 1    Assessment/Plan     Neuro:   Continuous opiate dependence with superimposed acute post-operative pain    Continuous infusions: None   Scheduled medication:  Tylenol IV 1000mg q6h   PRN medications:  Dilaudid PCA   APS consulted, follow up daytime recommendations   Holding home analgesic regimen:   Tramadol ER 300mg daily   Lyrica 200mg TID  Morphine powder   No acute neurologic concerns  Routine neuro checks  CAM-ICU BID       CV:   Sinus tachycardia   Volume resuscitate as indicated  Continue close telemetry monitoring  Hypertension   Maintain SBP < 180   Maintain MAP >65  Labetalol 10mg q6h PRN  Holding home antihypertensives:  Amlodipine 5mg BID  Discontinue arterial line today   Hyperlipidemia  Holding home atorvastatin 40mg qHS     Pulm:  Acute respiratory insufficiency   Secondary to poor inspiratory effort, pain   Wean supplemental oxygen as able in order to maintain SpO2 >90%  CXR as needed   ABG as needed   Continue pulmonary hygiene. Incentive spirometer q1h while awake, encourage coughing and deep breathing. Upright positioning  Suction as needed and closely monitor secretions. Maintain HOB >30 degrees. Q4h oral care with chlorhexidine BID  Mild persistent asthma without complication   Fluticasone-vilanterol daily   Albuterol PRN  Holding home Singulair 10mg qHS       GI:   Partially obstructing duodenal mass   POD #1 s/p Whipple, completion gastrectomy, jejunal repair   Surgical oncology following, follow up plan of care  Maintain NGT with close monitoring of output   Monitor LISA output amount and character  Protonix 40mg IV q24h   Elevated LFT's   Improved  Continue supportive care and monitor LFT's as needed   Avoid hepatotoxic medications   Bowel regimen  None    Last BM: PTA    :   No acute issues   Baseline creatinine: 0.63 - 0.83  Initial creatinine: 0.65 (1/22/24)  Last creatinine: 0.68 (1/23/24)   Strict q2h I/O monitoring  Matinain Gilmore catheter  Continue to follow renal function tests    F/E/N:   Fluids  Maintenance fluids:  ml/hr. 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   Follow up surgical pathology   Follow up with medical and surgical oncology as outpatient   VTE prophylaxis: SQH TID, SCD's to BLE    Endo/Rheum:   Insulin dependent type 2 diabetes   Last hemoglobin A1c 5.9% on 1/9/24  Lispro SSI units q6h   Adjust insulin regimen as needed to maintain goal -180  Holding home antihyperglycemics regimen:  Lantus 8 units qHS   Metformin 1000mg BID       ID:   Leukocytosis   Suspect secondary to leukemoid stress reaction from OR as well as possible underlying malignancy   Current antimicrobials: Zosyn/fluconazole   Plan to complete 24 hours   Continue to monitor fever and WBC curve     MSK/Skin:   No acute issues   Reposition q2h, eliminate pressure points while in bed  Close skin surveillance   PT/OT     Disposition: Stepdown Level 1    ICU Core Measures     A: Assess, Prevent, and Manage Pain Has pain been assessed? Yes  Need for changes to pain regimen? No   B: Both SAT/SAT  N/A   C: Choice of Sedation RASS Goal: 0 Alert and Calm or N/A patient not on sedation  Need for changes to sedation or analgesia regimen? No   D: Delirium CAM-ICU: Negative   E: Early Mobility  Plan for early mobility? Yes    F: Family Engagement Plan for family engagement today? Yes       Antibiotic Review: Continue broad spectrum secondary to severity of illness.  and Post op requirements     Review of Invasive Devices:    Gilmore Plan: Continue for accurate I/O monitoring for 48 hours    Deedee Plan: Discontinue arterial line    Prophylaxis:  VTE VTE covered by:  heparin (porcine), Subcutaneous, 5,000 Units at 01/22/24 2100       Stress Ulcer  covered byomeprazole (PriLOSEC) 40 MG capsule [[id number]] (Long-Term Med), pantoprazole (PROTONIX) injection 40 mg [[id number]]        Significant 24hr Events     24hr events: Patient remained hemodynamically WNL overnight with improvement of tachycardia and oxygen requirements.  Pain well controlled with dilaudid PCA.      Subjective     Review of Systems   Constitutional:  Positive for fatigue.   Respiratory:  Negative for shortness of breath.    Gastrointestinal:  Positive for abdominal pain. Negative for nausea.   Neurological:  Negative for dizziness and headaches.        Objective                            Vitals I/O      Most Recent Min/Max in 24hrs   Temp 98.4 °F (36.9 °C) Temp  Min: 97.4 °F (36.3 °C)  Max: 98.4 °F (36.9 °C)   Pulse 93 Pulse  Min: 93  Max: 113   Resp 16 Resp  Min: 13  Max: 21   /63 BP  Min: 119/69  Max: 164/87   O2 Sat 98 % SpO2  Min: 96 %  Max: 100 %      Intake/Output Summary (Last 24 hours) at 1/23/2024 0129  Last data filed at 1/23/2024 0124  Gross per 24 hour   Intake 5874.2 ml   Output 3060 ml   Net 2814.2 ml       Diet NPO; Sips with meds    Invasive Monitoring           Physical Exam   Physical Exam  Vitals and nursing note reviewed.   Eyes:      Conjunctiva/sclera: Conjunctivae normal.      Pupils: Pupils are equal, round, and reactive to light.   Skin:     General: Skin is warm and dry.      Capillary Refill: Capillary refill takes less than 2 seconds.   HENT:      Head: Normocephalic and atraumatic.      Nose: Nasogastric tube present.      Mouth/Throat:       Lips: Pink.      Mouth: Mucous membranes are dry.   Cardiovascular:      Rate and Rhythm: Normal rate and regular rhythm.      Pulses:           Radial pulses are 2+ on the right side and 2+ on the left side.        Dorsalis pedis pulses are 2+ on the right side and 2+ on the left side.      Heart sounds: Normal heart sounds.   Musculoskeletal:      Right lower leg: No edema.      Left lower leg: No edema.   Abdominal:          Comments: NGT with dark green, non-bloody output      Constitutional:       General: She is sleeping.      Appearance: She is well-developed.      Interventions: Nasal cannula in place.   Pulmonary:      Effort: Pulmonary effort is normal.      Breath sounds: Examination of the right-lower field reveals decreased breath sounds. Examination of the left-lower field reveals decreased breath sounds. Decreased breath sounds present.   Psychiatric:         Behavior: Behavior is cooperative.   Neurological:      General: No focal deficit present.      Mental Status: She is oriented to person, place, and time and easily aroused.      GCS: GCS eye subscore is 4. GCS verbal subscore is 5. GCS motor subscore is 6.   Genitourinary/Anorectal:  Gilmore present.          Diagnostic Studies      EKG: NSR  Imaging: No new imaging      Medications:  Scheduled PRN   acetaminophen, 1,000 mg, Q6H MARLENI  fluticasone-vilanterol, 1 puff, Daily  heparin (porcine), 5,000 Units, Q8H MARLENI  insulin lispro, 1-5 Units, Q6H MARLENI  pantoprazole, 40 mg, Q24H MARLENI  piperacillin-tazobactam, 3.375 g, Q6H      albuterol, 2 puff, Q4H PRN  albuterol, 2.5 mg, Q6H PRN  labetalol, 10 mg, Q6H PRN  naloxone, 0.1 mg, Q1MIN PRN  ondansetron, 4 mg, Q6H PRN       Continuous    HYDROmorphone,   lactated ringers, 125 mL/hr, Last Rate: 125 mL/hr (01/22/24 1909)         Labs:    CBC    Recent Labs     01/22/24  1629 01/22/24  1857   WBC  --  19.00*   HGB 7.5* 7.9*   HCT 22* 25.6*   PLT  --  319   BANDSPCT  --  4     BMP    Recent Labs      01/22/24  1629 01/22/24  1857   SODIUM  --  139   K  --  4.1   CL  --  106   CO2 24 24   AGAP  --  9   BUN  --  17   CREATININE  --  0.65   CALCIUM  --  8.6       Coags    No recent results     Additional Electrolytes  Recent Labs     01/22/24  1629 01/22/24  1857   MG  --  1.0*   PHOS  --  4.5*   CAIONIZED 1.09* 1.06*          Blood Gas    Recent Labs     01/22/24  1858   PHART 7.387   POG0ICH 41.0   PO2ART 233.0*   SUQ5DQY 24.1   BEART -0.8   SOURCE Line, Arterial     Recent Labs     01/22/24  1858   SOURCE Line, Arterial    LFTs  Recent Labs     01/22/24  1857   *   *   ALKPHOS 119*   ALB 3.3*   TBILI 0.50       Infectious  No recent results  Glucose  Recent Labs     01/22/24  1857   GLUC 193*                   CHITRA Aguiar

## 2024-01-23 NOTE — OCCUPATIONAL THERAPY NOTE
Occupational Therapy Evaluation     Patient Name: Aleida Hammond  Today's Date: 2024  Problem List  Active Problems:    GERD without esophagitis    Type 2 diabetes mellitus with diabetic neuropathy, with long-term current use of insulin (HCC)    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    Duodenal mass    Past Medical History  Past Medical History:   Diagnosis Date    PABLO (acute kidney injury) (HCC) 2021    Allergic     Anemia     Anesthesia complication     Aspiration pneumonia after     Anxiety     Arthritis     Asthma     Uses Albuterol daily    Breast lump     Resolved: 2017     Chronic pain     back    Chronic pain disorder     Back, legs    Diabetes mellitus (HCC)     Diverticulitis of colon     Dizziness     GERD (gastroesophageal reflux disease)     Hepatitis     History of stomach ulcers     HL (hearing loss)     Estimated    Hyperlipidemia     Hypertension     Hypotension 2021    Infectious viral hepatitis     Hepatitiss A    Memory loss     Estimated    Pneumonia     aspiration pneumonia    PONV (postoperative nausea and vomiting)     Stomach problems      Past Surgical History  Past Surgical History:   Procedure Laterality Date    APPENDECTOMY      BACK SURGERY      CATARACT EXTRACTION, BILATERAL       SECTION      CHOLECYSTECTOMY      GASTRECTOMY N/A 2024    Procedure: COMPLETION GASTRECTOMY;  Surgeon: Karlene Cruz MD;  Location: [facility];  Service: Surgical Oncology    GASTRIC BYPASS      GASTRIC BYPASS  2010    INFUSION PUMP IMPLANTATION      LAPAROTOMY N/A 2024    Procedure: DUODENAL EXPLORATION;  Surgeon: Karlene Cruz MD;  Location: [facility];  Service: Surgical Oncology    [place] NDSC WRST SURG W/RLS TRANSVRS CARPL LIGM Right 2022    Procedure: Right endoscopic carpal tunnel release;  Surgeon: Papo Peguero MD;  Location: [facility];  Service: Orthopedics    SHOULDER SURGERY      SMALL  "INTESTINE SURGERY N/A 1/22/2024    Procedure: INTRAOPERATIVE ULTRASOUND;  Surgeon: Karlene Cruz MD;  [facility];  Service: Surgical Oncology    SPINAL CORD STIMULATOR IMPLANT  2020    WHIPPLE PROCEDURE/PANCREATICO-DUODENECTOMY N/A 1/22/2024    Procedure: WHIPPLE;  Surgeon: Karlene Cruz MD;  Location: [facility];  Service: Surgical Oncology         01/23/24 1347   OT Last Visit   OT Visit Date 01/23/24   Note Type   Note type Evaluation   Pain Assessment   Pain Assessment Tool 0-10   Pain Score 4   Pain Location/Orientation Location: Abdomen   Hospital Pain Intervention(s) Repositioned;Ambulation/increased activity;Emotional support   Restrictions/Precautions   Weight Bearing Precautions Per Order No   Other Precautions Multiple lines;Telemetry;O2;Fall Risk;Pain  (LISA drain, NGT)   Home Living   Type of Home House   Home Layout Two level;Bed/bath upstairs;Stairs to enter with rails  (8 alena, 5 to bed/bath. son lives in basement.)   Bathroom Shower/Tub Tub/shower unit   Bathroom Toilet Standard   Bathroom Equipment Grab bars in shower   Bathroom Accessibility Accessible   Home Equipment Cane  (was using pta)   Prior Function   Level of [place] Independent with ADLs;Independent with functional mobility;Independent with IADLS   Lives With Son   Receives Help From Family   IADLs Independent with driving;Independent with medication management;Independent with meal prep  (gets groceries delievered)   Falls in the last 6 months 1 to 4  (1)   Vocational Retired   Lifestyle   Autonomy Pta, pt was I W ADL/IADL, SPC mobility. gets groceries delivered   Reciprocal Relationships supportive son who lives in her basement . also has dtr who will be staying with her for 2 weeks at d/c.   Service to Others retired   Intrinsic Gratification spending time with family/friends   Subjective   Subjective \"I will try, but its gonna hurt\"   ADL   Where Assessed Edge of bed   Eating Assistance 6  Modified independent "   Grooming Assistance 6  Modified Independent   UB Bathing Assistance 5  Supervision/Setup   LB Bathing Assistance 4  Minimal Assistance   UB Dressing Assistance 5  Supervision/Setup   LB Dressing Assistance 4  Minimal Assistance   Toileting Assistance  4  Minimal Assistance   Functional Assistance 4  Minimal Assistance   Bed Mobility   Supine to Sit 4  Minimal assistance   Additional items Assist x 1   Additional Comments found in bed, left in chair w all needs in reach   Transfers   Sit to Stand 4  Minimal assistance   Additional items Assist x 1   Stand to Sit 4  Minimal assistance   Additional items Assist x 1   Additional Comments rw   Functional Mobility   Functional Mobility 4  Minimal assistance   Additional Comments EOB>chair   Additional items Rolling walker   Balance   Static Sitting Good   Dynamic Sitting Fair +   Static Standing Fair   Dynamic Standing Fair -   Ambulatory Poor +   Activity Tolerance   Activity Tolerance Patient tolerated treatment well   Medical Staff Made Aware DPT 2' pts med complexity, comorbidities and regression from baseline.   Nurse Made Aware ok per RN   RUE Assessment   RUE Assessment WFL   LUE Assessment   LUE Assessment WFL   Hand Function   Gross Motor Coordination Functional   Fine Motor Coordination Functional   Psychosocial   Psychosocial (WDL) WDL   Cognition   Overall Cognitive Status WFL   Arousal/Participation Alert;Responsive   Attention Within functional limits   Orientation Level Oriented X4   Memory Within functional limits   Following Commands Follows one step commands without difficulty   Comments pt pleasant and cooperative with overall G safety awareness and insight to condition.   Assessment   Limitation Decreased ADL status;Decreased endurance;Decreased self-care trans;Decreased high-level ADLs   Prognosis Good   Assessment Pt is a 64 y.o. female admitted 1/22/24 w known duodenal mass and inconclusive biopsy. Pt underwent planned whipple completion  gastrectomy, jejunal repaor 1/23/24, POD #1 w active OT eval and treat orders. PMH includes  has a past medical history of PABLO (acute kidney injury) (HCC), Allergic, Anemia, Anesthesia complication, Anxiety, Arthritis, Asthma, Breast lump, Chronic pain, Chronic pain disorder, Diabetes mellitus (HCC), Diverticulitis of colon, Dizziness, GERD (gastroesophageal reflux disease), Hepatitis, History of stomach ulcers, HL (hearing loss), Hyperlipidemia, Hypertension, Hypotension, Infectious viral hepatitis, Memory loss, Pneumonia, PONV (postoperative nausea and vomiting), and Stomach problems. Pt lives w son in a 2 sh with bed/bath upstairs: tub shower with grab bars and standard toilet. Pta, pt was independent w/ ADL/IADL and used spc for functional mobility. Currently, pt is Supervision for UB ADL, Min Ax1 for LB ADL, and completed transfers/FM w Min Ax1. Pt is limited at this time 2* decreased endurance/activtiy tolerance, decreased cognition, decreased ADL/High-level ADL status, decreased self-care trans, decreased safety awareness, limited home support and is a fall risk. This impacts pt's ability to complete UB and LB dressing and bathing, toileting, transfers, functional mobility, community mobility, home and health maintenance, and safe engagement in typical daily routine. The patient's raw score on the AM-PAC Daily Activity inpatient short form is 19, standardized score is 40.22, greater than 39.4. Patients at this level are likely to benefit from discharge to home. Please refer to the recommendation of the Occupational Therapist for safe discharge planning. From OT standpoint, pt should D/C to home w home OT  when medically stable. Pt will benefit from continued acute OT services 2-3 x/wk for 10-14 days to meet goals.   Goals   Patient Goals get better   LTG Time Frame 10-14   Plan   Treatment Interventions ADL retraining;Functional transfer training;Endurance training;Patient/family training;Equipment  evaluation/education;Compensatory technique education;Energy conservation;Activityengagement   Goal Expiration Date 02/06/24   OT Frequency 2-3x/wk   Discharge Recommendation   Rehab Resource Intensity Level, OT III (Minimum Resource Intensity)   AM-PAC Daily Activity Inpatient   Lower Body Dressing 2   Bathing 3   Toileting 3   Upper Body Dressing 3   Grooming 4   Eating 4   Daily Activity Raw Score 19   Daily Activity Standardized Score (Calc for Raw Score >=11) 40.22   AM-PAC Applied Cognition Inpatient   Following a Speech/Presentation 4   Understanding Ordinary Conversation 4   Taking Medications 4   Remembering Where Things Are Placed or Put Away 4   Remembering List of 4-5 Errands 4   Taking Care of Complicated Tasks 4   Applied Cognition Raw Score 24   Applied Cognition Standardized Score 62.21   Modified Livingston Scale   Modified Livingston Scale 4   End of Consult   Education Provided Yes   Patient Position at End of Consult Bedside chair;All needs within reach   Nurse Communication Nurse aware of consult     Pt will complete functional mobility with Mod I using appropriate DME as needed.     Pt will complete UB dressing and bathing with Mod I using appropriate DME as needed.     Pt will complete LB dressing and bathing with Mod I using appropriate DME as needed.    Pt will complete transfers with Mod I using appropriate DME as needed.     Pt will complete toileting with Mod I using appropriate DME as needed.     Pt will utilize energy conservation techniques throughout functional activity/ADL s/p skilled education.     Pt will demonstrate increased safety awareness during functional tasks/ADL's s/p skilled education.     Pt will increase activity tolerance to 30 minutes in order to complete ADL's/ functional tasks, using appropriate DME as needed.       Farzana Mir, JUAN, OTR/L

## 2024-01-23 NOTE — CONSULTS
[facility]  Consult: Critical Care  Name: Aleida Hammond 64 y.o. female I MRN: [id number]  Unit/Bed#: PPHP 517-01 I Date of Admission: 1/22/2024   Date of Service: 1/22/2024 I Hospital Day: 0      Consults  Assessment/Plan     Neuro:   Continuous opiate dependence with superimposed acute post-operative pain    Continuous infusions: None   Scheduled medication:  Tylenol IV 1000mg q6h   PRN medications:  Dilaudid PCA   APS consulted, follow up daytime recommendations   Holding home analgesic regimen:   Tramadol ER 300mg daily   Lyrica 200mg TID  Morphine powder   No acute neurologic concerns  Routine neuro checks  CAM-ICU BID       CV:   Sinus tachycardia   Volume resuscitate as indicated  EKG now  Continue close telemetry monitoring  Hypertension   Maintain SBP < 180   Maintain MAP >65  Labetalol 10mg q6h PRN  Holding home antihypertensives:  Amlodipine 5mg BID  Hyperlipidemia  Holding home atorvastatin 40mg qHS     Pulm:  Acute respiratory insufficiency   Secondary to poor inspiratory effort, pain   Wean supplemental oxygen as able in order to maintain SpO2 >90%  CXR as needed   ABG as needed   Continue pulmonary hygiene. Incentive spirometer q1h while awake, encourage coughing and deep breathing. Upright positioning  Suction as needed and closely monitor secretions. Maintain HOB >30 degrees. Q4h oral care with chlorhexidine BID  Mild persistent asthma without complication   Fluticasone-vilanterol daily   Albuterol PRN  Holding home Singulair 10mg qHS       GI:   Partially obstructing duodenal mass   POD #0 s/p Whipple, completion gastrectomy, jejunal repair   Surgical oncology following, follow up plan of care  Maintain NGT with close monitoring of output   Monitor LISA output amount and character  Protonix 40mg IV q24h   Elevated LFT's   Repeat in AM  Avoid hepatotoxic medications   Bowel regimen  None   Last BM: PTA    :   No acute issues   Baseline creatinine: 0.63 -  0.83  Initial creatinine: 0.65 (1/22/24)  Last creatinine: 0.65 (1/22/24)   Strict q2h I/O monitoring  Matinain Gilmore catheter  Continue to follow renal function tests    F/E/N:   Fluids  Maintenance fluids:  ml/hr   Electrolytes   Hypomagnesemia  Replete now   Replete electrolytes with as needed to maintain K >4.0, Mag >2.0, Phos >3.0, ical > 1.10  Nutrition  NPO    Heme/Onc:   Acute blood loss anemia superimposed on chronic iron deficiency anemia  Baseline hemoglobin: 10.6 - 9.2  Last hemoglobin: 7.9 (1/22/24)  Transfuse as indicated   Follow with hematology as outpatient for iron infusions, can consider inpatient infusion based on hemoglobin trend   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   Follow up surgical pathology   Follow up with medical and surgical oncology as outpatient   VTE prophylaxis: SQH TID, SCD's to BLE    Endo/Rheum:   Insulin dependent type 2 diabetes   Last hemoglobin A1c 5.9% on 1/9/24  Start Lispro SSI units q6h   Adjust insulin regimen as needed to maintain goal -180  Holding home antihyperglycemics regimen:  Lantus 8 units qHS   Metformin 1000mg BID       ID:   Leukocytosis   Suspect secondary to leukemoid stress reaction from OR as well as possible underlying malignancy   Current antimicrobials: Zosyn/fluconazole   Plan to complete 24 hours   Continue to monitor fever and WBC curve     MSK/Skin:   No acute issues   Reposition q2h, eliminate pressure points while in bed  Close skin surveillance   PT/OT     Disposition: Critical care     History of Present Illness     HPI: Aleida Hammond is a 64 y.o. female with hypertension, hyperlipidemia, insulin-dependent type 2 diabetes, GERD, asthma, chronic back pain and polyneuropathy neuropathy with continuous opiate dependence, and Khurram-en-Y gastric bypass with esophagojejunal anastomosis resulting in chronic vitamin B12 and iron deficiency who presented to heme-onc in November 2022 with complaints of a 50 pound weight loss, intermittent  abdominal pain, and a duodenal mass that was found on CT in 2023. She underwent a biopsy which was inconclusive, and presents to the ICU post-operatively today from  Whipple, completion gastrectomy, jejunal repair, and LISA drain placement.       History obtained from chart review and the patient.    Review of Systems   Constitutional:  Positive for fatigue. Negative for fever.   HENT: Negative.     Eyes: Negative.    Respiratory: Negative.  Negative for shortness of breath.    Cardiovascular: Negative.  Negative for chest pain.   Gastrointestinal:  Positive for abdominal pain. Negative for diarrhea, nausea and vomiting.   Endocrine: Negative.    Musculoskeletal:  Positive for arthralgias, back pain and myalgias.   Skin: Negative.    Allergic/Immunologic: Negative.    Neurological: Negative.  Negative for dizziness.   Hematological: Negative.    Psychiatric/Behavioral: Negative.     All other systems reviewed and are negative.      Historical Information   Past Medical History:  2021: PABLO (acute kidney injury) (HCC)  No date: Allergic  No date: Anemia  No date: Anesthesia complication      Comment:  Aspiration pneumonia after   No date: Anxiety  No date: Arthritis  No date: Asthma      Comment:  Uses Albuterol daily  No date: Breast lump      Comment:  Resolved: 2017   No date: Chronic pain      Comment:  back  No date: Chronic pain disorder      Comment:  Back, legs  No date: Diabetes mellitus (HCC)  No date: Diverticulitis of colon  No date: Dizziness  No date: GERD (gastroesophageal reflux disease)  No date: Hepatitis  No date: History of stomach ulcers  2015: HL (hearing loss)      Comment:  Estimated  No date: Hyperlipidemia  No date: Hypertension  2021: Hypotension  No date: Infectious viral hepatitis      Comment:  Hepatitiss A  2015: Memory loss      Comment:  Estimated  No date: Pneumonia      Comment:  aspiration pneumonia  No date: PONV (postoperative nausea and  vomiting)  No date: Stomach problems Past Surgical History:  No date: APPENDECTOMY  No date: BACK SURGERY  No date: CATARACT EXTRACTION, BILATERAL  No date:  SECTION  No date: CHOLECYSTECTOMY  No date: GASTRIC BYPASS  : GASTRIC BYPASS  : INFUSION PUMP IMPLANTATION  2022: [place] NDSC WRST SURG W/RLS TRANSVRS CARPL LIGM; Right      Comment:  Procedure: Right endoscopic carpal tunnel release;                 Surgeon: Papo Peguero MD;  Location:  MAIN OR;                 Service: Orthopedics  No date: SHOULDER SURGERY  2020: SPINAL CORD STIMULATOR IMPLANT   Current Outpatient Medications   Medication Instructions    Accu-Chek FastClix Lancets MISC USE TO TEST BLOOD GLUCOSE  LEVELS TWO TIMES A DAY FOR DIABETES    albuterol (PROVENTIL HFA,VENTOLIN HFA) 90 mcg/act inhaler USE 2 INHALATIONS ORALLY   EVERY 6 HOURS AS NEEDED FORWHEEZING    albuterol 2.5 mg, Nebulization, Every 6 hours PRN    amLODIPine (NORVASC) 5 mg, 2 times daily    aspirin (ECOTRIN LOW STRENGTH) 81 mg, Oral, Daily    atorvastatin (LIPITOR) 40 mg tablet TAKE 1 TABLET DAILY    B-D UF III MINI PEN NEEDLES 31G X 5 MM MISC USE ONCE DAILY FOR BASAL   INSULIN INJECTION    Cyanocobalamin (B-12 IJ) Injection, Every 30 days    Ferrous Fumarate 63 (20 Fe) MG TABS 2 tablets, Oral, Daily, Take two tablets once daily.    Fluticasone-Salmeterol (Advair Diskus) 250-50 mcg/dose inhaler 1 puff, Inhalation, 2 times daily, Rinse mouth after use.    glucose blood (Accu-Chek Guide) test strip Test    HYDROcodone-acetaminophen (NORCO)  mg per tablet No dose, route, or frequency recorded.    Insulin Glargine Solostar (LANTUS SOLOSTAR) 8 Units, Subcutaneous, Daily at bedtime    Januvia 100 mg, Every morning    lidocaine (XYLOCAINE) 5 % ointment apply topically to affected area three times a day    Lyrica 200 mg, Oral, 3 times daily    metFORMIN (GLUCOPHAGE) 1000 MG tablet TAKE 1 TABLET TWICE A DAY    methocarbamol (ROBAXIN) 500 mg, Oral, 3 times  daily    mometasone (NASONEX) 50 mcg/act nasal spray USE 2 SPRAYS IN EACH       NOSTRIL DAILY    montelukast (SINGULAIR) 10 mg tablet TAKE 1 TABLET AT BEDTIME    Morphine Sulfate (MORPHINE 0.05 MG/ML SYRINGE, NICU/PICU,, CMPD ORDER,) No dose, route, or frequency recorded.    omeprazole (PriLOSEC) 40 MG capsule TAKE 1 CAPSULE EVERY       MORNING    ondansetron (ZOFRAN) 4 mg, Oral, Every 8 hours PRN    traMADol HCl (ER Biphasic) 300 mg, Oral, Daily    VITAMIN D PO No dose, route, or frequency recorded.    zolpidem (AMBIEN) 5 mg, Oral, Daily at bedtime PRN    Allergies   Allergen Reactions    Nsaids Other (See Comments)     Cannot take NSAIDS secondary to having gastric bypass    Percocet [Oxycodone-Acetaminophen] GI Intolerance      Social History     Tobacco Use    Smoking status: Former     Current packs/day: 0.00     Average packs/day: 1 pack/day for 34.0 years (34.0 ttl pk-yrs)     Types: Cigarettes     Start date: 1973     Quit date: 2007     Years since quittin.0    Smokeless tobacco: Never    Tobacco comments:     15yrs ago   Vaping Use    Vaping status: Never Used   Substance Use Topics    Alcohol use: No    Drug use: No    Family History   Problem Relation Age of Onset    Diabetes Mother         Mellitus    Hyperlipidemia Mother     Hypertension Mother     Colon cancer Mother 78    Pancreatic cancer Mother 84    Melanoma Mother 76    Cancer Father 78        Bladder ca    Alcohol abuse Father     Lung cancer Father 78    Prostate cancer Father 78    No Known Problems Daughter     No Known Problems Maternal Grandmother     No Known Problems Maternal Grandfather     Stomach cancer Paternal Grandmother         60's    Lung cancer Paternal Grandfather 76    Diabetes Brother         Mellitus    Hyperlipidemia Brother     Hypertension Brother     Diabetes Brother     No Known Problems Brother     No Known Problems Son     No Known Problems Son     Breast cancer Maternal Aunt 32        Unknown age    No  Known Problems Paternal Aunt     No Known Problems Paternal Aunt     No Known Problems Paternal Aunt     No Known Problems Paternal Aunt     Arthritis Family     Mental illness Neg Hx           Objective                            Vitals I/O      Most Recent Min/Max in 24hrs   Temp (!) 97.4 °F (36.3 °C) Temp  Min: 97.4 °F (36.3 °C)  Max: 97.9 °F (36.6 °C)   Pulse (!) 113 Pulse  Min: 95  Max: 113   Resp 13 Resp  Min: 13  Max: 21   /70 BP  Min: 119/69  Max: 164/87   O2 Sat 97 % SpO2  Min: 96 %  Max: 100 %      Intake/Output Summary (Last 24 hours) at 1/22/2024 2118  Last data filed at 1/22/2024 1955  Gross per 24 hour   Intake 5612 ml   Output 2440 ml   Net 3172 ml       Diet NPO; Sips with meds    Invasive Monitoring   Arterial Line  Brook /52  Arterial Line BP  Min: 136/52  Max: 194/74   MAP 82 mmHg  Arterial Line MAP (mmHg)  Min: 81 mmHg  Max: 118 mmHg           Physical Exam   Physical Exam  Vitals and nursing note reviewed.   Eyes:      Conjunctiva/sclera: Conjunctivae normal.      Pupils: Pupils are equal, round, and reactive to light.   Skin:     General: Skin is warm and dry.      Capillary Refill: Capillary refill takes less than 2 seconds.   HENT:      Head: Normocephalic and atraumatic.      Nose: Nasogastric tube present.      Mouth/Throat:      Mouth: Mucous membranes are dry.   Cardiovascular:      Rate and Rhythm: Normal rate and regular rhythm.      Pulses: Normal pulses.           Radial pulses are 2+ on the left side.        Dorsalis pedis pulses are 2+ on the right side and 2+ on the left side.      Heart sounds: Normal heart sounds.   Musculoskeletal:      Right lower leg: No edema.      Left lower leg: No edema.   Abdominal: General: Bowel sounds are decreased. There is no distension.      Palpations: Abdomen is soft.      Tenderness: There is no abdominal tenderness.       Constitutional:       General: She is awake. She is not in acute distress.     Appearance: She is well-developed  and well-nourished. She is not ill-appearing.      Interventions: Nasal cannula in place.   Pulmonary:      Effort: Pulmonary effort is normal.      Breath sounds: Examination of the right-lower field reveals decreased breath sounds. Examination of the left-lower field reveals decreased breath sounds. Decreased breath sounds present.   Psychiatric:         Behavior: Behavior is cooperative.   Neurological:      General: No focal deficit present.      Mental Status: She is alert.      GCS: GCS eye subscore is 4. GCS verbal subscore is 5. GCS motor subscore is 6.          Diagnostic Studies      EKG: NSR   Imaging: No new imaging      Medications:  Scheduled PRN   acetaminophen, 1,000 mg, Q6H MARLENI  albuterol, 2 puff, 4x Daily  [START ON 1/23/2024] fluconazole, 400 mg, Q24H  [START ON 1/23/2024] fluticasone-vilanterol, 1 puff, Daily  heparin (porcine), 5,000 Units, Q8H MARLENI  magnesium sulfate, 4 g, Once  [START ON 1/23/2024] pantoprazole, 40 mg, Q24H MARLENI  piperacillin-tazobactam, 3.375 g, Q6H      albuterol, 2.5 mg, Q6H PRN  labetalol, 10 mg, Q6H PRN  naloxone, 0.1 mg, Q1MIN PRN  ondansetron, 4 mg, Q6H PRN       Continuous    HYDROmorphone,   lactated ringers, 125 mL/hr, Last Rate: 125 mL/hr (01/22/24 1909)         Labs:    CBC    Recent Labs     01/22/24  1629 01/22/24  1857   WBC  --  19.00*   HGB 7.5* 7.9*   HCT 22* 25.6*   PLT  --  319   BANDSPCT  --  4     BMP    Recent Labs     01/22/24  1629 01/22/24  1857   SODIUM  --  139   K  --  4.1   CL  --  106   CO2 24 24   AGAP  --  9   BUN  --  17   CREATININE  --  0.65   CALCIUM  --  8.6       Coags    No recent results     Additional Electrolytes  Recent Labs     01/22/24  1629 01/22/24  1857   MG  --  1.0*   PHOS  --  4.5*   CAIONIZED 1.09* 1.06*          Blood Gas    Recent Labs     01/22/24  1858   PHART 7.387   VAV4MNL 41.0   PO2ART 233.0*   [id number] 24.1   BEART -0.8   SOURCE Line, Arterial     Recent Labs     01/22/24  1858   SOURCE Line, Arterial    LFTs  Recent  Labs     01/22/24  1857   *   *   ALKPHOS 119*   ALB 3.3*   TBILI 0.50       Infectious  No recent results  Glucose  Recent Labs     01/22/24  1857   GLUC 193*                  CHITRA Aguiar

## 2024-01-23 NOTE — PLAN OF CARE
Problem: OCCUPATIONAL THERAPY ADULT  Goal: Performs self-care activities at highest level of function for planned discharge setting.  See evaluation for individualized goals.  Description: Treatment Interventions: ADL retraining, Functional transfer training, Endurance training, Patient/family training, Equipment evaluation/education, Compensatory technique education, Energy conservation, Activityengagement          See flowsheet documentation for full assessment, interventions and recommendations.   Note: Limitation: Decreased ADL status, Decreased endurance, Decreased self-care trans, Decreased high-level ADLs  Prognosis: Good  Assessment: Pt is a 64 y.o. female admitted 1/22/24 w known duodenal mass and inconclusive biopsy. Pt underwent planned whipple completion gastrectomy, jejunal repaor 1/23/24, POD #1 w active OT eval and treat orders. PMH includes  has a past medical history of PABLO (acute kidney injury) (HCC), Allergic, Anemia, Anesthesia complication, Anxiety, Arthritis, Asthma, Breast lump, Chronic pain, Chronic pain disorder, Diabetes mellitus (HCC), Diverticulitis of colon, Dizziness, GERD (gastroesophageal reflux disease), Hepatitis, History of stomach ulcers, HL (hearing loss), Hyperlipidemia, Hypertension, Hypotension, Infectious viral hepatitis, Memory loss, Pneumonia, PONV (postoperative nausea and vomiting), and Stomach problems. Pt lives w son in a 2 sh with bed/bath upstairs: tub shower with grab bars and standard toilet. Pta, pt was independent w/ ADL/IADL and used spc for functional mobility. Currently, pt is Supervision for UB ADL, Min Ax1 for LB ADL, and completed transfers/FM w Min Ax1. Pt is limited at this time 2* decreased endurance/activtiy tolerance, decreased cognition, decreased ADL/High-level ADL status, decreased self-care trans, decreased safety awareness, limited home support and is a fall risk. This impacts pt's ability to complete UB and LB dressing and bathing, toileting,  transfers, functional mobility, community mobility, home and health maintenance, and safe engagement in typical daily routine. The patient's raw score on the AM-PAC Daily Activity inpatient short form is 19, standardized score is 40.22, greater than 39.4. Patients at this level are likely to benefit from discharge to home. Please refer to the recommendation of the Occupational Therapist for safe discharge planning. From OT standpoint, pt should D/C to home w home OT  when medically stable. Pt will benefit from continued acute OT services 2-3 x/wk for 10-14 days to meet goals.     Rehab Resource Intensity Level, OT: III (Minimum Resource Intensity)

## 2024-01-23 NOTE — PLAN OF CARE
Problem: PHYSICAL THERAPY ADULT  Goal: Performs mobility at highest level of function for planned discharge setting.  See evaluation for individualized goals.  Description: Treatment/Interventions: Functional transfer training, LE strengthening/ROM, Elevations, Therapeutic exercise, Endurance training, Patient/family training, Equipment eval/education, Bed mobility, Gait training, OT, Spoke to nursing  Equipment Recommended: (S) Walker (will require RW to d/c home)       See flowsheet documentation for full assessment, interventions and recommendations.  Note: Prognosis: Good  Problem List: Decreased strength, Decreased endurance, Impaired balance, Decreased mobility, Pain  Assessment: PT completed evaluation of 64 y.o. female admitted to [facility] on 1/22/2024 for the following planned procedure: whipple, completion gastectomy, jejunal repair. Patient's current status instabilities include ongoing admission to critical care, NGT, LISA drain, pain, continuous O2/HR monitoring, falls risk, and a regression in function from baseline.  PMH is significant for chronic pain (patient has 2 spinal infusion catheters- one in back and one on left side of abdomen), DM, anemia, and asthma. Prior to this admission patient resided with son (son rents basement in apartment) in a 2 level home (8 INGA; 5 steps to main living area of home). Tub shower. At her baseline she is I with mobility (no use of AD), ADLs, and iADLS. Patient presents at time of PT evaluation functioning below baseline and currently w/ overall mobility deficits 2* to: impaired balance, decreased endurance, gait deviations, decreased activity tolerance and fall risk. During PT evaluation, patient currently is requiring min-AX1 for bed mobility, transfers, and short distance ambulation. With hand held assistance patient walked 2 feet (bed to chair); limited by dizziness and nausea. Anticipate with ongoing mobility this admission patient will achieve PT  goals and d/c home with supportive family and HHPT. Recommend RW for d/c at this time. Patient will continue to benefit from continued skilled PT this admission to achieve maximal function and safety.        Rehab Resource Intensity Level, PT: III (Minimum Resource Intensity)    See flowsheet documentation for full assessment.

## 2024-01-23 NOTE — OP NOTE
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 (Active)   Site Description Healing 24   Dressing Status Clean;Dry;Intact 24   Drainage Appearance Serosanguineous 24   Status To bulb suction 24   Output (mL) 0 mL 24 06   Number of days: 1       NG/OG/Enteral Tube Nasogastric 18 Fr Left nare (Active)   Placement Reverification Other (Comment) 24   Site Assessment Clean;Dry;Intact 24   Status Suction-low intermittent 24   Drainage Appearance Bile;Bloody 24   Output (mL) 0 mL 24 0600   Number of days: 1       Urethral Catheter Latex 16 Fr. (Active)   Reasons to continue Urinary Catheter  Accurate I&O assessment in critically ill patients (48 hr. 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  All lines were placed by Anesthesia as well as a Gilmore and NG tube.  The abdomen and groins were clipped and prepped and draped in the usual sterile fashion.  A time-out was performed. An upper midline was made sharply carried down to the fascia with electrocautery and the abdomen was entered without is a incident.  The falciform ligament was taken down with Harmonic scalpel and the Bookwalter was placed.  We began our dissection at the hepatic plate.  There were dense adhesions to the inferior liver due to the patient's history of both the Khurram-en-Y gastric bypass as well as cholecystectomy.  The Khurram limb and the retrocolic plane was visualized. Omentum was carefully taken down from the liver plate to reveal the portal structures.  The jejunum was protected throughout.  We initiated a wide Kocher maneuver in order to elevate the duodenum into the field for exploration.  Once this had been accomplished, the ultrasound was brought into the field and the polyp examined.  I personally performed and interpreted the ultrasound images and uploaded an image to epic.  This revealed a large villous polyp that appeared to circumferentially involve both the pancreatic and distal common bile ducts within the pancreas.  At this juncture, a lateral duodenotomy was made, and the polyp delivered into the field.  Again, direct inspection revealed that the ampulla was within the polyp tissue and inseparable from it.  Resection of the polyp would require a Whipple,  and we thus proceeded.  The duodenotomy was whipstitched closed in order to prevent additional extravasation of biliary content. The peritoneum overlying the portal trialed was opened carefully and the common duct at its origin at the superior margin of the pancreas was carefully dissected.  Likewise, the GDA was carefully isolated from the portal structures.  This structure was unusual in its course given that the entire common hepatic artery arose from the SMA.  The GDA was circumferentially dissected and a vessel loop was placed around the vessel.  Clamping the vessel and palpating at the liver hilum, both a right and left-sided hepatic arterial pulse could be palpated.  Thus the GDA was doubly tied off with 2-0 silk and then suture ligated with 3-0 Prolene.  We continued our dissection at the portal triad, confirming the portal vein deep to the arterial structures. The gastrocolic ligament was then entered and opened along its length both proximally and distally.  The distinction between the transverse colon mesentery and the inferior aspect of the pancreas was difficult to decipher due to the passage of the jejunum and the retrocolic plane.  At the base of the transverse colon the inferior border of the pancreas was opened widely.  The middle colic vein was followed to the SMV.  The inferior border of the pancreas was carefully dissected free of its retroperitoneal attachments, and Daina forceps dissection from SMV to portal vein revealed a disease free plane. The common duct was enlarged due to the history of the obstructing polyp and was dissected circumferentially just superior to the pancreas and then divided sharply. Bile flowed from the proximal duct. The field was irrigated and a bulldog was placed across the duct. At this juncture, the pancreas neck tunnel was clear. Despite the need to resect the remaining gastric remnant, the pylorus and structures obstructed exposure of the pancreas and thus we  divided the stomach at the antrum using serial loads of the laparoscopic PENELOPE stapler.  The stomach was tucked into the left upper quadrant. 2-0 silk sutures were placed at the superior and inferior margins of the pancreas to control bleeding during division.  Electrocautery and cut dissection were then utilized to divide the neck of the pancreas over the portal vein SMV confluence.  Small bleeding vessels were controlled electrocautery.  The pancreatic duct was visualized and found to be approximately 2-3 mm in size. We then turned our attention towards division of the jejunum.  The ligament of Treitz was identified, and a additional load of the stapler was utilized to divide the proximal jejunum at a location that would easily reach to the upper abdomen.  The enseal was utilized to take the mesentery of this proximal jejunum that had been divided.  The retroperitoneal and peritoneal attachments to the transverse colon mesentery were taken down with careful electrocautery  and the remaining retroperitoneal attachments to the distal duodenum were taken down. This portion of the jejunum was flipped to the right side of the abdomen.  We then completed the retroperitoneal dissection, dividing the retroperitoneal pasquale tissue tissue with enseal and careful dissection given the aberrant course of the hepatic artery. We then divided the uncinate process from the SMV and SMA.  The SMV was carefully dissected free of the pancreatic groove with peanut dissection and enseal division of major contributing branches. At this juncture, the specimen was free, and placed off the field.  Surgiflo was placed in the field and packed.  We then turned our attention towards resection of the defunctionalized remnant stomach.  Due to the presence of the Axios stent from the remnant stomach to the Khurram limb in order to access the ampulla, there were extensive adhesions and inflammatory tissue.  We carefully  the pouch from the  remnant stomach along the staple line with tedious dissection.  The remnant stomach was carefully dissected from its posterior attachments as well.  Ultimately, we were able to separate the remnant stomach from the gastric pouch, isolating the remnant at its Axios connection to the jejunal limb just distal to the gastrojejunostomy.  A small jejunostomy was created, and the Axios was removed from the jejunum and included in the remnant stomach completion gastrectomy specimen.  There was significant extravasation of gastric content despite the presence of the NG tube at this juncture.  The field was copiously irrigated with 2 L of saline to reduce this contamination.  The jejunostomy was then closed in 2 layers with running 3-0 Vicryl and Lembert 3-0 silk suture.  This was closed in the transverse plane in order to reduce the risk of stricture to this limb.  We then brought the proximal jejunum through an existing defect in the transverse mesocolon to the patient's right of the middle colic vessels.  This appeared to reach with no tension.  A hepaticojejunostomy was then formed in standard fashion with 2 running 5-0 PDS sutures.  This was then irrigated and found to be free of any biliary content.  A Ray-Ran was placed in this location for future evaluation of biliary leakage.  A duct to mucosa pancreatic anastomosis was then created to the more proximal jejunum.  A back row of 2-0 Vicryl was placed, and a duct to mucosa anastomosis with interrupted 6 0 PDS.  An 8 Urdu pediatric feeding tube was utilized as a stent before tying down the final anterior sutures.  The anterior layer was then created with 2-0 Vicryl as well.  This appeared hemostatic and intact. We then confirmed placement of the NG tube in the proximal stomach and this was advanced through the jejunal repair.  The abdomen was then copiously irrigated and found to be hemostatic.  The previously placed Ray-Ran was removed and there was no evidence of  biliary leakage.  A 19 Icelandic Jose drain was then introduced through a left upper quadrant stab incision, placed posterior to the jejunal repair and the pancreatic and biliary anastomoses and sutured in place.  Remaining omentum was then placed at the anterior abdomen.  All sponge and needle counts were correct.  The fascia was closed with running 1. PDS and the skin was closed with staples after irrigation.  The patient tolerated the procedure well.     I was present for the entire procedure on 1/22/24.    Patient Disposition:  Critical Care Unit    This procedure was not performed to treat colon cancer through resection      SIGNATURE: Karlene Cruz MD  DATE: January 23, 2024  TIME: 10:47 AM

## 2024-01-23 NOTE — UTILIZATION REVIEW
"Initial Clinical Review    Elective IP surgical procedure  Age/Sex: 64 y.o. 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     POD#1 Progress Note: 1/23:  Remains on Dilaudid PCA, used 2 doses IV Zofran.  APS consult for continued pain control.  IV fluids in place. NPO w/ sips, NGT, LISA drain, IV PPPI, continue bailey, hemodynamically stable.  On exam is fatigued and has abd pain well controlled. Decreased breath sounds bilat.  Abd soft and nondistended.  Remains on oxygen down to 2L from 6L NC.  Completed PO antibiotics.      Admission Orders: Date/Time/Statement:   Admission Orders (From admission, onward)       Ordered        01/22/24 1836  Inpatient Admission  Once                          Orders Placed This Encounter   Procedures    Inpatient Admission     Standing Status:   Standing     Number of Occurrences:   1     Order Specific Question:   Level of Care     Answer:   Critical Care [15]     Order Specific Question:   Estimated length of stay     Answer:   Inpatient Only Surgery     Vital Signs: /74   Pulse 85   Temp 97.9 °F (36.6 °C) (Oral)   Resp 13   Ht 5' 4\" (1.626 m)   Wt 71.7 kg (158 lb)   LMP  (LMP Unknown)   SpO2 97%   BMI 27.12 kg/m²     Pertinent Labs/Diagnostic Test Results:       Results from last 7 days   Lab Units 01/23/24  0457 01/22/24  1857 01/22/24  1629 01/22/24  1500 01/22/24  1256   WBC Thousand/uL 14.85* 19.00*  --   -- "   --    HEMOGLOBIN g/dL 7.2* 7.9*  --   --   --    I STAT HEMOGLOBIN g/dl  --   --  7.5* 7.8* 8.2*   HEMATOCRIT % 24.0* 25.6*  --   --   --    HEMATOCRIT, ISTAT %  --   --  22* 23* 24*   PLATELETS Thousands/uL 285 319  --   --   --    NEUTROS ABS Thousands/µL 13.21*  --   --   --   --    BANDS PCT %  --  4  --   --   --          Results from last 7 days   Lab Units 01/23/24  0457 01/22/24  1857 01/22/24  1629 01/22/24  1500 01/22/24  1256   SODIUM mmol/L 142 139  --   --   --    POTASSIUM mmol/L 4.1 4.1  --   --   --    CHLORIDE mmol/L 107 106  --   --   --    CO2 mmol/L 26 24  --   --   --    CO2, I-STAT mmol/L  --   --  24 25 27   ANION GAP mmol/L 9 9  --   --   --    BUN mg/dL 14 17  --   --   --    CREATININE mg/dL 0.68 0.65  --   --   --    EGFR ml/min/1.73sq m 92 94  --   --   --    CALCIUM mg/dL 8.8 8.6  --   --   --    CALCIUM, IONIZED mmol/L 1.13 1.06*  --   --   --    CALCIUM, IONIZED, ISTAT mmol/L  --   --  1.09* 1.10* 1.16   MAGNESIUM mg/dL 2.0 1.0*  --   --   --    PHOSPHORUS mg/dL 4.9* 4.5*  --   --   --      Results from last 7 days   Lab Units 01/23/24 0457 01/22/24  1857   AST U/L 196* 466*   ALT U/L 184* 242*   ALK PHOS U/L 92 119*   TOTAL PROTEIN g/dL 5.1* 5.6*   ALBUMIN g/dL 3.1* 3.3*   TOTAL BILIRUBIN mg/dL 0.31 0.50     Results from last 7 days   Lab Units 01/23/24  0541 01/22/24  2327 01/22/24  1856 01/22/24  0858 01/22/24  0754   POC GLUCOSE mg/dl 150* 177* 184* 128 194*     Results from last 7 days   Lab Units 01/23/24  0457 01/22/24  1857   GLUCOSE RANDOM mg/dL 167* 193*     Results from last 7 days   Lab Units 01/22/24  1858   PH ART  7.387   PCO2 ART mm Hg 41.0   PO2 ART mm Hg 233.0*   HCO3 ART mmol/L 24.1   BASE EXC ART mmol/L -0.8   O2 CONTENT ART mL/dL 12.3*   O2 HGB, ARTERIAL % 97.5*   ABG SOURCE  Line, Arterial         Results from last 7 days   Lab Units 01/22/24  1629 01/22/24  1500 01/22/24  1256   I STAT BASE EXC mmol/L -2 -1 2   I STAT O2 SAT % 99* 99* 99*   ISTAT PH ART  7.382  7.395 7.463*   I STAT ART PCO2 mm HG 38.6 39.0 36.2   I STAT ART PO2 mm .0* 124.0 122.0   I STAT ART HCO3 mmol/L 22.9 23.9 25.9     Results from last 7 days   Lab Units 01/22/24  1857   LACTIC ACID mmol/L 0.8     Results from last 7 days   Lab Units 01/22/24  0913   UNIT PRODUCT CODE  [id number]  [id number]   UNIT NUMBER  [id number]  [id number]   UNITABO  A  A   UNITRH  POS  POS   CROSSMATCH  Compatible  Compatible   UNIT DISPENSE STATUS  Crossmatched  Crossmatched   UNIT PRODUCT VOL mL 350  350     Diet: NPO w/ sips  Mobility: ambulate TID  DVT Prophylaxis: SCDs, sq heparin     Medications/Pain Control:   Scheduled Medications:  acetaminophen, 1,000 mg, Intravenous, Q6H MARLENI  fluticasone-vilanterol, 1 puff, Inhalation, Daily  heparin (porcine), 5,000 Units, Subcutaneous, Q8H MARLENI  insulin lispro, 1-5 Units, Subcutaneous, Q6H MARLENI  pantoprazole, 40 mg, Intravenous, Q24H MARLENI  piperacillin-tazobactam, 3.375 g, Intravenous, Q6H      Continuous IV Infusions:  HYDROmorphone PCA, , Intravenous, Continuous  lactated ringers, 125 mL/hr, Intravenous, Continuous      PRN Meds:  albuterol, 2 puff, Inhalation, Q4H PRN  albuterol, 2.5 mg, Nebulization, Q6H PRN  labetalol, 10 mg, Intravenous, Q6H PRN  naloxone, 0.1 mg, Intravenous, Q1MIN PRN  ondansetron, 4 mg, Intravenous, Q6H PRN - x 1 1/22, 1/23    Network Utilization Review Department  ATTENTION: Please call with any questions or concerns to [phone] and carefully listen to the prompts so that you are directed to the right person. All voicemails are confidential.   For Discharge needs, contact Care Management DC Support Team at [phone] opt. 2  Send all requests for admission clinical reviews, approved or denied determinations and any other requests to dedicated fax number below belonging to the campus where the patient is receiving treatment. List of dedicated fax numbers for the Facilities:  FACILITY NAME UR FAX NUMBER   ADMISSION DENIALS  (Administrative/Medical Necessity) [phone]   DISCHARGE SUPPORT TEAM (NETWORK) [phone]   PARENT CHILD HEALTH (Maternity/NICU/Pediatrics) [phone]   [facility] [phone]   [facility] [phone]   [facility] [phone]   [facility] [phone]   [facility] [phone]   [facility] [phone]   [facility] [phone]   [facility] [phone]   [facility] [phone]   [facility] [phone]   [facility] [phone]

## 2024-01-23 NOTE — CONSULTS
Progress Note - Acute Pain Service    Aleida Hammond 64 y.o. female MRN: [id number]  Unit/Bed#: [facility] 517-01 Encounter: [id number]      Aleida Hammond is a 64 y.o. female with PMH RNY gastric bypass with duodenal mass now POD 1 s/p whipple, completion gastrectomy, jejunal repair. Patient has a history of chronic pain and intrathecal morphine pump. She had a right sided TAP block for postop pain control.    Patient seen this morning sitting up in bed with good pain control. She states her dilaudid PCA is controlling her pain and her TAP block is working well. She has little to no incisional site pain and has been using her PCA as needed for exacerbations. She is awaiting return of bowel function and to tolerate PO and will be transitioned to an oral    Duodenal mass  Assessment & Plan  - Postop right sided TAP block placed for post op pain control, working well, little to no incisional site pain  - Pain well managed with dilaudid PCA while awaiting return of bowel function and tolerating PO        APS will sign off at this time. Thank you for the consult. All opioids and other analgesics to be written at discretion of primary team. Please contact Acute Pain Service - via [employer] from [phone] with additional questions or concerns. See [employer] or [employer] for additional contacts and after hours information.    Pain History  Current pain location(s):  Pain Score: 5  Pain Location/Orientation: Location: Abdomen  Pain Scale: Pain Assessment Tool: 0-10  24 hour history: See above    Meds/Allergies   all current active meds have been reviewed    Allergies   Allergen Reactions    Nsaids Other (See Comments)     Cannot take NSAIDS secondary to having gastric bypass    Percocet [Oxycodone-Acetaminophen] GI Intolerance       Objective        Vitals:    01/23/24 0400 01/23/24 0500 01/23/24 0600 01/23/24 0700   BP: 139/69 139/71 141/67 136/74   BP Location: Right arm      Pulse: 91 87 85 85   Resp: 12 (!) 8 (!) 8 13    Temp: 97.9 °F (36.6 °C)      TempSrc: Oral      SpO2: 97% 97% 90% 97%   Weight:       Height:             Physical Exam  Vitals and nursing note reviewed.   Constitutional:       Appearance: Normal appearance. She is normal weight.   HENT:      Head: Normocephalic and atraumatic.      Right Ear: External ear normal.      Left Ear: External ear normal.      Nose: Nose normal.      Comments: NGT insitu     Mouth/Throat:      Mouth: Mucous membranes are moist.      Pharynx: Oropharynx is clear.   Eyes:      Conjunctiva/sclera: Conjunctivae normal.   Cardiovascular:      Rate and Rhythm: Normal rate and regular rhythm.      Pulses: Normal pulses.      Heart sounds: Normal heart sounds.   Pulmonary:      Effort: Pulmonary effort is normal.      Breath sounds: Normal breath sounds.   Abdominal:      Palpations: Abdomen is soft.      Tenderness: There is abdominal tenderness.   Musculoskeletal:         General: Normal range of motion.      Cervical back: Normal range of motion and neck supple.   Skin:     General: Skin is warm and dry.      Capillary Refill: Capillary refill takes less than 2 seconds.   Neurological:      Mental Status: She is alert and oriented to person, place, and time. Mental status is at baseline.   Psychiatric:         Mood and Affect: Mood normal.           Lab Results:   Estimated Creatinine Clearance: 81.1 mL/min (by C-G formula based on SCr of 0.68 mg/dL).  Lab Results   Component Value Date    WBC 14.85 (H) 01/23/2024    HGB 7.2 (L) 01/23/2024    HCT 24.0 (L) 01/23/2024     01/23/2024         Component Value Date/Time     11/04/2017 0836    K 4.1 01/23/2024 0457    K 4.8 09/07/2023 0916     01/23/2024 0457     09/07/2023 0916    CO2 26 01/23/2024 0457    CO2 24 01/22/2024 1629    CO2 28 09/07/2023 0916    BUN 14 01/23/2024 0457    BUN 30 (H) 09/07/2023 0916    CREATININE 0.68 01/23/2024 0457    CREATININE 0.63 11/04/2017 0836         Component Value Date/Time     CALCIUM 8.8 01/23/2024 0457    CALCIUM 9.5 09/07/2023 0916    ALKPHOS 92 01/23/2024 0457    ALKPHOS 60 09/07/2023 0916     (H) 01/23/2024 0457    AST 23 09/07/2023 0916     (H) 01/23/2024 0457    ALT 22 09/07/2023 0916    BILITOT 0.5 07/22/2017 0953    TP 5.1 (L) 01/23/2024 0457    TP 6.2 09/07/2023 0916    ALB 3.1 (L) 01/23/2024 0457    ALB 4.0 07/22/2017 0953       Imaging Studies/EKG: I have personally reviewed pertinent reports.       Counseling / Coordination of Care  Total floor / unit time spent today 15 minutes minutes. Greater than 50% of total time was spent with the patient and / or family counseling and / or coordination of care.     Please note that the APS provides consultative services regarding pain management only.  With the exception of ketamine and epidural infusions and except when indicated, final decisions regarding starting or changing doses of analgesic medications are at the discretion of the consulting service.    Pierre Walker MD   Acute Pain Service

## 2024-01-23 NOTE — QUICK NOTE
Surgery Post-Op Check  Aleida Hammond 64 y.o. female MRN: [id number]  Unit/Bed#: [facility] 517-01 Encounter: [id number]     S: Patient seen and examined bedside. No acute complaints.  Denies nausea. Pain well controlled with dPCA. Resting comfortably.    NGT with minimal gastric appearing output in tubing, none in canister.  VSS afebrile. Tachycardia earlier, current HR 98.  LISA thin serosang in bulb.      O:   Vitals:    01/22/24 2024   BP: 143/70   Pulse: (!) 113   Resp:    Temp:    SpO2: 97%     I/O         01/21 0701  01/22 0700 01/22 0701  01/23 0700    I.V. (mL/kg)  4462 (62.2)    IV Piggyback  1150    Total Intake(mL/kg)  5612 (78.3)    Urine (mL/kg/hr)  2000    Emesis/NG output  0    Drains  40    Blood  400    Total Output  2440    Net  +3172                PE:  Gen:  No acute distress  CV:  Warm, well-perfused  Lung:  Normal work of breathing, no respiratory distress  Abd:  Soft, appropriately tender, nondistended, meplex to midline c/d/I, LISA serosang, NG gastric in tubing  Ext:  Moving all extremities  Neuro:  Alert and oriented, M/S grossly intact     Lab Results   Component Value Date    WBC 19.00 (H) 01/22/2024    HGB 7.9 (L) 01/22/2024    HCT 25.6 (L) 01/22/2024    MCV 84 01/22/2024     01/22/2024     Lab Results   Component Value Date    GLUCOSE 181 (H) 01/22/2024    CALCIUM 8.6 01/22/2024     11/04/2017    K 4.1 01/22/2024    CO2 24 01/22/2024     01/22/2024    BUN 17 01/22/2024    CREATININE 0.65 01/22/2024         A/P: 64 y.o. female Day of Surgery s/p Procedure(s) (LRB):  INTRAOPERATIVE ULTRASOUND (N/A)  WHIPPLE (N/A)  COMPLETION GASTRECTOMY (N/A)  DUODENAL EXPLORATION (N/A)    Plan:  NPO NGT  Strict NGT - do not adjust or manipulate  DVT ppx  Out of bed, encourage ambulation  Encourage incentive spirometer use  Strict I's and O's  Pain and nausea control p.r.n.  Please tiger text on call surgery resident for questions or concerns      Marcie Schrader MD  PGY3, General Surgery

## 2024-01-23 NOTE — PLAN OF CARE
Problem: PAIN - ADULT  Goal: Verbalizes/displays adequate comfort level or baseline comfort level  Description: Interventions:  - Encourage patient to monitor pain and request assistance  - Assess pain using appropriate pain scale  - Administer analgesics based on type and severity of pain and evaluate response  - Implement non-pharmacological measures as appropriate and evaluate response  - Consider cultural and social influences on pain and pain management  - Notify physician/advanced practitioner if interventions unsuccessful or patient reports new pain  Outcome: Progressing     Problem: INFECTION - ADULT  Goal: Absence or prevention of progression during hospitalization  Description: INTERVENTIONS:  - Assess and monitor for signs and symptoms of infection  - Monitor lab/diagnostic results  - Monitor all insertion sites, i.e. indwelling lines, tubes, and drains  - Monitor endotracheal if appropriate and nasal secretions for changes in amount and color  - [place] appropriate cooling/warming therapies per order  - Administer medications as ordered  - Instruct and encourage patient and family to use good hand hygiene technique  - Identify and instruct in appropriate isolation precautions for identified infection/condition  Outcome: Progressing  Goal: Absence of fever/infection during neutropenic period  Description: INTERVENTIONS:  - Monitor WBC    Outcome: Progressing     Problem: SAFETY ADULT  Goal: Patient will remain free of falls  Description: INTERVENTIONS:  - Educate patient/family on patient safety including physical limitations  - Instruct patient to call for assistance with activity   - Consult OT/PT to assist with strengthening/mobility   - Keep Call bell within reach  - Keep bed low and locked with side rails adjusted as appropriate  - Keep care items and personal belongings within reach  - Initiate and maintain comfort rounds  - Make Fall Risk Sign visible to staff  - Offer Toileting every  Hours,  in advance of need  - Initiate/Maintain alarm  - Obtain necessary fall risk management equipment:   - Apply yellow socks and bracelet for high fall risk patients  - Consider moving patient to room near nurses station  Outcome: Progressing  Goal: Maintain or return to baseline ADL function  Description: INTERVENTIONS:  -  Assess patient's ability to carry out ADLs; assess patient's baseline for ADL function and identify physical deficits which impact ability to perform ADLs (bathing, care of mouth/teeth, toileting, grooming, dressing, etc.)  - Assess/evaluate cause of self-care deficits   - Assess range of motion  - Assess patient's mobility; develop plan if impaired  - Assess patient's need for assistive devices and provide as appropriate  - Encourage maximum independence but intervene and supervise when necessary  - Involve family in performance of ADLs  - Assess for home care needs following discharge   - Consider OT consult to assist with ADL evaluation and planning for discharge  - Provide patient education as appropriate  Outcome: Progressing  Goal: Maintains/Returns to pre admission functional level  Description: INTERVENTIONS:  - Perform AM-PAC 6 Click Basic Mobility/ Daily Activity assessment daily.  - Set and communicate daily mobility goal to care team and patient/family/caregiver.   - Collaborate with rehabilitation services on mobility goals if consulted  - Perform Range of Motion  times a day.  - Reposition patient every  hours.  - Dangle patient  times a day  - Stand patient  times a day  - Ambulate patient  times a day  - Out of bed to chair  times a day   - Out of bed for meal times a day  - Out of bed for toileting  - Record patient progress and toleration of activity level   Outcome: Progressing     Problem: DISCHARGE PLANNING  Goal: Discharge to home or other facility with appropriate resources  Description: INTERVENTIONS:  - Identify barriers to discharge w/patient and caregiver  - Arrange for  needed discharge resources and transportation as appropriate  - Identify discharge learning needs (meds, wound care, etc.)  - Arrange for interpretive services to assist at discharge as needed  - Refer to Case Management Department for coordinating discharge planning if the patient needs post-hospital services based on physician/advanced practitioner order or complex needs related to functional status, cognitive ability, or social support system  Outcome: Progressing     Problem: Knowledge Deficit  Goal: Patient/family/caregiver demonstrates understanding of disease process, treatment plan, medications, and discharge instructions  Description: Complete learning assessment and assess knowledge base.  Interventions:  - Provide teaching at level of understanding  - Provide teaching via preferred learning methods  Outcome: Progressing     Problem: Prexisting or High Potential for Compromised Skin Integrity  Goal: Skin integrity is maintained or improved  Description: INTERVENTIONS:  - Identify patients at risk for skin breakdown  - Assess and monitor skin integrity  - Assess and monitor nutrition and hydration status  - Monitor labs   - Assess for incontinence   - Turn and reposition patient  - Assist with mobility/ambulation  - Relieve pressure over bony prominences  - Avoid friction and shearing  - Provide appropriate hygiene as needed including keeping skin clean and dry  - Evaluate need for skin moisturizer/barrier cream  - Collaborate with interdisciplinary team   - Patient/family teaching  - Consider wound care consult   Outcome: Progressing

## 2024-01-23 NOTE — PHYSICAL THERAPY NOTE
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]    Problem List  Patient Active Problem List   Diagnosis    Benign essential hypertension    Mild persistent asthma without complication    Peripheral polyneuropathy    Chronic bilateral back pain    Chronic lumbar radiculopathy    Chronic cervical pain    Chronic thoracic spine pain    GERD without esophagitis    Hyperlipidemia associated with type 2 diabetes mellitus     Insomnia    B12 deficiency    Lumbar radiculopathy    Thoracic spondylosis without myelopathy    Thoracic and lumbosacral neuritis    Degeneration of lumbar intervertebral disc    Lumbar spondylosis    Pain in joint involving pelvic region and thigh    Iron deficiency    Memory dysfunction    COVID-19 vaccine series completed    Type 2 diabetes mellitus with diabetic neuropathy, with long-term current use of insulin (HCC)    Narcotic dependency, continuous (HCC)    Carpal tunnel syndrome on right    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    Microcytic anemia    S/P endoscopic carpal tunnel release    Pillar pain, post-operative    PONV (postoperative nausea and vomiting)    Duodenal mass    Malignant neoplasm of spinal cord (HCC)       Past Medical History  Past Medical History:   Diagnosis Date    PABLO (acute kidney injury) (HCC) 2021    Allergic     Anemia     Anesthesia complication     Aspiration pneumonia after     Anxiety     Arthritis     Asthma     Uses Albuterol daily    Breast lump     Resolved: 2017     Chronic pain     back    Chronic pain disorder     Back, legs    Diabetes mellitus (HCC)     Diverticulitis of colon     Dizziness     GERD (gastroesophageal reflux disease)     Hepatitis     History of stomach ulcers     HL (hearing loss)     Estimated    Hyperlipidemia     Hypertension     Hypotension 2021    Infectious viral hepatitis     Hepatitiss A    Memory loss      Estimated    Pneumonia     aspiration pneumonia    PONV (postoperative nausea and vomiting)     Stomach problems        Past Surgical History  Past Surgical History:   Procedure Laterality Date    APPENDECTOMY      BACK SURGERY      CATARACT EXTRACTION, BILATERAL       SECTION      CHOLECYSTECTOMY      GASTRECTOMY N/A 2024    Procedure: COMPLETION GASTRECTOMY;  Surgeon: Karlene Cruz MD;  [facility];  Service: Surgical Oncology    GASTRIC BYPASS      GASTRIC BYPASS      INFUSION PUMP IMPLANTATION      LAPAROTOMY N/A 2024    Procedure: DUODENAL EXPLORATION;  Surgeon: Karlene Cruz MD;  [facility];  Service: Surgical Oncology    VA NDSC WRST SURG W/RLS TRANSVRS CARPL LIGM Right 2022    Procedure: Right endoscopic carpal tunnel release;  Surgeon: Papo Peguero MD;  [facility];  Service: Orthopedics    SHOULDER SURGERY      SMALL INTESTINE SURGERY N/A 2024    Procedure: INTRAOPERATIVE ULTRASOUND;  Surgeon: Karlene Cruz MD;  [facility];  Service: Surgical Oncology    SPINAL CORD STIMULATOR IMPLANT      WHIPPLE PROCEDURE/PANCREATICO-DUODENECTOMY N/A 2024    Procedure: WHIPPLE;  Surgeon: Karlene Cruz MD;  [facility];  Service: Surgical Oncology        24 1348   PT Last Visit   PT Visit Date 24   Note Type   Note type Evaluation   Pain Assessment   Pain Assessment Tool 0-10   Pain Score 4   Pain Location/Orientation Location: Abdomen   Hospital Pain Intervention(s) Ambulation/increased activity;Repositioned  (PCA pump)   Restrictions/Precautions   Weight Bearing Precautions Per Order No   Braces or Orthoses   (none)   Other Precautions Pain;Fall Risk;O2;Telemetry;Multiple lines  (NGT, O2, LISA drain, PCA pump)   Home Living   Type of Home House   Home Layout Two level;Bed/bath upstairs;Stairs to enter with rails   Bathroom Shower/Tub Tub/shower unit   Bathroom Toilet Standard   Bathroom  "Equipment Grab bars in shower   Bathroom Accessibility Accessible   Home Equipment Cane   Additional Comments Prior to this admission patient resided with son (son rents basement in apartment) in a 2 level home (8 [address]; 5 steps to main living area of home). Tub shower. At her baseline she is I with mobility (no use of AD), ADLs, and iADLS.   Prior Function   Level of [place] Independent with ADLs;Independent with functional mobility;Independent with IADLS   Lives With Son   Receives Help From Family   IADLs Independent with driving;Independent with meal prep;Independent with medication management   Falls in the last 6 months 1 to 4  (1)   General   Additional Pertinent History 64 y.o. female admitted [facility] on [facility]/22/2024 for the following planned procedure: whipple, completion gastectomy, jejunal repair.   Family/Caregiver Present No   Cognition   Overall Cognitive Status WFL   Orientation Level Oriented X4   Subjective   Subjective \"I am ready to get up\"   RUE Assessment   RUE Assessment WFL   LUE Assessment   LUE Assessment WFL   RLE Assessment   RLE Assessment WFL  (4/5 grossly)   LLE Assessment   LLE Assessment WFL  (4/5 grossly)   Bed Mobility   Rolling R 4  Minimal assistance   Additional items Assist x 1;Bedrails   Supine to Sit 4  Minimal assistance   Additional items Assist x 1;Increased time required;HOB elevated;Bedrails   Sit to Supine Unable to assess   Additional Comments post evaluation patient OOB in chair   Transfers   Sit to Stand 4  Minimal assistance   Additional items Assist x 1;Increased time required;Verbal cues   Stand to Sit 4  Minimal assistance   Additional items Assist x 1;Increased time required;Verbal cues   Additional Comments HHA   Ambulation/Elevation   Gait pattern Excessively slow;Short stride;Decreased foot clearance   Gait Assistance 4  Minimal assist   Additional items Assist x 1   Assistive Device Rolling walker   Distance 2 feet (bed to chair); limited by " dizziness/nausea   Balance   Static Sitting Good   Static Standing Fair   Ambulatory Poor +   Endurance Deficit   Endurance Deficit Yes   Endurance Deficit Description O2, pain, dizziness, post-op fatigue   Activity Tolerance   Activity Tolerance Patient limited by fatigue;Patient limited by pain   Medical Staff Made Aware This high complexity evaluation was performed with an occupational therapist due to the patient's co-morbidities, clinically unstable presentation, and present impairments which are a regression from the patient's baseline.   Nurse Made Aware gal to see per ROXANNE Dowell   Assessment   Prognosis Good   Problem List Decreased strength;Decreased endurance;Impaired balance;Decreased mobility;Pain   Assessment PT completed evaluation of 64 y.o. female admitted to [facility] on 1/22/2024 for the following planned procedure: whipple, completion gastectomy, jejunal repair.    Patient's current status instabilities include ongoing admission to critical care, NGT, LISA drain, pain, continuous O2/HR monitoring, falls risk, and a regression in function from baseline.  PMH is significant for chronic pain (patient has 2 spinal infusion catheters- one in back and one on left side of abdomen), DM, anemia, and asthma. Prior to this admission patient resided with son (son rents basement in apartment) in a 2 level home (8 [address]; 5 steps to main living area of home). Tub shower. At her baseline she is I with mobility (no use of AD), ADLs, and iADLS.     Patient presents at time of PT evaluation functioning below baseline and currently w/ overall mobility deficits 2* to: impaired balance, decreased endurance, gait deviations, decreased activity tolerance and fall risk. During PT evaluation, patient currently is requiring min-AX1 for bed mobility, transfers, and short distance ambulation. With hand held assistance patient walked 2 feet (bed to chair); limited by dizziness and nausea.    Anticipate with ongoing  mobility this admission patient will achieve PT goals and d/c home with supportive family and HHPT. Recommend RW for d/c at this time. Patient will continue to benefit from continued skilled PT this admission to achieve maximal function and safety.   Goals   Patient Goals to get better   LTG Expiration Date 02/06/24   Long Term Goal #1 1) Perform bed mobility mod-I to participate in frequent repositioning and improve skin integrity; 2) Perform functional transfers mod-I to promote I with toileting and OOB mobility; 3) Ambulate 200 feet mod-I with least restrictive device to participate in household and community level mobility; 4) Improve b/l LE strength by 1/2 grade in order to improve efficiency of tranfers; 5) Improve balance by 1 grade to reduce risk for falls; 6) Improve overall activity tolerance to 60 minutes in order to increase patient's ability to engage in mobility tasks; 7) Navigate 8 steps S level in order to safely navigate multiple floors at home   PT Treatment Day 0   Plan   Treatment/Interventions Functional transfer training;LE strengthening/ROM;Elevations;Therapeutic exercise;Endurance training;Patient/family training;Equipment eval/education;Bed mobility;Gait training;OT;Spoke to nursing   PT Frequency 3-5x/wk   Discharge Recommendation   Rehab Resource Intensity Level, PT III (Minimum Resource Intensity)   Equipment Recommended (S)  Walker  (will require RW to d/c home)   Walker Package Recommended Wheeled walker   Change/add to Walker Package? No   AM-PAC Basic Mobility Inpatient   Turning in Flat Bed Without Bedrails 3   Lying on Back to Sitting on Edge of Flat Bed Without Bedrails 3   Moving Bed to Chair 3   Standing Up From Chair Using Arms 3   Walk in Room 3   Climb 3-5 Stairs With Railing 2   Basic Mobility Inpatient Raw Score 17   Basic Mobility Standardized Score 39.67   Highest Level Of Mobility   JH-HLM Goal 5: Stand one or more mins   JH-HLM Achieved 4: Move to chair/commode     The  patient's AM-PAC Basic Mobility Inpatient Standardized Score is less than 42.9, suggesting this patient may benefit from discharge to post-acute rehabilitation services. Please also refer to the recommendation of the Physical Therapist for safe discharge planning.        Nhi Willett, PT, DPT

## 2024-01-23 NOTE — ASSESSMENT & PLAN NOTE
- Postop right sided TAP block placed for post op pain control, working well, little to no incisional site pain  - Pain well managed with dilaudid PCA while awaiting return of bowel function and tolerating PO

## 2024-01-23 NOTE — PROGRESS NOTES
"Progress Note - Aleida Hammond 64 y.o. female MRN: [id number]    Unit/Bed#: [facility] 517-01 Encounter: [id number]      Assessment:  Aleida Hammond is a 64 y.o. female with PMH RNY gastric bypass with duodenal mass now s/p whipple, completion gastrectomy, jejunal repair on 1/23    VSS afebrile  UOP 3.6L  via bailey catheter  NG 0cc  LISA 130cc serosang  Am labs pending    Plan:  Appreciate ICU level care  F/u am labs  Strict NPO NGT - do not manipulate or adjust  Maintain LISA drain, monitor output/character  Transition to mIVF  F/u APS re: pain control  PT OT  OOB ambulate  DVT ppx SQH  SSI  S/p IV zosyn and fluconazole - completed course x24hr  Consider d/c bailey      Subjective:   Patient seen and examined bedside.  No acute complaints.  Pain well controlled.  No nausea.  No flatus or BM.    Objective:     Vitals: Blood pressure 137/69, pulse 86, temperature 98.4 °F (36.9 °C), temperature source Oral, resp. rate 12, height 5' 4\" (1.626 m), weight 71.7 kg (158 lb), SpO2 97%.,Body mass index is 27.12 kg/m².      Intake/Output Summary (Last 24 hours) at 1/23/2024 0436  Last data filed at 1/23/2024 0324  Gross per 24 hour   Intake 5875 ml   Output 3715 ml   Net 2160 ml       Physical Exam:   General - no acute distress, responsive and cooperative  CV - warm, regular rate  Pulm - normal work of breathing, no respiratory distress  Abd - soft, nondistended, midline meplex c/d/I without strike through, LISA serosang, NG minimal output in tubing  Neuro - m/s grossly intact, cn grossly intact  Ext - moving all extremities       Invasive Devices       Peripheral Intravenous Line  Duration             Peripheral IV 01/22/24 Distal;Dorsal (posterior);Left Forearm <1 day    Peripheral IV 01/22/24 Right Hand <1 day              Arterial Line  Duration             Arterial Line 01/22/24 Right Radial <1 day              Drain  Duration             Closed/Suction Drain Left LUQ Bulb 19 Fr. <1 day    NG/OG/Enteral Tube Nasogastric 18 Fr " Left nare <1 day    Urethral Catheter Latex 16 Fr. <1 day                    Lab, Imaging and other studies: I have personally reviewed pertinent reports.    VTE Pharmacologic Prophylaxis: Heparin  VTE Mechanical Prophylaxis: sequential compression device

## 2024-01-24 LAB
ABO GROUP BLD BPU: NORMAL
ABO GROUP BLD BPU: NORMAL
ALBUMIN SERPL BCP-MCNC: 3.2 G/DL (ref 3.5–5)
ALP SERPL-CCNC: 92 U/L (ref 34–104)
ALT SERPL W P-5'-P-CCNC: 117 U/L (ref 7–52)
ANION GAP SERPL CALCULATED.3IONS-SCNC: 9 MMOL/L
AST SERPL W P-5'-P-CCNC: 69 U/L (ref 13–39)
BASOPHILS # BLD AUTO: 0.02 THOUSANDS/ÂΜL (ref 0–0.1)
BASOPHILS NFR BLD AUTO: 0 % (ref 0–1)
BILIRUB SERPL-MCNC: 0.3 MG/DL (ref 0.2–1)
BPU ID: NORMAL
BPU ID: NORMAL
BUN SERPL-MCNC: 12 MG/DL (ref 5–25)
CALCIUM ALBUM COR SERPL-MCNC: 9.7 MG/DL (ref 8.3–10.1)
CALCIUM SERPL-MCNC: 9.1 MG/DL (ref 8.4–10.2)
CHLORIDE SERPL-SCNC: 103 MMOL/L (ref 96–108)
CO2 SERPL-SCNC: 29 MMOL/L (ref 21–32)
CREAT SERPL-MCNC: 0.61 MG/DL (ref 0.6–1.3)
CROSSMATCH: NORMAL
CROSSMATCH: NORMAL
EOSINOPHIL # BLD AUTO: 0.17 THOUSAND/ÂΜL (ref 0–0.61)
EOSINOPHIL NFR BLD AUTO: 1 % (ref 0–6)
ERYTHROCYTE [DISTWIDTH] IN BLOOD BY AUTOMATED COUNT: 16.3 % (ref 11.6–15.1)
GFR SERPL CREATININE-BSD FRML MDRD: 96 ML/MIN/1.73SQ M
GLUCOSE SERPL-MCNC: 133 MG/DL (ref 65–140)
GLUCOSE SERPL-MCNC: 135 MG/DL (ref 65–140)
GLUCOSE SERPL-MCNC: 146 MG/DL (ref 65–140)
GLUCOSE SERPL-MCNC: 148 MG/DL (ref 65–140)
GLUCOSE SERPL-MCNC: 157 MG/DL (ref 65–140)
GLUCOSE SERPL-MCNC: 158 MG/DL (ref 65–140)
HCT VFR BLD AUTO: 23.9 % (ref 34.8–46.1)
HGB BLD-MCNC: 7.2 G/DL (ref 11.5–15.4)
IMM GRANULOCYTES # BLD AUTO: 0.07 THOUSAND/UL (ref 0–0.2)
IMM GRANULOCYTES NFR BLD AUTO: 1 % (ref 0–2)
LYMPHOCYTES # BLD AUTO: 1.06 THOUSANDS/ÂΜL (ref 0.6–4.47)
LYMPHOCYTES NFR BLD AUTO: 7 % (ref 14–44)
MAGNESIUM SERPL-MCNC: 1.2 MG/DL (ref 1.9–2.7)
MCH RBC QN AUTO: 25.7 PG (ref 26.8–34.3)
MCHC RBC AUTO-ENTMCNC: 30.1 G/DL (ref 31.4–37.4)
MCV RBC AUTO: 85 FL (ref 82–98)
MONOCYTES # BLD AUTO: 0.66 THOUSAND/ÂΜL (ref 0.17–1.22)
MONOCYTES NFR BLD AUTO: 4 % (ref 4–12)
NEUTROPHILS # BLD AUTO: 13.25 THOUSANDS/ÂΜL (ref 1.85–7.62)
NEUTS SEG NFR BLD AUTO: 87 % (ref 43–75)
NRBC BLD AUTO-RTO: 0 /100 WBCS
PHOSPHATE SERPL-MCNC: 3.2 MG/DL (ref 2.3–4.1)
PLATELET # BLD AUTO: 265 THOUSANDS/UL (ref 149–390)
PMV BLD AUTO: 11.3 FL (ref 8.9–12.7)
POTASSIUM SERPL-SCNC: 3.6 MMOL/L (ref 3.5–5.3)
PROT SERPL-MCNC: 5.7 G/DL (ref 6.4–8.4)
RBC # BLD AUTO: 2.8 MILLION/UL (ref 3.81–5.12)
SODIUM SERPL-SCNC: 141 MMOL/L (ref 135–147)
UNIT DISPENSE STATUS: NORMAL
UNIT DISPENSE STATUS: NORMAL
UNIT PRODUCT CODE: NORMAL
UNIT PRODUCT CODE: NORMAL
UNIT PRODUCT VOLUME: 350 ML
UNIT PRODUCT VOLUME: 350 ML
UNIT RH: NORMAL
UNIT RH: NORMAL
WBC # BLD AUTO: 15.23 THOUSAND/UL (ref 4.31–10.16)

## 2024-01-24 PROCEDURE — 97530 THERAPEUTIC ACTIVITIES: CPT

## 2024-01-24 PROCEDURE — 85025 COMPLETE CBC W/AUTO DIFF WBC: CPT | Performed by: PHYSICIAN ASSISTANT

## 2024-01-24 PROCEDURE — 94664 DEMO&/EVAL PT USE INHALER: CPT

## 2024-01-24 PROCEDURE — 99024 POSTOP FOLLOW-UP VISIT: CPT | Performed by: STUDENT IN AN ORGANIZED HEALTH CARE EDUCATION/TRAINING PROGRAM

## 2024-01-24 PROCEDURE — 94760 N-INVAS EAR/PLS OXIMETRY 1: CPT

## 2024-01-24 PROCEDURE — 97116 GAIT TRAINING THERAPY: CPT

## 2024-01-24 PROCEDURE — C9113 INJ PANTOPRAZOLE SODIUM, VIA: HCPCS | Performed by: PHYSICIAN ASSISTANT

## 2024-01-24 PROCEDURE — 80053 COMPREHEN METABOLIC PANEL: CPT | Performed by: PHYSICIAN ASSISTANT

## 2024-01-24 PROCEDURE — 83735 ASSAY OF MAGNESIUM: CPT | Performed by: PHYSICIAN ASSISTANT

## 2024-01-24 PROCEDURE — 82948 REAGENT STRIP/BLOOD GLUCOSE: CPT

## 2024-01-24 PROCEDURE — 84100 ASSAY OF PHOSPHORUS: CPT | Performed by: PHYSICIAN ASSISTANT

## 2024-01-24 RX ORDER — MAGNESIUM SULFATE HEPTAHYDRATE 40 MG/ML
4 INJECTION, SOLUTION INTRAVENOUS ONCE
Status: COMPLETED | OUTPATIENT
Start: 2024-01-24 | End: 2024-01-24

## 2024-01-24 RX ORDER — POTASSIUM CHLORIDE 14.9 MG/ML
20 INJECTION INTRAVENOUS 2 TIMES DAILY
Status: COMPLETED | OUTPATIENT
Start: 2024-01-24 | End: 2024-01-25

## 2024-01-24 RX ORDER — DEXTROSE MONOHYDRATE, SODIUM CHLORIDE, AND POTASSIUM CHLORIDE 50; 1.49; 4.5 G/1000ML; G/1000ML; G/1000ML
50 INJECTION, SOLUTION INTRAVENOUS CONTINUOUS
Status: DISCONTINUED | OUTPATIENT
Start: 2024-01-24 | End: 2024-01-26

## 2024-01-24 RX ORDER — KETOROLAC TROMETHAMINE 30 MG/ML
15 INJECTION, SOLUTION INTRAMUSCULAR; INTRAVENOUS EVERY 6 HOURS SCHEDULED
Status: COMPLETED | OUTPATIENT
Start: 2024-01-24 | End: 2024-01-26

## 2024-01-24 RX ADMIN — BENZOCAINE 1 APPLICATION: 220 GEL, DENTIFRICE DENTAL at 09:40

## 2024-01-24 RX ADMIN — ACETAMINOPHEN 1000 MG: 10 INJECTION INTRAVENOUS at 17:40

## 2024-01-24 RX ADMIN — FLUTICASONE FUROATE AND VILANTEROL TRIFENATATE 1 PUFF: 200; 25 POWDER RESPIRATORY (INHALATION) at 09:12

## 2024-01-24 RX ADMIN — POTASSIUM CHLORIDE 20 MEQ: 14.9 INJECTION, SOLUTION INTRAVENOUS at 15:57

## 2024-01-24 RX ADMIN — HEPARIN SODIUM 5000 UNITS: 5000 INJECTION INTRAVENOUS; SUBCUTANEOUS at 05:10

## 2024-01-24 RX ADMIN — INSULIN LISPRO 1 UNITS: 100 INJECTION, SOLUTION INTRAVENOUS; SUBCUTANEOUS at 13:23

## 2024-01-24 RX ADMIN — KETOROLAC TROMETHAMINE 15 MG: 30 INJECTION, SOLUTION INTRAMUSCULAR; INTRAVENOUS at 23:10

## 2024-01-24 RX ADMIN — KETOROLAC TROMETHAMINE 15 MG: 30 INJECTION, SOLUTION INTRAMUSCULAR; INTRAVENOUS at 13:21

## 2024-01-24 RX ADMIN — ACETAMINOPHEN 1000 MG: 10 INJECTION INTRAVENOUS at 05:10

## 2024-01-24 RX ADMIN — Medication 1 SPRAY: at 09:27

## 2024-01-24 RX ADMIN — KETOROLAC TROMETHAMINE 15 MG: 30 INJECTION, SOLUTION INTRAMUSCULAR; INTRAVENOUS at 17:45

## 2024-01-24 RX ADMIN — HEPARIN SODIUM 5000 UNITS: 5000 INJECTION INTRAVENOUS; SUBCUTANEOUS at 21:24

## 2024-01-24 RX ADMIN — PANTOPRAZOLE SODIUM 40 MG: 40 INJECTION, POWDER, FOR SOLUTION INTRAVENOUS at 09:06

## 2024-01-24 RX ADMIN — HYDROMORPHONE HYDROCHLORIDE: 10 INJECTION, SOLUTION INTRAMUSCULAR; INTRAVENOUS; SUBCUTANEOUS at 16:08

## 2024-01-24 RX ADMIN — ACETAMINOPHEN 1000 MG: 10 INJECTION INTRAVENOUS at 13:34

## 2024-01-24 RX ADMIN — INSULIN LISPRO 1 UNITS: 100 INJECTION, SOLUTION INTRAVENOUS; SUBCUTANEOUS at 23:09

## 2024-01-24 RX ADMIN — Medication 1 SPRAY: at 23:15

## 2024-01-24 RX ADMIN — Medication 1 SPRAY: at 18:46

## 2024-01-24 RX ADMIN — POTASSIUM CHLORIDE 20 MEQ: 14.9 INJECTION, SOLUTION INTRAVENOUS at 09:45

## 2024-01-24 RX ADMIN — Medication 1 SPRAY: at 09:40

## 2024-01-24 RX ADMIN — POTASSIUM CHLORIDE, DEXTROSE MONOHYDRATE AND SODIUM CHLORIDE 84 ML/HR: 150; 5; 450 INJECTION, SOLUTION INTRAVENOUS at 09:37

## 2024-01-24 RX ADMIN — HEPARIN SODIUM 5000 UNITS: 5000 INJECTION INTRAVENOUS; SUBCUTANEOUS at 13:21

## 2024-01-24 RX ADMIN — MAGNESIUM SULFATE 4 G: 4 INJECTION INTRAVENOUS at 09:06

## 2024-01-24 RX ADMIN — ACETAMINOPHEN 1000 MG: 10 INJECTION INTRAVENOUS at 23:09

## 2024-01-24 RX ADMIN — POTASSIUM CHLORIDE, DEXTROSE MONOHYDRATE AND SODIUM CHLORIDE 84 ML/HR: 150; 5; 450 INJECTION, SOLUTION INTRAVENOUS at 21:29

## 2024-01-24 NOTE — CASE MANAGEMENT
Case Management Assessment & Discharge Planning Note    Patient name Aleida Hammond  Location [facility] 510-01 MRN [id number]  : 1960 Date 2024       Current Admission Date: 2024  Current Admission Diagnosis:Type 2 diabetes mellitus with diabetic neuropathy, with long-term current use of insulin (HCC)   Patient Active Problem List    Diagnosis Date Noted    Malignant neoplasm of spinal cord (HCC) 2024    Duodenal mass 2023    PONV (postoperative nausea and vomiting) 2023    S/P endoscopic carpal tunnel release 2023    Pillar pain, post-operative 2023    Microcytic anemia 2023    Carpal tunnel syndrome on right 2022    COVID-19 vaccine series completed 2021    Type 2 diabetes mellitus with diabetic neuropathy, with long-term current use of insulin ([facility]) 2021    Narcotic dependency, continuous ([facility]) 2021    Memory dysfunction 2021    Iron deficiency 2020    B12 deficiency 2020    Pain in joint involving pelvic region and thigh 2018    Chronic lumbar radiculopathy 2017    Chronic cervical pain 2017    Chronic thoracic spine pain 2017    Peripheral polyneuropathy 2017    Insomnia 2017    Benign essential hypertension 2017    Mild persistent asthma without complication 2017    Chronic bilateral back pain 2017    GERD without esophagitis 2017    Hyperlipidemia associated with type 2 diabetes mellitus  2017    Lumbar radiculopathy 2017    Thoracic spondylosis without myelopathy 2015    Lumbar spondylosis 2015    Thoracic and lumbosacral neuritis 2013    Degeneration of lumbar intervertebral disc 2013    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 2005      LOS (days): 2  Geometric Mean LOS (GMLOS) (days): 4.3  Days to GMLOS:2.4     OBJECTIVE:    Risk of Unplanned Readmission Score: 18.65          Current admission status: Inpatient       Preferred Pharmacy:   [facility] [place] - [address]  [address] [facility]  [place] [address]  Phone: [phone] Fax: [phone]    [facility][id number] - [place] - [address]  [address]  [place] [address]  Phone: [phone] Fax: [phone]    [facility] [place] - [address]  [address]  [place] [address]  Phone: [phone] Fax: [phone]    Primary Care Provider: Tara Colon DO    Primary Insurance: AEDURAN  Secondary Insurance:     ASSESSMENT:  Active Health Care Proxies       Ishaan Castaneda [facility] Representative - Son   Primary Phone: [phone] (Home)                           Readmission Root Cause  30 Day Readmission: No    Patient Information  Admitted from:: Home  Mental Status: Alert  During Assessment patient was accompanied by: Not accompanied during assessment  Assessment information provided by:: Patient  Primary Caregiver: Self  Support Systems: Self, Son  County of Residence: [place]  What city do you live in?: [place]  Home entry access options. Select all that apply.: Stairs  Number of steps to enter home.: 5  Type of Current Residence: [address] home  Living Arrangements: Lives w/ Son  Is patient a ?: No    Activities of Daily Living Prior to Admission  Functional Status: Independent  Completes ADLs independently?: Yes  Ambulates independently?: Yes  Does patient use assisted devices?: No  Does patient currently own DME?: No  Does patient have a history of Outpatient Therapy (PT/OT)?: No  Does the patient have a history of Short-Term Rehab?: No  Does patient have a history of HHC?: No  Does patient currently have HHC?: No         Patient Information Continued  Income Source: Employed                Housing Stability: Not on file   Food Insecurity: Not on file   Transportation Needs: Not on  file   Utilities: Not on file       DISCHARGE DETAILS:    Discharge planning discussed with:: Chart reviewed. Cm met with pt introduced self, role and discharged process. Pt reported lives with seperate section of the house in [facility], 1st floor set set and 5 steps to enter. Potential discharged home when medically cleared pending recommendation and dtr to transport upon discharged.

## 2024-01-24 NOTE — PLAN OF CARE
Problem: PAIN - ADULT  Goal: Verbalizes/displays adequate comfort level or baseline comfort level  Description: Interventions:  - Encourage patient to monitor pain and request assistance  - Assess pain using appropriate pain scale  - Administer analgesics based on type and severity of pain and evaluate response  - Implement non-pharmacological measures as appropriate and evaluate response  - Consider cultural and social influences on pain and pain management  - Notify physician/advanced practitioner if interventions unsuccessful or patient reports new pain  Outcome: Progressing     Problem: INFECTION - ADULT  Goal: Absence or prevention of progression during hospitalization  Description: INTERVENTIONS:  - Assess and monitor for signs and symptoms of infection  - Monitor lab/diagnostic results  - Monitor all insertion sites, i.e. indwelling lines, tubes, and drains  - Monitor endotracheal if appropriate and nasal secretions for changes in amount and color  - [place] appropriate cooling/warming therapies per order  - Administer medications as ordered  - Instruct and encourage patient and family to use good hand hygiene technique  - Identify and instruct in appropriate isolation precautions for identified infection/condition  Outcome: Progressing  Goal: Absence of fever/infection during neutropenic period  Description: INTERVENTIONS:  - Monitor WBC    Outcome: Progressing     Problem: SAFETY ADULT  Goal: Patient will remain free of falls  Description: INTERVENTIONS:  - Educate patient/family on patient safety including physical limitations  - Instruct patient to call for assistance with activity   - Consult OT/PT to assist with strengthening/mobility   - Keep Call bell within reach  - Keep bed low and locked with side rails adjusted as appropriate  - Keep care items and personal belongings within reach  - Initiate and maintain comfort rounds  - Make Fall Risk Sign visible to staff  - Offer Toileting every 2 Hours,  in advance of need  - Initiate/Maintain bed alarm  - Obtain necessary fall risk management equipment: non-slip socks  - Apply yellow socks and bracelet for high fall risk patients  - Consider moving patient to room near nurses station  Outcome: Progressing  Goal: Maintain or return to baseline ADL function  Description: INTERVENTIONS:  -  Assess patient's ability to carry out ADLs; assess patient's baseline for ADL function and identify physical deficits which impact ability to perform ADLs (bathing, care of mouth/teeth, toileting, grooming, dressing, etc.)  - Assess/evaluate cause of self-care deficits   - Assess range of motion  - Assess patient's mobility; develop plan if impaired  - Assess patient's need for assistive devices and provide as appropriate  - Encourage maximum independence but intervene and supervise when necessary  - Involve family in performance of ADLs  - Assess for home care needs following discharge   - Consider OT consult to assist with ADL evaluation and planning for discharge  - Provide patient education as appropriate  Outcome: Progressing  Goal: Maintains/Returns to pre admission functional level  Description: INTERVENTIONS:  - Perform AM-PAC 6 Click Basic Mobility/ Daily Activity assessment daily.  - Set and communicate daily mobility goal to care team and patient/family/caregiver.   - Collaborate with rehabilitation services on mobility goals if consulted  - Perform Range of Motion 4 times a day.  - Reposition patient every 2 hours.  - Dangle patient 3 times a day  - Stand patient 3 times a day  - Ambulate patient 3 times a day  - Out of bed to chair 3 times a day   - Out of bed for meals 3 times a day  - Out of bed for toileting  - Record patient progress and toleration of activity level   Outcome: Progressing     Problem: DISCHARGE PLANNING  Goal: Discharge to home or other facility with appropriate resources  Description: INTERVENTIONS:  - Identify barriers to discharge w/patient and  caregiver  - Arrange for needed discharge resources and transportation as appropriate  - Identify discharge learning needs (meds, wound care, etc.)  - Arrange for interpretive services to assist at discharge as needed  - Refer to Case Management Department for coordinating discharge planning if the patient needs post-hospital services based on physician/advanced practitioner order or complex needs related to functional status, cognitive ability, or social support system  Outcome: Progressing     Problem: Knowledge Deficit  Goal: Patient/family/caregiver demonstrates understanding of disease process, treatment plan, medications, and discharge instructions  Description: Complete learning assessment and assess knowledge base.  Interventions:  - Provide teaching at level of understanding  - Provide teaching via preferred learning methods  Outcome: Progressing     Problem: Prexisting or High Potential for Compromised Skin Integrity  Goal: Skin integrity is maintained or improved  Description: INTERVENTIONS:  - Identify patients at risk for skin breakdown  - Assess and monitor skin integrity  - Assess and monitor nutrition and hydration status  - Monitor labs   - Assess for incontinence   - Turn and reposition patient  - Assist with mobility/ambulation  - Relieve pressure over bony prominences  - Avoid friction and shearing  - Provide appropriate hygiene as needed including keeping skin clean and dry  - Evaluate need for skin moisturizer/barrier cream  - Collaborate with interdisciplinary team   - Patient/family teaching  - Consider wound care consult   Outcome: Progressing

## 2024-01-24 NOTE — PROGRESS NOTES
"Progress Note - Aleida Hammond 64 y.o. female MRN: [id number]    Unit/Bed#: [facility] 510-01 Encounter: [id number]      Assessment:  Aleida Hammond is a 64 y.o. 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  Endorses poor pain control.   Otherwise without nausea except when pain spikes.  No flatus or BM    Objective:     Vitals: Blood pressure 129/70, pulse 102, temperature 98.3 °F (36.8 °C), resp. rate 20, height 5' 4\" (1.626 m), weight 71.7 kg (158 lb), SpO2 99%.,Body mass index is 27.12 kg/m².      Intake/Output Summary (Last 24 hours) at 1/24/2024 0620  Last data filed at 1/24/2024 0510  Gross per 24 hour   Intake 1844.02 ml   Output 3370 ml   Net -1525.98 ml       Physical Exam:   General - no acute distress, responsive and cooperative  CV - warm, regular rate  Pulm - normal work of breathing, no respiratory distress  Abd - soft, nondistended, midline incision c/d/I, LISA serosang, NG   Neuro - m/s grossly intact, cn grossly intact  Ext - moving all extremities       Invasive Devices       Peripheral Intravenous Line  Duration             Peripheral IV 01/22/24 Distal;Dorsal (posterior);Left Forearm 1 day    Peripheral IV 01/23/24 Left Forearm <1 day              Drain  Duration             Closed/Suction Drain Left LUQ Bulb 19 Fr. 1 day    NG/OG/Enteral Tube Nasogastric 18 Fr Left nare 1 day    Urethral Catheter Latex 16 Fr. 1 day                    Lab, Imaging and other studies: I have personally reviewed " pertinent reports.    VTE Pharmacologic Prophylaxis: Heparin  VTE Mechanical Prophylaxis: sequential compression device

## 2024-01-24 NOTE — RESTORATIVE TECHNICIAN NOTE
Restorative Technician Note      Patient Name: Aleida Hammond     Note Type: Mobility  Patient Position Upon Consult: Supine  Activity Performed: Repositioned  Patient Position at End of Consult: All needs within reach; Other (comment) (Sitting up in bed)

## 2024-01-24 NOTE — PHYSICAL THERAPY NOTE
PHYSICAL THERAPY NOTE          Patient Name: Aleida Hammond  Today's Date: 1/24/2024 01/24/24 1438   PT Last Visit   PT Visit Date 01/24/24   Note Type   Note Type Treatment   Pain Assessment   Pain Assessment Tool 0-10   Pain Score 8   Pain Location/Orientation Location: Abdomen   Pain Onset/Description Onset: Ongoing;Frequency: Constant/Continuous;Descriptor: Discomfort   Effect of Pain on Daily Activities limits comfort and mobility   Patient's Stated Pain Goal No pain   Hospital Pain Intervention(s) Repositioned;Ambulation/increased activity;Emotional support   Restrictions/Precautions   Weight Bearing Precautions Per Order No   Other Precautions Pain;Fall Risk;O2;Telemetry;Multiple lines  (NGT, O2, LISA drain, PCA pump)   General   Chart Reviewed Yes   Family/Caregiver Present No   Cognition   Overall Cognitive Status WFL   Arousal/Participation Alert;Cooperative   Attention Within functional limits   Orientation Level Oriented X4   Memory Within functional limits   Following Commands Follows one step commands without difficulty   Comments Patient is pleasant and cooperative   Subjective   Subjective Patient agreeable to PT tx   Bed Mobility   Supine to Sit 4  Minimal assistance   Additional items Assist x 1;Increased time required;Verbal cues;HOB elevated   Sit to Supine 4  Minimal assistance   Additional items Assist x 1;Increased time required;Verbal cues;HOB elevated   Transfers   Sit to Stand 4  Minimal assistance   Additional items Assist x 1;Increased time required;Verbal cues   Stand to Sit 4  Minimal assistance   Additional items Assist x 1;Increased time required;Verbal cues   Additional Comments RW   Ambulation/Elevation   Gait pattern Excessively slow;Short stride;Decreased foot clearance   Gait Assistance   (CGA)   Additional items Assist x 1;Verbal cues   Assistive Device Rolling walker   Distance 100'x2    Balance   Static Sitting Good   Dynamic Sitting Fair +   Static Standing Fair   Dynamic Standing Fair -   Ambulatory Poor +   Endurance Deficit   Endurance Deficit Yes   Endurance Deficit Description fatigue, weakness   Activity Tolerance   Activity Tolerance Patient tolerated treatment well   Medical Staff Made Aware restorative   Nurse Made Aware RN cleared   Assessment   Prognosis Good   Problem List Decreased strength;Decreased endurance;Impaired balance;Decreased mobility;Pain   Assessment Patient received in bed. Patient agreeable to therapy. Patient performs bed mobility with minimal assistance x1. Patient performs functional transfers with minimal assistance x1 using rolling walker. Patient performs ambulation with minimal assistance - CGA x1 using rolling walker, 100'x2.  Patient left in bed with all needs met and call bell/personal items within reach. The patient's AM-PAC Basic Mobility Inpatient Short Form Raw Score is 17 showing further need for skilled PT services in order to improve functional mobility, decrease need for assistance, and return to PLOF. PT is recommending Level 3 - Minimum Resource Intensity on d/c from hospital.  Will continue to follow as able.   Goals   Patient Goals to feel better   PT Treatment Day 1   Plan   Treatment/Interventions Functional transfer training;LE strengthening/ROM;Elevations;Therapeutic exercise;Endurance training;Patient/family training;Equipment eval/education;Bed mobility;Gait training;OT;Spoke to nursing   Progress Progressing toward goals   PT Frequency 3-5x/wk   Discharge Recommendation   Rehab Resource Intensity Level, PT III (Minimum Resource Intensity)   Equipment Recommended Walker   AM-PAC Basic Mobility Inpatient   Turning in Flat Bed Without Bedrails 3   Lying on Back to Sitting on Edge of Flat Bed Without Bedrails 3   Moving Bed to Chair 3   Standing Up From Chair Using Arms 3   Walk in Room 3   Climb 3-5 Stairs With Railing 2   Basic Mobility  Inpatient Raw Score 17   Basic Mobility Standardized Score 39.67   Highest Level Of Mobility   -[facility] Goal 5: Stand one or more mins   -HLM Achieved 7: Walk 25 feet or more   End of Consult   Patient Position at End of Consult All needs within reach;Supine     Magalys Gonzales, PT, DPT

## 2024-01-24 NOTE — PLAN OF CARE
Problem: PAIN - ADULT  Goal: Verbalizes/displays adequate comfort level or baseline comfort level  Description: Interventions:  - Encourage patient to monitor pain and request assistance  - Assess pain using appropriate pain scale  - Administer analgesics based on type and severity of pain and evaluate response  - Implement non-pharmacological measures as appropriate and evaluate response  - Consider cultural and social influences on pain and pain management  - Notify physician/advanced practitioner if interventions unsuccessful or patient reports new pain  Outcome: Progressing     Problem: INFECTION - ADULT  Goal: Absence or prevention of progression during hospitalization  Description: INTERVENTIONS:  - Assess and monitor for signs and symptoms of infection  - Monitor lab/diagnostic results  - Monitor all insertion sites, i.e. indwelling lines, tubes, and drains  - Monitor endotracheal if appropriate and nasal secretions for changes in amount and color  - [place] appropriate cooling/warming therapies per order  - Administer medications as ordered  - Instruct and encourage patient and family to use good hand hygiene technique  - Identify and instruct in appropriate isolation precautions for identified infection/condition  Outcome: Progressing  Goal: Absence of fever/infection during neutropenic period  Description: INTERVENTIONS:  - Monitor WBC    Outcome: Progressing     Problem: SAFETY ADULT  Goal: Patient will remain free of falls  Description: INTERVENTIONS:  - Educate patient/family on patient safety including physical limitations  - Instruct patient to call for assistance with activity   - Consult OT/PT to assist with strengthening/mobility   - Keep Call bell within reach  - Keep bed low and locked with side rails adjusted as appropriate  - Keep care items and personal belongings within reach  - Initiate and maintain comfort rounds  - Make Fall Risk Sign visible to staff  - Offer Toileting every  Hours,  in advance of need  - Initiate/Maintain alarm  - Obtain necessary fall risk management equipment:   - Apply yellow socks and bracelet for high fall risk patients  - Consider moving patient to room near nurses station  Outcome: Progressing  Goal: Maintain or return to baseline ADL function  Description: INTERVENTIONS:  -  Assess patient's ability to carry out ADLs; assess patient's baseline for ADL function and identify physical deficits which impact ability to perform ADLs (bathing, care of mouth/teeth, toileting, grooming, dressing, etc.)  - Assess/evaluate cause of self-care deficits   - Assess range of motion  - Assess patient's mobility; develop plan if impaired  - Assess patient's need for assistive devices and provide as appropriate  - Encourage maximum independence but intervene and supervise when necessary  - Involve family in performance of ADLs  - Assess for home care needs following discharge   - Consider OT consult to assist with ADL evaluation and planning for discharge  - Provide patient education as appropriate  Outcome: Progressing  Goal: Maintains/Returns to pre admission functional level  Description: INTERVENTIONS:  - Perform AM-PAC 6 Click Basic Mobility/ Daily Activity assessment daily.  - Set and communicate daily mobility goal to care team and patient/family/caregiver.   - Collaborate with rehabilitation services on mobility goals if consulted  - Perform Range of Motion  times a day.  - Reposition patient every hours.  - Dangle patient  times a day  - Stand patient  times a day  - Ambulate patient  times a day  - Out of bed to chair  times a day   - Out of bed for meals times a day  - Out of bed for toileting  - Record patient progress and toleration of activity level   Outcome: Progressing     Problem: DISCHARGE PLANNING  Goal: Discharge to home or other facility with appropriate resources  Description: INTERVENTIONS:  - Identify barriers to discharge w/patient and caregiver  - Arrange for  needed discharge resources and transportation as appropriate  - Identify discharge learning needs (meds, wound care, etc.)  - Arrange for interpretive services to assist at discharge as needed  - Refer to Case Management Department for coordinating discharge planning if the patient needs post-hospital services based on physician/advanced practitioner order or complex needs related to functional status, cognitive ability, or social support system  Outcome: Progressing     Problem: Knowledge Deficit  Goal: Patient/family/caregiver demonstrates understanding of disease process, treatment plan, medications, and discharge instructions  Description: Complete learning assessment and assess knowledge base.  Interventions:  - Provide teaching at level of understanding  - Provide teaching via preferred learning methods  Outcome: Progressing     Problem: Prexisting or High Potential for Compromised Skin Integrity  Goal: Skin integrity is maintained or improved  Description: INTERVENTIONS:  - Identify patients at risk for skin breakdown  - Assess and monitor skin integrity  - Assess and monitor nutrition and hydration status  - Monitor labs   - Assess for incontinence   - Turn and reposition patient  - Assist with mobility/ambulation  - Relieve pressure over bony prominences  - Avoid friction and shearing  - Provide appropriate hygiene as needed including keeping skin clean and dry  - Evaluate need for skin moisturizer/barrier cream  - Collaborate with interdisciplinary team   - Patient/family teaching  - Consider wound care consult   Outcome: Progressing

## 2024-01-24 NOTE — PLAN OF CARE
Problem: PHYSICAL THERAPY ADULT  Goal: Performs mobility at highest level of function for planned discharge setting.  See evaluation for individualized goals.  Description: Treatment/Interventions: Functional transfer training, LE strengthening/ROM, Elevations, Therapeutic exercise, Endurance training, Patient/family training, Equipment eval/education, Bed mobility, Gait training, OT, Spoke to nursing  Equipment Recommended: (S) Walker (will require RW to d/c home)       See flowsheet documentation for full assessment, interventions and recommendations.  Outcome: Progressing  Note: Prognosis: Good  Problem List: Decreased strength, Decreased endurance, Impaired balance, Decreased mobility, Pain  Assessment: Patient received in bed. Patient agreeable to therapy. Patient performs bed mobility with minimal assistance x1. Patient performs functional transfers with minimal assistance x1 using rolling walker. Patient performs ambulation with minimal assistance - CGA x1 using rolling walker, 100'x2.  Patient left in bed with all needs met and call bell/personal items within reach. The patient's AM-PAC Basic Mobility Inpatient Short Form Raw Score is 17 showing further need for skilled PT services in order to improve functional mobility, decrease need for assistance, and return to PLOF. PT is recommending Level 3 - Minimum Resource Intensity on d/c from hospital.  Will continue to follow as able.        Rehab Resource Intensity Level, PT: III (Minimum Resource Intensity)    See flowsheet documentation for full assessment.

## 2024-01-25 ENCOUNTER — HOME HEALTH ADMISSION (OUTPATIENT)
Dept: HOME HEALTH SERVICES | Facility: HOME HEALTHCARE | Age: 64
End: 2024-01-25
Payer: COMMERCIAL

## 2024-01-25 LAB
AMYLASE FLD QL: 121 U/L
AMYLASE SERPL-CCNC: 18 IU/L (ref 29–103)
ANION GAP SERPL CALCULATED.3IONS-SCNC: 7 MMOL/L
BASOPHILS # BLD AUTO: 0.02 THOUSANDS/ÂΜL (ref 0–0.1)
BASOPHILS NFR BLD AUTO: 0 % (ref 0–1)
BUN SERPL-MCNC: 10 MG/DL (ref 5–25)
CALCIUM SERPL-MCNC: 9.1 MG/DL (ref 8.4–10.2)
CHLORIDE SERPL-SCNC: 103 MMOL/L (ref 96–108)
CO2 SERPL-SCNC: 30 MMOL/L (ref 21–32)
CREAT SERPL-MCNC: 0.57 MG/DL (ref 0.6–1.3)
DME PARACHUTE DELIVERY DATE REQUESTED: NORMAL
DME PARACHUTE DELIVERY NOTE: NORMAL
DME PARACHUTE ITEM DESCRIPTION: NORMAL
DME PARACHUTE ORDER STATUS: NORMAL
DME PARACHUTE SUPPLIER NAME: NORMAL
DME PARACHUTE SUPPLIER PHONE: NORMAL
EOSINOPHIL # BLD AUTO: 0.24 THOUSAND/ÂΜL (ref 0–0.61)
EOSINOPHIL NFR BLD AUTO: 2 % (ref 0–6)
ERYTHROCYTE [DISTWIDTH] IN BLOOD BY AUTOMATED COUNT: 16.1 % (ref 11.6–15.1)
GFR SERPL CREATININE-BSD FRML MDRD: 98 ML/MIN/1.73SQ M
GLUCOSE SERPL-MCNC: 140 MG/DL (ref 65–140)
GLUCOSE SERPL-MCNC: 151 MG/DL (ref 65–140)
GLUCOSE SERPL-MCNC: 155 MG/DL (ref 65–140)
GLUCOSE SERPL-MCNC: 165 MG/DL (ref 65–140)
GLUCOSE SERPL-MCNC: 194 MG/DL (ref 65–140)
HCT VFR BLD AUTO: 25.7 % (ref 34.8–46.1)
HGB BLD-MCNC: 7.6 G/DL (ref 11.5–15.4)
IMM GRANULOCYTES # BLD AUTO: 0.06 THOUSAND/UL (ref 0–0.2)
IMM GRANULOCYTES NFR BLD AUTO: 0 % (ref 0–2)
LYMPHOCYTES # BLD AUTO: 0.99 THOUSANDS/ÂΜL (ref 0.6–4.47)
LYMPHOCYTES NFR BLD AUTO: 7 % (ref 14–44)
MAGNESIUM SERPL-MCNC: 1.7 MG/DL (ref 1.9–2.7)
MCH RBC QN AUTO: 24.9 PG (ref 26.8–34.3)
MCHC RBC AUTO-ENTMCNC: 29.6 G/DL (ref 31.4–37.4)
MCV RBC AUTO: 84 FL (ref 82–98)
MONOCYTES # BLD AUTO: 0.54 THOUSAND/ÂΜL (ref 0.17–1.22)
MONOCYTES NFR BLD AUTO: 4 % (ref 4–12)
NEUTROPHILS # BLD AUTO: 13.16 THOUSANDS/ÂΜL (ref 1.85–7.62)
NEUTS SEG NFR BLD AUTO: 87 % (ref 43–75)
NRBC BLD AUTO-RTO: 0 /100 WBCS
PLATELET # BLD AUTO: 275 THOUSANDS/UL (ref 149–390)
PMV BLD AUTO: 10.8 FL (ref 8.9–12.7)
POTASSIUM SERPL-SCNC: 4 MMOL/L (ref 3.5–5.3)
RBC # BLD AUTO: 3.05 MILLION/UL (ref 3.81–5.12)
SODIUM SERPL-SCNC: 140 MMOL/L (ref 135–147)
WBC # BLD AUTO: 15.01 THOUSAND/UL (ref 4.31–10.16)

## 2024-01-25 PROCEDURE — C9113 INJ PANTOPRAZOLE SODIUM, VIA: HCPCS | Performed by: PHYSICIAN ASSISTANT

## 2024-01-25 PROCEDURE — 97535 SELF CARE MNGMENT TRAINING: CPT

## 2024-01-25 PROCEDURE — 85025 COMPLETE CBC W/AUTO DIFF WBC: CPT | Performed by: PHYSICIAN ASSISTANT

## 2024-01-25 PROCEDURE — 82948 REAGENT STRIP/BLOOD GLUCOSE: CPT

## 2024-01-25 PROCEDURE — 97112 NEUROMUSCULAR REEDUCATION: CPT

## 2024-01-25 PROCEDURE — 97530 THERAPEUTIC ACTIVITIES: CPT

## 2024-01-25 PROCEDURE — 80048 BASIC METABOLIC PNL TOTAL CA: CPT | Performed by: PHYSICIAN ASSISTANT

## 2024-01-25 PROCEDURE — 99024 POSTOP FOLLOW-UP VISIT: CPT | Performed by: STUDENT IN AN ORGANIZED HEALTH CARE EDUCATION/TRAINING PROGRAM

## 2024-01-25 PROCEDURE — 83735 ASSAY OF MAGNESIUM: CPT | Performed by: PHYSICIAN ASSISTANT

## 2024-01-25 PROCEDURE — 82150 ASSAY OF AMYLASE: CPT | Performed by: PHYSICIAN ASSISTANT

## 2024-01-25 PROCEDURE — 97116 GAIT TRAINING THERAPY: CPT

## 2024-01-25 RX ORDER — ACETAMINOPHEN 325 MG/1
975 TABLET ORAL EVERY 8 HOURS SCHEDULED
Status: DISCONTINUED | OUTPATIENT
Start: 2024-01-25 | End: 2024-01-26

## 2024-01-25 RX ORDER — GABAPENTIN 300 MG/1
300 CAPSULE ORAL 3 TIMES DAILY
Status: DISCONTINUED | OUTPATIENT
Start: 2024-01-25 | End: 2024-01-27 | Stop reason: HOSPADM

## 2024-01-25 RX ORDER — PANTOPRAZOLE SODIUM 40 MG/1
40 TABLET, DELAYED RELEASE ORAL
Status: DISCONTINUED | OUTPATIENT
Start: 2024-01-26 | End: 2024-01-27 | Stop reason: HOSPADM

## 2024-01-25 RX ADMIN — HEPARIN SODIUM 5000 UNITS: 5000 INJECTION INTRAVENOUS; SUBCUTANEOUS at 21:07

## 2024-01-25 RX ADMIN — INSULIN LISPRO 1 UNITS: 100 INJECTION, SOLUTION INTRAVENOUS; SUBCUTANEOUS at 16:49

## 2024-01-25 RX ADMIN — INSULIN LISPRO 1 UNITS: 100 INJECTION, SOLUTION INTRAVENOUS; SUBCUTANEOUS at 12:20

## 2024-01-25 RX ADMIN — ACETAMINOPHEN 1000 MG: 10 INJECTION INTRAVENOUS at 05:29

## 2024-01-25 RX ADMIN — Medication 1 SPRAY: at 09:07

## 2024-01-25 RX ADMIN — KETOROLAC TROMETHAMINE 15 MG: 30 INJECTION, SOLUTION INTRAMUSCULAR; INTRAVENOUS at 12:14

## 2024-01-25 RX ADMIN — KETOROLAC TROMETHAMINE 15 MG: 30 INJECTION, SOLUTION INTRAMUSCULAR; INTRAVENOUS at 05:29

## 2024-01-25 RX ADMIN — ACETAMINOPHEN 975 MG: 325 TABLET, FILM COATED ORAL at 21:07

## 2024-01-25 RX ADMIN — HEPARIN SODIUM 5000 UNITS: 5000 INJECTION INTRAVENOUS; SUBCUTANEOUS at 05:29

## 2024-01-25 RX ADMIN — GABAPENTIN 300 MG: 300 CAPSULE ORAL at 16:43

## 2024-01-25 RX ADMIN — POTASSIUM CHLORIDE, DEXTROSE MONOHYDRATE AND SODIUM CHLORIDE 84 ML/HR: 150; 5; 450 INJECTION, SOLUTION INTRAVENOUS at 09:13

## 2024-01-25 RX ADMIN — KETOROLAC TROMETHAMINE 15 MG: 30 INJECTION, SOLUTION INTRAMUSCULAR; INTRAVENOUS at 16:43

## 2024-01-25 RX ADMIN — ALBUTEROL SULFATE 2 PUFF: 90 AEROSOL, METERED RESPIRATORY (INHALATION) at 20:53

## 2024-01-25 RX ADMIN — Medication 1 SPRAY: at 21:07

## 2024-01-25 RX ADMIN — HEPARIN SODIUM 5000 UNITS: 5000 INJECTION INTRAVENOUS; SUBCUTANEOUS at 13:52

## 2024-01-25 RX ADMIN — FLUTICASONE FUROATE AND VILANTEROL TRIFENATATE 1 PUFF: 200; 25 POWDER RESPIRATORY (INHALATION) at 09:06

## 2024-01-25 RX ADMIN — PANTOPRAZOLE SODIUM 40 MG: 40 INJECTION, POWDER, FOR SOLUTION INTRAVENOUS at 09:07

## 2024-01-25 RX ADMIN — GABAPENTIN 300 MG: 300 CAPSULE ORAL at 21:07

## 2024-01-25 RX ADMIN — MAGNESIUM SULFATE HEPTAHYDRATE 3 G: 500 INJECTION, SOLUTION INTRAMUSCULAR; INTRAVENOUS at 12:14

## 2024-01-25 RX ADMIN — INSULIN LISPRO 1 UNITS: 100 INJECTION, SOLUTION INTRAVENOUS; SUBCUTANEOUS at 05:28

## 2024-01-25 RX ADMIN — ACETAMINOPHEN 975 MG: 325 TABLET, FILM COATED ORAL at 13:52

## 2024-01-25 NOTE — PROGRESS NOTES
"General Surgery  Progress Note   Aleida Hammond 64 y.o. female MRN: [id number]  Unit/Bed#: [facility] 510-01 Encounter: [id number]    Assessment:  64 y.o. 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 No acute events overnight.  Patient reports that she went to the restroom multiple times because she had a lot of gas and a lot of urine.  Patient reports that the PCA helps with the pain.  Patient is passing flatus.  Patient is not passing bowel movements.  Patient reports sore throat due to the NG tube.  Patient ambulated in the halls yesterday.  Patient denies nausea vomiting fevers chills chest pain or shortness of breath.    Objective:   Vitals:Blood pressure 160/94, pulse 90, temperature 98.1 °F (36.7 °C), resp. rate 18, height 5' 4\" (1.626 m), weight 71.7 kg (158 lb), SpO2 96%.  Temp (24hrs), Av.4 °F (36.9 °C), Min:98.1 °F (36.7 °C), Max:99.2 °F (37.3 °C)      I/O          0701   0700  0701   0700    P.O. 0 0    I.V. (mL/kg) 1544 (21.5) 75 (1)    NG/GT 0 30    IV Piggyback 300 150    Total Intake(mL/kg) 1844 (25.7) 255 (3.6)    Urine (mL/kg/hr) 3310 (1.9) 3550 (2.1)    Emesis/NG output 0 0    Drains 60 0    Stool 0     Total Output 3370 3550    Net -5529 -2780          Unmeasured Stool Occurrence 0 x             Invasive Devices       Peripheral Intravenous Line  Duration             Peripheral IV 24 Distal;Dorsal (posterior);Left " Forearm 2 days    Peripheral IV 01/23/24 Left Forearm 1 day              Drain  Duration             Closed/Suction Drain Left LUQ Bulb 19 Fr. 2 days    NG/OG/Enteral Tube Nasogastric 18 Fr Left nare 2 days    External Urinary Catheter <1 day                    Physical Exam:  General: No acute distress  Neuro: Awake, Alert and Oriented x 3  HEENT:  Normocephalic, atraumatic, moist mucous membranes, NGT  CV: Regular rate and rhythm  Lungs: Normal work of breathing, no increased respiratory effort  Abdomen: Soft, non-tender, non-distended. Incision(s) clean, dry and intact. LISA serous fluid  Extremities: No edema, clubbing or cyanosis  Skin: Warm, dry    Lab Results   Component Value Date    WBC 15.01 (H) 01/25/2024    HGB 7.6 (L) 01/25/2024    HCT 25.7 (L) 01/25/2024    MCV 84 01/25/2024     01/25/2024      Lab Results   Component Value Date     11/04/2017    SODIUM 140 01/25/2024    K 4.0 01/25/2024     01/25/2024    CO2 30 01/25/2024    AGAP 7 01/25/2024    BUN 10 01/25/2024    CREATININE 0.57 (L) 01/25/2024    GLUC 165 (H) 01/25/2024    GLUF 109 (H) 01/09/2024    CALCIUM 9.1 01/25/2024    AST 69 (H) 01/24/2024     (H) 01/24/2024    ALKPHOS 92 01/24/2024    PROT 6.6 07/22/2017    TP 5.7 (L) 01/24/2024    BILITOT 0.5 07/22/2017    TBILI 0.30 01/24/2024    EGFR 98 01/25/2024       VTE Prophylaxis: Heparin      Natali Balderas MD  1/25/2024  5:39 AM

## 2024-01-25 NOTE — PROGRESS NOTES
Patient:    MRN:  [id number]    [place] Request ID:  [id number]    Level of care reserved:  Home Health Agency    Partner Reserved:  [facility], [place] [address] [phone]    Clinical needs requested:    Geography searched:  [phone]    Start of Service:    Request sent:  11:35am EST on 1/25/2024 by Celia Piper    Partner reserved:  11:43am EST on 1/25/2024 by Celia Piper    Choice list shared:

## 2024-01-25 NOTE — UTILIZATION REVIEW
Continued Stay Review    Date: 1/25/2024                        Current Patient Class: inpatient  Current Level of Care: med/surg  HPI:64 y.o. female with PMH RNY gastric bypass with duodenal mass admitted 1/22 now s/p whipple, completion gastrectomy, jejunal repair on 1/23      Assessment/Plan: Pt reports that she went to the [facility] multiple times because she had a lot of gas and urine.  Reports that the PCA helps with the pain. (+) flatus, (-) bms. C/o sore throat d/t the NG tube. Denies N/V. She ambulated in the halls yesterday. VSS, on 2L NC. NGT 0 cc, LISA serious fluid. Continue strict npo, NGT. Maintain LISA drain, monitor output and character. Continues on dPCA, prn analgesics/antiemetics. APS following. Keep PCA. Encourage OOB/ambulate, incentive spirometry. SQ hep, SCDs.     Vital Signs:   Date/Time Temp Pulse Resp BP MAP (mmHg) SpO2 Calculated FIO2 (%) - Nasal Cannula O2 Flow Rate (L/min) Nasal Cannula O2 Flow Rate (L/min) O2 Device Patient Position - Orthostatic VS   01/25/24 10:59:08 98.3 °F (36.8 °C) -- 16 145/80 102 -- -- -- -- -- --   01/25/24 07:50:05 98.4 °F (36.9 °C) 88 20 144/81 102 95 % -- -- -- -- --   01/25/24 03:21:47 98.1 °F (36.7 °C) -- 18 160/94 116 96 % -- -- -- -- --   01/24/24 23:36:12 99.2 °F (37.3 °C) -- -- 155/97 116 -- -- -- -- -- --   01/24/24 19:48:27 98.2 °F (36.8 °C) -- 18 149/83 105 -- -- -- -- -- --   01/24/24 19:00:28 98.3 °F (36.8 °C) 90 16 142/82 102 93 % -- -- -- -- Lying   01/24/24 16:30:35 98.4 °F (36.9 °C) 88 19 135/76 96 -- -- -- -- -- Lying   01/24/24 16:27:38 98.4 °F (36.9 °C) -- -- -- -- 95 % -- -- -- -- --   01/24/24 11:34:15 98.4 °F (36.9 °C) 120 Abnormal  17 139/80 103 93 % -- -- -- -- Lying   01/24/24 0857 -- -- -- -- -- 92 % 28 -- 2 L/min Nasal cannula --   01/24/24 08:11:12 98.2 °F (36.8 °C) 97 16 127/73 95 95 % -- -- -- -- Lying   01/24/24 02:30:58 98.3 °F (36.8 °C) 102 20 129/70 -- 99 % -- -- -- -- --   01/23/24 22:55:18 98.4 °F (36.9 °C) 102 20 123/71 91 98 % -- --  -- -- --   01/23/24 20:53:41 98.6 °F (37 °C) 97 18 123/71 91 98 % 28 -- 2 L/min Nasal cannula Lying   01/23/24 2000 98.3 °F (36.8 °C) 99 18 129/63 90 92 % -- 2 L/min -- Nasal cannula --       Pertinent Labs/Diagnostic Results:     Results from last 7 days   Lab Units 01/25/24  0405 01/24/24  0512 01/23/24  0457 01/22/24  1857 01/22/24  1857 01/22/24  1629   WBC Thousand/uL 15.01* 15.23* 14.85*   < > 19.00*  --    HEMOGLOBIN g/dL 7.6* 7.2* 7.2*  --  7.9*  --    I STAT HEMOGLOBIN g/dl  --   --   --   --   --  7.5*   HEMATOCRIT % 25.7* 23.9* 24.0*  --  25.6*  --    HEMATOCRIT, ISTAT %  --   --   --   --   --  22*   PLATELETS Thousands/uL 275 265 285   < > 319  --    NEUTROS ABS Thousands/µL 13.16* 13.25* 13.21*  --   --   --    BANDS PCT %  --   --   --   --  4  --     < > = values in this interval not displayed.     Results from last 7 days   Lab Units 01/25/24  0340 01/24/24  0512 01/23/24  0457 01/22/24  1857 01/22/24  1629 01/22/24  1500 01/22/24  1256   SODIUM mmol/L 140 141 142 139  --   --   --    POTASSIUM mmol/L 4.0 3.6 4.1 4.1  --   --   --    CHLORIDE mmol/L 103 103 107 106  --   --   --    CO2 mmol/L 30 29 26 24  --   --   --    CO2, I-STAT mmol/L  --   --   --   --  24 25 27   ANION GAP mmol/L 7 9 9 9  --   --   --    BUN mg/dL 10 12 14 17  --   --   --    CREATININE mg/dL 0.57* 0.61 0.68 0.65  --   --   --    EGFR ml/min/1.73sq m 98 96 92 94  --   --   --    CALCIUM mg/dL 9.1 9.1 8.8 8.6  --   --   --    CALCIUM, IONIZED mmol/L  --   --  1.13 1.06*  --   --   --    CALCIUM, IONIZED, ISTAT mmol/L  --   --   --   --  1.09* 1.10* 1.16   MAGNESIUM mg/dL 1.7* 1.2* 2.0 1.0*  --   --   --    PHOSPHORUS mg/dL  --  3.2 4.9* 4.5*  --   --   --      Results from last 7 days   Lab Units 01/24/24  0512 01/23/24  0457 01/22/24  1857   AST U/L 69* 196* 466*   ALT U/L 117* 184* 242*   ALK PHOS U/L 92 92 119*   TOTAL PROTEIN g/dL 5.7* 5.1* 5.6*   ALBUMIN g/dL 3.2* 3.1* 3.3*   TOTAL BILIRUBIN mg/dL 0.30 0.31 0.50      Results from last 7 days   Lab Units 01/25/24  0513 01/24/24  2308 01/24/24  1843 01/24/24  1303 01/24/24  0629 01/23/24  2348 01/23/24  1813 01/23/24  1207 01/23/24  0541 01/22/24  2327 01/22/24  1856 01/22/24  0858   POC GLUCOSE mg/dl 194* 157* 146* 158* 148* 133 127 151* 150* 177* 184* 128     Results from last 7 days   Lab Units 01/25/24  0340 01/24/24  0512 01/23/24  0457 01/22/24  1857   GLUCOSE RANDOM mg/dL 165* 135 167* 193*     Results from last 7 days   Lab Units 01/24/24  0708   UNIT PRODUCT CODE  [id number]  [id number]   UNIT NUMBER  [id number]  [id number]   UNITABO  A  A   UNITRH  POS  POS   CROSSMATCH  Compatible  Compatible   UNIT DISPENSE STATUS  Return to Inv  Return to Inv   UNIT PRODUCT VOL mL 350  350     Medications:   Scheduled Medications:  acetaminophen, 1,000 mg, Intravenous, Q6H MARLENI  fluticasone-vilanterol, 1 puff, Inhalation, Daily  heparin (porcine), 5,000 Units, Subcutaneous, Q8H MARLENI  insulin lispro, 1-5 Units, Subcutaneous, Q6H MARLENI  ketorolac, 15 mg, Intravenous, Q6H MARLENI  magnesium sulfate, 3 g, Intravenous, Once  pantoprazole, 40 mg, Intravenous, Q24H MARLENI    Continuous IV Infusions:  dextrose 5 % and sodium chloride 0.45 % with KCl 20 mEq/L, 50 mL/hr, Intravenous, Continuous  HYDROmorphone, , Intravenous, Continuous    PRN Meds:  albuterol, 2 puff, Inhalation, Q4H PRN  labetalol, 10 mg, Intravenous, Q6H PRN  naloxone, 0.1 mg, Intravenous, Q1MIN PRN  ondansetron, 4 mg, Intravenous, Q6H PRN  phenol, 1 spray, Mouth/Throat, Q2H PRN 1/24 x1  sodium chloride, 1 spray, Each Nare, Q2H PRN 1/24 x3, 1/25 x1        Discharge Plan: D    Network Utilization Review Department  ATTENTION: Please call with any questions or concerns to [phone] and carefully listen to the prompts so that you are directed to the right person. All voicemails are confidential.   For Discharge needs, contact Care Management DC Support Team at [phone] opt. 2  Send all requests for admission  clinical reviews, approved or denied determinations and any other requests to dedicated fax number below belonging to the campus where the patient is receiving treatment. List of dedicated fax numbers for the Facilities:  FACILITY NAME UR FAX NUMBER   ADMISSION DENIALS (Administrative/Medical Necessity) [phone]   DISCHARGE SUPPORT TEAM (NETWORK) [phone]   PARENT CHILD HEALTH (Maternity/NICU/Pediatrics) [phone]   [facility] [phone]   [facility] [phone]   [facility] [phone]   [facility] [phone]   [facility] [phone]   [facility] [phone]   [facility] [phone]   [facility] [phone]   [facility] [phone]   [facility] [phone]   [facility] [phone]

## 2024-01-25 NOTE — OCCUPATIONAL THERAPY NOTE
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    Duodenal mass        01/25/24 1416   OT Last Visit   OT Visit Date 01/25/24   Note Type   Note Type Treatment   Pain Assessment   Pain Assessment Tool 0-10   Pain Score 4   Pain Location/Orientation Location: Abdomen   Hospital Pain Intervention(s) Repositioned;Ambulation/increased activity   Restrictions/Precautions   Weight Bearing Precautions Per Order No   Other Precautions Multiple lines;Telemetry;Fall Risk   ADL   Where Assessed Other (Comment)  (standing)   UB Dressing Assistance 4  Minimal Assistance   UB Dressing Deficit Steadying;Pull around back   UB Dressing Comments Pt donned zip up jacket while standing with rw. Pt required MIN A to bring jacket around her back to L UE   Toileting Assistance  5  Supervision/Setup   Toileting Deficit Supervison/safety;Grab bar use   Toileting Comments Pt required supervision for safety to complete toileting.   Bed Mobility   Supine to Sit 5  Supervision   Additional items HOB elevated;Increased time required   Sit to Supine 5  Supervision   Additional items HOB elevated;Increased time required   Additional Comments Pt requires increased time and supervision for safety with HOB elevated to complete sit to supine and supine to sit bed mobility   Transfers   Sit to Stand 5  Supervision   Additional items Increased time required  (rw)   Stand to Sit 5  Supervision   Additional items Increased time required  (rw)   Functional Mobility   Functional Mobility 5  Supervision   Additional Comments Pt requires superivion during functional mobility for household distances.   Toilet Transfers   Toilet Transfer From Bed   Toilet Transfer Type To and from   Toilet  "Transfer to Standard toilet  (grab bars)   Toilet Transfer Technique Ambulating   Toilet Transfers Supervision   Toilet Transfers Comments Pt requird supervision for safety to ambulate to and from [facility] with rw and complete a toilet transfer to a standard height toilet with a grab bar.   Subjective   Subjective \"I want to go for a walk\"   Cognition   Overall Cognitive Status WFL   Arousal/Participation Alert;Cooperative   Attention Within functional limits   Orientation Level Oriented X4   Memory Within functional limits   Following Commands Follows all commands and directions without difficulty   Comments Pt agreeable to therapy. Pt demonstrated good safety awareness   Activity Tolerance   Activity Tolerance Patient tolerated treatment well   Medical Staff Made Aware RN notified   Assessment   Assessment Pt was seen on 1/25/2024 to address ADL retraining, functional transfer training, and activity tolerance/endurance. Pt demonstrating improvements and currently requires MIN A to bring zip up jacket around her back. Pt requires supervision for safety during toileting, sit to supine and supine to sit bed mobility, functional transfers and functional mobility. Pt is limited by decreased ability to complete ADLs, decreased ability to complete functional transfers and mobility, and decreased endurance. Pt supine in bed at beginning of session and supine in bed at end of session with all items within reach. The patient's raw score on the AM-PAC Daily Activity Inpatient Short Form is 19. A raw score of greater than or equal to 19 suggests the patient may benefit from discharge to home. Please refer to the recommendation of the Occupational Therapist for safe discharge planning. Recommend HH OT at d/c to maximize pt function and safety.   Plan   Treatment Interventions ADL retraining;Functional transfer training;Endurance training;Patient/family training;Equipment evaluation/education;Energy " conservation;Activityengagement;Compensatory technique education   Goal Expiration Date 02/06/24   OT Treatment Day 1   OT Frequency 2-3x/wk   Discharge Recommendation   Rehab Resource Intensity Level, OT III (Minimum Resource Intensity)   AM-PAC Daily Activity Inpatient   Lower Body Dressing 2   Bathing 3   Toileting 3   Upper Body Dressing 3   Grooming 4   Eating 4   Daily Activity Raw Score 19   Daily Activity Standardized Score (Calc for Raw Score >=11) 40.22   AM-PAC Applied Cognition Inpatient   Following a Speech/Presentation 4   Understanding Ordinary Conversation 4   Taking Medications 4   Remembering Where Things Are Placed or Put Away 4   Remembering List of 4-5 Errands 4   Taking Care of Complicated Tasks 4   Applied Cognition Raw Score 24   Applied Cognition Standardized Score 62.21   End of Consult   Education Provided Yes   Patient Position at End of Consult Supine;All needs within reach   Nurse Communication Nurse aware of consult       GLORIA Burgess, OTR/L

## 2024-01-25 NOTE — PLAN OF CARE
Problem: OCCUPATIONAL THERAPY ADULT  Goal: Performs self-care activities at highest level of function for planned discharge setting.  See evaluation for individualized goals.  Description: Treatment Interventions: ADL retraining, Functional transfer training, Endurance training, Patient/family training, Equipment evaluation/education, Compensatory technique education, Energy conservation, Activityengagement          See flowsheet documentation for full assessment, interventions and recommendations.   Outcome: Progressing  Note: Limitation: Decreased ADL status, Decreased endurance, Decreased self-care trans, Decreased high-level ADLs  Prognosis: Good  Assessment: Pt was seen on 1/25/2024 to address ADL retraining, functional transfer training, and activity tolerance/endurance. Pt demonstrating improvements and currently requires MIN A to bring zip up jacket around her back. Pt requires supervision for safety during toileting, sit to supine and supine to sit bed mobility, functional transfers and functional mobility. Pt is limited by decreased ability to complete ADLs, decreased ability to complete functional transfers and mobility, and decreased endurance. Pt supine in bed at beginning of session and supine in bed at end of session with all items within reach. The patient's raw score on the -PAC Daily Activity Inpatient Short Form is 19. A raw score of greater than or equal to 19 suggests the patient may benefit from discharge to home. Please refer to the recommendation of the Occupational Therapist for safe discharge planning. Recommend HH OT at d/c to maximize pt function and safety.     Rehab Resource Intensity Level, OT: III (Minimum Resource Intensity)

## 2024-01-25 NOTE — PHYSICAL THERAPY NOTE
"   PT Treatment       01/25/24 0845   PT Last Visit   PT Visit Date 01/25/24   Note Type   Note Type Treatment   Pain Assessment   Pain Assessment Tool FLACC  (reports discomfort from NGT)   Pain Rating: FLACC (Rest) - Face 0   Pain Rating: FLACC (Rest) - Legs 0   Pain Rating: FLACC (Rest) - Activity 0   Pain Rating: FLACC (Rest) - Cry 0   Pain Rating: FLACC (Rest) - Consolability 0   Score: FLACC (Rest) 0   Pain Rating: FLACC (Activity) - Face 0   Pain Rating: FLACC (Activity) - Legs 0   Pain Rating: FLACC (Activity) - Activity 0   Pain Rating: FLACC (Activity) - Cry 0   Pain Rating: FLACC (Activity) - Consolability 0   Score: FLACC (Activity) 0   Restrictions/Precautions   Other Precautions Pain;Fall Risk;Telemetry;Multiple lines  (NGT, LISA drain, step down patient)   General   Chart Reviewed Yes   Additional Pertinent History 64 y.o. female admitted to [facility] on 1/22/2024 for the following planned procedure: whipple, completion gastectomy, jejunal repai   Family/Caregiver Present No   Cognition   Overall Cognitive Status WFL   Subjective   Subjective \"I should use the bathroom\"   Bed Mobility   Supine to Sit 5  Supervision   Additional items HOB elevated;Increased time required   Sit to Supine Unable to assess   Additional Comments patient remains step down and requires inreased time for safe management of lines pre/post/during mobility. received supine in bed and required increased time with use of bed rail. demonstrated good sitting balance. also able to perform sit-->supine transfer unassisted at S level and remained in bed post treatment   Transfers   Sit to Stand 5  Supervision   Additional items Increased time required   Stand to Sit 5  Supervision   Additional items Increased time required   Toilet transfer 5  Supervision   Additional items Increased time required   Additional Comments 3 sit<-->stand transfers with use of RW; patient keeps one hand on abdomen for bracing and other hand on RW. "   Ambulation/Elevation   Gait pattern Excessively slow;Foward flexed   Gait Assistance 5  Supervision  (close supervision; therapist managing lines)   Additional items Verbal cues   Assistive Device Rolling walker   Distance 100 feet x 2   Ambulation/Elevation Additional Comments gait training with use of  RW: VC to encourage increased trunk extension. gait speed reduced. recommend ongoing use of RW at this time   Balance   Static Sitting Good   Static Standing Fair +   Ambulatory Fair   Endurance Deficit   Endurance Deficit Yes   Endurance Deficit Description gross fatigue   Activity Tolerance   Activity Tolerance Patient tolerated treatment well   Nurse Made Aware gal to see per ROXANNE Frazier   Neuro re-ed bed mobility, sit<-->stand transfers fsrom various surface heights   Assessment   Prognosis Good   Problem List Decreased endurance;Impaired balance;Decreased mobility;Pain   Assessment PT initiated treatment session in order to assist patient in achieving goals to improve transfers, gait training, and overall activity tolerance. Patient demonstrated progress toward achieving functional mobility goals as evidenced by requiring less physical assistance. With extended time she is able to perform bed mobility, transfers, and ambulation at supervision level. She is ambulating both household and community distances with use of RW. Deferred stairs today due to multiple lines and fatigue post ambulation -plan to trial next session as appropriate and then likely d/c inpt PT. Patient remains appropriate for d/c recommendation for home with family support and HHPT. Will require provision of RW. Patient will continue to benefit from continued skilled PT this admission to achieve maximal function and safety.   Goals   Patient Goals to drink something and have NGT removed   LTG Expiration Date 02/06/24   PT Treatment Day 2   Plan   Treatment/Interventions Elevations;Gait training;Equipment eval/education;Spoke to  nursing   Progress Progressing toward goals   PT Frequency 3-5x/wk   Discharge Recommendation   Rehab Resource Intensity Level, PT III (Minimum Resource Intensity)   Equipment Recommended (S)  Walker  (will require RW for d/c)   AM-PAC Basic Mobility Inpatient   Turning in Flat Bed Without Bedrails 4   Lying on Back to Sitting on Edge of Flat Bed Without Bedrails 4   Moving Bed to Chair 3   Standing Up From Chair Using Arms 4   Walk in Room 3   Climb 3-5 Stairs With Railing 2   Basic Mobility Inpatient Raw Score 20   Basic Mobility Standardized Score 43.99   Highest Level Of Mobility   JH-HLM Goal 6: Walk 10 steps or more   JH-HLM Achieved 7: Walk 25 feet or more       The patient's AM-PAC Basic Mobility Inpatient Standardized Score is greater than 42.9, suggesting this patient may benefit from discharge to home. Please also refer to the recommendation of the Physical Therapist for safe discharge planning.    FRENCH RIVERA PT, DPT

## 2024-01-25 NOTE — PLAN OF CARE
Problem: PAIN - ADULT  Goal: Verbalizes/displays adequate comfort level or baseline comfort level  Description: Interventions:  - Encourage patient to monitor pain and request assistance  - Assess pain using appropriate pain scale  - Administer analgesics based on type and severity of pain and evaluate response  - Implement non-pharmacological measures as appropriate and evaluate response  - Consider cultural and social influences on pain and pain management  - Notify physician/advanced practitioner if interventions unsuccessful or patient reports new pain  Outcome: Progressing     Problem: INFECTION - ADULT  Goal: Absence or prevention of progression during hospitalization  Description: INTERVENTIONS:  - Assess and monitor for signs and symptoms of infection  - Monitor lab/diagnostic results  - Monitor all insertion sites, i.e. indwelling lines, tubes, and drains  - Monitor endotracheal if appropriate and nasal secretions for changes in amount and color  - [place] appropriate cooling/warming therapies per order  - Administer medications as ordered  - Instruct and encourage patient and family to use good hand hygiene technique  - Identify and instruct in appropriate isolation precautions for identified infection/condition  Outcome: Progressing  Goal: Absence of fever/infection during neutropenic period  Description: INTERVENTIONS:  - Monitor WBC    Outcome: Progressing     Problem: SAFETY ADULT  Goal: Patient will remain free of falls  Description: INTERVENTIONS:  - Educate patient/family on patient safety including physical limitations  - Instruct patient to call for assistance with activity   - Consult OT/PT to assist with strengthening/mobility   - Keep Call bell within reach  - Keep bed low and locked with side rails adjusted as appropriate  - Keep care items and personal belongings within reach  - Initiate and maintain comfort rounds  - Make Fall Risk Sign visible to staff  - Offer Toileting every  Hours,  in advance of need  - Initiate/Maintain alarm  - Obtain necessary fall risk management equipment:   - Apply yellow socks and bracelet for high fall risk patients  - Consider moving patient to room near nurses station  Outcome: Progressing  Goal: Maintain or return to baseline ADL function  Description: INTERVENTIONS:  -  Assess patient's ability to carry out ADLs; assess patient's baseline for ADL function and identify physical deficits which impact ability to perform ADLs (bathing, care of mouth/teeth, toileting, grooming, dressing, etc.)  - Assess/evaluate cause of self-care deficits   - Assess range of motion  - Assess patient's mobility; develop plan if impaired  - Assess patient's need for assistive devices and provide as appropriate  - Encourage maximum independence but intervene and supervise when necessary  - Involve family in performance of ADLs  - Assess for home care needs following discharge   - Consider OT consult to assist with ADL evaluation and planning for discharge  - Provide patient education as appropriate  Outcome: Progressing  Goal: Maintains/Returns to pre admission functional level  Description: INTERVENTIONS:  - Perform AM-PAC 6 Click Basic Mobility/ Daily Activity assessment daily.  - Set and communicate daily mobility goal to care team and patient/family/caregiver.   - Collaborate with rehabilitation services on mobility goals if consulted  - Perform Range of Motion  times a day.  - Reposition patient every  hours.  - Dangle patient  times a day  - Stand patient  times a day  - Ambulate patient  times a day  - Out of bed to chair  times a day   - Out of bed for meals  times a day  - Out of bed for toileting  - Record patient progress and toleration of activity level   Outcome: Progressing     Problem: DISCHARGE PLANNING  Goal: Discharge to home or other facility with appropriate resources  Description: INTERVENTIONS:  - Identify barriers to discharge w/patient and caregiver  - Arrange for  needed discharge resources and transportation as appropriate  - Identify discharge learning needs (meds, wound care, etc.)  - Arrange for interpretive services to assist at discharge as needed  - Refer to Case Management Department for coordinating discharge planning if the patient needs post-hospital services based on physician/advanced practitioner order or complex needs related to functional status, cognitive ability, or social support system  Outcome: Progressing     Problem: Knowledge Deficit  Goal: Patient/family/caregiver demonstrates understanding of disease process, treatment plan, medications, and discharge instructions  Description: Complete learning assessment and assess knowledge base.  Interventions:  - Provide teaching at level of understanding  - Provide teaching via preferred learning methods  Outcome: Progressing     Problem: Prexisting or High Potential for Compromised Skin Integrity  Goal: Skin integrity is maintained or improved  Description: INTERVENTIONS:  - Identify patients at risk for skin breakdown  - Assess and monitor skin integrity  - Assess and monitor nutrition and hydration status  - Monitor labs   - Assess for incontinence   - Turn and reposition patient  - Assist with mobility/ambulation  - Relieve pressure over bony prominences  - Avoid friction and shearing  - Provide appropriate hygiene as needed including keeping skin clean and dry  - Evaluate need for skin moisturizer/barrier cream  - Collaborate with interdisciplinary team   - Patient/family teaching  - Consider wound care consult   Outcome: Progressing

## 2024-01-25 NOTE — PLAN OF CARE
Problem: PAIN - ADULT  Goal: Verbalizes/displays adequate comfort level or baseline comfort level  Description: Interventions:  - Encourage patient to monitor pain and request assistance  - Assess pain using appropriate pain scale  - Administer analgesics based on type and severity of pain and evaluate response  - Implement non-pharmacological measures as appropriate and evaluate response  - Consider cultural and social influences on pain and pain management  - Notify physician/advanced practitioner if interventions unsuccessful or patient reports new pain  Outcome: Progressing     Problem: INFECTION - ADULT  Goal: Absence or prevention of progression during hospitalization  Description: INTERVENTIONS:  - Assess and monitor for signs and symptoms of infection  - Monitor lab/diagnostic results  - Monitor all insertion sites, i.e. indwelling lines, tubes, and drains  - Monitor endotracheal if appropriate and nasal secretions for changes in amount and color  - [place] appropriate cooling/warming therapies per order  - Administer medications as ordered  - Instruct and encourage patient and family to use good hand hygiene technique  - Identify and instruct in appropriate isolation precautions for identified infection/condition  Outcome: Progressing  Goal: Absence of fever/infection during neutropenic period  Description: INTERVENTIONS:  - Monitor WBC    Outcome: Progressing     Problem: SAFETY ADULT  Goal: Patient will remain free of falls  Description: INTERVENTIONS:  - Educate patient/family on patient safety including physical limitations  - Instruct patient to call for assistance with activity   - Consult OT/PT to assist with strengthening/mobility   - Keep Call bell within reach  - Keep bed low and locked with side rails adjusted as appropriate  - Keep care items and personal belongings within reach  - Initiate and maintain comfort rounds  - Make Fall Risk Sign visible to staff  - Offer Toileting every 2 Hours,  in advance of need  - Initiate/Maintain bed alarm  - Obtain necessary fall risk management equipment: npn-slip socks  - Apply yellow socks and bracelet for high fall risk patients  - Consider moving patient to room near nurses station  Outcome: Progressing  Goal: Maintain or return to baseline ADL function  Description: INTERVENTIONS:  -  Assess patient's ability to carry out ADLs; assess patient's baseline for ADL function and identify physical deficits which impact ability to perform ADLs (bathing, care of mouth/teeth, toileting, grooming, dressing, etc.)  - Assess/evaluate cause of self-care deficits   - Assess range of motion  - Assess patient's mobility; develop plan if impaired  - Assess patient's need for assistive devices and provide as appropriate  - Encourage maximum independence but intervene and supervise when necessary  - Involve family in performance of ADLs  - Assess for home care needs following discharge   - Consider OT consult to assist with ADL evaluation and planning for discharge  - Provide patient education as appropriate  Outcome: Progressing  Goal: Maintains/Returns to pre admission functional level  Description: INTERVENTIONS:  - Perform AM-PAC 6 Click Basic Mobility/ Daily Activity assessment daily.  - Set and communicate daily mobility goal to care team and patient/family/caregiver.   - Collaborate with rehabilitation services on mobility goals if consulted  - Perform Range of Motion 4 times a day.  - Reposition patient every 2 hours.  - Dangle patient 3 times a day  - Stand patient 3 times a day  - Ambulate patient 3 times a day  - Out of bed to chair 3 times a day   - Out of bed for meals 3 times a day  - Out of bed for toileting  - Record patient progress and toleration of activity level   Outcome: Progressing     Problem: DISCHARGE PLANNING  Goal: Discharge to home or other facility with appropriate resources  Description: INTERVENTIONS:  - Identify barriers to discharge w/patient and  caregiver  - Arrange for needed discharge resources and transportation as appropriate  - Identify discharge learning needs (meds, wound care, etc.)  - Arrange for interpretive services to assist at discharge as needed  - Refer to Case Management Department for coordinating discharge planning if the patient needs post-hospital services based on physician/advanced practitioner order or complex needs related to functional status, cognitive ability, or social support system  Outcome: Progressing     Problem: Knowledge Deficit  Goal: Patient/family/caregiver demonstrates understanding of disease process, treatment plan, medications, and discharge instructions  Description: Complete learning assessment and assess knowledge base.  Interventions:  - Provide teaching at level of understanding  - Provide teaching via preferred learning methods  Outcome: Progressing     Problem: Prexisting or High Potential for Compromised Skin Integrity  Goal: Skin integrity is maintained or improved  Description: INTERVENTIONS:  - Identify patients at risk for skin breakdown  - Assess and monitor skin integrity  - Assess and monitor nutrition and hydration status  - Monitor labs   - Assess for incontinence   - Turn and reposition patient  - Assist with mobility/ambulation  - Relieve pressure over bony prominences  - Avoid friction and shearing  - Provide appropriate hygiene as needed including keeping skin clean and dry  - Evaluate need for skin moisturizer/barrier cream  - Collaborate with interdisciplinary team   - Patient/family teaching  - Consider wound care consult   Outcome: Progressing

## 2024-01-25 NOTE — PLAN OF CARE
Problem: PHYSICAL THERAPY ADULT  Goal: Performs mobility at highest level of function for planned discharge setting.  See evaluation for individualized goals.  Description: Treatment/Interventions: Functional transfer training, LE strengthening/ROM, Elevations, Therapeutic exercise, Endurance training, Patient/family training, Equipment eval/education, Bed mobility, Gait training, OT, Spoke to nursing  Equipment Recommended: (S) Walker (will require RW to d/c home)       See flowsheet documentation for full assessment, interventions and recommendations.  Outcome: Progressing  Note: Prognosis: Good  Problem List: Decreased endurance, Impaired balance, Decreased mobility, Pain  Assessment: PT initiated treatment session in order to assist patient in achieving goals to improve transfers, gait training, and overall activity tolerance. Patient demonstrated progress toward achieving functional mobility goals as evidenced by requiring less physical assistance. With extended time she is able to perform bed mobility, transfers, and ambulation at supervision level. She is ambulating both household and community distances with use of RW. Deferred stairs today due to multiple lines and fatigue post ambulation -plan to trial next session as appropriate and then likely d/c inpt PT. Patient remains appropriate for d/c recommendation for home with family support and HHPT. Will require provision of RW. Patient will continue to benefit from continued skilled PT this admission to achieve maximal function and safety.        Rehab Resource Intensity Level, PT: III (Minimum Resource Intensity)    See flowsheet documentation for full assessment.

## 2024-01-25 NOTE — CASE MANAGEMENT
Case Management Discharge Planning Note    Patient name Aleida Hammond  Location [facility] 510-01 MRN [id number]  : 1960 Date 2024       Current Admission Date: 2024  Current Admission Diagnosis:Type 2 diabetes mellitus with diabetic neuropathy, with long-term current use of insulin (HCC)   Patient Active Problem List    Diagnosis Date Noted    Malignant neoplasm of spinal cord (HCC) 2024    Duodenal mass 2023    PONV (postoperative nausea and vomiting) 2023    S/P endoscopic carpal tunnel release 2023    Pillar pain, post-operative 2023    Microcytic anemia 2023    Carpal tunnel syndrome on right 2022    COVID-19 vaccine series completed 2021    Type 2 diabetes mellitus with diabetic neuropathy, with long-term current use of insulin ([facility]) 2021    Narcotic dependency, continuous ([facility]) 2021    Memory dysfunction 2021    Iron deficiency 2020    B12 deficiency 2020    Pain in joint involving pelvic region and thigh 2018    Chronic lumbar radiculopathy 2017    Chronic cervical pain 2017    Chronic thoracic spine pain 2017    Peripheral polyneuropathy 2017    Insomnia 2017    Benign essential hypertension 2017    Mild persistent asthma without complication 2017    Chronic bilateral back pain 2017    GERD without esophagitis 2017    Hyperlipidemia associated with type 2 diabetes mellitus  2017    Lumbar radiculopathy 2017    Thoracic spondylosis without myelopathy 2015    Lumbar spondylosis 2015    Thoracic and lumbosacral neuritis 2013    Degeneration of lumbar intervertebral disc 2013    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  [place] [address]  Phone: [phone] Fax: [phone]    [facility][id number] - [place] - [address]  [address]  [place] [address]  Phone: [phone] Fax: [phone]    [facility] [place] - [address]  [address]  [place] [address]  Phone: [phone] Fax: [phone]    Primary Care Provider: Tara Colon DO    Primary Insurance: AETMARY JANE  Secondary Insurance:     DISCHARGE DETAILS:       Freedom of Choice: Yes        Were Treatment Team discharge recommendations reviewed with patient/caregiver?: Yes  Did patient/caregiver verbalize understanding of patient care needs?: Yes  Were patient/caregiver advised of the risks associated with not following Treatment Team discharge recommendations?: Yes    Contacts  Reason/Outcome: Referral, Discharge Planning    Requested Home Health Care         Is the patient interested in HHC at discharge?: Yes  Home Health Discipline requested:: Occupational Therapy, Physical Therapy, Nursing  Home Health Agency Name:: [facility]  Home Health Follow-Up Provider:: NEVAEH  Home Health Services Needed:: Evaluate Functional Status and Safety, Gait/ADL Training, Post-Op Care and Assessment, Restore Joint Function Post Joint Replacement, Strengthening/Theraputic Exercises to Improve Function  Homebound Criteria Met:: Uses an Assist Device (i.e. cane, walker, etc)  Supporting Clincal Findings:: Limited Endurance, Fatigues Easliy in Short Distances    DME Referral Provided  Referral made for DME?: Yes  DME referral completed for the following items:: Walker  DME Supplier Name:: [employer]    Other Referral/Resources/Interventions Provided:  Referral Comments: Pt is rec'd for HHC. Pt requesting a referrals for [facility], submitted in [employer]. Pt requesting  a RW, ordered via parachute. [facility] reserved in [employer].

## 2024-01-26 LAB
ALBUMIN SERPL BCP-MCNC: 3.2 G/DL (ref 3.5–5)
ALP SERPL-CCNC: 94 U/L (ref 34–104)
ALT SERPL W P-5'-P-CCNC: 52 U/L (ref 7–52)
ANION GAP SERPL CALCULATED.3IONS-SCNC: 6 MMOL/L
AST SERPL W P-5'-P-CCNC: 17 U/L (ref 13–39)
BASE EXCESS BLDA CALC-SCNC: 0 MMOL/L (ref -2–3)
BASOPHILS # BLD AUTO: 0.02 THOUSANDS/ÂΜL (ref 0–0.1)
BASOPHILS NFR BLD AUTO: 0 % (ref 0–1)
BILIRUB SERPL-MCNC: 0.28 MG/DL (ref 0.2–1)
BUN SERPL-MCNC: 13 MG/DL (ref 5–25)
CA-I BLD-SCNC: 1.18 MMOL/L (ref 1.12–1.32)
CALCIUM ALBUM COR SERPL-MCNC: 9.3 MG/DL (ref 8.3–10.1)
CALCIUM SERPL-MCNC: 8.7 MG/DL (ref 8.4–10.2)
CHLORIDE SERPL-SCNC: 104 MMOL/L (ref 96–108)
CO2 SERPL-SCNC: 28 MMOL/L (ref 21–32)
CREAT SERPL-MCNC: 0.66 MG/DL (ref 0.6–1.3)
EOSINOPHIL # BLD AUTO: 0.3 THOUSAND/ÂΜL (ref 0–0.61)
EOSINOPHIL NFR BLD AUTO: 3 % (ref 0–6)
ERYTHROCYTE [DISTWIDTH] IN BLOOD BY AUTOMATED COUNT: 16.1 % (ref 11.6–15.1)
FIO2 GAS DIL.REBREATH: 45 L
GFR SERPL CREATININE-BSD FRML MDRD: 93 ML/MIN/1.73SQ M
GLUCOSE SERPL-MCNC: 121 MG/DL (ref 65–140)
GLUCOSE SERPL-MCNC: 125 MG/DL (ref 65–140)
GLUCOSE SERPL-MCNC: 134 MG/DL (ref 65–140)
GLUCOSE SERPL-MCNC: 139 MG/DL (ref 65–140)
GLUCOSE SERPL-MCNC: 141 MG/DL (ref 65–140)
GLUCOSE SERPL-MCNC: 150 MG/DL (ref 65–140)
GLUCOSE SERPL-MCNC: 152 MG/DL (ref 65–140)
GLUCOSE SERPL-MCNC: 182 MG/DL (ref 65–140)
HCO3 BLDA-SCNC: 23.8 MMOL/L (ref 22–28)
HCT VFR BLD AUTO: 24 % (ref 34.8–46.1)
HCT VFR BLD CALC: 22 % (ref 34.8–46.1)
HGB BLD-MCNC: 7.1 G/DL (ref 11.5–15.4)
HGB BLDA-MCNC: 7.5 G/DL (ref 11.5–15.4)
IMM GRANULOCYTES # BLD AUTO: 0.04 THOUSAND/UL (ref 0–0.2)
IMM GRANULOCYTES NFR BLD AUTO: 0 % (ref 0–2)
LYMPHOCYTES # BLD AUTO: 1.49 THOUSANDS/ÂΜL (ref 0.6–4.47)
LYMPHOCYTES NFR BLD AUTO: 14 % (ref 14–44)
MAGNESIUM SERPL-MCNC: 1.9 MG/DL (ref 1.9–2.7)
MCH RBC QN AUTO: 24.7 PG (ref 26.8–34.3)
MCHC RBC AUTO-ENTMCNC: 29.6 G/DL (ref 31.4–37.4)
MCV RBC AUTO: 83 FL (ref 82–98)
MONOCYTES # BLD AUTO: 0.56 THOUSAND/ÂΜL (ref 0.17–1.22)
MONOCYTES NFR BLD AUTO: 5 % (ref 4–12)
NEUTROPHILS # BLD AUTO: 8.48 THOUSANDS/ÂΜL (ref 1.85–7.62)
NEUTS SEG NFR BLD AUTO: 78 % (ref 43–75)
NRBC BLD AUTO-RTO: 0 /100 WBCS
PCO2 BLD: 25 MMOL/L (ref 21–32)
PCO2 BLD: 35.6 MM HG (ref 36–44)
PH BLD: 7.43 [PH] (ref 7.35–7.45)
PLATELET # BLD AUTO: 258 THOUSANDS/UL (ref 149–390)
PMV BLD AUTO: 10.8 FL (ref 8.9–12.7)
PO2 BLD: 112 MM HG (ref 75–129)
POTASSIUM BLD-SCNC: 4 MMOL/L (ref 3.5–5.3)
POTASSIUM SERPL-SCNC: 3.8 MMOL/L (ref 3.5–5.3)
PROT SERPL-MCNC: 6.3 G/DL (ref 6.4–8.4)
RBC # BLD AUTO: 2.88 MILLION/UL (ref 3.81–5.12)
SAO2 % BLD FROM PO2: 99 % (ref 60–85)
SODIUM BLD-SCNC: 140 MMOL/L (ref 136–145)
SODIUM SERPL-SCNC: 138 MMOL/L (ref 135–147)
SPECIMEN SOURCE: ABNORMAL
WBC # BLD AUTO: 10.89 THOUSAND/UL (ref 4.31–10.16)

## 2024-01-26 PROCEDURE — 97116 GAIT TRAINING THERAPY: CPT

## 2024-01-26 PROCEDURE — 97530 THERAPEUTIC ACTIVITIES: CPT

## 2024-01-26 PROCEDURE — 82948 REAGENT STRIP/BLOOD GLUCOSE: CPT

## 2024-01-26 PROCEDURE — 83735 ASSAY OF MAGNESIUM: CPT | Performed by: PHYSICIAN ASSISTANT

## 2024-01-26 PROCEDURE — 85025 COMPLETE CBC W/AUTO DIFF WBC: CPT | Performed by: PHYSICIAN ASSISTANT

## 2024-01-26 PROCEDURE — 80053 COMPREHEN METABOLIC PANEL: CPT | Performed by: PHYSICIAN ASSISTANT

## 2024-01-26 PROCEDURE — 99024 POSTOP FOLLOW-UP VISIT: CPT | Performed by: STUDENT IN AN ORGANIZED HEALTH CARE EDUCATION/TRAINING PROGRAM

## 2024-01-26 RX ORDER — CELECOXIB 200 MG/1
200 CAPSULE ORAL 2 TIMES DAILY
Status: DISCONTINUED | OUTPATIENT
Start: 2024-01-26 | End: 2024-01-27 | Stop reason: HOSPADM

## 2024-01-26 RX ORDER — POTASSIUM CHLORIDE 20 MEQ/1
20 TABLET, EXTENDED RELEASE ORAL ONCE
Status: COMPLETED | OUTPATIENT
Start: 2024-01-26 | End: 2024-01-26

## 2024-01-26 RX ORDER — GABAPENTIN 300 MG/1
300 CAPSULE ORAL 3 TIMES DAILY
Qty: 21 CAPSULE | Refills: 0 | Status: SHIPPED | OUTPATIENT
Start: 2024-01-26 | End: 2024-02-02

## 2024-01-26 RX ORDER — CELECOXIB 200 MG/1
200 CAPSULE ORAL 2 TIMES DAILY
Qty: 14 CAPSULE | Refills: 0 | Status: SHIPPED | OUTPATIENT
Start: 2024-01-26 | End: 2024-02-02

## 2024-01-26 RX ORDER — INSULIN LISPRO 100 [IU]/ML
1-5 INJECTION, SOLUTION INTRAVENOUS; SUBCUTANEOUS
Status: DISCONTINUED | OUTPATIENT
Start: 2024-01-26 | End: 2024-01-27 | Stop reason: HOSPADM

## 2024-01-26 RX ORDER — PANTOPRAZOLE SODIUM 40 MG/1
40 TABLET, DELAYED RELEASE ORAL
Qty: 30 TABLET | Refills: 0 | Status: CANCELLED | OUTPATIENT
Start: 2024-01-27 | End: 2024-02-26

## 2024-01-26 RX ORDER — POLYETHYLENE GLYCOL 3350 17 G/17G
17 POWDER, FOR SOLUTION ORAL DAILY
Status: DISCONTINUED | OUTPATIENT
Start: 2024-01-27 | End: 2024-01-27 | Stop reason: HOSPADM

## 2024-01-26 RX ORDER — DOCUSATE SODIUM 100 MG/1
100 CAPSULE, LIQUID FILLED ORAL 2 TIMES DAILY
Status: DISCONTINUED | OUTPATIENT
Start: 2024-01-26 | End: 2024-01-27 | Stop reason: HOSPADM

## 2024-01-26 RX ORDER — HYDROMORPHONE HYDROCHLORIDE 2 MG/1
2 TABLET ORAL EVERY 4 HOURS PRN
Status: DISCONTINUED | OUTPATIENT
Start: 2024-01-26 | End: 2024-01-27 | Stop reason: HOSPADM

## 2024-01-26 RX ORDER — ENOXAPARIN SODIUM 100 MG/ML
40 INJECTION SUBCUTANEOUS
Qty: 9.2 ML | Refills: 0 | Status: SHIPPED | OUTPATIENT
Start: 2024-01-27 | End: 2024-01-27

## 2024-01-26 RX ORDER — ENOXAPARIN SODIUM 100 MG/ML
40 INJECTION SUBCUTANEOUS
Status: DISCONTINUED | OUTPATIENT
Start: 2024-01-26 | End: 2024-01-27 | Stop reason: HOSPADM

## 2024-01-26 RX ORDER — ACETAMINOPHEN 325 MG/1
975 TABLET ORAL EVERY 8 HOURS SCHEDULED
Qty: 63 TABLET | Refills: 0 | Status: SHIPPED | OUTPATIENT
Start: 2024-01-26 | End: 2024-02-02

## 2024-01-26 RX ORDER — ACETAMINOPHEN 325 MG/1
975 TABLET ORAL EVERY 6 HOURS SCHEDULED
Status: DISCONTINUED | OUTPATIENT
Start: 2024-01-26 | End: 2024-01-27 | Stop reason: HOSPADM

## 2024-01-26 RX ORDER — HYDROMORPHONE HYDROCHLORIDE 2 MG/1
4 TABLET ORAL EVERY 4 HOURS PRN
Status: DISCONTINUED | OUTPATIENT
Start: 2024-01-26 | End: 2024-01-27 | Stop reason: HOSPADM

## 2024-01-26 RX ORDER — MAGNESIUM SULFATE HEPTAHYDRATE 40 MG/ML
2 INJECTION, SOLUTION INTRAVENOUS ONCE
Status: COMPLETED | OUTPATIENT
Start: 2024-01-26 | End: 2024-01-26

## 2024-01-26 RX ORDER — HYDROMORPHONE HYDROCHLORIDE 2 MG/1
2 TABLET ORAL EVERY 6 HOURS PRN
Qty: 16 TABLET | Refills: 0 | Status: SHIPPED | OUTPATIENT
Start: 2024-01-26 | End: 2024-01-30

## 2024-01-26 RX ADMIN — GABAPENTIN 300 MG: 300 CAPSULE ORAL at 17:12

## 2024-01-26 RX ADMIN — CELECOXIB 200 MG: 200 CAPSULE ORAL at 22:09

## 2024-01-26 RX ADMIN — HYDROMORPHONE HYDROCHLORIDE 4 MG: 2 TABLET ORAL at 22:50

## 2024-01-26 RX ADMIN — ALBUTEROL SULFATE 2 PUFF: 90 AEROSOL, METERED RESPIRATORY (INHALATION) at 11:22

## 2024-01-26 RX ADMIN — MAGNESIUM SULFATE HEPTAHYDRATE 2 G: 40 INJECTION, SOLUTION INTRAVENOUS at 09:02

## 2024-01-26 RX ADMIN — INSULIN LISPRO 1 UNITS: 100 INJECTION, SOLUTION INTRAVENOUS; SUBCUTANEOUS at 00:34

## 2024-01-26 RX ADMIN — Medication 1 SPRAY: at 09:13

## 2024-01-26 RX ADMIN — PANTOPRAZOLE SODIUM 40 MG: 40 TABLET, DELAYED RELEASE ORAL at 05:28

## 2024-01-26 RX ADMIN — KETOROLAC TROMETHAMINE 15 MG: 30 INJECTION, SOLUTION INTRAMUSCULAR; INTRAVENOUS at 00:34

## 2024-01-26 RX ADMIN — INSULIN LISPRO 1 UNITS: 100 INJECTION, SOLUTION INTRAVENOUS; SUBCUTANEOUS at 05:29

## 2024-01-26 RX ADMIN — GABAPENTIN 300 MG: 300 CAPSULE ORAL at 21:04

## 2024-01-26 RX ADMIN — POTASSIUM CHLORIDE 20 MEQ: 1500 TABLET, EXTENDED RELEASE ORAL at 09:02

## 2024-01-26 RX ADMIN — ACETAMINOPHEN 975 MG: 325 TABLET, FILM COATED ORAL at 23:12

## 2024-01-26 RX ADMIN — POTASSIUM CHLORIDE, DEXTROSE MONOHYDRATE AND SODIUM CHLORIDE 50 ML/HR: 150; 5; 450 INJECTION, SOLUTION INTRAVENOUS at 05:35

## 2024-01-26 RX ADMIN — HEPARIN SODIUM 5000 UNITS: 5000 INJECTION INTRAVENOUS; SUBCUTANEOUS at 05:29

## 2024-01-26 RX ADMIN — ACETAMINOPHEN 975 MG: 325 TABLET, FILM COATED ORAL at 17:12

## 2024-01-26 RX ADMIN — ACETAMINOPHEN 975 MG: 325 TABLET, FILM COATED ORAL at 11:22

## 2024-01-26 RX ADMIN — ACETAMINOPHEN 975 MG: 325 TABLET, FILM COATED ORAL at 05:28

## 2024-01-26 RX ADMIN — HYDROMORPHONE HYDROCHLORIDE 4 MG: 2 TABLET ORAL at 18:53

## 2024-01-26 RX ADMIN — ALBUTEROL SULFATE 2 PUFF: 90 AEROSOL, METERED RESPIRATORY (INHALATION) at 21:07

## 2024-01-26 RX ADMIN — KETOROLAC TROMETHAMINE 15 MG: 30 INJECTION, SOLUTION INTRAMUSCULAR; INTRAVENOUS at 05:28

## 2024-01-26 RX ADMIN — CELECOXIB 200 MG: 200 CAPSULE ORAL at 12:47

## 2024-01-26 RX ADMIN — GABAPENTIN 300 MG: 300 CAPSULE ORAL at 09:02

## 2024-01-26 RX ADMIN — DOCUSATE SODIUM 100 MG: 100 CAPSULE, LIQUID FILLED ORAL at 21:04

## 2024-01-26 RX ADMIN — ENOXAPARIN SODIUM 40 MG: 40 INJECTION SUBCUTANEOUS at 11:22

## 2024-01-26 RX ADMIN — FLUTICASONE FUROATE AND VILANTEROL TRIFENATATE 1 PUFF: 200; 25 POWDER RESPIRATORY (INHALATION) at 09:13

## 2024-01-26 RX ADMIN — HYDROMORPHONE HYDROCHLORIDE 4 MG: 2 TABLET ORAL at 14:27

## 2024-01-26 NOTE — PLAN OF CARE
Problem: PAIN - ADULT  Goal: Verbalizes/displays adequate comfort level or baseline comfort level  Description: Interventions:  - Encourage patient to monitor pain and request assistance  - Assess pain using appropriate pain scale  - Administer analgesics based on type and severity of pain and evaluate response  - Implement non-pharmacological measures as appropriate and evaluate response  - Consider cultural and social influences on pain and pain management  - Notify physician/advanced practitioner if interventions unsuccessful or patient reports new pain  Outcome: Progressing     Problem: INFECTION - ADULT  Goal: Absence or prevention of progression during hospitalization  Description: INTERVENTIONS:  - Assess and monitor for signs and symptoms of infection  - Monitor lab/diagnostic results  - Monitor all insertion sites, i.e. indwelling lines, tubes, and drains  - Monitor endotracheal if appropriate and nasal secretions for changes in amount and color  - [place] appropriate cooling/warming therapies per order  - Administer medications as ordered  - Instruct and encourage patient and family to use good hand hygiene technique  - Identify and instruct in appropriate isolation precautions for identified infection/condition  Outcome: Progressing  Goal: Absence of fever/infection during neutropenic period  Description: INTERVENTIONS:  - Monitor WBC    Outcome: Progressing     Problem: SAFETY ADULT  Goal: Patient will remain free of falls  Description: INTERVENTIONS:  - Educate patient/family on patient safety including physical limitations  - Instruct patient to call for assistance with activity   - Consult OT/PT to assist with strengthening/mobility   - Keep Call bell within reach  - Keep bed low and locked with side rails adjusted as appropriate  - Keep care items and personal belongings within reach  - Initiate and maintain comfort rounds  - Make Fall Risk Sign visible to staff  - Offer Toileting every 2 Hours,  in advance of need  - Initiate/Maintain bed alarm  - Obtain necessary fall risk management equipment: non-slip socks  - Apply yellow socks and bracelet for high fall risk patients  - Consider moving patient to room near nurses station  Outcome: Progressing  Goal: Maintain or return to baseline ADL function  Description: INTERVENTIONS:  -  Assess patient's ability to carry out ADLs; assess patient's baseline for ADL function and identify physical deficits which impact ability to perform ADLs (bathing, care of mouth/teeth, toileting, grooming, dressing, etc.)  - Assess/evaluate cause of self-care deficits   - Assess range of motion  - Assess patient's mobility; develop plan if impaired  - Assess patient's need for assistive devices and provide as appropriate  - Encourage maximum independence but intervene and supervise when necessary  - Involve family in performance of ADLs  - Assess for home care needs following discharge   - Consider OT consult to assist with ADL evaluation and planning for discharge  - Provide patient education as appropriate  Outcome: Progressing  Goal: Maintains/Returns to pre admission functional level  Description: INTERVENTIONS:  - Perform AM-PAC 6 Click Basic Mobility/ Daily Activity assessment daily.  - Set and communicate daily mobility goal to care team and patient/family/caregiver.   - Collaborate with rehabilitation services on mobility goals if consulted  - Perform Range of Motion 4 times a day.  - Reposition patient every 2 hours.  - Dangle patient 3 times a day  - Stand patient 3 times a day  - Ambulate patient 3 times a day  - Out of bed to chair 3 times a day   - Out of bed for meals 3 times a day  - Out of bed for toileting  - Record patient progress and toleration of activity level   Outcome: Progressing     Problem: DISCHARGE PLANNING  Goal: Discharge to home or other facility with appropriate resources  Description: INTERVENTIONS:  - Identify barriers to discharge w/patient and  caregiver  - Arrange for needed discharge resources and transportation as appropriate  - Identify discharge learning needs (meds, wound care, etc.)  - Arrange for interpretive services to assist at discharge as needed  - Refer to Case Management Department for coordinating discharge planning if the patient needs post-hospital services based on physician/advanced practitioner order or complex needs related to functional status, cognitive ability, or social support system  Outcome: Progressing     Problem: Knowledge Deficit  Goal: Patient/family/caregiver demonstrates understanding of disease process, treatment plan, medications, and discharge instructions  Description: Complete learning assessment and assess knowledge base.  Interventions:  - Provide teaching at level of understanding  - Provide teaching via preferred learning methods  Outcome: Progressing     Problem: Prexisting or High Potential for Compromised Skin Integrity  Goal: Skin integrity is maintained or improved  Description: INTERVENTIONS:  - Identify patients at risk for skin breakdown  - Assess and monitor skin integrity  - Assess and monitor nutrition and hydration status  - Monitor labs   - Assess for incontinence   - Turn and reposition patient  - Assist with mobility/ambulation  - Relieve pressure over bony prominences  - Avoid friction and shearing  - Provide appropriate hygiene as needed including keeping skin clean and dry  - Evaluate need for skin moisturizer/barrier cream  - Collaborate with interdisciplinary team   - Patient/family teaching  - Consider wound care consult   Outcome: Progressing

## 2024-01-26 NOTE — PLAN OF CARE
Problem: PHYSICAL THERAPY ADULT  Goal: Performs mobility at highest level of function for planned discharge setting.  See evaluation for individualized goals.  Description: Treatment/Interventions: Functional transfer training, LE strengthening/ROM, Elevations, Therapeutic exercise, Endurance training, Patient/family training, Equipment eval/education, Bed mobility, Gait training, OT, Spoke to nursing  Equipment Recommended: (S) Walker (will require RW to d/c home)       See flowsheet documentation for full assessment, interventions and recommendations.  Outcome: Adequate for Discharge  Note: Prognosis: Good  Problem List: Decreased endurance  Assessment: PT initiated treatment session in order to assist patient in achieving goals to improve gait and trial stairs. Patient is no longer step down patient and able to mobilize more independently. She is ambulating household and community distances mod-I with use of RW. She participated in stair training and was able to ascend 14 steps without difficulty. Patient has met PT goals and will be d/c from caseload. Plan is for d/c home with family and HHPT. Encouraged ongoing self ambulation this admission.        Rehab Resource Intensity Level, PT: III (Minimum Resource Intensity)    See flowsheet documentation for full assessment.

## 2024-01-26 NOTE — PROGRESS NOTES
"Progress Note - Surgical Oncology  Aleida Hammond 64 y.o. female MRN: [id number]  Unit/Bed#: [facility] 510-01 Encounter: [id number]      Objective: Patient is doing well this morning.  The Toradol has been allowing her to get to sleep at night.  There is no nausea or vomiting.  No fevers or chills.  She is tolerating the small portions of a bariatric diabetic diet.  Her blood sugars are 121 this morning.  Patient is ambulating the halls.  Passing flatus, no bowel movement as of yet.  Really would like to have a shower.  LISA amylase yesterday was 121 with a serum amylase of 18.  Patient is voiding well on her own.  And states she is much better pain controlled with the Toradol.    1000 UA  10 LISA -more serous this morning    Blood pressure 127/77, pulse 97, temperature 97.6 °F (36.4 °C), resp. rate 16, height 5' 4\" (1.626 m), weight 71.7 kg (158 lb), SpO2 97%.,Body mass index is 27.12 kg/m².      Intake/Output Summary (Last 24 hours) at 1/26/2024 0513  Last data filed at 1/26/2024 0034  Gross per 24 hour   Intake 2814.93 ml   Output 860 ml   Net 1954.93 ml       Invasive Devices       Peripheral Intravenous Line  Duration             Peripheral IV 01/22/24 Distal;Dorsal (posterior);Left Forearm 3 days    Peripheral IV 01/23/24 Left Forearm 2 days              Drain  Duration             Closed/Suction Drain Left LUQ Bulb 19 Fr. 3 days                    Physical Exam:   Awake alert and orientated x 3 in no acute or respiratory distress, pleasant  Abdomen: Is soft minimal tenderness to palpation, does not appear distended, midline surgical wound is clean and dry with staples intact, drain in left upper quadrant is more serous this morning not cloudy  Extremities: No pedal edema or lower extremity edema, no calf tenderness    Lab, Imaging and other studies:  white count this morning is 10.9 down from 15, hemoglobin 7.1  VTE Pharmacologic Prophylaxis: Heparin  VTE Mechanical Prophylaxis: sequential compression " device    Assessment:  POD # 4 Whipple procedure, completion gastrectomy, jejunal repair -Dr. Cruz    Plan:  ?  Diabetic full liquid diet -continue small frequent meals  ?  Discontinue IV fluids  ?  Take a stepdown 2  May shower  Acute pain service recommendation to take patient off IV pain medication  ?  Switched to Lovenox for DVT prophylaxis since will be home on Lovenox 40 mg subcu daily for 28 days from the day of surgery  Continue ambulating the halls multiple times throughout the day  ?  Dulcolax suppository  Case management for DC planning

## 2024-01-26 NOTE — PROGRESS NOTES
Provided lovenox teaching with patient at bedside.    Explained how to give injection. Patient explained back injection process.  Patient agreed to give 1st injection in ABD.

## 2024-01-26 NOTE — PHYSICAL THERAPY NOTE
"   PT Treatment       01/26/24 1215   PT Last Visit   PT Visit Date 01/26/24   Note Type   Note Type Treatment   Pain Assessment   Pain Assessment Tool FLACC   Pain Rating: FLACC (Rest) - Face 0   Pain Rating: FLACC (Rest) - Legs 0   Pain Rating: FLACC (Rest) - Activity 0   Pain Rating: FLACC (Rest) - Cry 0   Pain Rating: FLACC (Rest) - Consolability 0   Score: FLACC (Rest) 0   Pain Rating: FLACC (Activity) - Face 0   Pain Rating: FLACC (Activity) - Legs 0   Pain Rating: FLACC (Activity) - Activity 0   Pain Rating: FLACC (Activity) - Cry 0   Pain Rating: FLACC (Activity) - Consolability 0   Score: FLACC (Activity) 0   Restrictions/Precautions   Other Precautions   (off step down; cleared by PT to mobilize independently)   General   Chart Reviewed Yes   Response to Previous Treatment Patient with no complaints from previous session.   Family/Caregiver Present No   Cognition   Overall Cognitive Status WFL   Orientation Level Oriented X4   Subjective   Subjective \"lets do the steps\"   Bed Mobility   Supine to Sit 6  Modified independent   Sit to Supine Unable to assess   Additional Comments patient received supine in bed and able to perform bed mobility with use of bed rail mod-I. at end of treatment patient seated EOB   Transfers   Sit to Stand 6  Modified independent   Stand to Sit 6  Modified independent   Additional Comments transfers with RW   Ambulation/Elevation   Gait pattern Excessively slow;Foward flexed   Gait Assistance 6  Modified independent   Additional items Verbal cues   Assistive Device Rolling walker   Distance 20 feet --> stairs --> 250 feet   Stair Management Assistance 5  Supervision   Additional items Verbal cues   Stair Management Technique One rail L;Foreward;Nonreciprocal   Number of Stairs 14   Ambulation/Elevation Additional Comments gait has progressed from S to mod-I with use of RW. VC to encourage increased trunk extension. during stair training patient encouraged to perform in " non-reciprocal manner for improved energy conservation and less pulling on abdominal muscles.   Balance   Static Sitting Normal   Static Standing Good   Ambulatory Good   Endurance Deficit   Endurance Deficit No   Activity Tolerance   Activity Tolerance Patient tolerated treatment well   Nurse Made Aware gal to see per ROXANNE Angeles and f/u post   Assessment   Prognosis Good   Problem List Decreased endurance   Assessment PT initiated treatment session in order to assist patient in achieving goals to improve gait and trial stairs. Patient is no longer step down patient and able to mobilize more independently. She is ambulating household and community distances mod-I with use of RW. She participated in stair training and was able to ascend 14 steps without difficulty. Patient has met PT goals and will be d/c from caseload. Plan is for d/c home with family and HHPT. Encouraged ongoing self ambulation this admission.   Goals   Patient Goals to go home tomorrow   LTG Expiration Date 02/06/24   PT Treatment Day 3   Plan   Progress Discontinue PT   Discharge Recommendation   Rehab Resource Intensity Level, PT III (Minimum Resource Intensity)   Equipment Recommended Walker  (patient was issued RW- present in room)   Additional Comments Patient participated in 10 minutes of education regarding mobility this admission and upon d/c. This included the following:  recommendation to change positions at least 1x/hour (even if sit<-->stand) and to ambulate 3-5x within home to continue participation in mobility upon d/c, recommending ongoing use of RW at this time until HHPT progresses, patient denying further questions/concerns about d/c home.   AM-PAC Basic Mobility Inpatient   Turning in Flat Bed Without Bedrails 4   Lying on Back to Sitting on Edge of Flat Bed Without Bedrails 4   Moving Bed to Chair 4   Standing Up From Chair Using Arms 4   Walk in Room 4   Climb 3-5 Stairs With Railing 4   Basic Mobility Inpatient Raw Score 24    Basic Mobility Standardized Score 57.68   Highest Level Of Mobility   JH-HLM Goal 8: Walk 250 feet or more   JH-HLM Achieved 8: Walk 250 feet ot more       The patient's AM-PAC Basic Mobility Inpatient Standardized Score is greater than 42.9, suggesting this patient may benefit from discharge to home. Please also refer to the recommendation of the Physical Therapist for safe discharge planning.    FRENCH RIVERA PT, DPT

## 2024-01-26 NOTE — DISCHARGE INSTR - AVS FIRST PAGE
DISCHARGE INSTRUCTIONS    Follow Up: Follow up with Dr. Cruz on 2/8/2024 at 9:45 AM    Diet: Diabetic full liquid diet follow-up with Dr. Cruz    Pain: Tylenol 975 mg every 8 hours scheduled for 7 days. Celebrex 200 mg daily for 7 days.  Gabapentin 300 mg 3 times a day for 7 days.  Dilaudid 2 mg every 6 hours as needed for moderate/severe pain.    Shower: You may shower over the wound. Do not bathe or use a pool or hot tub until cleared by the physician.    Activity: As tolerated. You may go up and down stairs, walk as much as you are comfortable, but walk at least 3 times each day. Do not lift anything heavier than 15 pounds for at least 2-4 weeks, unless cleared by the doctor.    Driving: Do not drive or make any important decisions while on narcotic pain medication. Generally, you may drive when your off all narcotic pain medications.    Medications: Resume all of your previous medications, unless told otherwise by the doctor. Avoid aspirin or ibuprofen (Advil, Motrin, etc.) products for 2-3 days after the date of surgery. You may, at that time, began to take them again. Tylenol is always fine. You do not need to take the narcotic pain medications unless you are having significant pain and discomfort.    Call the office: If you are experiencing any of the following, fevers above 101.5° or chills, significant nausea or vomiting, increase in abdominal pain, if the wound develops drainage and/or is excessive redness around the wound, or if you have significant diarrhea or other worsening symptoms.    Drain Care:   Empty drainage bag/bulb when it becomes full into a measurable container. Record output each time drain is emptied in a notebook. Record output totals every day.    May shower with drain. Keep tube clean and dry with soap and water.  Call the office if output <20cc over 24 hrs

## 2024-01-27 VITALS
SYSTOLIC BLOOD PRESSURE: 151 MMHG | DIASTOLIC BLOOD PRESSURE: 73 MMHG | TEMPERATURE: 98 F | BODY MASS INDEX: 26.98 KG/M2 | WEIGHT: 158 LBS | HEART RATE: 84 BPM | OXYGEN SATURATION: 98 % | HEIGHT: 64 IN | RESPIRATION RATE: 18 BRPM

## 2024-01-27 LAB
AMYLASE FLD QL: 295 U/L
AMYLASE SERPL-CCNC: 20 IU/L (ref 29–103)
GLUCOSE SERPL-MCNC: 123 MG/DL (ref 65–140)
GLUCOSE SERPL-MCNC: 155 MG/DL (ref 65–140)

## 2024-01-27 PROCEDURE — 82150 ASSAY OF AMYLASE: CPT | Performed by: PHYSICIAN ASSISTANT

## 2024-01-27 PROCEDURE — NC001 PR NO CHARGE: Performed by: STUDENT IN AN ORGANIZED HEALTH CARE EDUCATION/TRAINING PROGRAM

## 2024-01-27 PROCEDURE — 99024 POSTOP FOLLOW-UP VISIT: CPT | Performed by: STUDENT IN AN ORGANIZED HEALTH CARE EDUCATION/TRAINING PROGRAM

## 2024-01-27 PROCEDURE — 82948 REAGENT STRIP/BLOOD GLUCOSE: CPT

## 2024-01-27 RX ORDER — HYDROMORPHONE HYDROCHLORIDE 2 MG/1
2 TABLET ORAL EVERY 6 HOURS PRN
Qty: 16 TABLET | Refills: 0 | Status: SHIPPED | OUTPATIENT
Start: 2024-01-27 | End: 2024-02-02

## 2024-01-27 RX ADMIN — INSULIN LISPRO 1 UNITS: 100 INJECTION, SOLUTION INTRAVENOUS; SUBCUTANEOUS at 11:50

## 2024-01-27 RX ADMIN — HYDROMORPHONE HYDROCHLORIDE 4 MG: 2 TABLET ORAL at 05:19

## 2024-01-27 RX ADMIN — GABAPENTIN 300 MG: 300 CAPSULE ORAL at 08:32

## 2024-01-27 RX ADMIN — ALBUTEROL SULFATE 2 PUFF: 90 AEROSOL, METERED RESPIRATORY (INHALATION) at 06:07

## 2024-01-27 RX ADMIN — CELECOXIB 200 MG: 200 CAPSULE ORAL at 08:32

## 2024-01-27 RX ADMIN — ENOXAPARIN SODIUM 40 MG: 40 INJECTION SUBCUTANEOUS at 08:32

## 2024-01-27 RX ADMIN — POLYETHYLENE GLYCOL 3350 17 G: 17 POWDER, FOR SOLUTION ORAL at 08:32

## 2024-01-27 RX ADMIN — PANTOPRAZOLE SODIUM 40 MG: 40 TABLET, DELAYED RELEASE ORAL at 05:07

## 2024-01-27 RX ADMIN — FLUTICASONE FUROATE AND VILANTEROL TRIFENATATE 1 PUFF: 200; 25 POWDER RESPIRATORY (INHALATION) at 08:32

## 2024-01-27 RX ADMIN — DOCUSATE SODIUM 100 MG: 100 CAPSULE, LIQUID FILLED ORAL at 08:32

## 2024-01-27 RX ADMIN — ACETAMINOPHEN 975 MG: 325 TABLET, FILM COATED ORAL at 11:49

## 2024-01-27 RX ADMIN — HYDROMORPHONE HYDROCHLORIDE 4 MG: 2 TABLET ORAL at 10:26

## 2024-01-27 NOTE — PROGRESS NOTES
"Progress Note - Surgical Oncology  Aleida Hammond 64 y.o. female MRN: [id number]  Unit/Bed#: [facility] 510-01 Encounter: [id number]    Assessment:  64 y.o. female with PMH RNY gastric bypass with duodenal mass, now s/p whipple, completion gastrectomy, jejunal repair on 1/23.  LISA amylase suggesting a leak with fluid loading to 95, and serum levels of 20.  She remains stable at the time    Plan:  -Full liquid diet.    - Pain and Nausea cont   - Incentive spirometry  - OOB, ambulate  - Monitor drain output  -  Lovenox 40 mg subcu daily for 28 days from the day of surgery   - Please Tiger text the White surgery resident role with any concerns    Subjective/Objective     Subjective: Feels good this morning.  Tolerating diet.  Up and ambulating.  Having flatus and bowel movements.  Denies any abdominal pain..      Objective:   Vitals: Blood pressure 139/67, pulse 84, temperature (!) 97.2 °F (36.2 °C), temperature source Oral, resp. rate 18, height 5' 4\" (1.626 m), weight 71.7 kg (158 lb), SpO2 98%.,Body mass index is 27.12 kg/m².    I/O         01/25 0701  01/26 0700 01/26 0701  01/27 0700 01/27 0701  01/28 0700    P.O. 100 900     I.V. (mL/kg) 3060.1 (42.7) 0.8 (0)     NG/GT 0 0     IV Piggyback 130      Total Intake(mL/kg) 3290.1 (45.9) 900.8 (12.6)     Urine (mL/kg/hr) 1000 (0.6) 1450 (0.8)     Emesis/NG output       Drains 10 5     Stool  0     Total Output 1010 1455     Net +2280.1 -554.2            Unmeasured Stool Occurrence  2 x             Physical Exam:  Gen: NAD, Aox3, Comfortable in bed  Chest: Normal work of breathing, no respiratory distress  Abd: Soft, ND, NT. LISA with 5 cc recorded over the last 24 hours  Ext: No Edema  Skin: Warm, Dry, Intact      Lab, Imaging and other studies: I have personally reviewed pertinent reports.    VTE Pharmacologic Prophylaxis: Enoxaparin (Lovenox)  VTE Mechanical Prophylaxis: sequential compression device        Nakosi Juice, MD  1/27/2024  7:09 AM      "

## 2024-01-27 NOTE — QUICK NOTE
LISA removed at the bedside. Pt tolerated well, dressed site w/ 2x2 guaze and tape.     Nicanor Aparicio MD  PGY-1

## 2024-01-27 NOTE — DISCHARGE SUMMARY
Discharge Summary - Oncologic Surgery   Aleida Hammond 64 y.o. 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 Cruz, Oncologic surgery.    Consulting Physician(s): Critical care    Imaging and Procedures Performed: No orders of the defined types were placed in this encounter.    Hospital Course: Aleida Hammond is a 64-year-old female who underwent completion gastrectomy, duodenal exploration and Whipple procedure in general repair on 1/23.  The patient tolerated the procedure well postoperatively was managed with ICU level of care.  The patient's was advanced, after the nasogastric tube was removed.  The patient's pain was well-controlled.  On 1/27 and the patient's LISA drain was removed to which the patient tolerated well without issue.    On discharge, the patient is instructed to follow-up with the patient's primary care provider within the next 2 weeks to review the events of the recent hospitalization. The patient is instructed to follow-up with surgical oncology as scheduled on your after visit summary The patient is instructed to follow the provided discharge instructions.     Condition at Discharge: good     Discharge instructions/Information to patient and family:   See after visit summary for information provided to patient and family.      Provisions for Follow-Up Care:  See after visit summary for information related to follow-up care and any pertinent home health orders.      Disposition: Home with home health care    Planned Readmission: No    Discharge Statement   I spent 25 minutes discharging the patient. This time was spent on the day of discharge. I had direct contact with the patient on the day of discharge. Additional documentation is required if more than 30 minutes were spent on discharge.     Discharge  Medications:  See after visit summary for reconciled discharge medications provided to patient and family.    Nicanor Aparicio MD  1/27/2024  1:15 PM

## 2024-01-28 ENCOUNTER — HOME CARE VISIT (OUTPATIENT)
Dept: HOME HEALTH SERVICES | Facility: HOME HEALTHCARE | Age: 64
End: 2024-01-28
Payer: COMMERCIAL

## 2024-01-28 VITALS
DIASTOLIC BLOOD PRESSURE: 65 MMHG | OXYGEN SATURATION: 96 % | HEART RATE: 78 BPM | RESPIRATION RATE: 16 BRPM | SYSTOLIC BLOOD PRESSURE: 122 MMHG

## 2024-01-28 PROCEDURE — G0299 HHS/HOSPICE OF RN EA 15 MIN: HCPCS

## 2024-01-28 PROCEDURE — 400013 VN SOC

## 2024-01-29 ENCOUNTER — TRANSITIONAL CARE MANAGEMENT (OUTPATIENT)
Dept: FAMILY MEDICINE CLINIC | Facility: HOSPITAL | Age: 64
End: 2024-01-29

## 2024-01-29 ENCOUNTER — HOME CARE VISIT (OUTPATIENT)
Dept: HOME HEALTH SERVICES | Facility: HOME HEALTHCARE | Age: 64
End: 2024-01-29
Payer: COMMERCIAL

## 2024-01-29 VITALS — SYSTOLIC BLOOD PRESSURE: 144 MMHG | OXYGEN SATURATION: 96 % | DIASTOLIC BLOOD PRESSURE: 62 MMHG | HEART RATE: 87 BPM

## 2024-01-29 PROCEDURE — G0151 HHCP-SERV OF PT,EA 15 MIN: HCPCS

## 2024-01-29 RX ORDER — TRAMADOL HYDROCHLORIDE 300 MG/1
300 TABLET, EXTENDED RELEASE ORAL DAILY
COMMUNITY
Start: 2024-01-10

## 2024-01-29 NOTE — UTILIZATION REVIEW
NOTIFICATION OF ADMISSION DISCHARGE   This is a Notification of Discharge from [facility]. Please be advised that this patient has been discharge from our facility. Below you will find the admission and discharge date and time including the patient’s disposition.   UTILIZATION REVIEW CONTACT:  Gladys Esquivel  Utilization   Network Utilization Review Department  Phone: [phone] x carefully listen to the prompts. All voicemails are confidential.  Email: [email or web address]     ADMISSION INFORMATION  PRESENTATION DATE: 1/22/2024  7:25 AM  OBERVATION ADMISSION DATE:   INPATIENT ADMISSION DATE: 1/22/24  6:36 PM   DISCHARGE DATE: 1/27/2024  2:33 PM   DISPOSITION:Home with Home Health Care    Network Utilization Review Department  ATTENTION: Please call with any questions or concerns to [phone] and carefully listen to the prompts so that you are directed to the right person. All voicemails are confidential.   For Discharge needs, contact Care Management DC Support Team at [phone] opt. 2  Send all requests for admission clinical reviews, approved or denied determinations and any other requests to dedicated fax number below belonging to the campus where the patient is receiving treatment. List of dedicated fax numbers for the Facilities:  FACILITY NAME UR FAX NUMBER   ADMISSION DENIALS (Administrative/Medical Necessity) [phone]   DISCHARGE SUPPORT TEAM ([facility]) [phone]   PARENT CHILD HEALTH (Maternity/NICU/Pediatrics) [phone]   [facility] [phone]   [facility] [phone]   [facility] [phone]   [facility] [phone]   [facility] [phone]   [facility] [phone]   [facility] [phone]   [facility]  [phone]   [facility] [phone]   [facility] [phone]   [facility] [phone]   [facility] [phone]

## 2024-01-31 ENCOUNTER — HOME CARE VISIT (OUTPATIENT)
Dept: HOME HEALTH SERVICES | Facility: HOME HEALTHCARE | Age: 64
End: 2024-01-31
Payer: COMMERCIAL

## 2024-01-31 DIAGNOSIS — I10 BENIGN ESSENTIAL HYPERTENSION: ICD-10-CM

## 2024-01-31 RX ORDER — AMLODIPINE BESYLATE 10 MG/1
5 TABLET ORAL 2 TIMES DAILY
Qty: 90 TABLET | Refills: 2 | Status: SHIPPED | OUTPATIENT
Start: 2024-01-31

## 2024-02-02 ENCOUNTER — OFFICE VISIT (OUTPATIENT)
Dept: FAMILY MEDICINE CLINIC | Facility: HOSPITAL | Age: 64
End: 2024-02-02
Payer: COMMERCIAL

## 2024-02-02 ENCOUNTER — HOME CARE VISIT (OUTPATIENT)
Dept: HOME HEALTH SERVICES | Facility: HOME HEALTHCARE | Age: 64
End: 2024-02-02
Payer: COMMERCIAL

## 2024-02-02 VITALS
WEIGHT: 145 LBS | HEIGHT: 64 IN | SYSTOLIC BLOOD PRESSURE: 120 MMHG | DIASTOLIC BLOOD PRESSURE: 78 MMHG | OXYGEN SATURATION: 95 % | BODY MASS INDEX: 24.75 KG/M2 | HEART RATE: 101 BPM

## 2024-02-02 DIAGNOSIS — Z90.3 S/P TOTAL GASTRECTOMY AND ROUX-EN-Y ESOPHAGOJEJUNAL ANASTOMOSIS: ICD-10-CM

## 2024-02-02 DIAGNOSIS — K21.00 GASTROESOPHAGEAL REFLUX DISEASE WITH ESOPHAGITIS: ICD-10-CM

## 2024-02-02 DIAGNOSIS — Z98.0 S/P TOTAL GASTRECTOMY AND ROUX-EN-Y ESOPHAGOJEJUNAL ANASTOMOSIS: ICD-10-CM

## 2024-02-02 DIAGNOSIS — Z76.89 ENCOUNTER FOR SUPPORT AND COORDINATION OF TRANSITION OF CARE: Primary | ICD-10-CM

## 2024-02-02 DIAGNOSIS — J45.30 MILD PERSISTENT ASTHMA WITHOUT COMPLICATION: ICD-10-CM

## 2024-02-02 DIAGNOSIS — E11.9 TYPE 2 DIABETES MELLITUS WITHOUT COMPLICATION, WITHOUT LONG-TERM CURRENT USE OF INSULIN (HCC): ICD-10-CM

## 2024-02-02 DIAGNOSIS — K31.89 DUODENAL MASS: ICD-10-CM

## 2024-02-02 DIAGNOSIS — E61.1 IRON DEFICIENCY: ICD-10-CM

## 2024-02-02 DIAGNOSIS — I10 BENIGN ESSENTIAL HYPERTENSION: ICD-10-CM

## 2024-02-02 PROCEDURE — 99496 TRANSJ CARE MGMT HIGH F2F 7D: CPT | Performed by: STUDENT IN AN ORGANIZED HEALTH CARE EDUCATION/TRAINING PROGRAM

## 2024-02-02 RX ORDER — OMEPRAZOLE 40 MG/1
40 CAPSULE, DELAYED RELEASE ORAL DAILY
Qty: 90 CAPSULE | Refills: 3 | Status: SHIPPED | OUTPATIENT
Start: 2024-02-02 | End: 2024-02-08 | Stop reason: SDUPTHER

## 2024-02-02 RX ORDER — METFORMIN HYDROCHLORIDE 500 MG/1
500 TABLET, EXTENDED RELEASE ORAL
Qty: 90 TABLET | Refills: 0 | Status: SHIPPED | OUTPATIENT
Start: 2024-02-02

## 2024-02-02 NOTE — PROGRESS NOTES
[facility]  Tara Isaiah, DO  Transition of Care Management Follow Up  Assessment/Plan:      Diagnosis ICD-10-CM Associated Orders   1. Encounter for support and coordination of transition of care  Z76.89       2. 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 Benign essential hypertension  I10 Comprehensive metabolic panel      4. Type 2 diabetes mellitus without complication, without long-term current use of insulin (HCC)  E11.9 metFORMIN (GLUCOPHAGE-XR) 500 mg 24 hr tablet     Hemoglobin A1C W/EAG With Reflex To Glycomark(R)      5. Mild persistent asthma without complication  J45.30       6. Iron deficiency  E61.1 Iron, TIBC and Ferritin Panel      7. 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 Gastroesophageal reflux disease with esophagitis  K21.00         Labs ordered as above.   STOP dilaudid, celebrex & gabapentin in favor of prior pain medicine regimen.   STOP/HOLD LANTUS for now, had weight loss in addition to the surgery, having lows.   Decreased metformin on her own due to low food intake from 1000 mg bid to 500 mg bid, and now will change to 500 mg XR once a day.   Please call with any questions or concerns as needed.  Return in about 2 months (around 4/15/2024) for Annual Physical, review labs .  ______________________________________________________________________  History of Present Illness   Aleida Hammond is here for follow up of their hospitalization/transition of care appointment.  HPI  TCM today.     Hospital Course: Aleida Hammond is a 64-year-old female who underwent completion gastrectomy, duodenal exploration and Whipple procedure in general repair on 1/22.  The patient tolerated the procedure well postoperatively was managed with ICU level of care.  The patient's was advanced, after the nasogastric tube was removed.  The patient's pain was well-controlled.  On 1/27 and the patient's LISA drain was  removed to which the patient tolerated well without issue.     On discharge, the patient is instructed to follow-up with the patient's primary care provider within the next 2 weeks to review the events of the recent hospitalization. The patient is instructed to follow-up with surgical oncology as scheduled on your after visit summary The patient is instructed to follow the provided discharge instructions.    Started on gabapentin & celebrex & dilaudid. One week scripts given.    Stopped from prior pain mgmt - robaxin meds.     Did had home care nursing come out. Cost prohibitive. Out of work now for a a few yrs.   4 steps to get into house. Does have upstairs, but has first floor bedroom, rarely goes up there. Feel strong going up the stairs and walking in today.     Getting sweaty at night & adjusting her insulin & metformin due to being on po liquid diet.   Holding lantus due to low levels of sugars, 90-100s, Morning 148 one time.       F/U Onco team on Thursday next week.     01/27/2024 01/27/2024 1 Hydromorphone 2 Mg Tablet 16.00 4 [place] [id number] Pen (8306) 0 40.00 MME Comm Ins [place]   01/10/2024 01/04/2024 1 Tramadol Hcl Er 300 Mg Tablet 30.00 30 Th Juan Jose [id number] Pen (6052) 0 60.00 MME Comm Ins [place]   01/08/2024 11/07/2023 1 Pregabalin 200 Mg Capsule 90.00 30 Th Juan Jose [id number] Pen (6052) 0 4.02 LME Comm Ins [place]   01/04/2024 01/04/2024 1 Hydrocodone-Acetamin 5-325 Mg 120.00 15 Th Juan Jose [id number] Pen (6052) 0 40.00 MME Comm Ins [place]   01/02/2024 12/29/2023 1 Morphine Sulfate Powder 0.02 30 Th Juan Jose [id number] Bon (5668)         Wt Readings from Last 3 Encounters:   03/07/24 65.8 kg (145 lb)   02/26/24 65.8 kg (145 lb)   02/08/24 65.3 kg (144 lb)     Temp Readings from Last 3 Encounters:   04/09/24 97.8 °F (36.6 °C) (Temporal)   04/01/24 (!) 96.8 °F (36 °C) (Temporal)   03/25/24 97.9 °F (36.6 °C) (Temporal)     BP Readings from Last 3 Encounters:   04/09/24 106/72   04/01/24 127/66   03/25/24 144/83     Pulse Readings from Last 3  Encounters:   04/09/24 89   04/01/24 76   03/25/24 86     TCM Call     Date and time call was made  1/29/2024 10:34 AM    Patient was hospitialized at  [facility]    Date of Admission  01/22/24    Date of discharge  01/27/24    Diagnosis  duodenal mass    Disposition  Home    Were the patients medications reviewed and updated  Yes    Current Symptoms  None      TCM Call     Post hospital issues  None    Scheduled for follow up?  Yes    Did you obtain your prescribed medications  Yes    Do you need help managing your prescriptions or medications  No    I have advised the patient to call PCP with any new or worsening symptoms  Jocelyn trujillo, ERICK [facility] [address]    Living Arrangements  Family members    Comments  spoke ith patient, states she is doing ok, has some pain. daughter is staying with her for several week. Med list reviewed. Call transferred to [facility] for BARRY appt. CR        The following portions of the patient's history were reviewed and updated as appropriate: allergies, current medications, past family history, past medical history, past social history, past surgical history and problem list.    Review of Systems   Review of Systems   Constitutional:  Positive for appetite change (very hungry, but listening to the rules) and diaphoresis (a lot at night since coming home). Negative for chills, fatigue (better even since prior to adm) and fever.   Respiratory:  Negative for cough and shortness of breath.    Cardiovascular:  Negative for chest pain and palpitations.   Gastrointestinal:  Positive for constipation (1 BM since d/c), diarrhea (liquid x1 on Tues) and nausea (x1). Negative for vomiting.   Genitourinary:  Negative for dysuria and hematuria.   Neurological:  Positive for weakness (minimal, taking rests, but active). Negative for dizziness, light-headedness and headaches.   Psychiatric/Behavioral:  Negative for dysphoric mood. The patient is not nervous/anxious.   "       Mood feels \"pretty good\"     Past Medical History     Past Medical History:   Diagnosis Date   • PABLO (acute kidney injury) (HCC) 2021   • Allergic    • Anemia    • Anesthesia complication     Aspiration pneumonia after    • Anxiety    • Arthritis    • Asthma     Uses Albuterol daily   • Breast lump     Resolved: 2017    • Chronic pain     back   • Chronic pain disorder     Back, legs   • Diabetes mellitus (HCC)    • Diverticulitis of colon    • Dizziness    • GERD (gastroesophageal reflux disease)    • Hepatitis    • History of stomach ulcers    • HL (hearing loss)     Estimated   • Hyperlipidemia    • Hypertension    • Hypotension 2021   • Infectious viral hepatitis     Hepatitiss A   • Memory loss     Estimated   • Pneumonia     aspiration pneumonia   • PONV (postoperative nausea and vomiting)    • Stomach problems        Past Surgical History     Past Surgical History:   Procedure Laterality Date   • APPENDECTOMY     • BACK SURGERY     • CATARACT EXTRACTION, BILATERAL     •  SECTION     • CHOLECYSTECTOMY     • GASTRECTOMY N/A 2024    Procedure: COMPLETION GASTRECTOMY;  Surgeon: Karlene Cruz MD;  Location: [facility];  Service: Surgical Oncology   • GASTRIC BYPASS     • GASTRIC BYPASS     • INFUSION PUMP IMPLANTATION     • LAPAROTOMY N/A 2024    Procedure: DUODENAL EXPLORATION;  Surgeon: Karlene Cruz MD;  [facility]: [facility];  Service: Surgical Oncology   • OK NDSC WRST SURG W/RLS TRANSVRS CARPL LIGM Right 2022    Procedure: Right endoscopic carpal tunnel release;  Surgeon: Papo Peguero MD;  Location: [facility];  Service: Orthopedics   • SHOULDER SURGERY     • SMALL INTESTINE SURGERY N/A 2024    Procedure: INTRAOPERATIVE ULTRASOUND;  Surgeon: Karlene Cruz MD;  Location: [facility];  Service: Surgical Oncology   • SPINAL CORD STIMULATOR IMPLANT     • WHIPPLE PROCEDURE/PANCREATICO-DUODENECTOMY N/A 2024    " Procedure: WHIPPLE;  Surgeon: Karlene Cruz MD;  Location: [facility];  Service: Surgical Oncology       Social History     Social History     Socioeconomic History   • Marital status:      Spouse name: None   • Number of children: 3   • Years of education: None   • Highest education level: None   Occupational History   • Occupation: Disability   Tobacco Use   • Smoking status: Former     Current packs/day: 0.00     Average packs/day: 1 pack/day for 34.0 years (34.0 ttl pk-yrs)     Types: Cigarettes     Start date: 1973     Quit date: 2007     Years since quittin.2   • Smokeless tobacco: Never   • Tobacco comments:     15yrs ago   Vaping Use   • Vaping status: Never Used   Substance and Sexual Activity   • Alcohol use: No   • Drug use: No   • Sexual activity: Not Currently     Partners: Male     Birth control/protection: Post-menopausal   Other Topics Concern   • None   Social History Narrative   • None     Social Determinants of Health     Financial Resource Strain: Not on file   Food Insecurity: Not on file   Transportation Needs: Not on file   Physical Activity: Not on file   Stress: Not on file   Social Connections: Not on file   Intimate Partner Violence: Not on file   Housing Stability: Not on file       Family History     Family History   Problem Relation Age of Onset   • Diabetes Mother         Mellitus   • Hyperlipidemia Mother    • Hypertension Mother    • Colon cancer Mother 78   • Pancreatic cancer Mother 84   • Melanoma Mother 76   • Cancer Father 78        Bladder ca   • Alcohol abuse Father    • Lung cancer Father 78   • Prostate cancer Father 78   • No Known Problems Daughter    • No Known Problems Maternal Grandmother    • No Known Problems Maternal Grandfather    • Stomach cancer Paternal Grandmother         60's   • Lung cancer Paternal Grandfather 76   • Diabetes Brother         Mellitus   • Hyperlipidemia Brother    • Hypertension Brother    • Diabetes Brother    • No  Known Problems Brother    • No Known Problems Son    • No Known Problems Son    • Breast cancer Maternal Aunt 32        Unknown age   • No Known Problems Paternal Aunt    • No Known Problems Paternal Aunt    • No Known Problems Paternal Aunt    • No Known Problems Paternal Aunt    • Arthritis Family    • Mental illness Neg Hx        Current Medications     Current Outpatient Medications:   •  Accu-Chek FastClix Lancets MISC, USE TO TEST BLOOD GLUCOSE  LEVELS TWO TIMES A DAY FOR DIABETES, Disp: 204 each, Rfl: 3  •  albuterol (2.5 mg/3 mL) 0.083 % nebulizer solution, Take 3 mL (2.5 mg total) by nebulization every 6 (six) hours as needed for wheezing or shortness of breath, Disp: 300 mL, Rfl: 1  •  amLODIPine (NORVASC) 10 mg tablet, Take 0.5 tablets (5 mg total) by mouth 2 (two) times a day, Disp: 90 tablet, Rfl: 2  •  aspirin (ECOTRIN LOW STRENGTH) 81 mg EC tablet, Take 81 mg by mouth daily, Disp: , Rfl:   •  B-D UF III MINI PEN NEEDLES 31G X 5 MM MISC, USE ONCE DAILY FOR BASAL   INSULIN INJECTION, Disp: 90 each, Rfl: 1  •  Cyanocobalamin (B-12 IJ), Inject as directed every 30 (thirty) days, Disp: , Rfl:   •  Ferrous Fumarate 63 (20 Fe) MG TABS, Take 2 tablets by mouth daily Take two tablets once daily., Disp: , Rfl:   •  Fluticasone-Salmeterol (Advair Diskus) 250-50 mcg/dose inhaler, Inhale 1 puff 2 (two) times a day Rinse mouth after use., Disp: 180 blister, Rfl: 2  •  glucose blood (Accu-Chek Guide) test strip, Test, Disp: 100 strip, Rfl: 3  •  Januvia 100 MG tablet, TAKE 1 TABLET EVERY MORNING, Disp: 90 tablet, Rfl: 3  •  lidocaine (XYLOCAINE) 5 % ointment, apply topically to affected area three times a day, Disp: , Rfl:   •  metFORMIN (GLUCOPHAGE-XR) 500 mg 24 hr tablet, Take 1 tablet (500 mg total) by mouth daily with breakfast, Disp: 90 tablet, Rfl: 0  •  montelukast (SINGULAIR) 10 mg tablet, TAKE 1 TABLET AT BEDTIME, Disp: 90 tablet, Rfl: 1  •  Morphine Sulfate (MORPHINE 0.05 MG/ML SYRINGE, NICU/PICU,, CMPD  "ORDER,), , Disp: , Rfl:   •  ondansetron (ZOFRAN) 4 mg tablet, Take 1 tablet (4 mg total) by mouth every 8 (eight) hours as needed for nausea or vomiting, Disp: 90 tablet, Rfl: 1  •  traMADol (ULTRAM-ER) 300 MG 24 hr tablet, Take 300 mg by mouth daily, Disp: , Rfl:   •  VITAMIN D PO, , Disp: , Rfl:   •  albuterol (PROVENTIL HFA,VENTOLIN HFA) 90 mcg/act inhaler, USE 2 INHALATIONS ORALLY   EVERY 6 HOURS AS NEEDED FORWHEEZING, Disp: 25.5 g, Rfl: 1  •  atorvastatin (LIPITOR) 40 mg tablet, TAKE 1 TABLET DAILY, Disp: 90 tablet, Rfl: 0  •  HYDROcodone-acetaminophen (NORCO) 5-325 mg per tablet, take 1 to 2 tablets by mouth every 6 hours as needed, Disp: , Rfl:   •  mometasone (NASONEX) 50 mcg/act nasal spray, Use 2 sprays in each nostril daily, Disp: 17 g, Rfl: 1  •  omeprazole (PriLOSEC) 40 MG capsule, Take 1 capsule (40 mg total) by mouth daily, Disp: 90 capsule, Rfl: 3  •  pregabalin (LYRICA) 200 MG capsule, Take 200 mg by mouth 3 (three) times a day, Disp: , Rfl:   •  zolpidem (AMBIEN) 5 mg tablet, TAKE 1 TABLET DAILY AT     BEDTIME AS NEEDED FOR SLEEP, Disp: 90 tablet, Rfl: 0     Allergies     Allergies   Allergen Reactions   • Nsaids Other (See Comments)     Cannot take NSAIDS secondary to having gastric bypass   • Percocet [Oxycodone-Acetaminophen] GI Intolerance       Objective   /78   Pulse 101   Ht 5' 4\" (1.626 m)   Wt 65.8 kg (145 lb)   LMP  (LMP Unknown)   SpO2 95%   BMI 24.89 kg/m²   Wt Readings from Last 3 Encounters:   03/07/24 65.8 kg (145 lb)   02/26/24 65.8 kg (145 lb)   02/08/24 65.3 kg (144 lb)     BP Readings from Last 3 Encounters:   04/09/24 106/72   04/01/24 127/66   03/25/24 144/83     Pulse Readings from Last 3 Encounters:   04/09/24 89   04/01/24 76   03/25/24 86     Body mass index is 24.89 kg/m².     Physical Exam  Vitals and nursing note reviewed.   Constitutional:       General: She is not in acute distress.     Appearance: Normal appearance. She is normal weight. She is not " ill-appearing.   HENT:      Head: Normocephalic and atraumatic.   Eyes:      General: No scleral icterus.        Right eye: No discharge.         Left eye: No discharge.   Cardiovascular:      Rate and Rhythm: Normal rate and regular rhythm.      Pulses: Normal pulses.      Heart sounds: Normal heart sounds. No murmur heard.  Pulmonary:      Effort: Pulmonary effort is normal. No respiratory distress.      Breath sounds: Normal breath sounds. No stridor. No wheezing.   Musculoskeletal:      Cervical back: Normal range of motion and neck supple. No rigidity.      Right lower leg: No edema.      Left lower leg: No edema.   Neurological:      Mental Status: She is alert and oriented to person, place, and time.      Gait: Gait normal.   Psychiatric:         Mood and Affect: Mood normal.         Behavior: Behavior normal.         Thought Content: Thought content normal.         Judgment: Judgment normal.

## 2024-02-04 DIAGNOSIS — E11.69 HYPERLIPIDEMIA ASSOCIATED WITH TYPE 2 DIABETES MELLITUS: ICD-10-CM

## 2024-02-04 DIAGNOSIS — G47.00 INSOMNIA, UNSPECIFIED TYPE: ICD-10-CM

## 2024-02-04 DIAGNOSIS — J45.30 MILD PERSISTENT ASTHMA, UNSPECIFIED WHETHER COMPLICATED: ICD-10-CM

## 2024-02-04 DIAGNOSIS — E78.5 HYPERLIPIDEMIA ASSOCIATED WITH TYPE 2 DIABETES MELLITUS: ICD-10-CM

## 2024-02-05 DIAGNOSIS — J01.00 ACUTE NON-RECURRENT MAXILLARY SINUSITIS: ICD-10-CM

## 2024-02-05 PROCEDURE — G0180 MD CERTIFICATION HHA PATIENT: HCPCS | Performed by: STUDENT IN AN ORGANIZED HEALTH CARE EDUCATION/TRAINING PROGRAM

## 2024-02-05 RX ORDER — MOMETASONE FUROATE 50 UG/1
SPRAY, METERED NASAL
Qty: 17 G | Refills: 1 | Status: SHIPPED | OUTPATIENT
Start: 2024-02-05

## 2024-02-06 PROBLEM — C25.9 ADENOCARCINOMA OF PANCREAS (HCC): Status: ACTIVE | Noted: 2024-02-06

## 2024-02-06 RX ORDER — ATORVASTATIN CALCIUM 40 MG/1
TABLET, FILM COATED ORAL
Qty: 90 TABLET | Refills: 0 | Status: SHIPPED | OUTPATIENT
Start: 2024-02-06

## 2024-02-06 RX ORDER — ALBUTEROL SULFATE 90 UG/1
AEROSOL, METERED RESPIRATORY (INHALATION)
Qty: 25.5 G | Refills: 1 | Status: SHIPPED | OUTPATIENT
Start: 2024-02-06

## 2024-02-06 RX ORDER — ZOLPIDEM TARTRATE 5 MG/1
TABLET ORAL
Qty: 90 TABLET | Refills: 0 | Status: SHIPPED | OUTPATIENT
Start: 2024-02-06

## 2024-02-08 ENCOUNTER — OFFICE VISIT (OUTPATIENT)
Dept: SURGICAL ONCOLOGY | Facility: CLINIC | Age: 64
End: 2024-02-08

## 2024-02-08 VITALS
TEMPERATURE: 97.2 F | HEART RATE: 119 BPM | SYSTOLIC BLOOD PRESSURE: 118 MMHG | DIASTOLIC BLOOD PRESSURE: 70 MMHG | OXYGEN SATURATION: 98 % | BODY MASS INDEX: 24.72 KG/M2 | WEIGHT: 144 LBS

## 2024-02-08 DIAGNOSIS — C25.9 ADENOCARCINOMA OF PANCREAS (HCC): Primary | ICD-10-CM

## 2024-02-08 DIAGNOSIS — K21.00 GASTROESOPHAGEAL REFLUX DISEASE WITH ESOPHAGITIS: ICD-10-CM

## 2024-02-08 PROBLEM — C17.0 ADENOCARCINOMA OF DUODENUM (HCC): Status: ACTIVE | Noted: 2024-02-08

## 2024-02-08 PROCEDURE — 99024 POSTOP FOLLOW-UP VISIT: CPT | Performed by: STUDENT IN AN ORGANIZED HEALTH CARE EDUCATION/TRAINING PROGRAM

## 2024-02-08 RX ORDER — OMEPRAZOLE 40 MG/1
40 CAPSULE, DELAYED RELEASE ORAL DAILY
Qty: 90 CAPSULE | Refills: 3 | Status: SHIPPED | OUTPATIENT
Start: 2024-02-08

## 2024-02-08 NOTE — PROGRESS NOTES
Surgical Oncology Follow Up    [facility] [address] [id number] [phone]    Aleida Hammond  1960  [id number]    Diagnoses and all orders for this visit:    Adenocarcinoma of pancreas (HCC)  -     CT abdomen pelvis w contrast; Future  -     BUN; Future  -     Creatinine, serum; Future        Chief Complaint   Patient presents with    Post-op       Return in about 6 months (around 8/8/2024) for Office Visit, Imaging - See orders.      Oncology History   Adenocarcinoma of pancreas (HCC) (Resolved)   1/22/2024 Surgery    A. Lymph Node, Portacaval lymph node:  - One lymph node, negative for malignancy (0/1).     B. Pancreas, Whipple:  - Small focus of adenocarcinoma arising in duodenal adenomatous polyp with focal high grade dysplasia.  - Tumor invades muscularis mucosa (lamina propria, pT1a)  - Ampulla involved by low grade dysplasia.  - Pancreatic intraepitheliel neoplasia (PanIN), involving pancreatic resection margin.  - All margins are negative for carcinoma or high grade dysplasia.  - Seventeen lymph nodes, negative for malignancy (0/17).      C. Stomach, Completion gastrectomy:  - Stomach with chronic inactive gastritis and prepyloric ulcer.  - Negative for dysplasia or malignancy.  - Three lymph nodes, negative for malignancy (0/3).     2/6/2024 Initial Diagnosis    Adenocarcinoma of pancreas (HCC)     Adenocarcinoma of duodenum (HCC)   2/8/2024 Initial Diagnosis    Adenocarcinoma of duodenum (HCC)     2/8/2024 -  Cancer Staged    Staging form: Small Intestine - Adenocarcinoma, AJCC 8th Edition  - Pathologic: Stage I (pT1a, pN0, cM0) - Signed by Karlene Cruz MD on 2/8/2024  Total positive nodes: 0           Staging: T1aN0 duo adeno  Treatment history: whipple 1/2024  Current treatment: postop  Disease status: postop    History of Present Illness:   Postop visit after whipple for endoscopically unresectable  duo polyp. There was a T1a duo adeno within the specimen. Doing well and recovering well. Also required completion gastrectomy of RNY remnant stomach with repair of jej limb due to removal of axios stent  at time of surgery. Has been on full liquids diet with no issue.     Review of Systems  Complete ROS Surg Onc:   Constitutional: The patient denies new or recent history of general fatigue, no recent weight loss, no change in appetite.   Eyes: No complaints of visual problems, no scleral icterus.   ENT: no complaints of ear pain, no hoarseness, no difficulty swallowing,  no tinnitus and no new masses in head, oral cavity, or neck.   Cardiovascular: No complaints of chest pain, no palpitations, no ankle edema.   Respiratory: No complaints of shortness of breath, no cough.   Gastrointestinal: No complaints of jaundice, no bloody stools, no pale stools.   Genitourinary: No complaints of dysuria, no hematuria, no nocturia, no frequent urination, no urethral discharge.   Musculoskeletal: No complaints of weakness, paralysis, joint stiffness or arthralgias.  Integumentary: No complaints of rash, no new lesions.   Neurological: No complaints of convulsions, no seizures, no dizziness.   Hematologic/Lymphatic: No complaints of easy bruising.   Endocrine:  No hot or cold intolerance.  No polydipsia, polyphagia, or polyuria.  Allergy/immunology:  No environmental allergies.  No food allergies.  Not immunocompromised.  Skin:  No pallor or rash.  No wound.        Patient Active Problem List   Diagnosis    Benign essential hypertension    Mild persistent asthma without complication    Peripheral polyneuropathy    Chronic bilateral back pain    Chronic lumbar radiculopathy    Chronic cervical pain    Chronic thoracic spine pain    GERD without esophagitis    Hyperlipidemia associated with type 2 diabetes mellitus     Insomnia    B12 deficiency    Lumbar radiculopathy    Thoracic spondylosis without myelopathy    Thoracic and  lumbosacral neuritis    Degeneration of lumbar intervertebral disc    Lumbar spondylosis    Pain in joint involving pelvic region and thigh    Iron deficiency    Memory dysfunction    COVID-19 vaccine series completed    Type 2 diabetes mellitus with diabetic neuropathy, with long-term current use of insulin (HCC)    Narcotic dependency, continuous (HCC)    Carpal tunnel syndrome on right    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    Microcytic anemia    S/P endoscopic carpal tunnel release    Pillar pain, post-operative    PONV (postoperative nausea and vomiting)    Duodenal mass    Malignant neoplasm of spinal cord (HCC)    Adenocarcinoma of duodenum (HCC)     Past Medical History:   Diagnosis Date    PABLO (acute kidney injury) (HCC) 2021    Allergic     Anemia     Anesthesia complication     Aspiration pneumonia after     Anxiety     Arthritis     Asthma     Uses Albuterol daily    Breast lump     Resolved: 2017     Chronic pain     back    Chronic pain disorder     Back, legs    Diabetes mellitus (HCC)     Diverticulitis of colon     Dizziness     GERD (gastroesophageal reflux disease)     Hepatitis     History of stomach ulcers     HL (hearing loss)     Estimated    Hyperlipidemia     Hypertension     Hypotension 2021    Infectious viral hepatitis     Hepatitiss A    Memory loss     Estimated    Pneumonia     aspiration pneumonia    PONV (postoperative nausea and vomiting)     Stomach problems      Past Surgical History:   Procedure Laterality Date    APPENDECTOMY      BACK SURGERY      CATARACT EXTRACTION, BILATERAL       SECTION      CHOLECYSTECTOMY      GASTRECTOMY N/A 2024    Procedure: COMPLETION GASTRECTOMY;  Surgeon: Karlene Cruz MD;  Location: [facility];  Service: Surgical Oncology    GASTRIC BYPASS      GASTRIC BYPASS  2010    INFUSION PUMP IMPLANTATION      LAPAROTOMY N/A 2024    Procedure: DUODENAL EXPLORATION;   Surgeon: Karlene Cruz MD;  Location: [facility];  Service: Surgical Oncology    SC NDSC WRST SURG W/RLS TRANSVRS CARPL LIGM Right 12/01/2022    Procedure: Right endoscopic carpal tunnel release;  Surgeon: Papo Peguero MD;  Location: [facility];  Service: Orthopedics    SHOULDER SURGERY      SMALL INTESTINE SURGERY N/A 1/22/2024    Procedure: INTRAOPERATIVE ULTRASOUND;  Surgeon: Karlene Cruz MD;  Location: [facility];  Service: Surgical Oncology    SPINAL CORD STIMULATOR IMPLANT  2020    WHIPPLE PROCEDURE/PANCREATICO-DUODENECTOMY N/A 1/22/2024    Procedure: WHIPPLE;  Surgeon: Karlene Cruz MD;  Location: [facility];  Service: Surgical Oncology     Family History   Problem Relation Age of Onset    Diabetes Mother         Mellitus    Hyperlipidemia Mother     Hypertension Mother     Colon cancer Mother 78    Pancreatic cancer Mother 84    Melanoma Mother 76    Cancer Father 78        Bladder ca    Alcohol abuse Father     Lung cancer Father 78    Prostate cancer Father 78    No Known Problems Daughter     No Known Problems Maternal Grandmother     No Known Problems Maternal Grandfather     Stomach cancer Paternal Grandmother         60's    Lung cancer Paternal Grandfather 76    Diabetes Brother         Mellitus    Hyperlipidemia Brother     Hypertension Brother     Diabetes Brother     No Known Problems Brother     No Known Problems Son     No Known Problems Son     Breast cancer Maternal Aunt 32        Unknown age    No Known Problems Paternal Aunt     No Known Problems Paternal Aunt     No Known Problems Paternal Aunt     No Known Problems Paternal Aunt     Arthritis Family     Mental illness Neg Hx      Social History     Socioeconomic History    Marital status:      Spouse name: Not on file    Number of children: 3    Years of education: Not on file    Highest education level: Not on file   Occupational History    Occupation: Disability   Tobacco Use    Smoking status: Former      Current packs/day: 0.00     Average packs/day: 1 pack/day for 34.0 years (34.0 ttl pk-yrs)     Types: Cigarettes     Start date: 1973     Quit date: 2007     Years since quittin.1    Smokeless tobacco: Never    Tobacco comments:     15yrs ago   Vaping Use    Vaping status: Never Used   Substance and Sexual Activity    Alcohol use: No    Drug use: No    Sexual activity: Not Currently     Partners: Male     Birth control/protection: Post-menopausal   Other Topics Concern    Not on file   Social History Narrative    Not on file     Social Determinants of Health     Financial Resource Strain: Not on file   Food Insecurity: Not on file   Transportation Needs: Not on file   Physical Activity: Not on file   Stress: Not on file   Social Connections: Not on file   Intimate Partner Violence: Not on file   Housing Stability: Not on file       Current Outpatient Medications:     Accu-Chek FastClix Lancets MISC, USE TO TEST BLOOD GLUCOSE  LEVELS TWO TIMES A DAY FOR DIABETES, Disp: 204 each, Rfl: 3    albuterol (2.5 mg/3 mL) 0.083 % nebulizer solution, Take 3 mL (2.5 mg total) by nebulization every 6 (six) hours as needed for wheezing or shortness of breath, Disp: 300 mL, Rfl: 1    albuterol (PROVENTIL HFA,VENTOLIN HFA) 90 mcg/act inhaler, USE 2 INHALATIONS ORALLY   EVERY 6 HOURS AS NEEDED FORWHEEZING, Disp: 25.5 g, Rfl: 1    amLODIPine (NORVASC) 10 mg tablet, Take 0.5 tablets (5 mg total) by mouth 2 (two) times a day, Disp: 90 tablet, Rfl: 2    aspirin (ECOTRIN LOW STRENGTH) 81 mg EC tablet, Take 81 mg by mouth daily, Disp: , Rfl:     atorvastatin (LIPITOR) 40 mg tablet, TAKE 1 TABLET DAILY, Disp: 90 tablet, Rfl: 0    B-D UF III MINI PEN NEEDLES 31G X 5 MM MISC, USE ONCE DAILY FOR BASAL   INSULIN INJECTION, Disp: 90 each, Rfl: 1    Cyanocobalamin (B-12 IJ), Inject as directed every 30 (thirty) days, Disp: , Rfl:     Ferrous Fumarate 63 (20 Fe) MG TABS, Take 2 tablets by mouth daily Take two tablets once daily.,  Disp: , Rfl:     Fluticasone-Salmeterol (Advair Diskus) 250-50 mcg/dose inhaler, Inhale 1 puff 2 (two) times a day Rinse mouth after use., Disp: 180 blister, Rfl: 2    glucose blood (Accu-Chek Guide) test strip, Test, Disp: 100 strip, Rfl: 3    Januvia 100 MG tablet, TAKE 1 TABLET EVERY MORNING, Disp: 90 tablet, Rfl: 3    lidocaine (XYLOCAINE) 5 % ointment, apply topically to affected area three times a day, Disp: , Rfl:     metFORMIN (GLUCOPHAGE-XR) 500 mg 24 hr tablet, Take 1 tablet (500 mg total) by mouth daily with breakfast, Disp: 90 tablet, Rfl: 0    mometasone (NASONEX) 50 mcg/act nasal spray, Use 2 sprays in each nostril daily, Disp: 17 g, Rfl: 1    montelukast (SINGULAIR) 10 mg tablet, TAKE 1 TABLET AT BEDTIME, Disp: 90 tablet, Rfl: 1    Morphine Sulfate (MORPHINE 0.05 MG/ML SYRINGE, NICU/PICU,, CMPD ORDER,), , Disp: , Rfl:     omeprazole (PriLOSEC) 40 MG capsule, Take 1 capsule (40 mg total) by mouth daily, Disp: 90 capsule, Rfl: 3    ondansetron (ZOFRAN) 4 mg tablet, Take 1 tablet (4 mg total) by mouth every 8 (eight) hours as needed for nausea or vomiting, Disp: 90 tablet, Rfl: 1    traMADol (ULTRAM-ER) 300 MG 24 hr tablet, Take 300 mg by mouth daily, Disp: , Rfl:     VITAMIN D PO, , Disp: , Rfl:     zolpidem (AMBIEN) 5 mg tablet, TAKE 1 TABLET DAILY AT     BEDTIME AS NEEDED FOR SLEEP, Disp: 90 tablet, Rfl: 0  Allergies   Allergen Reactions    Nsaids Other (See Comments)     Cannot take NSAIDS secondary to having gastric bypass    Percocet [Oxycodone-Acetaminophen] GI Intolerance     Vitals:    02/08/24 0930   BP: 118/70   Pulse: (!) 119   Temp: (!) 97.2 °F (36.2 °C)   SpO2: 98%       Physical Exam  Constitutional: Patient is well-developed and well-nourished who appears the stated age in no acute distress. Patient is pleasant and talkative.     HEENT:  Normocephalic.  Sclerae are anicteric. Mucous membranes are moist. Neck is supple without adenopathy. No JVD.     Chest: Equal chest rise, easy  WOB  Cardiac: Heart is regular rate.     Abdomen: Abdomen is non-tender, non-distended and without masses.     Extremities: There is no clubbing or cyanosis. There is no edema.  Symmetric.  Neuro: Grossly nonfocal. Gait is normal.     Lymphatic: No evidence of cervical adenopathy bilaterally. No evidence of axillary adenopathy bilaterally. No evidence of inguinal adenopathy bilaterally.     Skin: Warm, anicteric.    Psych:  Patient is pleasant and talkative.    Pathology:  T1a duo adeno    Labs:  Reviewed in [employer] personally    Imaging  No results found.    I personally reviewed and interpreted the above laboratory and imaging data.    Discussion/Summary:   63 yo female s/p whipple and resection of gastric remnant with repair of jej limb (axios removal) for what was ultimately determined to be a T1a duo adenocarcinoma. No further therapy required. Recovering very well. Liberating diet today. She will f/u with pcp in next 2 months for f/u of noncon CT lung nodules and I will see her in 6 mo with repeat scan of A/P. Endoscopic eval to be determined by her GI docs. All questions answered.

## 2024-02-08 NOTE — LETTER
February 8, 2024     Tara Colon DO  [facility]   [address] [place] [id number]    Patient: Aleida Hammond   YOB: 1960   Date of Visit: 2/8/2024       Dear Dr. Colon:    Thank you for referring Aleida Hammond to me for evaluation. Below are my notes for this consultation.    If you have questions, please do not hesitate to call me. I look forward to following your patient along with you.         Sincerely,        Karlene Cruz MD        CC: MD Katalina Bautista MD Nathan James Davis, DO Jacquelyn S Carr, MD  2/8/2024 10:16 AM  Sign when Signing Visit  Surgical Oncology Follow Up    [facility] [address] [address]  [phone]    Aleida Hammond  1960  [id number]    Diagnoses and all orders for this visit:    Adenocarcinoma of pancreas (HCC)  -     CT abdomen pelvis w contrast; Future  -     BUN; Future  -     Creatinine, serum; Future        Chief Complaint   Patient presents with   • Post-op       Return in about 6 months (around 8/8/2024) for Office Visit, Imaging - See orders.      Oncology History   Adenocarcinoma of pancreas (HCC) (Resolved)   1/22/2024 Surgery    A. Lymph Node, Portacaval lymph node:  - One lymph node, negative for malignancy (0/1).     B. Pancreas, Whipple:  - Small focus of adenocarcinoma arising in duodenal adenomatous polyp with focal high grade dysplasia.  - Tumor invades muscularis mucosa (lamina propria, pT1a)  - Ampulla involved by low grade dysplasia.  - Pancreatic intraepitheliel neoplasia (PanIN), involving pancreatic resection margin.  - All margins are negative for carcinoma or high grade dysplasia.  - Seventeen lymph nodes, negative for malignancy (0/17).      C. Stomach, Completion gastrectomy:  - Stomach with chronic inactive gastritis and prepyloric ulcer.  - Negative for dysplasia or malignancy.  - Three lymph nodes, negative for  malignancy (0/3).     2/6/2024 Initial Diagnosis    Adenocarcinoma of pancreas (HCC)     Adenocarcinoma of duodenum (HCC)   2/8/2024 Initial Diagnosis    Adenocarcinoma of duodenum (HCC)     2/8/2024 -  Cancer Staged    Staging form: Small Intestine - Adenocarcinoma, AJCC 8th Edition  - Pathologic: Stage I (pT1a, pN0, cM0) - Signed by Karlene Cruz MD on 2/8/2024  Total positive nodes: 0           Staging: T1aN0 duo adeno  Treatment history: whipple 1/2024  Current treatment: postop  Disease status: postop    History of Present Illness:   Postop visit after whipple for endoscopically unresectable duo polyp. There was a T1a duo adeno within the specimen. Doing well and recovering well. Also required completion gastrectomy of RNY remnant stomach with repair of jej limb due to removal of axios stent  at time of surgery. Has been on full liquids diet with no issue.     Review of Systems  Complete ROS Surg Onc:   Constitutional: The patient denies new or recent history of general fatigue, no recent weight loss, no change in appetite.   Eyes: No complaints of visual problems, no scleral icterus.   ENT: no complaints of ear pain, no hoarseness, no difficulty swallowing,  no tinnitus and no new masses in head, oral cavity, or neck.   Cardiovascular: No complaints of chest pain, no palpitations, no ankle edema.   Respiratory: No complaints of shortness of breath, no cough.   Gastrointestinal: No complaints of jaundice, no bloody stools, no pale stools.   Genitourinary: No complaints of dysuria, no hematuria, no nocturia, no frequent urination, no urethral discharge.   Musculoskeletal: No complaints of weakness, paralysis, joint stiffness or arthralgias.  Integumentary: No complaints of rash, no new lesions.   Neurological: No complaints of convulsions, no seizures, no dizziness.   Hematologic/Lymphatic: No complaints of easy bruising.   Endocrine:  No hot or cold intolerance.  No polydipsia, polyphagia, or  polyuria.  Allergy/immunology:  No environmental allergies.  No food allergies.  Not immunocompromised.  Skin:  No pallor or rash.  No wound.        Patient Active Problem List   Diagnosis   • Benign essential hypertension   • Mild persistent asthma without complication   • Peripheral polyneuropathy   • Chronic bilateral back pain   • Chronic lumbar radiculopathy   • Chronic cervical pain   • Chronic thoracic spine pain   • GERD without esophagitis   • Hyperlipidemia associated with type 2 diabetes mellitus    • Insomnia   • B12 deficiency   • Lumbar radiculopathy   • Thoracic spondylosis without myelopathy   • Thoracic and lumbosacral neuritis   • Degeneration of lumbar intervertebral disc   • Lumbar spondylosis   • Pain in joint involving pelvic region and thigh   • Iron deficiency   • Memory dysfunction   • COVID-19 vaccine series completed   • Type 2 diabetes mellitus with diabetic neuropathy, with long-term current use of insulin ([facility])   • Narcotic dependency, continuous ([facility])   • Carpal tunnel syndrome on right   • 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   • Microcytic anemia   • S/P endoscopic carpal tunnel release   • Pillar pain, post-operative   • PONV (postoperative nausea and vomiting)   • Duodenal mass   • Malignant neoplasm of spinal cord ([facility])   • Adenocarcinoma of duodenum (HCC)     Past Medical History:   Diagnosis Date   • PABLO (acute kidney injury) ([facility]) 2021   • Allergic    • Anemia    • Anesthesia complication     Aspiration pneumonia after    • Anxiety    • Arthritis    • Asthma     Uses Albuterol daily   • Breast lump     Resolved: 2017    • Chronic pain     back   • Chronic pain disorder     Back, legs   • Diabetes mellitus (HCC)    • Diverticulitis of colon    • Dizziness    • GERD (gastroesophageal reflux disease)    • Hepatitis    • History of stomach ulcers    • HL (hearing loss) 2015    Estimated   • Hyperlipidemia    • Hypertension    •  Hypotension 2021   • Infectious viral hepatitis     Hepatitiss A   • Memory loss     Estimated   • Pneumonia     aspiration pneumonia   • PONV (postoperative nausea and vomiting)    • Stomach problems      Past Surgical History:   Procedure Laterality Date   • APPENDECTOMY     • BACK SURGERY     • CATARACT EXTRACTION, BILATERAL     •  SECTION     • CHOLECYSTECTOMY     • GASTRECTOMY N/A 2024    Procedure: COMPLETION GASTRECTOMY;  Surgeon: Karlene Cruz MD;  Location: [facility];  Service: Surgical Oncology   • GASTRIC BYPASS     • GASTRIC BYPASS     • INFUSION PUMP IMPLANTATION     • LAPAROTOMY N/A 2024    Procedure: DUODENAL EXPLORATION;  Surgeon: Karlene Cruz MD;  Location: [facility];  Service: Surgical Oncology   • KS NDSC WRST SURG W/RLS TRANSVRS CARPL LIGM Right 2022    Procedure: Right endoscopic carpal tunnel release;  Surgeon: Papo Peguero MD;  Location: [facility];  Service: Orthopedics   • SHOULDER SURGERY     • SMALL INTESTINE SURGERY N/A 2024    Procedure: INTRAOPERATIVE ULTRASOUND;  Surgeon: Karlene Cruz MD;  Location: [facility];  Service: Surgical Oncology   • SPINAL CORD STIMULATOR IMPLANT     • WHIPPLE PROCEDURE/PANCREATICO-DUODENECTOMY N/A 2024    Procedure: WHIPPLE;  Surgeon: Karlene Cruz MD;  Location: [facility];  Service: Surgical Oncology     Family History   Problem Relation Age of Onset   • Diabetes Mother         Mellitus   • Hyperlipidemia Mother    • Hypertension Mother    • Colon cancer Mother 78   • Pancreatic cancer Mother 84   • Melanoma Mother 76   • Cancer Father 78        Bladder ca   • Alcohol abuse Father    • Lung cancer Father 78   • Prostate cancer Father 78   • No Known Problems Daughter    • No Known Problems Maternal Grandmother    • No Known Problems Maternal Grandfather    • Stomach cancer Paternal Grandmother         60's   • Lung cancer Paternal Grandfather 76   • Diabetes Brother          Mellitus   • Hyperlipidemia Brother    • Hypertension Brother    • Diabetes Brother    • No Known Problems Brother    • No Known Problems Son    • No Known Problems Son    • Breast cancer Maternal Aunt 32        Unknown age   • No Known Problems Paternal Aunt    • No Known Problems Paternal Aunt    • No Known Problems Paternal Aunt    • No Known Problems Paternal Aunt    • Arthritis Family    • Mental illness Neg Hx      Social History     Socioeconomic History   • Marital status:      Spouse name: Not on file   • Number of children: 3   • Years of education: Not on file   • Highest education level: Not on file   Occupational History   • Occupation: Disability   Tobacco Use   • Smoking status: Former     Current packs/day: 0.00     Average packs/day: 1 pack/day for 34.0 years (34.0 ttl pk-yrs)     Types: Cigarettes     Start date: 1973     Quit date: 2007     Years since quittin.1   • Smokeless tobacco: Never   • Tobacco comments:     15yrs ago   Vaping Use   • Vaping status: Never Used   Substance and Sexual Activity   • Alcohol use: No   • Drug use: No   • Sexual activity: Not Currently     Partners: Male     Birth control/protection: Post-menopausal   Other Topics Concern   • Not on file   Social History Narrative   • Not on file     Social Determinants of Health     Financial Resource Strain: Not on file   Food Insecurity: Not on file   Transportation Needs: Not on file   Physical Activity: Not on file   Stress: Not on file   Social Connections: Not on file   Intimate Partner Violence: Not on file   Housing Stability: Not on file       Current Outpatient Medications:   •  Accu-Chek FastClix Lancets MISC, USE TO TEST BLOOD GLUCOSE  LEVELS TWO TIMES A DAY FOR DIABETES, Disp: 204 each, Rfl: 3  •  albuterol (2.5 mg/3 mL) 0.083 % nebulizer solution, Take 3 mL (2.5 mg total) by nebulization every 6 (six) hours as needed for wheezing or shortness of breath, Disp: 300 mL, Rfl: 1  •  albuterol  (PROVENTIL HFA,VENTOLIN HFA) 90 mcg/act inhaler, USE 2 INHALATIONS ORALLY   EVERY 6 HOURS AS NEEDED FORWHEEZING, Disp: 25.5 g, Rfl: 1  •  amLODIPine (NORVASC) 10 mg tablet, Take 0.5 tablets (5 mg total) by mouth 2 (two) times a day, Disp: 90 tablet, Rfl: 2  •  aspirin (ECOTRIN LOW STRENGTH) 81 mg EC tablet, Take 81 mg by mouth daily, Disp: , Rfl:   •  atorvastatin (LIPITOR) 40 mg tablet, TAKE 1 TABLET DAILY, Disp: 90 tablet, Rfl: 0  •  B-D UF III MINI PEN NEEDLES 31G X 5 MM MISC, USE ONCE DAILY FOR BASAL   INSULIN INJECTION, Disp: 90 each, Rfl: 1  •  Cyanocobalamin (B-12 IJ), Inject as directed every 30 (thirty) days, Disp: , Rfl:   •  Ferrous Fumarate 63 (20 Fe) MG TABS, Take 2 tablets by mouth daily Take two tablets once daily., Disp: , Rfl:   •  Fluticasone-Salmeterol (Advair Diskus) 250-50 mcg/dose inhaler, Inhale 1 puff 2 (two) times a day Rinse mouth after use., Disp: 180 blister, Rfl: 2  •  glucose blood (Accu-Chek Guide) test strip, Test, Disp: 100 strip, Rfl: 3  •  Januvia 100 MG tablet, TAKE 1 TABLET EVERY MORNING, Disp: 90 tablet, Rfl: 3  •  lidocaine (XYLOCAINE) 5 % ointment, apply topically to affected area three times a day, Disp: , Rfl:   •  metFORMIN (GLUCOPHAGE-XR) 500 mg 24 hr tablet, Take 1 tablet (500 mg total) by mouth daily with breakfast, Disp: 90 tablet, Rfl: 0  •  mometasone (NASONEX) 50 mcg/act nasal spray, Use 2 sprays in each nostril daily, Disp: 17 g, Rfl: 1  •  montelukast (SINGULAIR) 10 mg tablet, TAKE 1 TABLET AT BEDTIME, Disp: 90 tablet, Rfl: 1  •  Morphine Sulfate (MORPHINE 0.05 MG/ML SYRINGE, NICU/PICU,, CMPD ORDER,), , Disp: , Rfl:   •  omeprazole (PriLOSEC) 40 MG capsule, Take 1 capsule (40 mg total) by mouth daily, Disp: 90 capsule, Rfl: 3  •  ondansetron (ZOFRAN) 4 mg tablet, Take 1 tablet (4 mg total) by mouth every 8 (eight) hours as needed for nausea or vomiting, Disp: 90 tablet, Rfl: 1  •  traMADol (ULTRAM-ER) 300 MG 24 hr tablet, Take 300 mg by mouth daily, Disp: , Rfl:    •  VITAMIN D PO, , Disp: , Rfl:   •  zolpidem (AMBIEN) 5 mg tablet, TAKE 1 TABLET DAILY AT     BEDTIME AS NEEDED FOR SLEEP, Disp: 90 tablet, Rfl: 0  Allergies   Allergen Reactions   • Nsaids Other (See Comments)     Cannot take NSAIDS secondary to having gastric bypass   • Percocet [Oxycodone-Acetaminophen] GI Intolerance     Vitals:    02/08/24 0930   BP: 118/70   Pulse: (!) 119   Temp: (!) 97.2 °F (36.2 °C)   SpO2: 98%       Physical Exam  Constitutional: Patient is well-developed and well-nourished who appears the stated age in no acute distress. Patient is pleasant and talkative.     HEENT:  Normocephalic.  Sclerae are anicteric. Mucous membranes are moist. Neck is supple without adenopathy. No JVD.     Chest: Equal chest rise, easy WOB  Cardiac: Heart is regular rate.     Abdomen: Abdomen is non-tender, non-distended and without masses.     Extremities: There is no clubbing or cyanosis. There is no edema.  Symmetric.  Neuro: Grossly nonfocal. Gait is normal.     Lymphatic: No evidence of cervical adenopathy bilaterally. No evidence of axillary adenopathy bilaterally. No evidence of inguinal adenopathy bilaterally.     Skin: Warm, anicteric.    Psych:  Patient is pleasant and talkative.    Pathology:  T1a duo adeno    Labs:  Reviewed in [employer] personally    Imaging  No results found.    I personally reviewed and interpreted the above laboratory and imaging data.    Discussion/Summary:   63 yo female s/p whipple and resection of gastric remnant with repair of jej limb (axios removal) for what was ultimately determined to be a T1a duo adenocarcinoma. No further therapy required. Recovering very well. Liberating diet today. She will f/u with pcp in next 2 months for f/u of noncon CT lung nodules and I will see her in 6 mo with repeat scan of A/P. Endoscopic eval to be determined by her GI docs. All questions answered.

## 2024-02-09 ENCOUNTER — TELEPHONE (OUTPATIENT)
Dept: HEMATOLOGY ONCOLOGY | Facility: CLINIC | Age: 64
End: 2024-02-09

## 2024-02-09 DIAGNOSIS — E61.1 IRON DEFICIENCY: Primary | ICD-10-CM

## 2024-02-09 NOTE — TELEPHONE ENCOUNTER
Appointment Change  Cancel, Reschedule, Change to Virtual      Who are you speaking with? Patient   If it is not the patient, is the caller listed on the communication consent form? N/A   Which provider is the appointment scheduled with? Dr. Lind   When was the original appointment scheduled?    Please list date and time 2/26/24 10am   At which location is the appointment scheduled to take place? [place]   Was the appointment rescheduled?     Was the appointment changed from an in person visit to a virtual visit?    If so, please list the details of the change. N/a   What is the reason for the appointment change? She was told she doesn't need treatment or a f/u       Was STAR transport scheduled? N/A   Does STAR transport need to be scheduled for the new visit (if applicable) N/A   Does the patient need an infusion appointment rescheduled? N/A   Does the patient have an upcoming infusion appointment scheduled? If so, when? No   Is the patient undergoing chemotherapy? N/A   For appointments cancelled with less than 24 hours:  Was the no-show policy reviewed? N/A

## 2024-02-09 NOTE — TELEPHONE ENCOUNTER
Appointment Confirmation   Who are you speaking with? Patient   If it is not the patient, are they listed on an active communication consent form? N/A   Which provider is the appointment scheduled with?  Dr. Lind   When is the appointment scheduled?  Please list date and time 02/26/2024 @ 10AM    At which location is the appointment scheduled to take place? [place]   Did caller verbalize understanding of appointment details? Yes

## 2024-02-10 ENCOUNTER — APPOINTMENT (OUTPATIENT)
Dept: LAB | Facility: HOSPITAL | Age: 64
End: 2024-02-10
Payer: COMMERCIAL

## 2024-02-10 DIAGNOSIS — I10 BENIGN ESSENTIAL HYPERTENSION: ICD-10-CM

## 2024-02-10 DIAGNOSIS — E11.9 TYPE 2 DIABETES MELLITUS WITHOUT COMPLICATION, WITHOUT LONG-TERM CURRENT USE OF INSULIN (HCC): ICD-10-CM

## 2024-02-10 DIAGNOSIS — Z98.0 S/P TOTAL GASTRECTOMY AND ROUX-EN-Y ESOPHAGOJEJUNAL ANASTOMOSIS: ICD-10-CM

## 2024-02-10 DIAGNOSIS — K31.89 GASTROPTOSIS: ICD-10-CM

## 2024-02-10 DIAGNOSIS — E61.1 IRON DEFICIENCY: ICD-10-CM

## 2024-02-10 DIAGNOSIS — Z90.3 S/P TOTAL GASTRECTOMY AND ROUX-EN-Y ESOPHAGOJEJUNAL ANASTOMOSIS: ICD-10-CM

## 2024-02-10 LAB
ALBUMIN SERPL BCP-MCNC: 3.4 G/DL (ref 3.5–5)
ALP SERPL-CCNC: 93 U/L (ref 34–104)
ALT SERPL W P-5'-P-CCNC: 10 U/L (ref 7–52)
ANION GAP SERPL CALCULATED.3IONS-SCNC: 11 MMOL/L
AST SERPL W P-5'-P-CCNC: 15 U/L (ref 13–39)
BASOPHILS # BLD AUTO: 0.05 THOUSANDS/ÂΜL (ref 0–0.1)
BASOPHILS NFR BLD AUTO: 0 % (ref 0–1)
BILIRUB SERPL-MCNC: 0.26 MG/DL (ref 0.2–1)
BUN SERPL-MCNC: 13 MG/DL (ref 5–25)
CALCIUM ALBUM COR SERPL-MCNC: 9.6 MG/DL (ref 8.3–10.1)
CALCIUM SERPL-MCNC: 9.1 MG/DL (ref 8.4–10.2)
CEA SERPL-MCNC: 2.7 NG/ML (ref 0–3)
CHLORIDE SERPL-SCNC: 100 MMOL/L (ref 96–108)
CO2 SERPL-SCNC: 28 MMOL/L (ref 21–32)
CREAT SERPL-MCNC: 0.71 MG/DL (ref 0.6–1.3)
EOSINOPHIL # BLD AUTO: 0.18 THOUSAND/ÂΜL (ref 0–0.61)
EOSINOPHIL NFR BLD AUTO: 1 % (ref 0–6)
ERYTHROCYTE [DISTWIDTH] IN BLOOD BY AUTOMATED COUNT: 16.9 % (ref 11.6–15.1)
FERRITIN SERPL-MCNC: 71 NG/ML (ref 11–307)
GFR SERPL CREATININE-BSD FRML MDRD: 90 ML/MIN/1.73SQ M
GLUCOSE P FAST SERPL-MCNC: 126 MG/DL (ref 65–99)
HCT VFR BLD AUTO: 28.5 % (ref 34.8–46.1)
HGB BLD-MCNC: 8.4 G/DL (ref 11.5–15.4)
IMM GRANULOCYTES # BLD AUTO: 0.13 THOUSAND/UL (ref 0–0.2)
IMM GRANULOCYTES NFR BLD AUTO: 1 % (ref 0–2)
IRON SATN MFR SERPL: 7 % (ref 15–50)
IRON SERPL-MCNC: 15 UG/DL (ref 50–212)
LYMPHOCYTES # BLD AUTO: 1.46 THOUSANDS/ÂΜL (ref 0.6–4.47)
LYMPHOCYTES NFR BLD AUTO: 10 % (ref 14–44)
MCH RBC QN AUTO: 23.6 PG (ref 26.8–34.3)
MCHC RBC AUTO-ENTMCNC: 29.5 G/DL (ref 31.4–37.4)
MCV RBC AUTO: 80 FL (ref 82–98)
MONOCYTES # BLD AUTO: 0.75 THOUSAND/ÂΜL (ref 0.17–1.22)
MONOCYTES NFR BLD AUTO: 5 % (ref 4–12)
NEUTROPHILS # BLD AUTO: 11.98 THOUSANDS/ÂΜL (ref 1.85–7.62)
NEUTS SEG NFR BLD AUTO: 83 % (ref 43–75)
NRBC BLD AUTO-RTO: 0 /100 WBCS
PLATELET # BLD AUTO: 456 THOUSANDS/UL (ref 149–390)
PMV BLD AUTO: 10.6 FL (ref 8.9–12.7)
POTASSIUM SERPL-SCNC: 3.9 MMOL/L (ref 3.5–5.3)
PROT SERPL-MCNC: 7.4 G/DL (ref 6.4–8.4)
RBC # BLD AUTO: 3.56 MILLION/UL (ref 3.81–5.12)
SODIUM SERPL-SCNC: 139 MMOL/L (ref 135–147)
TIBC SERPL-MCNC: 208 UG/DL (ref 250–450)
UIBC SERPL-MCNC: 193 UG/DL (ref 155–355)
WBC # BLD AUTO: 14.55 THOUSAND/UL (ref 4.31–10.16)

## 2024-02-10 PROCEDURE — 83550 IRON BINDING TEST: CPT

## 2024-02-10 PROCEDURE — 36415 COLL VENOUS BLD VENIPUNCTURE: CPT

## 2024-02-10 PROCEDURE — 83540 ASSAY OF IRON: CPT

## 2024-02-10 PROCEDURE — 82378 CARCINOEMBRYONIC ANTIGEN: CPT

## 2024-02-10 PROCEDURE — 85025 COMPLETE CBC W/AUTO DIFF WBC: CPT

## 2024-02-10 PROCEDURE — 80053 COMPREHEN METABOLIC PANEL: CPT

## 2024-02-10 PROCEDURE — 82728 ASSAY OF FERRITIN: CPT

## 2024-02-10 PROCEDURE — 83036 HEMOGLOBIN GLYCOSYLATED A1C: CPT

## 2024-02-11 LAB
EST. AVERAGE GLUCOSE BLD GHB EST-MCNC: 140 MG/DL
HBA1C MFR BLD: 6.5 %

## 2024-02-13 LAB
DME PARACHUTE DELIVERY DATE ACTUAL: NORMAL
DME PARACHUTE DELIVERY DATE REQUESTED: NORMAL
DME PARACHUTE DELIVERY NOTE: NORMAL
DME PARACHUTE ITEM DESCRIPTION: NORMAL
DME PARACHUTE ORDER STATUS: NORMAL
DME PARACHUTE SUPPLIER NAME: NORMAL
DME PARACHUTE SUPPLIER PHONE: NORMAL

## 2024-02-26 ENCOUNTER — OFFICE VISIT (OUTPATIENT)
Age: 64
End: 2024-02-26
Payer: COMMERCIAL

## 2024-02-26 ENCOUNTER — TELEPHONE (OUTPATIENT)
Dept: GASTROENTEROLOGY | Facility: CLINIC | Age: 64
End: 2024-02-26

## 2024-02-26 ENCOUNTER — TELEPHONE (OUTPATIENT)
Age: 64
End: 2024-02-26

## 2024-02-26 VITALS
TEMPERATURE: 97.4 F | RESPIRATION RATE: 18 BRPM | OXYGEN SATURATION: 96 % | WEIGHT: 145 LBS | DIASTOLIC BLOOD PRESSURE: 69 MMHG | HEIGHT: 64 IN | HEART RATE: 83 BPM | SYSTOLIC BLOOD PRESSURE: 108 MMHG | BODY MASS INDEX: 24.75 KG/M2

## 2024-02-26 DIAGNOSIS — Z98.0 S/P TOTAL GASTRECTOMY AND ROUX-EN-Y ESOPHAGOJEJUNAL ANASTOMOSIS: ICD-10-CM

## 2024-02-26 DIAGNOSIS — E61.1 IRON DEFICIENCY: ICD-10-CM

## 2024-02-26 DIAGNOSIS — E53.8 B12 DEFICIENCY: ICD-10-CM

## 2024-02-26 DIAGNOSIS — R91.1 LUNG NODULE: ICD-10-CM

## 2024-02-26 DIAGNOSIS — C17.0 ADENOCARCINOMA OF DUODENUM (HCC): Primary | ICD-10-CM

## 2024-02-26 DIAGNOSIS — Z90.3 S/P TOTAL GASTRECTOMY AND ROUX-EN-Y ESOPHAGOJEJUNAL ANASTOMOSIS: ICD-10-CM

## 2024-02-26 PROCEDURE — 99214 OFFICE O/P EST MOD 30 MIN: CPT | Performed by: INTERNAL MEDICINE

## 2024-02-26 RX ORDER — SODIUM CHLORIDE 9 MG/ML
20 INJECTION, SOLUTION INTRAVENOUS ONCE
OUTPATIENT
Start: 2024-02-27

## 2024-02-26 NOTE — TELEPHONE ENCOUNTER
What would be a preferred day of the week that would work best for your infusion appointment? Any day  Do you prefer mornings or afternoons for your appointments? PM  Are there any days or dates that do not work for your schedule, including any upcoming vacations? 2/27, 3/7, 3/21  We are going to try our best to schedule you at the infusion center closest to your home.  In the event that we are unable to what would be your next preferred infusion site or sites? [facility]  Do you have transportation to take you to all of your appointments? yes

## 2024-02-26 NOTE — PROGRESS NOTES
[facility]ATOLOGY [facility]  [address]  [facility] [address]  [facility] [id number] [phone]     Date of Visit: 2/26/2024  Name: Aleida Hammond   YOB: 1960         Assessment/Plan    This is a 64-year-old lady with a history of gastric bypass found to have a duodenal mass, s/p Whipple surgery on January 22, 2024 showing a small focus of adenocarcinoma arising from a duodenal adenomatous polyp with focal high-grade dysplasia.  Tumor invading the muscularis mucosa, pT1a.  Margins negative.  0 out of 17 lymph nodes were positive.  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  Complete gastrectomy was also performed showing chronic inactive gastritis and prepyloric ulcer.  0/3 lymph nodes positive.    Reviewed pathology in detail with patient today.  Stage I adenocarcinoma of the duodenum, pT1a pN0 disease.  No role for adjuvant therapy.  Continue follow-up with GI for ongoing surveillance.    Continue vitamin B12 injections monthly.  Iron studies from February 10, 2024 shows an iron saturation of only 7%, iron 15.  Will arrange for IV iron.  side effects of IV iron were discussed with the patient today, including risk of reaction. May stop PO iron which she is probably not absorbing at this time.    CT chest September 2019 reports 2 new left upper lobe pulmonary nodules measuring 0.5 and 0.4 cm.  Of note, patient is a former smoker.  Will obtain a repeat CT chest without contrast.    Return to clinic in 2 months for ongoing follow-up with Marie BUENRSOTRO    All the patient's questions were answered to their apparent satisfaction.  Patient has been strongly urged to call with any questions or concerns.     Total time spent reviewing EMR, seeing patient in clinic visit, documenting visit, placing treatment orders, and communicating with other medical providers:   25 mins      Oncology History   Adenocarcinoma of  pancreas (HCC) (Resolved)   1/22/2024 Surgery    A. Lymph Node, Portacaval lymph node:  - One lymph node, negative for malignancy (0/1).     B. Pancreas, Whipple:  - Small focus of adenocarcinoma arising in duodenal adenomatous polyp with focal high grade dysplasia.  - Tumor invades muscularis mucosa (lamina propria, pT1a)  - Ampulla involved by low grade dysplasia.  - Pancreatic intraepitheliel neoplasia (PanIN), involving pancreatic resection margin.  - All margins are negative for carcinoma or high grade dysplasia.  - Seventeen lymph nodes, negative for malignancy (0/17).      C. Stomach, Completion gastrectomy:  - Stomach with chronic inactive gastritis and prepyloric ulcer.  - Negative for dysplasia or malignancy.  - Three lymph nodes, negative for malignancy (0/3).     2/6/2024 Initial Diagnosis    Adenocarcinoma of pancreas (HCC)     Adenocarcinoma of duodenum (HCC)   2/8/2024 Initial Diagnosis    Adenocarcinoma of duodenum (HCC)     2/8/2024 -  Cancer Staged    Staging form: Small Intestine - Adenocarcinoma, AJCC 8th Edition  - Pathologic: Stage I (pT1a, pN0, cM0) - Signed by Karlene Cruz MD on 2/8/2024  Total positive nodes: 0          Cancer Staging   No matching staging information was found for the patient.     Treatment Details   Treatment goal [No plan goal]   Plan Name VITAMIN B12 INJECTION (CYANOCOBALAMIN)   Status Active   Start Date 6/27/2023   End Date Until discontinued   Provider CHITRA Mancini   Chemotherapy cyanocobalamin, 1,000 mcg, Intramuscular, Once, 1 of 1 cycle  Administration: 1,000 mcg (6/27/2023), 1,000 mcg (7/26/2023), 1,000 mcg (8/24/2023), 1,000 mcg (9/21/2023), 1,000 mcg (11/9/2023), 1,000 mcg (12/7/2023), 1,000 mcg (1/10/2024)       Treatment Details   Treatment goal [No plan goal]   Plan Name VENOFER + B12 COMBINATION   Status Inactive   Start Date 6/28/2023   End Date    Provider CHITRA Mancini   Chemotherapy [No matching medication found in this  treatment plan]     Treatment Details   Treatment goal [No plan goal]   Plan Name IRON SUCROSE (VENOFER) TWICE WEEKLY   Status Inactive   Start Date 7/13/2023   End Date 8/24/2023   Provider CHITRA Mancini   Chemotherapy iron sucrose (VENOFER), 200 mg, Intravenous, Once, 1 of 1 cycle  Administration: 200 mg (7/13/2023), 200 mg (7/17/2023), 200 mg (7/19/2023), 200 mg (7/26/2023), 200 mg (7/31/2023), 200 mg (8/8/2023), 200 mg (8/10/2023), 200 mg (8/24/2023)          HISTORY OF PRESENT ILLNESS:   Aleida Hammond is a 64 y.o. 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  Patient states that over the last 2 years, she has experienced over 50 pound weight loss, nausea, poor appetite and abdominal discomfort.    She previously followed in our office for iron deficiency anemia and vitamin B12 injections, which she continues to get the latter monthly.    She had a CT chest on September 29, 2023 which showed  2 new adjacent left upper lobe pulmonary nodules measuring 0.5 and 0.4 cm.  CT abdomen and pelvis from October 7 showed Masslike soft tissue density filling the duodenal lumen measuring approximately 6.7 x 3.1 cm concerning for indeterminate duodenal mass or polypoid lesion.  On October 28, she underwent an EUS with Axios stent placement.  A repeat EUS from November 2018 showed a large villous appearing mass in the second part of the duodenum which was biopsied.  Biopsy was negative for malignancy.    s/p Whipple surgery on January 22, 2024 showing a small focus of adenocarcinoma arising from a duodenal adenomatous polyp with focal high-grade dysplasia.  Tumor invading the muscularis mucosa, pT1a.  Margins negative.  0 out of 17 lymph nodes were positive.  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  Complete gastrectomy was also performed showing chronic inactive gastritis and prepyloric ulcer.   0/3 lymph nodes positive.    Here for f/u visit    Subjective  Offers no new complaints  Healing well from surgery    REVIEW OF SYSTEMS:  14 point review of systems is otherwise negative.      Current Outpatient Medications:     albuterol (2.5 mg/3 mL) 0.083 % nebulizer solution, Take 3 mL (2.5 mg total) by nebulization every 6 (six) hours as needed for wheezing or shortness of breath, Disp: 300 mL, Rfl: 1    albuterol (PROVENTIL HFA,VENTOLIN HFA) 90 mcg/act inhaler, USE 2 INHALATIONS ORALLY   EVERY 6 HOURS AS NEEDED FORWHEEZING, Disp: 25.5 g, Rfl: 1    amLODIPine (NORVASC) 10 mg tablet, Take 0.5 tablets (5 mg total) by mouth 2 (two) times a day, Disp: 90 tablet, Rfl: 2    aspirin (ECOTRIN LOW STRENGTH) 81 mg EC tablet, Take 81 mg by mouth daily, Disp: , Rfl:     atorvastatin (LIPITOR) 40 mg tablet, TAKE 1 TABLET DAILY, Disp: 90 tablet, Rfl: 0    Cyanocobalamin (B-12 IJ), Inject as directed every 30 (thirty) days, Disp: , Rfl:     Ferrous Fumarate 63 (20 Fe) MG TABS, Take 2 tablets by mouth daily Take two tablets once daily., Disp: , Rfl:     Fluticasone-Salmeterol (Advair Diskus) 250-50 mcg/dose inhaler, Inhale 1 puff 2 (two) times a day Rinse mouth after use., Disp: 180 blister, Rfl: 2    Januvia 100 MG tablet, TAKE 1 TABLET EVERY MORNING, Disp: 90 tablet, Rfl: 3    lidocaine (XYLOCAINE) 5 % ointment, apply topically to affected area three times a day, Disp: , Rfl:     metFORMIN (GLUCOPHAGE-XR) 500 mg 24 hr tablet, Take 1 tablet (500 mg total) by mouth daily with breakfast, Disp: 90 tablet, Rfl: 0    mometasone (NASONEX) 50 mcg/act nasal spray, Use 2 sprays in each nostril daily, Disp: 17 g, Rfl: 1    montelukast (SINGULAIR) 10 mg tablet, TAKE 1 TABLET AT BEDTIME, Disp: 90 tablet, Rfl: 1    Morphine Sulfate (MORPHINE 0.05 MG/ML SYRINGE, NICU/PICU,, CMPD ORDER,), , Disp: , Rfl:     omeprazole (PriLOSEC) 40 MG capsule, Take 1 capsule (40 mg total) by mouth daily, Disp: 90 capsule, Rfl: 3    ondansetron (ZOFRAN) 4 mg  tablet, Take 1 tablet (4 mg total) by mouth every 8 (eight) hours as needed for nausea or vomiting, Disp: 90 tablet, Rfl: 1    traMADol (ULTRAM-ER) 300 MG 24 hr tablet, Take 300 mg by mouth daily, Disp: , Rfl:     VITAMIN D PO, , Disp: , Rfl:     zolpidem (AMBIEN) 5 mg tablet, TAKE 1 TABLET DAILY AT     BEDTIME AS NEEDED FOR SLEEP, Disp: 90 tablet, Rfl: 0    Accu-Chek FastClix Lancets MISC, USE TO TEST BLOOD GLUCOSE  LEVELS TWO TIMES A DAY FOR DIABETES, Disp: 204 each, Rfl: 3    B-D UF III MINI PEN NEEDLES 31G X 5 MM MISC, USE ONCE DAILY FOR BASAL   INSULIN INJECTION, Disp: 90 each, Rfl: 1    glucose blood (Accu-Chek Guide) test strip, Test, Disp: 100 strip, Rfl: 3     Allergies   Allergen Reactions    Nsaids Other (See Comments)     Cannot take NSAIDS secondary to having gastric bypass    Percocet [Oxycodone-Acetaminophen] GI Intolerance        ACTIVE PROBLEMS:  Patient Active Problem List   Diagnosis    Benign essential hypertension    Mild persistent asthma without complication    Peripheral polyneuropathy    Chronic bilateral back pain    Chronic lumbar radiculopathy    Chronic cervical pain    Chronic thoracic spine pain    GERD without esophagitis    Hyperlipidemia associated with type 2 diabetes mellitus     Insomnia    B12 deficiency    Lumbar radiculopathy    Thoracic spondylosis without myelopathy    Thoracic and lumbosacral neuritis    Degeneration of lumbar intervertebral disc    Lumbar spondylosis    Pain in joint involving pelvic region and thigh    Iron deficiency    Memory dysfunction    COVID-19 vaccine series completed    Type 2 diabetes mellitus with diabetic neuropathy, with long-term current use of insulin (HCC)    Narcotic dependency, continuous (HCC)    Carpal tunnel syndrome on right    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    Microcytic anemia    S/P endoscopic carpal tunnel release    Pillar pain, post-operative    PONV (postoperative nausea and vomiting)     Duodenal mass    Malignant neoplasm of spinal cord (HCC)    Adenocarcinoma of duodenum (HCC)          Past Medical History:   Diagnosis Date    PABLO (acute kidney injury) (HCC) 2021    Allergic     Anemia     Anesthesia complication     Aspiration pneumonia after     Anxiety     Arthritis     Asthma     Uses Albuterol daily    Breast lump     Resolved: 2017     Chronic pain     back    Chronic pain disorder     Back, legs    Diabetes mellitus (HCC)     Diverticulitis of colon     Dizziness     GERD (gastroesophageal reflux disease)     Hepatitis     History of stomach ulcers     HL (hearing loss)     Estimated    Hyperlipidemia     Hypertension     Hypotension 2021    Infectious viral hepatitis     Hepatitiss A    Memory loss     Estimated    Pneumonia     aspiration pneumonia    PONV (postoperative nausea and vomiting)     Stomach problems         Past Surgical History:   Procedure Laterality Date    APPENDECTOMY      BACK SURGERY      CATARACT EXTRACTION, BILATERAL       SECTION      CHOLECYSTECTOMY      GASTRECTOMY N/A 2024    Procedure: COMPLETION GASTRECTOMY;  Surgeon: Karlene Cruz MD;  Location: [facility];  Service: Surgical Oncology    GASTRIC BYPASS      GASTRIC BYPASS  2010    INFUSION PUMP IMPLANTATION      LAPAROTOMY N/A 2024    Procedure: DUODENAL EXPLORATION;  Surgeon: Karlene Cruz MD;  Location: [facility];  Service: Surgical Oncology    MN NDSC WRST SURG W/RLS TRANSVRS CARPL LIGM Right 2022    Procedure: Right endoscopic carpal tunnel release;  Surgeon: Papo Peguero MD;  Location: [facility];  Service: Orthopedics    SHOULDER SURGERY      SMALL INTESTINE SURGERY N/A 2024    Procedure: INTRAOPERATIVE ULTRASOUND;  Surgeon: Karlene Cruz MD;  Location: [facility];  Service: Surgical Oncology    SPINAL CORD STIMULATOR IMPLANT      WHIPPLE PROCEDURE/PANCREATICO-DUODENECTOMY N/A 2024    Procedure: WHIPPLE;   Surgeon: Karlene Cruz MD;  Location: [facility];  Service: Surgical Oncology        Social History     Socioeconomic History    Marital status:      Spouse name: Not on file    Number of children: 3    Years of education: Not on file    Highest education level: Not on file   Occupational History    Occupation: Disability   Tobacco Use    Smoking status: Former     Current packs/day: 0.00     Average packs/day: 1 pack/day for 34.0 years (34.0 ttl pk-yrs)     Types: Cigarettes     Start date: 1973     Quit date: 2007     Years since quittin.1    Smokeless tobacco: Never    Tobacco comments:     15yrs ago   Vaping Use    Vaping status: Never Used   Substance and Sexual Activity    Alcohol use: No    Drug use: No    Sexual activity: Not Currently     Partners: Male     Birth control/protection: Post-menopausal   Other Topics Concern    Not on file   Social History Narrative    Not on file     Social Determinants of Health     Financial Resource Strain: Not on file   Food Insecurity: Not on file   Transportation Needs: Not on file   Physical Activity: Not on file   Stress: Not on file   Social Connections: Not on file   Intimate Partner Violence: Not on file   Housing Stability: Not on file        Family History   Problem Relation Age of Onset    Diabetes Mother         Mellitus    Hyperlipidemia Mother     Hypertension Mother     Colon cancer Mother 78    Pancreatic cancer Mother 84    Melanoma Mother 76    Cancer Father 78        Bladder ca    Alcohol abuse Father     Lung cancer Father 78    Prostate cancer Father 78    No Known Problems Daughter     No Known Problems Maternal Grandmother     No Known Problems Maternal Grandfather     Stomach cancer Paternal Grandmother         60's    Lung cancer Paternal Grandfather 76    Diabetes Brother         Mellitus    Hyperlipidemia Brother     Hypertension Brother     Diabetes Brother     No Known Problems Brother     No Known Problems Son     No  "Known Problems Son     Breast cancer Maternal Aunt 32        Unknown age    No Known Problems Paternal Aunt     No Known Problems Paternal Aunt     No Known Problems Paternal Aunt     No Known Problems Paternal Aunt     Arthritis Family     Mental illness Neg Hx         Objective        Physical Exam  ECOG performance status: 0  Blood pressure 108/69, pulse 83, temperature (!) 97.4 °F (36.3 °C), temperature source Temporal, resp. rate 18, height 5' 4\" (1.626 m), weight 65.8 kg (145 lb), SpO2 96%.     General appearance: Not in acute distress, well appearing and well nourished.  Alert and oriented.  She appears pale.        I reviewed lab data in the chart as noted above.  Additional labs as noted below    WBC   Date Value Ref Range Status   02/10/2024 14.55 (H) 4.31 - 10.16 Thousand/uL Final   01/26/2024 10.89 (H) 4.31 - 10.16 Thousand/uL Final   01/25/2024 15.01 (H) 4.31 - 10.16 Thousand/uL Final     Hemoglobin   Date Value Ref Range Status   02/10/2024 8.4 (L) 11.5 - 15.4 g/dL Final   01/26/2024 7.1 (L) 11.5 - 15.4 g/dL Final   01/25/2024 7.6 (L) 11.5 - 15.4 g/dL Final     Platelets   Date Value Ref Range Status   02/10/2024 456 (H) 149 - 390 Thousands/uL Final   01/26/2024 258 149 - 390 Thousands/uL Final   01/25/2024 275 149 - 390 Thousands/uL Final     MCV   Date Value Ref Range Status   02/10/2024 80 (L) 82 - 98 fL Final   01/26/2024 83 82 - 98 fL Final   01/25/2024 84 82 - 98 fL Final      Sodium   Date Value Ref Range Status   11/04/2017 143 135 - 146 mmol/L Final   07/22/2017 142 135 - 146 mmol/L Final     Potassium   Date Value Ref Range Status   02/10/2024 3.9 3.5 - 5.3 mmol/L Final   01/26/2024 3.8 3.5 - 5.3 mmol/L Final   01/25/2024 4.0 3.5 - 5.3 mmol/L Final   09/07/2023 4.8 3.5 - 5.3 mmol/L Final   06/23/2023 4.4 3.5 - 5.3 mmol/L Final   03/07/2023 4.1 3.5 - 5.3 mmol/L Final     Chloride   Date Value Ref Range Status   02/10/2024 100 96 - 108 mmol/L Final   01/26/2024 104 96 - 108 mmol/L Final "   01/25/2024 103 96 - 108 mmol/L Final   09/07/2023 103 98 - 110 mmol/L Final   06/23/2023 103 98 - 110 mmol/L Final   03/07/2023 102 98 - 110 mmol/L Final     CO2   Date Value Ref Range Status   02/10/2024 28 21 - 32 mmol/L Final   01/26/2024 28 21 - 32 mmol/L Final   01/25/2024 30 21 - 32 mmol/L Final   09/07/2023 28 20 - 32 mmol/L Final   06/23/2023 28 20 - 32 mmol/L Final   03/07/2023 31 20 - 32 mmol/L Final     CO2, i-STAT   Date Value Ref Range Status   01/22/2024 25 21 - 32 mmol/L Final   01/22/2024 24 21 - 32 mmol/L Final   01/22/2024 25 21 - 32 mmol/L Final     BUN   Date Value Ref Range Status   02/10/2024 13 5 - 25 mg/dL Final   01/26/2024 13 5 - 25 mg/dL Final   01/25/2024 10 5 - 25 mg/dL Final   09/07/2023 30 (H) 7 - 25 mg/dL Final   06/23/2023 24 7 - 25 mg/dL Final   03/07/2023 26 (H) 7 - 25 mg/dL Final     Creatinine   Date Value Ref Range Status   02/10/2024 0.71 0.60 - 1.30 mg/dL Final     Comment:     Standardized to IDMS reference method   01/26/2024 0.66 0.60 - 1.30 mg/dL Final     Comment:     Standardized to IDMS reference method   01/25/2024 0.57 (L) 0.60 - 1.30 mg/dL Final     Comment:     Standardized to IDMS reference method   11/04/2017 0.63 0.50 - 1.05 mg/dL Final     Comment:     Result Comment: For patients >49 years of age, the reference limit  for Creatinine is approximately 13% higher for people  identified as -American.     07/22/2017 0.69 0.50 - 1.05 mg/dL Final     Comment:     Result Comment: For patients >49 years of age, the reference limit  for Creatinine is approximately 13% higher for people  identified as -American.       Glucose   Date Value Ref Range Status   01/26/2024 121 65 - 140 mg/dL Final     Comment:     If the patient is fasting, the ADA then defines impaired fasting glucose as > 100 mg/dL and diabetes as > or equal to 123 mg/dL.   01/25/2024 165 (H) 65 - 140 mg/dL Final     Comment:     If the patient is fasting, the ADA then defines impaired  fasting glucose as > 100 mg/dL and diabetes as > or equal to 123 mg/dL.   01/24/2024 135 65 - 140 mg/dL Final     Comment:     If the patient is fasting, the ADA then defines impaired fasting glucose as > 100 mg/dL and diabetes as > or equal to 123 mg/dL.     Glucose, Random   Date Value Ref Range Status   09/07/2023 78 65 - 99 mg/dL Final     Comment:                   Fasting reference interval        06/23/2023 78 65 - 139 mg/dL Final     Comment:               Non-fasting reference interval        03/07/2023 99 65 - 99 mg/dL Final     Comment:                   Fasting reference interval          eGFR    Date Value Ref Range Status   10/13/2021 109 > OR = 60 mL/min/1.73m2 Final   06/15/2021 116 > OR = 60 mL/min/1.73m2 Final   03/16/2021 110 > OR = 60 mL/min/1.73m2 Final     Calcium   Date Value Ref Range Status   02/10/2024 9.1 8.4 - 10.2 mg/dL Final   01/26/2024 8.7 8.4 - 10.2 mg/dL Final   01/25/2024 9.1 8.4 - 10.2 mg/dL Final   09/07/2023 9.5 8.6 - 10.4 mg/dL Final   06/23/2023 9.4 8.6 - 10.4 mg/dL Final   03/07/2023 9.4 8.6 - 10.4 mg/dL Final     Total Protein   Date Value Ref Range Status   07/22/2017 6.6 6.1 - 8.1 g/dL Final     Albumin   Date Value Ref Range Status   02/10/2024 3.4 (L) 3.5 - 5.0 g/dL Final   01/26/2024 3.2 (L) 3.5 - 5.0 g/dL Final   01/24/2024 3.2 (L) 3.5 - 5.0 g/dL Final   07/22/2017 4.0 3.6 - 5.1 g/dL Final     Total Bilirubin   Date Value Ref Range Status   02/10/2024 0.26 0.20 - 1.00 mg/dL Final     Comment:     Use of this assay is not recommended for patients undergoing treatment with eltrombopag due to the potential for falsely elevated results.  N-acetyl-p-benzoquinone imine (metabolite of Acetaminophen) will generate erroneously low results in samples for patients that have taken an overdose of Acetaminophen.   01/26/2024 0.28 0.20 - 1.00 mg/dL Final     Comment:     Use of this assay is not recommended for patients undergoing treatment with eltrombopag due to the  potential for falsely elevated results.  N-acetyl-p-benzoquinone imine (metabolite of Acetaminophen) will generate erroneously low results in samples for patients that have taken an overdose of Acetaminophen.   01/24/2024 0.30 0.20 - 1.00 mg/dL Final     Comment:     Use of this assay is not recommended for patients undergoing treatment with eltrombopag due to the potential for falsely elevated results.  N-acetyl-p-benzoquinone imine (metabolite of Acetaminophen) will generate erroneously low results in samples for patients that have taken an overdose of Acetaminophen.     TOTAL BILIRUBIN   Date Value Ref Range Status   09/07/2023 0.5 0.2 - 1.2 mg/dL Final   06/23/2023 0.3 0.2 - 1.2 mg/dL Final   03/07/2023 0.4 0.2 - 1.2 mg/dL Final     Alkaline Phosphatase   Date Value Ref Range Status   02/10/2024 93 34 - 104 U/L Final   01/26/2024 94 34 - 104 U/L Final   01/24/2024 92 34 - 104 U/L Final   09/07/2023 60 37 - 153 U/L Final   06/23/2023 71 37 - 153 U/L Final   03/07/2023 67 37 - 153 U/L Final     AST   Date Value Ref Range Status   02/10/2024 15 13 - 39 U/L Final   01/26/2024 17 13 - 39 U/L Final   01/24/2024 69 (H) 13 - 39 U/L Final   09/07/2023 23 10 - 35 U/L Final   06/23/2023 26 10 - 35 U/L Final   03/07/2023 25 10 - 35 U/L Final     ALT   Date Value Ref Range Status   02/10/2024 10 7 - 52 U/L Final     Comment:     Specimen collection should occur prior to Sulfasalazine administration due to the potential for falsely depressed results.    01/26/2024 52 7 - 52 U/L Final     Comment:     Specimen collection should occur prior to Sulfasalazine administration due to the potential for falsely depressed results.    01/24/2024 117 (H) 7 - 52 U/L Final     Comment:     Specimen collection should occur prior to Sulfasalazine administration due to the potential for falsely depressed results.    09/07/2023 22 6 - 29 U/L Final   06/23/2023 24 6 - 29 U/L Final   03/07/2023 22 6 - 29 U/L Final      No results found for:  "\"LDH\"  No results found for: \"TSH\"  No results found for: \"A2CRGGH\"   No results found for: \"FREET4\"      RECENT IMAGING:  No results found.       Portions of the record may have been created with voice recognition software.  Occasional wrong word or \"sound a like\" substitutions may have occurred due to the inherent limitations of voice recognition software.  Read the chart carefully and recognize, using context, where substitutions have occurred.    "

## 2024-02-26 NOTE — TELEPHONE ENCOUNTER
----- Message from Kristopher Walden DO sent at 2/26/2024 12:51 PM EST -----  Regarding: Office follow up  Can patient please have follow up with me in 6 months?

## 2024-02-29 ENCOUNTER — TELEPHONE (OUTPATIENT)
Dept: NEUROLOGY | Facility: CLINIC | Age: 64
End: 2024-02-29

## 2024-03-01 ENCOUNTER — TELEPHONE (OUTPATIENT)
Dept: HEMATOLOGY ONCOLOGY | Facility: CLINIC | Age: 64
End: 2024-03-01

## 2024-03-01 NOTE — TELEPHONE ENCOUNTER
Patient Call    Who are you speaking with? Patient    If it is not the patient, are they listed on an active communication consent form? Yes   What is the reason for this call? Patient is needing help to see if her new insurance she is getting would cover her infusions   Does this require a call back? Yes   If a call back is required, please list best call back number [phone]   If a call back is required, advise that a message will be forwarded to their care team and someone will return their call as soon as possible.   Did you relay this information to the patient? Yes

## 2024-03-05 ENCOUNTER — TELEPHONE (OUTPATIENT)
Age: 64
End: 2024-03-05

## 2024-03-05 DIAGNOSIS — Z90.3 S/P TOTAL GASTRECTOMY AND ROUX-EN-Y ESOPHAGOJEJUNAL ANASTOMOSIS: ICD-10-CM

## 2024-03-05 DIAGNOSIS — Z98.0 S/P TOTAL GASTRECTOMY AND ROUX-EN-Y ESOPHAGOJEJUNAL ANASTOMOSIS: ICD-10-CM

## 2024-03-05 DIAGNOSIS — E61.1 IRON DEFICIENCY: Primary | ICD-10-CM

## 2024-03-05 RX ORDER — SODIUM CHLORIDE 9 MG/ML
20 INJECTION, SOLUTION INTRAVENOUS ONCE
Status: CANCELLED | OUTPATIENT
Start: 2024-03-11

## 2024-03-05 NOTE — TELEPHONE ENCOUNTER
Venofer was added to plan in place of feraheme due to insurance preference. Patient called to make aware of this change and is agreeable.

## 2024-03-07 ENCOUNTER — TELEPHONE (OUTPATIENT)
Dept: HEMATOLOGY ONCOLOGY | Facility: CLINIC | Age: 64
End: 2024-03-07

## 2024-03-07 ENCOUNTER — OFFICE VISIT (OUTPATIENT)
Dept: NEUROLOGY | Facility: CLINIC | Age: 64
End: 2024-03-07
Payer: COMMERCIAL

## 2024-03-07 VITALS
BODY MASS INDEX: 24.75 KG/M2 | SYSTOLIC BLOOD PRESSURE: 94 MMHG | HEART RATE: 84 BPM | WEIGHT: 145 LBS | DIASTOLIC BLOOD PRESSURE: 58 MMHG | HEIGHT: 64 IN | TEMPERATURE: 97.7 F

## 2024-03-07 DIAGNOSIS — R41.3 OTHER AMNESIA: ICD-10-CM

## 2024-03-07 DIAGNOSIS — E53.8 B12 DEFICIENCY: Primary | ICD-10-CM

## 2024-03-07 DIAGNOSIS — M54.16 LUMBAR RADICULOPATHY: ICD-10-CM

## 2024-03-07 DIAGNOSIS — R41.3 MEMORY DYSFUNCTION: ICD-10-CM

## 2024-03-07 DIAGNOSIS — G62.9 POLYNEUROPATHY, UNSPECIFIED: ICD-10-CM

## 2024-03-07 DIAGNOSIS — G62.9 PERIPHERAL POLYNEUROPATHY: Primary | ICD-10-CM

## 2024-03-07 PROCEDURE — 99215 OFFICE O/P EST HI 40 MIN: CPT | Performed by: PSYCHIATRY & NEUROLOGY

## 2024-03-07 RX ORDER — CYANOCOBALAMIN 1000 UG/ML
1000 INJECTION, SOLUTION INTRAMUSCULAR; SUBCUTANEOUS ONCE
Status: CANCELLED | OUTPATIENT
Start: 2024-03-11

## 2024-03-07 RX ORDER — HYDROCODONE BITARTRATE AND ACETAMINOPHEN 5; 325 MG/1; MG/1
TABLET ORAL
COMMUNITY
Start: 2024-02-08

## 2024-03-07 RX ORDER — PREGABALIN 200 MG/1
200 CAPSULE ORAL 3 TIMES DAILY
COMMUNITY
Start: 2024-02-08

## 2024-03-07 NOTE — ASSESSMENT & PLAN NOTE
Pt notes actual improvement in memory since change in pain pump from prialt to mso4  Pt also notes more clarity of thought as well  Pt remains on vit b12 replacement which is also helpful  Pt able to provide all of complicated history from past several months  Pt dx with duodenal mass s/p resection, whipple, gastrectomy and jejunal repair on 1/23  Pt is slowly recovering and feeling better every day  Per heme onc pt with adenocarcinoma arising from a duodenal adenomatous polyp  Dx with stage 1 adenoca of the duodenum and no adjunctive therapy needed , 0/18 lymph nodes  Pt is getting back to her normal routine  Great family support  Cont to follow clinically  [place] today 24/30  No new or focal pathology  Pt to call for any new issues

## 2024-03-07 NOTE — TELEPHONE ENCOUNTER
Appointment Change  Cancel, Reschedule, Change to Virtual      Who are you speaking with? Patient   If it is not the patient, is the caller listed on the communication consent form? Yes   Which provider is the appointment scheduled with? CHITRA Cassidy   When was the original appointment scheduled?    Please list date and time 4/25   At which location is the appointment scheduled to take place? [place]   Was the appointment rescheduled?     Was the appointment changed from an in person visit to a virtual visit?    If so, please list the details of the change. 5/2 11:30am   What is the reason for the appointment change? Sched conflict       Was STAR transport scheduled? No   Does STAR transport need to be scheduled for the new visit (if applicable) No   Does the patient need an infusion appointment rescheduled? No   Does the patient have an upcoming infusion appointment scheduled? If so, when? Yes, 3/11 2pm   Is the patient undergoing chemotherapy? No   For appointments cancelled with less than 24 hours:  Was the no-show policy reviewed? Yes

## 2024-03-07 NOTE — PROGRESS NOTES
Patient ID: Aleida Hammond is a 64 y.o. female.    Assessment/Plan:    Peripheral polyneuropathy  Pt here today for neuro follow up  Pt notes no new neuropathic sxs  No recent falls or trips  Pt with complicated medical course more recently but no direct impact on neuropathy  Pt remains on lyrica   Pt following closely with hematology for her known vit b12 and iron deficiencies.    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  Dx with stage 1 adenoca of the duodenum and no adjunctive therapy needed , 0/18 lymph nodes  Pt is getting back to her normal routine  Great family support  Cont to follow clinically  [place] today 24/30  No new or focal pathology  Pt to call for any new issues       Diagnoses and all orders for this visit:    Peripheral polyneuropathy    Other amnesia  -     Ambulatory Referral to Neurology    Polyneuropathy, unspecified  -     Ambulatory Referral to Neurology    Lumbar radiculopathy    Memory dysfunction    Other orders  -     HYDROcodone-acetaminophen (NORCO) 5-325 mg per tablet; take 1 to 2 tablets by mouth every 6 hours as needed  -     pregabalin (LYRICA) 200 MG capsule; Take 200 mg by mouth 3 (three) times a day           Subjective:    HPI    Pt is a 65 yo f with pmh of gastric bypass, dm type 2, asthma, vit b12 deficiency, neuropathy, hypercholesterolemia, htn, chronic pain, lumbar spondylosis, prior lumbar surgery 26 yrs  "ago, [facility] about one yr ago who presents today for neuro follow up.    pt last seen on 9/6/23.  Per my last note \"Pt notes sxs for the past several yrs, more apparent past 2 yrs.  Pt notes no tia or cva like sxs.  Pt notes history of headaches.   Pt notes occ feeling of food getting stuck.  No diff with articulation of speech.  No incontinence.  Pt notes long standing diff with directions, ever since learning to drive.  Not new.  Pt denies any significant financial issues.   Pt notes occ positional dizziness.   Pt notes diff with recall of specific details. Pt has noted specifically on job, issues with keeping up on same level of detail.  Worked for same company for 24 yrs.    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   Pt notes family also noted issues with stm and repeating self.  Also family reminds her of events from their youth.  Pt with supportive family and lives close to them.   Pt works in IT banking field.   Pt with known history of gastric bypass.  Pt also with known history of vit b12 deficiency.  Pt more recently with im replacement.  Rev labs dating back to 2018.  April 2018 vit b12 at 292.   Oct 2019 level 144, and now on 6/15/21 level 738.\"  Kept above paragraph due to complexities of pt case.pt here today for her neuro follow up. Pt here today for follow up of her neuropathy.  Pt last seen in Sept.  Since last visit, pt with multiple medical issues and more recent hospitalization. Pt was found to have a duodenal mass and had surgery 1/23/24 with whipple, completion gastrectomy, and jejunal repair.  Pt feels she is recovering fairly well.  Pt feels she is getting stronger.  Still with generalized fatigue. Pt is now seeing hematology and getting iron and vit b12 infusions.  Pt seen by heme onc post surgery seeing Dr Lind.  Pt with stage one adenocarcinoma of duodenum.  0/18 lymph noted. " "Pt does not require adjunctive therapy.  Pt notes loss of weight of about 65 lbs during this process. Pt is following with hematology and pcp closely.  Pt notes her family was extremely helpful and supportive during this entire process.   Pt notes no worsening of neuropathy.  No new nocturnal sxs. Pt notes she has been on ambien.  Pt also on lyrica. Pt has been following with same pain mx team for over 20 yrs. Pt notes memory better since change in her pain pump from prialt to mso4.  Pt feels less sedated and also less spacy. Pt notes she is now out of bed and doing more activities of daily life.  Currently no fever or chills.  No cough or sob.  No cp.  Pt denies any safety issues at home.  Pt denies any issues with home or finances.   No change in mood or behavior.  Pt very appreciative of her family.          Also kept for future reference; Neurocogntive eval from dr saavedra  from oct 2021.  Per Dr saavedra assessment from 10/26/21 \"Discussed cognitive profile with intact processing speed, executive functioning, verbal memory, verbal reasoning, and visual reasoning, and mild deficiencies in attention and visual memory. Discussed attentional issues in the context of her experienced cognitive difficulties. Explained that the etiology is not readily apparent, though reassured her that her cognitive profile is not suggestive of a neurodegenerative condition at this time\"            The following portions of the patient's history were reviewed and updated as appropriate: allergies, current medications, past family history, past medical history, past social history, past surgical history, and problem list and med rec and ros rev.      Total time spent today 40 minutes. Greater than 50% of total time was spent with the patient and / or family counseling and / or coordination of care   Objective:    Blood pressure 94/58, pulse 84, temperature 97.7 °F (36.5 °C), height 5' 4\" (1.626 m), weight 65.8 kg (145 lb).    Physical " Exam  Constitutional:       General: She is not in acute distress.     Appearance: She is not ill-appearing.   Eyes:      General: Lids are normal.      Extraocular Movements: Extraocular movements intact.      Pupils: Pupils are equal, round, and reactive to light.   Musculoskeletal:      Right lower leg: No edema.      Left lower leg: No edema.   Neurological:      Mental Status: She is alert.      Motor: Motor strength is normal.     Deep Tendon Reflexes:      Reflex Scores:       Tricep reflexes are 2+ on the right side and 2+ on the left side.       Bicep reflexes are 2+ on the right side and 2+ on the left side.       Brachioradialis reflexes are 2+ on the right side and 2+ on the left side.       Patellar reflexes are 2+ on the right side and 2+ on the left side.       Achilles reflexes are 1+ on the right side and 1+ on the left side.  Psychiatric:         Speech: Speech normal.         Neurological Exam  Mental Status  Alert. Able to copy figure. Clock drawing is normal. Speech is normal. Language is fluent with no aphasia. Fund of knowledge is appropriate for level of education.  [place] 24/30  2/5 delay recall  Good on orientation  Past and current presidents  3rd floor, [place]  No issues with complicated 4 part command  No right left confusion.    Cranial Nerves  CN II: Visual acuity is normal. Visual fields full to confrontation.  CN III, IV, VI: Extraocular movements intact bilaterally. Normal lids and orbits bilaterally. Pupils equal round and reactive to light bilaterally.  CN V: Facial sensation is normal.  CN VII: Full and symmetric facial movement.  CN VIII: Hearing is normal.  CN IX, X: Palate elevates symmetrically. Normal gag reflex.  CN XI: Shoulder shrug strength is normal.  CN XII: Tongue midline without atrophy or fasciculations.    Motor  Normal muscle bulk throughout. Normal muscle tone. No abnormal involuntary movements. Strength is 5/5 throughout all four extremities.    Sensory  Dec vib  patti lowers.    Reflexes                                            Right                      Left  Brachioradialis                    2+                         2+  Biceps                                 2+                         2+  Triceps                                2+                         2+  Finger flex                           2+                         2+  Hamstring                            2+                         2+  Patellar                                2+                         2+  Achilles                                1+                         1+  Right Plantar: downgoing  Left Plantar: downgoing    Coordination  Right: Finger-to-nose normal. Rapid alternating movement normal.Left: Finger-to-nose normal. Rapid alternating movement normal.    Gait  Casual gait is normal including stance, stride, and arm swing.        ROS:    Review of Systems   Constitutional:  Negative for appetite change, fatigue and fever.   HENT: Negative.  Negative for hearing loss, tinnitus, trouble swallowing and voice change.    Eyes: Negative.  Negative for photophobia, pain and visual disturbance.   Respiratory: Negative.  Negative for shortness of breath.    Cardiovascular: Negative.  Negative for palpitations.   Gastrointestinal: Negative.  Negative for nausea and vomiting.   Endocrine: Negative.  Negative for cold intolerance.   Genitourinary: Negative.  Negative for dysuria, frequency and urgency.   Musculoskeletal:  Negative for back pain, gait problem, myalgias, neck pain and neck stiffness.   Skin: Negative.  Negative for rash.   Allergic/Immunologic: Negative.    Neurological: Negative.  Negative for dizziness, tremors, seizures, syncope, facial asymmetry, speech difficulty, weakness, light-headedness, numbness and headaches.   Hematological: Negative.  Does not bruise/bleed easily.   Psychiatric/Behavioral: Negative.  Negative for confusion, hallucinations and sleep disturbance.         STM & LTM  becoming a problem   All other systems reviewed and are negative.

## 2024-03-07 NOTE — ASSESSMENT & PLAN NOTE
Pt here today for neuro follow up  Pt notes no new neuropathic sxs  No recent falls or trips  Pt with complicated medical course more recently but no direct impact on neuropathy  Pt remains on lyrica   Pt following closely with hematology for her known vit b12 and iron deficiencies.

## 2024-03-11 ENCOUNTER — TELEPHONE (OUTPATIENT)
Dept: GASTROENTEROLOGY | Facility: CLINIC | Age: 64
End: 2024-03-11

## 2024-03-11 ENCOUNTER — HOSPITAL ENCOUNTER (OUTPATIENT)
Dept: INFUSION CENTER | Facility: HOSPITAL | Age: 64
Discharge: HOME | End: 2024-03-11
Attending: INTERNAL MEDICINE
Payer: COMMERCIAL

## 2024-03-11 VITALS
HEART RATE: 78 BPM | OXYGEN SATURATION: 96 % | SYSTOLIC BLOOD PRESSURE: 99 MMHG | TEMPERATURE: 97.4 F | DIASTOLIC BLOOD PRESSURE: 60 MMHG | RESPIRATION RATE: 18 BRPM

## 2024-03-11 DIAGNOSIS — E61.1 IRON DEFICIENCY: ICD-10-CM

## 2024-03-11 DIAGNOSIS — E53.8 B12 DEFICIENCY: Primary | ICD-10-CM

## 2024-03-11 DIAGNOSIS — Z98.0 S/P TOTAL GASTRECTOMY AND ROUX-EN-Y ESOPHAGOJEJUNAL ANASTOMOSIS: ICD-10-CM

## 2024-03-11 DIAGNOSIS — Z90.3 S/P TOTAL GASTRECTOMY AND ROUX-EN-Y ESOPHAGOJEJUNAL ANASTOMOSIS: ICD-10-CM

## 2024-03-11 PROCEDURE — 96372 THER/PROPH/DIAG INJ SC/IM: CPT

## 2024-03-11 PROCEDURE — 96366 THER/PROPH/DIAG IV INF ADDON: CPT

## 2024-03-11 PROCEDURE — 96365 THER/PROPH/DIAG IV INF INIT: CPT

## 2024-03-11 RX ORDER — CYANOCOBALAMIN 1000 UG/ML
1000 INJECTION, SOLUTION INTRAMUSCULAR; SUBCUTANEOUS ONCE
Status: COMPLETED | OUTPATIENT
Start: 2024-03-11 | End: 2024-03-11

## 2024-03-11 RX ORDER — CYANOCOBALAMIN 1000 UG/ML
1000 INJECTION, SOLUTION INTRAMUSCULAR; SUBCUTANEOUS ONCE
OUTPATIENT
Start: 2024-04-08

## 2024-03-11 RX ORDER — SODIUM CHLORIDE 9 MG/ML
20 INJECTION, SOLUTION INTRAVENOUS ONCE
Status: CANCELLED | OUTPATIENT
Start: 2024-03-18

## 2024-03-11 RX ORDER — SODIUM CHLORIDE 9 MG/ML
20 INJECTION, SOLUTION INTRAVENOUS ONCE
Status: COMPLETED | OUTPATIENT
Start: 2024-03-11 | End: 2024-03-11

## 2024-03-11 RX ADMIN — CYANOCOBALAMIN 1000 MCG: 1000 INJECTION, SOLUTION INTRAMUSCULAR at 14:29

## 2024-03-11 RX ADMIN — SODIUM CHLORIDE 20 ML/HR: 9 INJECTION, SOLUTION INTRAVENOUS at 14:29

## 2024-03-11 RX ADMIN — IRON SUCROSE 300 MG: 20 INJECTION, SOLUTION INTRAVENOUS at 14:29

## 2024-03-11 NOTE — PROGRESS NOTES
..Aleida Hammond  tolerated treatment well with no complications.      Aleida Hammond is aware of future appt on 3/18 at 2:00.     AVS printed and given to Aleida Hammond:  No (Declined by Aleida Hammond)

## 2024-03-15 NOTE — TELEPHONE ENCOUNTER
Called patient Dr Nubia Vieyra will not be in office on the date of their appt  3/8/23 @ 8:30am [place]  R/S appt   For 3/2/23 10:00am Katlyn Shay
MD Office

## 2024-03-18 ENCOUNTER — HOSPITAL ENCOUNTER (OUTPATIENT)
Dept: INFUSION CENTER | Facility: HOSPITAL | Age: 64
Discharge: HOME | End: 2024-03-18
Attending: INTERNAL MEDICINE
Payer: COMMERCIAL

## 2024-03-18 VITALS
OXYGEN SATURATION: 96 % | HEART RATE: 85 BPM | RESPIRATION RATE: 14 BRPM | TEMPERATURE: 97.4 F | SYSTOLIC BLOOD PRESSURE: 127 MMHG | DIASTOLIC BLOOD PRESSURE: 77 MMHG

## 2024-03-18 DIAGNOSIS — E61.1 IRON DEFICIENCY: ICD-10-CM

## 2024-03-18 DIAGNOSIS — Z98.0 S/P TOTAL GASTRECTOMY AND ROUX-EN-Y ESOPHAGOJEJUNAL ANASTOMOSIS: Primary | ICD-10-CM

## 2024-03-18 DIAGNOSIS — Z90.3 S/P TOTAL GASTRECTOMY AND ROUX-EN-Y ESOPHAGOJEJUNAL ANASTOMOSIS: Primary | ICD-10-CM

## 2024-03-18 PROCEDURE — 96366 THER/PROPH/DIAG IV INF ADDON: CPT

## 2024-03-18 PROCEDURE — 96365 THER/PROPH/DIAG IV INF INIT: CPT

## 2024-03-18 RX ORDER — SODIUM CHLORIDE 9 MG/ML
20 INJECTION, SOLUTION INTRAVENOUS ONCE
Status: COMPLETED | OUTPATIENT
Start: 2024-03-18 | End: 2024-03-18

## 2024-03-18 RX ORDER — SODIUM CHLORIDE 9 MG/ML
20 INJECTION, SOLUTION INTRAVENOUS ONCE
Status: CANCELLED | OUTPATIENT
Start: 2024-03-25

## 2024-03-18 RX ADMIN — IRON SUCROSE 300 MG: 20 INJECTION, SOLUTION INTRAVENOUS at 14:41

## 2024-03-18 RX ADMIN — SODIUM CHLORIDE 20 ML/HR: 0.9 INJECTION, SOLUTION INTRAVENOUS at 14:41

## 2024-03-18 NOTE — PROGRESS NOTES
Patient Venofer infusion completed without complication. Patient declined AVS at this time and confirmed next appointment. Patient discharged in stable condition to home via ambulation.

## 2024-03-25 ENCOUNTER — HOSPITAL ENCOUNTER (OUTPATIENT)
Dept: INFUSION CENTER | Facility: HOSPITAL | Age: 64
Discharge: HOME | End: 2024-03-25
Attending: INTERNAL MEDICINE
Payer: COMMERCIAL

## 2024-03-25 VITALS
SYSTOLIC BLOOD PRESSURE: 144 MMHG | HEART RATE: 86 BPM | RESPIRATION RATE: 16 BRPM | TEMPERATURE: 97.9 F | DIASTOLIC BLOOD PRESSURE: 83 MMHG | OXYGEN SATURATION: 95 %

## 2024-03-25 DIAGNOSIS — Z98.0 S/P TOTAL GASTRECTOMY AND ROUX-EN-Y ESOPHAGOJEJUNAL ANASTOMOSIS: Primary | ICD-10-CM

## 2024-03-25 DIAGNOSIS — Z90.3 S/P TOTAL GASTRECTOMY AND ROUX-EN-Y ESOPHAGOJEJUNAL ANASTOMOSIS: Primary | ICD-10-CM

## 2024-03-25 DIAGNOSIS — E61.1 IRON DEFICIENCY: ICD-10-CM

## 2024-03-25 PROCEDURE — 96365 THER/PROPH/DIAG IV INF INIT: CPT

## 2024-03-25 PROCEDURE — 96366 THER/PROPH/DIAG IV INF ADDON: CPT

## 2024-03-25 RX ORDER — SODIUM CHLORIDE 9 MG/ML
20 INJECTION, SOLUTION INTRAVENOUS ONCE
OUTPATIENT
Start: 2024-04-01

## 2024-03-25 RX ORDER — SODIUM CHLORIDE 9 MG/ML
20 INJECTION, SOLUTION INTRAVENOUS ONCE
Status: COMPLETED | OUTPATIENT
Start: 2024-03-25 | End: 2024-03-25

## 2024-03-25 RX ADMIN — SODIUM CHLORIDE 20 ML/HR: 9 INJECTION, SOLUTION INTRAVENOUS at 14:16

## 2024-03-25 RX ADMIN — IRON SUCROSE 300 MG: 20 INJECTION, SOLUTION INTRAVENOUS at 14:29

## 2024-03-25 NOTE — PROGRESS NOTES
Aleida Hammond  tolerated treatment well with no complications.      Aleida Hammond is aware of future appt on 4/1 at 1400.     AVS printed and given to Aleida Hammond:    No (Declined by Aleida Hammond)

## 2024-04-01 ENCOUNTER — HOSPITAL ENCOUNTER (OUTPATIENT)
Dept: INFUSION CENTER | Facility: HOSPITAL | Age: 64
Discharge: HOME | End: 2024-04-01
Attending: INTERNAL MEDICINE
Payer: COMMERCIAL

## 2024-04-01 VITALS
OXYGEN SATURATION: 96 % | HEART RATE: 76 BPM | RESPIRATION RATE: 16 BRPM | SYSTOLIC BLOOD PRESSURE: 127 MMHG | DIASTOLIC BLOOD PRESSURE: 66 MMHG | TEMPERATURE: 96.8 F

## 2024-04-01 DIAGNOSIS — E61.1 IRON DEFICIENCY: ICD-10-CM

## 2024-04-01 DIAGNOSIS — Z98.0 S/P TOTAL GASTRECTOMY AND ROUX-EN-Y ESOPHAGOJEJUNAL ANASTOMOSIS: Primary | ICD-10-CM

## 2024-04-01 DIAGNOSIS — Z90.3 S/P TOTAL GASTRECTOMY AND ROUX-EN-Y ESOPHAGOJEJUNAL ANASTOMOSIS: Primary | ICD-10-CM

## 2024-04-01 PROCEDURE — 96365 THER/PROPH/DIAG IV INF INIT: CPT

## 2024-04-01 PROCEDURE — 96366 THER/PROPH/DIAG IV INF ADDON: CPT

## 2024-04-01 RX ORDER — SODIUM CHLORIDE 9 MG/ML
20 INJECTION, SOLUTION INTRAVENOUS ONCE
Status: COMPLETED | OUTPATIENT
Start: 2024-04-01 | End: 2024-04-01

## 2024-04-01 RX ORDER — SODIUM CHLORIDE 9 MG/ML
20 INJECTION, SOLUTION INTRAVENOUS ONCE
Status: CANCELLED | OUTPATIENT
Start: 2024-04-01

## 2024-04-01 RX ADMIN — IRON SUCROSE 300 MG: 20 INJECTION, SOLUTION INTRAVENOUS at 14:16

## 2024-04-01 RX ADMIN — SODIUM CHLORIDE 20 ML/HR: 9 INJECTION, SOLUTION INTRAVENOUS at 14:16

## 2024-04-01 NOTE — PROGRESS NOTES
Infusion tolerated well. No complaints offered. AVS declined, April 8 appt confirmed. PIV removed and pt discharged with steady gait .

## 2024-04-08 ENCOUNTER — HOSPITAL ENCOUNTER (OUTPATIENT)
Dept: INFUSION CENTER | Facility: HOSPITAL | Age: 64
Discharge: HOME | End: 2024-04-08
Attending: INTERNAL MEDICINE

## 2024-04-08 DIAGNOSIS — E53.8 B12 DEFICIENCY: Primary | ICD-10-CM

## 2024-04-08 RX ORDER — CYANOCOBALAMIN 1000 UG/ML
1000 INJECTION, SOLUTION INTRAMUSCULAR; SUBCUTANEOUS ONCE
Status: CANCELLED | OUTPATIENT
Start: 2024-04-09

## 2024-04-09 ENCOUNTER — HOSPITAL ENCOUNTER (OUTPATIENT)
Dept: INFUSION CENTER | Facility: HOSPITAL | Age: 64
Discharge: HOME | End: 2024-04-09
Attending: INTERNAL MEDICINE
Payer: COMMERCIAL

## 2024-04-09 VITALS
OXYGEN SATURATION: 94 % | TEMPERATURE: 97.8 F | HEART RATE: 89 BPM | DIASTOLIC BLOOD PRESSURE: 72 MMHG | SYSTOLIC BLOOD PRESSURE: 106 MMHG | RESPIRATION RATE: 17 BRPM

## 2024-04-09 DIAGNOSIS — E53.8 B12 DEFICIENCY: Primary | ICD-10-CM

## 2024-04-09 PROCEDURE — 96372 THER/PROPH/DIAG INJ SC/IM: CPT

## 2024-04-09 RX ORDER — CYANOCOBALAMIN 1000 UG/ML
1000 INJECTION, SOLUTION INTRAMUSCULAR; SUBCUTANEOUS ONCE
Status: COMPLETED | OUTPATIENT
Start: 2024-04-09 | End: 2024-04-09

## 2024-04-09 RX ORDER — CYANOCOBALAMIN 1000 UG/ML
1000 INJECTION, SOLUTION INTRAMUSCULAR; SUBCUTANEOUS ONCE
OUTPATIENT
Start: 2024-05-07

## 2024-04-09 RX ADMIN — CYANOCOBALAMIN 1000 MCG: 1000 INJECTION, SOLUTION INTRAMUSCULAR at 15:07

## 2024-04-09 NOTE — PROGRESS NOTES
Aleida Hammond  tolerated treatment well with no complications.      Aleida Hammond is aware of future appt on 05/06/2024 at 03:00 PM.     AVS printed and given to Aleida Hammond:  No (Declined by Aleida Hammond)

## 2024-04-15 ENCOUNTER — OFFICE VISIT (OUTPATIENT)
Dept: FAMILY MEDICINE CLINIC | Facility: HOSPITAL | Age: 64
End: 2024-04-15
Payer: COMMERCIAL

## 2024-04-15 VITALS
DIASTOLIC BLOOD PRESSURE: 80 MMHG | HEART RATE: 75 BPM | BODY MASS INDEX: 25.75 KG/M2 | OXYGEN SATURATION: 94 % | SYSTOLIC BLOOD PRESSURE: 128 MMHG | WEIGHT: 150 LBS

## 2024-04-15 DIAGNOSIS — Z90.3 S/P TOTAL GASTRECTOMY AND ROUX-EN-Y ESOPHAGOJEJUNAL ANASTOMOSIS: ICD-10-CM

## 2024-04-15 DIAGNOSIS — E61.1 IRON DEFICIENCY: Primary | ICD-10-CM

## 2024-04-15 DIAGNOSIS — Z98.0 S/P TOTAL GASTRECTOMY AND ROUX-EN-Y ESOPHAGOJEJUNAL ANASTOMOSIS: ICD-10-CM

## 2024-04-15 DIAGNOSIS — R19.5 CHANGE IN STOOL: ICD-10-CM

## 2024-04-15 DIAGNOSIS — K31.89 DUODENAL MASS: ICD-10-CM

## 2024-04-15 DIAGNOSIS — I10 BENIGN ESSENTIAL HYPERTENSION: ICD-10-CM

## 2024-04-15 PROCEDURE — 99214 OFFICE O/P EST MOD 30 MIN: CPT | Performed by: STUDENT IN AN ORGANIZED HEALTH CARE EDUCATION/TRAINING PROGRAM

## 2024-04-15 RX ORDER — AMLODIPINE BESYLATE 10 MG/1
5 TABLET ORAL DAILY
Qty: 90 TABLET | Refills: 2 | Status: SHIPPED | OUTPATIENT
Start: 2024-04-15

## 2024-04-15 NOTE — PROGRESS NOTES
[facility]   Tara Colon DO    Assessment/Plan:      Diagnosis ICD-10-CM Associated Orders   1. Iron deficiency  E61.1       2. 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  Z90.3     Z98.0       4. Change in stool  R19.5       5. Benign essential hypertension  I10 amLODIPine (NORVASC) 10 mg tablet        Switch bid amlodipine to qd 5 mg. Check BP's some.   BW printed for her. Iron already ordered.   Zofran does help her with the nausea, often once a day needed.   Stool research done in room seems consistent with liver, and absent GB concerns.   Can call and discuss with GI about stool color concerns.   Return in about 2 months (around 6/15/2024) for Annual Physical, A1c & Urine albumin in office. .  Patient may call or return to office with any questions or concerns.   ______________________________________________________________________  Subjective:     Patient ID: Aleida Hammond is a 64 y.o. female.  Aleida Hammond  Chief Complaint   Patient presents with   • Fatigue     Completed Iron infusions    • Stool Color Change            Hospital Course: Aleida Hammond is a 64-year-old female who underwent completion gastrectomy, duodenal exploration and Whipple procedure in general repair on 1/22.  The patient tolerated the procedure well postoperatively was managed with ICU level of care.  The patient's was advanced, after the nasogastric tube was removed.  The patient's pain was well-controlled.  On 1/27 and the patient's LISA drain was removed to which the patient tolerated well without issue.  Done by Dr. Cruz.     Stools are now more blonde colored, no blood, and much larger than before.   5x a week having BM's.   Using prune juice & stool softener. Typically was constipated.     Still having some nausea, occas vomiting.   Appt with GI in Sept - Dr. Walden.     Was 143 after surgery & now 150.   No food restrictions except steak or pork  chops.     Eggs in am, sandwich for lunch, dinners fairly normal.     Heme appt 5/2/24, done infusions, labs pended.   Minimal bursts of energy, but hard to do anything afterwards.   Not more pale.     Using inhaler right now daily.     BP's at home - can be > 130/80 takes second amlodipine.   If lower only takes am pill.     Wt Readings from Last 3 Encounters:   05/02/24 66.7 kg (147 lb)   04/15/24 68 kg (150 lb)   03/07/24 65.8 kg (145 lb)     Temp Readings from Last 3 Encounters:   05/14/24 98.1 °F (36.7 °C) (Temporal)   05/06/24 (!) 96.4 °F (35.8 °C) (Temporal)   05/02/24 98 °F (36.7 °C) (Tympanic)     BP Readings from Last 3 Encounters:   05/14/24 150/72   05/06/24 104/57   05/02/24 124/82     Pulse Readings from Last 3 Encounters:   05/14/24 78   05/06/24 72   05/02/24 76        The following portions of the patient's history were reviewed and updated as appropriate: allergies, current medications, past medical history, and problem list.    Review of Systems   Constitutional:  Positive for fatigue. Negative for chills and fever.   Respiratory:  Positive for cough (tiny). Negative for shortness of breath.    Cardiovascular:  Negative for chest pain and palpitations.   Neurological:  Positive for dizziness and light-headedness (if BP low). Negative for headaches.         Objective:      Vitals:    04/15/24 1531   BP: 128/80   Pulse: 75   SpO2: 94%      Physical Exam  Vitals and nursing note reviewed.   Constitutional:       General: She is not in acute distress.     Appearance: Normal appearance. She is normal weight. She is not ill-appearing.   HENT:      Head: Normocephalic and atraumatic.   Eyes:      General: No scleral icterus.        Right eye: No discharge.         Left eye: No discharge.   Cardiovascular:      Rate and Rhythm: Normal rate and regular rhythm.      Pulses: Normal pulses.      Heart sounds: Normal heart sounds. No murmur heard.  Pulmonary:      Effort: Pulmonary effort is normal. No  "respiratory distress.      Breath sounds: Normal breath sounds. No stridor. No wheezing.   Abdominal:      General: Abdomen is flat. Bowel sounds are normal. There is no distension.      Palpations: Abdomen is soft.      Tenderness: There is no abdominal tenderness. There is no right CVA tenderness or left CVA tenderness.   Musculoskeletal:      Cervical back: Normal range of motion and neck supple. No rigidity or tenderness.      Right lower leg: No edema.      Left lower leg: No edema.   Lymphadenopathy:      Cervical: No cervical adenopathy.   Skin:     General: Skin is warm and dry.      Capillary Refill: Capillary refill takes 2 to 3 seconds.      Coloration: Skin is not jaundiced or pale.   Neurological:      Mental Status: She is alert and oriented to person, place, and time.      Gait: Gait normal.   Psychiatric:         Mood and Affect: Mood normal.         Behavior: Behavior normal.         Thought Content: Thought content normal.         Judgment: Judgment normal.           Portions of the record may have been created with voice recognition software. Occasional wrong word or \"sound alike\" substitutions may have occurred due to the inherent limitations of voice recognition software. Please review the chart carefully and recognize, using context, where substitutions/typographical errors may have occurred.     "

## 2024-04-18 ENCOUNTER — APPOINTMENT (OUTPATIENT)
Dept: LAB | Facility: HOSPITAL | Age: 64
End: 2024-04-18
Payer: COMMERCIAL

## 2024-04-18 ENCOUNTER — HOSPITAL ENCOUNTER (OUTPATIENT)
Dept: CT IMAGING | Facility: HOSPITAL | Age: 64
Discharge: HOME | End: 2024-04-18
Attending: INTERNAL MEDICINE
Payer: COMMERCIAL

## 2024-04-18 DIAGNOSIS — K31.89 GASTROPTOSIS: ICD-10-CM

## 2024-04-18 DIAGNOSIS — R91.1 LUNG NODULE: ICD-10-CM

## 2024-04-18 LAB
ALBUMIN SERPL BCP-MCNC: 3.3 G/DL (ref 3.5–5)
ALP SERPL-CCNC: 93 U/L (ref 34–104)
ALT SERPL W P-5'-P-CCNC: 59 U/L (ref 7–52)
ANION GAP SERPL CALCULATED.3IONS-SCNC: 4 MMOL/L (ref 4–13)
AST SERPL W P-5'-P-CCNC: 58 U/L (ref 13–39)
BASOPHILS # BLD AUTO: 0.02 THOUSANDS/ÂΜL (ref 0–0.1)
BASOPHILS NFR BLD AUTO: 0 % (ref 0–1)
BILIRUB SERPL-MCNC: 0.29 MG/DL (ref 0.2–1)
BUN SERPL-MCNC: 19 MG/DL (ref 5–25)
CALCIUM ALBUM COR SERPL-MCNC: 9.2 MG/DL (ref 8.3–10.1)
CALCIUM SERPL-MCNC: 8.6 MG/DL (ref 8.4–10.2)
CEA SERPL-MCNC: 2.5 NG/ML (ref 0–3)
CHLORIDE SERPL-SCNC: 106 MMOL/L (ref 96–108)
CO2 SERPL-SCNC: 30 MMOL/L (ref 21–32)
CREAT SERPL-MCNC: 0.78 MG/DL (ref 0.6–1.3)
EOSINOPHIL # BLD AUTO: 0.13 THOUSAND/ÂΜL (ref 0–0.61)
EOSINOPHIL NFR BLD AUTO: 2 % (ref 0–6)
ERYTHROCYTE [DISTWIDTH] IN BLOOD BY AUTOMATED COUNT: 23.1 % (ref 11.6–15.1)
GFR SERPL CREATININE-BSD FRML MDRD: 80 ML/MIN/1.73SQ M
GLUCOSE P FAST SERPL-MCNC: 126 MG/DL (ref 65–99)
HCT VFR BLD AUTO: 35 % (ref 34.8–46.1)
HGB BLD-MCNC: 10.3 G/DL (ref 11.5–15.4)
IMM GRANULOCYTES # BLD AUTO: 0.02 THOUSAND/UL (ref 0–0.2)
IMM GRANULOCYTES NFR BLD AUTO: 0 % (ref 0–2)
LYMPHOCYTES # BLD AUTO: 2.45 THOUSANDS/ÂΜL (ref 0.6–4.47)
LYMPHOCYTES NFR BLD AUTO: 30 % (ref 14–44)
MCH RBC QN AUTO: 23.6 PG (ref 26.8–34.3)
MCHC RBC AUTO-ENTMCNC: 29.4 G/DL (ref 31.4–37.4)
MCV RBC AUTO: 80 FL (ref 82–98)
MONOCYTES # BLD AUTO: 0.46 THOUSAND/ÂΜL (ref 0.17–1.22)
MONOCYTES NFR BLD AUTO: 6 % (ref 4–12)
NEUTROPHILS # BLD AUTO: 5.13 THOUSANDS/ÂΜL (ref 1.85–7.62)
NEUTS SEG NFR BLD AUTO: 62 % (ref 43–75)
NRBC BLD AUTO-RTO: 0 /100 WBCS
PLATELET # BLD AUTO: 239 THOUSANDS/UL (ref 149–390)
POTASSIUM SERPL-SCNC: 4.7 MMOL/L (ref 3.5–5.3)
PROT SERPL-MCNC: 6.4 G/DL (ref 6.4–8.4)
RBC # BLD AUTO: 4.36 MILLION/UL (ref 3.81–5.12)
SODIUM SERPL-SCNC: 140 MMOL/L (ref 135–147)
WBC # BLD AUTO: 8.21 THOUSAND/UL (ref 4.31–10.16)

## 2024-04-18 PROCEDURE — 85025 COMPLETE CBC W/AUTO DIFF WBC: CPT

## 2024-04-18 PROCEDURE — 80053 COMPREHEN METABOLIC PANEL: CPT

## 2024-04-18 PROCEDURE — 36415 COLL VENOUS BLD VENIPUNCTURE: CPT

## 2024-04-18 PROCEDURE — 71250 CT THORAX DX C-: CPT

## 2024-04-18 PROCEDURE — 82378 CARCINOEMBRYONIC ANTIGEN: CPT

## 2024-04-26 ENCOUNTER — TELEPHONE (OUTPATIENT)
Dept: FAMILY MEDICINE CLINIC | Facility: HOSPITAL | Age: 64
End: 2024-04-26

## 2024-04-29 RX ORDER — CYANOCOBALAMIN 1000 UG/ML
1000 INJECTION, SOLUTION INTRAMUSCULAR; SUBCUTANEOUS ONCE
Status: CANCELLED | OUTPATIENT
Start: 2024-05-06

## 2024-04-30 ENCOUNTER — TELEPHONE (OUTPATIENT)
Age: 64
End: 2024-04-30

## 2024-04-30 NOTE — TELEPHONE ENCOUNTER
Keith from [facility] is requesting an insurance referral for the following specialty:      Test Name / Order Name: Hematology oncology    DX Code: C17.0, E53.8    Date Of Service: 05/02/2024    Location/Facility: DR Marie Zayas     Location / Facility NPI: [id number]    [facility] Phone # To Reach The Patient: [phone]

## 2024-04-30 NOTE — TELEPHONE ENCOUNTER
Patient called in regarding CT scan. Patient is concerned with resutls. Would like them reviewed and then to get a call back with recommendations and if she will need to see a cardiologist. Please advise and call back.

## 2024-04-30 NOTE — PROGRESS NOTES
HEMATOLOGY / ONCOLOGY CLINIC FOLLOW UP NOTE    Primary Care Provider: Tara Colon DO  Referring Provider: Tara Colon  MRN: [id number]  : 1960    Reason for Encounter: Follow-up for history of iron and B12 deficiency, duodenal adenocarcinoma.       Oncology History Overview Note   24 S/P Whipple which showed a small focus of adenocarcinoma arising from a duodenal adenomatous polyp with focal high-grade dysplasia.  Tumor invading the muscularis mucosa, pT1a.  Margins negative.  0 out of 17 lymph nodes were positive.  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 Complete gastrectomy was also performed showing chronic inactive gastritis and prepyloric ulcer.  0/3 lymph nodes positive.     Stage I adenocarcinoma of the duodenum, pT1a pN0 disease.  No role for adjuvant therapy.     Adenocarcinoma of pancreas (HCC) (Resolved)   2024 Surgery    A. Lymph Node, Portacaval lymph node:  - One lymph node, negative for malignancy (0/1).     B. Pancreas, Whipple:  - Small focus of adenocarcinoma arising in duodenal adenomatous polyp with focal high grade dysplasia.  - Tumor invades muscularis mucosa (lamina propria, pT1a)  - Ampulla involved by low grade dysplasia.  - Pancreatic intraepitheliel neoplasia (PanIN), involving pancreatic resection margin.  - All margins are negative for carcinoma or high grade dysplasia.  - Seventeen lymph nodes, negative for malignancy (0/17).      C. Stomach, Completion gastrectomy:  - Stomach with chronic inactive gastritis and prepyloric ulcer.  - Negative for dysplasia or malignancy.  - Three lymph nodes, negative for malignancy (0/3).     2024 Initial Diagnosis    Adenocarcinoma of pancreas (HCC)     Adenocarcinoma of duodenum (HCC)   2024 Initial Diagnosis    Adenocarcinoma of duodenum (HCC)     2024 -  Cancer Staged    Staging form: Small Intestine - Adenocarcinoma, AJCC 8th Edition  - Pathologic: Stage I  (pT1a, pN0, cM0) - Signed by Karlene Cruz MD on 2/8/2024  Total positive nodes: 0           Interval History: Patient returns for follow-up visit.  She was last seen by Dr. Lind on 2/26/2024 to review pathology from duodenal mass.  She did not require adjuvant therapy.    She had CT Chest on 4/18 to evaluate previously identified solitary pulmonary nodule.  Results demonstrate interval development of multiple fine centrilobular nodules in the right upper lobe, consistent with infectious/inflammatory process with recommendation to repeat CT of chest in 3 months.  Of note, it did demonstrate heavy atherosclerotic coronary artery calcification    She recently completed a course of IV Venofer on 4/1/2024.  She has continue to receive B12 monthly    Blood work completed on 4/18/24 shows improving microcytic anemia.  Hemoglobin up from 8.4-10.3.  MCV 80.  White count, platelet count and differential are normal.  CEA normal 2.5  AST 58/ALT 59, T. bili normal    Overall, she states she is recovering well from surgery.  She does have some nausea at times.  No vomiting.  She takes Zofran as needed which does help.  She still feels tired most of the time        REVIEW OF SYSTEMS:  Please note that a 14-point review of systems was performed to include Constitutional, HEENT, Respiratory, CVS, GI, , Musculoskeletal, Integumentary, Neurologic, Rheumatologic, Endocrinologic, Psychiatric, Lymphatic, and Hematologic/Oncologic systems were reviewed and are negative unless otherwise stated in HPI. Positive and negative findings pertinent to this evaluation are incorporated into the history of present illness.      ECOG PS: 1    PROBLEM LIST:  Patient Active Problem List   Diagnosis    Benign essential hypertension    Mild persistent asthma without complication    Peripheral polyneuropathy    Chronic bilateral back pain    Chronic lumbar radiculopathy    Chronic cervical pain    Chronic thoracic spine pain    GERD without  esophagitis    Hyperlipidemia associated with type 2 diabetes mellitus  (HCC)    Insomnia    B12 deficiency    Lumbar radiculopathy    Thoracic spondylosis without myelopathy    Thoracic and lumbosacral neuritis    Degeneration of lumbar intervertebral disc    Lumbar spondylosis    Pain in joint involving pelvic region and thigh    Iron deficiency    Memory dysfunction    COVID-19 vaccine series completed    Type 2 diabetes mellitus with diabetic neuropathy, with long-term current use of insulin (HCC)    Narcotic dependency, continuous (HCC)    Carpal tunnel syndrome on right    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    Microcytic anemia    S/P endoscopic carpal tunnel release    Pillar pain, post-operative    PONV (postoperative nausea and vomiting)    Duodenal mass    Malignant neoplasm of spinal cord (HCC)    Adenocarcinoma of duodenum (HCC)    Change in stool       Assessment / Plan:  1. Iron deficiency    2. 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    3. Adenocarcinoma of duodenum (HCC)    4. Lung nodule seen on imaging study      Patient is a very pleasant 64-year-old female with a history of iron and B12 deficiency in the setting of gastric bypass/post surgical malabsorption.  In January 2024 she underwent resection of duodenal mass and pathology was consistent with stage I adenocarcinoma of the duodenum, pT1a pN0 disease.  She did not require adjuvant therapy.    She has been receiving injectable B12 replacement monthly and more recently completed a course of IV Venofer.  Her most recent blood work demonstrates microcytic and hypochromic anemia.  I suspect she still has some underlying iron deficiency.  She is at risk for iron and substrate deficiencies due to her history.  I am going to set her up for another course of IV Venofer 300 mg weekly.  She will continue on monthly B12 replacement.  I will have her get new iron panel today.  She will also repeat blood  work in 4 months prior to her next visit.    She is scheduled for surveillance imaging in August and follow-up with Dr. Cruz.  I will add CT of chest to her abdomen and pelvis to reevaluate the pulmonary nodule seen on recent imaging    I did review that she will need to follow up with her PCP to discuss findings of Heavy atherosclerotic coronary artery calcification and have assessment for ASCVD risk   Patient verbalized understanding and states she will reach out to PCP for an appointment    Patient will continue to follow closely with her other specialists.  I will see her back in 4 months with repeat labs.    She is asked to call with any questions or concerns prior to her next visit     I spent 30 minutes on chart review, face to face counseling time, coordination of care and documentation.    Past Medical History:   has a past medical history of PABLO (acute kidney injury) (HCC) (2021), Allergic, Anemia, Anesthesia complication, Anxiety, Arthritis, Asthma, Breast lump, Chronic pain, Chronic pain disorder, Diabetes mellitus (), Diverticulitis of colon, Dizziness, GERD (gastroesophageal reflux disease), Hepatitis, History of stomach ulcers, HL (hearing loss) (), Hyperlipidemia, Hypertension, Hypotension (2021), Infectious viral hepatitis, Memory loss (), Pneumonia, PONV (postoperative nausea and vomiting), and Stomach problems.    PAST SURGICAL HISTORY:   has a past surgical history that includes Cholecystectomy; Appendectomy;  section; Back surgery; Gastric bypass; Shoulder surgery; Spinal cord stimulator implant (); Infusion pump implantation (); Gastric bypass (); pr ndsc wrst surg w/rls transvrs carpl ligm (Right, 2022); Cataract extraction, bilateral; Small intestine surgery (N/A, 2024); WHIPPLE PROCEDURE/PANCREATICO-DUODENECTOMY (N/A, 2024); Gastrectomy (N/A, 2024); and LAPAROTOMY (N/A, 2024).    CURRENT MEDICATIONS  Current Outpatient  Medications   Medication Sig Dispense Refill    albuterol (2.5 mg/3 mL) 0.083 % nebulizer solution Take 3 mL (2.5 mg total) by nebulization every 6 (six) hours as needed for wheezing or shortness of breath 300 mL 1    albuterol (PROVENTIL HFA,VENTOLIN HFA) 90 mcg/act inhaler USE 2 INHALATIONS ORALLY   EVERY 6 HOURS AS NEEDED FORWHEEZING 25.5 g 1    amLODIPine (NORVASC) 10 mg tablet Take 0.5 tablets (5 mg total) by mouth daily 90 tablet 2    aspirin (ECOTRIN LOW STRENGTH) 81 mg EC tablet Take 81 mg by mouth daily      atorvastatin (LIPITOR) 40 mg tablet TAKE 1 TABLET DAILY 90 tablet 0    Cyanocobalamin (B-12 IJ) Inject as directed every 30 (thirty) days      Fluticasone-Salmeterol (Advair Diskus) 250-50 mcg/dose inhaler Inhale 1 puff 2 (two) times a day Rinse mouth after use. 180 blister 2    glucose blood (Accu-Chek Guide) test strip Test 100 strip 3    HYDROcodone-acetaminophen (NORCO) 5-325 mg per tablet take 1 to 2 tablets by mouth every 6 hours as needed      Januvia 100 MG tablet TAKE 1 TABLET EVERY MORNING 90 tablet 3    lidocaine (XYLOCAINE) 5 % ointment apply topically to affected area three times a day      metFORMIN (GLUCOPHAGE-XR) 500 mg 24 hr tablet Take 1 tablet (500 mg total) by mouth daily with breakfast 90 tablet 0    mometasone (NASONEX) 50 mcg/act nasal spray Use 2 sprays in each nostril daily 17 g 1    montelukast (SINGULAIR) 10 mg tablet TAKE 1 TABLET AT BEDTIME 90 tablet 1    Morphine Sulfate (MORPHINE 0.05 MG/ML SYRINGE, NICU/PICU,, CMPD ORDER,)       omeprazole (PriLOSEC) 40 MG capsule Take 1 capsule (40 mg total) by mouth daily 90 capsule 3    ondansetron (ZOFRAN) 4 mg tablet Take 1 tablet (4 mg total) by mouth every 8 (eight) hours as needed for nausea or vomiting 90 tablet 1    pregabalin (LYRICA) 200 MG capsule Take 200 mg by mouth 3 (three) times a day      traMADol (ULTRAM-ER) 300 MG 24 hr tablet Take 300 mg by mouth daily      zolpidem (AMBIEN) 5 mg tablet TAKE 1 TABLET DAILY AT      "BEDTIME AS NEEDED FOR SLEEP 90 tablet 0     No current facility-administered medications for this visit.     [unfilled]    SOCIAL HISTORY:   reports that she quit smoking about 17 years ago. Her smoking use included cigarettes. She started smoking about 51 years ago. She has a 34 pack-year smoking history. She has never used smokeless tobacco. She reports that she does not drink alcohol and does not use drugs.     FAMILY HISTORY:  family history includes Alcohol abuse in her father; Arthritis in her family; Breast cancer (age of onset: 32) in her maternal aunt; Cancer (age of onset: 78) in her father; Colon cancer (age of onset: 78) in her mother; Diabetes in her brother, brother, and mother; Hyperlipidemia in her brother and mother; Hypertension in her brother and mother; Lung cancer (age of onset: 76) in her paternal grandfather; Lung cancer (age of onset: 78) in her father; Melanoma (age of onset: 76) in her mother; No Known Problems in her brother, daughter, maternal grandfather, maternal grandmother, paternal aunt, paternal aunt, paternal aunt, paternal aunt, son, and son; Pancreatic cancer (age of onset: 84) in her mother; Prostate cancer (age of onset: 78) in her father; Stomach cancer in her paternal grandmother.     ALLERGIES:  is allergic to nsaids and percocet [oxycodone-acetaminophen].      Physical Exam:  Vital Signs:   Visit Vitals  /82 (BP Location: Left arm)   Pulse 76   Temp 98 °F (36.7 °C) (Tympanic)   Resp 18   Ht 5' 4\" (1.626 m)   Wt 66.7 kg (147 lb)   LMP  (LMP Unknown)   SpO2 97%   BMI 25.23 kg/m²   OB Status Postmenopausal   Smoking Status Former   BSA 1.72 m²     Body mass index is 25.23 kg/m².  Body surface area is 1.72 meters squared.    Physical Exam  Constitutional:       General: She is not in acute distress.     Appearance: Normal appearance.   HENT:      Head: Normocephalic and atraumatic.   Eyes:      General: No scleral icterus.        Right eye: No discharge.         Left eye: " No discharge.      Conjunctiva/sclera: Conjunctivae normal.   Cardiovascular:      Rate and Rhythm: Normal rate and regular rhythm.   Pulmonary:      Effort: Pulmonary effort is normal. No respiratory distress.      Breath sounds: Normal breath sounds.   Abdominal:      General: Bowel sounds are normal. There is no distension.      Palpations: Abdomen is soft. There is no mass.      Tenderness: There is no abdominal tenderness.   Musculoskeletal:         General: Normal range of motion.   Lymphadenopathy:      Cervical: No cervical adenopathy.      Upper Body:      Right upper body: No supraclavicular, axillary or pectoral adenopathy.      Left upper body: No supraclavicular, axillary or pectoral adenopathy.   Skin:     General: Skin is warm and dry.      Coloration: Skin is pale.   Neurological:      General: No focal deficit present.      Mental Status: She is alert and oriented to person, place, and time.   Psychiatric:         Mood and Affect: Mood normal.         Behavior: Behavior normal.         Labs:  Lab Results   Component Value Date    WBC 8.21 04/18/2024    HGB 10.3 (L) 04/18/2024    HCT 35.0 04/18/2024    MCV 80 (L) 04/18/2024     04/18/2024     Lab Results   Component Value Date     11/04/2017    SODIUM 140 04/18/2024    K 4.7 04/18/2024     04/18/2024    CO2 30 04/18/2024    AGAP 4 04/18/2024    BUN 19 04/18/2024    CREATININE 0.78 04/18/2024    GLUC 121 01/26/2024    GLUF 126 (H) 04/18/2024    CALCIUM 8.6 04/18/2024    AST 58 (H) 04/18/2024    ALT 59 (H) 04/18/2024    ALKPHOS 93 04/18/2024    PROT 6.6 07/22/2017    TP 6.4 04/18/2024    BILITOT 0.5 07/22/2017    TBILI 0.29 04/18/2024    EGFR 80 04/18/2024

## 2024-05-01 ENCOUNTER — TELEPHONE (OUTPATIENT)
Age: 64
End: 2024-05-01

## 2024-05-01 ENCOUNTER — TELEPHONE (OUTPATIENT)
Dept: HEMATOLOGY ONCOLOGY | Facility: CLINIC | Age: 64
End: 2024-05-01

## 2024-05-01 DIAGNOSIS — Z98.0 S/P TOTAL GASTRECTOMY AND ROUX-EN-Y ESOPHAGOJEJUNAL ANASTOMOSIS: ICD-10-CM

## 2024-05-01 DIAGNOSIS — K31.89 DUODENAL MASS: Primary | ICD-10-CM

## 2024-05-01 DIAGNOSIS — Z90.3 S/P TOTAL GASTRECTOMY AND ROUX-EN-Y ESOPHAGOJEJUNAL ANASTOMOSIS: ICD-10-CM

## 2024-05-01 NOTE — TELEPHONE ENCOUNTER
Patient Call    Who are you speaking with? Patient    If it is not the patient, are they listed on an active communication consent form? N/A   What is the reason for this call? Gave fax number for office for insurance referral   Does this require a call back? N/A   If a call back is required, please list best call back number N/a   If a call back is required, advise that a message will be forwarded to their care team and someone will return their call as soon as possible.   Did you relay this information to the patient? N/A

## 2024-05-01 NOTE — TELEPHONE ENCOUNTER
Called patient to advise that office has still not received ins ref after numerous calls to primary care.  Encouraged patient to reach out to primary care to send one.

## 2024-05-01 NOTE — TELEPHONE ENCOUNTER
paradise from [facility] is requesting an insurance referral for the following specialty:      Test Name / Order Name: Hematology oncology     DX Code: C17.0, E53.8     Date Of Service: 05/02/2024     Location/Facility: DR Marie Zayas      Location / Facility NPI: [id number]     Best Phone # To Reach The Patient: [phone]      Please complete as soon as possible, patient has appointment scheduled for tomorrow 05/02/2024. Patient would have to reschedule this appointment, if this isnt completed.     Paradise has been waiting for a week on this.

## 2024-05-01 NOTE — TELEPHONE ENCOUNTER
Jessy from [facility] called stating there is no referral in for this patient who has an appt tomorrow morning. I see that 2 requests have been made to the [facility] Procedure Team; however, there is no referral in the patients chart.    I spoke with Danielle at  and she said she would have Lorna put the referral in first thing in the morning. I relayed this message to Jessy at the cancer center.    Please fax referral to Jessy at [phone]    Thank you

## 2024-05-02 ENCOUNTER — TELEPHONE (OUTPATIENT)
Age: 64
End: 2024-05-02

## 2024-05-02 ENCOUNTER — OFFICE VISIT (OUTPATIENT)
Age: 64
End: 2024-05-02
Payer: COMMERCIAL

## 2024-05-02 ENCOUNTER — APPOINTMENT (OUTPATIENT)
Dept: LAB | Facility: HOSPITAL | Age: 64
End: 2024-05-02
Payer: COMMERCIAL

## 2024-05-02 ENCOUNTER — TELEPHONE (OUTPATIENT)
Dept: HEMATOLOGY ONCOLOGY | Facility: CLINIC | Age: 64
End: 2024-05-02

## 2024-05-02 VITALS
SYSTOLIC BLOOD PRESSURE: 124 MMHG | OXYGEN SATURATION: 97 % | RESPIRATION RATE: 18 BRPM | BODY MASS INDEX: 25.1 KG/M2 | HEIGHT: 64 IN | TEMPERATURE: 98 F | HEART RATE: 76 BPM | DIASTOLIC BLOOD PRESSURE: 82 MMHG | WEIGHT: 147 LBS

## 2024-05-02 DIAGNOSIS — Z90.3 S/P TOTAL GASTRECTOMY AND ROUX-EN-Y ESOPHAGOJEJUNAL ANASTOMOSIS: ICD-10-CM

## 2024-05-02 DIAGNOSIS — E61.1 IRON DEFICIENCY: ICD-10-CM

## 2024-05-02 DIAGNOSIS — Z90.3 POSTGASTRECTOMY GASTRITIS: ICD-10-CM

## 2024-05-02 DIAGNOSIS — Z98.0 S/P TOTAL GASTRECTOMY AND ROUX-EN-Y ESOPHAGOJEJUNAL ANASTOMOSIS: ICD-10-CM

## 2024-05-02 DIAGNOSIS — E61.1 IRON DEFICIENCY: Primary | ICD-10-CM

## 2024-05-02 DIAGNOSIS — R91.1 LUNG NODULE SEEN ON IMAGING STUDY: ICD-10-CM

## 2024-05-02 DIAGNOSIS — C17.0 ADENOCARCINOMA OF DUODENUM (HCC): ICD-10-CM

## 2024-05-02 DIAGNOSIS — K29.60 POSTGASTRECTOMY GASTRITIS: ICD-10-CM

## 2024-05-02 LAB
FERRITIN SERPL-MCNC: 144 NG/ML (ref 11–307)
IRON SATN MFR SERPL: 16 % (ref 15–50)
IRON SERPL-MCNC: 40 UG/DL (ref 50–212)
TIBC SERPL-MCNC: 243 UG/DL (ref 250–450)
UIBC SERPL-MCNC: 203 UG/DL (ref 155–355)
VIT B12 SERPL-MCNC: 303 PG/ML (ref 180–914)

## 2024-05-02 PROCEDURE — 3074F SYST BP LT 130 MM HG: CPT | Performed by: NURSE PRACTITIONER

## 2024-05-02 PROCEDURE — 83550 IRON BINDING TEST: CPT

## 2024-05-02 PROCEDURE — 36415 COLL VENOUS BLD VENIPUNCTURE: CPT

## 2024-05-02 PROCEDURE — 3079F DIAST BP 80-89 MM HG: CPT | Performed by: NURSE PRACTITIONER

## 2024-05-02 PROCEDURE — 99214 OFFICE O/P EST MOD 30 MIN: CPT | Performed by: NURSE PRACTITIONER

## 2024-05-02 PROCEDURE — 82728 ASSAY OF FERRITIN: CPT

## 2024-05-02 PROCEDURE — 83540 ASSAY OF IRON: CPT

## 2024-05-02 PROCEDURE — 82607 VITAMIN B-12: CPT

## 2024-05-02 RX ORDER — SODIUM CHLORIDE 9 MG/ML
20 INJECTION, SOLUTION INTRAVENOUS ONCE
Qty: 250 ML | Refills: 0 | Status: CANCELLED | OUTPATIENT
Start: 2024-05-06

## 2024-05-02 NOTE — TELEPHONE ENCOUNTER
Called PCP regarding not receiving referral(also not in [facility] Portal) via fax this morning.  PCP advised they were scanned right into media this morning.  Thanked PCP

## 2024-05-02 NOTE — TELEPHONE ENCOUNTER
Jessy called again requesting insurance referral.  Advised Jessy insurance referrals were scanned in under Media tab.  NFA

## 2024-05-02 NOTE — TELEPHONE ENCOUNTER
Pt call in for an appointment and would just keep current appointment. PT also inquired about a referral and provided details on what is required to get this referral.

## 2024-05-02 NOTE — TELEPHONE ENCOUNTER
Appointment Confirmation   Who are you speaking with? Patient   If it is not the patient, are they listed on an active communication consent form? N/A   Which provider is the appointment scheduled with?  CHITRA Cassidy   When is the appointment scheduled?  Please list date and time 5/2/24   At which location is the appointment scheduled to take place? [place]   Did caller verbalize understanding of appointment details? Yes

## 2024-05-06 ENCOUNTER — HOSPITAL ENCOUNTER (OUTPATIENT)
Dept: INFUSION CENTER | Facility: HOSPITAL | Age: 64
End: 2024-05-06
Attending: INTERNAL MEDICINE

## 2024-05-06 ENCOUNTER — HOSPITAL ENCOUNTER (OUTPATIENT)
Dept: INFUSION CENTER | Facility: HOSPITAL | Age: 64
Discharge: HOME | End: 2024-05-06
Attending: INTERNAL MEDICINE
Payer: COMMERCIAL

## 2024-05-06 VITALS
OXYGEN SATURATION: 100 % | HEART RATE: 72 BPM | DIASTOLIC BLOOD PRESSURE: 57 MMHG | TEMPERATURE: 96.4 F | RESPIRATION RATE: 16 BRPM | SYSTOLIC BLOOD PRESSURE: 104 MMHG

## 2024-05-06 DIAGNOSIS — E53.8 B12 DEFICIENCY: Primary | ICD-10-CM

## 2024-05-06 DIAGNOSIS — Z98.0 S/P TOTAL GASTRECTOMY AND ROUX-EN-Y ESOPHAGOJEJUNAL ANASTOMOSIS: ICD-10-CM

## 2024-05-06 DIAGNOSIS — Z90.3 S/P TOTAL GASTRECTOMY AND ROUX-EN-Y ESOPHAGOJEJUNAL ANASTOMOSIS: ICD-10-CM

## 2024-05-06 DIAGNOSIS — E61.1 IRON DEFICIENCY: ICD-10-CM

## 2024-05-06 PROCEDURE — 96365 THER/PROPH/DIAG IV INF INIT: CPT

## 2024-05-06 PROCEDURE — 96372 THER/PROPH/DIAG INJ SC/IM: CPT

## 2024-05-06 RX ORDER — SODIUM CHLORIDE 9 MG/ML
20 INJECTION, SOLUTION INTRAVENOUS ONCE
Status: COMPLETED | OUTPATIENT
Start: 2024-05-06 | End: 2024-05-06

## 2024-05-06 RX ORDER — CYANOCOBALAMIN 1000 UG/ML
1000 INJECTION, SOLUTION INTRAMUSCULAR; SUBCUTANEOUS ONCE
Status: COMPLETED | OUTPATIENT
Start: 2024-05-06 | End: 2024-05-06

## 2024-05-06 RX ORDER — SODIUM CHLORIDE 9 MG/ML
20 INJECTION, SOLUTION INTRAVENOUS ONCE
Status: CANCELLED | OUTPATIENT
Start: 2024-05-14

## 2024-05-06 RX ORDER — CYANOCOBALAMIN 1000 UG/ML
1000 INJECTION, SOLUTION INTRAMUSCULAR; SUBCUTANEOUS ONCE
OUTPATIENT
Start: 2024-06-03

## 2024-05-06 RX ADMIN — IRON SUCROSE 300 MG: 20 INJECTION, SOLUTION INTRAVENOUS at 13:49

## 2024-05-06 RX ADMIN — SODIUM CHLORIDE 20 ML/HR: 0.9 INJECTION, SOLUTION INTRAVENOUS at 13:49

## 2024-05-06 RX ADMIN — CYANOCOBALAMIN 1000 MCG: 1000 INJECTION, SOLUTION INTRAMUSCULAR at 13:29

## 2024-05-06 NOTE — PROGRESS NOTES
Aleida Hammond  tolerated treatment well with no complications.      Aleida Hammond is aware of future appt on 5/14 at 1400.     AVS printed and given to Aleida Hammond:  No (Declined by Aleida Hammond)

## 2024-05-09 DIAGNOSIS — Z00.6 ENCOUNTER FOR EXAMINATION FOR NORMAL COMPARISON OR CONTROL IN CLINICAL RESEARCH PROGRAM: ICD-10-CM

## 2024-05-10 ENCOUNTER — APPOINTMENT (OUTPATIENT)
Dept: LAB | Facility: HOSPITAL | Age: 64
End: 2024-05-10

## 2024-05-10 DIAGNOSIS — Z00.6 ENCOUNTER FOR EXAMINATION FOR NORMAL COMPARISON OR CONTROL IN CLINICAL RESEARCH PROGRAM: ICD-10-CM

## 2024-05-10 PROCEDURE — 36415 COLL VENOUS BLD VENIPUNCTURE: CPT

## 2024-05-13 DIAGNOSIS — G47.00 INSOMNIA, UNSPECIFIED TYPE: ICD-10-CM

## 2024-05-14 ENCOUNTER — HOSPITAL ENCOUNTER (OUTPATIENT)
Dept: INFUSION CENTER | Facility: HOSPITAL | Age: 64
Discharge: HOME | End: 2024-05-14
Attending: INTERNAL MEDICINE
Payer: COMMERCIAL

## 2024-05-14 VITALS
DIASTOLIC BLOOD PRESSURE: 72 MMHG | RESPIRATION RATE: 17 BRPM | TEMPERATURE: 98.1 F | OXYGEN SATURATION: 95 % | SYSTOLIC BLOOD PRESSURE: 150 MMHG | HEART RATE: 78 BPM

## 2024-05-14 DIAGNOSIS — E61.1 IRON DEFICIENCY: ICD-10-CM

## 2024-05-14 DIAGNOSIS — Z98.0 S/P TOTAL GASTRECTOMY AND ROUX-EN-Y ESOPHAGOJEJUNAL ANASTOMOSIS: Primary | ICD-10-CM

## 2024-05-14 DIAGNOSIS — Z90.3 S/P TOTAL GASTRECTOMY AND ROUX-EN-Y ESOPHAGOJEJUNAL ANASTOMOSIS: Primary | ICD-10-CM

## 2024-05-14 RX ORDER — ZOLPIDEM TARTRATE 5 MG/1
5 TABLET ORAL
Qty: 90 TABLET | Refills: 0 | Status: SHIPPED | OUTPATIENT
Start: 2024-05-14

## 2024-05-14 RX ORDER — SODIUM CHLORIDE 9 MG/ML
20 INJECTION, SOLUTION INTRAVENOUS ONCE
Status: COMPLETED | OUTPATIENT
Start: 2024-05-14 | End: 2024-05-14

## 2024-05-14 RX ORDER — SODIUM CHLORIDE 9 MG/ML
20 INJECTION, SOLUTION INTRAVENOUS ONCE
Status: CANCELLED | OUTPATIENT
Start: 2024-05-21

## 2024-05-14 RX ADMIN — IRON SUCROSE 300 MG: 20 INJECTION, SOLUTION INTRAVENOUS at 14:23

## 2024-05-14 RX ADMIN — SODIUM CHLORIDE 20 ML/HR: 9 INJECTION, SOLUTION INTRAVENOUS at 14:17

## 2024-05-14 NOTE — PROGRESS NOTES
Aleida Hammond  tolerated treatment well with no complications.      Aleida Hammond is aware of future appt on 5/21/24 at 1400.     AVS printed and given to Aleida Hammond:  No (Declined by Aleida Hammond)

## 2024-05-15 ENCOUNTER — TELEPHONE (OUTPATIENT)
Age: 64
End: 2024-05-15

## 2024-05-15 NOTE — TELEPHONE ENCOUNTER
Spoke to patient.    Provided Dr Hatfield license # to her.     Advised that we will keep the original in the file at the  for her to  at her next apt if she wants to.

## 2024-05-15 NOTE — TELEPHONE ENCOUNTER
New handicapped parking form has been given to Dr Colon for her signature.     Will notify the patient as soon as I get it back so she can pick it up as it requires her signature as well.

## 2024-05-15 NOTE — TELEPHONE ENCOUNTER
Pt called, reports that her disability parking pass paperwork was received in [place], but they were not able to read the Providers name or License #. Pt requests that the paperwork be corrected/resent.     Pt requests a call when sending. Thank you.

## 2024-05-16 ENCOUNTER — TELEPHONE (OUTPATIENT)
Age: 64
End: 2024-05-16

## 2024-05-16 NOTE — TELEPHONE ENCOUNTER
Pt called requesting a referral. I asked patient to please get dx code, NPI, and DrLes Name and location where referral is needed and to call back.    ERWIN

## 2024-05-16 NOTE — TELEPHONE ENCOUNTER
Patient called about Insurance referral.  She's been going to this doctor 20 years but now needs insurance referrals..before we put this through she would need an ambulatory referral on file correct? For Pain Management     Please send this after ambulatory referral...if not needed then please send this to get insurance referral since she said her insurance just changed.    Patient is requesting an insurance referral for the following specialty:      Test Name / Order Name:  pain pump refill     DX Code: G89.4, M96.1, C17.9    Date Of Service: 5/30    Location/Facility Name/Address/Phone #:   [facility] in [address] [facility] [address]. Phone: [phone]    Location / Facility NPI: [id number]    Best Phone # To Reach The Patient:  [phone]

## 2024-05-20 NOTE — TELEPHONE ENCOUNTER
Patient called in again regarding her referral she requested last week.  There is a note stating the referral is in Media, but it is not there..  Patient also looked in her MyChart and did not see it.  Please advise and please call patient when it is completed.

## 2024-05-21 ENCOUNTER — HOSPITAL ENCOUNTER (OUTPATIENT)
Dept: INFUSION CENTER | Facility: HOSPITAL | Age: 64
Discharge: HOME | End: 2024-05-21
Attending: INTERNAL MEDICINE
Payer: COMMERCIAL

## 2024-05-21 VITALS
SYSTOLIC BLOOD PRESSURE: 108 MMHG | DIASTOLIC BLOOD PRESSURE: 68 MMHG | TEMPERATURE: 98.2 F | RESPIRATION RATE: 16 BRPM | HEART RATE: 68 BPM

## 2024-05-21 DIAGNOSIS — E61.1 IRON DEFICIENCY: ICD-10-CM

## 2024-05-21 DIAGNOSIS — Z98.0 S/P TOTAL GASTRECTOMY AND ROUX-EN-Y ESOPHAGOJEJUNAL ANASTOMOSIS: Primary | ICD-10-CM

## 2024-05-21 DIAGNOSIS — E53.8 VITAMIN B12 DEFICIENCY (NON ANEMIC): ICD-10-CM

## 2024-05-21 DIAGNOSIS — C17.0 MALIGNANT NEOPLASM OF DUODENUM (HCC): Primary | ICD-10-CM

## 2024-05-21 DIAGNOSIS — Z90.3 S/P TOTAL GASTRECTOMY AND ROUX-EN-Y ESOPHAGOJEJUNAL ANASTOMOSIS: Primary | ICD-10-CM

## 2024-05-21 PROCEDURE — 96365 THER/PROPH/DIAG IV INF INIT: CPT

## 2024-05-21 PROCEDURE — 96366 THER/PROPH/DIAG IV INF ADDON: CPT

## 2024-05-21 RX ORDER — SODIUM CHLORIDE 9 MG/ML
20 INJECTION, SOLUTION INTRAVENOUS ONCE
Status: COMPLETED | OUTPATIENT
Start: 2024-05-21 | End: 2024-05-21

## 2024-05-21 RX ORDER — SODIUM CHLORIDE 9 MG/ML
20 INJECTION, SOLUTION INTRAVENOUS ONCE
Status: CANCELLED | OUTPATIENT
Start: 2024-05-29

## 2024-05-21 RX ADMIN — SODIUM CHLORIDE 20 ML/HR: 9 INJECTION, SOLUTION INTRAVENOUS at 14:25

## 2024-05-21 RX ADMIN — IRON SUCROSE 300 MG: 20 INJECTION, SOLUTION INTRAVENOUS at 14:25

## 2024-05-21 NOTE — PROGRESS NOTES
Aleida Hammond  tolerated treatment well with no complications.      Aleida Hammond is aware of future appt on 5/29 at 1400.     AVS printed and given to Aleida Hammond:    No (Declined by Aleida Hammond)

## 2024-05-23 ENCOUNTER — TELEPHONE (OUTPATIENT)
Dept: HEMATOLOGY ONCOLOGY | Facility: CLINIC | Age: 64
End: 2024-05-23

## 2024-05-23 NOTE — TELEPHONE ENCOUNTER
We received a referral to schedule patient with medical oncology. Patient is established and under Dr. Mcrae's care.  She was last seen by Marie Zayas 5/2/24.  And has a 4mo follow up schedule 9/3/24.  I closed referral.

## 2024-05-29 ENCOUNTER — HOSPITAL ENCOUNTER (OUTPATIENT)
Dept: INFUSION CENTER | Facility: HOSPITAL | Age: 64
Discharge: HOME | End: 2024-05-29
Attending: INTERNAL MEDICINE
Payer: COMMERCIAL

## 2024-05-29 VITALS
TEMPERATURE: 97.7 F | HEART RATE: 64 BPM | OXYGEN SATURATION: 99 % | SYSTOLIC BLOOD PRESSURE: 137 MMHG | DIASTOLIC BLOOD PRESSURE: 75 MMHG | RESPIRATION RATE: 17 BRPM

## 2024-05-29 DIAGNOSIS — Z98.0 S/P TOTAL GASTRECTOMY AND ROUX-EN-Y ESOPHAGOJEJUNAL ANASTOMOSIS: Primary | ICD-10-CM

## 2024-05-29 DIAGNOSIS — E61.1 IRON DEFICIENCY: ICD-10-CM

## 2024-05-29 DIAGNOSIS — Z90.3 S/P TOTAL GASTRECTOMY AND ROUX-EN-Y ESOPHAGOJEJUNAL ANASTOMOSIS: Primary | ICD-10-CM

## 2024-05-29 RX ORDER — SODIUM CHLORIDE 9 MG/ML
20 INJECTION, SOLUTION INTRAVENOUS ONCE
Status: COMPLETED | OUTPATIENT
Start: 2024-05-29 | End: 2024-05-29

## 2024-05-29 RX ORDER — SODIUM CHLORIDE 9 MG/ML
20 INJECTION, SOLUTION INTRAVENOUS ONCE
Status: CANCELLED | OUTPATIENT
Start: 2024-05-29

## 2024-05-29 RX ADMIN — SODIUM CHLORIDE 20 ML/HR: 9 INJECTION, SOLUTION INTRAVENOUS at 14:12

## 2024-05-29 RX ADMIN — IRON SUCROSE 300 MG: 20 INJECTION, SOLUTION INTRAVENOUS at 14:12

## 2024-05-29 NOTE — PROGRESS NOTES
Aleida Hammond  tolerated treatment well with no complications.      Aleida Hammond is aware of future appt on 06/03/2024 at 03:00 PM.     AVS printed and given to Aleida Hammond:  No (Declined by Aleida Hammond)

## 2024-06-03 ENCOUNTER — HOSPITAL ENCOUNTER (OUTPATIENT)
Dept: INFUSION CENTER | Facility: HOSPITAL | Age: 64
Discharge: HOME | End: 2024-06-03
Attending: INTERNAL MEDICINE

## 2024-06-04 ENCOUNTER — HOSPITAL ENCOUNTER (OUTPATIENT)
Dept: INFUSION CENTER | Facility: HOSPITAL | Age: 64
Discharge: HOME | End: 2024-06-04
Attending: INTERNAL MEDICINE
Payer: COMMERCIAL

## 2024-06-04 DIAGNOSIS — E53.8 B12 DEFICIENCY: Primary | ICD-10-CM

## 2024-06-04 RX ORDER — CYANOCOBALAMIN 1000 UG/ML
1000 INJECTION, SOLUTION INTRAMUSCULAR; SUBCUTANEOUS ONCE
OUTPATIENT
Start: 2024-07-02

## 2024-06-04 RX ORDER — CYANOCOBALAMIN 1000 UG/ML
1000 INJECTION, SOLUTION INTRAMUSCULAR; SUBCUTANEOUS ONCE
Status: COMPLETED | OUTPATIENT
Start: 2024-06-04 | End: 2024-06-04

## 2024-06-04 RX ADMIN — CYANOCOBALAMIN 1000 MCG: 1000 INJECTION, SOLUTION INTRAMUSCULAR at 12:37

## 2024-06-04 NOTE — PROGRESS NOTES
..Aleida Hammond  tolerated treatment well with no complications.      Aleida Hammond is aware of future appt on 7/1 at 1:00.     AVS printed and given to Aleida Hammond: No (Declined by Aleida Hammond)

## 2024-06-19 ENCOUNTER — OFFICE VISIT (OUTPATIENT)
Dept: FAMILY MEDICINE CLINIC | Facility: HOSPITAL | Age: 64
End: 2024-06-19
Payer: COMMERCIAL

## 2024-06-19 VITALS
OXYGEN SATURATION: 94 % | WEIGHT: 156 LBS | HEART RATE: 74 BPM | BODY MASS INDEX: 26.63 KG/M2 | DIASTOLIC BLOOD PRESSURE: 78 MMHG | SYSTOLIC BLOOD PRESSURE: 148 MMHG | HEIGHT: 64 IN

## 2024-06-19 DIAGNOSIS — E78.5 HYPERLIPIDEMIA ASSOCIATED WITH TYPE 2 DIABETES MELLITUS  (HCC): ICD-10-CM

## 2024-06-19 DIAGNOSIS — J45.30 MILD PERSISTENT ASTHMA WITHOUT COMPLICATION: ICD-10-CM

## 2024-06-19 DIAGNOSIS — Z90.3 S/P TOTAL GASTRECTOMY AND ROUX-EN-Y ESOPHAGOJEJUNAL ANASTOMOSIS: ICD-10-CM

## 2024-06-19 DIAGNOSIS — Z98.0 S/P TOTAL GASTRECTOMY AND ROUX-EN-Y ESOPHAGOJEJUNAL ANASTOMOSIS: ICD-10-CM

## 2024-06-19 DIAGNOSIS — M54.50 CHRONIC BILATERAL LOW BACK PAIN, UNSPECIFIED WHETHER SCIATICA PRESENT: Primary | ICD-10-CM

## 2024-06-19 DIAGNOSIS — E11.69 HYPERLIPIDEMIA ASSOCIATED WITH TYPE 2 DIABETES MELLITUS  (HCC): ICD-10-CM

## 2024-06-19 DIAGNOSIS — C17.0 MALIGNANT NEOPLASM OF DUODENUM (HCC): ICD-10-CM

## 2024-06-19 DIAGNOSIS — G89.29 CHRONIC BILATERAL LOW BACK PAIN, UNSPECIFIED WHETHER SCIATICA PRESENT: Primary | ICD-10-CM

## 2024-06-19 DIAGNOSIS — R93.1 HIGH CORONARY ARTERY CALCIUM SCORE: ICD-10-CM

## 2024-06-19 DIAGNOSIS — R93.89 ABNORMAL CHEST CT: ICD-10-CM

## 2024-06-19 PROCEDURE — 99214 OFFICE O/P EST MOD 30 MIN: CPT | Performed by: STUDENT IN AN ORGANIZED HEALTH CARE EDUCATION/TRAINING PROGRAM

## 2024-06-19 PROCEDURE — 3725F SCREEN DEPRESSION PERFORMED: CPT | Performed by: STUDENT IN AN ORGANIZED HEALTH CARE EDUCATION/TRAINING PROGRAM

## 2024-06-19 PROCEDURE — 3078F DIAST BP <80 MM HG: CPT | Performed by: STUDENT IN AN ORGANIZED HEALTH CARE EDUCATION/TRAINING PROGRAM

## 2024-06-19 PROCEDURE — 3077F SYST BP >= 140 MM HG: CPT | Performed by: STUDENT IN AN ORGANIZED HEALTH CARE EDUCATION/TRAINING PROGRAM

## 2024-06-19 RX ORDER — METHOCARBAMOL 500 MG/1
500 TABLET, FILM COATED ORAL EVERY 6 HOURS PRN
COMMUNITY
Start: 2024-05-30

## 2024-06-19 RX ORDER — PREDNISONE 20 MG/1
TABLET ORAL
Qty: 15 TABLET | Refills: 1 | Status: SHIPPED | OUTPATIENT
Start: 2024-06-19 | End: 2024-07-01

## 2024-06-19 RX ORDER — ATORVASTATIN CALCIUM 40 MG/1
60 TABLET, FILM COATED ORAL DAILY
Qty: 135 TABLET | Refills: 1 | Status: SHIPPED | OUTPATIENT
Start: 2024-06-19

## 2024-06-25 ENCOUNTER — TELEPHONE (OUTPATIENT)
Dept: FAMILY MEDICINE CLINIC | Facility: HOSPITAL | Age: 64
End: 2024-06-25

## 2024-06-25 ENCOUNTER — OFFICE VISIT (OUTPATIENT)
Dept: FAMILY MEDICINE CLINIC | Facility: HOSPITAL | Age: 64
End: 2024-06-25
Payer: COMMERCIAL

## 2024-06-25 VITALS
OXYGEN SATURATION: 96 % | HEART RATE: 74 BPM | DIASTOLIC BLOOD PRESSURE: 71 MMHG | HEIGHT: 64 IN | WEIGHT: 154.8 LBS | BODY MASS INDEX: 26.43 KG/M2 | SYSTOLIC BLOOD PRESSURE: 123 MMHG

## 2024-06-25 DIAGNOSIS — S32.048S OTHER CLOSED FRACTURE OF FOURTH LUMBAR VERTEBRA, SEQUELA: ICD-10-CM

## 2024-06-25 DIAGNOSIS — E11.69 HYPERLIPIDEMIA ASSOCIATED WITH TYPE 2 DIABETES MELLITUS  (HCC): ICD-10-CM

## 2024-06-25 DIAGNOSIS — Z12.31 SCREENING MAMMOGRAM FOR BREAST CANCER: Primary | ICD-10-CM

## 2024-06-25 DIAGNOSIS — E11.9 TYPE 2 DIABETES MELLITUS WITHOUT COMPLICATION, WITHOUT LONG-TERM CURRENT USE OF INSULIN (HCC): ICD-10-CM

## 2024-06-25 DIAGNOSIS — M54.42 CHRONIC BILATERAL LOW BACK PAIN WITH LEFT-SIDED SCIATICA: ICD-10-CM

## 2024-06-25 DIAGNOSIS — E78.5 HYPERLIPIDEMIA ASSOCIATED WITH TYPE 2 DIABETES MELLITUS  (HCC): ICD-10-CM

## 2024-06-25 DIAGNOSIS — Z13.29 SCREENING FOR THYROID DISORDER: ICD-10-CM

## 2024-06-25 DIAGNOSIS — G89.29 CHRONIC BILATERAL LOW BACK PAIN WITH LEFT-SIDED SCIATICA: ICD-10-CM

## 2024-06-25 LAB — SL AMB POCT HEMOGLOBIN AIC: 6.3 (ref ?–6.5)

## 2024-06-25 PROCEDURE — 83036 HEMOGLOBIN GLYCOSYLATED A1C: CPT | Performed by: STUDENT IN AN ORGANIZED HEALTH CARE EDUCATION/TRAINING PROGRAM

## 2024-06-25 PROCEDURE — 99214 OFFICE O/P EST MOD 30 MIN: CPT | Performed by: STUDENT IN AN ORGANIZED HEALTH CARE EDUCATION/TRAINING PROGRAM

## 2024-06-25 NOTE — PROGRESS NOTES
[facility]   Tara Colon DO    Assessment/Plan:      Diagnosis ICD-10-CM Associated Orders   1. Screening mammogram for breast cancer  Z12.31 Mammo screening bilateral w 3d & cad      2. Type 2 diabetes mellitus without complication, without long-term current use of insulin (HCC)  E11.9 POCT hemoglobin A1c     Albumin / creatinine urine ratio      3. Chronic bilateral low back pain with left-sided sciatica  M54.42 Ambulatory referral to Spine & Pain Management    G89.29       4. Other closed fracture of fourth lumbar vertebra, sequela  S32.048S Ambulatory referral to Spine & Pain Management      5. Screening for thyroid disorder  Z13.29 TSH, 3rd generation with Free T4 reflex      6. Hyperlipidemia associated with type 2 diabetes mellitus  (HCC)  E11.69 Lipid Panel with Direct LDL reflex    E78.5         A1c looks great today. Stay the course of meds. Labs added to august set.   Just adjusted lipitor dose the other day here.   Due for mammo, ordered.   Can use voltaren, lido topical if needed to hands & feet. Prednisone taper may also help. Can discuss with pain mgmt as well.   Declined XR's today, but can add if needed or not improving.   Insurance ref placed for upcoming pain mgmt appt & pump supply refill.   Return in about 4 months (around 10/25/2024) for Annual Physical, poct A1c, foot exam .  Patient may call or return to office with any questions or concerns.   ______________________________________________________________________  Subjective:     Patient ID: Aleida Hammond is a 64 y.o. female.  HPI  Aleida Hammond  Chief Complaint   Patient presents with   • Follow-up     6 month follow up   Having what she thinks is arthritic pain in hands and feet.      A1c 6.3 this time. Insulin gone now & metformin dose cut.     Started steroid a few days ago, & on 40 mg this morning, no major relief yet.   Getting very stiff in the mornings or if sitting for a while.   Fall in garden a few  weeks ago. Landed on back/butt.   Bones feel bad in her feet.   Hands painful in MCP's & PIPs.   Get too stiff to separate her hands.     Home BP's similar to today 120/70's.   Wt Readings from Last 3 Encounters:   06/25/24 70.2 kg (154 lb 12.8 oz)   06/19/24 70.8 kg (156 lb)   05/02/24 66.7 kg (147 lb)     Temp Readings from Last 3 Encounters:   05/29/24 97.7 °F (36.5 °C) (Temporal)   05/21/24 98.2 °F (36.8 °C)   05/14/24 98.1 °F (36.7 °C) (Temporal)     BP Readings from Last 3 Encounters:   06/25/24 123/71   06/19/24 148/78   05/29/24 137/75     Pulse Readings from Last 3 Encounters:   06/25/24 74   06/19/24 74   05/29/24 64     Pierre Cole in [place], needs pain pump refill coming up soon, needs insurance referral. Med needs to be ordered in advance of appt.   On below meds + pain pump/morphine infusion.     05/30/2024 05/30/2024 1 Tramadol Hcl 50 Mg Tablet 180.00 30 Th Juan Jose [id number] Pen (6052) 0 60.00 MME Comm Ins [place]   05/30/2024 05/30/2024 1 Hydrocodone-Acetamin 5-325 Mg 120.00 15 Th Juan Jose [id number] Pen (6052) 0 40.00 MME Private Pay [place]   05/30/2024 05/30/2024 1 Pregabalin 200 Mg Capsule 90.00 30 Th Juan Jose [id number] Pen (6052) 0 4.02 LME Comm Ins [place]   05/23/2024 05/23/2024 1 Morphine Sulfate Powder 0.02 30 Th Juan Jose              The following portions of the patient's history were reviewed and updated as appropriate: allergies, current medications, past medical history, and problem list.    Review of Systems   Constitutional:  Negative for chills and fever.   Respiratory:  Negative for cough and shortness of breath.    Cardiovascular:  Negative for chest pain, palpitations and leg swelling.   Musculoskeletal:  Positive for arthralgias and back pain.   Neurological:  Positive for light-headedness (less freq than before). Negative for dizziness and headaches.         Objective:      Vitals:    06/25/24 0856   BP: 123/71   Pulse: 74   SpO2: 96%      Physical Exam  Vitals and nursing note reviewed.   Constitutional:        "General: She is not in acute distress.     Appearance: Normal appearance. She is normal weight. She is not ill-appearing.   HENT:      Head: Normocephalic and atraumatic.   Eyes:      General: No scleral icterus.        Right eye: No discharge.         Left eye: No discharge.   Cardiovascular:      Rate and Rhythm: Normal rate and regular rhythm.      Pulses: Normal pulses.      Heart sounds: Normal heart sounds. No murmur heard.  Pulmonary:      Effort: Pulmonary effort is normal. No respiratory distress.      Breath sounds: Normal breath sounds. No stridor. No wheezing.   Musculoskeletal:      Cervical back: Normal range of motion and neck supple. No rigidity or tenderness.      Right lower leg: No edema.      Left lower leg: No edema.   Lymphadenopathy:      Cervical: No cervical adenopathy.   Neurological:      Mental Status: She is alert and oriented to person, place, and time.      Gait: Gait normal.   Psychiatric:         Mood and Affect: Mood normal.         Behavior: Behavior normal.         Thought Content: Thought content normal.         Judgment: Judgment normal.           Portions of the record may have been created with voice recognition software. Occasional wrong word or \"sound alike\" substitutions may have occurred due to the inherent limitations of voice recognition software. Please review the chart carefully and recognize, using context, where substitutions/typographical errors may have occurred.     " none

## 2024-06-25 NOTE — TELEPHONE ENCOUNTER
Stephany needs a referral for:    [facility]  NPI# [id number]  Ambulatory referral in epic    ICD10 code M54.42, G89.29, S32.048S    [facility]# [id number]    In comment code area:  Morphine Pump Refill    Appointment 7/8/24

## 2024-06-30 NOTE — PROGRESS NOTES
[facility]   Tara Colon DO    Assessment/Plan:      Diagnosis ICD-10-CM Associated Orders   1. Chronic bilateral low back pain, unspecified whether sciatica present  M54.50     G89.29       2. Hyperlipidemia associated with type 2 diabetes mellitus  (HCC)  E11.69 atorvastatin (LIPITOR) 40 mg tablet    E78.5       3. Mild persistent asthma without complication  J45.30 predniSONE 20 mg tablet     Ambulatory Referral to Pulmonology      4. Abnormal chest CT  R93.89 Ambulatory Referral to Pulmonology     Ambulatory Referral to Cardiology      5. High coronary artery calcium score  R93.1 Ambulatory Referral to Cardiology      6. 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 Malignant neoplasm of duodenum (HCC)  C17.0         Referrals provided as above for pulmonology and cardiology.  Patient had heavy atherosclerotic coronary artery calcification.  ASCVD below, is on Lipitor 40 mg daily, but amenable to increasing it to 60 mg/day.  Call if you are experiencing any chest pain specifically with exertion.  Prednisone taper provided as well for her chronic asthma and shortness of breath, former smoker.  PDMP reviewed, last 90-day supply filled in May for her Ambien at bedtime as needed.  Return in about 4 months (around 10/19/2024) for ly Physical.  Patient may call or return to office with any questions or concerns.   ______________________________________________________________________  Subjective:     Patient ID: Aleida Hammond is a 64 y.o. female.  HPI  Aleida Hammond  Chief Complaint   Patient presents with   • Follow-up     Ct  scan review      Iron infusions & B12 injections thru [facility].   Last seen in May by Lauren.      Sometimes eating hurts, if she overdoes it, sometimes not sure if it will be too much.      Tobacco Use: Medium Risk (6/25/2024)    Patient History    • Smoking Tobacco Use: Former    • Smokeless Tobacco Use: Never    • Passive  Exposure: Not on file     The 10-year ASCVD risk score (Renetta TUTTLE, et al., 2019) is: 10.3%    Values used to calculate the score:      Age: 64 years      Sex: Female      Is Non- : No      Diabetic: Yes      Tobacco smoker: No      Systolic Blood Pressure: 123 mmHg      Is BP treated: Yes      HDL Cholesterol: 44 mg/dL      Total Cholesterol: 134 mg/dL    Wt Readings from Last 3 Encounters:   06/25/24 70.2 kg (154 lb 12.8 oz)   06/19/24 70.8 kg (156 lb)   05/02/24 66.7 kg (147 lb)     Temp Readings from Last 3 Encounters:   05/29/24 97.7 °F (36.5 °C) (Temporal)   05/21/24 98.2 °F (36.8 °C)   05/14/24 98.1 °F (36.7 °C) (Temporal)     BP Readings from Last 3 Encounters:   06/25/24 123/71   06/19/24 148/78   05/29/24 137/75     Pulse Readings from Last 3 Encounters:   06/25/24 74   06/19/24 74   05/29/24 64        The following portions of the patient's history were reviewed and updated as appropriate: allergies, current medications, past medical history, and problem list.    Review of Systems   Constitutional:  Positive for appetite change (Decreased, chronic) and fatigue (Chronic). Negative for fever.   Respiratory:  Positive for cough and shortness of breath.    Cardiovascular:  Negative for chest pain, palpitations and leg swelling.   Gastrointestinal:  Positive for abdominal distention (If too much food too quickly) and abdominal pain.   Musculoskeletal:  Positive for back pain.       Objective:      Vitals:    06/19/24 0741   BP: 148/78   Pulse: 74   SpO2: 94%      Physical Exam  Vitals and nursing note reviewed.   Constitutional:       General: She is not in acute distress.     Appearance: Normal appearance. She is not ill-appearing.   HENT:      Head: Normocephalic and atraumatic.   Eyes:      General: No scleral icterus.        Right eye: No discharge.         Left eye: No discharge.   Cardiovascular:      Rate and Rhythm: Normal rate and regular rhythm.      Pulses: Normal pulses.       "Heart sounds: Normal heart sounds. No murmur heard.  Pulmonary:      Effort: Pulmonary effort is normal. No respiratory distress.      Breath sounds: Normal breath sounds. No stridor. No wheezing.   Musculoskeletal:         General: Tenderness (Bilateral low back, not going into the legs at this time) present.      Cervical back: Normal range of motion and neck supple. No rigidity or tenderness.      Right lower leg: No edema.      Left lower leg: No edema.      Comments: Straight leg raise negative today bilaterally  Normal strength and sensation of bilateral lower extremities   Lymphadenopathy:      Cervical: No cervical adenopathy.   Neurological:      Mental Status: She is alert and oriented to person, place, and time.      Gait: Gait normal.   Psychiatric:         Mood and Affect: Mood normal.         Behavior: Behavior normal.         Thought Content: Thought content normal.         Judgment: Judgment normal.           Portions of the record may have been created with voice recognition software. Occasional wrong word or \"sound alike\" substitutions may have occurred due to the inherent limitations of voice recognition software. Please review the chart carefully and recognize, using context, where substitutions/typographical errors may have occurred.       "

## 2024-07-05 DIAGNOSIS — E53.8 B12 DEFICIENCY: Primary | ICD-10-CM

## 2024-07-05 RX ORDER — CYANOCOBALAMIN 1000 UG/ML
1000 INJECTION, SOLUTION INTRAMUSCULAR; SUBCUTANEOUS ONCE
OUTPATIENT
Start: 2024-07-08

## 2024-07-08 ENCOUNTER — HOSPITAL ENCOUNTER (OUTPATIENT)
Dept: INFUSION CENTER | Facility: HOSPITAL | Age: 64
Discharge: HOME | End: 2024-07-08
Attending: INTERNAL MEDICINE
Payer: COMMERCIAL

## 2024-07-08 VITALS
SYSTOLIC BLOOD PRESSURE: 109 MMHG | HEART RATE: 77 BPM | TEMPERATURE: 97.7 F | DIASTOLIC BLOOD PRESSURE: 69 MMHG | RESPIRATION RATE: 16 BRPM

## 2024-07-08 DIAGNOSIS — E53.8 B12 DEFICIENCY: Primary | ICD-10-CM

## 2024-07-08 PROCEDURE — 96372 THER/PROPH/DIAG INJ SC/IM: CPT

## 2024-07-08 RX ORDER — CYANOCOBALAMIN 1000 UG/ML
1000 INJECTION, SOLUTION INTRAMUSCULAR; SUBCUTANEOUS ONCE
OUTPATIENT
Start: 2024-08-05

## 2024-07-08 RX ORDER — CYANOCOBALAMIN 1000 UG/ML
1000 INJECTION, SOLUTION INTRAMUSCULAR; SUBCUTANEOUS ONCE
Status: COMPLETED | OUTPATIENT
Start: 2024-07-08 | End: 2024-07-08

## 2024-07-08 RX ADMIN — CYANOCOBALAMIN 1000 MCG: 1000 INJECTION, SOLUTION INTRAMUSCULAR at 13:26

## 2024-07-08 NOTE — PROGRESS NOTES
Aleida Hammond  tolerated treatment well with no complications.      Aleida Hammond is aware of future appt on 8/5 at 12:30.     AVS printed and given to Aleida Hammond:  No (Declined by Aleida Hammond)

## 2024-07-10 ENCOUNTER — TELEPHONE (OUTPATIENT)
Age: 64
End: 2024-07-10

## 2024-07-10 NOTE — TELEPHONE ENCOUNTER
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  Patient is unable to find form and not found under media section in chart.  Please contact patient if able to find form.

## 2024-07-10 NOTE — TELEPHONE ENCOUNTER
Pt called back to correct the name of the insurance company. Its not [employer]. It's actually [employer].

## 2024-07-12 DIAGNOSIS — K21.00 GASTROESOPHAGEAL REFLUX DISEASE WITH ESOPHAGITIS: ICD-10-CM

## 2024-07-12 DIAGNOSIS — E11.9 TYPE 2 DIABETES MELLITUS WITHOUT COMPLICATION, WITHOUT LONG-TERM CURRENT USE OF INSULIN (HCC): ICD-10-CM

## 2024-07-12 DIAGNOSIS — J45.30 MILD PERSISTENT ASTHMA WITHOUT COMPLICATION: ICD-10-CM

## 2024-07-12 DIAGNOSIS — R11.0 NAUSEA: ICD-10-CM

## 2024-07-12 RX ORDER — ONDANSETRON 4 MG/1
4 TABLET, FILM COATED ORAL EVERY 8 HOURS PRN
Qty: 100 TABLET | Refills: 1 | Status: SHIPPED | OUTPATIENT
Start: 2024-07-12

## 2024-07-12 RX ORDER — MONTELUKAST SODIUM 10 MG/1
10 TABLET ORAL
Qty: 100 TABLET | Refills: 1 | Status: SHIPPED | OUTPATIENT
Start: 2024-07-12

## 2024-07-12 RX ORDER — OMEPRAZOLE 40 MG/1
40 CAPSULE, DELAYED RELEASE ORAL DAILY
Qty: 100 CAPSULE | Refills: 1 | Status: SHIPPED | OUTPATIENT
Start: 2024-07-12

## 2024-08-05 ENCOUNTER — HOSPITAL ENCOUNTER (OUTPATIENT)
Dept: INFUSION CENTER | Facility: HOSPITAL | Age: 64
Discharge: HOME | End: 2024-08-05
Attending: INTERNAL MEDICINE
Payer: COMMERCIAL

## 2024-08-05 DIAGNOSIS — E53.8 B12 DEFICIENCY: Primary | ICD-10-CM

## 2024-08-05 PROCEDURE — 96372 THER/PROPH/DIAG INJ SC/IM: CPT

## 2024-08-05 RX ORDER — CYANOCOBALAMIN 1000 UG/ML
1000 INJECTION, SOLUTION INTRAMUSCULAR; SUBCUTANEOUS ONCE
Status: COMPLETED | OUTPATIENT
Start: 2024-08-05 | End: 2024-08-05

## 2024-08-05 RX ORDER — CYANOCOBALAMIN 1000 UG/ML
1000 INJECTION, SOLUTION INTRAMUSCULAR; SUBCUTANEOUS ONCE
OUTPATIENT
Start: 2024-09-02

## 2024-08-05 RX ADMIN — CYANOCOBALAMIN 1000 MCG: 1000 INJECTION, SOLUTION INTRAMUSCULAR at 12:29

## 2024-08-05 NOTE — PROGRESS NOTES
"Aleida Hammond  tolerated treatment well with no complications.      Aleida Hammond has no future appointments with the infusion at this time. Stephany stated she \"wanted to call to make an appointment because she is starting her new insurance and wants to call to make an appointment at a later time\".    AVS printed and given to Aleida Hammond:    No (Declined by Aleida L Stephany)       "

## 2024-08-05 NOTE — PLAN OF CARE
Problem: Knowledge Deficit  Goal: Patient/family/caregiver demonstrates understanding of disease process, treatment plan, medications, and discharge instructions  Description: Complete learning assessment and assess knowledge base.  Interventions:  - Provide teaching at level of understanding  - Provide teaching via preferred learning methods  8/5/2024 1234 by Bev Cleveland, RN  Outcome: Progressing  8/5/2024 1227 by Bev Cleveland, RN  Outcome: Progressing

## 2024-08-06 ENCOUNTER — TELEPHONE (OUTPATIENT)
Age: 64
End: 2024-08-06

## 2024-08-07 ENCOUNTER — APPOINTMENT (OUTPATIENT)
Dept: LAB | Facility: HOSPITAL | Age: 64
End: 2024-08-07
Payer: COMMERCIAL

## 2024-08-07 DIAGNOSIS — C17.0 ADENOCARCINOMA OF DUODENUM (HCC): ICD-10-CM

## 2024-08-07 DIAGNOSIS — E11.69 HYPERLIPIDEMIA ASSOCIATED WITH TYPE 2 DIABETES MELLITUS  (HCC): ICD-10-CM

## 2024-08-07 DIAGNOSIS — Z13.29 SCREENING FOR THYROID DISORDER: ICD-10-CM

## 2024-08-07 DIAGNOSIS — C25.9 ADENOCARCINOMA OF PANCREAS (HCC): ICD-10-CM

## 2024-08-07 DIAGNOSIS — E11.9 TYPE 2 DIABETES MELLITUS WITHOUT COMPLICATION, WITHOUT LONG-TERM CURRENT USE OF INSULIN (HCC): ICD-10-CM

## 2024-08-07 DIAGNOSIS — E61.1 IRON DEFICIENCY: ICD-10-CM

## 2024-08-07 DIAGNOSIS — E78.5 HYPERLIPIDEMIA ASSOCIATED WITH TYPE 2 DIABETES MELLITUS  (HCC): ICD-10-CM

## 2024-08-07 LAB
BUN SERPL-MCNC: 22 MG/DL (ref 5–25)
CHOLEST SERPL-MCNC: 104 MG/DL
CREAT SERPL-MCNC: 0.93 MG/DL (ref 0.6–1.3)
CREAT UR-MCNC: 59.1 MG/DL
FERRITIN SERPL-MCNC: 290 NG/ML (ref 11–307)
GFR SERPL CREATININE-BSD FRML MDRD: 65 ML/MIN/1.73SQ M
HDLC SERPL-MCNC: 38 MG/DL
IRON SATN MFR SERPL: 30 % (ref 15–50)
IRON SERPL-MCNC: 78 UG/DL (ref 50–212)
LDLC SERPL CALC-MCNC: 41 MG/DL (ref 0–100)
MICROALBUMIN UR-MCNC: <7 MG/L
TIBC SERPL-MCNC: 263 UG/DL (ref 250–450)
TRIGL SERPL-MCNC: 124 MG/DL
TSH SERPL DL<=0.05 MIU/L-ACNC: 4.25 UIU/ML (ref 0.45–4.5)
UIBC SERPL-MCNC: 185 UG/DL (ref 155–355)

## 2024-08-07 PROCEDURE — 82565 ASSAY OF CREATININE: CPT

## 2024-08-07 PROCEDURE — 84443 ASSAY THYROID STIM HORMONE: CPT

## 2024-08-07 PROCEDURE — 82043 UR ALBUMIN QUANTITATIVE: CPT

## 2024-08-07 PROCEDURE — 36415 COLL VENOUS BLD VENIPUNCTURE: CPT

## 2024-08-07 PROCEDURE — 82570 ASSAY OF URINE CREATININE: CPT

## 2024-08-07 PROCEDURE — 80061 LIPID PANEL: CPT

## 2024-08-07 PROCEDURE — 84520 ASSAY OF UREA NITROGEN: CPT

## 2024-08-07 PROCEDURE — 83540 ASSAY OF IRON: CPT

## 2024-08-07 PROCEDURE — 82728 ASSAY OF FERRITIN: CPT

## 2024-08-07 PROCEDURE — 83550 IRON BINDING TEST: CPT

## 2024-08-08 ENCOUNTER — HOSPITAL ENCOUNTER (OUTPATIENT)
Dept: CT IMAGING | Facility: HOSPITAL | Age: 64
Discharge: HOME | End: 2024-08-08
Attending: STUDENT IN AN ORGANIZED HEALTH CARE EDUCATION/TRAINING PROGRAM
Payer: COMMERCIAL

## 2024-08-08 DIAGNOSIS — R91.1 LUNG NODULE SEEN ON IMAGING STUDY: ICD-10-CM

## 2024-08-08 DIAGNOSIS — C17.0 ADENOCARCINOMA OF DUODENUM (HCC): ICD-10-CM

## 2024-08-08 PROCEDURE — 74177 CT ABD & PELVIS W/CONTRAST: CPT

## 2024-08-08 PROCEDURE — 71260 CT THORAX DX C+: CPT

## 2024-08-08 RX ADMIN — IOHEXOL 50 ML: 350 INJECTION, SOLUTION INTRAVENOUS at 11:03

## 2024-08-09 ENCOUNTER — TELEPHONE (OUTPATIENT)
Dept: FAMILY MEDICINE CLINIC | Facility: HOSPITAL | Age: 64
End: 2024-08-09

## 2024-08-09 DIAGNOSIS — E11.9 TYPE 2 DIABETES MELLITUS WITHOUT COMPLICATION, WITHOUT LONG-TERM CURRENT USE OF INSULIN (HCC): ICD-10-CM

## 2024-08-09 DIAGNOSIS — I10 BENIGN ESSENTIAL HYPERTENSION: ICD-10-CM

## 2024-08-09 NOTE — TELEPHONE ENCOUNTER
Please send to [facility], [facility] [place], [place] [address] Please do not send to mail order.  [phone].  Pt stated her ins will not cover if using mail order. I changed pt preference in chart to this [facility].

## 2024-08-10 RX ORDER — METFORMIN HYDROCHLORIDE 500 MG/1
500 TABLET, EXTENDED RELEASE ORAL
Qty: 90 TABLET | Refills: 0 | Status: SHIPPED | OUTPATIENT
Start: 2024-08-10

## 2024-08-10 RX ORDER — AMLODIPINE BESYLATE 10 MG/1
5 TABLET ORAL DAILY
Qty: 90 TABLET | Refills: 0 | Status: SHIPPED | OUTPATIENT
Start: 2024-08-10

## 2024-08-11 LAB
APOB+LDLR+PCSK9 GENE MUT ANL BLD/T: NOT DETECTED
BRCA1+BRCA2 DEL+DUP + FULL MUT ANL BLD/T: NOT DETECTED
MLH1+MSH2+MSH6+PMS2 GN DEL+DUP+FUL M: NOT DETECTED

## 2024-08-16 DIAGNOSIS — G47.00 INSOMNIA, UNSPECIFIED TYPE: ICD-10-CM

## 2024-08-19 RX ORDER — ZOLPIDEM TARTRATE 5 MG/1
5 TABLET ORAL
Qty: 90 TABLET | Refills: 0 | Status: SHIPPED | OUTPATIENT
Start: 2024-08-19

## 2024-08-21 ENCOUNTER — OFFICE VISIT (OUTPATIENT)
Dept: CARDIOLOGY CLINIC | Facility: CLINIC | Age: 64
End: 2024-08-21
Payer: COMMERCIAL

## 2024-08-21 VITALS
WEIGHT: 157.6 LBS | SYSTOLIC BLOOD PRESSURE: 102 MMHG | HEART RATE: 79 BPM | DIASTOLIC BLOOD PRESSURE: 64 MMHG | HEIGHT: 64 IN | BODY MASS INDEX: 26.91 KG/M2

## 2024-08-21 DIAGNOSIS — R93.89 ABNORMAL CHEST CT: ICD-10-CM

## 2024-08-21 DIAGNOSIS — R93.1 HIGH CORONARY ARTERY CALCIUM SCORE: ICD-10-CM

## 2024-08-21 DIAGNOSIS — I10 PRIMARY HYPERTENSION: ICD-10-CM

## 2024-08-21 DIAGNOSIS — Z79.4 TYPE 2 DIABETES MELLITUS WITH DIABETIC NEUROPATHY, WITH LONG-TERM CURRENT USE OF INSULIN (HCC): Primary | ICD-10-CM

## 2024-08-21 DIAGNOSIS — E78.2 MIXED HYPERLIPIDEMIA: ICD-10-CM

## 2024-08-21 DIAGNOSIS — E11.40 TYPE 2 DIABETES MELLITUS WITH DIABETIC NEUROPATHY, WITH LONG-TERM CURRENT USE OF INSULIN (HCC): Primary | ICD-10-CM

## 2024-08-21 PROCEDURE — 99204 OFFICE O/P NEW MOD 45 MIN: CPT | Performed by: INTERNAL MEDICINE

## 2024-08-21 NOTE — PROGRESS NOTES
Consultation - Cardiology   Aleida Hammond 64 y.o. female MRN: [id number]    Encounter: [id number]    Assessment & Plan     Assessment:   Coronary atherosclerosis  Hypertension  Hyperlipidemia    Plan:    Recommend lexiscan MPI for eval. She has had some dyspnea. No angina.   Continue with current medical regimen for hypertension. BP is stable.   Continue atorvastatin at current dosing. LDL 41.       History of Present Illness   Physician Requesting Consult: No att. providers found  Reason for Consult / Principal Problem: Coronary atherosclerosis  HPI: Aleida Hammond is a 64 y.o. year old female who presents with a history of duodenal cancer and she underwent a whipple procedure. Recent chest CT shows coronary atherosclerosis.     She has had some exertional dyspnea in the past however that has improved. No chest pain. She has a history of Type 2 DM, Hypertension and Hyperlipidemia.     Family Hx: No premature cad.     Review of Systems   Constitutional: Negative.   HENT: Negative.     Eyes: Negative.    Cardiovascular: Negative.    Respiratory: Negative.     Endocrine: Negative.    Hematologic/Lymphatic: Negative.    Skin: Negative.    Musculoskeletal: Negative.    Gastrointestinal: Negative.    Genitourinary: Negative.    Neurological: Negative.    Psychiatric/Behavioral: Negative.     Allergic/Immunologic: Negative.        Historical Information   Past Medical History:   Diagnosis Date    PABLO (acute kidney injury) (HCC) 2021    Allergic     Anemia     Anesthesia complication     Aspiration pneumonia after     Anxiety     Arthritis     Asthma     Uses Albuterol daily    Breast lump     Resolved: 2017     Chronic pain     back    Chronic pain disorder     Back, legs    Diabetes mellitus (HCC)     Diverticulitis of colon     Dizziness     GERD (gastroesophageal reflux disease)     Hepatitis     History of stomach ulcers     HL (hearing loss)     Estimated    Hyperlipidemia      Hypertension     Hypotension 2021    Infectious viral hepatitis     Hepatitiss A    Memory loss     Estimated    Pneumonia     aspiration pneumonia    PONV (postoperative nausea and vomiting)     Stomach problems      Past Surgical History:   Procedure Laterality Date    APPENDECTOMY      BACK SURGERY      CATARACT EXTRACTION, BILATERAL       SECTION      CHOLECYSTECTOMY      GASTRECTOMY N/A 2024    Procedure: COMPLETION GASTRECTOMY;  Surgeon: Karlene Cruz MD;  Location: [facility];  Service: Surgical Oncology    GASTRIC BYPASS      GASTRIC BYPASS  2010    INFUSION PUMP IMPLANTATION      LAPAROTOMY N/A 2024    Procedure: DUODENAL EXPLORATION;  Surgeon: Karlene Curz MD;  Location: [facility];  Service: Surgical Oncology    ME NDSC WRST SURG W/RLS TRANSVRS CARPL LIGM Right 2022    Procedure: Right endoscopic carpal tunnel release;  Surgeon: Papo Peguero MD;  Location: [facility];  Service: Orthopedics    SHOULDER SURGERY      SMALL INTESTINE SURGERY N/A 2024    Procedure: INTRAOPERATIVE ULTRASOUND;  Surgeon: Karlene Cruz MD;  Location: [facility];  Service: Surgical Oncology    SPINAL CORD STIMULATOR IMPLANT      WHIPPLE PROCEDURE/PANCREATICO-DUODENECTOMY N/A 2024    Procedure: WHIPPLE;  Surgeon: Karlene Cruz MD;  Location: [facility];  Service: Surgical Oncology       Social History:  Social History     Substance and Sexual Activity   Alcohol Use No     Social History     Substance and Sexual Activity   Drug Use No     Social History     Tobacco Use   Smoking Status Former    Current packs/day: 0.00    Average packs/day: 1 pack/day for 34.0 years (34.0 ttl pk-yrs)    Types: Cigarettes    Start date: 1973    Quit date: 2007    Years since quittin.6   Smokeless Tobacco Never   Tobacco Comments    15yrs ago       Family History:   Family History   Problem Relation Age of Onset    Diabetes Mother         Mellitus    Hyperlipidemia  Mother     Hypertension Mother     Colon cancer Mother 78    Pancreatic cancer Mother 84    Melanoma Mother 76    Cancer Father 78        Bladder ca    Alcohol abuse Father     Lung cancer Father 78    Prostate cancer Father 78    No Known Problems Daughter     No Known Problems Maternal Grandmother     No Known Problems Maternal Grandfather     Stomach cancer Paternal Grandmother         60's    Lung cancer Paternal Grandfather 76    Diabetes Brother         Mellitus    Hyperlipidemia Brother     Hypertension Brother     Diabetes Brother     No Known Problems Brother     No Known Problems Son     No Known Problems Son     Breast cancer Maternal Aunt 32        Unknown age    No Known Problems Paternal Aunt     No Known Problems Paternal Aunt     No Known Problems Paternal Aunt     No Known Problems Paternal Aunt     Arthritis Family     Mental illness Neg Hx        Meds/Allergies   Allergies   Allergen Reactions    Nsaids Other (See Comments)     Cannot take NSAIDS secondary to having gastric bypass    Percocet [Oxycodone-Acetaminophen] GI Intolerance       Current Outpatient Medications:     albuterol (2.5 mg/3 mL) 0.083 % nebulizer solution, Take 3 mL (2.5 mg total) by nebulization every 6 (six) hours as needed for wheezing or shortness of breath, Disp: 300 mL, Rfl: 1    albuterol (PROVENTIL HFA,VENTOLIN HFA) 90 mcg/act inhaler, USE 2 INHALATIONS ORALLY   EVERY 6 HOURS AS NEEDED FORWHEEZING, Disp: 25.5 g, Rfl: 1    amLODIPine (NORVASC) 10 mg tablet, Take 0.5 tablets (5 mg total) by mouth daily, Disp: 90 tablet, Rfl: 0    aspirin (ECOTRIN LOW STRENGTH) 81 mg EC tablet, Take 81 mg by mouth daily, Disp: , Rfl:     atorvastatin (LIPITOR) 40 mg tablet, Take 1.5 tablets (60 mg total) by mouth daily, Disp: 135 tablet, Rfl: 1    Cyanocobalamin (B-12 IJ), Inject as directed every 30 (thirty) days, Disp: , Rfl:     glucose blood (Accu-Chek Guide) test strip, Test, Disp: 100 strip, Rfl: 3    HYDROcodone-acetaminophen (NORCO)  5-325 mg per tablet, take 1 to 2 tablets by mouth every 6 hours as needed, Disp: , Rfl:     lidocaine (XYLOCAINE) 5 % ointment, apply topically to affected area three times a day, Disp: , Rfl:     metFORMIN (GLUCOPHAGE-XR) 500 mg 24 hr tablet, Take 1 tablet (500 mg total) by mouth daily with breakfast, Disp: 90 tablet, Rfl: 0    mometasone (NASONEX) 50 mcg/act nasal spray, Use 2 sprays in each nostril daily, Disp: 17 g, Rfl: 1    montelukast (SINGULAIR) 10 mg tablet, Take 1 tablet (10 mg total) by mouth daily at bedtime, Disp: 100 tablet, Rfl: 1    Morphine Sulfate (MORPHINE 0.05 MG/ML SYRINGE, NICU/PICU,, CMPD ORDER,), , Disp: , Rfl:     omeprazole (PriLOSEC) 40 MG capsule, Take 1 capsule (40 mg total) by mouth daily, Disp: 100 capsule, Rfl: 1    ondansetron (ZOFRAN) 4 mg tablet, Take 1 tablet (4 mg total) by mouth every 8 (eight) hours as needed for nausea or vomiting, Disp: 100 tablet, Rfl: 1    pregabalin (LYRICA) 200 MG capsule, Take 200 mg by mouth 3 (three) times a day, Disp: , Rfl:     sitaGLIPtin (Januvia) 100 mg tablet, Take 1 tablet (100 mg total) by mouth every morning, Disp: 100 tablet, Rfl: 0    traMADol (ULTRAM-ER) 300 MG 24 hr tablet, Take 300 mg by mouth daily, Disp: , Rfl:     zolpidem (AMBIEN) 5 mg tablet, Take 1 tablet (5 mg total) by mouth daily at bedtime as needed for sleep, Disp: 90 tablet, Rfl: 0    Fluticasone-Salmeterol (Advair Diskus) 250-50 mcg/dose inhaler, Inhale 1 puff 2 (two) times a day Rinse mouth after use. (Patient not taking: Reported on 8/21/2024), Disp: 180 blister, Rfl: 2    Vitals:   Pulse: 79  Blood Pressue: 102/64  Weight: 71.5 kg (157 lb 9.6 oz)      Physical Exam  Constitutional:       General: She is not in acute distress.     Appearance: She is well-developed. She is not diaphoretic.   HENT:      Head: Normocephalic.   Eyes:      General: No scleral icterus.        Right eye: No discharge.      Conjunctiva/sclera: Conjunctivae normal.   Neck:      Vascular: No JVD.  "  Cardiovascular:      Rate and Rhythm: Normal rate and regular rhythm.      Heart sounds: No murmur heard.     No friction rub. No gallop.   Pulmonary:      Effort: Pulmonary effort is normal. No respiratory distress.      Breath sounds: Normal breath sounds. No wheezing or rales.   Abdominal:      General: Bowel sounds are normal. There is no distension.      Palpations: Abdomen is soft.      Tenderness: There is no abdominal tenderness. There is no rebound.   Musculoskeletal:         General: No tenderness, deformity or edema.      Cervical back: Normal range of motion.   Skin:     General: Skin is warm and dry.   Neurological:      Mental Status: She is alert and oriented to person, place, and time.   Psychiatric:         Mood and Affect: Mood and affect normal.         [unfilled]    Invasive Devices       None                   Lab Results   Component Value Date     11/04/2017     04/18/2024    CO2 30 04/18/2024    BUN 22 08/07/2024    CREATININE 0.93 08/07/2024    EGFR 65 08/07/2024    GLUCOSE 182 (H) 01/22/2024    CALCIUM 8.6 04/18/2024    AST 58 (H) 04/18/2024    ALT 59 (H) 04/18/2024    ALKPHOS 93 04/18/2024    PROT 6.6 07/22/2017    BILITOT 0.5 07/22/2017     Lab Results   Component Value Date    WBC 8.21 04/18/2024    HGB 10.3 (L) 04/18/2024     04/18/2024     No components found for: \"TROP\"    Imaging:     EKG: from MUSE: SInus tach.     Counseling / Coordination of Care  Total floor / unit time spent today 45 minutes.  Greater than 50% of total time was spent with the patient and / or family counseling and / or coordination of care.  A description of the counseling / coordination of care.      "

## 2024-08-25 DIAGNOSIS — J45.30 MILD PERSISTENT ASTHMA, UNSPECIFIED WHETHER COMPLICATED: ICD-10-CM

## 2024-08-25 DIAGNOSIS — J01.00 ACUTE NON-RECURRENT MAXILLARY SINUSITIS: ICD-10-CM

## 2024-08-26 PROBLEM — Z85.068 HISTORY OF DUODENAL CANCER: Status: ACTIVE | Noted: 2024-02-08

## 2024-08-26 RX ORDER — MOMETASONE FUROATE MONOHYDRATE 50 UG/1
SPRAY, METERED NASAL
Qty: 17 G | Refills: 0 | Status: SHIPPED | OUTPATIENT
Start: 2024-08-26

## 2024-08-26 RX ORDER — ALBUTEROL SULFATE 90 UG/1
AEROSOL, METERED RESPIRATORY (INHALATION)
Qty: 25.5 G | Refills: 0 | Status: SHIPPED | OUTPATIENT
Start: 2024-08-26

## 2024-08-28 ENCOUNTER — OFFICE VISIT (OUTPATIENT)
Age: 64
End: 2024-08-28
Payer: COMMERCIAL

## 2024-08-28 ENCOUNTER — TELEPHONE (OUTPATIENT)
Age: 64
End: 2024-08-28

## 2024-08-28 VITALS
WEIGHT: 158.6 LBS | HEART RATE: 73 BPM | RESPIRATION RATE: 16 BRPM | SYSTOLIC BLOOD PRESSURE: 118 MMHG | HEIGHT: 64 IN | TEMPERATURE: 98.1 F | BODY MASS INDEX: 27.08 KG/M2 | DIASTOLIC BLOOD PRESSURE: 62 MMHG | OXYGEN SATURATION: 93 %

## 2024-08-28 DIAGNOSIS — Z85.068 HISTORY OF DUODENAL CANCER: Primary | ICD-10-CM

## 2024-08-28 PROCEDURE — 99215 OFFICE O/P EST HI 40 MIN: CPT | Performed by: STUDENT IN AN ORGANIZED HEALTH CARE EDUCATION/TRAINING PROGRAM

## 2024-08-28 NOTE — ASSESSMENT & PLAN NOTE
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 She is having issues with discomfort after eating, and feels she can't eat a large volume (which is to be expected after RNY in some fashion). She is seeing her GI Dr Win in the near future, who can assess need for re-scope for eval. I will see her in 6 mo for standard surveillance with CT CAP.

## 2024-08-28 NOTE — LETTER
August 28, 2024     Kristopher Walden, DO  [address] [facility] [address]    Patient: Aleida Hammond   YOB: 1960   Date of Visit: 8/28/2024       Dear Dr. Walden:    Thank you for referring Aleida Hammond to me for evaluation. Below are my notes for this consultation.    If you have questions, please do not hesitate to call me. I look forward to following your patient along with you.         Sincerely,        Karlene Cruz MD        CC: No Recipients    Karlene Cruz MD  8/28/2024  2:25 PM  Sign when Signing Visit  Surgical Oncology Follow Up    [facility] [address] [address]  [phone]    Aleida Hammond  1960  [id number]      ASSESSMENT AND PLAN    1. 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 She is having issues with discomfort after eating, and feels she can't eat a large volume (which is to be expected after RNY in some fashion). She is seeing her GI Dr Win in the near future, who can assess need for re-scope for eval. I will see her in 6 mo for standard surveillance with CT CAP.    Orders:  -     CT chest abdomen pelvis w contrast; Future; Expected date: 02/28/2025  -     BUN; Future; Expected date: 02/28/2025  -     Creatinine, serum; Future; Expected date: 02/28/2025        Oncology History Overview Note   1/22/24 S/P Whipple which showed a small focus of adenocarcinoma arising from a duodenal adenomatous polyp with focal high-grade dysplasia.  Tumor invading the muscularis mucosa, pT1a.  Margins negative.  0 out of 17 lymph nodes were positive.  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 Complete gastrectomy was also performed showing chronic inactive  gastritis and prepyloric ulcer.  0/3 lymph nodes positive.     Stage I adenocarcinoma of the duodenum, pT1a pN0 disease.  No role for adjuvant therapy.     Adenocarcinoma of pancreas (HCC) (Resolved)   1/22/2024 Surgery    A. Lymph Node, Portacaval lymph node:  - One lymph node, negative for malignancy (0/1).     B. Pancreas, Whipple:  - Small focus of adenocarcinoma arising in duodenal adenomatous polyp with focal high grade dysplasia.  - Tumor invades muscularis mucosa (lamina propria, pT1a)  - Ampulla involved by low grade dysplasia.  - Pancreatic intraepitheliel neoplasia (PanIN), involving pancreatic resection margin.  - All margins are negative for carcinoma or high grade dysplasia.  - Seventeen lymph nodes, negative for malignancy (0/17).      C. Stomach, Completion gastrectomy:  - Stomach with chronic inactive gastritis and prepyloric ulcer.  - Negative for dysplasia or malignancy.  - Three lymph nodes, negative for malignancy (0/3).     2/6/2024 Initial Diagnosis    Adenocarcinoma of pancreas (HCC)     History of duodenal cancer   2/8/2024 Initial Diagnosis    Adenocarcinoma of duodenum (HCC)     2/8/2024 -  Cancer Staged    Staging form: Small Intestine - Adenocarcinoma, AJCC 8th Edition  - Pathologic: Stage I (pT1a, pN0, cM0) - Signed by Karlene Cruz MD on 2/8/2024  Total positive nodes: 0           Staging: T1aN0 duo adeno  Treatment history: whipple 1/2024  Current treatment: obs  Disease status: GENEVA    History of Present Illness:   F/U visit after whipple for endoscopically unresectable duo polyp. There was a T1a duo adeno within the specimen. She also underwent completion gastrectomy of her remnant stomach since it was defunctionalized and she required antral resection for her cancer operation. Her original RNY GJ anastomosis was not manipulated and remained intact, but she did require repair of the jej in this location 2/2 presence of axios stent for duo access via EGD. She states she is feeling  very well, better than she has in the last couple of years. She remains 40 qd PPI, and states she continues to have near-daily discomfort with PO intake, similar to before surgery. No weight loss, change in stool habits. Recent CT GENEVA.     Review of Systems  Complete ROS Surg Onc:   Constitutional: The patient denies new or recent history of general fatigue, no recent weight loss, no change in appetite.   Eyes: No complaints of visual problems, no scleral icterus.   ENT: no complaints of ear pain, no hoarseness, no difficulty swallowing,  no tinnitus and no new masses in head, oral cavity, or neck.   Cardiovascular: No complaints of chest pain, no palpitations, no ankle edema.   Respiratory: No complaints of shortness of breath, no cough.   Gastrointestinal: No complaints of jaundice, no bloody stools, no pale stools.   Genitourinary: No complaints of dysuria, no hematuria, no nocturia, no frequent urination, no urethral discharge.   Musculoskeletal: No complaints of weakness, paralysis, joint stiffness or arthralgias.  Integumentary: No complaints of rash, no new lesions.   Neurological: No complaints of convulsions, no seizures, no dizziness.   Hematologic/Lymphatic: No complaints of easy bruising.   Endocrine:  No hot or cold intolerance.  No polydipsia, polyphagia, or polyuria.  Allergy/immunology:  No environmental allergies.  No food allergies.  Not immunocompromised.  Skin:  No pallor or rash.  No wound.        Patient Active Problem List   Diagnosis   • Benign essential hypertension   • Mild persistent asthma without complication   • Peripheral polyneuropathy   • Chronic bilateral back pain   • Chronic lumbar radiculopathy   • Chronic cervical pain   • Chronic thoracic spine pain   • GERD without esophagitis   • Hyperlipidemia associated with type 2 diabetes mellitus  (HCC)   • Insomnia   • B12 deficiency   • Lumbar radiculopathy   • Thoracic spondylosis without myelopathy   • Thoracic and lumbosacral  neuritis   • Degeneration of lumbar intervertebral disc   • Lumbar spondylosis   • Pain in joint involving pelvic region and thigh   • Iron deficiency   • Memory dysfunction   • COVID-19 vaccine series completed   • Type 2 diabetes mellitus with diabetic neuropathy, with long-term current use of insulin (HCC)   • Narcotic dependency, continuous (HCC)   • Carpal tunnel syndrome on right   • 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   • Microcytic anemia   • S/P endoscopic carpal tunnel release   • Pillar pain, post-operative   • PONV (postoperative nausea and vomiting)   • Duodenal mass   • Malignant neoplasm of spinal cord (HCC)   • History of duodenal cancer   • Change in stool     Past Medical History:   Diagnosis Date   • PABLO (acute kidney injury) (HCC) 2021   • Allergic    • Anemia    • Anesthesia complication     Aspiration pneumonia after    • Anxiety    • Arthritis    • Asthma     Uses Albuterol daily   • Breast lump     Resolved: 2017    • Chronic pain     back   • Chronic pain disorder     Back, legs   • Diabetes mellitus (HCC)    • Diverticulitis of colon    • Dizziness    • GERD (gastroesophageal reflux disease)    • Hepatitis    • History of stomach ulcers    • HL (hearing loss)     Estimated   • Hyperlipidemia    • Hypertension    • Hypotension 2021   • Infectious viral hepatitis     Hepatitiss A   • Memory loss     Estimated   • Pneumonia     aspiration pneumonia   • PONV (postoperative nausea and vomiting)    • Stomach problems      Past Surgical History:   Procedure Laterality Date   • APPENDECTOMY     • BACK SURGERY     • CATARACT EXTRACTION, BILATERAL     •  SECTION     • CHOLECYSTECTOMY     • GASTRECTOMY N/A 2024    Procedure: COMPLETION GASTRECTOMY;  Surgeon: Karlene Cruz MD;  Location: [facility];  Service: Surgical Oncology   • GASTRIC BYPASS     • GASTRIC BYPASS     • INFUSION PUMP IMPLANTATION     • LAPAROTOMY  N/A 1/22/2024    Procedure: DUODENAL EXPLORATION;  Surgeon: Karlene Cruz MD;  Location: [facility];  Service: Surgical Oncology   • AR NDSC WRST SURG W/RLS TRANSVRS CARPL LIGM Right 12/01/2022    Procedure: Right endoscopic carpal tunnel release;  Surgeon: Papo Peguero MD;  Location: [facility];  Service: Orthopedics   • SHOULDER SURGERY     • SMALL INTESTINE SURGERY N/A 1/22/2024    Procedure: INTRAOPERATIVE ULTRASOUND;  Surgeon: Karlene Cruz MD;  Location: [facility];  Service: Surgical Oncology   • SPINAL CORD STIMULATOR IMPLANT  2020   • WHIPPLE PROCEDURE/PANCREATICO-DUODENECTOMY N/A 1/22/2024    Procedure: WHIPPLE;  Surgeon: Karlene Cruz MD;  Location: [facility];  Service: Surgical Oncology     Family History   Problem Relation Age of Onset   • Diabetes Mother         Mellitus   • Hyperlipidemia Mother    • Hypertension Mother    • Colon cancer Mother 78   • Pancreatic cancer Mother 84   • Melanoma Mother 76   • Cancer Father 78        Bladder ca   • Alcohol abuse Father    • Lung cancer Father 78   • Prostate cancer Father 78   • No Known Problems Daughter    • No Known Problems Maternal Grandmother    • No Known Problems Maternal Grandfather    • Stomach cancer Paternal Grandmother         60's   • Lung cancer Paternal Grandfather 76   • Diabetes Brother         Mellitus   • Hyperlipidemia Brother    • Hypertension Brother    • Diabetes Brother    • No Known Problems Brother    • No Known Problems Son    • No Known Problems Son    • Breast cancer Maternal Aunt 32        Unknown age   • No Known Problems Paternal Aunt    • No Known Problems Paternal Aunt    • No Known Problems Paternal Aunt    • No Known Problems Paternal Aunt    • Arthritis Family    • Mental illness Neg Hx      Social History     Socioeconomic History   • Marital status:      Spouse name: Not on file   • Number of children: 3   • Years of education: Not on file   • Highest education level: Not on file    Occupational History   • Occupation: Disability   Tobacco Use   • Smoking status: Former     Current packs/day: 0.00     Average packs/day: 1 pack/day for 34.0 years (34.0 ttl pk-yrs)     Types: Cigarettes     Start date: 1973     Quit date: 2007     Years since quittin.6   • Smokeless tobacco: Never   • Tobacco comments:     15yrs ago   Vaping Use   • Vaping status: Never Used   Substance and Sexual Activity   • Alcohol use: No   • Drug use: No   • Sexual activity: Not Currently     Partners: Male     Birth control/protection: Post-menopausal   Other Topics Concern   • Not on file   Social History Narrative   • Not on file     Social Determinants of Health     Financial Resource Strain: Not on file   Food Insecurity: Not on file   Transportation Needs: Not on file   Physical Activity: Not on file   Stress: Not on file   Social Connections: Not on file   Intimate Partner Violence: Not on file   Housing Stability: Not on file       Current Outpatient Medications:   •  albuterol (2.5 mg/3 mL) 0.083 % nebulizer solution, Take 3 mL (2.5 mg total) by nebulization every 6 (six) hours as needed for wheezing or shortness of breath, Disp: 300 mL, Rfl: 1  •  albuterol (PROVENTIL HFA,VENTOLIN HFA) 90 mcg/act inhaler, USE 2 INHALATIONS ORALLY   EVERY 6 HOURS AS NEEDED FORWHEEZING, Disp: 25.5 g, Rfl: 0  •  amLODIPine (NORVASC) 10 mg tablet, Take 0.5 tablets (5 mg total) by mouth daily, Disp: 90 tablet, Rfl: 0  •  aspirin (ECOTRIN LOW STRENGTH) 81 mg EC tablet, Take 81 mg by mouth daily, Disp: , Rfl:   •  atorvastatin (LIPITOR) 40 mg tablet, Take 1.5 tablets (60 mg total) by mouth daily, Disp: 135 tablet, Rfl: 1  •  Cyanocobalamin (B-12 IJ), Inject as directed every 30 (thirty) days, Disp: , Rfl:   •  glucose blood (Accu-Chek Guide) test strip, Test, Disp: 100 strip, Rfl: 3  •  HYDROcodone-acetaminophen (NORCO) 5-325 mg per tablet, take 1 to 2 tablets by mouth every 6 hours as needed, Disp: , Rfl:   •  lidocaine  (XYLOCAINE) 5 % ointment, apply topically to affected area three times a day, Disp: , Rfl:   •  metFORMIN (GLUCOPHAGE-XR) 500 mg 24 hr tablet, Take 1 tablet (500 mg total) by mouth daily with breakfast, Disp: 90 tablet, Rfl: 0  •  mometasone (NASONEX) 50 mcg/act nasal spray, Use 2 sprays in each nostril daily, Disp: 17 g, Rfl: 0  •  montelukast (SINGULAIR) 10 mg tablet, Take 1 tablet (10 mg total) by mouth daily at bedtime, Disp: 100 tablet, Rfl: 1  •  omeprazole (PriLOSEC) 40 MG capsule, Take 1 capsule (40 mg total) by mouth daily, Disp: 100 capsule, Rfl: 1  •  ondansetron (ZOFRAN) 4 mg tablet, Take 1 tablet (4 mg total) by mouth every 8 (eight) hours as needed for nausea or vomiting, Disp: 100 tablet, Rfl: 1  •  pregabalin (LYRICA) 200 MG capsule, Take 200 mg by mouth 3 (three) times a day, Disp: , Rfl:   •  sitaGLIPtin (Januvia) 100 mg tablet, Take 1 tablet (100 mg total) by mouth every morning, Disp: 100 tablet, Rfl: 0  •  traMADol (ULTRAM-ER) 300 MG 24 hr tablet, Take 300 mg by mouth daily, Disp: , Rfl:   •  zolpidem (AMBIEN) 5 mg tablet, Take 1 tablet (5 mg total) by mouth daily at bedtime as needed for sleep, Disp: 90 tablet, Rfl: 0  •  Fluticasone-Salmeterol (Advair Diskus) 250-50 mcg/dose inhaler, Inhale 1 puff 2 (two) times a day Rinse mouth after use. (Patient not taking: Reported on 8/21/2024), Disp: 180 blister, Rfl: 2  •  Morphine Sulfate (MORPHINE 0.05 MG/ML SYRINGE, NICU/PICU,, CMPD ORDER,), , Disp: , Rfl:   Allergies   Allergen Reactions   • Nsaids Other (See Comments)     Cannot take NSAIDS secondary to having gastric bypass   • Percocet [Oxycodone-Acetaminophen] GI Intolerance     There were no vitals filed for this visit.      Physical Exam  Constitutional: Patient is well-developed and well-nourished who appears the stated age in no acute distress. Patient is pleasant and talkative.     HEENT:  Normocephalic.  Sclerae are anicteric. Mucous membranes are moist. Neck is supple without adenopathy.  No JVD.     Chest: Equal chest rise, easy WOB  Cardiac: Heart is regular rate.     Abdomen: Abdomen is non-tender, non-distended and without masses.     Extremities: There is no clubbing or cyanosis. There is no edema.  Symmetric.  Neuro: Grossly nonfocal. Gait is normal.     Lymphatic: No evidence of cervical adenopathy bilaterally. No evidence of axillary adenopathy bilaterally. No evidence of inguinal adenopathy bilaterally.     Skin: Warm, anicteric.  Incision well-healed  Psych:  Patient is pleasant and talkative.    Pathology:  T1a duo adeno    Labs:  Reviewed in Epic personally      I personally reviewed and interpreted the available laboratory and imaging data, incl surgical path, current and past CT, labs, GI notes, med onc notes

## 2024-08-28 NOTE — PROGRESS NOTES
Surgical Oncology Follow Up    [facility] [place]  [address] [id number] [phone]    Aleida Hammond  1960  [id number]      ASSESSMENT AND PLAN    1. 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 She is having issues with discomfort after eating, and feels she can't eat a large volume (which is to be expected after RNY in some fashion). She is seeing her GI Dr Win in the near future, who can assess need for re-scope for eval. I will see her in 6 mo for standard surveillance with CT CAP.    Orders:  -     CT chest abdomen pelvis w contrast; Future; Expected date: 02/28/2025  -     BUN; Future; Expected date: 02/28/2025  -     Creatinine, serum; Future; Expected date: 02/28/2025        Oncology History Overview Note   1/22/24 S/P Whipple which showed a small focus of adenocarcinoma arising from a duodenal adenomatous polyp with focal high-grade dysplasia.  Tumor invading the muscularis mucosa, pT1a.  Margins negative.  0 out of 17 lymph nodes were positive.  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 Complete gastrectomy was also performed showing chronic inactive gastritis and prepyloric ulcer.  0/3 lymph nodes positive.     Stage I adenocarcinoma of the duodenum, pT1a pN0 disease.  No role for adjuvant therapy.     Adenocarcinoma of pancreas (HCC) (Resolved)   1/22/2024 Surgery    A. Lymph Node, Portacaval lymph node:  - One lymph node, negative for malignancy (0/1).     B. Pancreas, Whipple:  - Small focus of adenocarcinoma arising in duodenal adenomatous polyp with focal high grade dysplasia.  - Tumor invades muscularis mucosa (lamina propria, pT1a)  - Ampulla involved by low grade dysplasia.  - Pancreatic  intraepitheliel neoplasia (PanIN), involving pancreatic resection margin.  - All margins are negative for carcinoma or high grade dysplasia.  - Seventeen lymph nodes, negative for malignancy (0/17).      C. Stomach, Completion gastrectomy:  - Stomach with chronic inactive gastritis and prepyloric ulcer.  - Negative for dysplasia or malignancy.  - Three lymph nodes, negative for malignancy (0/3).     2/6/2024 Initial Diagnosis    Adenocarcinoma of pancreas (HCC)     History of duodenal cancer   2/8/2024 Initial Diagnosis    Adenocarcinoma of duodenum (HCC)     2/8/2024 -  Cancer Staged    Staging form: Small Intestine - Adenocarcinoma, AJCC 8th Edition  - Pathologic: Stage I (pT1a, pN0, cM0) - Signed by Karlene Cruz MD on 2/8/2024  Total positive nodes: 0           Staging: T1aN0 duo adeno  Treatment history: whipple 1/2024  Current treatment: obs  Disease status: GENEVA    History of Present Illness:   F/U visit after whipple for endoscopically unresectable duo polyp. There was a T1a duo adeno within the specimen. She also underwent completion gastrectomy of her remnant stomach since it was defunctionalized and she required antral resection for her cancer operation. Her original RNY GJ anastomosis was not manipulated and remained intact, but she did require repair of the jej in this location 2/2 presence of axios stent for duo access via EGD. She states she is feeling very well, better than she has in the last couple of years. She remains 40 qd PPI, and states she continues to have near-daily discomfort with PO intake, similar to before surgery. No weight loss, change in stool habits. Recent CT GENEVA.     Review of Systems  Complete ROS Surg Onc:   Constitutional: The patient denies new or recent history of general fatigue, no recent weight loss, no change in appetite.   Eyes: No complaints of visual problems, no scleral icterus.   ENT: no complaints of ear pain, no hoarseness, no difficulty swallowing,  no tinnitus  and no new masses in head, oral cavity, or neck.   Cardiovascular: No complaints of chest pain, no palpitations, no ankle edema.   Respiratory: No complaints of shortness of breath, no cough.   Gastrointestinal: No complaints of jaundice, no bloody stools, no pale stools.   Genitourinary: No complaints of dysuria, no hematuria, no nocturia, no frequent urination, no urethral discharge.   Musculoskeletal: No complaints of weakness, paralysis, joint stiffness or arthralgias.  Integumentary: No complaints of rash, no new lesions.   Neurological: No complaints of convulsions, no seizures, no dizziness.   Hematologic/Lymphatic: No complaints of easy bruising.   Endocrine:  No hot or cold intolerance.  No polydipsia, polyphagia, or polyuria.  Allergy/immunology:  No environmental allergies.  No food allergies.  Not immunocompromised.  Skin:  No pallor or rash.  No wound.        Patient Active Problem List   Diagnosis    Benign essential hypertension    Mild persistent asthma without complication    Peripheral polyneuropathy    Chronic bilateral back pain    Chronic lumbar radiculopathy    Chronic cervical pain    Chronic thoracic spine pain    GERD without esophagitis    Hyperlipidemia associated with type 2 diabetes mellitus  (HCC)    Insomnia    B12 deficiency    Lumbar radiculopathy    Thoracic spondylosis without myelopathy    Thoracic and lumbosacral neuritis    Degeneration of lumbar intervertebral disc    Lumbar spondylosis    Pain in joint involving pelvic region and thigh    Iron deficiency    Memory dysfunction    COVID-19 vaccine series completed    Type 2 diabetes mellitus with diabetic neuropathy, with long-term current use of insulin (HCC)    Narcotic dependency, continuous (HCC)    Carpal tunnel syndrome on right    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    Microcytic anemia    S/P endoscopic carpal tunnel release    Pillar pain, post-operative    PONV (postoperative nausea and  vomiting)    Duodenal mass    Malignant neoplasm of spinal cord (HCC)    History of duodenal cancer    Change in stool     Past Medical History:   Diagnosis Date    PABLO (acute kidney injury) (HCC) 2021    Allergic     Anemia     Anesthesia complication     Aspiration pneumonia after     Anxiety     Arthritis     Asthma     Uses Albuterol daily    Breast lump     Resolved: 2017     Chronic pain     back    Chronic pain disorder     Back, legs    Diabetes mellitus (HCC)     Diverticulitis of colon     Dizziness     GERD (gastroesophageal reflux disease)     Hepatitis     History of stomach ulcers     HL (hearing loss)     Estimated    Hyperlipidemia     Hypertension     Hypotension 2021    Infectious viral hepatitis     Hepatitiss A    Memory loss     Estimated    Pneumonia     aspiration pneumonia    PONV (postoperative nausea and vomiting)     Stomach problems      Past Surgical History:   Procedure Laterality Date    APPENDECTOMY      BACK SURGERY      CATARACT EXTRACTION, BILATERAL       SECTION      CHOLECYSTECTOMY      GASTRECTOMY N/A 2024    Procedure: COMPLETION GASTRECTOMY;  Surgeon: Karlene Cruz MD;  Location: [facility];  Service: Surgical Oncology    GASTRIC BYPASS      GASTRIC BYPASS      INFUSION PUMP IMPLANTATION      LAPAROTOMY N/A 2024    Procedure: DUODENAL EXPLORATION;  Surgeon: Karlene Cruz MD;  Location: [facility];  Service: Surgical Oncology    NH NDSC WRST SURG W/RLS TRANSVRS CARPL LIGM Right 2022    Procedure: Right endoscopic carpal tunnel release;  Surgeon: Papo Peguero MD;  Location: [facility];  Service: Orthopedics    SHOULDER SURGERY      SMALL INTESTINE SURGERY N/A 2024    Procedure: INTRAOPERATIVE ULTRASOUND;  Surgeon: Karlene Cruz MD;  Location: [facility];  Service: Surgical Oncology    SPINAL CORD STIMULATOR IMPLANT      WHIPPLE PROCEDURE/PANCREATICO-DUODENECTOMY N/A 2024     Procedure: WHIPPLE;  Surgeon: Karlene Cruz MD;  Location: [facility];  Service: Surgical Oncology     Family History   Problem Relation Age of Onset    Diabetes Mother         Mellitus    Hyperlipidemia Mother     Hypertension Mother     Colon cancer Mother 78    Pancreatic cancer Mother 84    Melanoma Mother 76    Cancer Father 78        Bladder ca    Alcohol abuse Father     Lung cancer Father 78    Prostate cancer Father 78    No Known Problems Daughter     No Known Problems Maternal Grandmother     No Known Problems Maternal Grandfather     Stomach cancer Paternal Grandmother         60's    Lung cancer Paternal Grandfather 76    Diabetes Brother         Mellitus    Hyperlipidemia Brother     Hypertension Brother     Diabetes Brother     No Known Problems Brother     No Known Problems Son     No Known Problems Son     Breast cancer Maternal Aunt 32        Unknown age    No Known Problems Paternal Aunt     No Known Problems Paternal Aunt     No Known Problems Paternal Aunt     No Known Problems Paternal Aunt     Arthritis Family     Mental illness Neg Hx      Social History     Socioeconomic History    Marital status:      Spouse name: Not on file    Number of children: 3    Years of education: Not on file    Highest education level: Not on file   Occupational History    Occupation: Disability   Tobacco Use    Smoking status: Former     Current packs/day: 0.00     Average packs/day: 1 pack/day for 34.0 years (34.0 ttl pk-yrs)     Types: Cigarettes     Start date: 1973     Quit date: 2007     Years since quittin.6    Smokeless tobacco: Never    Tobacco comments:     15yrs ago   Vaping Use    Vaping status: Never Used   Substance and Sexual Activity    Alcohol use: No    Drug use: No    Sexual activity: Not Currently     Partners: Male     Birth control/protection: Post-menopausal   Other Topics Concern    Not on file   Social History Narrative    Not on file     Social Determinants of  Health     Financial Resource Strain: Not on file   Food Insecurity: Not on file   Transportation Needs: Not on file   Physical Activity: Not on file   Stress: Not on file   Social Connections: Not on file   Intimate Partner Violence: Not on file   Housing Stability: Not on file       Current Outpatient Medications:     albuterol (2.5 mg/3 mL) 0.083 % nebulizer solution, Take 3 mL (2.5 mg total) by nebulization every 6 (six) hours as needed for wheezing or shortness of breath, Disp: 300 mL, Rfl: 1    albuterol (PROVENTIL HFA,VENTOLIN HFA) 90 mcg/act inhaler, USE 2 INHALATIONS ORALLY   EVERY 6 HOURS AS NEEDED FORWHEEZING, Disp: 25.5 g, Rfl: 0    amLODIPine (NORVASC) 10 mg tablet, Take 0.5 tablets (5 mg total) by mouth daily, Disp: 90 tablet, Rfl: 0    aspirin (ECOTRIN LOW STRENGTH) 81 mg EC tablet, Take 81 mg by mouth daily, Disp: , Rfl:     atorvastatin (LIPITOR) 40 mg tablet, Take 1.5 tablets (60 mg total) by mouth daily, Disp: 135 tablet, Rfl: 1    Cyanocobalamin (B-12 IJ), Inject as directed every 30 (thirty) days, Disp: , Rfl:     glucose blood (Accu-Chek Guide) test strip, Test, Disp: 100 strip, Rfl: 3    HYDROcodone-acetaminophen (NORCO) 5-325 mg per tablet, take 1 to 2 tablets by mouth every 6 hours as needed, Disp: , Rfl:     lidocaine (XYLOCAINE) 5 % ointment, apply topically to affected area three times a day, Disp: , Rfl:     metFORMIN (GLUCOPHAGE-XR) 500 mg 24 hr tablet, Take 1 tablet (500 mg total) by mouth daily with breakfast, Disp: 90 tablet, Rfl: 0    mometasone (NASONEX) 50 mcg/act nasal spray, Use 2 sprays in each nostril daily, Disp: 17 g, Rfl: 0    montelukast (SINGULAIR) 10 mg tablet, Take 1 tablet (10 mg total) by mouth daily at bedtime, Disp: 100 tablet, Rfl: 1    omeprazole (PriLOSEC) 40 MG capsule, Take 1 capsule (40 mg total) by mouth daily, Disp: 100 capsule, Rfl: 1    ondansetron (ZOFRAN) 4 mg tablet, Take 1 tablet (4 mg total) by mouth every 8 (eight) hours as needed for nausea or  vomiting, Disp: 100 tablet, Rfl: 1    pregabalin (LYRICA) 200 MG capsule, Take 200 mg by mouth 3 (three) times a day, Disp: , Rfl:     sitaGLIPtin (Januvia) 100 mg tablet, Take 1 tablet (100 mg total) by mouth every morning, Disp: 100 tablet, Rfl: 0    traMADol (ULTRAM-ER) 300 MG 24 hr tablet, Take 300 mg by mouth daily, Disp: , Rfl:     zolpidem (AMBIEN) 5 mg tablet, Take 1 tablet (5 mg total) by mouth daily at bedtime as needed for sleep, Disp: 90 tablet, Rfl: 0    Fluticasone-Salmeterol (Advair Diskus) 250-50 mcg/dose inhaler, Inhale 1 puff 2 (two) times a day Rinse mouth after use. (Patient not taking: Reported on 8/21/2024), Disp: 180 blister, Rfl: 2    Morphine Sulfate (MORPHINE 0.05 MG/ML SYRINGE, NICU/PICU,, CMPD ORDER,), , Disp: , Rfl:   Allergies   Allergen Reactions    Nsaids Other (See Comments)     Cannot take NSAIDS secondary to having gastric bypass    Percocet [Oxycodone-Acetaminophen] GI Intolerance     There were no vitals filed for this visit.      Physical Exam  Constitutional: Patient is well-developed and well-nourished who appears the stated age in no acute distress. Patient is pleasant and talkative.     HEENT:  Normocephalic.  Sclerae are anicteric. Mucous membranes are moist. Neck is supple without adenopathy. No JVD.     Chest: Equal chest rise, easy WOB  Cardiac: Heart is regular rate.     Abdomen: Abdomen is non-tender, non-distended and without masses.     Extremities: There is no clubbing or cyanosis. There is no edema.  Symmetric.  Neuro: Grossly nonfocal. Gait is normal.     Lymphatic: No evidence of cervical adenopathy bilaterally. No evidence of axillary adenopathy bilaterally. No evidence of inguinal adenopathy bilaterally.     Skin: Warm, anicteric.  Incision well-healed  Psych:  Patient is pleasant and talkative.    Pathology:  T1a duo adeno    Labs:  Reviewed in Epic personally      I personally reviewed and interpreted the available laboratory and imaging data, incl surgical  path, current and past CT, labs, GI notes, med onc notes

## 2024-08-28 NOTE — TELEPHONE ENCOUNTER
Called patient to let her know CT is scheduled for 2/10/25 at [facility] at 10 am.  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

## 2024-08-31 ENCOUNTER — APPOINTMENT (OUTPATIENT)
Dept: LAB | Facility: HOSPITAL | Age: 64
End: 2024-08-31
Payer: COMMERCIAL

## 2024-08-31 DIAGNOSIS — Z00.00 ROUTINE GENERAL MEDICAL EXAMINATION AT A HEALTH CARE FACILITY: ICD-10-CM

## 2024-08-31 LAB — CEA SERPL-MCNC: 2.3 NG/ML (ref 0–3)

## 2024-08-31 PROCEDURE — 36415 COLL VENOUS BLD VENIPUNCTURE: CPT

## 2024-08-31 PROCEDURE — 82378 CARCINOEMBRYONIC ANTIGEN: CPT

## 2024-09-03 ENCOUNTER — TELEPHONE (OUTPATIENT)
Age: 64
End: 2024-09-03

## 2024-09-03 ENCOUNTER — OFFICE VISIT (OUTPATIENT)
Age: 64
End: 2024-09-03
Payer: COMMERCIAL

## 2024-09-03 ENCOUNTER — APPOINTMENT (OUTPATIENT)
Dept: LAB | Facility: HOSPITAL | Age: 64
End: 2024-09-03
Payer: COMMERCIAL

## 2024-09-03 VITALS
TEMPERATURE: 98.5 F | HEIGHT: 64 IN | HEART RATE: 74 BPM | SYSTOLIC BLOOD PRESSURE: 120 MMHG | RESPIRATION RATE: 16 BRPM | WEIGHT: 160 LBS | OXYGEN SATURATION: 96 % | DIASTOLIC BLOOD PRESSURE: 78 MMHG | BODY MASS INDEX: 27.31 KG/M2

## 2024-09-03 DIAGNOSIS — C17.0 ADENOCARCINOMA OF DUODENUM (HCC): ICD-10-CM

## 2024-09-03 DIAGNOSIS — E53.8 B12 DEFICIENCY: Primary | ICD-10-CM

## 2024-09-03 DIAGNOSIS — Z98.0 S/P TOTAL GASTRECTOMY AND ROUX-EN-Y ESOPHAGOJEJUNAL ANASTOMOSIS: ICD-10-CM

## 2024-09-03 DIAGNOSIS — E61.1 IRON DEFICIENCY: ICD-10-CM

## 2024-09-03 DIAGNOSIS — E53.8 B12 DEFICIENCY: ICD-10-CM

## 2024-09-03 DIAGNOSIS — Z90.3 S/P TOTAL GASTRECTOMY AND ROUX-EN-Y ESOPHAGOJEJUNAL ANASTOMOSIS: ICD-10-CM

## 2024-09-03 LAB
ALBUMIN SERPL BCG-MCNC: 3.6 G/DL (ref 3.5–5)
ALP SERPL-CCNC: 98 U/L (ref 34–104)
ALT SERPL W P-5'-P-CCNC: 22 U/L (ref 7–52)
ANION GAP SERPL CALCULATED.3IONS-SCNC: 6 MMOL/L (ref 4–13)
AST SERPL W P-5'-P-CCNC: 24 U/L (ref 13–39)
BASOPHILS # BLD AUTO: 0.03 THOUSANDS/ÂΜL (ref 0–0.1)
BASOPHILS NFR BLD AUTO: 0 % (ref 0–1)
BILIRUB SERPL-MCNC: 0.48 MG/DL (ref 0.2–1)
BUN SERPL-MCNC: 20 MG/DL (ref 5–25)
CALCIUM SERPL-MCNC: 8.9 MG/DL (ref 8.4–10.2)
CHLORIDE SERPL-SCNC: 104 MMOL/L (ref 96–108)
CO2 SERPL-SCNC: 29 MMOL/L (ref 21–32)
CREAT SERPL-MCNC: 0.8 MG/DL (ref 0.6–1.3)
EOSINOPHIL # BLD AUTO: 0.15 THOUSAND/ÂΜL (ref 0–0.61)
EOSINOPHIL NFR BLD AUTO: 2 % (ref 0–6)
ERYTHROCYTE [DISTWIDTH] IN BLOOD BY AUTOMATED COUNT: 13.2 % (ref 11.6–15.1)
GFR SERPL CREATININE-BSD FRML MDRD: 78 ML/MIN/1.73SQ M
GLUCOSE P FAST SERPL-MCNC: 183 MG/DL (ref 65–99)
HCT VFR BLD AUTO: 37.5 % (ref 34.8–46.1)
HGB BLD-MCNC: 11.8 G/DL (ref 11.5–15.4)
IMM GRANULOCYTES # BLD AUTO: 0.05 THOUSAND/UL (ref 0–0.2)
IMM GRANULOCYTES NFR BLD AUTO: 1 % (ref 0–2)
LYMPHOCYTES # BLD AUTO: 1.79 THOUSANDS/ÂΜL (ref 0.6–4.47)
LYMPHOCYTES NFR BLD AUTO: 21 % (ref 14–44)
MCH RBC QN AUTO: 28.4 PG (ref 26.8–34.3)
MCHC RBC AUTO-ENTMCNC: 31.5 G/DL (ref 31.4–37.4)
MCV RBC AUTO: 90 FL (ref 82–98)
MONOCYTES # BLD AUTO: 0.5 THOUSAND/ÂΜL (ref 0.17–1.22)
MONOCYTES NFR BLD AUTO: 6 % (ref 4–12)
NEUTROPHILS # BLD AUTO: 5.99 THOUSANDS/ÂΜL (ref 1.85–7.62)
NEUTS SEG NFR BLD AUTO: 70 % (ref 43–75)
NRBC BLD AUTO-RTO: 0 /100 WBCS
PLATELET # BLD AUTO: 160 THOUSANDS/UL (ref 149–390)
PMV BLD AUTO: 13 FL (ref 8.9–12.7)
POTASSIUM SERPL-SCNC: 4.2 MMOL/L (ref 3.5–5.3)
PROT SERPL-MCNC: 6.5 G/DL (ref 6.4–8.4)
RBC # BLD AUTO: 4.16 MILLION/UL (ref 3.81–5.12)
SODIUM SERPL-SCNC: 139 MMOL/L (ref 135–147)
VIT B12 SERPL-MCNC: 272 PG/ML (ref 180–914)
WBC # BLD AUTO: 8.51 THOUSAND/UL (ref 4.31–10.16)

## 2024-09-03 PROCEDURE — 99213 OFFICE O/P EST LOW 20 MIN: CPT | Performed by: NURSE PRACTITIONER

## 2024-09-03 PROCEDURE — 80053 COMPREHEN METABOLIC PANEL: CPT

## 2024-09-03 PROCEDURE — 36415 COLL VENOUS BLD VENIPUNCTURE: CPT

## 2024-09-03 PROCEDURE — 82607 VITAMIN B-12: CPT

## 2024-09-03 PROCEDURE — 85025 COMPLETE CBC W/AUTO DIFF WBC: CPT

## 2024-09-03 RX ORDER — CYANOCOBALAMIN 1000 UG/ML
1000 INJECTION, SOLUTION INTRAMUSCULAR; SUBCUTANEOUS ONCE
Status: CANCELLED | OUTPATIENT
Start: 2024-09-05

## 2024-09-03 NOTE — PROGRESS NOTES
HEMATOLOGY / ONCOLOGY CLINIC FOLLOW UP NOTE    Primary Care Provider: Tara Colon DO  Referring Provider: Tara Colon  MRN: [id number]  : 1960    Reason for Encounter: Follow-up for history of iron and B12 deficiency, duodenal adenocarcinoma.       Oncology History Overview Note   24 S/P Whipple which showed a small focus of adenocarcinoma arising from a duodenal adenomatous polyp with focal high-grade dysplasia.  Tumor invading the muscularis mucosa, pT1a.  Margins negative.  0 out of 17 lymph nodes were positive.  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 Complete gastrectomy was also performed showing chronic inactive gastritis and prepyloric ulcer.  0/3 lymph nodes positive.     Stage I adenocarcinoma of the duodenum, pT1a pN0 disease.  No role for adjuvant therapy.     Adenocarcinoma of pancreas (HCC) (Resolved)   2024 Surgery    A. Lymph Node, Portacaval lymph node:  - One lymph node, negative for malignancy (0/1).     B. Pancreas, Whipple:  - Small focus of adenocarcinoma arising in duodenal adenomatous polyp with focal high grade dysplasia.  - Tumor invades muscularis mucosa (lamina propria, pT1a)  - Ampulla involved by low grade dysplasia.  - Pancreatic intraepitheliel neoplasia (PanIN), involving pancreatic resection margin.  - All margins are negative for carcinoma or high grade dysplasia.  - Seventeen lymph nodes, negative for malignancy (0/17).      C. Stomach, Completion gastrectomy:  - Stomach with chronic inactive gastritis and prepyloric ulcer.  - Negative for dysplasia or malignancy.  - Three lymph nodes, negative for malignancy (0/3).     2024 Initial Diagnosis    Adenocarcinoma of pancreas (HCC)     History of duodenal cancer   2024 Initial Diagnosis    Adenocarcinoma of duodenum (HCC)     2024 -  Cancer Staged    Staging form: Small Intestine - Adenocarcinoma, AJCC 8th Edition  - Pathologic: Stage I (pT1a, pN0,  cM0) - Signed by Karlene Cruz MD on 2/8/2024  Total positive nodes: 0           Interval History: Patient returns for follow-up visit.  She remains on observation for history of duodenal cancer.  Recently seen by Dr. Cruz on 8/28/2024.  Recent surveillance imaging was negative for any recurrent or metastatic disease.  She continues to experience nausea and some occasional vomiting.  She will be seeing GI next week for further evaluation.  Fortunately her weight is stable, she has actually gained a few pounds.  Recent blood work did not include CBC or B12 level even though these were ordered.  Her iron panel is normal.  CEA normal.  She has continued to receive injectable B12 replacement monthly  Overall, she feels well.  Remains active.  Denies any concerning symptoms today      REVIEW OF SYSTEMS:  Please note that a 14-point review of systems was performed to include Constitutional, HEENT, Respiratory, CVS, GI, , Musculoskeletal, Integumentary, Neurologic, Rheumatologic, Endocrinologic, Psychiatric, Lymphatic, and Hematologic/Oncologic systems were reviewed and are negative unless otherwise stated in HPI. Positive and negative findings pertinent to this evaluation are incorporated into the history of present illness.      ECOG PS: 1    PROBLEM LIST:  Patient Active Problem List   Diagnosis    Benign essential hypertension    Mild persistent asthma without complication    Peripheral polyneuropathy    Chronic bilateral back pain    Chronic lumbar radiculopathy    Chronic cervical pain    Chronic thoracic spine pain    GERD without esophagitis    Hyperlipidemia associated with type 2 diabetes mellitus  (HCC)    Insomnia    B12 deficiency    Lumbar radiculopathy    Thoracic spondylosis without myelopathy    Thoracic and lumbosacral neuritis    Degeneration of lumbar intervertebral disc    Lumbar spondylosis    Pain in joint involving pelvic region and thigh    Iron deficiency    Memory dysfunction    COVID-19  vaccine series completed    Type 2 diabetes mellitus with diabetic neuropathy, with long-term current use of insulin (HCC)    Narcotic dependency, continuous (HCC)    Carpal tunnel syndrome on right    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    Microcytic anemia    S/P endoscopic carpal tunnel release    Pillar pain, post-operative    PONV (postoperative nausea and vomiting)    Duodenal mass    Malignant neoplasm of spinal cord (HCC)    History of duodenal cancer    Change in stool       Assessment / Plan:  1. B12 deficiency    2. S/P total gastrectomy and Khruram-en-Y esophagojejunal anastomosis    3. Adenocarcinoma of duodenum (HCC)    4. Iron deficiency      Patient is a very pleasant 64-year-old female with a history of iron and B12 deficiency in the setting of gastric bypass/post surgical malabsorption.  In January 2024 she underwent resection of duodenal mass and pathology was consistent with stage I adenocarcinoma of the duodenum, pT1a pN0 disease.  She did not require adjuvant therapy.    She has been receiving injectable B12 replacement monthly.  Unfortunately, most recent blood work did not include CBC or serum B12 level.  Her iron panel is normal.  CEA is normal.  I have requested she have CBC and B12 level drawn today.  This will help determine if she needs to continue on monthly injectable B12 replacement.  Patient is in agreement.  I will call her once her labs result.  She is at risk for recurrent iron and B12 deficiency given her history.    I will see her back in 6 months with repeat blood work.  Patient is in agreement with this plan of care.  She will continue to follow closely with her other specialists.  She knows to call anytime with questions or concerns    I spent 25 minutes on chart review, face to face counseling time, coordination of care and documentation.    Past Medical History:   has a past medical history of PABLO (acute kidney injury) ([facility]) (11/04/2021), Allergic,  Anemia, Anesthesia complication, Anxiety, Arthritis, Asthma, Breast lump, Chronic pain, Chronic pain disorder, Diabetes mellitus (HCC), Diverticulitis of colon, Dizziness, GERD (gastroesophageal reflux disease), Hepatitis, History of stomach ulcers, HL (hearing loss) (), Hyperlipidemia, Hypertension, Hypotension (2021), Infectious viral hepatitis, Memory loss (), Pneumonia, PONV (postoperative nausea and vomiting), and Stomach problems.    PAST SURGICAL HISTORY:   has a past surgical history that includes Cholecystectomy; Appendectomy;  section; Back surgery; Gastric bypass; Shoulder surgery; Spinal cord stimulator implant (); Infusion pump implantation (); Gastric bypass (); pr ndsc wrst surg w/rls transvrs carpl ligm (Right, 2022); Cataract extraction, bilateral; Small intestine surgery (N/A, 2024); WHIPPLE PROCEDURE/PANCREATICO-DUODENECTOMY (N/A, 2024); Gastrectomy (N/A, 2024); and LAPAROTOMY (N/A, 2024).    CURRENT MEDICATIONS  Current Outpatient Medications   Medication Sig Dispense Refill    albuterol (2.5 mg/3 mL) 0.083 % nebulizer solution Take 3 mL (2.5 mg total) by nebulization every 6 (six) hours as needed for wheezing or shortness of breath 300 mL 1    albuterol (PROVENTIL HFA,VENTOLIN HFA) 90 mcg/act inhaler USE 2 INHALATIONS ORALLY   EVERY 6 HOURS AS NEEDED FORWHEEZING 25.5 g 0    amLODIPine (NORVASC) 10 mg tablet Take 0.5 tablets (5 mg total) by mouth daily 90 tablet 0    aspirin (ECOTRIN LOW STRENGTH) 81 mg EC tablet Take 81 mg by mouth daily      atorvastatin (LIPITOR) 40 mg tablet Take 1.5 tablets (60 mg total) by mouth daily 135 tablet 1    Cyanocobalamin (B-12 IJ) Inject as directed every 30 (thirty) days      glucose blood (Accu-Chek Guide) test strip Test 100 strip 3    HYDROcodone-acetaminophen (NORCO) 5-325 mg per tablet take 1 to 2 tablets by mouth every 6 hours as needed      lidocaine (XYLOCAINE) 5 % ointment apply topically to  affected area three times a day      metFORMIN (GLUCOPHAGE-XR) 500 mg 24 hr tablet Take 1 tablet (500 mg total) by mouth daily with breakfast 90 tablet 0    mometasone (NASONEX) 50 mcg/act nasal spray Use 2 sprays in each nostril daily 17 g 0    montelukast (SINGULAIR) 10 mg tablet Take 1 tablet (10 mg total) by mouth daily at bedtime 100 tablet 1    Morphine Sulfate (MORPHINE 0.05 MG/ML SYRINGE, NICU/PICU,, CMPD ORDER,)       omeprazole (PriLOSEC) 40 MG capsule Take 1 capsule (40 mg total) by mouth daily 100 capsule 1    ondansetron (ZOFRAN) 4 mg tablet Take 1 tablet (4 mg total) by mouth every 8 (eight) hours as needed for nausea or vomiting 100 tablet 1    pregabalin (LYRICA) 200 MG capsule Take 200 mg by mouth 3 (three) times a day      sitaGLIPtin (Januvia) 100 mg tablet Take 1 tablet (100 mg total) by mouth every morning 100 tablet 0    traMADol (ULTRAM-ER) 300 MG 24 hr tablet Take 300 mg by mouth daily      zolpidem (AMBIEN) 5 mg tablet Take 1 tablet (5 mg total) by mouth daily at bedtime as needed for sleep 90 tablet 0    Fluticasone-Salmeterol (Advair Diskus) 250-50 mcg/dose inhaler Inhale 1 puff 2 (two) times a day Rinse mouth after use. (Patient not taking: Reported on 8/21/2024) 180 blister 2     No current facility-administered medications for this visit.     [unfilled]    SOCIAL HISTORY:   reports that she quit smoking about 17 years ago. Her smoking use included cigarettes. She started smoking about 51 years ago. She has a 34 pack-year smoking history. She has never used smokeless tobacco. She reports that she does not drink alcohol and does not use drugs.     FAMILY HISTORY:  family history includes Alcohol abuse in her father; Arthritis in her family; Breast cancer (age of onset: 32) in her maternal aunt; Cancer (age of onset: 78) in her father; Colon cancer (age of onset: 78) in her mother; Diabetes in her brother, brother, and mother; Hyperlipidemia in her brother and mother; Hypertension in her  "brother and mother; Lung cancer (age of onset: 76) in her paternal grandfather; Lung cancer (age of onset: 78) in her father; Melanoma (age of onset: 76) in her mother; No Known Problems in her brother, daughter, maternal grandfather, maternal grandmother, paternal aunt, paternal aunt, paternal aunt, paternal aunt, son, and son; Pancreatic cancer (age of onset: 84) in her mother; Prostate cancer (age of onset: 78) in her father; Stomach cancer in her paternal grandmother.     ALLERGIES:  is allergic to nsaids and percocet [oxycodone-acetaminophen].      Physical Exam:  Vital Signs:   Visit Vitals  /78 (BP Location: Left arm, Patient Position: Sitting, Cuff Size: Adult)   Pulse 74   Temp 98.5 °F (36.9 °C) (Temporal)   Resp 16   Ht 5' 4\" (1.626 m)   Wt 72.6 kg (160 lb)   LMP  (LMP Unknown)   SpO2 96%   BMI 27.46 kg/m²   OB Status Postmenopausal   Smoking Status Former   BSA 1.78 m²     Body mass index is 27.46 kg/m².  Body surface area is 1.78 meters squared.    Physical Exam  Constitutional:       General: She is not in acute distress.     Appearance: Normal appearance.   HENT:      Head: Normocephalic and atraumatic.   Eyes:      General: No scleral icterus.        Right eye: No discharge.         Left eye: No discharge.      Conjunctiva/sclera: Conjunctivae normal.   Cardiovascular:      Rate and Rhythm: Normal rate and regular rhythm.   Pulmonary:      Effort: Pulmonary effort is normal. No respiratory distress.      Breath sounds: Normal breath sounds.   Abdominal:      General: Bowel sounds are normal. There is no distension.      Palpations: Abdomen is soft. There is no mass.      Tenderness: There is no abdominal tenderness.   Musculoskeletal:         General: Normal range of motion.   Lymphadenopathy:      Cervical: No cervical adenopathy.      Upper Body:      Right upper body: No supraclavicular, axillary or pectoral adenopathy.      Left upper body: No supraclavicular, axillary or pectoral " adenopathy.   Skin:     General: Skin is warm and dry.   Neurological:      General: No focal deficit present.      Mental Status: She is alert and oriented to person, place, and time.   Psychiatric:         Mood and Affect: Mood normal.         Behavior: Behavior normal.         Labs:  Lab Results   Component Value Date    WBC 8.21 04/18/2024    HGB 10.3 (L) 04/18/2024    HCT 35.0 04/18/2024    MCV 80 (L) 04/18/2024     04/18/2024     Lab Results   Component Value Date     11/04/2017    SODIUM 140 04/18/2024    K 4.7 04/18/2024     04/18/2024    CO2 30 04/18/2024    AGAP 4 04/18/2024    BUN 22 08/07/2024    CREATININE 0.93 08/07/2024    GLUC 121 01/26/2024    GLUF 126 (H) 04/18/2024    CALCIUM 8.6 04/18/2024    AST 58 (H) 04/18/2024    ALT 59 (H) 04/18/2024    ALKPHOS 93 04/18/2024    PROT 6.6 07/22/2017    TP 6.4 04/18/2024    BILITOT 0.5 07/22/2017    TBILI 0.29 04/18/2024    EGFR 65 08/07/2024

## 2024-09-04 ENCOUNTER — TELEPHONE (OUTPATIENT)
Age: 64
End: 2024-09-04

## 2024-09-04 NOTE — TELEPHONE ENCOUNTER
Called and spoke to Stephany. Relayed Lauren's message. Patient verbalized understanding. She knows to call with any questions/concerns in the meantime.

## 2024-09-04 NOTE — TELEPHONE ENCOUNTER
----- Message from CHITRA Cassidy sent at 9/3/2024  4:04 PM EDT -----  Please let patient know blood work has resulted from this morning and CBC is improved.  Her serum B12 remains low and I would like her to continue on monthly injectable B12 replacement.  She should keep her appointment as scheduled this week and sche  dule additional monthly appointments.  I will repeat blood work again prior to seeing her back in 6 months as planned

## 2024-09-04 NOTE — TELEPHONE ENCOUNTER
Patient insurance changed as of 09/01/24 and does not cover the following medication sitaGLIPtin (Januvia) 100 mg tablet   Patient is wondering if PCP could prescribe an alternative.    Please advise and contact pt

## 2024-09-05 ENCOUNTER — HOSPITAL ENCOUNTER (OUTPATIENT)
Dept: INFUSION CENTER | Facility: HOSPITAL | Age: 64
End: 2024-09-05
Attending: INTERNAL MEDICINE

## 2024-09-09 ENCOUNTER — OFFICE VISIT (OUTPATIENT)
Age: 64
End: 2024-09-09
Payer: COMMERCIAL

## 2024-09-09 ENCOUNTER — HOSPITAL ENCOUNTER (OUTPATIENT)
Dept: INFUSION CENTER | Facility: HOSPITAL | Age: 64
Discharge: HOME | End: 2024-09-09
Attending: INTERNAL MEDICINE
Payer: COMMERCIAL

## 2024-09-09 VITALS
BODY MASS INDEX: 26.63 KG/M2 | HEIGHT: 64 IN | DIASTOLIC BLOOD PRESSURE: 80 MMHG | OXYGEN SATURATION: 95 % | TEMPERATURE: 96.6 F | WEIGHT: 156 LBS | SYSTOLIC BLOOD PRESSURE: 134 MMHG | HEART RATE: 71 BPM

## 2024-09-09 DIAGNOSIS — Z87.891 HISTORY OF TOBACCO ABUSE: ICD-10-CM

## 2024-09-09 DIAGNOSIS — J45.41 MODERATE PERSISTENT ASTHMA WITH ACUTE EXACERBATION: ICD-10-CM

## 2024-09-09 DIAGNOSIS — Z85.068 HISTORY OF DUODENAL CANCER: ICD-10-CM

## 2024-09-09 DIAGNOSIS — R91.1 PULMONARY NODULE: ICD-10-CM

## 2024-09-09 DIAGNOSIS — R93.89 ABNORMAL CHEST CT: Primary | ICD-10-CM

## 2024-09-09 DIAGNOSIS — E53.8 B12 DEFICIENCY: Primary | ICD-10-CM

## 2024-09-09 PROCEDURE — 99204 OFFICE O/P NEW MOD 45 MIN: CPT | Performed by: INTERNAL MEDICINE

## 2024-09-09 PROCEDURE — 96372 THER/PROPH/DIAG INJ SC/IM: CPT

## 2024-09-09 RX ORDER — CYANOCOBALAMIN 1000 UG/ML
1000 INJECTION, SOLUTION INTRAMUSCULAR; SUBCUTANEOUS ONCE
OUTPATIENT
Start: 2024-10-07

## 2024-09-09 RX ORDER — CYANOCOBALAMIN 1000 UG/ML
1000 INJECTION, SOLUTION INTRAMUSCULAR; SUBCUTANEOUS ONCE
Status: COMPLETED | OUTPATIENT
Start: 2024-09-09 | End: 2024-09-09

## 2024-09-09 RX ADMIN — CYANOCOBALAMIN 1000 MCG: 1000 INJECTION, SOLUTION INTRAMUSCULAR at 09:30

## 2024-09-09 NOTE — PROGRESS NOTES
Pt tolerated B12 injection without any adverse reaction. Band-aid applied and intact. Pt ambulated with steady gait off unit. Declined AVS     Aware of next appt 10/03/24 @ 1030 am

## 2024-09-09 NOTE — PROGRESS NOTES
"Pulmonary Consultation   Aleida Hammond 64 y.o. female MRN: [id number]  2024    Referring Physician or Provider:  Tara Colon DO  [facility]   [address] [place] [address]       Chief Complaint:  Pulmonary nodules, asthma    HPI:    64-year-old female with past medical history of asthma, tobacco abuse, history of duodenal cancer status post Whipple resection now in remission Zen for evaluation of CT chest abnormality and pulmonary nodules.  Patient states that she has had asthma most of her adult life and states that is well-controlled \"when taking inhalers as they have been prescribed\".  She has been prescribed Wixela 250 twice a day which she states she uses intermittently.  She also uses albuterol intermittently.  She previously smoked for 30 years but states that she quit approximately 17 years ago.  No illicit drug use.  Complains that she is felt general malaise as well as upper airway symptoms over the past weekend.  No fevers or chills.  She states that she did not antigen test for COVID-19 which was negative.    Past Medical Hx  Past Medical History:   Diagnosis Date    PABLO (acute kidney injury) (HCC) 2021    Allergic     Anemia     Anesthesia complication     Aspiration pneumonia after     Anxiety     Arthritis     Asthma     Uses Albuterol daily    Breast lump     Resolved: 2017     Chronic pain     back    Chronic pain disorder     Back, legs    Diabetes mellitus (HCC)     Diverticulitis of colon     Dizziness     GERD (gastroesophageal reflux disease)     Hepatitis     History of stomach ulcers     HL (hearing loss)     Estimated    Hyperlipidemia     Hypertension     Hypotension 2021    Infectious viral hepatitis     Hepatitiss A    Memory loss     Estimated    Pneumonia     aspiration pneumonia    PONV (postoperative nausea and vomiting)     Stomach problems            Past Surgical Hx  Past Surgical History:   Procedure Laterality Date    " APPENDECTOMY      BACK SURGERY      CATARACT EXTRACTION, BILATERAL       SECTION      CHOLECYSTECTOMY      GASTRECTOMY N/A 2024    Procedure: COMPLETION GASTRECTOMY;  Surgeon: Karlene Cruz MD;  Location: [facility];  Service: Surgical Oncology    GASTRIC BYPASS      GASTRIC BYPASS      INFUSION PUMP IMPLANTATION      LAPAROTOMY N/A 2024    Procedure: DUODENAL EXPLORATION;  Surgeon: Karlene Cruz MD;  Location: [facility];  Service: Surgical Oncology    MO NDSC WRST SURG W/RLS TRANSVRS CARPL LIGM Right 2022    Procedure: Right endoscopic carpal tunnel release;  Surgeon: Papo Peguero MD;  Location: [facility];  Service: Orthopedics    SHOULDER SURGERY      SMALL INTESTINE SURGERY N/A 2024    Procedure: INTRAOPERATIVE ULTRASOUND;  Surgeon: Karlene Cruz MD;  Location: [facility];  Service: Surgical Oncology    SPINAL CORD STIMULATOR IMPLANT      WHIPPLE PROCEDURE/PANCREATICO-DUODENECTOMY N/A 2024    Procedure: WHIPPLE;  Surgeon: Karlene Cruz MD;  Location: [facility];  Service: Surgical Oncology           Family Hx  Family History   Problem Relation Age of Onset    Diabetes Mother         Mellitus    Hyperlipidemia Mother     Hypertension Mother     Colon cancer Mother 78    Pancreatic cancer Mother 84    Melanoma Mother 76    Cancer Father 78        Bladder ca    Alcohol abuse Father     Lung cancer Father 78    Prostate cancer Father 78    No Known Problems Daughter     No Known Problems Maternal Grandmother     No Known Problems Maternal Grandfather     Stomach cancer Paternal Grandmother         60's    Lung cancer Paternal Grandfather 76    Diabetes Brother         Mellitus    Hyperlipidemia Brother     Hypertension Brother     Diabetes Brother     No Known Problems Brother     No Known Problems Son     No Known Problems Son     Breast cancer Maternal Aunt 32        Unknown age    No Known Problems Paternal Aunt     No Known Problems Paternal  Aunt     No Known Problems Paternal Aunt     No Known Problems Paternal Aunt     Arthritis Family     Mental illness Neg Hx            Occupational History:   No known previous inhalation injuries      Social History:   Social History     Socioeconomic History    Marital status:      Spouse name: Not on file    Number of children: 3    Years of education: Not on file    Highest education level: Not on file   Occupational History    Occupation: Disability   Tobacco Use    Smoking status: Former     Current packs/day: 0.00     Average packs/day: 1 pack/day for 34.0 years (34.0 ttl pk-yrs)     Types: Cigarettes     Start date: 1973     Quit date: 2007     Years since quittin.7    Smokeless tobacco: Never    Tobacco comments:     15yrs ago   Vaping Use    Vaping status: Never Used   Substance and Sexual Activity    Alcohol use: No    Drug use: No    Sexual activity: Not Currently     Partners: Male     Birth control/protection: Post-menopausal   Other Topics Concern    Not on file   Social History Narrative    Not on file     Social Determinants of Health     Financial Resource Strain: Not on file   Food Insecurity: Not on file   Transportation Needs: Not on file   Physical Activity: Not on file   Stress: Not on file   Social Connections: Not on file   Intimate Partner Violence: Not on file   Housing Stability: Not on file        Pertinent Meds  Wixela 250 twice a day (using intermittently)  Singular 10 mg daily  Albuterol as needed    ROS:  Constitutional: - Fatigue, - chills, - fever, - weight change.   HEENT: - rhinorrhea, - sneezing, - sore throat.    Respiratory: - cough, - sputum production, + remittent shortness of breath, - wheezing.    Cardiovascular: - chest pain,  -palpitations, - leg swelling.   Gastrointestinal: - abdominal pain, - constipation, - diarrhea, - nausea, - vomiting.   Endocrine: - cold intolerance, - heat intolerance.   Genitourinary: - dysuria.   Musculoskeletal: -  "arthralgias.   Skin:- rash, - wound.   Allergic/Immunologic: - allergies  Neurological: - dizziness, - numbness      Vitals: Blood pressure 134/80, pulse 71, temperature (!) 96.6 °F (35.9 °C), temperature source Tympanic, height 5' 4\" (1.626 m), weight 70.8 kg (156 lb), SpO2 95%., Body mass index is 26.78 kg/m².    Physical Exam  GEN  NAD  HEENT  ncat, non icteric, MM moist  NECK  supple, no JVD, no LAD  CV  +s1s2, no mrg, RRR  PULM sign bilateral apical predominant wheeze, no rhonchi, no rales  ABD  soft, ntnd, + BS  EXT 1+ lower extremity pitting edema, no cyanosis, no clubbing  NEURO  Aox3, no focal weakness    Imaging and other studies     I have personally viewed and interpreted the following studies:  CT chest 8/8/2024 shows 2 pulmonary nodules that are subcentimeter in largest diameter in the left upper lobe.  Note: These nodules are stable per radiology report.  Otherwise, no gross intraparenchymal or pleural abnormalities.      Pulmonary function testing:   No PFTs available for interpretation      Lab Results, EKG, Pathology, and Other Studies:  No recent relevant lab work.Answers submitted by the patient for this visit:  Pulmonology Questionnaire (Submitted on 9/9/2024)  Chief Complaint: Primary symptoms  Do you have wheezing?: Yes  Chronicity: new  When did you first notice your symptoms?: more than 1 year ago  How often do your symptoms occur?: rarely  Since you first noticed this problem, how has it changed?: unchanged  Have you had a change in appetite?: No  Do you have chest pain?: No  Do you have shortness of breath that occurs with effort or exertion?: No  Do you have ear congestion?: No  Do you have ear pain?: No  Do you have a fever?: No  Do you have headaches?: No  Do you have heartburn?: No  Do you have fatigue?: Yes  Do you have muscle pain?: No  Do you have nasal congestion?: No  Do you have shortness of breath when lying flat?: No  Do you have shortness of breath when you wake up?: No  Do " "you have post-nasal drip?: Yes  Do you have a runny nose?: No  Do you have sneezing?: No  Do you have a sore throat?: No  Do you have sweats?: No  Do you have trouble swallowing?: Yes  Have you experienced weight loss?: No  Which of the following makes your symptoms worse?: animal exposure, exposure to fumes, exposure to smoke, pollen, URI  Which of the following makes your symptoms better?: oral steroids, OTC inhaler  Risk factors for lung disease: smoking/tobacco exposure      Assessment:  CT chest abnormality  Pulmonary nodules  Asthma-probably moderate persistent, with mild exacerbation  History of tobacco abuse    Plan:  Patient symptoms are consistent with mild exacerbation of asthma/obstructive lung disease  Check full pulmonary function test  Encouraged patient to use Wixela 250 twice a day  Continue his albuterol as needed  Patient states that she has a repeat CT of her chest that has been ordered by her oncologist for February 2025.  I will interpret this once completed for re-evaluation of pulmonary nodules.    Return visit in 3 months  On next visit we will evaluate symptoms, PFT results, compliance with Wixela, confirm the patient has CT chest for February 2025 for reevaluation of nodules    Note: Portions of the record may have been created with voice recognition software. Occasional wrong word or \"sound a like\" substitutions may have occurred due to the inherent limitations of voice recognition software. Read the chart carefully and recognize, using context, where substitutions have occurred.     Steve Person M.D.  [facility]    "

## 2024-09-10 ENCOUNTER — OFFICE VISIT (OUTPATIENT)
Dept: GASTROENTEROLOGY | Facility: CLINIC | Age: 64
End: 2024-09-10
Payer: COMMERCIAL

## 2024-09-10 VITALS — WEIGHT: 158 LBS | BODY MASS INDEX: 27.12 KG/M2 | DIASTOLIC BLOOD PRESSURE: 80 MMHG | SYSTOLIC BLOOD PRESSURE: 126 MMHG

## 2024-09-10 DIAGNOSIS — K21.00 GASTROESOPHAGEAL REFLUX DISEASE WITH ESOPHAGITIS: ICD-10-CM

## 2024-09-10 DIAGNOSIS — R11.0 CHRONIC NAUSEA: ICD-10-CM

## 2024-09-10 DIAGNOSIS — Z12.11 SCREENING FOR COLON CANCER: ICD-10-CM

## 2024-09-10 DIAGNOSIS — K59.03 THERAPEUTIC OPIOID INDUCED CONSTIPATION: ICD-10-CM

## 2024-09-10 DIAGNOSIS — T40.2X5A THERAPEUTIC OPIOID INDUCED CONSTIPATION: ICD-10-CM

## 2024-09-10 DIAGNOSIS — Z85.068 HISTORY OF DUODENAL CANCER: Primary | ICD-10-CM

## 2024-09-10 PROCEDURE — 99214 OFFICE O/P EST MOD 30 MIN: CPT | Performed by: STUDENT IN AN ORGANIZED HEALTH CARE EDUCATION/TRAINING PROGRAM

## 2024-09-10 RX ORDER — OMEPRAZOLE 40 MG/1
40 CAPSULE, DELAYED RELEASE ORAL 2 TIMES DAILY
Qty: 180 CAPSULE | Refills: 1 | Status: SHIPPED | OUTPATIENT
Start: 2024-09-10 | End: 2025-03-09

## 2024-09-10 NOTE — PATIENT INSTRUCTIONS
1.  Please open your omeprazole capsules into applesauce and take this twice daily before breakfast and dinner.  I did send refills for you.  2.  I do suspect that some of your chronic GI complaints are secondary to your pain medications which can lead to GI dysmotility and constipation.  Please continue MiraLAX as you are doing, you can increase the frequency of the dose if you have any constipation symptoms.  3.  As we discussed, we can plan for eventual EGD/colonoscopy but can hold off until the new year for insurance purposes.  We will plan to see back at that time.

## 2024-09-10 NOTE — PROGRESS NOTES
[facility] - Outpatient Follow-up Note  Aleida Hammond 64 y.o. female MRN: [id number]  Encounter: [id number]    ASSESSMENT AND PLAN:      Assessment & Plan  Gastroesophageal reflux disease with esophagitis  Stephany has a history of reflux.  Given her bypass and now Whipple anatomy, I have concerned about absorption of her PPI.  I have instructed her to open the capsules into a teaspoon of applesauce and take it before breakfast and dinner every day.  Refills were sent.  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  She is undergoing surveillance with medical and surgical oncology.  She is not presently on chemotherapy.       Chronic nausea  I do suspect that her chronic nausea is multifactorial, chronic opiates are certainly a contributing factor.  She takes MiraLAX every 3 to 4 days and still has constipation.  I suspect she is underdosed.  We discussed the side effect of opiates and focus of symptom management.  We will increase her PPI as above and she was advised to titrate up on her MiraLAX to regular spontaneous complete bowel movements.  We did discuss EGD and colonoscopy, however she has had multiple insurance changes this year and prefers to hold off until the new year.       Therapeutic opioid induced constipation  As above, I do suspect she is opiate induced constipation.  She was advised to uptitrate her MiraLAX to avoid any constipation symptoms.  She will consider this.  If no relief, could consider starting medication such as Linzess or Amitiza.       Screening for colon cancer  She is due for colon cancer screening.  However as above, due to insurance issues she would like to hold off until the new year.  We will see back in January to discuss scheduling.         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    She was diagnosed with duodenal adenocarcinoma last year and underwent Whipple in 2024. She did well post-operatively and continues to follow with heme/onc and surgical oncology for surveillance. She does not require chemotherapy.    She has had chronic nausea for years and this continues even after her surgery.  Her weight has been stable in the 150s.  Last had a colonoscopy in  with [facility].  The reports is 10-year follow-up, however she states she has a family history of colon cancer involving both her parents and is on a 5-year plan.    She is followed with pain management and is on chronic opiates including morphine pump and hydrocodone.    Historical Information   Past Medical History:   Diagnosis Date    PABLO (acute kidney injury) (HCC) 2021    Allergic     Anemia     Anesthesia complication     Aspiration pneumonia after     Anxiety     Arthritis     Asthma     Uses Albuterol daily    Breast lump     Resolved: 2017     Chronic kidney disease 10/2021    PABLO    Chronic pain     back    Chronic pain disorder     Back, legs    Diabetes mellitus (HCC)     Diverticulitis of colon     Dizziness     Duodenal ulcer     GERD (gastroesophageal reflux disease)     Hepatitis     Hepatitis A 2017    History of stomach ulcers     HL (hearing loss)     Estimated    Hyperlipidemia     Hypertension     Hypotension 2021    Infectious viral hepatitis     Hepatitiss A    Memory loss     Estimated    Pneumonia     aspiration  pneumonia    PONV (postoperative nausea and vomiting)     Stomach problems      Past Surgical History:   Procedure Laterality Date    ABDOMINAL SURGERY  2012    Gastric bypass    APPENDECTOMY      BACK SURGERY      BARIATRIC SURGERY  About 15 years ago    CATARACT EXTRACTION, BILATERAL       SECTION      CHOLECYSTECTOMY      GASTRECTOMY N/A 2024    Procedure: COMPLETION GASTRECTOMY;  Surgeon: Karlene Cruz MD;  Location: [facility];  Service: Surgical Oncology    GASTRIC BYPASS      GASTRIC BYPASS      INFUSION PUMP IMPLANTATION      LAPAROTOMY N/A 2024    Procedure: DUODENAL EXPLORATION;  Surgeon: Karlene Cruz MD;  Location: [facility];  Service: Surgical Oncology    IL NDSC WRST SURG W/RLS TRANSVRS CARPL LIGM Right 2022    Procedure: Right endoscopic carpal tunnel release;  Surgeon: Papo Peguero MD;  Location: [facility];  Service: Orthopedics    SHOULDER SURGERY      SMALL INTESTINE SURGERY N/A 2024    Procedure: INTRAOPERATIVE ULTRASOUND;  Surgeon: Karlene Cruz MD;  Location: [facility];  Service: Surgical Oncology    SPINAL CORD STIMULATOR IMPLANT      UPPER GASTROINTESTINAL ENDOSCOPY  2012    Multiple for bleeding ulcer    WHIPPLE PROCEDURE/PANCREATICO-DUODENECTOMY N/A 2024    Procedure: WHIPPLE;  Surgeon: Karlene Cruz MD;  Location: [facility];  Service: Surgical Oncology     Social History     Substance and Sexual Activity   Alcohol Use No     Social History     Substance and Sexual Activity   Drug Use No     Social History     Tobacco Use   Smoking Status Former    Current packs/day: 0.00    Average packs/day: 1 pack/day for 34.0 years (34.0 ttl pk-yrs)    Types: Cigarettes    Start date: 1973    Quit date: 2007    Years since quittin.7   Smokeless Tobacco Never   Tobacco Comments    15yrs ago     Family History   Problem Relation Age of Onset    Diabetes Mother         Mellitus    Hyperlipidemia Mother     Hypertension  Mother     Colon cancer Mother 78    Pancreatic cancer Mother 84    Melanoma Mother 76    Learning disabilities Mother     Cancer Father 78        Bladder ca    Alcohol abuse Father     Lung cancer Father 78    Prostate cancer Father 78    No Known Problems Daughter     No Known Problems Maternal Grandmother     No Known Problems Maternal Grandfather     Stomach cancer Paternal Grandmother         60's    Lung cancer Paternal Grandfather 76    Diabetes Brother         Mellitus    Hyperlipidemia Brother     Hypertension Brother     Learning disabilities Brother     Diabetes Brother     No Known Problems Brother     No Known Problems Son     No Known Problems Son     Breast cancer Maternal Aunt 32        Unknown age    No Known Problems Paternal Aunt     No Known Problems Paternal Aunt     No Known Problems Paternal Aunt     No Known Problems Paternal Aunt     Arthritis Family     Mental illness Neg Hx          Current Outpatient Medications:     albuterol (2.5 mg/3 mL) 0.083 % nebulizer solution    albuterol (PROVENTIL HFA,VENTOLIN HFA) 90 mcg/act inhaler    amLODIPine (NORVASC) 10 mg tablet    aspirin (ECOTRIN LOW STRENGTH) 81 mg EC tablet    atorvastatin (LIPITOR) 40 mg tablet    Cyanocobalamin (B-12 IJ)    glucose blood (Accu-Chek Guide) test strip    HYDROcodone-acetaminophen (NORCO) 5-325 mg per tablet    lidocaine (XYLOCAINE) 5 % ointment    metFORMIN (GLUCOPHAGE-XR) 500 mg 24 hr tablet    mometasone (NASONEX) 50 mcg/act nasal spray    montelukast (SINGULAIR) 10 mg tablet    Morphine Sulfate (MORPHINE 0.05 MG/ML SYRINGE, NICU/PICU,, CMPD ORDER,)    omeprazole (PriLOSEC) 40 MG capsule    ondansetron (ZOFRAN) 4 mg tablet    pregabalin (LYRICA) 200 MG capsule    sitaGLIPtin (Januvia) 100 mg tablet    traMADol (ULTRAM-ER) 300 MG 24 hr tablet    zolpidem (AMBIEN) 5 mg tablet    Fluticasone-Salmeterol (Advair Diskus) 250-50 mcg/dose inhaler  Allergies   Allergen Reactions    Nsaids Other (See Comments)     Cannot  take NSAIDS secondary to having gastric bypass    Percocet [Oxycodone-Acetaminophen] GI Intolerance     Reviewed medications and allergies and updated as indicated    PHYSICAL EXAM:    Blood pressure 126/80, weight 71.7 kg (158 lb). Body mass index is 27.12 kg/m².  General Appearance: NAD, cooperative, alert  Eyes: Anicteric  GI:  Soft, non-tender, non-distended; normal bowel sounds; no masses, no organomegaly   Rectal: Deferred  Musculoskeletal: No edema.  Skin:  No jaundice    Lab Results:   Lab Results   Component Value Date    WBC 8.51 09/03/2024    WBC 8.21 04/18/2024    WBC 14.55 (H) 02/10/2024    HGB 11.8 09/03/2024    HGB 10.3 (L) 04/18/2024    HGB 8.4 (L) 02/10/2024    MCV 90 09/03/2024     09/03/2024     04/18/2024     (H) 02/10/2024     Lab Results   Component Value Date     11/04/2017    K 4.2 09/03/2024     09/03/2024    CO2 29 09/03/2024    BUN 20 09/03/2024    CREATININE 0.80 09/03/2024    GLUCOSE 182 (H) 01/22/2024    GLUF 183 (H) 09/03/2024    CALCIUM 8.9 09/03/2024    CORRECTEDCA 9.2 04/18/2024    AST 24 09/03/2024    AST 58 (H) 04/18/2024    AST 15 02/10/2024    ALT 22 09/03/2024    ALT 59 (H) 04/18/2024    ALT 10 02/10/2024    ALKPHOS 98 09/03/2024    ALKPHOS 93 04/18/2024    ALKPHOS 93 02/10/2024    PROT 6.6 07/22/2017    BILITOT 0.5 07/22/2017    EGFR 78 09/03/2024     Lab Results   Component Value Date    IRON 78 08/07/2024    TIBC 263 08/07/2024    FERRITIN 290 08/07/2024     Lab Results   Component Value Date    LIPASE 113 09/23/2017       Radiology Results:   No results found.

## 2024-09-10 NOTE — ASSESSMENT & PLAN NOTE
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  She is undergoing surveillance with medical and surgical oncology.  She is not presently on chemotherapy.

## 2024-09-16 ENCOUNTER — TELEPHONE (OUTPATIENT)
Dept: PULMONOLOGY | Facility: CLINIC | Age: 64
End: 2024-09-16

## 2024-09-16 NOTE — TELEPHONE ENCOUNTER
Called patient to schedule appointment for the 3M FU (around 12/9/2024) from the Recall List and patient stated she was driving and did not have the calendar in handy and will call back to schedule once she gets home.

## 2024-09-18 ENCOUNTER — OFFICE VISIT (OUTPATIENT)
Dept: FAMILY MEDICINE CLINIC | Facility: HOSPITAL | Age: 64
End: 2024-09-18
Payer: COMMERCIAL

## 2024-09-18 VITALS
HEIGHT: 64 IN | BODY MASS INDEX: 26.63 KG/M2 | WEIGHT: 156 LBS | HEART RATE: 75 BPM | DIASTOLIC BLOOD PRESSURE: 80 MMHG | OXYGEN SATURATION: 95 % | SYSTOLIC BLOOD PRESSURE: 140 MMHG

## 2024-09-18 DIAGNOSIS — E11.69 HYPERLIPIDEMIA ASSOCIATED WITH TYPE 2 DIABETES MELLITUS  (HCC): ICD-10-CM

## 2024-09-18 DIAGNOSIS — I10 BENIGN ESSENTIAL HYPERTENSION: ICD-10-CM

## 2024-09-18 DIAGNOSIS — E11.9 TYPE 2 DIABETES MELLITUS WITHOUT COMPLICATION, WITHOUT LONG-TERM CURRENT USE OF INSULIN (HCC): Primary | ICD-10-CM

## 2024-09-18 DIAGNOSIS — E78.5 HYPERLIPIDEMIA ASSOCIATED WITH TYPE 2 DIABETES MELLITUS  (HCC): ICD-10-CM

## 2024-09-18 DIAGNOSIS — M54.16 CHRONIC LUMBAR RADICULOPATHY: ICD-10-CM

## 2024-09-18 DIAGNOSIS — M54.16 LUMBAR RADICULOPATHY: ICD-10-CM

## 2024-09-18 LAB — SL AMB POCT HEMOGLOBIN AIC: 7.5 (ref ?–6.5)

## 2024-09-18 PROCEDURE — 83036 HEMOGLOBIN GLYCOSYLATED A1C: CPT | Performed by: STUDENT IN AN ORGANIZED HEALTH CARE EDUCATION/TRAINING PROGRAM

## 2024-09-18 PROCEDURE — 99214 OFFICE O/P EST MOD 30 MIN: CPT | Performed by: STUDENT IN AN ORGANIZED HEALTH CARE EDUCATION/TRAINING PROGRAM

## 2024-09-18 RX ORDER — ROSUVASTATIN CALCIUM 20 MG/1
20 TABLET, COATED ORAL DAILY
Qty: 100 TABLET | Refills: 3 | Status: SHIPPED | OUTPATIENT
Start: 2024-09-18

## 2024-09-18 NOTE — PROGRESS NOTES
"  [facility]   Tara Colon DO    Assessment/Plan:      Diagnosis ICD-10-CM Associated Orders   1. Type 2 diabetes mellitus without complication, without long-term current use of insulin (HCC)  E11.9 Hemoglobin A1C     metFORMIN (GLUCOPHAGE) 1000 MG tablet      2. Hyperlipidemia associated with type 2 diabetes mellitus  (HCC)  E11.69 rosuvastatin (CRESTOR) 20 MG tablet    E78.5       3. Chronic lumbar radiculopathy  M54.16       4. Lumbar radiculopathy  M54.16       5. Benign essential hypertension  I10         Lipitor 60 mg to crestor 20 mg.   A1c 7.5 today - januvia not covered.   Will go back to metformin 1000 mg bid.   Upcoming wellness visit.   BP stable on current med - norvasc 5 mg   Return if symptoms worsen or fail to improve.  Patient may call or return to office with any questions or concerns.   _____________________________________________________________________  Subjective:     Patient ID: Aleida Hammond is a 64 y.o. female.  HPI  Aleida Hammond  Chief Complaint   Patient presents with    Follow-up     Discuss blood sugar 160/260 am,  300-400 insurance wont cover januvia   Discuss atorvastatin 80 mg      BW from August   Lipid panel - improved since change from 40 mg prior to 60 mg.            - bad taste in mouth when splitting tabs in half, 80 mg likely too much     \"Recommend lexiscan MPI for eval. She has had some dyspnea. No angina.   Continue with current medical regimen for hypertension. BP is stable.   Continue atorvastatin at current dosing. LDL 41. \"    The ASCVD Risk score (Renetta DK, et al., 2019) failed to calculate for the following reasons:    The valid total cholesterol range is 130 to 320 mg/dL     in the am to as high as 260 at that time.   360 mg this afternoon before coming over.   Almost always 200-300's when not fasting.     Does have work insurance related paperwork to complete today.   On disability for 3 months now since July 2024.     Wt Readings " from Last 3 Encounters:   09/18/24 70.8 kg (156 lb)   09/10/24 71.7 kg (158 lb)   09/09/24 70.8 kg (156 lb)     Temp Readings from Last 3 Encounters:   09/09/24 (!) 96.6 °F (35.9 °C) (Tympanic)   09/03/24 98.5 °F (36.9 °C) (Temporal)   08/28/24 98.1 °F (36.7 °C) (Temporal)     BP Readings from Last 3 Encounters:   09/18/24 140/80   09/10/24 126/80   09/09/24 134/80     Pulse Readings from Last 3 Encounters:   09/18/24 75   09/09/24 71   09/03/24 74      The following portions of the patient's history were reviewed and updated as appropriate: allergies, current medications, past medical history, and problem list.    Review of Systems   Constitutional:  Negative for chills and fever.   Respiratory:  Negative for cough and shortness of breath.    Cardiovascular:  Negative for chest pain and palpitations.   Musculoskeletal:  Positive for back pain.   Neurological:  Positive for light-headedness (at times) and headaches (sinuses at times). Negative for numbness (pain into both legs, R > L).       Objective:      Vitals:    09/18/24 1745   BP: 140/80   Pulse: 75   SpO2: 95%      Physical Exam  Vitals and nursing note reviewed.   Constitutional:       General: She is not in acute distress.     Appearance: Normal appearance. She is normal weight. She is not ill-appearing.   HENT:      Head: Normocephalic and atraumatic.   Eyes:      General: No scleral icterus.        Right eye: No discharge.         Left eye: No discharge.   Cardiovascular:      Rate and Rhythm: Normal rate and regular rhythm.      Pulses: Normal pulses.      Heart sounds: Normal heart sounds. No murmur heard.  Pulmonary:      Effort: Pulmonary effort is normal. No respiratory distress.      Breath sounds: Normal breath sounds. No stridor. No wheezing.   Musculoskeletal:         General: Tenderness (low back deep, less superficial) present.      Cervical back: Normal range of motion and neck supple. No rigidity or tenderness.      Right lower leg: No  "edema.      Left lower leg: No edema.   Lymphadenopathy:      Cervical: No cervical adenopathy.   Neurological:      Mental Status: She is alert and oriented to person, place, and time.      Gait: Gait normal.   Psychiatric:         Mood and Affect: Mood normal.         Behavior: Behavior normal.         Thought Content: Thought content normal.         Judgment: Judgment normal.           Portions of the record may have been created with voice recognition software. Occasional wrong word or \"sound alike\" substitutions may have occurred due to the inherent limitations of voice recognition software. Please review the chart carefully and recognize, using context, where substitutions/typographical errors may have occurred.     "

## 2024-09-24 ENCOUNTER — HOSPITAL ENCOUNTER (OUTPATIENT)
Dept: NON INVASIVE DIAGNOSTICS | Facility: HOSPITAL | Age: 64
Discharge: HOME | End: 2024-09-24
Attending: INTERNAL MEDICINE
Payer: COMMERCIAL

## 2024-09-24 ENCOUNTER — HOSPITAL ENCOUNTER (OUTPATIENT)
Dept: NUCLEAR MEDICINE | Facility: HOSPITAL | Age: 64
Discharge: HOME | End: 2024-09-24
Attending: INTERNAL MEDICINE
Payer: COMMERCIAL

## 2024-09-24 DIAGNOSIS — E11.40 TYPE 2 DIABETES MELLITUS WITH DIABETIC NEUROPATHY, WITH LONG-TERM CURRENT USE OF INSULIN (HCC): ICD-10-CM

## 2024-09-24 DIAGNOSIS — Z79.4 TYPE 2 DIABETES MELLITUS WITH DIABETIC NEUROPATHY, WITH LONG-TERM CURRENT USE OF INSULIN (HCC): ICD-10-CM

## 2024-09-24 DIAGNOSIS — R93.1 HIGH CORONARY ARTERY CALCIUM SCORE: ICD-10-CM

## 2024-09-24 LAB
NUC STRESS EJECTION FRACTION: 66 %
SL CV REST NUCLEAR ISOTOPE DOSE: 10.3 MCI
SL CV STRESS NUCLEAR ISOTOPE DOSE: 31.1 MCI
STRESS ANGINA INDEX: 0
STRESS BASELINE HR: 79 BPM
STRESS POST PEAK BP: 130 MMHG
STRESS/REST PERFUSION RATIO: 1.02

## 2024-09-24 PROCEDURE — 93016 CV STRESS TEST SUPVJ ONLY: CPT | Performed by: INTERNAL MEDICINE

## 2024-09-24 PROCEDURE — 93017 CV STRESS TEST TRACING ONLY: CPT

## 2024-09-24 PROCEDURE — 93018 CV STRESS TEST I&R ONLY: CPT | Performed by: INTERNAL MEDICINE

## 2024-09-24 PROCEDURE — 78452 HT MUSCLE IMAGE SPECT MULT: CPT | Performed by: INTERNAL MEDICINE

## 2024-09-24 PROCEDURE — 78452 HT MUSCLE IMAGE SPECT MULT: CPT

## 2024-09-24 PROCEDURE — A9502 TC99M TETROFOSMIN: HCPCS

## 2024-09-24 RX ORDER — REGADENOSON 0.08 MG/ML
0.4 INJECTION, SOLUTION INTRAVENOUS ONCE
Status: COMPLETED | OUTPATIENT
Start: 2024-09-24 | End: 2024-09-24

## 2024-09-24 RX ADMIN — REGADENOSON 0.4 MG: 0.08 INJECTION, SOLUTION INTRAVENOUS at 13:58

## 2024-09-25 ENCOUNTER — OFFICE VISIT (OUTPATIENT)
Dept: NEUROLOGY | Facility: CLINIC | Age: 64
End: 2024-09-25
Payer: COMMERCIAL

## 2024-09-25 ENCOUNTER — TELEPHONE (OUTPATIENT)
Dept: NEUROLOGY | Facility: CLINIC | Age: 64
End: 2024-09-25

## 2024-09-25 VITALS
HEART RATE: 83 BPM | WEIGHT: 157 LBS | DIASTOLIC BLOOD PRESSURE: 56 MMHG | HEIGHT: 64 IN | SYSTOLIC BLOOD PRESSURE: 116 MMHG | TEMPERATURE: 97.6 F | BODY MASS INDEX: 26.8 KG/M2

## 2024-09-25 DIAGNOSIS — E53.8 B12 DEFICIENCY: Primary | ICD-10-CM

## 2024-09-25 DIAGNOSIS — R41.3 MEMORY DYSFUNCTION: Primary | ICD-10-CM

## 2024-09-25 DIAGNOSIS — G62.9 PERIPHERAL POLYNEUROPATHY: ICD-10-CM

## 2024-09-25 PROCEDURE — 99214 OFFICE O/P EST MOD 30 MIN: CPT | Performed by: PSYCHIATRY & NEUROLOGY

## 2024-09-25 RX ORDER — CYANOCOBALAMIN 1000 UG/ML
1000 INJECTION, SOLUTION INTRAMUSCULAR; SUBCUTANEOUS ONCE
OUTPATIENT
Start: 2024-09-25

## 2024-09-25 NOTE — TELEPHONE ENCOUNTER
Called and left a detailed voicemail with Stephany. Will have infusion schedulers reach out to her. Left the hopeline number in case she needs to call back.

## 2024-09-25 NOTE — PROGRESS NOTES
"Patient ID: Aleida Hammond is a 64 y.o. female.    Assessment/Plan:    Memory dysfunction  Patient here today for neuro follow-up.  Patient last seen in March.  Since last visit, patient reports no significant changes in memory or mood.  No issues with safety at home or financial issues.  With some increased stress due to leaking attic at this time.  No new focality on exam.  Reviewed most recent labs from September 3 with a vitamin B12 of 272.  Reviewed trending with patient back to 2019 with the high of 1823 in 2021 to a low of 144 in 2019.  However over the past 2 years, patient has been in the low 200s i.e. 206, 204, 303.  Patient is currently on monthly intramuscular vitamin B12 injections.  Patient has had a Whipple procedure as of January of this year which may also be contributory.  Vitamin B12 may have some causal relationship to memory issues as well as patient's underlying neuropathy especially since this is becoming more persistent.  Will reach out to Marie Zayas for further assistance in etiology as well as replacement of vitamin B12 at least over 400 as a level.      Peripheral polyneuropathy  Patient notes some increased pain in the feet and hands.  Patient feels this is more related to her arthritis.  No new neuropathic features.  No new nocturnal symptoms.  Stable with normal foot intrinsics.  No falls or trips.  recommend vitamin B12 replacement.  Patient follows with PCP for her blood sugars.  Patient remains on Lyrica from pain management.       Diagnoses and all orders for this visit:    Memory dysfunction    Peripheral polyneuropathy           Subjective:    HPI    Pt is a 65 yo f with pmh of gastric bypass, dm type 2, asthma, vit b12 deficiency, neuropathy, hypercholesterolemia, htn, chronic pain, lumbar spondylosis, prior lumbar surgery 26 yrs ago, SCS about one yr ago who presents today for neuro follow up.    pt last seen on  3/7/24.  Per my last note \"Pt notes sxs for the past " "several yrs, more apparent past 2 yrs.  Pt notes no tia or cva like sxs.  Pt notes history of headaches.   Pt notes occ feeling of food getting stuck.  No diff with articulation of speech.  No incontinence.  Pt notes long standing diff with directions, ever since learning to drive.  Not new.  Pt denies any significant financial issues.   Pt notes occ positional dizziness.   Pt notes diff with recall of specific details. Pt has noted specifically on job, issues with keeping up on same level of detail.  Worked for same company for 24 yrs.    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   Pt notes family also noted issues with stm and repeating self.  Also family reminds her of events from their youth.  Pt with supportive family and lives close to them.   Pt works in [employer] field.   Pt with known history of gastric bypass.  Pt also with known history of vit b12 deficiency.  Pt more recently with im replacement.  Rev labs dating back to 2018.  April 2018 vit b12 at 292.   Oct 2019 level 144, and now on 6/15/21 level 738.\"  Kept above paragraph due to complexities of pt case.pt here today for her neuro follow up. Pt here today for follow up of her neuropathy.  Patient last seen in March.  Since last visit, patient is doing fairly well.  No falls or trips.  No change in bowel or bladder.  Patient notes no recent hospitalizations or infections.  No change in speech or swallowing.  No issues with safety in the home.  Patient notes biggest stressor at this time is having water in the attic and having to clear out some of the house.  Has been very busy.  Patient notes no new memory issues.  No issues with safety in the home or directions.  Continues to go for occasional iron infusions and monthly vitamin B12 injections.  This is regulated by her hematologist.  Patient has a son that lives in the home with her. " " Patient did have most recent blood work September 2024 with a vitamin B12 level of 272.  This still remains less than 400.  Reviewed trending with patient back to October 2019.  Oct 2019 that was the [place] level at 144.  2021 was the best with levels of 738 and 1823.  Patient has had lower levels since 2022 to present all in the 200s [id number] and presently to 72.  Patient did have 1 value at 303 in May 2024.  Reviewed with patient concerned that the vitamin B12 deficiency could also be contributory to her memory as well as her underlying neuropathy.  Will also send a task to Marie Zayas to follow-up.  Patient is already on monthly injections.  However, patient did have a Whipple procedure in the early part of this year January 2024 which may also be contributory nutritional standpoint.  Also recommended to follow-up with GI as well.  Pt has been following with same pain mx team for over 20 yrs.        Also kept for future reference; Neurocogntive eval from dr saavedra  from oct 2021.  Per Dr saavedra assessment from 10/26/21 \"Discussed cognitive profile with intact processing speed, executive functioning, verbal memory, verbal reasoning, and visual reasoning, and mild deficiencies in attention and visual memory. Discussed attentional issues in the context of her experienced cognitive difficulties. Explained that the etiology is not readily apparent, though reassured her that her cognitive profile is not suggestive of a neurodegenerative condition at this time\"              The following portions of the patient's history were reviewed and updated as appropriate: allergies, current medications, past family history, past medical history, past social history, past surgical history, and problem list and med rec and ros rev.         Objective:    Blood pressure 116/56, pulse 83, temperature 97.6 °F (36.4 °C), height 5' 4\" (1.626 m), weight 71.2 kg (157 lb).    Physical Exam  Constitutional:       General: She is not in " acute distress.     Appearance: She is not ill-appearing.   Eyes:      General: Lids are normal.      Extraocular Movements: Extraocular movements intact.      Pupils: Pupils are equal, round, and reactive to light.   Musculoskeletal:      Right lower leg: No edema.      Left lower leg: No edema.   Neurological:      Mental Status: She is alert.      Motor: Motor strength is normal.     Deep Tendon Reflexes: Reflexes are normal and symmetric.   Psychiatric:         Speech: Speech normal.         Neurological Exam  Mental Status  Alert. Speech is normal. Language is fluent with no aphasia. Able to spell words backwards. Fund of knowledge is appropriate for level of education.  No difficulty with right left confusion.  No issues with following for part command.  No Issues with world backwards.  No issues with speech or swallowing.  No aphasia or dysarthria.  No finger agnosia.  .    Cranial Nerves  CN II: Visual acuity is normal. Visual fields full to confrontation.  CN III, IV, VI: Extraocular movements intact bilaterally. Normal lids and orbits bilaterally. Pupils equal round and reactive to light bilaterally.  CN V: Facial sensation is normal.  CN VII: Full and symmetric facial movement.  CN VIII: Hearing is normal.  CN IX, X: Palate elevates symmetrically. Normal gag reflex.  CN XI: Shoulder shrug strength is normal.  CN XII: Tongue midline without atrophy or fasciculations.    Motor  Normal muscle bulk throughout. Normal muscle tone. No abnormal involuntary movements. Strength is 5/5 throughout all four extremities.    Sensory  Dec vib patti lowers.    Reflexes  Deep tendon reflexes are 2+ and symmetric in all four extremities.    Coordination  Right: Finger-to-nose normal. Rapid alternating movement normal.Left: Finger-to-nose normal. Rapid alternating movement normal.    Gait  Casual gait: Wide stance.        ROS:    Review of Systems   Constitutional:  Negative for appetite change, fatigue and fever.   HENT:  Negative.  Negative for hearing loss, tinnitus, trouble swallowing and voice change.    Eyes: Negative.  Negative for photophobia, pain and visual disturbance.   Respiratory: Negative.  Negative for shortness of breath.    Cardiovascular: Negative.  Negative for palpitations.   Gastrointestinal: Negative.  Negative for nausea and vomiting.   Endocrine: Negative.  Negative for cold intolerance.   Genitourinary: Negative.  Negative for dysuria, frequency and urgency.   Musculoskeletal:  Negative for back pain, gait problem, myalgias, neck pain and neck stiffness.   Skin: Negative.  Negative for rash.   Allergic/Immunologic: Negative.    Neurological: Negative.  Negative for dizziness, tremors, seizures, syncope, facial asymmetry, speech difficulty, weakness, light-headedness, numbness and headaches.   Hematological: Negative.  Does not bruise/bleed easily.   Psychiatric/Behavioral: Negative.  Negative for confusion, hallucinations and sleep disturbance.    All other systems reviewed and are negative.    No new issues to address

## 2024-09-25 NOTE — TELEPHONE ENCOUNTER
Hi, thanks for reaching out.  I am more than willing to increase her B12 injections to every 2 weeks to see if this helps improve her symptoms.    Lillian, I am going to adjust patient's B12 plan.  Can you please let patient and the infusion center know.  Thank you

## 2024-09-25 NOTE — ASSESSMENT & PLAN NOTE
Patient notes some increased pain in the feet and hands.  Patient feels this is more related to her arthritis.  No new neuropathic features.  No new nocturnal symptoms.  Stable with normal foot intrinsics.  No falls or trips.  recommend vitamin B12 replacement.  Patient follows with PCP for her blood sugars.  Patient remains on Lyrica from pain management.

## 2024-09-25 NOTE — TELEPHONE ENCOUNTER
Pt seen in office today.  Full neuro consult on chart.  Concern about continued low vit B12 levels over the past 2 years as well as dating back as far as 2019.  Now also s/p whipple procedure.  Last lab 9/3, pt with low level 272.  Pt already on monthly vit b12.  Can you please help to see etiology as well as any need for additional replacement to help with both her memory and neuropathy.  Thanks so much.

## 2024-09-25 NOTE — ASSESSMENT & PLAN NOTE
Patient here today for neuro follow-up.  Patient last seen in March.  Since last visit, patient reports no significant changes in memory or mood.  No issues with safety at home or financial issues.  With some increased stress due to leaking attic at this time.  No new focality on exam.  Reviewed most recent labs from September 3 with a vitamin B12 of 272.  Reviewed trending with patient back to 2019 with the high of 1823 in 2021 to a low of 144 in 2019.  However over the past 2 years, patient has been in the low 200s i.e. 206, 204, 303.  Patient is currently on monthly intramuscular vitamin B12 injections.  Patient has had a Whipple procedure as of January of this year which may also be contributory.  Vitamin B12 may have some causal relationship to memory issues as well as patient's underlying neuropathy especially since this is becoming more persistent.  Will reach out to Marie Zayas for further assistance in etiology as well as replacement of vitamin B12 at least over 400 as a level.

## 2024-09-26 DIAGNOSIS — J01.00 ACUTE NON-RECURRENT MAXILLARY SINUSITIS: ICD-10-CM

## 2024-09-26 RX ORDER — MOMETASONE FUROATE MONOHYDRATE 50 UG/1
SPRAY, METERED NASAL
Qty: 51 G | Refills: 0 | Status: SHIPPED | OUTPATIENT
Start: 2024-09-26

## 2024-09-27 LAB
CHEST PAIN STATEMENT: NORMAL
MAX DIASTOLIC BP: 70 MMHG
MAX PREDICTED HEART RATE: 156 BPM
PROTOCOL NAME: NORMAL
REASON FOR TERMINATION: NORMAL
STRESS POST EXERCISE DUR MIN: 3 MIN
STRESS POST EXERCISE DUR SEC: 0 SEC
STRESS POST PEAK HR: 118 BPM
STRESS POST PEAK SYSTOLIC BP: 130 MMHG
TARGET HR FORMULA: NORMAL
TEST INDICATION: NORMAL

## 2024-10-02 ENCOUNTER — OFFICE VISIT (OUTPATIENT)
Dept: FAMILY MEDICINE CLINIC | Facility: HOSPITAL | Age: 64
End: 2024-10-02
Payer: COMMERCIAL

## 2024-10-02 VITALS
HEART RATE: 80 BPM | WEIGHT: 154 LBS | OXYGEN SATURATION: 96 % | DIASTOLIC BLOOD PRESSURE: 67 MMHG | TEMPERATURE: 97.2 F | SYSTOLIC BLOOD PRESSURE: 113 MMHG | BODY MASS INDEX: 26.43 KG/M2

## 2024-10-02 DIAGNOSIS — J45.30 MILD PERSISTENT ASTHMA WITHOUT COMPLICATION: Primary | ICD-10-CM

## 2024-10-02 DIAGNOSIS — K59.03 DRUG-INDUCED CONSTIPATION: ICD-10-CM

## 2024-10-02 DIAGNOSIS — I10 BENIGN ESSENTIAL HYPERTENSION: ICD-10-CM

## 2024-10-02 PROBLEM — K59.00 CONSTIPATION: Status: ACTIVE | Noted: 2024-10-02

## 2024-10-02 LAB
SARS-COV-2 AG UPPER RESP QL IA: NEGATIVE
VALID CONTROL: NORMAL

## 2024-10-02 PROCEDURE — 87811 SARS-COV-2 COVID19 W/OPTIC: CPT | Performed by: NURSE PRACTITIONER

## 2024-10-02 PROCEDURE — G2211 COMPLEX E/M VISIT ADD ON: HCPCS | Performed by: NURSE PRACTITIONER

## 2024-10-02 PROCEDURE — 99213 OFFICE O/P EST LOW 20 MIN: CPT | Performed by: NURSE PRACTITIONER

## 2024-10-02 RX ORDER — TRAMADOL HYDROCHLORIDE 50 MG/1
150 TABLET ORAL EVERY 8 HOURS PRN
COMMUNITY
Start: 2024-09-25

## 2024-10-02 NOTE — ASSESSMENT & PLAN NOTE
History of asthma, former smoker, stable pulmonary nodules.  Had carpet installed in her bedroom last Monday and noted acute onset of nausea with emesis x 1.  Associated malaise, fatigue with nasal burning and headache.  Appetite slightly diminished due to nausea able to stay hydrated.  Reports the smell of the carpet is intolerable she has since called the  for an intervention.  She has open windows which is helping and moved to a different bedroom and is able to sleep well.  She is using a [place] pot which is helping the nasal burning.  She denies any dyspnea cough sputum production.  She is adherent to her Advair, Nasonex as well as albuterol as needed  -Discussed keeping bedroom and closed and window open.  She will follow-up with  to remove carpeting.  -She will continue report of care as this is helping.

## 2024-10-02 NOTE — PROGRESS NOTES
[facility]   Caroline BUENROSTRO    Assessment/Plan:   1. Mild persistent asthma without complication  Assessment & Plan:  History of asthma, former smoker, stable pulmonary nodules.  Had carpet installed in her bedroom last Monday and noted acute onset of nausea with emesis x 1.  Associated malaise, fatigue with nasal burning and headache.  Appetite slightly diminished due to nausea able to stay hydrated.  Reports the smell of the carpet is intolerable she has since called the  for an intervention.  She has open windows which is helping and moved to a different bedroom and is able to sleep well.  She is using a [place] pot which is helping the nasal burning.  She denies any dyspnea cough sputum production.  She is adherent to her Advair, Nasonex as well as albuterol as needed  -Discussed keeping bedroom and closed and window open.  She will follow-up with  to remove carpeting.  -She will continue report of care as this is helping.  Orders:  -     POCT Rapid Covid Ag  2. Benign essential hypertension  Assessment & Plan:  HX HTN well controlled on amlodipine 5mg daily.    3. Drug-induced constipation  Assessment & Plan:  History of constipation multifactorial due to history of duodenal cancer status post Whipple procedure as well as drug-induced long-term narcotics.  Reports current pattern every days sometimes hard to pass.  She is using MiraLAX and a softener.  We discussed optimizing hydration as well as fiber intake.      Avoid environmental trigger:  keep bedroom door closed.    Continue supportive care.   Return for Next scheduled follow up.  Patient may call or return to office with any questions or concerns.     ______________________________________________________________________  Subjective:     Patient ID: Aleida Hammond is a 64 y.o. female.  Aleida Hammond  Chief Complaint   Patient presents with   • Generalized Body Aches     Pt feels weak, tired, smell from  her new carpeting is making her ill.     Per A/P             The following portions of the patient's history were reviewed and updated as appropriate: allergies, current medications, past family history, past medical history, past social history, past surgical history, and problem list.    Review of Systems   Constitutional:  Positive for appetite change and fatigue. Negative for activity change, chills and fever.   HENT: Negative.  Negative for congestion, ear pain, postnasal drip and sinus pain.         Nasal burning   Eyes: Negative.    Respiratory:  Positive for wheezing. Negative for cough, chest tightness and shortness of breath.         Rare wheeze at baseline     Cardiovascular: Negative.  Negative for chest pain, palpitations and leg swelling.   Gastrointestinal:  Positive for constipation and nausea. Negative for diarrhea.   Endocrine: Negative.    Genitourinary: Negative.  Negative for dysuria.   Musculoskeletal: Negative.    Skin: Negative.    Allergic/Immunologic: Negative.  Negative for immunocompromised state.   Neurological:  Positive for headaches. Negative for dizziness and light-headedness.   Hematological: Negative.    Psychiatric/Behavioral: Negative.           Objective:      Vitals:    10/02/24 0856   BP: 113/67   Pulse: 80   Temp: (!) 97.2 °F (36.2 °C)   SpO2: 96%      Physical Exam  Vitals and nursing note reviewed.   Constitutional:       General: She is awake.      Appearance: Normal appearance.   HENT:      Head: Normocephalic and atraumatic.      Right Ear: Tympanic membrane, ear canal and external ear normal.      Left Ear: Tympanic membrane, ear canal and external ear normal.      Nose: Nose normal. No congestion.      Right Sinus: No maxillary sinus tenderness or frontal sinus tenderness.      Left Sinus: No maxillary sinus tenderness or frontal sinus tenderness.      Mouth/Throat:      Mouth: Mucous membranes are moist.      Pharynx: Oropharynx is clear. No pharyngeal swelling,  "oropharyngeal exudate, posterior oropharyngeal erythema or postnasal drip.   Eyes:      Extraocular Movements: Extraocular movements intact.      Conjunctiva/sclera: Conjunctivae normal.      Pupils: Pupils are equal, round, and reactive to light.   Cardiovascular:      Rate and Rhythm: Normal rate and regular rhythm.      Pulses: Normal pulses.      Heart sounds: Normal heart sounds.   Pulmonary:      Effort: Pulmonary effort is normal. No respiratory distress.      Breath sounds: Normal breath sounds. No wheezing or rhonchi.   Abdominal:      General: Bowel sounds are normal.      Palpations: Abdomen is soft.   Musculoskeletal:         General: Normal range of motion.      Cervical back: Normal range of motion and neck supple.   Skin:     General: Skin is warm and dry.   Neurological:      General: No focal deficit present.      Mental Status: She is alert and oriented to person, place, and time.   Psychiatric:         Mood and Affect: Mood normal.         Behavior: Behavior normal. Behavior is cooperative.         Thought Content: Thought content normal.         Judgment: Judgment normal.         Portions of the record may have been created with voice recognition software. Occasional wrong word or \"sound alike\" substitutions may have occurred due to the inherent limitations of voice recognition software. Please review the chart carefully and recognize, using context, where substitutions/typographical errors may have occurred.       "

## 2024-10-02 NOTE — ASSESSMENT & PLAN NOTE
History of constipation multifactorial due to history of duodenal cancer status post Whipple procedure as well as drug-induced long-term narcotics.  Reports current pattern every days sometimes hard to pass.  She is using MiraLAX and a softener.  We discussed optimizing hydration as well as fiber intake.

## 2024-10-03 ENCOUNTER — TELEPHONE (OUTPATIENT)
Dept: ADMINISTRATIVE | Facility: OTHER | Age: 64
End: 2024-10-03

## 2024-10-03 ENCOUNTER — HOSPITAL ENCOUNTER (OUTPATIENT)
Dept: INFUSION CENTER | Facility: HOSPITAL | Age: 64
Discharge: HOME | End: 2024-10-03
Attending: INTERNAL MEDICINE
Payer: COMMERCIAL

## 2024-10-03 DIAGNOSIS — E53.8 B12 DEFICIENCY: Primary | ICD-10-CM

## 2024-10-03 PROCEDURE — 96372 THER/PROPH/DIAG INJ SC/IM: CPT

## 2024-10-03 RX ORDER — CYANOCOBALAMIN 1000 UG/ML
1000 INJECTION, SOLUTION INTRAMUSCULAR; SUBCUTANEOUS ONCE
Status: CANCELLED | OUTPATIENT
Start: 2024-10-17

## 2024-10-03 RX ORDER — CYANOCOBALAMIN 1000 UG/ML
1000 INJECTION, SOLUTION INTRAMUSCULAR; SUBCUTANEOUS ONCE
Status: COMPLETED | OUTPATIENT
Start: 2024-10-03 | End: 2024-10-03

## 2024-10-03 RX ADMIN — CYANOCOBALAMIN 1000 MCG: 1000 INJECTION, SOLUTION INTRAMUSCULAR at 10:24

## 2024-10-03 NOTE — LETTER
Diabetic Foot Exam Form    Date Requested: 10/08/24  Patient: Aleida Hammond  Patient : 1960   Referring Provider: Tara Colon DO    Diabetic Foot Exam Performed with shoes and socks removed        Yes         No     Date of Diabetic Foot Exam ______________________________  Risk Score ____________________________________________    Left Foot       Visual Inspection         Monofilament Testing Sensory Exam        Pedal Pulses         Additional Comments         Right Foot      Visual Inspection         Monofilament Testing Sensory Exam       Pedal Pulses         Additional Comments         Comments __________________________________________________________    Practice Providing Exam ______________________________________________    Exam Performed By (print name) _______________________________________      Provider Signature ___________________________________________________      These reports are needed for  compliance.    Please fax this completed form and a copy of the Diabetic Foot Exam report to our office located at [address] [place] [address] as soon as possible via Fax [phone] cari Bañuelos: Phone [phone]    We thank you for your assistance in treating our mutual patient.

## 2024-10-03 NOTE — LETTER
Diabetic Foot Exam Form    Date Requested: 10/04/24  Patient: Aleida Hammond  Patient : 1960   Referring Provider: Tara Colon DO    Diabetic Foot Exam Performed with shoes and socks removed        Yes         No     Date of Diabetic Foot Exam ______________________________  Risk Score ____________________________________________    Left Foot       Visual Inspection         Monofilament Testing Sensory Exam        Pedal Pulses         Additional Comments         Right Foot      Visual Inspection         Monofilament Testing Sensory Exam       Pedal Pulses         Additional Comments         Comments __________________________________________________________    Practice Providing Exam ______________________________________________    Exam Performed By (print name) _______________________________________      Provider Signature ___________________________________________________      These reports are needed for  compliance.    Please fax this completed form and a copy of the Diabetic Foot Exam report to our office located at [address] [place] [address] as soon as possible via Fax [phone] cari Bañuelos: Phone [phone]    We thank you for your assistance in treating our mutual patient.

## 2024-10-03 NOTE — PROGRESS NOTES
Pt tolerated B12 injection without any adverse reaction. Band-aid applied and intact. Pt ambulated with steady gait off unit. Declined AVS     Aware of next appt 10/17/24 @ 1030 am

## 2024-10-03 NOTE — TELEPHONE ENCOUNTER
----- Message from Jocelyn STAFFORD sent at 10/2/2024 10:59 AM EDT -----  Regarding: DM Foot exam  10/02/24 11:00 AM    Hello, our patient Aleida Hammond has had Diabetic Foot Exam completed/performed. Please assist in updating the patient chart by making an External outreach to Dr. KATHI Sanchez [facility] facility located in [facility] [phone]. The date of service is 05/2024.    Thank you,  Jocelyn Mitchell MA  [facility]

## 2024-10-04 NOTE — TELEPHONE ENCOUNTER
Upon review of the In Basket request and the patient's chart, initial outreach has been made via fax to facility. Please see Contacts section for details.     Thank you  Edda Kraft MA

## 2024-10-08 NOTE — TELEPHONE ENCOUNTER
As a follow-up, a second attempt has been made for outreach via fax to facility. Please see Contacts section for details.    Thank you  Edda Kraft MA

## 2024-10-10 NOTE — TELEPHONE ENCOUNTER
Upon review of the In Basket request we have found as a result of outreach that patient did not have the requested item(s) completed.     Any additional questions or concerns should be emailed to the Practice Liaisons via the appropriate education email address, please do not reply via In Basket.    Thank you  Edda Kraft MA   [facility]

## 2024-10-16 DIAGNOSIS — J45.30 MILD PERSISTENT ASTHMA WITHOUT COMPLICATION: ICD-10-CM

## 2024-10-17 RX ORDER — MONTELUKAST SODIUM 10 MG/1
10 TABLET ORAL
Qty: 90 TABLET | Refills: 1 | Status: SHIPPED | OUTPATIENT
Start: 2024-10-17

## 2024-10-18 ENCOUNTER — HOSPITAL ENCOUNTER (OUTPATIENT)
Dept: INFUSION CENTER | Facility: HOSPITAL | Age: 64
End: 2024-10-18
Attending: INTERNAL MEDICINE
Payer: COMMERCIAL

## 2024-10-18 ENCOUNTER — RA CDI HCC (OUTPATIENT)
Dept: OTHER | Facility: HOSPITAL | Age: 64
End: 2024-10-18

## 2024-10-18 VITALS
HEART RATE: 78 BPM | DIASTOLIC BLOOD PRESSURE: 81 MMHG | RESPIRATION RATE: 16 BRPM | OXYGEN SATURATION: 95 % | SYSTOLIC BLOOD PRESSURE: 128 MMHG | TEMPERATURE: 97.6 F

## 2024-10-18 DIAGNOSIS — E53.8 B12 DEFICIENCY: Primary | ICD-10-CM

## 2024-10-18 PROCEDURE — 96372 THER/PROPH/DIAG INJ SC/IM: CPT

## 2024-10-18 RX ORDER — CYANOCOBALAMIN 1000 UG/ML
1000 INJECTION, SOLUTION INTRAMUSCULAR; SUBCUTANEOUS ONCE
Status: CANCELLED | OUTPATIENT
Start: 2024-10-31

## 2024-10-18 RX ORDER — CYANOCOBALAMIN 1000 UG/ML
1000 INJECTION, SOLUTION INTRAMUSCULAR; SUBCUTANEOUS ONCE
Status: COMPLETED | OUTPATIENT
Start: 2024-10-18 | End: 2024-10-18

## 2024-10-18 RX ADMIN — CYANOCOBALAMIN 1000 MCG: 1000 INJECTION, SOLUTION INTRAMUSCULAR at 11:34

## 2024-10-18 NOTE — PROGRESS NOTES
..Aleida Hammond  tolerated treatment well with no complications.      Aleida Hammond is aware of future appt on 10/31 at 11:00.     AVS printed and given to Aleida Hammond:  No (Declined by Aleida Hammond)

## 2024-10-25 ENCOUNTER — OFFICE VISIT (OUTPATIENT)
Dept: FAMILY MEDICINE CLINIC | Facility: HOSPITAL | Age: 64
End: 2024-10-25
Payer: COMMERCIAL

## 2024-10-25 VITALS
DIASTOLIC BLOOD PRESSURE: 60 MMHG | SYSTOLIC BLOOD PRESSURE: 110 MMHG | OXYGEN SATURATION: 95 % | WEIGHT: 156.4 LBS | HEIGHT: 64 IN | BODY MASS INDEX: 26.7 KG/M2 | HEART RATE: 87 BPM

## 2024-10-25 DIAGNOSIS — J45.30 MILD PERSISTENT ASTHMA WITHOUT COMPLICATION: ICD-10-CM

## 2024-10-25 DIAGNOSIS — G47.00 INSOMNIA, UNSPECIFIED TYPE: ICD-10-CM

## 2024-10-25 DIAGNOSIS — Z23 ENCOUNTER FOR IMMUNIZATION: Primary | ICD-10-CM

## 2024-10-25 DIAGNOSIS — I10 BENIGN ESSENTIAL HYPERTENSION: ICD-10-CM

## 2024-10-25 DIAGNOSIS — E11.9 TYPE 2 DIABETES MELLITUS WITHOUT COMPLICATION, WITHOUT LONG-TERM CURRENT USE OF INSULIN (HCC): ICD-10-CM

## 2024-10-25 PROCEDURE — 90673 RIV3 VACCINE NO PRESERV IM: CPT

## 2024-10-25 PROCEDURE — G0008 ADMIN INFLUENZA VIRUS VAC: HCPCS

## 2024-10-25 PROCEDURE — G0439 PPPS, SUBSEQ VISIT: HCPCS | Performed by: STUDENT IN AN ORGANIZED HEALTH CARE EDUCATION/TRAINING PROGRAM

## 2024-10-25 RX ORDER — METHOCARBAMOL 500 MG/1
500 TABLET, FILM COATED ORAL
COMMUNITY
Start: 2024-10-23

## 2024-10-25 RX ORDER — ZOLPIDEM TARTRATE 5 MG/1
5 TABLET ORAL
Qty: 30 TABLET | Refills: 2 | Status: SHIPPED | OUTPATIENT
Start: 2024-11-13

## 2024-10-25 NOTE — PATIENT INSTRUCTIONS
Medicare Preventive Visit Patient Instructions  Thank you for completing your Welcome to Medicare Visit or Medicare Annual Wellness Visit today. Your next wellness visit will be due in one year (10/26/2025).  The screening/preventive services that you may require over the next 5-10 years are detailed below. Some tests may not apply to you based off risk factors and/or age. Screening tests ordered at today's visit but not completed yet may show as past due. Also, please note that scanned in results may not display below.  Preventive Screenings:  Service Recommendations Previous Testing/Comments   Colorectal Cancer Screening  * Colonoscopy    * Fecal Occult Blood Test (FOBT)/Fecal Immunochemical Test (FIT)  * Fecal DNA/Cologuard Test  * Flexible Sigmoidoscopy Age: 45-75 years old   Colonoscopy: every 10 years (may be performed more frequently if at higher risk)  OR  FOBT/FIT: every 1 year  OR  Cologuard: every 3 years  OR  Sigmoidoscopy: every 5 years  Screening may be recommended earlier than age 45 if at higher risk for colorectal cancer. Also, an individualized decision between you and your healthcare provider will decide whether screening between the ages of 76-85 would be appropriate. Colonoscopy: 12/08/2016  FOBT/FIT: Not on file  Cologuard: Not on file  Sigmoidoscopy: Not on file    Screening Current     Breast Cancer Screening Age: 40+ years old  Frequency: every 1-2 years  Not required if history of left and right mastectomy Mammogram: 07/28/2023    Screening Current   Cervical Cancer Screening Between the ages of 21-29, pap smear recommended once every 3 years.   Between the ages of 30-65, can perform pap smear with HPV co-testing every 5 years.   Recommendations may differ for women with a history of total hysterectomy, cervical cancer, or abnormal pap smears in past. Pap Smear: 10/05/2022    Screening Current   Hepatitis C Screening Once for adults born between 1945 and 1965  More frequently in patients at  high risk for Hepatitis C Hep C Antibody: 11/03/2018    Screening Current   Diabetes Screening 1-2 times per year if you're at risk for diabetes or have pre-diabetes Fasting glucose: 183 mg/dL (9/3/2024)  A1C: 7.5 (9/18/2024)  Screening Not Indicated  History Diabetes   Cholesterol Screening Once every 5 years if you don't have a lipid disorder. May order more often based on risk factors. Lipid panel: 08/07/2024    Screening Not Indicated  History Lipid Disorder     Other Preventive Screenings Covered by Medicare:  Abdominal Aortic Aneurysm (AAA) Screening: covered once if your at risk. You're considered to be at risk if you have a family history of AAA.  Lung Cancer Screening: covers low dose CT scan once per year if you meet all of the following conditions: (1) Age 55-77; (2) No signs or symptoms of lung cancer; (3) Current smoker or have quit smoking within the last 15 years; (4) You have a tobacco smoking history of at least 20 pack years (packs per day multiplied by number of years you smoked); (5) You get a written order from a healthcare provider.  Glaucoma Screening: covered annually if you're considered high risk: (1) You have diabetes OR (2) Family history of glaucoma OR (3)  aged 50 and older OR (4)  American aged 65 and older  Osteoporosis Screening: covered every 2 years if you meet one of the following conditions: (1) You're estrogen deficient and at risk for osteoporosis based off medical history and other findings; (2) Have a vertebral abnormality; (3) On glucocorticoid therapy for more than 3 months; (4) Have primary hyperparathyroidism; (5) On osteoporosis medications and need to assess response to drug therapy.   Last bone density test (DXA Scan): Not on file.  HIV Screening: covered annually if you're between the age of 15-65. Also covered annually if you are younger than 15 and older than 65 with risk factors for HIV infection. For pregnant patients, it is covered up to 3  times per pregnancy.    Immunizations:  Immunization Recommendations   Influenza Vaccine Annual influenza vaccination during flu season is recommended for all persons aged >= 6 months who do not have contraindications   Pneumococcal Vaccine   * Pneumococcal conjugate vaccine = PCV13 (Prevnar 13), PCV15 (Vaxneuvance), PCV20 (Prevnar 20)  * Pneumococcal polysaccharide vaccine = PPSV23 (Pneumovax) Adults 19-65 yo with certain risk factors or if 65+ yo  If never received any pneumonia vaccine: recommend Prevnar 20 (PCV20)  Give PCV20 if previously received 1 dose of PCV13 or PPSV23   Hepatitis B Vaccine 3 dose series if at intermediate or high risk (ex: diabetes, end stage renal disease, liver disease)   Respiratory syncytial virus (RSV) Vaccine - COVERED BY MEDICARE PART D  * RSVPreF3 (Arexvy) CDC recommends that adults 60 years of age and older may receive a single dose of RSV vaccine using shared clinical decision-making (SCDM)   Tetanus (Td) Vaccine - COST NOT COVERED BY MEDICARE PART B Following completion of primary series, a booster dose should be given every 10 years to maintain immunity against tetanus. Td may also be given as tetanus wound prophylaxis.   Tdap Vaccine - COST NOT COVERED BY MEDICARE PART B Recommended at least once for all adults. For pregnant patients, recommended with each pregnancy.   Shingles Vaccine (Shingrix) - COST NOT COVERED BY MEDICARE PART B  2 shot series recommended in those 19 years and older who have or will have weakened immune systems or those 50 years and older     Health Maintenance Due:      Topic Date Due   • Colorectal Cancer Screening  12/08/2021   • Breast Cancer Screening: Mammogram  07/28/2024   • Cervical Cancer Screening  10/05/2027   • HIV Screening  Completed   • Hepatitis C Screening  Completed   • Lung Cancer Screening  Discontinued     Immunizations Due:      Topic Date Due   • Influenza Vaccine (1) 09/01/2024     Advance Directives   What are advance directives?   Advance directives are legal documents that state your wishes and plans for medical care. These plans are made ahead of time in case you lose your ability to make decisions for yourself. Advance directives can apply to any medical decision, such as the treatments you want, and if you want to donate organs.   What are the types of advance directives?  There are many types of advance directives, and each state has rules about how to use them. You may choose a combination of any of the following:  Living will:  This is a written record of the treatment you want. You can also choose which treatments you do not want, which to limit, and which to stop at a certain time. This includes surgery, medicine, IV fluid, and tube feedings.   Durable power of  for healthcare (DPAHC):  This is a written record that states who you want to make healthcare choices for you when you are unable to make them for yourself. This person, called a proxy, is usually a family member or a friend. You may choose more than 1 proxy.  Do not resuscitate (DNR) order:  A DNR order is used in case your heart stops beating or you stop breathing. It is a request not to have certain forms of treatment, such as CPR. A DNR order may be included in other types of advance directives.  Medical directive:  This covers the care that you want if you are in a coma, near death, or unable to make decisions for yourself. You can list the treatments you want for each condition. Treatment may include pain medicine, surgery, blood transfusions, dialysis, IV or tube feedings, and a ventilator (breathing machine).  Values history:  This document has questions about your views, beliefs, and how you feel and think about life. This information can help others choose the care that you would choose.  Why are advance directives important?  An advance directive helps you control your care. Although spoken wishes may be used, it is better to have your wishes written down.  Spoken wishes can be misunderstood, or not followed. Treatments may be given even if you do not want them. An advance directive may make it easier for your family to make difficult choices about your care.   Urinary Incontinence   Urinary incontinence (UI)  is when you lose control of your bladder. UI develops because your bladder cannot store or empty urine properly. The 3 most common types of UI are stress incontinence, urge incontinence, or both.  Medicines:   May be given to help strengthen your bladder control. Report any side effects of medication to your healthcare provider.  Do pelvic muscle exercises often:  Your pelvic muscles help you stop urinating. Squeeze these muscles tight for 5 seconds, then relax for 5 seconds. Gradually work up to squeezing for 10 seconds. Do 3 sets of 15 repetitions a day, or as directed. This will help strengthen your pelvic muscles and improve bladder control.  Train your bladder:  Go to the bathroom at set times, such as every 2 hours, even if you do not feel the urge to go. You can also try to hold your urine when you feel the urge to go. For example, hold your urine for 5 minutes when you feel the urge to go. As that becomes easier, hold your urine for 10 minutes.   Self-care:   Keep a UI record.  Write down how often you leak urine and how much you leak. Make a note of what you were doing when you leaked urine.  Drink liquids as directed. You may need to limit the amount of liquid you drink to help control your urine leakage. Do not drink any liquid right before you go to bed. Limit or do not have drinks that contain caffeine or alcohol.   Prevent constipation.  Eat a variety of high-fiber foods. Good examples are high-fiber cereals, beans, vegetables, and whole-grain breads. Walking is the best way to trigger your intestines to have a bowel movement.  Exercise regularly and maintain a healthy weight.  Weight loss and exercise will decrease pressure on your bladder and help  you control your leakage.   Use a catheter as directed  to help empty your bladder. A catheter is a tiny, plastic tube that is put into your bladder to drain your urine.   Go to behavior therapy as directed.  Behavior therapy may be used to help you learn to control your urge to urinate.    Weight Management   Why it is important to manage your weight:  Being overweight increases your risk of health conditions such as heart disease, high blood pressure, type 2 diabetes, and certain types of cancer. It can also increase your risk for osteoarthritis, sleep apnea, and other respiratory problems. Aim for a slow, steady weight loss. Even a small amount of weight loss can lower your risk of health problems.  How to lose weight safely:  A safe and healthy way to lose weight is to eat fewer calories and get regular exercise. You can lose up about 1 pound a week by decreasing the number of calories you eat by 500 calories each day.   Healthy meal plan for weight management:  A healthy meal plan includes a variety of foods, contains fewer calories, and helps you stay healthy. A healthy meal plan includes the following:  Eat whole-grain foods more often.  A healthy meal plan should contain fiber. Fiber is the part of grains, fruits, and vegetables that is not broken down by your body. Whole-grain foods are healthy and provide extra fiber in your diet. Some examples of whole-grain foods are whole-wheat breads and pastas, oatmeal, brown rice, and bulgur.  Eat a variety of vegetables every day.  Include dark, leafy greens such as spinach, kale, kvng greens, and mustard greens. Eat yellow and orange vegetables such as carrots, sweet potatoes, and winter squash.   Eat a variety of fruits every day.  Choose fresh or canned fruit (canned in its own juice or light syrup) instead of juice. Fruit juice has very little or no fiber.  Eat low-fat dairy foods.  Drink fat-free (skim) milk or 1% milk. Eat fat-free yogurt and low-fat  cottage cheese. Try low-fat cheeses such as mozzarella and other reduced-fat cheeses.  Choose meat and other protein foods that are low in fat.  Choose beans or other legumes such as split peas or lentils. Choose fish, skinless poultry (chicken or turkey), or lean cuts of red meat (beef or pork). Before you cook meat or poultry, cut off any visible fat.   Use less fat and oil.  Try baking foods instead of frying them. Add less fat, such as margarine, sour cream, regular salad dressing and mayonnaise to foods. Eat fewer high-fat foods. Some examples of high-fat foods include french fries, doughnuts, ice cream, and cakes.  Eat fewer sweets.  Limit foods and drinks that are high in sugar. This includes candy, cookies, regular soda, and sweetened drinks.  Exercise:  Exercise at least 30 minutes per day on most days of the week. Some examples of exercise include walking, biking, dancing, and swimming. You can also fit in more physical activity by taking the stairs instead of the elevator or parking farther away from stores. Ask your healthcare provider about the best exercise plan for you.   Narcotic (Opioid) Safety    Use narcotics safely:  Take prescribed narcotics exactly as directed  Do not give narcotics to others or take narcotics that belong to someone else  Do not mix narcotics without medicines or alcohol  Do not drive or operate heavy machinery after you take the narcotic  Monitor for side effects and notify your healthcare provider if you experienced side effects such as nausea, sleepiness, itching, or trouble thinking clearly.    Manage constipation:    Constipation is the most common side effect of narcotic medicine. Constipation is when you have hard, dry bowel movements, or you go longer than usual between bowel movements. Tell your healthcare provider about all changes in your bowel movements while you are taking narcotics. He or she may recommend laxative medicine to help you have a bowel movement. He or  she may also change the kind of narcotic you are taking, or change when you take it. The following are more ways you can prevent or relieve constipation:    Drink liquids as directed.  You may need to drink extra liquids to help soften and move your bowels. Ask how much liquid to drink each day and which liquids are best for you.  Eat high-fiber foods.  This may help decrease constipation by adding bulk to your bowel movements. High-fiber foods include fruits, vegetables, whole-grain breads and cereals, and beans. Your healthcare provider or dietitian can help you create a high-fiber meal plan. Your provider may also recommend a fiber supplement if you cannot get enough fiber from food.  Exercise regularly.  Regular physical activity can help stimulate your intestines. Walking is a good exercise to prevent or relieve constipation. Ask which exercises are best for you.  Schedule a time each day to have a bowel movement.  This may help train your body to have regular bowel movements. Bend forward while you are on the toilet to help move the bowel movement out. Sit on the toilet for at least 10 minutes, even if you do not have a bowel movement.    Store narcotics safely:   Store narcotics where others cannot easily get them.  Keep them in a locked cabinet or secure area. Do not  keep them in a purse or other bag you carry with you. A person may be looking for something else and find the narcotics.  Make sure narcotics are stored out of the reach of children.  A child can easily overdose on narcotics. Narcotics may look like candy to a small child.    The best way to dispose of narcotics:      The laws vary by country and area. In the [place], the best way is to return the narcotics through a take-back program. This program is offered by the [place] Drug Enforcement Agency (ARIEL). The following are options for using the program:  Take the narcotics to a ARIEL collection site.  The site is often a law enforcement center.  Call your local law enforcement center for scheduled take-back days in your area. You will be given information on where to go if the collection site is in a different location.  Take the narcotics to an approved pharmacy or hospital.  A pharmacy or hospital may be set up as a collection site. You will need to ask if it is a ARIEL collection site if you were not directed there. A pharmacy or doctor's office may not be able to take back narcotics unless it is a ARIEL site.  Use a mail-back system.  This means you are given containers to put the narcotics into. You will then mail them in the containers.  Use a take-back drop box.  This is a place to leave the narcotics at any time. People and animals will not be able to get into the box. Your local law enforcement agency can tell you where to find a drop box in your area.    Other ways to manage pain:   Ask your healthcare provider about non-narcotic medicines to control pain.  Nonprescription medicines include NSAIDs (such as ibuprofen) and acetaminophen. Prescription medicines include muscle relaxers, antidepressants, and steroids.  Pain may be managed without any medicines.  Some ways to relieve pain include massage, aromatherapy, or meditation. Physical or occupational therapy may also help.    For more information:   Drug Enforcement Administration  [address] [place] [id number]  Phone: 7- [phone] - [phone]  Web Address: https://www.deadiversion.[employer].gov/drug_disposal/    [place] Food and Drug Administration  [id number] [place] [address]  Phone: 5- 041 - 974-7199  Web Address: http://www.fda.gov     © Copyright [email or web address] 2018 Information is for End User's use only and may not be sold, redistributed or otherwise used for commercial purposes. All illustrations and images included in CareNotes® are the copyrighted property of [employer] or [employer]

## 2024-10-25 NOTE — ASSESSMENT & PLAN NOTE
Seen by pulm - started back up on wixhela daily. Helping her more.   Now using albuterol rarely.   Since carpets out, breathing well.

## 2024-10-25 NOTE — PROGRESS NOTES
Ambulatory Visit  Name: Aleida Hammond      : 1960      MRN: [id number]  Encounter Provider: Tara Colon DO  Encounter Date: 10/25/2024   Encounter department: [facility] PRIMARY CARE [address]    Assessment & Plan  Encounter for immunization  Given to pt today.   Orders:    influenza vaccine, recombinant, PF, 0.5 mL IM (Flublok)    Insomnia, unspecified type  Next dosing of ambien reviewed & sent to the pharmacy for Nov.   Orders:    zolpidem (AMBIEN) 5 mg tablet; Take 1 tablet (5 mg total) by mouth daily at bedtime as needed for sleep Do not start before 2024.    Mild persistent asthma without complication  Seen by pulm - started back up on wixhela daily. Helping her more.   Now using albuterol rarely.   Since carpets out, breathing well.        Benign essential hypertension  Well controlled today 110/60 - no med changes at this time.        Type 2 diabetes mellitus without complication, without long-term current use of insulin ([facility])    Lab Results   Component Value Date    HGBA1C 7.5 (A) 2024          Return in about 2 months (around 2025) for F/U Chronic DX, a1c in office .    Depression Screening and Follow-up Plan: Patient was screened for depression during today's encounter. They screened negative with a PHQ-2 score of 0.      Preventive health issues were discussed with patient, and age appropriate screening tests were ordered as noted in patient's After Visit Summary. Personalized health advice and appropriate referrals for health education or preventive services given if needed, as noted in patient's After Visit Summary.    History of Present Illness     HPI     Wt Readings from Last 3 Encounters:   10/25/24 70.9 kg (156 lb 6.4 oz)   10/02/24 69.9 kg (154 lb)   24 71.2 kg (157 lb)     Temp Readings from Last 3 Encounters:   10/18/24 97.6 °F (36.4 °C) (Temporal)   10/02/24 (!) 97.2 °F (36.2 °C)   24 97.6 °F (36.4 °C)     BP Readings from Last 3  Encounters:   10/25/24 110/60   10/18/24 128/81   10/02/24 113/67     Pulse Readings from Last 3 Encounters:   10/25/24 87   10/18/24 78   10/02/24 80     Sick 10/2/24 - seen in office.   New carpet installed, felt terrible, while it was in. Had 3 weeks of it, but then carpet removed & it immediately resolved. No ultimate agent found, but linked to the carpet.   Getting new carpet Monday.     Feeling okay, but low energy lately.     Patient Care Team:  Tara Colon DO as PCP - General (Family Medicine)  CHITRA Calderon MA as  (Oncology)  Katalina Flynn MD (Gastroenterology)  Karlene Cruz MD (Surgical Oncology)    Review of Systems   Constitutional:  Negative for chills and fever.   Respiratory:  Negative for cough and shortness of breath.    Cardiovascular:  Negative for chest pain and palpitations.   Gastrointestinal:         Reflux at times   Neurological:  Positive for dizziness (occas w position). Negative for light-headedness and headaches.     Medical History Reviewed by provider this encounter:  Tobacco  Allergies  Meds  Problems  Med Hx  Surg Hx  Fam Hx       Annual Wellness Visit Questionnaire   Aleida is here for her Subsequent Wellness visit.     Health Risk Assessment:   Patient rates overall health as poor. Patient feels that their physical health rating is slightly better. Patient is very satisfied with their life. Eyesight was rated as same. Hearing was rated as same. Patient feels that their emotional and mental health rating is much better. Patients states they are never, rarely angry. Patient states they are always unusually tired/fatigued. Pain experienced in the last 7 days has been some. Patient's pain rating has been 7/10. Patient states that she has experienced no weight loss or gain in last 6 months.     Depression Screening:   PHQ-2 Score: 0      Fall Risk Screening:   In the past year, patient has experienced: history of falling in  past year    Number of falls: 1  Injured during fall?: No    Feels unsteady when standing or walking?: No    Worried about falling?: No      Urinary Incontinence Screening:   Patient has leaked urine accidently in the last six months.     Home Safety:  Patient does not have trouble with stairs inside or outside of their home. Patient has working smoke alarms and has no working carbon monoxide detector. Home safety hazards include: none.     Nutrition:   Current diet is Regular.     Medications:   Patient is currently taking over-the-counter supplements. OTC medications include: see medication list. Patient is able to manage medications.     Activities of Daily Living (ADLs)/Instrumental Activities of Daily Living (IADLs):   Walk and transfer into and out of bed and chair?: Yes  Dress and groom yourself?: Yes    Bathe or shower yourself?: Yes    Feed yourself? Yes  Do your laundry/housekeeping?: Yes  Manage your money, pay your bills and track your expenses?: Yes  Make your own meals?: Yes    Do your own shopping?: Yes    Previous Hospitalizations:   Any hospitalizations or ED visits within the last 12 months?: Yes    How many hospitalizations have you had in the last year?: 1-2    PREVENTIVE SCREENINGS      Cardiovascular Screening:    General: Screening Not Indicated and History Lipid Disorder      Diabetes Screening:     General: Screening Not Indicated and History Diabetes      Colorectal Cancer Screening:     General: Screening Current      Breast Cancer Screening:     General: Screening Current      Cervical Cancer Screening:    General: Screening Current      Lung Cancer Screening:     General: Screening Not Indicated      Hepatitis C Screening:    General: Screening Current    Screening, Brief Intervention, and Referral to Treatment (SBIRT)    Screening      AUDIT-C Screenin) How often did you have a drink containing alcohol in the past year? never  2) How many drinks did you have on a typical day when  "you were drinking in the past year? 0  3) How often did you have 6 or more drinks on one occasion in the past year? never    AUDIT-C Score: 0  Interpretation: Score 0-2 (female): Negative screen for alcohol misuse    Single Item Drug Screening:  How often have you used an illegal drug (including marijuana) or a prescription medication for non-medical reasons in the past year? never    Single Item Drug Screen Score: 0  Interpretation: Negative screen for possible drug use disorder    Social Determinants of Health     Food Insecurity: No Food Insecurity (10/25/2024)    Hunger Vital Sign     Worried About Running Out of Food in the Last Year: Never true     Ran Out of Food in the Last Year: Never true   Transportation Needs: No Transportation Needs (10/25/2024)    PRAPARE - Transportation     Lack of Transportation (Medical): No     Lack of Transportation (Non-Medical): No   Housing Stability: Low Risk  (10/25/2024)    Housing Stability Vital Sign     Unable to Pay for Housing in the Last Year: No     Number of Times Moved in the Last Year: 1     Homeless in the Last Year: No   Utilities: Not At Risk (10/25/2024)    [facility] Utilities     Threatened with loss of utilities: No     Objective     /60   Pulse 87   Ht 5' 4\" (1.626 m)   Wt 70.9 kg (156 lb 6.4 oz)   LMP  (LMP Unknown)   SpO2 95%   BMI 26.85 kg/m²     Physical Exam  Vitals and nursing note reviewed.   Constitutional:       General: She is not in acute distress.     Appearance: Normal appearance. She is well-developed and normal weight. She is not ill-appearing.   HENT:      Head: Normocephalic and atraumatic.   Eyes:      General: No scleral icterus.        Right eye: No discharge.         Left eye: No discharge.      Conjunctiva/sclera: Conjunctivae normal.   Neck:      Comments: No thyroid nodules or thyromegaly.  Cardiovascular:      Rate and Rhythm: Normal rate and regular rhythm.      Pulses: Normal pulses. no weak pulses.           Dorsalis pedis " pulses are 2+ on the right side and 2+ on the left side.      Heart sounds: Normal heart sounds. No murmur heard.  Pulmonary:      Effort: Pulmonary effort is normal. No respiratory distress.      Breath sounds: Normal breath sounds.   Musculoskeletal:      Cervical back: Normal range of motion and neck supple. No rigidity.      Right lower leg: No edema.      Left lower leg: No edema.   Feet:      Right foot:      Skin integrity: No ulcer, skin breakdown, erythema, warmth, callus or dry skin.      Left foot:      Skin integrity: No ulcer, skin breakdown, erythema, warmth, callus or dry skin.   Skin:     General: Skin is warm and dry.      Capillary Refill: Capillary refill takes less than 2 seconds.   Neurological:      Mental Status: She is alert and oriented to person, place, and time.      Gait: Gait normal.   Psychiatric:         Mood and Affect: Mood normal.         Behavior: Behavior normal.         Thought Content: Thought content normal.         Judgment: Judgment normal.       Diabetic Foot Exam    Patient's shoes and socks removed.    Right Foot/Ankle   Right Foot Inspection  Skin Exam: skin normal and skin intact. No dry skin, no warmth, no callus, no erythema, no maceration, no abnormal color, no pre-ulcer, no ulcer and no callus.     Toe Exam: ROM and strength within normal limits.     Sensory   Proprioception: intact  Monofilament testing: intact    Vascular  Capillary refills: < 3 seconds  The right DP pulse is 2+.     Left Foot/Ankle  Left Foot Inspection  Skin Exam: skin normal and skin intact. No dry skin, no warmth, no erythema, no maceration, normal color, no pre-ulcer, no ulcer and no callus.     Toe Exam: ROM and strength within normal limits.     Sensory   Proprioception: intact  Monofilament testing: intact    Vascular  Capillary refills: < 3 seconds  The left DP pulse is 2+.     Assign Risk Category  No deformity present  No loss of protective sensation  No weak pulses  Risk: 0

## 2024-10-25 NOTE — ASSESSMENT & PLAN NOTE
Next dosing of ambien reviewed & sent to the pharmacy for Nov.   Orders:    zolpidem (AMBIEN) 5 mg tablet; Take 1 tablet (5 mg total) by mouth daily at bedtime as needed for sleep Do not start before November 13, 2024.

## 2024-10-28 ENCOUNTER — HOSPITAL ENCOUNTER (OUTPATIENT)
Dept: PULMONOLOGY | Facility: HOSPITAL | Age: 64
Discharge: HOME | End: 2024-10-28
Attending: INTERNAL MEDICINE
Payer: COMMERCIAL

## 2024-10-28 DIAGNOSIS — J45.41 MODERATE PERSISTENT ASTHMA WITH ACUTE EXACERBATION: ICD-10-CM

## 2024-10-28 DIAGNOSIS — R93.89 ABNORMAL CHEST CT: ICD-10-CM

## 2024-10-28 PROCEDURE — 94726 PLETHYSMOGRAPHY LUNG VOLUMES: CPT

## 2024-10-28 PROCEDURE — 94729 DIFFUSING CAPACITY: CPT

## 2024-10-28 PROCEDURE — 94729 DIFFUSING CAPACITY: CPT | Performed by: INTERNAL MEDICINE

## 2024-10-28 PROCEDURE — 94726 PLETHYSMOGRAPHY LUNG VOLUMES: CPT | Performed by: INTERNAL MEDICINE

## 2024-10-28 PROCEDURE — 94618 PULMONARY STRESS TESTING: CPT | Performed by: INTERNAL MEDICINE

## 2024-10-28 PROCEDURE — 94618 PULMONARY STRESS TESTING: CPT

## 2024-10-28 PROCEDURE — 94060 EVALUATION OF WHEEZING: CPT

## 2024-10-28 PROCEDURE — 94060 EVALUATION OF WHEEZING: CPT | Performed by: INTERNAL MEDICINE

## 2024-10-28 RX ORDER — ALBUTEROL SULFATE 0.83 MG/ML
2.5 SOLUTION RESPIRATORY (INHALATION) ONCE
Status: COMPLETED | OUTPATIENT
Start: 2024-10-28 | End: 2024-10-28

## 2024-10-28 RX ADMIN — ALBUTEROL SULFATE 2.5 MG: 2.5 SOLUTION RESPIRATORY (INHALATION) at 12:36

## 2024-10-31 ENCOUNTER — HOSPITAL ENCOUNTER (OUTPATIENT)
Dept: INFUSION CENTER | Facility: HOSPITAL | Age: 64
Discharge: HOME | End: 2024-10-31
Attending: INTERNAL MEDICINE
Payer: COMMERCIAL

## 2024-10-31 VITALS — TEMPERATURE: 98.2 F | SYSTOLIC BLOOD PRESSURE: 120 MMHG | HEART RATE: 86 BPM | DIASTOLIC BLOOD PRESSURE: 70 MMHG

## 2024-10-31 DIAGNOSIS — E53.8 B12 DEFICIENCY: Primary | ICD-10-CM

## 2024-10-31 PROCEDURE — 96372 THER/PROPH/DIAG INJ SC/IM: CPT

## 2024-10-31 RX ORDER — CYANOCOBALAMIN 1000 UG/ML
1000 INJECTION, SOLUTION INTRAMUSCULAR; SUBCUTANEOUS ONCE
Status: CANCELLED | OUTPATIENT
Start: 2024-11-14

## 2024-10-31 RX ORDER — CYANOCOBALAMIN 1000 UG/ML
1000 INJECTION, SOLUTION INTRAMUSCULAR; SUBCUTANEOUS ONCE
Status: COMPLETED | OUTPATIENT
Start: 2024-10-31 | End: 2024-10-31

## 2024-10-31 RX ADMIN — CYANOCOBALAMIN 1000 MCG: 1000 INJECTION, SOLUTION INTRAMUSCULAR at 11:08

## 2024-10-31 NOTE — PROGRESS NOTES
Aleida Hammond  tolerated treatment well with no complications.      Aleida Hammond is aware of future appt on 11/14 at 1030.     AVS printed and given to Aleida Hmamond:  No (Declined by Aleida Hammond)

## 2024-11-08 DIAGNOSIS — J45.30 MILD PERSISTENT ASTHMA WITHOUT COMPLICATION: ICD-10-CM

## 2024-11-08 DIAGNOSIS — E11.9 TYPE 2 DIABETES MELLITUS WITHOUT COMPLICATION, WITHOUT LONG-TERM CURRENT USE OF INSULIN (HCC): ICD-10-CM

## 2024-11-08 RX ORDER — ALBUTEROL SULFATE 0.83 MG/ML
2.5 SOLUTION RESPIRATORY (INHALATION) EVERY 6 HOURS PRN
Qty: 300 ML | Refills: 0 | Status: SHIPPED | OUTPATIENT
Start: 2024-11-08 | End: 2024-11-11 | Stop reason: SDUPTHER

## 2024-11-11 ENCOUNTER — TELEPHONE (OUTPATIENT)
Age: 64
End: 2024-11-11

## 2024-11-11 DIAGNOSIS — J45.30 MILD PERSISTENT ASTHMA WITHOUT COMPLICATION: ICD-10-CM

## 2024-11-11 RX ORDER — ALBUTEROL SULFATE 0.83 MG/ML
2.5 SOLUTION RESPIRATORY (INHALATION) EVERY 6 HOURS PRN
Qty: 300 ML | Refills: 0 | Status: SHIPPED | OUTPATIENT
Start: 2024-11-11

## 2024-11-11 NOTE — TELEPHONE ENCOUNTER
Spoke with pharmacy and pt picked up medication on 11/08/24. Medication does not need a PA, covered under her medicare part D

## 2024-11-14 ENCOUNTER — HOSPITAL ENCOUNTER (OUTPATIENT)
Dept: INFUSION CENTER | Facility: HOSPITAL | Age: 64
Discharge: HOME | End: 2024-11-14
Attending: INTERNAL MEDICINE
Payer: COMMERCIAL

## 2024-11-14 DIAGNOSIS — E53.8 B12 DEFICIENCY: Primary | ICD-10-CM

## 2024-11-14 PROCEDURE — 96372 THER/PROPH/DIAG INJ SC/IM: CPT

## 2024-11-14 RX ORDER — CYANOCOBALAMIN 1000 UG/ML
1000 INJECTION, SOLUTION INTRAMUSCULAR; SUBCUTANEOUS ONCE
Status: COMPLETED | OUTPATIENT
Start: 2024-11-14 | End: 2024-11-14

## 2024-11-14 RX ORDER — CYANOCOBALAMIN 1000 UG/ML
1000 INJECTION, SOLUTION INTRAMUSCULAR; SUBCUTANEOUS ONCE
OUTPATIENT
Start: 2024-11-29

## 2024-11-14 RX ADMIN — CYANOCOBALAMIN 1000 MCG: 1000 INJECTION, SOLUTION INTRAMUSCULAR at 10:32

## 2024-11-14 NOTE — PROGRESS NOTES
Pt tolerated B12 injection without any adverse reaction. Band-aid applied and intact. Pt ambulated with steady gait off unit. Declined AVS     Aware of next appt 11/29/24 @ 1000 am

## 2024-11-18 DIAGNOSIS — I10 BENIGN ESSENTIAL HYPERTENSION: ICD-10-CM

## 2024-11-19 RX ORDER — AMLODIPINE BESYLATE 10 MG/1
5 TABLET ORAL DAILY
Qty: 90 TABLET | Refills: 0 | Status: SHIPPED | OUTPATIENT
Start: 2024-11-19

## 2024-11-29 ENCOUNTER — TELEPHONE (OUTPATIENT)
Age: 64
End: 2024-11-29

## 2024-11-29 ENCOUNTER — HOSPITAL ENCOUNTER (OUTPATIENT)
Dept: INFUSION CENTER | Facility: HOSPITAL | Age: 64
End: 2024-11-29
Attending: INTERNAL MEDICINE
Payer: COMMERCIAL

## 2024-11-29 VITALS — HEART RATE: 68 BPM | SYSTOLIC BLOOD PRESSURE: 167 MMHG | DIASTOLIC BLOOD PRESSURE: 91 MMHG | RESPIRATION RATE: 16 BRPM

## 2024-11-29 DIAGNOSIS — I10 BENIGN ESSENTIAL HYPERTENSION: ICD-10-CM

## 2024-11-29 DIAGNOSIS — E53.8 B12 DEFICIENCY: Primary | ICD-10-CM

## 2024-11-29 PROCEDURE — 96372 THER/PROPH/DIAG INJ SC/IM: CPT

## 2024-11-29 RX ORDER — AMLODIPINE BESYLATE 10 MG/1
10 TABLET ORAL DAILY
Qty: 90 TABLET | Refills: 1 | Status: SHIPPED | OUTPATIENT
Start: 2024-11-29

## 2024-11-29 RX ORDER — CYANOCOBALAMIN 1000 UG/ML
1000 INJECTION, SOLUTION INTRAMUSCULAR; SUBCUTANEOUS ONCE
Status: CANCELLED | OUTPATIENT
Start: 2024-12-12

## 2024-11-29 RX ORDER — CYANOCOBALAMIN 1000 UG/ML
1000 INJECTION, SOLUTION INTRAMUSCULAR; SUBCUTANEOUS ONCE
Status: COMPLETED | OUTPATIENT
Start: 2024-11-29 | End: 2024-11-29

## 2024-11-29 RX ADMIN — CYANOCOBALAMIN 1000 MCG: 1000 INJECTION, SOLUTION INTRAMUSCULAR at 10:12

## 2024-11-29 NOTE — PROGRESS NOTES
Aleida Hammond  tolerated treatment well with no complications.      Aleida Hammond is aware of future appt on 12/12/24 at 1030.     AVS printed and given to Aleida Hammond:  No (Declined by Aleida Hammond)

## 2024-11-29 NOTE — TELEPHONE ENCOUNTER
Pt called in as she said that she's been taking her amlodipine full tablet. She said that she forgot she was told to take only a half a pill however forgot so she continued taking the 10mg and is now completely out. Her BP today is 170/80. No current symptoms. Please advise and send in refill. Advised on ED precautions. Pt verbalized understanding.

## 2024-12-02 ENCOUNTER — NURSE TRIAGE (OUTPATIENT)
Age: 64
End: 2024-12-02

## 2024-12-11 ENCOUNTER — RA CDI HCC (OUTPATIENT)
Dept: OTHER | Facility: HOSPITAL | Age: 64
End: 2024-12-11

## 2024-12-12 ENCOUNTER — HOSPITAL ENCOUNTER (OUTPATIENT)
Dept: INFUSION CENTER | Facility: HOSPITAL | Age: 64
End: 2024-12-12
Attending: INTERNAL MEDICINE

## 2024-12-13 ENCOUNTER — HOSPITAL ENCOUNTER (OUTPATIENT)
Dept: INFUSION CENTER | Facility: HOSPITAL | Age: 64
End: 2024-12-13
Attending: INTERNAL MEDICINE
Payer: COMMERCIAL

## 2024-12-13 VITALS
RESPIRATION RATE: 16 BRPM | SYSTOLIC BLOOD PRESSURE: 162 MMHG | TEMPERATURE: 96.8 F | DIASTOLIC BLOOD PRESSURE: 78 MMHG | HEART RATE: 76 BPM

## 2024-12-13 DIAGNOSIS — E53.8 B12 DEFICIENCY: Primary | ICD-10-CM

## 2024-12-13 PROCEDURE — 96372 THER/PROPH/DIAG INJ SC/IM: CPT

## 2024-12-13 RX ORDER — CYANOCOBALAMIN 1000 UG/ML
1000 INJECTION, SOLUTION INTRAMUSCULAR; SUBCUTANEOUS ONCE
Status: CANCELLED | OUTPATIENT
Start: 2024-12-26

## 2024-12-13 RX ORDER — CYANOCOBALAMIN 1000 UG/ML
1000 INJECTION, SOLUTION INTRAMUSCULAR; SUBCUTANEOUS ONCE
Status: COMPLETED | OUTPATIENT
Start: 2024-12-13 | End: 2024-12-13

## 2024-12-13 RX ADMIN — CYANOCOBALAMIN 1000 MCG: 1000 INJECTION, SOLUTION INTRAMUSCULAR at 08:15

## 2024-12-13 NOTE — PROGRESS NOTES
Aleida Hammond  tolerated treatment well with no complications.      Aleida Hammond is aware of future appt on 12/26 at 1100.     AVS printed and given to Aleida Hammond:    No (Declined by Aleida Hammond)

## 2024-12-13 NOTE — PLAN OF CARE
Problem: Knowledge Deficit  Goal: Patient/family/caregiver demonstrates understanding of disease process, treatment plan, medications, and discharge instructions  Description: Complete learning assessment and assess knowledge base.  Interventions:  - Provide teaching at level of understanding  - Provide teaching via preferred learning methods  12/13/2024 0802 by Bev Cleveland, RN  Outcome: Progressing  12/13/2024 0800 by Bev Cleveland, RN  Outcome: Progressing

## 2024-12-18 ENCOUNTER — OFFICE VISIT (OUTPATIENT)
Dept: FAMILY MEDICINE CLINIC | Facility: HOSPITAL | Age: 64
End: 2024-12-18
Payer: COMMERCIAL

## 2024-12-18 ENCOUNTER — APPOINTMENT (OUTPATIENT)
Dept: LAB | Facility: HOSPITAL | Age: 64
End: 2024-12-18
Payer: COMMERCIAL

## 2024-12-18 VITALS
DIASTOLIC BLOOD PRESSURE: 72 MMHG | BODY MASS INDEX: 27.21 KG/M2 | OXYGEN SATURATION: 96 % | SYSTOLIC BLOOD PRESSURE: 128 MMHG | WEIGHT: 159.4 LBS | HEART RATE: 85 BPM | HEIGHT: 64 IN

## 2024-12-18 DIAGNOSIS — J45.30 MILD PERSISTENT ASTHMA, UNSPECIFIED WHETHER COMPLICATED: ICD-10-CM

## 2024-12-18 DIAGNOSIS — I10 BENIGN ESSENTIAL HYPERTENSION: ICD-10-CM

## 2024-12-18 DIAGNOSIS — E11.9 TYPE 2 DIABETES MELLITUS WITHOUT COMPLICATION, WITHOUT LONG-TERM CURRENT USE OF INSULIN (HCC): Primary | ICD-10-CM

## 2024-12-18 DIAGNOSIS — M54.16 CHRONIC LUMBAR RADICULOPATHY: ICD-10-CM

## 2024-12-18 DIAGNOSIS — E11.9 TYPE 2 DIABETES MELLITUS WITHOUT COMPLICATION, WITHOUT LONG-TERM CURRENT USE OF INSULIN (HCC): ICD-10-CM

## 2024-12-18 LAB
ANION GAP SERPL CALCULATED.3IONS-SCNC: 8 MMOL/L (ref 4–13)
BUN SERPL-MCNC: 22 MG/DL (ref 5–25)
CALCIUM SERPL-MCNC: 9.2 MG/DL (ref 8.4–10.2)
CHLORIDE SERPL-SCNC: 100 MMOL/L (ref 96–108)
CO2 SERPL-SCNC: 28 MMOL/L (ref 21–32)
CREAT SERPL-MCNC: 1.11 MG/DL (ref 0.6–1.3)
EST. AVERAGE GLUCOSE BLD GHB EST-MCNC: 189 MG/DL
GFR SERPL CREATININE-BSD FRML MDRD: 52 ML/MIN/1.73SQ M
GLUCOSE P FAST SERPL-MCNC: 209 MG/DL (ref 65–99)
HBA1C MFR BLD: 8.2 %
POTASSIUM SERPL-SCNC: 4.2 MMOL/L (ref 3.5–5.3)
SL AMB POCT HEMOGLOBIN AIC: 8 (ref ?–6.5)
SODIUM SERPL-SCNC: 136 MMOL/L (ref 135–147)

## 2024-12-18 PROCEDURE — G2211 COMPLEX E/M VISIT ADD ON: HCPCS | Performed by: STUDENT IN AN ORGANIZED HEALTH CARE EDUCATION/TRAINING PROGRAM

## 2024-12-18 PROCEDURE — 83036 HEMOGLOBIN GLYCOSYLATED A1C: CPT | Performed by: STUDENT IN AN ORGANIZED HEALTH CARE EDUCATION/TRAINING PROGRAM

## 2024-12-18 PROCEDURE — 99214 OFFICE O/P EST MOD 30 MIN: CPT | Performed by: STUDENT IN AN ORGANIZED HEALTH CARE EDUCATION/TRAINING PROGRAM

## 2024-12-18 PROCEDURE — 80048 BASIC METABOLIC PNL TOTAL CA: CPT

## 2024-12-18 PROCEDURE — 83036 HEMOGLOBIN GLYCOSYLATED A1C: CPT

## 2024-12-18 PROCEDURE — 36415 COLL VENOUS BLD VENIPUNCTURE: CPT

## 2024-12-18 RX ORDER — METOCLOPRAMIDE 10 MG/1
10 TABLET ORAL
COMMUNITY
Start: 2024-11-27

## 2024-12-18 NOTE — PROGRESS NOTES
Name: Aleida Hammond      : 1960      MRN: [id number]  Encounter Provider: Tara Colon DO  Encounter Date: 2024   Encounter department: [facility] PRIMARY CARE [address]  :  Assessment & Plan  Type 2 diabetes mellitus without complication, without long-term current use of insulin (HCC)    Lab Results   Component Value Date    HGBA1C 8.2 (H) 2024     No med dose adjustments until labs done today from the arm.   Unsure if to trust the rise in her A1c in office. POCT less accurate.   Typically had been in the 6's & &'s, so slight jump to the 8's now.   Only 3 lb weight gain.   Orders:  •  POCT hemoglobin A1c  •  Hemoglobin A1C; Future  •  Basic metabolic panel; Future    Benign essential hypertension  Much better today then the last 2 checks.   Feels better with better control.   Orders:  •  Basic metabolic panel; Future    Mild persistent asthma, unspecified whether complicated  Breathing has been good on advair and albuterol inhalers.   No recent changes.  Seen by Pulm in Oct with PFT's.        Chronic lumbar radiculopathy  Has pain pump, f/w pain mgmt. On norco, tramadol, lyrica, and robaxin. Has morphine in pump. Opiate contract with them.        Return in about 3 months (around 3/18/2025) for F/U Chronic DX, diabetes check .       History of Present Illness     Diabetes  She has type 2 diabetes mellitus. No MedicAlert identification noted. Hypoglycemia symptoms include headaches (better since corrected BP). Pertinent negatives for hypoglycemia include no dizziness. Associated symptoms include foot paresthesias, polydipsia, polyuria and weakness. Pertinent negatives for diabetes include no chest pain. Symptoms are worsening. Diabetic complications include peripheral neuropathy and retinopathy. Risk factors for coronary artery disease include dyslipidemia, hypertension, obesity, post-menopausal and sedentary lifestyle. Current diabetic treatment includes oral agent  (monotherapy). She is compliant with treatment all of the time. Her weight is stable. She is following a generally healthy diet. Meal planning includes avoidance of concentrated sweets. She has not had a previous visit with a dietitian. She never participates in exercise. She monitors blood glucose at home 1-2 x per day. Blood glucose monitoring compliance is inadequate. Her home blood glucose trend is increasing steadily. Her breakfast blood glucose is taken between 6-7 am. Her breakfast blood glucose range is generally 140-180 mg/dl. Her lunch blood glucose is taken between 12-1 pm. Her dinner blood glucose is taken between 6-7 pm. Her dinner blood glucose range is generally >200 mg/dl. Her bedtime blood glucose is taken between 10-11 pm. Her overall blood glucose range is >200 mg/dl. She sees a podiatrist.Eye exam is current.     Morning sugars around 140 or so, at dinner 260.     Home BP's have been higher - half pill - 160's/80's.   Went back to 10 mg tabs and working better. Better BP today.         Wt Readings from Last 3 Encounters:   12/18/24 72.3 kg (159 lb 6.4 oz)   10/25/24 70.9 kg (156 lb 6.4 oz)   10/02/24 69.9 kg (154 lb)          Temp Readings from Last 3 Encounters:   12/13/24 (!) 96.8 °F (36 °C) (Temporal)   10/31/24 98.2 °F (36.8 °C) (Temporal)   10/18/24 97.6 °F (36.4 °C) (Temporal)          BP Readings from Last 3 Encounters:   12/18/24 128/72   12/13/24 162/78   11/29/24 167/91          Pulse Readings from Last 3 Encounters:   12/18/24 85   12/13/24 76   11/29/24 68       Review of Systems   Constitutional:  Negative for chills and fever.   HENT:  Negative for nosebleeds and sore throat.    Respiratory:  Negative for cough and shortness of breath.    Cardiovascular:  Positive for palpitations (occas caffeine does it). Negative for chest pain.   Endocrine: Positive for polydipsia and polyuria.   Musculoskeletal:  Positive for back pain and neck pain.   Neurological:  Positive for weakness and  "headaches (better since corrected BP). Negative for dizziness and light-headedness.       Objective   /72   Pulse 85   Ht 5' 4\" (1.626 m)   Wt 72.3 kg (159 lb 6.4 oz)   LMP  (LMP Unknown)   SpO2 96%   BMI 27.36 kg/m²      Physical Exam  Vitals and nursing note reviewed.   Constitutional:       General: She is not in acute distress.     Appearance: Normal appearance. She is well-developed. She is not ill-appearing.   HENT:      Head: Normocephalic and atraumatic.   Eyes:      General: No scleral icterus.        Right eye: No discharge.         Left eye: No discharge.      Conjunctiva/sclera: Conjunctivae normal.   Cardiovascular:      Rate and Rhythm: Normal rate and regular rhythm.      Pulses: Normal pulses.      Heart sounds: Normal heart sounds. No murmur heard.  Pulmonary:      Effort: Pulmonary effort is normal. No respiratory distress.      Breath sounds: Normal breath sounds.   Musculoskeletal:      Cervical back: Normal range of motion and neck supple. No rigidity.      Right lower leg: No edema.      Left lower leg: No edema.   Skin:     General: Skin is warm and dry.      Capillary Refill: Capillary refill takes less than 2 seconds.   Neurological:      Mental Status: She is alert and oriented to person, place, and time.      Gait: Gait normal.   Psychiatric:         Mood and Affect: Mood normal.         Behavior: Behavior normal.         Thought Content: Thought content normal.         Judgment: Judgment normal.       "

## 2024-12-18 NOTE — ASSESSMENT & PLAN NOTE
Much better today then the last 2 checks.   Feels better with better control.   Orders:  •  Basic metabolic panel; Future

## 2024-12-20 DIAGNOSIS — J45.30 MILD PERSISTENT ASTHMA, UNSPECIFIED WHETHER COMPLICATED: ICD-10-CM

## 2024-12-20 DIAGNOSIS — J01.00 ACUTE NON-RECURRENT MAXILLARY SINUSITIS: ICD-10-CM

## 2024-12-20 RX ORDER — ALBUTEROL SULFATE 90 UG/1
INHALANT RESPIRATORY (INHALATION)
Qty: 25.5 G | Refills: 1 | Status: SHIPPED | OUTPATIENT
Start: 2024-12-20

## 2024-12-20 RX ORDER — MOMETASONE FUROATE MONOHYDRATE 50 UG/1
SPRAY, METERED NASAL
Qty: 51 G | Refills: 1 | Status: SHIPPED | OUTPATIENT
Start: 2024-12-20

## 2024-12-26 ENCOUNTER — HOSPITAL ENCOUNTER (OUTPATIENT)
Dept: INFUSION CENTER | Facility: HOSPITAL | Age: 64
Discharge: HOME | End: 2024-12-26
Attending: INTERNAL MEDICINE

## 2024-12-27 ENCOUNTER — HOSPITAL ENCOUNTER (OUTPATIENT)
Dept: INFUSION CENTER | Facility: HOSPITAL | Age: 64
End: 2024-12-27
Attending: INTERNAL MEDICINE
Payer: COMMERCIAL

## 2024-12-27 VITALS
RESPIRATION RATE: 16 BRPM | HEART RATE: 76 BPM | DIASTOLIC BLOOD PRESSURE: 76 MMHG | TEMPERATURE: 97.8 F | SYSTOLIC BLOOD PRESSURE: 139 MMHG

## 2024-12-27 DIAGNOSIS — E53.8 B12 DEFICIENCY: Primary | ICD-10-CM

## 2024-12-27 PROCEDURE — 96372 THER/PROPH/DIAG INJ SC/IM: CPT

## 2024-12-27 RX ORDER — CYANOCOBALAMIN 1000 UG/ML
1000 INJECTION, SOLUTION INTRAMUSCULAR; SUBCUTANEOUS ONCE
Status: COMPLETED | OUTPATIENT
Start: 2024-12-27 | End: 2024-12-27

## 2024-12-27 RX ORDER — CYANOCOBALAMIN 1000 UG/ML
1000 INJECTION, SOLUTION INTRAMUSCULAR; SUBCUTANEOUS ONCE
OUTPATIENT
Start: 2025-01-10

## 2024-12-27 RX ADMIN — CYANOCOBALAMIN 1000 MCG: 1000 INJECTION INTRAMUSCULAR; SUBCUTANEOUS at 12:05

## 2024-12-27 NOTE — PROGRESS NOTES
Aleida Hammond  tolerated treatment well with no complications.      Aleida Hammond is aware of future appt on 01/10/2025 at 10:30 AM.     AVS printed and given to Aleida Hammond:  No (Declined by Aleida Hammond)

## 2025-01-06 DIAGNOSIS — J45.30 MILD PERSISTENT ASTHMA WITHOUT COMPLICATION: ICD-10-CM

## 2025-01-06 RX ORDER — FLUTICASONE PROPIONATE AND SALMETEROL 250; 50 UG/1; UG/1
1 POWDER RESPIRATORY (INHALATION) 2 TIMES DAILY
Qty: 180 BLISTER | Refills: 1 | Status: SHIPPED | OUTPATIENT
Start: 2025-01-06 | End: 2026-01-01

## 2025-01-07 ENCOUNTER — RA CDI HCC (OUTPATIENT)
Dept: OTHER | Facility: HOSPITAL | Age: 65
End: 2025-01-07

## 2025-01-07 NOTE — PROGRESS NOTES
HCC coding opportunities          Chart Reviewed number of suggestions sent to Provider: 2     Patients Insurance   E11.69 and E11.65  Medicare Insurance: [employer] Advantage        HCC coding opportunities             Patients Insurance     Medicare Insurance: [employer] Medicare Advantage           Yes

## 2025-01-10 ENCOUNTER — HOSPITAL ENCOUNTER (OUTPATIENT)
Dept: INFUSION CENTER | Facility: HOSPITAL | Age: 65
Discharge: HOME | End: 2025-01-10
Attending: INTERNAL MEDICINE

## 2025-01-19 DIAGNOSIS — R11.0 NAUSEA: ICD-10-CM

## 2025-01-20 DIAGNOSIS — R11.0 NAUSEA: ICD-10-CM

## 2025-01-20 RX ORDER — ONDANSETRON 4 MG/1
TABLET, FILM COATED ORAL
Qty: 90 TABLET | Refills: 1 | Status: SHIPPED | OUTPATIENT
Start: 2025-01-20

## 2025-01-21 RX ORDER — ONDANSETRON 4 MG/1
4 TABLET, FILM COATED ORAL EVERY 8 HOURS PRN
Qty: 100 TABLET | Refills: 0 | Status: SHIPPED | OUTPATIENT
Start: 2025-01-21

## 2025-01-23 DIAGNOSIS — E53.8 B12 DEFICIENCY: Primary | ICD-10-CM

## 2025-01-23 RX ORDER — CYANOCOBALAMIN 1000 UG/ML
1000 INJECTION, SOLUTION INTRAMUSCULAR; SUBCUTANEOUS ONCE
Status: CANCELLED | OUTPATIENT
Start: 2025-01-24

## 2025-01-24 ENCOUNTER — HOSPITAL ENCOUNTER (OUTPATIENT)
Dept: MAMMOGRAPHY | Facility: IMAGING CENTER | Age: 65
Discharge: HOME | End: 2025-01-24
Payer: COMMERCIAL

## 2025-01-24 ENCOUNTER — HOSPITAL ENCOUNTER (OUTPATIENT)
Dept: INFUSION CENTER | Facility: HOSPITAL | Age: 65
End: 2025-01-24
Attending: INTERNAL MEDICINE
Payer: COMMERCIAL

## 2025-01-24 VITALS
RESPIRATION RATE: 16 BRPM | DIASTOLIC BLOOD PRESSURE: 92 MMHG | HEART RATE: 80 BPM | OXYGEN SATURATION: 99 % | TEMPERATURE: 97.3 F | SYSTOLIC BLOOD PRESSURE: 148 MMHG

## 2025-01-24 VITALS — WEIGHT: 158 LBS | BODY MASS INDEX: 26.98 KG/M2 | HEIGHT: 64 IN

## 2025-01-24 DIAGNOSIS — E53.8 B12 DEFICIENCY: Primary | ICD-10-CM

## 2025-01-24 DIAGNOSIS — Z12.31 SCREENING MAMMOGRAM FOR BREAST CANCER: ICD-10-CM

## 2025-01-24 PROCEDURE — 77067 SCR MAMMO BI INCL CAD: CPT

## 2025-01-24 PROCEDURE — 77063 BREAST TOMOSYNTHESIS BI: CPT

## 2025-01-24 PROCEDURE — 96372 THER/PROPH/DIAG INJ SC/IM: CPT

## 2025-01-24 RX ORDER — CYANOCOBALAMIN 1000 UG/ML
1000 INJECTION, SOLUTION INTRAMUSCULAR; SUBCUTANEOUS ONCE
Status: COMPLETED | OUTPATIENT
Start: 2025-01-24 | End: 2025-01-24

## 2025-01-24 RX ORDER — CYANOCOBALAMIN 1000 UG/ML
1000 INJECTION, SOLUTION INTRAMUSCULAR; SUBCUTANEOUS ONCE
OUTPATIENT
Start: 2025-02-07

## 2025-01-24 RX ADMIN — CYANOCOBALAMIN 1000 MCG: 1000 INJECTION, SOLUTION INTRAMUSCULAR at 10:57

## 2025-01-24 NOTE — PROGRESS NOTES
Aleida Hammond  tolerated treatment well with no complications.      Aleida Hammond is aware of future appt on 02/07/2025 at 10:00 AM.     AVS printed and given to Aleida Hammond:  No (Declined by Aleida Hammond)

## 2025-01-24 NOTE — PLAN OF CARE
Problem: Potential for Falls  Goal: Patient will remain free of falls  Description: INTERVENTIONS:  - Educate patient/family on patient safety including physical limitations  - Instruct patient to call for assistance with activity   - Consult OT/PT to assist with strengthening/mobility   - Keep Call bell within reach  - Keep bed low and locked with side rails adjusted as appropriate  - Keep care items and personal belongings within reach  - Initiate and maintain comfort rounds  - Make Fall Risk Sign visible to staff  - Offer Toileting every x Hours, in advance of need  - Initiate/Maintain xalarm  - Obtain necessary fall risk management equipment: x  - Apply yellow socks and bracelet for high fall risk patients  - Consider moving patient to room near nurses station  1/24/2025 1059 by Ramiro Adame RN  Outcome: Progressing  1/24/2025 1051 by Ramiro Adame RN  Outcome: Progressing     Problem: Knowledge Deficit  Goal: Patient/family/caregiver demonstrates understanding of disease process, treatment plan, medications, and discharge instructions  Description: Complete learning assessment and assess knowledge base.  Interventions:  - Provide teaching at level of understanding  - Provide teaching via preferred learning methods  1/24/2025 1059 by Ramiro Adame RN  Outcome: Progressing  1/24/2025 1051 by Ramiro Adame RN  Outcome: Progressing

## 2025-01-27 ENCOUNTER — RESULTS FOLLOW-UP (OUTPATIENT)
Dept: FAMILY MEDICINE CLINIC | Facility: HOSPITAL | Age: 65
End: 2025-01-27

## 2025-01-28 ENCOUNTER — TELEPHONE (OUTPATIENT)
Dept: GASTROENTEROLOGY | Facility: CLINIC | Age: 65
End: 2025-01-28

## 2025-01-28 ENCOUNTER — OFFICE VISIT (OUTPATIENT)
Dept: GASTROENTEROLOGY | Facility: CLINIC | Age: 65
End: 2025-01-28
Payer: COMMERCIAL

## 2025-01-28 VITALS
SYSTOLIC BLOOD PRESSURE: 102 MMHG | BODY MASS INDEX: 27.66 KG/M2 | WEIGHT: 162 LBS | DIASTOLIC BLOOD PRESSURE: 57 MMHG | HEIGHT: 64 IN

## 2025-01-28 DIAGNOSIS — K59.03 OPIOID-INDUCED CONSTIPATION: ICD-10-CM

## 2025-01-28 DIAGNOSIS — Z98.0 S/P TOTAL GASTRECTOMY AND ROUX-EN-Y ESOPHAGOJEJUNAL ANASTOMOSIS: ICD-10-CM

## 2025-01-28 DIAGNOSIS — R10.84 GENERALIZED ABDOMINAL PAIN: ICD-10-CM

## 2025-01-28 DIAGNOSIS — Z80.0 FAMILY HISTORY OF COLON CANCER: ICD-10-CM

## 2025-01-28 DIAGNOSIS — T40.2X5A OPIOID-INDUCED CONSTIPATION: ICD-10-CM

## 2025-01-28 DIAGNOSIS — Z98.890 HISTORY OF INSERTION OF PANCREATIC STENT: Primary | ICD-10-CM

## 2025-01-28 DIAGNOSIS — Z90.3 S/P TOTAL GASTRECTOMY AND ROUX-EN-Y ESOPHAGOJEJUNAL ANASTOMOSIS: ICD-10-CM

## 2025-01-28 PROCEDURE — 99214 OFFICE O/P EST MOD 30 MIN: CPT | Performed by: STUDENT IN AN ORGANIZED HEALTH CARE EDUCATION/TRAINING PROGRAM

## 2025-01-28 RX ORDER — POLYETHYLENE GLYCOL 3350, SODIUM SULFATE ANHYDROUS, SODIUM BICARBONATE, SODIUM CHLORIDE, POTASSIUM CHLORIDE 236; 22.74; 6.74; 5.86; 2.97 G/4L; G/4L; G/4L; G/4L; G/4L
4000 POWDER, FOR SOLUTION ORAL ONCE
Qty: 4000 ML | Refills: 0 | Status: SHIPPED | OUTPATIENT
Start: 2025-01-28 | End: 2025-01-28

## 2025-01-28 RX ORDER — LINACLOTIDE 145 UG/1
145 CAPSULE, GELATIN COATED ORAL DAILY
Qty: 90 CAPSULE | Refills: 0 | Status: SHIPPED | OUTPATIENT
Start: 2025-01-28 | End: 2025-01-30

## 2025-01-28 RX ORDER — SODIUM CHLORIDE, SODIUM LACTATE, POTASSIUM CHLORIDE, CALCIUM CHLORIDE 600; 310; 30; 20 MG/100ML; MG/100ML; MG/100ML; MG/100ML
125 INJECTION, SOLUTION INTRAVENOUS CONTINUOUS
OUTPATIENT
Start: 2025-01-28

## 2025-01-28 NOTE — TELEPHONE ENCOUNTER
Scheduled date of combo (as of today):2-19-25  Physician performing combo:Win  Location of combo:[facility]UB  Bowel prep reviewed with patient:Tish  Instructions reviewed with patient by:lenard    Clearances: Diabetic  gave info  Metformin

## 2025-01-28 NOTE — PROGRESS NOTES
Name: Aleida Hammond      : 1960      MRN: [id number]  Encounter Provider: Kristopher Walden DO  Encounter Date: 2025   Encounter department: [facility]  :  Assessment & Plan  History of insertion of pancreatic stent  Pancreatic stent was noticed on CT from 2024.  We will get a KUB to see if this is still there.  If it is still present I will reach out to our advanced endoscopy team regarding eventual removal.    Orders:    XR abdomen 1 view kub; Future    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  She had Whipple surgery with Dr. Cruz with excellent result.  She had a T1a duodenal adenocarcinoma and there has been no need for adjuvant chemotherapy.  She has upcoming routine follow-up with Dr. Cruz.  CT from August reviewed and there was no evidence of residual disease.       Opioid-induced constipation  I do suspect the main  her her symptoms are her opiates and this was discussed in detail with her.  We discussed that this could be contributing to opioid-induced constipation as well as narcotic bowel syndrome.  The pathophysiology of both these conditions was discussed in detail with her.  Given that she needs these medications for her chronic pain we will focus on addressing side effects.  Will begin with Linzess 145 mcg and reassess after 8 weeks in case she needs dose escalation.  Should Linzess not work well for her we can consider moving to alternate agent such as methylnaltrexone or Symproic.  Orders:    linaCLOtide (Linzess) 145 MCG CAPS; Take 1 capsule (145 mcg total) by mouth in the morning    Generalized abdominal pain  As above, I suspect this is largely being driven by her opiates.  Will start Linzess 145 mcg as above.  She is overdue for colon cancer screening given her family history so we will additionally proceed with EGD and colonoscopy.  Procedure, preparation, risk and benefit discussed in  detail.  Patient is amenable with proceeding.  Orders:    Colonoscopy; Future    EGD; Future    polyethylene glycol (Golytely) 4000 mL solution; Take 4,000 mL by mouth once for 1 dose Take 4000 mL by mouth once for 1 dose. Use as directed    Family history of colon cancer  As above, she is due for screening based on her family history.  We will proceed with colonoscopy as above.  Orders:    Colonoscopy; Future    polyethylene glycol (Golytely) 4000 mL solution; Take 4,000 mL by mouth once for 1 dose Take 4000 mL by mouth once for 1 dose. Use as directed        History of Present Illness   DAIN Hammond is a 65-year-old female with past medical history of chronic back pain on chronic morphine pump and p.o. 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     Her last CT scan was in August 2024 which shows post Whipple anatomy with stable pancreatic duct stent, no peritoneal lesions, ascites or abdominal/pelvic lymph nodes.  She did not require adjuvant therapy.    Her main complaint today is abdominal discomfort and bloating/gassiness.  Her abdominal discomfort is diffuse and her gas can be quite malodorous.  For her constipation she has trialed fiber, MiraLAX, senna and stool softeners with minimal relief.    She last had a colonoscopy in 2016 with the [facility] in [place].  She is overdue given her family history of colon cancer screening.      History obtained from: patient    Review of Systems  Current Outpatient Medications on File Prior to Visit   Medication Sig Dispense Refill    albuterol (2.5 mg/3 mL) 0.083 % nebulizer solution Take 3 mL (2.5 mg total) by nebulization every 6 (six) hours as needed for wheezing or shortness of breath 300 mL 0    albuterol (PROVENTIL HFA,VENTOLIN HFA) 90 mcg/act inhaler USE 2 INHALATIONS ORALLY EVERY 6 HOURS AS NEEDED FORWHEEZING 25.5 g 1    amLODIPine (NORVASC) 10 mg tablet Take 1 tablet (10 mg total) by mouth daily 90 tablet 1     aspirin (ECOTRIN LOW STRENGTH) 81 mg EC tablet Take 81 mg by mouth daily      Cyanocobalamin (B-12 IJ) Inject as directed every 30 (thirty) days      Fluticasone-Salmeterol (Advair) 250-50 mcg/dose inhaler Inhale 1 puff 2 (two) times a day Rinse mouth after use. 180 blister 1    glucose blood (Accu-Chek Guide) test strip Test 100 strip 3    HYDROcodone-acetaminophen (NORCO) 5-325 mg per tablet take 1 to 2 tablets by mouth every 6 hours as needed      lidocaine (XYLOCAINE) 5 % ointment apply topically to affected area three times a day      metFORMIN (GLUCOPHAGE) 1000 MG tablet Take 1 tablet (1,000 mg total) by mouth 2 (two) times a day with meals 180 tablet 1    methocarbamol (ROBAXIN) 500 mg tablet Take 500 mg by mouth      metoclopramide (REGLAN) 10 mg tablet Take 10 mg by mouth      mometasone (NASONEX) 50 mcg/act nasal spray SPRAY 2 SPRAYS INTO EACH NOSTRIL EVERY DAY 51 g 1    montelukast (SINGULAIR) 10 mg tablet TAKE 1 TABLET BY MOUTH DAILY AT BEDTIME 90 tablet 1    Morphine Sulfate (MORPHINE 0.05 MG/ML SYRINGE, NICU/PICU,, CMPD ORDER,)       omeprazole (PriLOSEC) 40 MG capsule Take 1 capsule (40 mg total) by mouth 2 (two) times a day 180 capsule 1    ondansetron (ZOFRAN) 4 mg tablet TAKE 1 TABLET BY MOUTH EVERY 8 HOURS AS NEEDED FOR NAUSEA AND VOMITING 90 tablet 1    ondansetron (ZOFRAN) 4 mg tablet Take 1 tablet (4 mg total) by mouth every 8 (eight) hours as needed for nausea or vomiting 100 tablet 0    pregabalin (LYRICA) 200 MG capsule Take 200 mg by mouth 3 (three) times a day      rosuvastatin (CRESTOR) 20 MG tablet Take 1 tablet (20 mg total) by mouth daily 100 tablet 3    traMADol (ULTRAM) 50 mg tablet Take 150 mg by mouth every 8 (eight) hours as needed      zolpidem (AMBIEN) 5 mg tablet Take 1 tablet (5 mg total) by mouth daily at bedtime as needed for sleep Do not start before November 13, 2024. 30 tablet 2    Fluticasone-Salmeterol (Advair Diskus) 250-50 mcg/dose inhaler Inhale 1 puff 2 (two)  "times a day Rinse mouth after use. (Patient not taking: Reported on 2025) 180 blister 2     No current facility-administered medications on file prior to visit.      Social History     Tobacco Use    Smoking status: Former     Current packs/day: 0.00     Average packs/day: 1 pack/day for 34.0 years (34.0 ttl pk-yrs)     Types: Cigarettes     Start date: 1973     Quit date: 2007     Years since quittin.0    Smokeless tobacco: Never    Tobacco comments:     15yrs ago   Vaping Use    Vaping status: Never Used   Substance and Sexual Activity    Alcohol use: No    Drug use: No    Sexual activity: Not Currently     Partners: Male     Birth control/protection: Post-menopausal        Objective   /57   Ht 5' 4\" (1.626 m)   Wt 73.5 kg (162 lb)   LMP  (LMP Unknown)   BMI 27.81 kg/m²      Physical Exam    General Appearance: NAD, cooperative, alert  Eyes: Anicteric  GI:  Soft, non-tender, non-distended; normal bowel sounds; no masses, no organomegaly   Rectal: Deferred  Musculoskeletal: No edema.  Skin:     No jaundice    "

## 2025-01-28 NOTE — H&P (VIEW-ONLY)
Name: Aleida Hammond      : 1960      MRN: [id number]  Encounter Provider: Kristopher Walden DO  Encounter Date: 2025   Encounter department: [facility]  :  Assessment & Plan  History of insertion of pancreatic stent  Pancreatic stent was noticed on CT from 2024.  We will get a KUB to see if this is still there.  If it is still present I will reach out to our advanced endoscopy team regarding eventual removal.    Orders:    XR abdomen 1 view kub; Future    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  She had Whipple surgery with Dr. Cruz with excellent result.  She had a T1a duodenal adenocarcinoma and there has been no need for adjuvant chemotherapy.  She has upcoming routine follow-up with Dr. Cruz.  CT from August reviewed and there was no evidence of residual disease.       Opioid-induced constipation  I do suspect the main  her her symptoms are her opiates and this was discussed in detail with her.  We discussed that this could be contributing to opioid-induced constipation as well as narcotic bowel syndrome.  The pathophysiology of both these conditions was discussed in detail with her.  Given that she needs these medications for her chronic pain we will focus on addressing side effects.  Will begin with Linzess 145 mcg and reassess after 8 weeks in case she needs dose escalation.  Should Linzess not work well for her we can consider moving to alternate agent such as methylnaltrexone or Symproic.  Orders:    linaCLOtide (Linzess) 145 MCG CAPS; Take 1 capsule (145 mcg total) by mouth in the morning    Generalized abdominal pain  As above, I suspect this is largely being driven by her opiates.  Will start Linzess 145 mcg as above.  She is overdue for colon cancer screening given her family history so we will additionally proceed with EGD and colonoscopy.  Procedure, preparation, risk and benefit discussed in  detail.  Patient is amenable with proceeding.  Orders:    Colonoscopy; Future    EGD; Future    polyethylene glycol (Golytely) 4000 mL solution; Take 4,000 mL by mouth once for 1 dose Take 4000 mL by mouth once for 1 dose. Use as directed    Family history of colon cancer  As above, she is due for screening based on her family history.  We will proceed with colonoscopy as above.  Orders:    Colonoscopy; Future    polyethylene glycol (Golytely) 4000 mL solution; Take 4,000 mL by mouth once for 1 dose Take 4000 mL by mouth once for 1 dose. Use as directed        History of Present Illness   DAIN Hammond is a 65-year-old female with past medical history of chronic back pain on chronic morphine pump and p.o. 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     Her last CT scan was in August 2024 which shows post Whipple anatomy with stable pancreatic duct stent, no peritoneal lesions, ascites or abdominal/pelvic lymph nodes.  She did not require adjuvant therapy.    Her main complaint today is abdominal discomfort and bloating/gassiness.  Her abdominal discomfort is diffuse and her gas can be quite malodorous.  For her constipation she has trialed fiber, MiraLAX, senna and stool softeners with minimal relief.    She last had a colonoscopy in 2016 with the [facility] in [place].  She is overdue given her family history of colon cancer screening.      History obtained from: patient    Review of Systems  Current Outpatient Medications on File Prior to Visit   Medication Sig Dispense Refill    albuterol (2.5 mg/3 mL) 0.083 % nebulizer solution Take 3 mL (2.5 mg total) by nebulization every 6 (six) hours as needed for wheezing or shortness of breath 300 mL 0    albuterol (PROVENTIL HFA,VENTOLIN HFA) 90 mcg/act inhaler USE 2 INHALATIONS ORALLY EVERY 6 HOURS AS NEEDED FORWHEEZING 25.5 g 1    amLODIPine (NORVASC) 10 mg tablet Take 1 tablet (10 mg total) by mouth daily 90 tablet 1     aspirin (ECOTRIN LOW STRENGTH) 81 mg EC tablet Take 81 mg by mouth daily      Cyanocobalamin (B-12 IJ) Inject as directed every 30 (thirty) days      Fluticasone-Salmeterol (Advair) 250-50 mcg/dose inhaler Inhale 1 puff 2 (two) times a day Rinse mouth after use. 180 blister 1    glucose blood (Accu-Chek Guide) test strip Test 100 strip 3    HYDROcodone-acetaminophen (NORCO) 5-325 mg per tablet take 1 to 2 tablets by mouth every 6 hours as needed      lidocaine (XYLOCAINE) 5 % ointment apply topically to affected area three times a day      metFORMIN (GLUCOPHAGE) 1000 MG tablet Take 1 tablet (1,000 mg total) by mouth 2 (two) times a day with meals 180 tablet 1    methocarbamol (ROBAXIN) 500 mg tablet Take 500 mg by mouth      metoclopramide (REGLAN) 10 mg tablet Take 10 mg by mouth      mometasone (NASONEX) 50 mcg/act nasal spray SPRAY 2 SPRAYS INTO EACH NOSTRIL EVERY DAY 51 g 1    montelukast (SINGULAIR) 10 mg tablet TAKE 1 TABLET BY MOUTH DAILY AT BEDTIME 90 tablet 1    Morphine Sulfate (MORPHINE 0.05 MG/ML SYRINGE, NICU/PICU,, CMPD ORDER,)       omeprazole (PriLOSEC) 40 MG capsule Take 1 capsule (40 mg total) by mouth 2 (two) times a day 180 capsule 1    ondansetron (ZOFRAN) 4 mg tablet TAKE 1 TABLET BY MOUTH EVERY 8 HOURS AS NEEDED FOR NAUSEA AND VOMITING 90 tablet 1    ondansetron (ZOFRAN) 4 mg tablet Take 1 tablet (4 mg total) by mouth every 8 (eight) hours as needed for nausea or vomiting 100 tablet 0    pregabalin (LYRICA) 200 MG capsule Take 200 mg by mouth 3 (three) times a day      rosuvastatin (CRESTOR) 20 MG tablet Take 1 tablet (20 mg total) by mouth daily 100 tablet 3    traMADol (ULTRAM) 50 mg tablet Take 150 mg by mouth every 8 (eight) hours as needed      zolpidem (AMBIEN) 5 mg tablet Take 1 tablet (5 mg total) by mouth daily at bedtime as needed for sleep Do not start before November 13, 2024. 30 tablet 2    Fluticasone-Salmeterol (Advair Diskus) 250-50 mcg/dose inhaler Inhale 1 puff 2 (two)  "times a day Rinse mouth after use. (Patient not taking: Reported on 2025) 180 blister 2     No current facility-administered medications on file prior to visit.      Social History     Tobacco Use    Smoking status: Former     Current packs/day: 0.00     Average packs/day: 1 pack/day for 34.0 years (34.0 ttl pk-yrs)     Types: Cigarettes     Start date: 1973     Quit date: 2007     Years since quittin.0    Smokeless tobacco: Never    Tobacco comments:     15yrs ago   Vaping Use    Vaping status: Never Used   Substance and Sexual Activity    Alcohol use: No    Drug use: No    Sexual activity: Not Currently     Partners: Male     Birth control/protection: Post-menopausal        Objective   /57   Ht 5' 4\" (1.626 m)   Wt 73.5 kg (162 lb)   LMP  (LMP Unknown)   BMI 27.81 kg/m²      Physical Exam    General Appearance: NAD, cooperative, alert  Eyes: Anicteric  GI:  Soft, non-tender, non-distended; normal bowel sounds; no masses, no organomegaly   Rectal: Deferred  Musculoskeletal: No edema.  Skin:     No jaundice    "

## 2025-01-28 NOTE — ASSESSMENT & PLAN NOTE
She had Whipple surgery with Dr. Cruz with excellent result.  She had a T1a duodenal adenocarcinoma and there has been no need for adjuvant chemotherapy.  She has upcoming routine follow-up with Dr. Cruz.  CT from August reviewed and there was no evidence of residual disease.

## 2025-01-30 ENCOUNTER — TELEPHONE (OUTPATIENT)
Dept: GASTROENTEROLOGY | Facility: CLINIC | Age: 65
End: 2025-01-30

## 2025-01-30 ENCOUNTER — OFFICE VISIT (OUTPATIENT)
Dept: PULMONOLOGY | Facility: CLINIC | Age: 65
End: 2025-01-30
Payer: COMMERCIAL

## 2025-01-30 VITALS
HEART RATE: 83 BPM | HEIGHT: 64 IN | TEMPERATURE: 98.9 F | SYSTOLIC BLOOD PRESSURE: 112 MMHG | BODY MASS INDEX: 27.66 KG/M2 | WEIGHT: 162 LBS | DIASTOLIC BLOOD PRESSURE: 62 MMHG | OXYGEN SATURATION: 93 %

## 2025-01-30 DIAGNOSIS — K59.03 OPIOID-INDUCED CONSTIPATION: ICD-10-CM

## 2025-01-30 DIAGNOSIS — R91.1 PULMONARY NODULE: ICD-10-CM

## 2025-01-30 DIAGNOSIS — T40.2X5A OPIOID-INDUCED CONSTIPATION: ICD-10-CM

## 2025-01-30 DIAGNOSIS — T40.2X5A OPIOID-INDUCED CONSTIPATION: Primary | ICD-10-CM

## 2025-01-30 DIAGNOSIS — R93.89 ABNORMAL CHEST CT: ICD-10-CM

## 2025-01-30 DIAGNOSIS — K59.03 OPIOID-INDUCED CONSTIPATION: Primary | ICD-10-CM

## 2025-01-30 DIAGNOSIS — J45.41 MODERATE PERSISTENT ASTHMA WITH ACUTE EXACERBATION: Primary | ICD-10-CM

## 2025-01-30 PROCEDURE — 99214 OFFICE O/P EST MOD 30 MIN: CPT | Performed by: INTERNAL MEDICINE

## 2025-01-30 PROCEDURE — G2211 COMPLEX E/M VISIT ADD ON: HCPCS | Performed by: INTERNAL MEDICINE

## 2025-01-30 RX ORDER — PREDNISONE 20 MG/1
TABLET ORAL
COMMUNITY
Start: 2024-11-06

## 2025-01-30 RX ORDER — NALDEMEDINE 0.2 MG/1
0.2 TABLET ORAL DAILY
Qty: 30 TABLET | Refills: 1 | Status: SHIPPED | OUTPATIENT
Start: 2025-01-30 | End: 2025-02-04 | Stop reason: ALTCHOICE

## 2025-01-30 NOTE — PROGRESS NOTES
"Pulmonary Follow Up Note  Aleida Hammond 65 y.o. female MRN: [id number]  1/30/2025      HPI:    Patient states that she had 1 episode of shortness of breath since last visit.  She also exhibited chest tightness during this event.  She states that she used her rescue inhaler that improved symptoms along with time.  Symptoms occurred spontaneously without any insinuating triggers.  She has been compliant with Wixela 250 twice daily.  She requires albuterol rarely.      Exercise Tolerance: Good.  No gross exercise intolerance       Meds:  Wixela 250 twice daily  Albuterol as needed, rarely    ROS:  Constitutional: - Fatigue, - chills, - fever, - weight change.   HEENT: - rhinorrhea, - sneezing, - sore throat.    Respiratory: - cough, - shortness of breath, - wheezing.    Cardiovascular: - chest pain,  -palpitations, - leg swelling.   Gastrointestinal: - abdominal pain, - constipation, - diarrhea, - nausea, - vomiting.   Endocrine: - cold intolerance, - heat intolerance.   Genitourinary: - dysuria.   Musculoskeletal: - arthralgias.   Skin:- rash, - wound.   Allergic/Immunologic: - allergies  Neurological: - dizziness, - numbness        Vitals: Blood pressure 112/62, pulse 83, temperature 98.9 °F (37.2 °C), temperature source Tympanic Core, height 5' 4\" (1.626 m), weight 73.5 kg (162 lb), SpO2 93%., Body mass index is 27.81 kg/m².    Physical Exam:  GEN  NAD  HEENT  ncat, non icteric, MM moist  NECK  supple, no JVD, no LAD  CV  +s1s2, no mrg, RRR  PULM  CTA BL, no wrr  ABD  soft, ntnd, + BS  EXT 1+ lower extremity pitting edema, no cyanosis, no clubbing  NEURO  Aox3, no focal weakness    Imaging and other studies:   I personally viewed and interpreted the following imaging studies:  CT chest 8/8/2024 shows 2 subcentimeter left upper lobe nodules.  Otherwise, no gross intraparenchymal or pleural abnormalities.  Per radiology, these nodules are stable.    Pulmonary function testing:   PFT 10/28/2024 shows small airways " "disease without gross obstruction.      Assessment:  Asthma-moderate persistent, without acute exacerbation  CT chest abnormality  Pulmonary nodule  History of tobacco abuse    Plan:  Continue Wixela 250 twice daily.  Continue albuterol as needed.  If patient has any further exacerbating symptoms with severe shortness of breath, will consider increasing Wixela to 500.  Repeat CT chest for February 2025 pending.  Patient will call pulmonary office with any worsening or development of new pulmonary symptoms prior to next visit.    Return visit in March 2025 and then August/April 2025  On next visit we will evaluate symptoms, albuterol requirement, repeat CT chest results    Note: Portions of the record may have been created with voice recognition software. Occasional wrong word or \"sound a like\" substitutions may have occurred due to the inherent limitations of voice recognition software. Read the chart carefully and recognize, using context, where substitutions have occurred.     Steve Person M.D.  [facility]  Answers submitted by the patient for this visit:  Pulmonology Questionnaire (Submitted on 1/30/2025)  Chief Complaint: Primary symptoms  Do you have chest tightness?: Yes  Do you experience frequent throat clearing?: Yes  Do you have a hoarse voice?: Yes  Chronicity: chronic  When did you first notice your symptoms?: more than 1 year ago  How often do your symptoms occur?: intermittently  Since you first noticed this problem, how has it changed?: unchanged  Have you had a change in appetite?: No  Do you have chest pain?: No  Do you have shortness of breath that occurs with effort or exertion?: Yes  Do you have ear congestion?: No  Do you have ear pain?: No  Do you have a fever?: No  Do you have headaches?: Yes  Do you have heartburn?: Yes  Do you have fatigue?: Yes  Do you have muscle pain?: Yes  Do you have nasal congestion?: No  Do you have shortness of breath when " lying flat?: No  Do you have shortness of breath when you wake up?: No  Do you have post-nasal drip?: Yes  Do you have a runny nose?: No  Do you have sneezing?: No  Do you have a sore throat?: No  Do you have sweats?: No  Do you have trouble swallowing?: Yes  Have you experienced weight loss?: No  Which of the following makes your symptoms worse?: animal exposure, exposure to fumes, exposure to smoke, pollen, URI  Which of the following makes your symptoms better?: oral steroids, steroid inhaler  Risk factors for lung disease: smoking/tobacco exposure

## 2025-01-30 NOTE — TELEPHONE ENCOUNTER
LOV 01/28/25    Pt reports prescribed Linzess 145 mcg is ~ $700.00 monthly which is unaffordable. She is inquiring if a less expensive medication could be substituted. Informed pt I would forward he request to .

## 2025-01-30 NOTE — TELEPHONE ENCOUNTER
I contacted patient to update that alternate medication sent to pharmacy per Dr. Walden. I did review that it may require prior authorization and pharmacy should notify us if it is required.

## 2025-01-31 ENCOUNTER — TELEPHONE (OUTPATIENT)
Dept: GASTROENTEROLOGY | Facility: CLINIC | Age: 65
End: 2025-01-31

## 2025-01-31 DIAGNOSIS — K59.03 OPIOID-INDUCED CONSTIPATION: ICD-10-CM

## 2025-01-31 DIAGNOSIS — T40.2X5A OPIOID-INDUCED CONSTIPATION: ICD-10-CM

## 2025-01-31 RX ORDER — NALDEMEDINE 0.2 MG/1
0.2 TABLET ORAL DAILY
Qty: 30 TABLET | Refills: 1 | OUTPATIENT
Start: 2025-01-31 | End: 2025-04-01

## 2025-01-31 NOTE — TELEPHONE ENCOUNTER
PA for Naldemedine Tosylate (Symproic) 0.2 MG TABS     SUBMITTED to [employer] [facility]    via      [x][employer]-Case ID #   [id number]    Payer: [facility]   Phone: [phone]   Fax: [phone]         All office notes, labs and other pertaining documents and studies sent. Clinical questions answered. Awaiting determination from insurance company.     Turnaround time for your insurance to make a decision on your Prior Authorization can take 7-21 business days.

## 2025-01-31 NOTE — TELEPHONE ENCOUNTER
Pt calling regarding PA.  Pharmacy only told her med is not covered.  Explained PA process and advised PA was started. Pt verbalizes understanding.

## 2025-01-31 NOTE — TELEPHONE ENCOUNTER
Forwarding to PA team for review.     Reason for call:   [x] Prior Auth  [] Other:     Caller:  [] Patient  [x] Pharmacy  Name: [facility]/pharmacy #[id number] - [place] - [address]      Callback Number: [phone]     Medication: Naldemedine Tosylate (Symproic) 0.2 MG TABS Take 0.2 mg by mouth in the morning       Ordering Provider:   [] PCP/Provider -   [x] Speciality/Provider - Gastro

## 2025-02-03 ENCOUNTER — TELEPHONE (OUTPATIENT)
Age: 65
End: 2025-02-03

## 2025-02-03 ENCOUNTER — HOSPITAL ENCOUNTER (OUTPATIENT)
Dept: RADIOLOGY | Facility: HOSPITAL | Age: 65
Discharge: HOME | End: 2025-02-03
Payer: COMMERCIAL

## 2025-02-03 ENCOUNTER — RESULTS FOLLOW-UP (OUTPATIENT)
Dept: GASTROENTEROLOGY | Facility: CLINIC | Age: 65
End: 2025-02-03

## 2025-02-03 ENCOUNTER — APPOINTMENT (OUTPATIENT)
Dept: LAB | Facility: HOSPITAL | Age: 65
End: 2025-02-03
Payer: COMMERCIAL

## 2025-02-03 DIAGNOSIS — Z98.890 HISTORY OF INSERTION OF PANCREATIC STENT: ICD-10-CM

## 2025-02-03 DIAGNOSIS — Z85.068 HISTORY OF DUODENAL CANCER: ICD-10-CM

## 2025-02-03 LAB
BUN SERPL-MCNC: 29 MG/DL (ref 5–25)
CREAT SERPL-MCNC: 1.18 MG/DL (ref 0.6–1.3)
GFR SERPL CREATININE-BSD FRML MDRD: 48 ML/MIN/1.73SQ M

## 2025-02-03 PROCEDURE — 36415 COLL VENOUS BLD VENIPUNCTURE: CPT

## 2025-02-03 PROCEDURE — 74018 RADEX ABDOMEN 1 VIEW: CPT

## 2025-02-03 PROCEDURE — 82565 ASSAY OF CREATININE: CPT

## 2025-02-03 PROCEDURE — 84520 ASSAY OF UREA NITROGEN: CPT

## 2025-02-03 NOTE — TELEPHONE ENCOUNTER
Pt. Calling regarding [facility] , advised that medication was cancelled by an alternate was sent for PA to [facility], Naldemedine Tosylate (Symproic) 0.2 MG  PA submitted on 1/31/25, advised we are waiting on determination from insurance, once approved we will fax info to pharmacy, if not approved an appeal can be submitted, advised pt. To call us back if she does not hear from anyone by Friday

## 2025-02-03 NOTE — TELEPHONE ENCOUNTER
PA for symproic DENIED    Reason:(Screenshot if applicable)        Message sent to office clinical pool Yes    Denial letter scanned into Media Yes    Appeal started No (Provider will need to decide if appeal is warranted and send clinical documentation to Prior Authorization Team for initiation.)    **Please follow up with your patient regarding denial and next steps**

## 2025-02-04 DIAGNOSIS — K59.03 OPIOID-INDUCED CONSTIPATION: Primary | ICD-10-CM

## 2025-02-04 DIAGNOSIS — T40.2X5A OPIOID-INDUCED CONSTIPATION: Primary | ICD-10-CM

## 2025-02-04 NOTE — TELEPHONE ENCOUNTER
There is no current script for Linzess for the PA team to do a prior auth.    Please create new script and please advise the PA team when done so we can do a prior auth.  Thank you

## 2025-02-04 NOTE — TELEPHONE ENCOUNTER
Pt calling in about Linzess that was denied by her insurance. She would like to appeal this decision. Will route to Prior Auth team.

## 2025-02-04 NOTE — TELEPHONE ENCOUNTER
NO pa required for Linzess  Is there a pt assistance program the GI office can look into?  Thank you

## 2025-02-07 ENCOUNTER — HOSPITAL ENCOUNTER (OUTPATIENT)
Dept: INFUSION CENTER | Facility: HOSPITAL | Age: 65
End: 2025-02-07
Attending: INTERNAL MEDICINE
Payer: COMMERCIAL

## 2025-02-07 DIAGNOSIS — E53.8 B12 DEFICIENCY: Primary | ICD-10-CM

## 2025-02-07 PROCEDURE — 96372 THER/PROPH/DIAG INJ SC/IM: CPT

## 2025-02-07 RX ORDER — CYANOCOBALAMIN 1000 UG/ML
1000 INJECTION, SOLUTION INTRAMUSCULAR; SUBCUTANEOUS ONCE
Status: CANCELLED | OUTPATIENT
Start: 2025-02-21

## 2025-02-07 RX ORDER — CYANOCOBALAMIN 1000 UG/ML
1000 INJECTION, SOLUTION INTRAMUSCULAR; SUBCUTANEOUS ONCE
Status: COMPLETED | OUTPATIENT
Start: 2025-02-07 | End: 2025-02-07

## 2025-02-07 RX ADMIN — CYANOCOBALAMIN 1000 MCG: 1000 INJECTION, SOLUTION INTRAMUSCULAR at 10:18

## 2025-02-07 NOTE — PROGRESS NOTES
Pt here for B12; injection given with no adverse reactions; bandaid dry and intact; pt aware of next appt 2/21 at 1130; left unit ambulatory with steady gait.

## 2025-02-09 NOTE — ASSESSMENT & PLAN NOTE
Has pain pump, f/w pain mgmt. On norco, tramadol, lyrica, and robaxin. Has morphine in pump. Opiate contract with them.

## 2025-02-10 ENCOUNTER — HOSPITAL ENCOUNTER (OUTPATIENT)
Dept: CT IMAGING | Facility: HOSPITAL | Age: 65
Discharge: HOME | End: 2025-02-10
Attending: STUDENT IN AN ORGANIZED HEALTH CARE EDUCATION/TRAINING PROGRAM
Payer: COMMERCIAL

## 2025-02-10 DIAGNOSIS — Z85.068 HISTORY OF DUODENAL CANCER: ICD-10-CM

## 2025-02-10 PROCEDURE — 74177 CT ABD & PELVIS W/CONTRAST: CPT

## 2025-02-10 PROCEDURE — 71260 CT THORAX DX C+: CPT

## 2025-02-10 RX ADMIN — IOHEXOL 50 ML: 350 INJECTION, SOLUTION INTRAVENOUS at 10:23

## 2025-02-16 DIAGNOSIS — G47.00 INSOMNIA, UNSPECIFIED TYPE: ICD-10-CM

## 2025-02-17 ENCOUNTER — HOSPITAL ENCOUNTER (EMERGENCY)
Facility: HOSPITAL | Age: 65
Discharge: HOME | End: 2025-02-17
Attending: EMERGENCY MEDICINE | Admitting: EMERGENCY MEDICINE
Payer: COMMERCIAL

## 2025-02-17 ENCOUNTER — TELEPHONE (OUTPATIENT)
Age: 65
End: 2025-02-17

## 2025-02-17 ENCOUNTER — TELEMEDICINE (OUTPATIENT)
Dept: OTHER | Facility: HOSPITAL | Age: 65
End: 2025-02-17
Payer: COMMERCIAL

## 2025-02-17 VITALS
SYSTOLIC BLOOD PRESSURE: 150 MMHG | WEIGHT: 160 LBS | RESPIRATION RATE: 18 BRPM | HEART RATE: 95 BPM | TEMPERATURE: 97.7 F | DIASTOLIC BLOOD PRESSURE: 78 MMHG | OXYGEN SATURATION: 99 % | BODY MASS INDEX: 27.46 KG/M2

## 2025-02-17 VITALS — SYSTOLIC BLOOD PRESSURE: 170 MMHG | DIASTOLIC BLOOD PRESSURE: 94 MMHG

## 2025-02-17 DIAGNOSIS — R73.9 ELEVATED BLOOD SUGAR: Primary | ICD-10-CM

## 2025-02-17 DIAGNOSIS — R73.9 HYPERGLYCEMIA: Primary | ICD-10-CM

## 2025-02-17 LAB
ALBUMIN SERPL BCG-MCNC: 3.9 G/DL (ref 3.5–5)
ALP SERPL-CCNC: 108 U/L (ref 34–104)
ALT SERPL W P-5'-P-CCNC: 55 U/L (ref 7–52)
ANION GAP SERPL CALCULATED.3IONS-SCNC: 10 MMOL/L (ref 4–13)
AST SERPL W P-5'-P-CCNC: 58 U/L (ref 13–39)
B-OH-BUTYR SERPL-MCNC: 0.06 MMOL/L (ref 0.02–0.27)
BASE EXCESS BLDA CALC-SCNC: -2 MMOL/L (ref -2–3)
BASOPHILS # BLD AUTO: 0.01 THOUSANDS/ΜL (ref 0–0.1)
BASOPHILS NFR BLD AUTO: 0 % (ref 0–1)
BILIRUB SERPL-MCNC: 0.41 MG/DL (ref 0.2–1)
BILIRUB UR QL STRIP: NEGATIVE
BUN SERPL-MCNC: 29 MG/DL (ref 5–25)
CA-I BLD-SCNC: 1.15 MMOL/L (ref 1.12–1.32)
CALCIUM SERPL-MCNC: 8.8 MG/DL (ref 8.4–10.2)
CHLORIDE SERPL-SCNC: 101 MMOL/L (ref 96–108)
CLARITY UR: CLEAR
CO2 SERPL-SCNC: 23 MMOL/L (ref 21–32)
COLOR UR: COLORLESS
CREAT SERPL-MCNC: 1.3 MG/DL (ref 0.6–1.3)
EOSINOPHIL # BLD AUTO: 0.1 THOUSAND/ΜL (ref 0–0.61)
EOSINOPHIL NFR BLD AUTO: 1 % (ref 0–6)
ERYTHROCYTE [DISTWIDTH] IN BLOOD BY AUTOMATED COUNT: 12.6 % (ref 11.6–15.1)
GFR SERPL CREATININE-BSD FRML MDRD: 43 ML/MIN/1.73SQ M
GLUCOSE SERPL-MCNC: 289 MG/DL (ref 65–140)
GLUCOSE SERPL-MCNC: 462 MG/DL (ref 65–140)
GLUCOSE SERPL-MCNC: 483 MG/DL (ref 65–140)
GLUCOSE SERPL-MCNC: 486 MG/DL (ref 65–140)
GLUCOSE UR STRIP-MCNC: ABNORMAL MG/DL
HCO3 BLDA-SCNC: 23.8 MMOL/L (ref 24–30)
HCT VFR BLD AUTO: 36 % (ref 34.8–46.1)
HCT VFR BLD CALC: 35 % (ref 34.8–46.1)
HGB BLD-MCNC: 11 G/DL (ref 11.5–15.4)
HGB BLDA-MCNC: 11.9 G/DL (ref 11.5–15.4)
HGB UR QL STRIP.AUTO: NEGATIVE
IMM GRANULOCYTES # BLD AUTO: 0.04 THOUSAND/UL (ref 0–0.2)
IMM GRANULOCYTES NFR BLD AUTO: 1 % (ref 0–2)
KETONES UR STRIP-MCNC: NEGATIVE MG/DL
LEUKOCYTE ESTERASE UR QL STRIP: NEGATIVE
LIPASE SERPL-CCNC: 27 U/L (ref 11–82)
LYMPHOCYTES # BLD AUTO: 1.52 THOUSANDS/ΜL (ref 0.6–4.47)
LYMPHOCYTES NFR BLD AUTO: 20 % (ref 14–44)
MCH RBC QN AUTO: 28.1 PG (ref 26.8–34.3)
MCHC RBC AUTO-ENTMCNC: 30.6 G/DL (ref 31.4–37.4)
MCV RBC AUTO: 92 FL (ref 82–98)
MONOCYTES # BLD AUTO: 0.48 THOUSAND/ΜL (ref 0.17–1.22)
MONOCYTES NFR BLD AUTO: 6 % (ref 4–12)
NEUTROPHILS # BLD AUTO: 5.56 THOUSANDS/ΜL (ref 1.85–7.62)
NEUTS SEG NFR BLD AUTO: 72 % (ref 43–75)
NITRITE UR QL STRIP: NEGATIVE
NRBC BLD AUTO-RTO: 0 /100 WBCS
PCO2 BLD: 25 MMOL/L (ref 21–32)
PCO2 BLD: 42 MM HG (ref 42–50)
PH BLD: 7.36 [PH] (ref 7.3–7.4)
PH UR STRIP.AUTO: 6 [PH]
PLATELET # BLD AUTO: 170 THOUSANDS/UL (ref 149–390)
PMV BLD AUTO: 11.8 FL (ref 8.9–12.7)
PO2 BLD: 34 MM HG (ref 35–45)
POTASSIUM BLD-SCNC: 4.1 MMOL/L (ref 3.5–5.3)
POTASSIUM SERPL-SCNC: 4.2 MMOL/L (ref 3.5–5.3)
PROT SERPL-MCNC: 7.2 G/DL (ref 6.4–8.4)
PROT UR STRIP-MCNC: NEGATIVE MG/DL
RBC # BLD AUTO: 3.91 MILLION/UL (ref 3.81–5.12)
SAO2 % BLD FROM PO2: 64 % (ref 60–85)
SODIUM BLD-SCNC: 137 MMOL/L (ref 136–145)
SODIUM SERPL-SCNC: 134 MMOL/L (ref 135–147)
SP GR UR STRIP.AUTO: <1.005 (ref 1–1.03)
SPECIMEN SOURCE: ABNORMAL
UROBILINOGEN UR STRIP-ACNC: <2 MG/DL
WBC # BLD AUTO: 7.71 THOUSAND/UL (ref 4.31–10.16)

## 2025-02-17 PROCEDURE — 99285 EMERGENCY DEPT VISIT HI MDM: CPT

## 2025-02-17 PROCEDURE — 99285 EMERGENCY DEPT VISIT HI MDM: CPT | Performed by: EMERGENCY MEDICINE

## 2025-02-17 PROCEDURE — 85025 COMPLETE CBC W/AUTO DIFF WBC: CPT | Performed by: EMERGENCY MEDICINE

## 2025-02-17 PROCEDURE — 82948 REAGENT STRIP/BLOOD GLUCOSE: CPT

## 2025-02-17 PROCEDURE — 82947 ASSAY GLUCOSE BLOOD QUANT: CPT

## 2025-02-17 PROCEDURE — 80053 COMPREHEN METABOLIC PANEL: CPT | Performed by: EMERGENCY MEDICINE

## 2025-02-17 PROCEDURE — 96360 HYDRATION IV INFUSION INIT: CPT

## 2025-02-17 PROCEDURE — 84132 ASSAY OF SERUM POTASSIUM: CPT

## 2025-02-17 PROCEDURE — 36415 COLL VENOUS BLD VENIPUNCTURE: CPT | Performed by: EMERGENCY MEDICINE

## 2025-02-17 PROCEDURE — 82330 ASSAY OF CALCIUM: CPT

## 2025-02-17 PROCEDURE — 84295 ASSAY OF SERUM SODIUM: CPT

## 2025-02-17 PROCEDURE — 82010 KETONE BODYS QUAN: CPT | Performed by: EMERGENCY MEDICINE

## 2025-02-17 PROCEDURE — 93005 ELECTROCARDIOGRAM TRACING: CPT

## 2025-02-17 PROCEDURE — 85014 HEMATOCRIT: CPT

## 2025-02-17 PROCEDURE — 83690 ASSAY OF LIPASE: CPT | Performed by: EMERGENCY MEDICINE

## 2025-02-17 PROCEDURE — 82803 BLOOD GASES ANY COMBINATION: CPT

## 2025-02-17 PROCEDURE — 99213 OFFICE O/P EST LOW 20 MIN: CPT | Performed by: NURSE PRACTITIONER

## 2025-02-17 RX ORDER — ZOLPIDEM TARTRATE 5 MG/1
5 TABLET ORAL
Qty: 30 TABLET | Refills: 0 | Status: SHIPPED | OUTPATIENT
Start: 2025-02-17

## 2025-02-17 RX ORDER — INSULIN GLARGINE 100 [IU]/ML
8 INJECTION, SOLUTION SUBCUTANEOUS
COMMUNITY
End: 2025-02-18

## 2025-02-17 RX ADMIN — SODIUM CHLORIDE 1000 ML: 0.9 INJECTION, SOLUTION INTRAVENOUS at 20:26

## 2025-02-17 NOTE — PATIENT INSTRUCTIONS
As we discussed, if you are having symptoms of high blood sugars you should go to  ER.  You will see if you can have you son take you.

## 2025-02-17 NOTE — TELEPHONE ENCOUNTER
Called patient to reschedule 3/3 11 am appt with patient to 4/3 at 1130 am due to a change in the provider's schedule.

## 2025-02-17 NOTE — PROGRESS NOTES
Virtual Regular Visit  Name: Aleida Hammond      : 1960      MRN: [id number]  Encounter Provider: Your Video Visit Provider  Encounter Date: 2025   Encounter department: VIRTUAL CARE       Verification of patient location:  Patient is located at Home in the following state in which I hold an active license [place] :  Assessment & Plan  Elevated blood sugar    Orders:    Transfer to other facility        Encounter provider Your Video Visit Provider    The patient was identified by name and date of birth. Aleida Hammond was informed that this is a telemedicine visit and that the visit is being conducted through the Epic Embedded platform. She agrees to proceed..  My office door was closed. No one else was in the room.  She acknowledged consent and understanding of privacy and security of the video platform. The patient has agreed to participate and understands they can discontinue the visit at any time.    Patient is aware this is a billable service.     History obtained from: patient  History of Present Illness     This is as 65 year old female here today for video visit.  She states her last A1C was elevated at 8.  She states that tried to contact her PCP to discuss her Hemoglobin A1C.  She states she does check blood sugar every morning.  Has been running around 150.  She states she is not feeling unwell and she checked it in the afternoon and was 489.  She states she ate a chicken noodle soup.  She was on insulin and Jardiance  She has been having increased hunger and increased thirst .  She states she has been feeling unwell since Friday.  She states she has been more tired.   She is fatigue but no dizziness.  No chest pan or sob.        Review of Systems   Constitutional:  Positive for activity change and fatigue. Negative for fever.   Respiratory: Negative.     Gastrointestinal:  Negative for diarrhea, nausea and vomiting.   Genitourinary:  Positive for frequency.   Musculoskeletal: Negative.     Neurological: Negative.        Objective   /94   LMP  (LMP Unknown)     Physical Exam  Constitutional:       General: She is not in acute distress.     Appearance: Normal appearance. She is not ill-appearing or toxic-appearing.   HENT:      Head: Normocephalic and atraumatic.   Pulmonary:      Effort: Pulmonary effort is normal. No respiratory distress.   Neurological:      Mental Status: She is alert and oriented to person, place, and time.   Psychiatric:         Mood and Affect: Mood normal.         Behavior: Behavior normal.         Thought Content: Thought content normal.         Judgment: Judgment normal.         Visit Time  Total Visit Duration: 8 minutes not including the time spent for establishing the audio/video connection.

## 2025-02-18 ENCOUNTER — OFFICE VISIT (OUTPATIENT)
Dept: FAMILY MEDICINE CLINIC | Facility: HOSPITAL | Age: 65
End: 2025-02-18
Payer: COMMERCIAL

## 2025-02-18 VITALS
OXYGEN SATURATION: 96 % | BODY MASS INDEX: 27.66 KG/M2 | HEART RATE: 78 BPM | WEIGHT: 162 LBS | SYSTOLIC BLOOD PRESSURE: 110 MMHG | DIASTOLIC BLOOD PRESSURE: 60 MMHG | HEIGHT: 64 IN

## 2025-02-18 DIAGNOSIS — Z79.4 TYPE 2 DIABETES MELLITUS WITH DIABETIC NEUROPATHY, WITH LONG-TERM CURRENT USE OF INSULIN (HCC): Primary | ICD-10-CM

## 2025-02-18 DIAGNOSIS — K59.03 DRUG-INDUCED CONSTIPATION: ICD-10-CM

## 2025-02-18 DIAGNOSIS — F11.20 NARCOTIC DEPENDENCY, CONTINUOUS (HCC): ICD-10-CM

## 2025-02-18 DIAGNOSIS — E11.40 TYPE 2 DIABETES MELLITUS WITH DIABETIC NEUROPATHY, WITH LONG-TERM CURRENT USE OF INSULIN (HCC): Primary | ICD-10-CM

## 2025-02-18 DIAGNOSIS — C17.0 ADENOCARCINOMA OF DUODENUM (HCC): ICD-10-CM

## 2025-02-18 LAB
ATRIAL RATE: 88 BPM
P AXIS: 66 DEGREES
PR INTERVAL: 246 MS
QRS AXIS: 25 DEGREES
QRSD INTERVAL: 86 MS
QT INTERVAL: 346 MS
QTC INTERVAL: 419 MS
T WAVE AXIS: 65 DEGREES
VENTRICULAR RATE: 88 BPM

## 2025-02-18 PROCEDURE — 93010 ELECTROCARDIOGRAM REPORT: CPT | Performed by: INTERNAL MEDICINE

## 2025-02-18 PROCEDURE — 99214 OFFICE O/P EST MOD 30 MIN: CPT | Performed by: STUDENT IN AN ORGANIZED HEALTH CARE EDUCATION/TRAINING PROGRAM

## 2025-02-18 PROCEDURE — G2211 COMPLEX E/M VISIT ADD ON: HCPCS | Performed by: STUDENT IN AN ORGANIZED HEALTH CARE EDUCATION/TRAINING PROGRAM

## 2025-02-18 RX ORDER — GLIPIZIDE 5 MG/1
5 TABLET, FILM COATED, EXTENDED RELEASE ORAL DAILY
Qty: 100 TABLET | Refills: 0 | Status: SHIPPED | OUTPATIENT
Start: 2025-02-18

## 2025-02-18 NOTE — PROGRESS NOTES
Name: Aleida Hammond      : 1960      MRN: [id number]  Encounter Provider: Tara Colon DO  Encounter Date: 2025   Encounter department: [facility] PRIMARY [facility] [address]  :  Assessment & Plan  Type 2 diabetes mellitus with diabetic neuropathy, with long-term current use of insulin (HCC)  Lab Results   Component Value Date    HGBA1C 8.2 (H) 2024     Plan for POCT A1c at next visit.   Labs fasting for heme in April.   Start daily morning glipizide XL, butn will likely need dinner dose also.   Orders:  •  glipiZIDE (GLUCOTROL XL) 5 mg 24 hr tablet; Take 1 tablet (5 mg total) by mouth daily    Adenocarcinoma of duodenum (HCC)  F/W GI, and scope tomorrow.        Narcotic dependency, continuous (HCC)  F/W pain mgmt. No recent dose changes.        Drug-induced constipation  Trying to get approved for linzess.        Return if symptoms worsen or fail to improve.       History of Present Illness   Chief Complaint   Patient presents with   • Follow-up     ED Follow up BS   Diabetes  She has type 2 diabetes mellitus. No MedicAlert identification noted. The initial diagnosis of diabetes was made 15 years ago. Hypoglycemia symptoms include hunger and sleepiness. Pertinent negatives for hypoglycemia include no confusion, dizziness, headaches, mood changes, nervousness/anxiousness, pallor, seizures, speech difficulty, sweats or tremors. (Sometimes fatigue worse from pain meds also though) Associated symptoms include fatigue, foot paresthesias, polydipsia, polyphagia, polyuria and weakness. Pertinent negatives for diabetes include no blurred vision, no chest pain, no foot ulcerations, no visual change and no weight loss. Pertinent negatives for hypoglycemia complications include no blackouts, no hospitalization, no nocturnal hypoglycemia, no required assistance and no required glucagon injection. Symptoms are stable. Diabetic complications include peripheral neuropathy, PVD and retinopathy.  Pertinent negatives for diabetic complications include no CVA, heart disease, impotence or nephropathy. Risk factors for coronary artery disease include dyslipidemia, hypertension, obesity, post-menopausal and sedentary lifestyle. Current diabetic treatment includes oral agent (monotherapy). She is compliant with treatment all of the time. Her weight is increasing steadily. She is following a generally healthy and low fat/cholesterol diet. Meal planning includes avoidance of concentrated sweets. She has not had a previous visit with a dietitian. She rarely participates in exercise. She monitors blood glucose at home 1-2 x per day. Blood glucose monitoring compliance is fair. Her home blood glucose trend is increasing rapidly. Her breakfast blood glucose is taken between 7-8 am. Her breakfast blood glucose range is generally 140-180 mg/dl. Her lunch blood glucose is taken between 11-12 pm. Her lunch blood glucose range is generally >200 mg/dl. Her dinner blood glucose is taken between 6-7 pm. Her dinner blood glucose range is generally >200 mg/dl. Her bedtime blood glucose is taken after 11 pm. Her bedtime blood glucose range is generally >200 mg/dl. Her overall blood glucose range is >200 mg/dl. She sees a podiatrist.Eye exam is not current.     Dec 18th last A1c 8.2,   150 in am, fasting & higher 260's in later in the day.   Sept started medicare, they won't cover the januvia.   Had to stop.  Sugars climbing.   Has tried weekly meds, but too nauseous, made her vomit.     Having scope done tomorrow with GI.     Wt Readings from Last 3 Encounters:   10/25/24 70.9 kg (156 lb 6.4 oz)   10/02/24 69.9 kg (154 lb)   09/25/24 71.2 kg (157 lb)     Wt Readings from Last 3 Encounters:   02/18/25 73.5 kg (162 lb)   02/17/25 72.6 kg (160 lb)   01/30/25 73.5 kg (162 lb)     Temp Readings from Last 3 Encounters:   02/17/25 97.7 °F (36.5 °C) (Temporal)   01/30/25 98.9 °F (37.2 °C) (Tympanic Core)   01/24/25 (!) 97.3 °F (36.3 °C)  "(Temporal)     BP Readings from Last 3 Encounters:   02/18/25 110/60   02/17/25 150/78   02/17/25 170/94     Pulse Readings from Last 3 Encounters:   02/18/25 78   02/17/25 95   01/30/25 83     Review of Systems   Constitutional:  Positive for fatigue. Negative for weight loss.   Eyes:  Negative for blurred vision.   Cardiovascular:  Negative for chest pain.   Gastrointestinal:  Positive for abdominal pain (at times while eating & digesting.), constipation (almost always) and nausea. Negative for diarrhea (rarely).   Endocrine: Positive for polydipsia, polyphagia and polyuria.   Genitourinary:  Negative for impotence.   Skin:  Negative for pallor.   Neurological:  Positive for weakness. Negative for dizziness, tremors, seizures, speech difficulty and headaches.   Psychiatric/Behavioral:  Negative for confusion. The patient is not nervous/anxious.      Objective   /60   Pulse 78   Ht 5' 4\" (1.626 m)   Wt 73.5 kg (162 lb)   LMP  (LMP Unknown)   SpO2 96%   BMI 27.81 kg/m²      Physical Exam  Vitals and nursing note reviewed.   Constitutional:       General: She is not in acute distress.     Appearance: Normal appearance. She is well-developed. She is not ill-appearing.   HENT:      Head: Normocephalic and atraumatic.   Eyes:      General: No scleral icterus.        Right eye: No discharge.         Left eye: No discharge.      Conjunctiva/sclera: Conjunctivae normal.   Cardiovascular:      Rate and Rhythm: Normal rate and regular rhythm.      Pulses: Normal pulses.      Heart sounds: Normal heart sounds. No murmur heard.  Pulmonary:      Effort: Pulmonary effort is normal. No respiratory distress.      Breath sounds: Normal breath sounds.   Musculoskeletal:      Cervical back: Normal range of motion and neck supple. No rigidity.      Right lower leg: No edema.      Left lower leg: No edema.   Skin:     General: Skin is warm and dry.      Capillary Refill: Capillary refill takes less than 2 seconds. "   Neurological:      Mental Status: She is alert and oriented to person, place, and time.      Gait: Gait normal.   Psychiatric:         Mood and Affect: Mood normal.         Behavior: Behavior normal.         Thought Content: Thought content normal.         Judgment: Judgment normal.

## 2025-02-18 NOTE — PATIENT INSTRUCTIONS
Januvia - tier 3/5 could you possibly get tier exemption form?   Could tier 2 be affordable?   Could you appeal the denial?   Sugars much better on januvia & metformin prior.

## 2025-02-18 NOTE — ED PROVIDER NOTES
Time reflects when diagnosis was documented in both MDM as applicable and the Disposition within this note       Time User Action Codes Description Comment    2025  9:42 PM Rod Smith Add [R73.9] Hyperglycemia           ED Disposition       ED Disposition   Discharge    Condition   Stable    Date/Time     9:42 PM    Comment   Aleida Hammond discharge to home/self care.                   Assessment & Plan       Medical Decision Making  Elevated blood sugar in the 400s will check labs give IV fluid    Amount and/or Complexity of Data Reviewed  Labs: ordered.        ED Course as of 25 Patient's blood sugar did come down to 280 1:09 liter of saline okay for discharge and follow-up with her primary care physician       Medications   sodium chloride 0.9 % bolus 1,000 mL (1,000 mL Intravenous New Bag 25)       ED Risk Strat Scores                                                History of Present Illness       Chief Complaint   Patient presents with    Hyperglycemia - Symptomatic     Pt noted that her BS at home was in the 4002. Increased need for fluids, food. States she's feeling much more fatigued than usual. Jardiance stopped in December and now only on metformin BID       Past Medical History:   Diagnosis Date    PABLO (acute kidney injury) (HCC) 2021    Allergic     Anemia     Anesthesia complication     Aspiration pneumonia after     Anxiety     Arthritis     Asthma     Uses Albuterol daily    Breast lump     Resolved: 2017     Chronic kidney disease 10/2021    PABLO    Chronic pain     back    Chronic pain disorder     Back, legs    Colon cancer (HCC)     Diabetes mellitus (HCC)     Diverticulitis of colon     Dizziness     Duodenal ulcer     GERD (gastroesophageal reflux disease)     Hepatitis     Hepatitis A 2017    History of stomach ulcers     HL (hearing loss) 2015    Estimated    Hyperlipidemia     Hypertension      Hypotension 2021    Infectious viral hepatitis     Hepatitiss A    Lung nodule 10/2023    Memory loss 2015    Estimated    Pneumonia     aspiration pneumonia    PONV (postoperative nausea and vomiting)     Stomach problems       Past Surgical History:   Procedure Laterality Date    ABDOMINAL SURGERY      Gastric bypass    APPENDECTOMY      BACK SURGERY      BARIATRIC SURGERY  About 15 years ago    CATARACT EXTRACTION, BILATERAL       SECTION      CHOLECYSTECTOMY      COLON SURGERY  2024    [facility]    COLONOSCOPY  2016    EYE SURGERY  2023    Cataracts    GASTRECTOMY N/A 2024    Procedure: COMPLETION GASTRECTOMY;  Surgeon: Karlene Cruz MD;  Location: [facility];  Service: Surgical Oncology    GASTRIC BYPASS      GASTRIC BYPASS  2010    INFUSION PUMP IMPLANTATION      LAPAROTOMY N/A 2024    Procedure: DUODENAL EXPLORATION;  Surgeon: Karlene Cruz MD;  Location: [facility];  Service: Surgical Oncology    IA NDSC WRST SURG W/RLS TRANSVRS CARPL LIGM Right 2022    Procedure: Right endoscopic carpal tunnel release;  Surgeon: Papo Peguero MD;  Location: [facility];  Service: Orthopedics    SHOULDER SURGERY      SMALL INTESTINE SURGERY N/A 2024    Procedure: INTRAOPERATIVE ULTRASOUND;  Surgeon: Karlene Cruz MD;  Location: [facility];  Service: Surgical Oncology    SPINAL CORD STIMULATOR IMPLANT      SPINE SURGERY      Multiple    UPPER GASTROINTESTINAL ENDOSCOPY      Multiple for bleeding ulcer    WHIPPLE PROCEDURE/PANCREATICO-DUODENECTOMY N/A 2024    Procedure: WHIPPLE;  Surgeon: Karlene Cruz MD;  Location: [facility];  Service: Surgical Oncology      Family History   Problem Relation Age of Onset    Diabetes Mother         Mellitus    Hyperlipidemia Mother     Hypertension Mother     Colon cancer Mother 78    Pancreatic cancer Mother 84    Melanoma Mother 76    Learning disabilities Mother     Cancer Mother         Colon and  pancreas    Cancer Father 78        Lung, prostrate and bladder    Alcohol abuse Father     Lung cancer Father 78    Prostate cancer Father 78    No Known Problems Daughter     No Known Problems Maternal Grandmother     No Known Problems Maternal Grandfather     Stomach cancer Paternal Grandmother         60's    Lung cancer Paternal Grandfather 76        Lung    Diabetes Brother         Mellitus    Hyperlipidemia Brother     Hypertension Brother     Learning disabilities Brother     Diabetes Brother     No Known Problems Brother     No Known Problems Son     No Known Problems Son     Breast cancer Maternal Aunt 32        Unknown age    No Known Problems Paternal Aunt     No Known Problems Paternal Aunt     No Known Problems Paternal Aunt     No Known Problems Paternal Aunt     Arthritis Family     Mental illness Neg Hx       Social History     Tobacco Use    Smoking status: Former     Current packs/day: 0.00     Average packs/day: 1 pack/day for 34.0 years (34.0 ttl pk-yrs)     Types: Cigarettes     Start date: 1973     Quit date: 2007     Years since quittin.1    Smokeless tobacco: Never    Tobacco comments:     15yrs ago   Vaping Use    Vaping status: Never Used   Substance Use Topics    Alcohol use: No    Drug use: No      E-Cigarette/Vaping    E-Cigarette Use Never User       E-Cigarette/Vaping Substances    Nicotine No     THC No     CBD No     Flavoring No     Other No     Unknown No       I have reviewed and agree with the history as documented.     This is a 65-year-old female diabetic currently on metformin 1000 mg twice daily was previously on metformin and Jardiance and insulin however was switched for insurance purposes she has generalized weakness fatigue increased thirst and hunger denies any nausea vomiting does have diarrhea      History provided by:  Patient  Medical Problem  Location:  Blood sugar  Quality:  Elevation  Severity:  Moderate  Onset quality:  Gradual  Duration:  1  day  Timing:  Constant  Progression:  Waxing and waning  Chronicity:  New  Context:  Elevated blood sugar over the past 24 hours  Associated symptoms: diarrhea and fatigue    Associated symptoms: no abdominal pain        Review of Systems   Constitutional:  Positive for fatigue.   Gastrointestinal:  Positive for diarrhea. Negative for abdominal pain.   Neurological:  Positive for weakness.   All other systems reviewed and are negative.          Objective       ED Triage Vitals [02/17/25 1817]   Temperature Pulse Blood Pressure Respirations SpO2 Patient Position - Orthostatic VS   97.7 °F (36.5 °C) 95 150/78 18 99 % Sitting      Temp Source Heart Rate Source BP Location FiO2 (%) Pain Score    Temporal Monitor Left arm -- --      Vitals      Date and Time Temp Pulse SpO2 Resp BP Pain Score FACES Pain Rating User   02/17/25 1817 97.7 °F (36.5 °C) 95 99 % 18 150/78 -- -- ML            Physical Exam  Vitals and nursing note reviewed.   Constitutional:       General: She is not in acute distress.     Appearance: She is not ill-appearing, toxic-appearing or diaphoretic.   HENT:      Head: Normocephalic and atraumatic.      Right Ear: Tympanic membrane, ear canal and external ear normal.      Left Ear: Tympanic membrane, ear canal and external ear normal.      Mouth/Throat:      Mouth: Mucous membranes are dry.   Eyes:      General:         Right eye: No discharge.         Left eye: No discharge.      Extraocular Movements: Extraocular movements intact.      Pupils: Pupils are equal, round, and reactive to light.   Cardiovascular:      Rate and Rhythm: Normal rate and regular rhythm.      Pulses: Normal pulses.      Heart sounds: No murmur heard.     No friction rub. No gallop.   Pulmonary:      Effort: No respiratory distress.      Breath sounds: No stridor. No wheezing, rhonchi or rales.   Abdominal:      General: There is no distension.      Palpations: Abdomen is soft.      Tenderness: There is no abdominal tenderness.    Musculoskeletal:         General: No swelling, tenderness, deformity or signs of injury. Normal range of motion.      Cervical back: Normal range of motion and neck supple. No rigidity.      Right lower leg: No edema.      Left lower leg: No edema.   Skin:     General: Skin is warm and dry.      Coloration: Skin is not jaundiced.      Findings: No bruising, erythema or rash.   Neurological:      General: No focal deficit present.      Mental Status: She is alert and oriented to person, place, and time.      Cranial Nerves: No cranial nerve deficit.      Sensory: No sensory deficit.   Psychiatric:         Mood and Affect: Mood normal.         Behavior: Behavior normal.         Thought Content: Thought content normal.         Results Reviewed       Procedure Component Value Units Date/Time    Fingerstick Glucose (POCT) [[id number]]  (Abnormal) Collected: 02/17/25 2116    Lab Status: Final result Specimen: Blood Updated: 02/17/25 2117     POC Glucose 289 mg/dl     Comprehensive metabolic panel [[id number]]  (Abnormal) Collected: 02/17/25 1830    Lab Status: Final result Specimen: Blood from Arm, Left Updated: 02/17/25 2000     Sodium 134 mmol/L      Potassium 4.2 mmol/L      Chloride 101 mmol/L      CO2 23 mmol/L      ANION GAP 10 mmol/L      BUN 29 mg/dL      Creatinine 1.30 mg/dL      Glucose 483 mg/dL      Calcium 8.8 mg/dL      AST 58 U/L      ALT 55 U/L      Alkaline Phosphatase 108 U/L      Total Protein 7.2 g/dL      Albumin 3.9 g/dL      Total Bilirubin 0.41 mg/dL      eGFR 43 ml/min/1.73sq m     Narrative:      National Kid[employer] guidelines for Chronic Kidney Disease (CKD):     Stage 1 with normal or high GFR (GFR > 90 mL/min/1.73 square meters)    Stage 2 Mild CKD (GFR = 60-89 mL/min/1.73 square meters)    Stage 3A Moderate CKD (GFR = 45-59 mL/min/1.73 square meters)    Stage 3B Moderate CKD (GFR = 30-44 mL/min/1.73 square meters)    Stage 4 Severe CKD (GFR = 15-29 mL/min/1.73 square meters)     Stage 5 End Stage CKD (GFR <15 mL/min/1.73 square meters)  Note: GFR calculation is accurate only with a steady state creatinine    Lipase [[id number]]  (Normal) Collected: 02/17/25 1830    Lab Status: Final result Specimen: Blood from Arm, Left Updated: 02/17/25 1954     Lipase 27 u/L     Beta Hydroxybutyrate [[id number]]  (Normal) Collected: 02/17/25 1830    Lab Status: Final result Specimen: Blood from Arm, Left Updated: 02/17/25 1954     Beta- Hydroxybutyrate 0.06 mmol/L     CBC and differential [[id number]]  (Abnormal) Collected: 02/17/25 1830    Lab Status: Final result Specimen: Blood from Arm, Left Updated: 02/17/25 1835     WBC 7.71 Thousand/uL      RBC 3.91 Million/uL      Hemoglobin 11.0 g/dL      Hematocrit 36.0 %      MCV 92 fL      MCH 28.1 pg      MCHC 30.6 g/dL      RDW 12.6 %      MPV 11.8 fL      Platelets 170 Thousands/uL      nRBC 0 /100 WBCs      Segmented % 72 %      Immature Grans % 1 %      Lymphocytes % 20 %      Monocytes % 6 %      Eosinophils Relative 1 %      Basophils Relative 0 %      Absolute Neutrophils 5.56 Thousands/µL      Absolute Immature Grans 0.04 Thousand/uL      Absolute Lymphocytes 1.52 Thousands/µL      Absolute Monocytes 0.48 Thousand/µL      Eosinophils Absolute 0.10 Thousand/µL      Basophils Absolute 0.01 Thousands/µL     POCT Blood Gas (CG8+) [[id number]]  (Abnormal) Collected: 02/17/25 1832    Lab Status: Final result Specimen: Venous Updated: 02/17/25 1835     ph, Palmer ISTAT 7.360     pCO2, Palmer i-STAT 42.0 mm HG      pO2, [place] i-STAT 34.0 mm HG      BE, i-STAT -2 mmol/L      HCO3, [place] i-STAT 23.8 mmol/L      CO2, i-STAT 25 mmol/L      O2 Sat, i-STAT 64 %      SODIUM, I-STAT 137 mmol/l      Potassium, i-STAT 4.1 mmol/L      Calcium, Ionized i-STAT 1.15 mmol/L      Hct, i-STAT 35 %      Hgb, i-STAT 11.9 g/dl      Glucose, i-STAT 462 mg/dl      Specimen Type VENOUS    UA w Reflex to Microscopic w Reflex to Culture [[id number]]     Lab Status: No result Specimen: Urine      Fingerstick Glucose (POCT) [[id number]]  (Abnormal) Collected: 02/17/25 1817    Lab Status: Final result Specimen: Blood Updated: 02/17/25 1819     POC Glucose 486 mg/dl             No orders to display       ECG 12 Lead Documentation Only    Date/Time: 2/17/2025 8:29 PM    Performed by: Rod Smith DO  Authorized by: Rod Smith DO    ECG reviewed by me, the ED Provider: yes    Patient location:  ED  Rate:     ECG rate:  88  Rhythm:     Rhythm: sinus rhythm    Conduction:     Conduction: abnormal      Abnormal conduction: 1st degree    T waves:     T waves: normal        ED Medication and Procedure Management   Prior to Admission Medications   Prescriptions Last Dose Informant Patient Reported? Taking?   Cyanocobalamin (B-12 IJ)  Self Yes No   Sig: Inject as directed every 30 (thirty) days   Fluticasone-Salmeterol (Advair) 250-50 mcg/dose inhaler  Self No No   Sig: Inhale 1 puff 2 (two) times a day Rinse mouth after use.   HYDROcodone-acetaminophen (NORCO) 5-325 mg per tablet  Self Yes No   Sig: take 1 to 2 tablets by mouth every 6 hours as needed   Morphine Sulfate (MORPHINE 0.05 MG/ML SYRINGE, NICU/PICU,, CMPD ORDER,)  Self Yes No   albuterol (2.5 mg/3 mL) 0.083 % nebulizer solution  Self No No   Sig: Take 3 mL (2.5 mg total) by nebulization every 6 (six) hours as needed for wheezing or shortness of breath   albuterol (PROVENTIL HFA,VENTOLIN HFA) 90 mcg/act inhaler  Self No No   Sig: USE 2 INHALATIONS ORALLY EVERY 6 HOURS AS NEEDED FORWHEEZING   amLODIPine (NORVASC) 10 mg tablet  Self No No   Sig: Take 1 tablet (10 mg total) by mouth daily   aspirin (ECOTRIN LOW STRENGTH) 81 mg EC tablet  Self Yes No   Sig: Take 81 mg by mouth daily   glucose blood (Accu-Chek Guide) test strip  Self No No   Sig: Test   insulin glargine (LANTUS) 100 units/mL subcutaneous injection More than a month  Yes No   Sig: Inject 8 Units under the skin daily at bedtime   lidocaine (XYLOCAINE) 5 % ointment  Self Yes No   Sig:  apply topically to affected area three times a day   linaCLOtide 290 MCG CAPS   No No   Sig: Take 1 capsule by mouth in the morning   metFORMIN (GLUCOPHAGE) 1000 MG tablet  Self No No   Sig: Take 1 tablet (1,000 mg total) by mouth 2 (two) times a day with meals   methocarbamol (ROBAXIN) 500 mg tablet  Self Yes No   Sig: Take 500 mg by mouth   metoclopramide (REGLAN) 10 mg tablet  Self Yes No   Sig: Take 10 mg by mouth   mometasone (NASONEX) 50 mcg/act nasal spray  Self No No   Sig: SPRAY 2 SPRAYS INTO EACH NOSTRIL EVERY DAY   montelukast (SINGULAIR) 10 mg tablet  Self No No   Sig: TAKE 1 TABLET BY MOUTH DAILY AT BEDTIME   omeprazole (PriLOSEC) 40 MG capsule  Self No No   Sig: Take 1 capsule (40 mg total) by mouth 2 (two) times a day   ondansetron (ZOFRAN) 4 mg tablet  Self No No   Sig: TAKE 1 TABLET BY MOUTH EVERY 8 HOURS AS NEEDED FOR NAUSEA AND VOMITING   ondansetron (ZOFRAN) 4 mg tablet  Self No No   Sig: Take 1 tablet (4 mg total) by mouth every 8 (eight) hours as needed for nausea or vomiting   polyethylene glycol (Golytely) 4000 mL solution   No No   Sig: Take 4,000 mL by mouth once for 1 dose Take 4000 mL by mouth once for 1 dose. Use as directed   predniSONE 20 mg tablet  Self Yes No   Sig: TAKE 2 TABLETS BY MOUTH DAILY X3 DAYS, THEN 1.5 TABLETS X3 DAYS, 1 TAB X3 DAYS, 1/2 TAB X3 DAYS   pregabalin (LYRICA) 200 MG capsule  Self Yes No   Sig: Take 200 mg by mouth 3 (three) times a day   rosuvastatin (CRESTOR) 20 MG tablet  Self No No   Sig: Take 1 tablet (20 mg total) by mouth daily   traMADol (ULTRAM) 50 mg tablet  Self Yes No   Sig: Take 150 mg by mouth every 8 (eight) hours as needed   zolpidem (AMBIEN) 5 mg tablet   No No   Sig: Take 1 tablet (5 mg total) by mouth daily at bedtime as needed for sleep      Facility-Administered Medications: None     Patient's Medications   Discharge Prescriptions    No medications on file     No discharge procedures on file.  ED SEPSIS DOCUMENTATION   Time reflects when  diagnosis was documented in both MDM as applicable and the Disposition within this note       Time User Action Codes Description Comment    2/17/2025  9:42 PM Rod Smith Add [R73.9] Hyperglycemia                  Rod Smith DO  02/17/25 2147

## 2025-02-18 NOTE — ASSESSMENT & PLAN NOTE
Lab Results   Component Value Date    HGBA1C 8.2 (H) 12/18/2024     Plan for POCT A1c at next visit.   Labs fasting for heme in April.   Start daily morning glipizide XL, butn will likely need dinner dose also.   Orders:  •  glipiZIDE (GLUCOTROL XL) 5 mg 24 hr tablet; Take 1 tablet (5 mg total) by mouth daily

## 2025-02-19 ENCOUNTER — HOSPITAL ENCOUNTER (OUTPATIENT)
Dept: GASTROENTEROLOGY | Facility: HOSPITAL | Age: 65
Setting detail: OUTPATIENT SURGERY
Discharge: HOME | End: 2025-02-19
Attending: STUDENT IN AN ORGANIZED HEALTH CARE EDUCATION/TRAINING PROGRAM
Payer: COMMERCIAL

## 2025-02-19 ENCOUNTER — ANESTHESIA (OUTPATIENT)
Dept: GASTROENTEROLOGY | Facility: HOSPITAL | Age: 65
End: 2025-02-19
Payer: COMMERCIAL

## 2025-02-19 ENCOUNTER — ANESTHESIA EVENT (OUTPATIENT)
Dept: GASTROENTEROLOGY | Facility: HOSPITAL | Age: 65
End: 2025-02-19
Payer: COMMERCIAL

## 2025-02-19 VITALS
DIASTOLIC BLOOD PRESSURE: 84 MMHG | OXYGEN SATURATION: 94 % | SYSTOLIC BLOOD PRESSURE: 176 MMHG | TEMPERATURE: 97.3 F | HEART RATE: 67 BPM | RESPIRATION RATE: 14 BRPM

## 2025-02-19 DIAGNOSIS — Z80.0 FAMILY HISTORY OF COLON CANCER: ICD-10-CM

## 2025-02-19 DIAGNOSIS — R10.84 GENERALIZED ABDOMINAL PAIN: ICD-10-CM

## 2025-02-19 LAB — GLUCOSE SERPL-MCNC: 173 MG/DL (ref 65–140)

## 2025-02-19 PROCEDURE — 88305 TISSUE EXAM BY PATHOLOGIST: CPT | Performed by: PATHOLOGY

## 2025-02-19 PROCEDURE — 82948 REAGENT STRIP/BLOOD GLUCOSE: CPT

## 2025-02-19 PROCEDURE — G0105 COLORECTAL SCRN; HI RISK IND: HCPCS | Performed by: STUDENT IN AN ORGANIZED HEALTH CARE EDUCATION/TRAINING PROGRAM

## 2025-02-19 PROCEDURE — 44361 SMALL BOWEL ENDOSCOPY/BIOPSY: CPT | Performed by: STUDENT IN AN ORGANIZED HEALTH CARE EDUCATION/TRAINING PROGRAM

## 2025-02-19 RX ORDER — PROPOFOL 10 MG/ML
INJECTION, EMULSION INTRAVENOUS AS NEEDED
Status: DISCONTINUED | OUTPATIENT
Start: 2025-02-19 | End: 2025-02-19

## 2025-02-19 RX ORDER — SODIUM CHLORIDE 9 MG/ML
INJECTION, SOLUTION INTRAVENOUS CONTINUOUS PRN
Status: DISCONTINUED | OUTPATIENT
Start: 2025-02-19 | End: 2025-02-19

## 2025-02-19 RX ADMIN — PROPOFOL 40 MG: 10 INJECTION, EMULSION INTRAVENOUS at 07:38

## 2025-02-19 RX ADMIN — PROPOFOL 50 MG: 10 INJECTION, EMULSION INTRAVENOUS at 07:42

## 2025-02-19 RX ADMIN — PROPOFOL 40 MG: 10 INJECTION, EMULSION INTRAVENOUS at 07:35

## 2025-02-19 RX ADMIN — PROPOFOL 50 MG: 10 INJECTION, EMULSION INTRAVENOUS at 07:46

## 2025-02-19 RX ADMIN — PROPOFOL 120 MG: 10 INJECTION, EMULSION INTRAVENOUS at 07:33

## 2025-02-19 RX ADMIN — SODIUM CHLORIDE: 0.9 INJECTION, SOLUTION INTRAVENOUS at 07:22

## 2025-02-19 RX ADMIN — PROPOFOL 50 MG: 10 INJECTION, EMULSION INTRAVENOUS at 07:52

## 2025-02-19 NOTE — ANESTHESIA POSTPROCEDURE EVALUATION
Post-Op Assessment Note    CV Status:  Stable  Pain Score: 0    Pain management: adequate       Mental Status:  Alert and awake   Hydration Status:  Euvolemic   PONV Controlled:  Controlled   Airway Patency:  Patent     Post Op Vitals Reviewed: Yes    No anethesia notable event occurred.    Staff: Anesthesiologist, CRNA           Last Filed PACU Vitals:  Vitals Value Taken Time   Temp     Pulse 67 02/19/25 0818   /84 02/19/25 0818   Resp 14 02/19/25 0818   SpO2 94 % 02/19/25 0818       Modified Celestina:     Vitals Value Taken Time   Activity 2 02/19/25 0805   Respiration 2 02/19/25 0805   Circulation 2 02/19/25 0805   Consciousness 2 02/19/25 0805   Oxygen Saturation 2 02/19/25 0805     Modified Celestina Score: 10

## 2025-02-19 NOTE — INTERVAL H&P NOTE
H&P reviewed. After examining the patient I find no changes in the patients condition since the H&P had been written.    Vitals:    02/19/25 0648   BP: 154/74   Pulse: 70   Resp: 18   Temp: (!) 97.3 °F (36.3 °C)   SpO2: 95%

## 2025-02-19 NOTE — ANESTHESIA PREPROCEDURE EVALUATION
Procedure:  COLONOSCOPY  EGD    Relevant Problems   ANESTHESIA   (+) PONV (postoperative nausea and vomiting)      CARDIO  Stress test 9/2024, neg ischemia, normal EF   (+) Benign essential hypertension   (+) Hyperlipidemia associated with type 2 diabetes mellitus  (HCC)      ENDO   (+) Type 2 diabetes mellitus with diabetic neuropathy, with long-term current use of insulin (HCC)      GI/HEPATIC   (+) GERD without esophagitis      MUSCULOSKELETAL   (+) Chronic thoracic spine pain   (+) Lumbar spondylosis      NEURO/PSYCH   (+) Malignant neoplasm of spinal cord (HCC)   (+) Type 2 diabetes mellitus with diabetic neuropathy, with long-term current use of insulin (HCC)      PULMONARY   (+) Mild persistent asthma without complication (At baseline)   (-) Sleep apnea   (-) Smoking   (-) URI (upper respiratory infection)      Behavioral Health   (+) Narcotic dependency, continuous (HCC)      Neurology/Sleep   (+) Memory dysfunction   (+) Peripheral polyneuropathy      Oncology   (+) History of duodenal cancer      Surgery/Wound/Pain   (+) 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      Other   (+) B12 deficiency      Physical Exam    Airway    Mallampati score: II  TM Distance: >3 FB  Neck ROM: full     Dental   No notable dental hx     Cardiovascular      Pulmonary      Other Findings  post-pubertal.    Lab Results   Component Value Date    WBC 7.71 02/17/2025    HGB 11.9 02/17/2025     02/17/2025     Lab Results   Component Value Date    SODIUM 134 (L) 02/17/2025    K 4.2 02/17/2025    BUN 29 (H) 02/17/2025    CREATININE 1.30 02/17/2025    EGFR 43 02/17/2025    GLUCOSE 462 (HH) 02/17/2025     Lab Results   Component Value Date    HGBA1C 8.2 (H) 12/18/2024         Anesthesia Plan  ASA Score- 3     Anesthesia Type- IV sedation with anesthesia with ASA Monitors.         Additional Monitors:     Airway Plan:            Plan Factors-Exercise tolerance (METS): >4 METS.    Chart reviewed.   Existing  labs reviewed. Patient summary reviewed.    Patient is not a current smoker.              Induction- intravenous.    Postoperative Plan-         Informed Consent- Anesthetic plan and risks discussed with patient.  I personally reviewed this patient with the CRNA. Discussed and agreed on the Anesthesia Plan with the CRNA..      NPO Status:  Vitals Value Taken Time   Date of last liquid 02/19/25 02/19/25 0637   Time of last liquid 0200 02/19/25 0637   Date of last solid 02/17/25 02/19/25 0637   Time of last solid 1900 02/19/25 0637

## 2025-02-21 ENCOUNTER — HOSPITAL ENCOUNTER (OUTPATIENT)
Dept: INFUSION CENTER | Facility: HOSPITAL | Age: 65
End: 2025-02-21
Attending: INTERNAL MEDICINE
Payer: COMMERCIAL

## 2025-02-21 VITALS — TEMPERATURE: 97.1 F

## 2025-02-21 DIAGNOSIS — E53.8 B12 DEFICIENCY: Primary | ICD-10-CM

## 2025-02-21 PROBLEM — Z90.410 HISTORY OF WHIPPLE PROCEDURE: Status: ACTIVE | Noted: 2025-02-21

## 2025-02-21 PROBLEM — Z90.49 HISTORY OF WHIPPLE PROCEDURE: Status: ACTIVE | Noted: 2025-02-21

## 2025-02-21 PROBLEM — Z85.068 HISTORY OF DUODENAL CANCER: Status: ACTIVE | Noted: 2025-02-21

## 2025-02-21 PROCEDURE — 96372 THER/PROPH/DIAG INJ SC/IM: CPT

## 2025-02-21 RX ORDER — CYANOCOBALAMIN 1000 UG/ML
1000 INJECTION, SOLUTION INTRAMUSCULAR; SUBCUTANEOUS ONCE
Status: COMPLETED | OUTPATIENT
Start: 2025-02-21 | End: 2025-02-21

## 2025-02-21 RX ORDER — CYANOCOBALAMIN 1000 UG/ML
1000 INJECTION, SOLUTION INTRAMUSCULAR; SUBCUTANEOUS ONCE
OUTPATIENT
Start: 2025-03-07

## 2025-02-21 RX ADMIN — CYANOCOBALAMIN 1000 MCG: 1000 INJECTION, SOLUTION INTRAMUSCULAR at 11:38

## 2025-02-21 NOTE — PROGRESS NOTES
Aleida Hammond  tolerated treatment well with no complications.      Aleida Hammond is aware of future appt on 3/7 at 1100.     AVS printed and given to Aleida Hammond:    No (Declined by Aleida Hammond)

## 2025-02-24 PROCEDURE — 88305 TISSUE EXAM BY PATHOLOGIST: CPT | Performed by: PATHOLOGY

## 2025-02-25 ENCOUNTER — RESULTS FOLLOW-UP (OUTPATIENT)
Dept: GASTROENTEROLOGY | Facility: CLINIC | Age: 65
End: 2025-02-25

## 2025-02-26 ENCOUNTER — OFFICE VISIT (OUTPATIENT)
Age: 65
End: 2025-02-26
Payer: COMMERCIAL

## 2025-02-26 VITALS
TEMPERATURE: 97.6 F | HEART RATE: 79 BPM | WEIGHT: 163.4 LBS | HEIGHT: 64 IN | BODY MASS INDEX: 27.9 KG/M2 | OXYGEN SATURATION: 93 % | RESPIRATION RATE: 14 BRPM | DIASTOLIC BLOOD PRESSURE: 70 MMHG | SYSTOLIC BLOOD PRESSURE: 118 MMHG

## 2025-02-26 DIAGNOSIS — K21.9 GERD WITHOUT ESOPHAGITIS: ICD-10-CM

## 2025-02-26 DIAGNOSIS — Z90.410 HISTORY OF WHIPPLE PROCEDURE: ICD-10-CM

## 2025-02-26 DIAGNOSIS — Z90.49 HISTORY OF WHIPPLE PROCEDURE: ICD-10-CM

## 2025-02-26 DIAGNOSIS — C17.0 DUODENAL CANCER (HCC): Primary | ICD-10-CM

## 2025-02-26 DIAGNOSIS — Z85.068 HISTORY OF DUODENAL CANCER: ICD-10-CM

## 2025-02-26 DIAGNOSIS — R11.2 PONV (POSTOPERATIVE NAUSEA AND VOMITING): ICD-10-CM

## 2025-02-26 DIAGNOSIS — Z98.890 PONV (POSTOPERATIVE NAUSEA AND VOMITING): ICD-10-CM

## 2025-02-26 PROCEDURE — 99215 OFFICE O/P EST HI 40 MIN: CPT | Performed by: STUDENT IN AN ORGANIZED HEALTH CARE EDUCATION/TRAINING PROGRAM

## 2025-02-26 NOTE — ASSESSMENT & PLAN NOTE
Working with GI Dr Walden on these sx; recent EGD with no concerns. Likely due to opioid use; he is working to transition her to alternate meds.

## 2025-02-26 NOTE — PROGRESS NOTES
Surgical Oncology Follow Up    [facility] [place]  [address] [address]  [phone]    Aleida L Stephany  1960  [id number]      ASSESSMENT AND PLAN    1. Duodenal cancer (HCC)  Assessment & Plan:  Doing very well. GENEVA. Will need another scan in 6 mo.  Orders:  -     CT chest abdomen pelvis w contrast; Future; Expected date: 08/26/2025  -     BUN; Future; Expected date: 08/26/2025  -     Creatinine, serum; Future; Expected date: 08/26/2025  2. History of Whipple procedure  3. History of duodenal cancer  4. PONV (postoperative nausea and vomiting)  Assessment & Plan:  Working with GI Dr Walden on these sx; recent EGD with no concerns. Likely due to opioid use; he is working to transition her to alternate meds.   5. GERD without esophagitis  Assessment & Plan:  Remains on PPI        Oncology History Overview Note   1/22/24 S/P Whipple which showed a small focus of adenocarcinoma arising from a duodenal adenomatous polyp with focal high-grade dysplasia.  Tumor invading the muscularis mucosa, pT1a.  Margins negative.  0 out of 17 lymph nodes were positive.  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 Complete gastrectomy was also performed showing chronic inactive gastritis and prepyloric ulcer.  0/3 lymph nodes positive.     Stage I adenocarcinoma of the duodenum, pT1a pN0 disease.  No role for adjuvant therapy.     Adenocarcinoma of pancreas (HCC) (Resolved)   1/22/2024 Surgery    A. Lymph Node, Portacaval lymph node:  - One lymph node, negative for malignancy (0/1).     B. Pancreas, Whipple:  - Small focus of adenocarcinoma arising in duodenal adenomatous polyp with focal high grade dysplasia.  - Tumor invades muscularis mucosa (lamina propria, pT1a)  - Ampulla involved by low grade dysplasia.  - Pancreatic intraepitheliel neoplasia (PanIN), involving pancreatic resection margin.  - All  margins are negative for carcinoma or high grade dysplasia.  - Seventeen lymph nodes, negative for malignancy (0/17).      C. Stomach, Completion gastrectomy:  - Stomach with chronic inactive gastritis and prepyloric ulcer.  - Negative for dysplasia or malignancy.  - Three lymph nodes, negative for malignancy (0/3).     2/6/2024 Initial Diagnosis    Adenocarcinoma of pancreas (HCC)     Duodenal cancer (HCC)   2/8/2024 Initial Diagnosis    Adenocarcinoma of duodenum (HCC)     2/8/2024 -  Cancer Staged    Staging form: Small Intestine - Adenocarcinoma, AJCC 8th Edition  - Pathologic: Stage I (pT1a, pN0, cM0) - Signed by Karlene Cruz MD on 2/8/2024  Total positive nodes: 0           Staging: T1aN0 duo adeno  Treatment history: whipple 1/2024  Current treatment: obs  Disease status: GENEVA    History of Present Illness:   F/U visit after whipple for endoscopically unresectable duo polyp in Jan 2024. There was a T1a duo adeno within the specimen. She also underwent completion gastrectomy of her remnant stomach since it was defunctionalized and she required antral resection for her cancer operation. Her original RNY GJ anastomosis was not manipulated and remained intact, but she did require repair of the jej in this location 2/2 presence of axios stent for duo access via EGD. She states she is feeling about the same - struggling with constipation and trying out new meds under the guidance of Dr Walden. Recent CT GENEVA. Recent c-scope and EGD with no concerning findings. No concerns about incision.     Review of Systems  Complete ROS Surg Onc:   Constitutional: The patient denies new or recent history of general fatigue, no recent weight loss, no change in appetite.   Eyes: No complaints of visual problems, no scleral icterus.   ENT: no complaints of ear pain, no hoarseness, no difficulty swallowing,  no tinnitus and no new masses in head, oral cavity, or neck.   Cardiovascular: No complaints of chest pain, no palpitations,  no ankle edema.   Respiratory: No complaints of shortness of breath, no cough.   Gastrointestinal: No complaints of jaundice, no bloody stools, no pale stools.   Genitourinary: No complaints of dysuria, no hematuria, no nocturia, no frequent urination, no urethral discharge.   Musculoskeletal: No complaints of weakness, paralysis, joint stiffness or arthralgias.  Integumentary: No complaints of rash, no new lesions.   Neurological: No complaints of convulsions, no seizures, no dizziness.   Hematologic/Lymphatic: No complaints of easy bruising.   Endocrine:  No hot or cold intolerance.  No polydipsia, polyphagia, or polyuria.  Allergy/immunology:  No environmental allergies.  No food allergies.  Not immunocompromised.  Skin:  No pallor or rash.  No wound.        Patient Active Problem List   Diagnosis    Benign essential hypertension    Mild persistent asthma without complication    Peripheral polyneuropathy    Chronic bilateral back pain    Chronic lumbar radiculopathy    Chronic cervical pain    Chronic thoracic spine pain    GERD without esophagitis    Hyperlipidemia associated with type 2 diabetes mellitus  (HCC)    Insomnia    B12 deficiency    Lumbar radiculopathy    Thoracic spondylosis without myelopathy    Thoracic and lumbosacral neuritis    Degeneration of lumbar intervertebral disc    Lumbar spondylosis    Pain in joint involving pelvic region and thigh    Iron deficiency    Memory dysfunction    COVID-19 vaccine series completed    Type 2 diabetes mellitus with diabetic neuropathy, with long-term current use of insulin (HCC)    Narcotic dependency, continuous (HCC)    Carpal tunnel syndrome on right    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    Microcytic anemia    S/P endoscopic carpal tunnel release    Pillar pain, post-operative    PONV (postoperative nausea and vomiting)    Duodenal mass    Malignant neoplasm of spinal cord (HCC)    Duodenal cancer (HCC)    Change in stool     Constipation    Abnormal chest CT    History of Whipple procedure    History of duodenal cancer     Past Medical History:   Diagnosis Date    PABLO (acute kidney injury) (HCC) 2021    Allergic     Anemia     Anesthesia complication     Aspiration pneumonia after     Anxiety     Arthritis     Asthma     Uses Albuterol daily    Breast lump     Resolved: 2017     Chronic kidney disease 10/2021    PABLO    Chronic pain     back    Chronic pain disorder     Back, legs    Colon cancer (HCC)     Diabetes mellitus (HCC)     Diverticulitis of colon     Dizziness     Duodenal ulcer     GERD (gastroesophageal reflux disease)     Hepatitis     Hepatitis A     History of stomach ulcers     HL (hearing loss)     Estimated    Hyperlipidemia     Hypertension     Hypotension 2021    Infectious viral hepatitis     Hepatitiss A    Lung nodule 10/2023    Memory loss     Estimated    Pneumonia     aspiration pneumonia    PONV (postoperative nausea and vomiting)     Stomach problems      Past Surgical History:   Procedure Laterality Date    ABDOMINAL SURGERY      Gastric bypass    APPENDECTOMY      BACK SURGERY      BARIATRIC SURGERY  About 15 years ago    CATARACT EXTRACTION, BILATERAL       SECTION      CHOLECYSTECTOMY      COLON SURGERY  2024    Whipple    COLONOSCOPY  2016    EYE SURGERY  2023    Cataracts    GASTRECTOMY N/A 2024    Procedure: COMPLETION GASTRECTOMY;  Surgeon: Karlene Cruz MD;  Location: [facility];  Service: Surgical Oncology    GASTRIC BYPASS      GASTRIC BYPASS      INFUSION PUMP IMPLANTATION      LAPAROTOMY N/A 2024    Procedure: DUODENAL EXPLORATION;  Surgeon: Karlene Cruz MD;  Location[facility];  Service: Surgical Oncology    MN NDSC WRST SURG W/RLS TRANSVRS CARPL LIGM Right 2022    Procedure: Right endoscopic carpal tunnel release;  Surgeon: Papo Pegureo MD;  Location: [facility];  Service: Orthopedics     SHOULDER SURGERY      SMALL INTESTINE SURGERY N/A 01/22/2024    Procedure: INTRAOPERATIVE ULTRASOUND;  Surgeon: Karlene Cruz MD;  Location: [facility];  Service: Surgical Oncology    SPINAL CORD STIMULATOR IMPLANT  2020    SPINE SURGERY  2021    Multiple    UPPER GASTROINTESTINAL ENDOSCOPY  2012    Multiple for bleeding ulcer    WHIPPLE PROCEDURE/PANCREATICO-DUODENECTOMY N/A 01/22/2024    Procedure: WHIPPLE;  Surgeon: Karlene Cruz MD;  Location: [facility];  Service: Surgical Oncology     Family History   Problem Relation Age of Onset    Diabetes Mother         Mellitus    Hyperlipidemia Mother     Hypertension Mother     Colon cancer Mother 78    Pancreatic cancer Mother 84    Melanoma Mother 76    Learning disabilities Mother     Cancer Mother         Colon and pancreas    Cancer Father 78        Lung, prostrate and bladder    Alcohol abuse Father     Lung cancer Father 78    Prostate cancer Father 78    No Known Problems Daughter     No Known Problems Maternal Grandmother     No Known Problems Maternal Grandfather     Stomach cancer Paternal Grandmother         60's    Lung cancer Paternal Grandfather 76        Lung    Diabetes Brother         Mellitus    Hyperlipidemia Brother     Hypertension Brother     Learning disabilities Brother     Diabetes Brother     No Known Problems Brother     No Known Problems Son     No Known Problems Son     Breast cancer Maternal Aunt 32        Unknown age    No Known Problems Paternal Aunt     No Known Problems Paternal Aunt     No Known Problems Paternal Aunt     No Known Problems Paternal Aunt     Arthritis Family     Mental illness Neg Hx      Social History     Socioeconomic History    Marital status:      Spouse name: Not on file    Number of children: 3    Years of education: Not on file    Highest education level: Not on file   Occupational History    Occupation: Disability   Tobacco Use    Smoking status: Former     Current packs/day: 0.00      Average packs/day: 1 pack/day for 34.0 years (34.0 ttl pk-yrs)     Types: Cigarettes     Start date: 1973     Quit date: 2007     Years since quittin.1    Smokeless tobacco: Never    Tobacco comments:     15yrs ago   Vaping Use    Vaping status: Never Used   Substance and Sexual Activity    Alcohol use: No    Drug use: No    Sexual activity: Not Currently     Partners: Male     Birth control/protection: Post-menopausal   Other Topics Concern    Not on file   Social History Narrative    Not on file     Social Drivers of Health     Financial Resource Strain: Not on file   Food Insecurity: No Food Insecurity (10/25/2024)    Hunger Vital Sign     Worried About Running Out of Food in the Last Year: Never true     Ran Out of Food in the Last Year: Never true   Transportation Needs: No Transportation Needs (10/25/2024)    PRAPARE - Transportation     Lack of Transportation (Medical): No     Lack of Transportation (Non-Medical): No   Physical Activity: Not on file   Stress: Not on file   Social Connections: Not on file   Intimate Partner Violence: Not on file   Housing Stability: Low Risk  (10/25/2024)    Housing Stability Vital Sign     Unable to Pay for Housing in the Last Year: No     Number of Times Moved in the Last Year: 1     Homeless in the Last Year: No       Current Outpatient Medications:     albuterol (2.5 mg/3 mL) 0.083 % nebulizer solution, Take 3 mL (2.5 mg total) by nebulization every 6 (six) hours as needed for wheezing or shortness of breath, Disp: 300 mL, Rfl: 0    albuterol (PROVENTIL HFA,VENTOLIN HFA) 90 mcg/act inhaler, USE 2 INHALATIONS ORALLY EVERY 6 HOURS AS NEEDED FORWHEEZING, Disp: 25.5 g, Rfl: 1    amLODIPine (NORVASC) 10 mg tablet, Take 1 tablet (10 mg total) by mouth daily, Disp: 90 tablet, Rfl: 1    aspirin (ECOTRIN LOW STRENGTH) 81 mg EC tablet, Take 81 mg by mouth daily, Disp: , Rfl:     Cyanocobalamin (B-12 IJ), Inject as directed every 30 (thirty) days, Disp: , Rfl:      Fluticasone-Salmeterol (Advair) 250-50 mcg/dose inhaler, Inhale 1 puff 2 (two) times a day Rinse mouth after use., Disp: 180 blister, Rfl: 1    glipiZIDE (GLUCOTROL XL) 5 mg 24 hr tablet, Take 1 tablet (5 mg total) by mouth daily, Disp: 100 tablet, Rfl: 0    glucose blood (Accu-Chek Guide) test strip, Test, Disp: 100 strip, Rfl: 3    HYDROcodone-acetaminophen (NORCO) 5-325 mg per tablet, take 1 to 2 tablets by mouth every 6 hours as needed, Disp: , Rfl:     lidocaine (XYLOCAINE) 5 % ointment, apply topically to affected area three times a day, Disp: , Rfl:     metFORMIN (GLUCOPHAGE) 1000 MG tablet, Take 1 tablet (1,000 mg total) by mouth 2 (two) times a day with meals, Disp: 180 tablet, Rfl: 1    methocarbamol (ROBAXIN) 500 mg tablet, Take 500 mg by mouth, Disp: , Rfl:     metoclopramide (REGLAN) 10 mg tablet, Take 10 mg by mouth, Disp: , Rfl:     mometasone (NASONEX) 50 mcg/act nasal spray, SPRAY 2 SPRAYS INTO EACH NOSTRIL EVERY DAY, Disp: 51 g, Rfl: 1    montelukast (SINGULAIR) 10 mg tablet, TAKE 1 TABLET BY MOUTH DAILY AT BEDTIME, Disp: 90 tablet, Rfl: 1    Morphine Sulfate (MORPHINE 0.05 MG/ML SYRINGE, NICU/PICU,, CMPD ORDER,), , Disp: , Rfl:     omeprazole (PriLOSEC) 40 MG capsule, Take 1 capsule (40 mg total) by mouth 2 (two) times a day, Disp: 180 capsule, Rfl: 1    ondansetron (ZOFRAN) 4 mg tablet, TAKE 1 TABLET BY MOUTH EVERY 8 HOURS AS NEEDED FOR NAUSEA AND VOMITING, Disp: 90 tablet, Rfl: 1    pregabalin (LYRICA) 200 MG capsule, Take 200 mg by mouth 3 (three) times a day, Disp: , Rfl:     rosuvastatin (CRESTOR) 20 MG tablet, Take 1 tablet (20 mg total) by mouth daily, Disp: 100 tablet, Rfl: 3    traMADol (ULTRAM) 50 mg tablet, Take 150 mg by mouth every 8 (eight) hours as needed, Disp: , Rfl:     zolpidem (AMBIEN) 5 mg tablet, Take 1 tablet (5 mg total) by mouth daily at bedtime as needed for sleep, Disp: 30 tablet, Rfl: 0    linaCLOtide 290 MCG CAPS, Take 1 capsule by mouth in the morning (Patient not  taking: Reported on 2/26/2025), Disp: 60 capsule, Rfl: 0  Allergies   Allergen Reactions    Nsaids Other (See Comments)     Cannot take NSAIDS secondary to having gastric bypass    Percocet [Oxycodone-Acetaminophen] GI Intolerance     Vitals:    02/26/25 1019   BP: 118/70   Pulse: 79   Resp: 14   Temp: 97.6 °F (36.4 °C)   SpO2: 93%         Physical Exam  Constitutional: Patient is well-developed and well-nourished who appears the stated age in no acute distress. Patient is pleasant and talkative.     HEENT:  Normocephalic.  Sclerae are anicteric. Mucous membranes are moist. Neck is supple without adenopathy. No JVD.     Chest: Equal chest rise, easy WOB  Cardiac: Heart is regular rate.     Abdomen: Abdomen is non-tender, non-distended and without masses.     Extremities: There is no clubbing or cyanosis. There is no edema.  Symmetric.  Neuro: Grossly nonfocal. Gait is normal.     Lymphatic: No evidence of cervical adenopathy bilaterally. No evidence of axillary adenopathy bilaterally. No evidence of inguinal adenopathy bilaterally.     Skin: Warm, anicteric.  Incision well-healed  Psych:  Patient is pleasant and talkative.    Pathology:  T1a duo adeno    Labs:  Reviewed in Epic personally      I personally reviewed and interpreted the available laboratory and imaging data, incl surgical path, current and past CT, labs, GI notes, EGD, c-scope

## 2025-03-03 ENCOUNTER — RA CDI HCC (OUTPATIENT)
Dept: OTHER | Facility: HOSPITAL | Age: 65
End: 2025-03-03

## 2025-03-03 NOTE — PROGRESS NOTES
E11.65, e11.69 (DM w/ hyperlipidemia)    HCC coding opportunities          Chart Reviewed number of suggestions sent to Provider: 2     Patients Insurance     Medicare Insurance: [employer] Advantage

## 2025-03-07 ENCOUNTER — HOSPITAL ENCOUNTER (OUTPATIENT)
Dept: INFUSION CENTER | Facility: HOSPITAL | Age: 65
End: 2025-03-07
Attending: INTERNAL MEDICINE
Payer: COMMERCIAL

## 2025-03-07 VITALS
SYSTOLIC BLOOD PRESSURE: 137 MMHG | OXYGEN SATURATION: 94 % | DIASTOLIC BLOOD PRESSURE: 79 MMHG | TEMPERATURE: 97.3 F | RESPIRATION RATE: 16 BRPM | HEART RATE: 96 BPM

## 2025-03-07 DIAGNOSIS — E53.8 B12 DEFICIENCY: Primary | ICD-10-CM

## 2025-03-07 PROCEDURE — 96372 THER/PROPH/DIAG INJ SC/IM: CPT

## 2025-03-07 RX ORDER — CYANOCOBALAMIN 1000 UG/ML
1000 INJECTION, SOLUTION INTRAMUSCULAR; SUBCUTANEOUS ONCE
Status: COMPLETED | OUTPATIENT
Start: 2025-03-07 | End: 2025-03-07

## 2025-03-07 RX ORDER — CYANOCOBALAMIN 1000 UG/ML
1000 INJECTION, SOLUTION INTRAMUSCULAR; SUBCUTANEOUS ONCE
Status: CANCELLED | OUTPATIENT
Start: 2025-03-21

## 2025-03-07 RX ADMIN — CYANOCOBALAMIN 1000 MCG: 1000 INJECTION, SOLUTION INTRAMUSCULAR at 11:02

## 2025-03-07 NOTE — PROGRESS NOTES
Aleida Hammond  tolerated treatment well with no complications.      Aleida Hammond is aware of future appt on 3/21/2025 at 1000.     AVS printed and given to Aleida Hammond:    No (Declined by Aleida Hammond)

## 2025-03-10 ENCOUNTER — TELEPHONE (OUTPATIENT)
Age: 65
End: 2025-03-10

## 2025-03-10 ENCOUNTER — OFFICE VISIT (OUTPATIENT)
Dept: FAMILY MEDICINE CLINIC | Facility: HOSPITAL | Age: 65
End: 2025-03-10
Payer: COMMERCIAL

## 2025-03-10 VITALS
OXYGEN SATURATION: 95 % | HEART RATE: 81 BPM | BODY MASS INDEX: 28 KG/M2 | SYSTOLIC BLOOD PRESSURE: 130 MMHG | DIASTOLIC BLOOD PRESSURE: 68 MMHG | WEIGHT: 164 LBS | HEIGHT: 64 IN

## 2025-03-10 DIAGNOSIS — E78.5 HYPERLIPIDEMIA ASSOCIATED WITH TYPE 2 DIABETES MELLITUS  (HCC): ICD-10-CM

## 2025-03-10 DIAGNOSIS — E11.69 HYPERLIPIDEMIA ASSOCIATED WITH TYPE 2 DIABETES MELLITUS  (HCC): ICD-10-CM

## 2025-03-10 DIAGNOSIS — L98.8 AGE-RELATED FACIAL WRINKLES: ICD-10-CM

## 2025-03-10 DIAGNOSIS — E11.40 TYPE 2 DIABETES MELLITUS WITH DIABETIC NEUROPATHY, WITH LONG-TERM CURRENT USE OF INSULIN (HCC): Primary | ICD-10-CM

## 2025-03-10 DIAGNOSIS — G47.00 INSOMNIA, UNSPECIFIED TYPE: ICD-10-CM

## 2025-03-10 DIAGNOSIS — Z79.4 TYPE 2 DIABETES MELLITUS WITH DIABETIC NEUROPATHY, WITH LONG-TERM CURRENT USE OF INSULIN (HCC): Primary | ICD-10-CM

## 2025-03-10 DIAGNOSIS — L30.9 ECZEMA OF FACE: ICD-10-CM

## 2025-03-10 LAB — SL AMB POCT HEMOGLOBIN AIC: 7.6 (ref ?–6.5)

## 2025-03-10 PROCEDURE — 83036 HEMOGLOBIN GLYCOSYLATED A1C: CPT | Performed by: STUDENT IN AN ORGANIZED HEALTH CARE EDUCATION/TRAINING PROGRAM

## 2025-03-10 PROCEDURE — 99214 OFFICE O/P EST MOD 30 MIN: CPT | Performed by: STUDENT IN AN ORGANIZED HEALTH CARE EDUCATION/TRAINING PROGRAM

## 2025-03-10 PROCEDURE — G2211 COMPLEX E/M VISIT ADD ON: HCPCS | Performed by: STUDENT IN AN ORGANIZED HEALTH CARE EDUCATION/TRAINING PROGRAM

## 2025-03-10 RX ORDER — TRETINOIN 0.5 MG/G
CREAM TOPICAL
Qty: 45 G | Refills: 1 | Status: SHIPPED | OUTPATIENT
Start: 2025-03-10

## 2025-03-10 RX ORDER — METOCLOPRAMIDE 5 MG/1
1 TABLET ORAL 4 TIMES DAILY
COMMUNITY
Start: 2025-03-06 | End: 2025-03-28

## 2025-03-10 RX ORDER — ZOLPIDEM TARTRATE 5 MG/1
5 TABLET ORAL
Qty: 30 TABLET | Refills: 0 | Status: SHIPPED | OUTPATIENT
Start: 2025-03-10

## 2025-03-10 NOTE — ASSESSMENT & PLAN NOTE
Lab Results   Component Value Date    HGBA1C 7.6 (A) 03/10/2025   Was over the holidays up at 8.0/8.2, is getting better.   Seeing 60-80's in the mornings.   Sometimes checks after dinner if had more sweets/carbs. Can be higher > 200.   Metformin 1000 mg bid & glipizide 5 mg qd.   Already getting lower fasting sugars than before.   Orders:  •  Hemoglobin A1C; Future  •  Basic metabolic panel; Future

## 2025-03-10 NOTE — ASSESSMENT & PLAN NOTE
Ambien - can't sleep without it.   Would stay up until 3 am without it.   Takes 5 mg qhs.   Orders:  •  zolpidem (AMBIEN) 5 mg tablet; Take 1 tablet (5 mg total) by mouth daily at bedtime as needed for sleep

## 2025-03-10 NOTE — PROGRESS NOTES
Name: Aleida Hammond      : 1960      MRN: [id number]  Encounter Provider: Tara Colon DO  Encounter Date: 3/10/2025   Encounter department: [facility] [address]  :  Assessment & Plan  Type 2 diabetes mellitus with diabetic neuropathy, with long-term current use of insulin (HCC)    Lab Results   Component Value Date    HGBA1C 7.6 (A) 03/10/2025   Was over the holidays up at 8.0/8.2, is getting better.   Seeing 60-80's in the mornings.   Sometimes checks after dinner if had more sweets/carbs. Can be higher > 200.   Metformin 1000 mg bid & glipizide 5 mg qd.   Already getting lower fasting sugars than before.   Orders:  •  Hemoglobin A1C; Future  •  Basic metabolic panel; Future    Hyperlipidemia associated with type 2 diabetes mellitus  (HCC)  On crestor 20 mg qd.   Lab Results   Component Value Date    HGBA1C 7.6 (A) 03/10/2025     Orders:  •  POCT hemoglobin A1c    Age-related facial wrinkles  Has tried topical options for wrinkles and face dryness/ eczema patches.   Has not yet tried retin A cream, would like to use. Daughter reported benefits for herself and pt has never had.   No risk of pregnancy. Discussed using as part of a face cleanse & nightly routine.   Orders:  •  tretinoin (Retin-A) 0.05 % cream; Apply topically daily at bedtime    Eczema of face  Dry areas on skin, and some regions with recurrent acne. Needs cream as above.   Orders:  •  tretinoin (Retin-A) 0.05 % cream; Apply topically daily at bedtime    Insomnia, unspecified type  Ambien - can't sleep without it.   Would stay up until 3 am without it.   Takes 5 mg qhs.   Orders:  •  zolpidem (AMBIEN) 5 mg tablet; Take 1 tablet (5 mg total) by mouth daily at bedtime as needed for sleep    Return in about 3 months (around 6/10/2025) for F/U Chronic DX, rvw labs & A1c should be done prior to appt .       History of Present Illness   Chief Complaint   Patient presents with   • Follow-up   HPI  Interested in facial  "cream, some acne, wrinkles & eczema. Daughter having help with tretinoin, would like to try.     Seeing GI - was supposed to start linzess, but not covered.   BM's every 4-5 days. Sometimes enemas needed.   On stool softeners too.     Filled  Written  ID  Drug  QTY  Days  Prescriber  RX #  Dispenser  Refill  Daily Dose*  Pymt Type      03/06/2025 03/06/2025 1 Pregabalin 200 Mg Capsule 60.00 20 Th Juan Jose [id number] Bdr (4320) 0 4.02 LME Comm Ins [place]   02/17/2025 02/17/2025 1 Zolpidem Tartrate 5 Mg Tablet 30.00 30 Sa Bog [id number] Pen (6052) 0 0.25 LME Medicare [place]   02/10/2025 02/10/2025 1 Hydrocodone-Acetamin 5-325 Mg 45.00 8 Er Dem [id number] Bdr (4320) 0 28.13 MME Comm Ins [place]   01/29/2025 01/29/2025 1 Tramadol Hcl 50 Mg Tablet 20.00 3 Th Juan Jose [id number] Bdr (4320) 0 66.67 MME Comm Ins [place]   01/19/2025 10/25/2024 1 Zolpidem Tartrate 5 Mg Tablet 30.00 30 Sa Bog           Wt Readings from Last 3 Encounters:   03/10/25 74.4 kg (164 lb)   02/26/25 74.1 kg (163 lb 6.4 oz)   02/18/25 73.5 kg (162 lb)     Temp Readings from Last 3 Encounters:   03/21/25 (!) 96.8 °F (36 °C) (Temporal)   03/07/25 (!) 97.3 °F (36.3 °C) (Temporal)   02/26/25 97.6 °F (36.4 °C) (Temporal)     BP Readings from Last 3 Encounters:   03/21/25 114/70   03/10/25 130/68   03/07/25 137/79     Pulse Readings from Last 3 Encounters:   03/21/25 72   03/10/25 81   03/07/25 96       Review of Systems   Constitutional:  Negative for chills and fever.   Respiratory:  Negative for cough and shortness of breath.         THOMAS + mild   Cardiovascular:  Negative for chest pain and palpitations.   Neurological:  Negative for dizziness, light-headedness and headaches.   Psychiatric/Behavioral:  Positive for sleep disturbance.      Objective   /68   Pulse 81   Ht 5' 4\" (1.626 m)   Wt 74.4 kg (164 lb)   LMP  (LMP Unknown)   SpO2 95%   BMI 28.15 kg/m²      Physical Exam  Vitals and nursing note reviewed.   Constitutional:       General: She is not in acute distress.     " Appearance: Normal appearance. She is well-developed. She is not ill-appearing.   HENT:      Head: Normocephalic and atraumatic.   Eyes:      General: No scleral icterus.        Right eye: No discharge.         Left eye: No discharge.      Conjunctiva/sclera: Conjunctivae normal.   Cardiovascular:      Rate and Rhythm: Normal rate and regular rhythm.      Pulses: Normal pulses.      Heart sounds: Normal heart sounds. No murmur heard.  Pulmonary:      Effort: Pulmonary effort is normal. No respiratory distress.      Breath sounds: Normal breath sounds.   Musculoskeletal:      Cervical back: Normal range of motion and neck supple. No rigidity.      Right lower leg: No edema.      Left lower leg: No edema.   Skin:     General: Skin is warm and dry.      Capillary Refill: Capillary refill takes less than 2 seconds.      Findings: Rash (areas with acne, and dry skin along face and jawline.) present.      Comments: Wrinkles seen bilateral in face, folding over eyelids   Neurological:      Mental Status: She is alert and oriented to person, place, and time.      Gait: Gait normal.   Psychiatric:         Mood and Affect: Mood normal.         Behavior: Behavior normal.         Thought Content: Thought content normal.         Judgment: Judgment normal.

## 2025-03-10 NOTE — ASSESSMENT & PLAN NOTE
On crestor 20 mg qd.   Lab Results   Component Value Date    HGBA1C 7.6 (A) 03/10/2025     Orders:  •  POCT hemoglobin A1c

## 2025-03-10 NOTE — TELEPHONE ENCOUNTER
*OV NEEDS TO BE SIGNED* PA for Tretinoin (Retin-A) 0.05 % cream SUBMITTED to [facility]    via    []CMM-KEY:   [x]Surescripts-Case ID #: [id number]   []Availity-Auth ID #   []Faxed to plan   []Other website   []Phone call Case ID #     []PA sent as URGENT    All office notes, labs and other pertaining documents and studies sent. Clinical questions answered. Awaiting determination from insurance company.     Turnaround time for your insurance to make a decision on your Prior Authorization can take 7-21 business days.

## 2025-03-11 ENCOUNTER — TELEPHONE (OUTPATIENT)
Dept: FAMILY MEDICINE CLINIC | Facility: HOSPITAL | Age: 65
End: 2025-03-11

## 2025-03-21 ENCOUNTER — HOSPITAL ENCOUNTER (OUTPATIENT)
Dept: INFUSION CENTER | Facility: HOSPITAL | Age: 65
Discharge: HOME | End: 2025-03-21
Attending: INTERNAL MEDICINE
Payer: COMMERCIAL

## 2025-03-21 VITALS
SYSTOLIC BLOOD PRESSURE: 114 MMHG | DIASTOLIC BLOOD PRESSURE: 70 MMHG | OXYGEN SATURATION: 97 % | TEMPERATURE: 96.8 F | HEART RATE: 72 BPM | RESPIRATION RATE: 18 BRPM

## 2025-03-21 DIAGNOSIS — E53.8 B12 DEFICIENCY: Primary | ICD-10-CM

## 2025-03-21 PROCEDURE — 96372 THER/PROPH/DIAG INJ SC/IM: CPT

## 2025-03-21 RX ORDER — CYANOCOBALAMIN 1000 UG/ML
1000 INJECTION, SOLUTION INTRAMUSCULAR; SUBCUTANEOUS ONCE
Status: COMPLETED | OUTPATIENT
Start: 2025-03-21 | End: 2025-03-21

## 2025-03-21 RX ORDER — CYANOCOBALAMIN 1000 UG/ML
1000 INJECTION, SOLUTION INTRAMUSCULAR; SUBCUTANEOUS ONCE
Status: CANCELLED | OUTPATIENT
Start: 2025-04-04

## 2025-03-21 RX ADMIN — CYANOCOBALAMIN 1000 MCG: 1000 INJECTION, SOLUTION INTRAMUSCULAR at 09:57

## 2025-03-21 NOTE — PROGRESS NOTES
Pt tolerated B12 injection without any adverse reaction. Band-aid applied and intact. Pt ambulated with steady gait off unit. Declined AVS     Aware of next appt 04/04/25 @ 1030 am

## 2025-03-24 NOTE — TELEPHONE ENCOUNTER
PA for Tretinoin (Retin-A) 0.05 % cream SUBMITTED to [facility]    via    []CMM-KEY:   [x]Surescripts-Case ID #: [id number]   []Availity-Auth ID #   []Faxed to plan   []Other website   []Phone call Case ID # cx    [x]PA sent as URGENT    All office notes, labs and other pertaining documents and studies sent. Clinical questions answered. Awaiting determination from insurance company.     Turnaround time for your insurance to make a decision on your Prior Authorization can take 7-21 business days.

## 2025-03-26 ENCOUNTER — OFFICE VISIT (OUTPATIENT)
Dept: NEUROLOGY | Facility: CLINIC | Age: 65
End: 2025-03-26
Payer: COMMERCIAL

## 2025-03-26 ENCOUNTER — OFFICE VISIT (OUTPATIENT)
Age: 65
End: 2025-03-26
Payer: COMMERCIAL

## 2025-03-26 VITALS
DIASTOLIC BLOOD PRESSURE: 70 MMHG | TEMPERATURE: 97.8 F | SYSTOLIC BLOOD PRESSURE: 118 MMHG | HEIGHT: 64 IN | OXYGEN SATURATION: 95 % | BODY MASS INDEX: 27.83 KG/M2 | WEIGHT: 163 LBS | HEART RATE: 72 BPM

## 2025-03-26 VITALS
TEMPERATURE: 97.2 F | DIASTOLIC BLOOD PRESSURE: 60 MMHG | BODY MASS INDEX: 28 KG/M2 | HEIGHT: 64 IN | SYSTOLIC BLOOD PRESSURE: 118 MMHG | WEIGHT: 164 LBS | HEART RATE: 66 BPM

## 2025-03-26 DIAGNOSIS — R91.1 PULMONARY NODULE: ICD-10-CM

## 2025-03-26 DIAGNOSIS — J45.40 MODERATE PERSISTENT ASTHMA WITHOUT COMPLICATION: ICD-10-CM

## 2025-03-26 DIAGNOSIS — G62.9 PERIPHERAL POLYNEUROPATHY: Primary | ICD-10-CM

## 2025-03-26 DIAGNOSIS — Z85.068 HISTORY OF DUODENAL CANCER: ICD-10-CM

## 2025-03-26 DIAGNOSIS — J45.41 MODERATE PERSISTENT ASTHMA WITH ACUTE EXACERBATION: Primary | ICD-10-CM

## 2025-03-26 PROCEDURE — 99214 OFFICE O/P EST MOD 30 MIN: CPT | Performed by: INTERNAL MEDICINE

## 2025-03-26 PROCEDURE — 99214 OFFICE O/P EST MOD 30 MIN: CPT | Performed by: PSYCHIATRY & NEUROLOGY

## 2025-03-26 PROCEDURE — G2211 COMPLEX E/M VISIT ADD ON: HCPCS | Performed by: INTERNAL MEDICINE

## 2025-03-26 RX ORDER — MONTELUKAST SODIUM 10 MG/1
10 TABLET ORAL
Qty: 30 TABLET | Refills: 5 | Status: SHIPPED | OUTPATIENT
Start: 2025-03-26 | End: 2025-09-22

## 2025-03-26 RX ORDER — FLUTICASONE PROPIONATE AND SALMETEROL 250; 50 UG/1; UG/1
1 POWDER RESPIRATORY (INHALATION) 2 TIMES DAILY
Qty: 180 BLISTER | Refills: 1 | Status: SHIPPED | OUTPATIENT
Start: 2025-03-26 | End: 2026-03-21

## 2025-03-26 NOTE — PROGRESS NOTES
"Name: Aleida Hammond      : 1960      MRN: [id number]  Encounter Provider: Lorna Mera MD  Encounter Date: 3/26/2025   Encounter department: [facility]  :  Assessment & Plan  Peripheral polyneuropathy  Patient here today for neuro follow-up.  Patient last seen 2024.  Patient notes no significant changes in her polyneuropathy.  Patient denies any falls or trips.  No new symptoms in the upper or lower extremities.  Patient is following closely with pain management.  Patient has been seeing pain management for 23 years at [facility].  Patient notes her current pain pump is about 4 years old, her spinal cord stimulator is about 5 years old and due for battery change.  Patient is planning on having further discussion with pain management about possible removal of the spinal cord stimulator.  Patient is unsure of any significant benefit for her back.  From a neuropathy standpoint, patient remains stable.  Exam remains stable.  Patient's diabetes is being followed by her PCP.  Last hemoglobin A1c in 2024 was elevated 8.2.  Also, as noted at last appointment, patient's vitamin B12 was low normal at 272.  This was last done on September 3, 2024.  Patient is following with hematology/oncology for B12 replacement.  Patient has upcoming appointment to ensure adequate levels.  Patient to return in 6 months.               History of Present Illness   HPI   Pt is a 66 yo f with pmh of gastric bypass, dm type 2, asthma, vit b12 deficiency, neuropathy, hypercholesterolemia, htn, chronic pain, lumbar spondylosis, prior lumbar surgery 26 yrs ago, SCS about one yr ago who presents today for neuro follow up.    pt last seen on  24.   Per my last note \"Pt notes sxs for the past several yrs, more apparent past 2 yrs.  Pt notes no tia or cva like sxs.  Pt notes history of headaches.   Pt notes occ feeling of food getting stuck.  No diff with articulation of " "speech.  No incontinence.  Pt notes long standing diff with directions, ever since learning to drive.  Not new.  Pt denies any significant financial issues.   Pt notes occ positional dizziness.   Pt notes diff with recall of specific details. Pt has noted specifically on job, issues with keeping up on same level of detail.  Worked for same company for 24 yrs.    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   Pt notes family also noted issues with stm and repeating self.  Also family reminds her of events from their youth.  Pt with supportive family and lives close to them.   Pt works in [employer] field.   Pt with known history of gastric bypass.  Pt also with known history of vit b12 deficiency.  Pt more recently with im replacement.  Rev labs dating back to 2018.  April 2018 vit b12 at 292. Continues to go for occasional iron infusions and monthly vitamin B12 injections.  This is regulated by her hematologist.  Patient has a son that lives in the home with her.  Patient did have most recent blood work September 2024 with a vitamin B12 level of 272.  This still remains less than 400.  Reviewed trending with patient back to October 2019.  Oct 2019 that was the [place] level at 144.  2021 was the best with levels of 738 and 1823.  Patient has had lower levels since 2022 to present all in the 200s (206,204 )and presently to 272\"   Patient here today for neuro follow-up.  Patient last seen on 9/25/2024.  Patient is followed for her underlying polyneuropathy.  Patient has had prior Whipple procedure January 20  Patient follows with hematology oncology for both iron and B12 deficiency.  Patient is on vitamin B12 IM replacement bimonthly.  Patient denies any falls or trips.  No change in bowel or bladder.  No new nocturnal symptoms.  No change in upper extremity paresthesias.  Patient's last vitamin B12 level " was at timing of last appointment in September 2024 with a level of 272.  Patient with history of the past 2 years of blood levels in the low 200s.  Patient remains on Lyrica from pain management.  Patient is followed with pain management for the past 23 years.  Patient currently has a pain pump for the past 4 years.  Her spinal cord stimulator was placed about 5 years ago and is due soon for battery replacement.  Patient is considering removal of the spinal cord stimulator after discussion with her pain management team.  No falls or trips.  No recent hospitalizations.  No recent infections.  Foot intrinsics remain full.  Patient reminded of risk for falls.  Fall safety precautions also provided.  Patient is following with PCP for blood sugars.  Patient is followed for her diabetes by her PCP.  Last hemoglobin A1c was from December 18, 2024 with an elevated level of 8.2.  Patient is following up closely for blood sugar control.  The following portions of the patient's history were reviewed and updated as appropriate: allergies, current medications, past family history, past medical history, past social history, past surgical history, and problem list and med rec and ros rev.   Review of Systems   Constitutional:  Negative for appetite change, fatigue and fever.   HENT: Negative.  Negative for hearing loss, tinnitus, trouble swallowing and voice change.    Eyes: Negative.  Negative for photophobia, pain and visual disturbance.   Respiratory: Negative.  Negative for shortness of breath.    Cardiovascular: Negative.  Negative for palpitations.   Gastrointestinal: Negative.  Negative for nausea and vomiting.   Endocrine: Negative.  Negative for cold intolerance.   Genitourinary: Negative.  Negative for dysuria, frequency and urgency.   Musculoskeletal:  Negative for back pain, gait problem, myalgias, neck pain and neck stiffness.   Skin: Negative.  Negative for rash.   Allergic/Immunologic: Negative.    Neurological:   "Positive for weakness and numbness. Negative for dizziness, tremors, seizures, syncope, facial asymmetry, speech difficulty, light-headedness and headaches.        A lot of med changes with pain doctors and pain pump concentrations.  Stimulator getting new programming  Causing more pain/numbness in feet   Hematological: Negative.  Does not bruise/bleed easily.   Psychiatric/Behavioral: Negative.  Negative for confusion, hallucinations and sleep disturbance.    All other systems reviewed and are negative.   I have personally reviewed the MA's review of systems and made changes as necessary.         Objective   /60   Pulse 66   Temp (!) 97.2 °F (36.2 °C)   Ht 5' 4\" (1.626 m)   Wt 74.4 kg (164 lb)   LMP  (LMP Unknown)   BMI 28.15 kg/m²     Physical Exam  Constitutional:       General: She is not in acute distress.     Appearance: She is not ill-appearing.   Eyes:      General: Lids are normal.      Extraocular Movements: Extraocular movements intact.      Pupils: Pupils are equal, round, and reactive to light.   Musculoskeletal:      Right lower leg: No edema.      Left lower leg: No edema.   Neurological:      Mental Status: She is alert.      Motor: Motor strength is normal.     Deep Tendon Reflexes:      Reflex Scores:       Tricep reflexes are 2+ on the right side and 2+ on the left side.       Bicep reflexes are 2+ on the right side and 2+ on the left side.       Brachioradialis reflexes are 2+ on the right side and 2+ on the left side.       Patellar reflexes are 1+ on the right side and 1+ on the left side.       Achilles reflexes are 1+ on the right side and 1+ on the left side.  Psychiatric:         Speech: Speech normal.       Neurological Exam  Mental Status  Alert. Recent and remote memory are intact. Speech is normal. Language is fluent with no aphasia. Attention and concentration are normal. Fund of knowledge is appropriate for level of education.    Cranial Nerves  CN II: Visual acuity is " normal. Visual fields full to confrontation.  CN III, IV, VI: Extraocular movements intact bilaterally. Normal lids and orbits bilaterally. Pupils equal round and reactive to light bilaterally.  CN V: Facial sensation is normal.  CN VII: Full and symmetric facial movement.  CN VIII: Hearing is normal.  CN IX, X: Palate elevates symmetrically. Normal gag reflex.  CN XI: Shoulder shrug strength is normal.  CN XII: Tongue midline without atrophy or fasciculations.    Motor  Normal muscle bulk throughout. Normal muscle tone. No abnormal involuntary movements. Strength is 5/5 throughout all four extremities.    Sensory  Dec vib patti lowers.    Reflexes                                            Right                      Left  Brachioradialis                    2+                         2+  Biceps                                 2+                         2+  Triceps                                2+                         2+  Finger flex                           2+                         2+  Hamstring                            2+                         2+  Patellar                                1+                         1+  Achilles                                1+                         1+  Right Plantar: downgoing  Left Plantar: downgoing    Coordination  Right: Finger-to-nose normal. Rapid alternating movement normal.Left: Finger-to-nose normal. Rapid alternating movement normal.    Gait  Casual gait: Wide stance.

## 2025-03-26 NOTE — ASSESSMENT & PLAN NOTE
Patient here today for neuro follow-up.  Patient last seen September 25, 2024.  Patient notes no significant changes in her polyneuropathy.  Patient denies any falls or trips.  No new symptoms in the upper or lower extremities.  Patient is following closely with pain management.  Patient has been seeing pain management for 23 years at [facility].  Patient notes her current pain pump is about 4 years old, her spinal cord stimulator is about 5 years old and due for battery change.  Patient is planning on having further discussion with pain management about possible removal of the spinal cord stimulator.  Patient is unsure of any significant benefit for her back.  From a neuropathy standpoint, patient remains stable.  Exam remains stable.  Patient's diabetes is being followed by her PCP.  Last hemoglobin A1c in December 18, 2024 was elevated 8.2.  Also, as noted at last appointment, patient's vitamin B12 was low normal at 272.  This was last done on September 3, 2024.  Patient is following with hematology/oncology for B12 replacement.  Patient has upcoming appointment to ensure adequate levels.  Patient to return in 6 months.

## 2025-03-26 NOTE — PROGRESS NOTES
"Pulmonary Follow Up Note  Aleida Hammond 65 y.o. female MRN: [id number]  3/26/2025      HPI:    Patient states that asthma symptoms have been well-controlled.  She requires albuterol 1-2 times weekly.  No baseline symptoms such as cough or sputum production.  No dyspnea on exertion.    Exercise Tolerance: Good.  No gross exercise intolerance       Meds:  Advair 250 twice daily  Montelukast 10 mg daily  Albuterol as needed, 1-2 times weekly    ROS:  Constitutional: - Fatigue, - chills, - fever, - weight change.   HEENT: - rhinorrhea, - sneezing, - sore throat.    Respiratory: - cough, - shortness of breath, - wheezing.    Cardiovascular: - chest pain,  -palpitations, - leg swelling.   Gastrointestinal: - abdominal pain, - constipation, - diarrhea, - nausea, - vomiting.   Endocrine: - cold intolerance, - heat intolerance.   Genitourinary: - dysuria.   Musculoskeletal: - arthralgias.   Skin:- rash, - wound.   Allergic/Immunologic: - allergies  Neurological: - dizziness, - numbness        Vitals: Blood pressure 118/70, pulse 72, temperature 97.8 °F (36.6 °C), temperature source Tympanic, height 5' 4\" (1.626 m), weight 73.9 kg (163 lb), SpO2 95%., Body mass index is 27.98 kg/m².    Physical Exam:  GEN  NAD  HEENT  ncat, non icteric, MM moist  NECK  supple, no JVD, no LAD  CV  +s1s2, no mrg, RRR  PULM  CTA BL, no wrr  ABD  soft, ntnd, + BS  EXT  + LE edema, no cyanosis, no clubbing  NEURO  Aox3, no focal weakness    Imaging and other studies:   I personally viewed and interpreted the following imaging studies:  CT chest 2/10/2025 shows stable nodules in the left upper lobe.    Pulmonary function testing:   PFT 10/20/2024 shows small airways disease without heidi obstruction.      Assessment:  Asthma, moderate persistent-without acute exacerbation  CT chest abnormality  Pulmonary nodule  History of tobacco abuse  Cancer of the small intestine    Plan:  Continue Advair 250 twice daily.  Refilled.  Cont montelukast daily. " "Refilled.  Continue albuterol as needed.  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  Patient will call pulmonary office with any worsening or development of new pulmonary symptoms prior to next visit.    Return visit in end September 2025  On next visit we will evaluate symptoms,albuterol requirement, CT chest results    Note: Portions of the record may have been created with voice recognition software. Occasional wrong word or \"sound a like\" substitutions may have occurred due to the inherent limitations of voice recognition software. Read the chart carefully and recognize, using context, where substitutions have occurred.     Steve Person M.D.  [facility]    "

## 2025-03-27 ENCOUNTER — APPOINTMENT (EMERGENCY)
Dept: RADIOLOGY | Facility: HOSPITAL | Age: 65
End: 2025-03-27
Payer: COMMERCIAL

## 2025-03-27 ENCOUNTER — HOSPITAL ENCOUNTER (OUTPATIENT)
Facility: HOSPITAL | Age: 65
Setting detail: OBSERVATION
Discharge: HOME | End: 2025-03-28
Attending: EMERGENCY MEDICINE | Admitting: HOSPITALIST
Payer: COMMERCIAL

## 2025-03-27 DIAGNOSIS — M54.50 LOW BACK PAIN: ICD-10-CM

## 2025-03-27 DIAGNOSIS — R53.1 GENERALIZED WEAKNESS: Primary | ICD-10-CM

## 2025-03-27 LAB
2HR DELTA HS TROPONIN: 0 NG/L
ALBUMIN SERPL BCG-MCNC: 4.2 G/DL (ref 3.5–5)
ALP SERPL-CCNC: 95 U/L (ref 34–104)
ALT SERPL W P-5'-P-CCNC: 51 U/L (ref 7–52)
AMORPH URATE CRY URNS QL MICRO: NORMAL
ANION GAP SERPL CALCULATED.3IONS-SCNC: 10 MMOL/L (ref 4–13)
AST SERPL W P-5'-P-CCNC: 40 U/L (ref 13–39)
ATRIAL RATE: 87 BPM
BACTERIA UR QL AUTO: NORMAL /HPF
BASOPHILS # BLD AUTO: 0.02 THOUSANDS/ÂΜL (ref 0–0.1)
BASOPHILS NFR BLD AUTO: 0 % (ref 0–1)
BILIRUB SERPL-MCNC: 0.61 MG/DL (ref 0.2–1)
BILIRUB UR QL STRIP: NEGATIVE
BUN SERPL-MCNC: 28 MG/DL (ref 5–25)
CALCIUM SERPL-MCNC: 9.5 MG/DL (ref 8.4–10.2)
CARDIAC TROPONIN I PNL SERPL HS: 4 NG/L (ref ?–50)
CARDIAC TROPONIN I PNL SERPL HS: 4 NG/L (ref ?–50)
CHLORIDE SERPL-SCNC: 104 MMOL/L (ref 96–108)
CK SERPL-CCNC: 87 U/L (ref 26–192)
CLARITY UR: CLEAR
CO2 SERPL-SCNC: 26 MMOL/L (ref 21–32)
COLOR UR: ABNORMAL
CREAT SERPL-MCNC: 1.09 MG/DL (ref 0.6–1.3)
CRP SERPL QL: <1 MG/L
EOSINOPHIL # BLD AUTO: 0.06 THOUSAND/ÂΜL (ref 0–0.61)
EOSINOPHIL NFR BLD AUTO: 1 % (ref 0–6)
ERYTHROCYTE [DISTWIDTH] IN BLOOD BY AUTOMATED COUNT: 12.9 % (ref 11.6–15.1)
FLUAV RNA RESP QL NAA+PROBE: NEGATIVE
FLUBV RNA RESP QL NAA+PROBE: NEGATIVE
GFR SERPL CREATININE-BSD FRML MDRD: 53 ML/MIN/1.73SQ M
GLUCOSE SERPL-MCNC: 159 MG/DL (ref 65–140)
GLUCOSE SERPL-MCNC: 167 MG/DL (ref 65–140)
GLUCOSE UR STRIP-MCNC: NEGATIVE MG/DL
HCT VFR BLD AUTO: 39.8 % (ref 34.8–46.1)
HGB BLD-MCNC: 12.5 G/DL (ref 11.5–15.4)
HGB UR QL STRIP.AUTO: ABNORMAL
IMM GRANULOCYTES # BLD AUTO: 0.08 THOUSAND/UL (ref 0–0.2)
IMM GRANULOCYTES NFR BLD AUTO: 1 % (ref 0–2)
KETONES UR STRIP-MCNC: ABNORMAL MG/DL
LEUKOCYTE ESTERASE UR QL STRIP: NEGATIVE
LYMPHOCYTES # BLD AUTO: 0.97 THOUSANDS/ÂΜL (ref 0.6–4.47)
LYMPHOCYTES NFR BLD AUTO: 11 % (ref 14–44)
MAGNESIUM SERPL-MCNC: 1.5 MG/DL (ref 1.9–2.7)
MCH RBC QN AUTO: 27.8 PG (ref 26.8–34.3)
MCHC RBC AUTO-ENTMCNC: 31.4 G/DL (ref 31.4–37.4)
MCV RBC AUTO: 88 FL (ref 82–98)
MONOCYTES # BLD AUTO: 0.24 THOUSAND/ÂΜL (ref 0.17–1.22)
MONOCYTES NFR BLD AUTO: 3 % (ref 4–12)
MUCOUS THREADS UR QL AUTO: NORMAL
NEUTROPHILS # BLD AUTO: 7.76 THOUSANDS/ÂΜL (ref 1.85–7.62)
NEUTS SEG NFR BLD AUTO: 84 % (ref 43–75)
NITRITE UR QL STRIP: NEGATIVE
NON-SQ EPI CELLS URNS QL MICRO: NORMAL /HPF
NRBC BLD AUTO-RTO: 0 /100 WBCS
P AXIS: 67 DEGREES
PH UR STRIP.AUTO: 7 [PH]
PHOSPHATE SERPL-MCNC: 4 MG/DL (ref 2.3–4.1)
PLATELET # BLD AUTO: 161 THOUSANDS/UL (ref 149–390)
PMV BLD AUTO: 12.6 FL (ref 8.9–12.7)
POTASSIUM SERPL-SCNC: 4 MMOL/L (ref 3.5–5.3)
PR INTERVAL: 222 MS
PROCALCITONIN SERPL-MCNC: 0.1 NG/ML
PROT SERPL-MCNC: 7.4 G/DL (ref 6.4–8.4)
PROT UR STRIP-MCNC: ABNORMAL MG/DL
QRS AXIS: 1 DEGREES
QRSD INTERVAL: 76 MS
QT INTERVAL: 366 MS
QTC INTERVAL: 440 MS
RBC # BLD AUTO: 4.5 MILLION/UL (ref 3.81–5.12)
RBC #/AREA URNS AUTO: NORMAL /HPF
RSV RNA RESP QL NAA+PROBE: NEGATIVE
SARS-COV-2 RNA RESP QL NAA+PROBE: NEGATIVE
SODIUM SERPL-SCNC: 140 MMOL/L (ref 135–147)
SP GR UR STRIP.AUTO: 1.01 (ref 1–1.03)
T WAVE AXIS: 63 DEGREES
TSH SERPL DL<=0.05 MIU/L-ACNC: 1.78 UIU/ML (ref 0.45–4.5)
UROBILINOGEN UR STRIP-ACNC: <2 MG/DL
VENTRICULAR RATE: 87 BPM
WBC # BLD AUTO: 9.13 THOUSAND/UL (ref 4.31–10.16)
WBC #/AREA URNS AUTO: NORMAL /HPF

## 2025-03-27 PROCEDURE — 81001 URINALYSIS AUTO W/SCOPE: CPT | Performed by: EMERGENCY MEDICINE

## 2025-03-27 PROCEDURE — 96374 THER/PROPH/DIAG INJ IV PUSH: CPT

## 2025-03-27 PROCEDURE — 82550 ASSAY OF CK (CPK): CPT | Performed by: EMERGENCY MEDICINE

## 2025-03-27 PROCEDURE — 93005 ELECTROCARDIOGRAM TRACING: CPT

## 2025-03-27 PROCEDURE — 84443 ASSAY THYROID STIM HORMONE: CPT | Performed by: EMERGENCY MEDICINE

## 2025-03-27 PROCEDURE — 93010 ELECTROCARDIOGRAM REPORT: CPT | Performed by: INTERNAL MEDICINE

## 2025-03-27 PROCEDURE — 99222 1ST HOSP IP/OBS MODERATE 55: CPT | Performed by: HOSPITALIST

## 2025-03-27 PROCEDURE — 85025 COMPLETE CBC W/AUTO DIFF WBC: CPT | Performed by: EMERGENCY MEDICINE

## 2025-03-27 PROCEDURE — 84484 ASSAY OF TROPONIN QUANT: CPT | Performed by: EMERGENCY MEDICINE

## 2025-03-27 PROCEDURE — 83735 ASSAY OF MAGNESIUM: CPT | Performed by: EMERGENCY MEDICINE

## 2025-03-27 PROCEDURE — 80053 COMPREHEN METABOLIC PANEL: CPT | Performed by: EMERGENCY MEDICINE

## 2025-03-27 PROCEDURE — 99285 EMERGENCY DEPT VISIT HI MDM: CPT

## 2025-03-27 PROCEDURE — 71045 X-RAY EXAM CHEST 1 VIEW: CPT

## 2025-03-27 PROCEDURE — 0241U HB NFCT DS VIR RESP RNA 4 TRGT: CPT | Performed by: EMERGENCY MEDICINE

## 2025-03-27 PROCEDURE — 99285 EMERGENCY DEPT VISIT HI MDM: CPT | Performed by: EMERGENCY MEDICINE

## 2025-03-27 PROCEDURE — 36415 COLL VENOUS BLD VENIPUNCTURE: CPT | Performed by: EMERGENCY MEDICINE

## 2025-03-27 PROCEDURE — 86140 C-REACTIVE PROTEIN: CPT | Performed by: EMERGENCY MEDICINE

## 2025-03-27 PROCEDURE — 82948 REAGENT STRIP/BLOOD GLUCOSE: CPT

## 2025-03-27 PROCEDURE — 84145 PROCALCITONIN (PCT): CPT | Performed by: EMERGENCY MEDICINE

## 2025-03-27 PROCEDURE — 96361 HYDRATE IV INFUSION ADD-ON: CPT

## 2025-03-27 PROCEDURE — 84100 ASSAY OF PHOSPHORUS: CPT | Performed by: EMERGENCY MEDICINE

## 2025-03-27 RX ORDER — ACETAMINOPHEN 10 MG/ML
1000 INJECTION, SOLUTION INTRAVENOUS ONCE
Status: COMPLETED | OUTPATIENT
Start: 2025-03-27 | End: 2025-03-27

## 2025-03-27 RX ORDER — FLUTICASONE FUROATE AND VILANTEROL 200; 25 UG/1; UG/1
1 POWDER RESPIRATORY (INHALATION) DAILY
Status: DISCONTINUED | OUTPATIENT
Start: 2025-03-27 | End: 2025-03-28 | Stop reason: HOSPADM

## 2025-03-27 RX ORDER — CALCIUM CARBONATE 500 MG/1
500 TABLET, CHEWABLE ORAL DAILY PRN
Status: DISCONTINUED | OUTPATIENT
Start: 2025-03-27 | End: 2025-03-28 | Stop reason: HOSPADM

## 2025-03-27 RX ORDER — ASPIRIN 81 MG/1
81 TABLET ORAL DAILY
Status: DISCONTINUED | OUTPATIENT
Start: 2025-03-27 | End: 2025-03-28 | Stop reason: HOSPADM

## 2025-03-27 RX ORDER — SENNOSIDES 8.6 MG
1 TABLET ORAL
Status: DISCONTINUED | OUTPATIENT
Start: 2025-03-27 | End: 2025-03-28

## 2025-03-27 RX ORDER — ATORVASTATIN CALCIUM 40 MG/1
40 TABLET, FILM COATED ORAL
Status: DISCONTINUED | OUTPATIENT
Start: 2025-03-27 | End: 2025-03-28 | Stop reason: HOSPADM

## 2025-03-27 RX ORDER — HYDRALAZINE HYDROCHLORIDE 20 MG/ML
5 INJECTION INTRAMUSCULAR; INTRAVENOUS EVERY 6 HOURS PRN
Status: DISCONTINUED | OUTPATIENT
Start: 2025-03-27 | End: 2025-03-28 | Stop reason: HOSPADM

## 2025-03-27 RX ORDER — MONTELUKAST SODIUM 10 MG/1
10 TABLET ORAL
Status: DISCONTINUED | OUTPATIENT
Start: 2025-03-27 | End: 2025-03-28 | Stop reason: HOSPADM

## 2025-03-27 RX ORDER — GUAIFENESIN/DEXTROMETHORPHAN 100-10MG/5
10 SYRUP ORAL EVERY 4 HOURS PRN
Status: DISCONTINUED | OUTPATIENT
Start: 2025-03-27 | End: 2025-03-28 | Stop reason: HOSPADM

## 2025-03-27 RX ORDER — ACETAMINOPHEN 325 MG/1
650 TABLET ORAL EVERY 6 HOURS PRN
Status: DISCONTINUED | OUTPATIENT
Start: 2025-03-27 | End: 2025-03-28

## 2025-03-27 RX ORDER — PREGABALIN 100 MG/1
200 CAPSULE ORAL 3 TIMES DAILY
Status: DISCONTINUED | OUTPATIENT
Start: 2025-03-27 | End: 2025-03-28 | Stop reason: HOSPADM

## 2025-03-27 RX ORDER — INSULIN LISPRO 100 [IU]/ML
1-5 INJECTION, SOLUTION INTRAVENOUS; SUBCUTANEOUS
Status: DISCONTINUED | OUTPATIENT
Start: 2025-03-27 | End: 2025-03-28 | Stop reason: HOSPADM

## 2025-03-27 RX ORDER — HYDROMORPHONE HCL/PF 1 MG/ML
1 SYRINGE (ML) INJECTION
Status: DISCONTINUED | OUTPATIENT
Start: 2025-03-27 | End: 2025-03-27

## 2025-03-27 RX ORDER — AMLODIPINE BESYLATE 5 MG/1
10 TABLET ORAL DAILY
Status: DISCONTINUED | OUTPATIENT
Start: 2025-03-27 | End: 2025-03-28 | Stop reason: HOSPADM

## 2025-03-27 RX ORDER — HYDROMORPHONE HCL/PF 1 MG/ML
0.5 SYRINGE (ML) INJECTION
Refills: 0 | Status: DISCONTINUED | OUTPATIENT
Start: 2025-03-27 | End: 2025-03-27

## 2025-03-27 RX ORDER — POLYETHYLENE GLYCOL 3350 17 G/17G
17 POWDER, FOR SOLUTION ORAL DAILY PRN
Status: DISCONTINUED | OUTPATIENT
Start: 2025-03-27 | End: 2025-03-28 | Stop reason: HOSPADM

## 2025-03-27 RX ORDER — TRAMADOL HYDROCHLORIDE 50 MG/1
100 TABLET ORAL EVERY 8 HOURS PRN
Status: DISCONTINUED | OUTPATIENT
Start: 2025-03-27 | End: 2025-03-28 | Stop reason: HOSPADM

## 2025-03-27 RX ORDER — HYDROMORPHONE HCL/PF 1 MG/ML
0.5 SYRINGE (ML) INJECTION ONCE
Status: COMPLETED | OUTPATIENT
Start: 2025-03-27 | End: 2025-03-27

## 2025-03-27 RX ORDER — HYDROMORPHONE HCL/PF 1 MG/ML
1 SYRINGE (ML) INJECTION EVERY 2 HOUR PRN
Status: DISCONTINUED | OUTPATIENT
Start: 2025-03-27 | End: 2025-03-27

## 2025-03-27 RX ORDER — ZOLPIDEM TARTRATE 5 MG/1
5 TABLET ORAL
Status: DISCONTINUED | OUTPATIENT
Start: 2025-03-27 | End: 2025-03-28 | Stop reason: HOSPADM

## 2025-03-27 RX ORDER — HYDROMORPHONE HCL/PF 1 MG/ML
1 SYRINGE (ML) INJECTION EVERY 2 HOUR PRN
Status: DISCONTINUED | OUTPATIENT
Start: 2025-03-27 | End: 2025-03-28

## 2025-03-27 RX ORDER — ENOXAPARIN SODIUM 100 MG/ML
40 INJECTION SUBCUTANEOUS DAILY
Status: DISCONTINUED | OUTPATIENT
Start: 2025-03-27 | End: 2025-03-28 | Stop reason: HOSPADM

## 2025-03-27 RX ADMIN — HYDROMORPHONE HYDROCHLORIDE 0.5 MG: 1 INJECTION, SOLUTION INTRAMUSCULAR; INTRAVENOUS; SUBCUTANEOUS at 13:45

## 2025-03-27 RX ADMIN — ATORVASTATIN CALCIUM 40 MG: 40 TABLET, FILM COATED ORAL at 16:29

## 2025-03-27 RX ADMIN — SODIUM CHLORIDE 1000 ML: 0.9 INJECTION, SOLUTION INTRAVENOUS at 09:55

## 2025-03-27 RX ADMIN — HYDROMORPHONE HYDROCHLORIDE 1 MG: 1 INJECTION, SOLUTION INTRAMUSCULAR; INTRAVENOUS; SUBCUTANEOUS at 19:28

## 2025-03-27 RX ADMIN — ASPIRIN 81 MG: 81 TABLET, COATED ORAL at 16:29

## 2025-03-27 RX ADMIN — PREGABALIN 200 MG: 100 CAPSULE ORAL at 18:00

## 2025-03-27 RX ADMIN — AMLODIPINE BESYLATE 10 MG: 5 TABLET ORAL at 16:29

## 2025-03-27 RX ADMIN — INSULIN LISPRO 1 UNITS: 100 INJECTION, SOLUTION INTRAVENOUS; SUBCUTANEOUS at 18:00

## 2025-03-27 RX ADMIN — ZOLPIDEM TARTRATE 5 MG: 5 TABLET ORAL at 21:34

## 2025-03-27 RX ADMIN — HYDROMORPHONE HYDROCHLORIDE 1 MG: 1 INJECTION, SOLUTION INTRAMUSCULAR; INTRAVENOUS; SUBCUTANEOUS at 16:29

## 2025-03-27 RX ADMIN — ENOXAPARIN SODIUM 40 MG: 40 INJECTION SUBCUTANEOUS at 16:29

## 2025-03-27 RX ADMIN — HYDROMORPHONE HYDROCHLORIDE 1 MG: 1 INJECTION, SOLUTION INTRAMUSCULAR; INTRAVENOUS; SUBCUTANEOUS at 21:34

## 2025-03-27 RX ADMIN — MONTELUKAST 10 MG: 10 TABLET, FILM COATED ORAL at 21:34

## 2025-03-27 RX ADMIN — PREGABALIN 200 MG: 100 CAPSULE ORAL at 21:34

## 2025-03-27 RX ADMIN — ACETAMINOPHEN 1000 MG: 10 INJECTION INTRAVENOUS at 11:39

## 2025-03-27 RX ADMIN — FLUTICASONE FUROATE AND VILANTEROL TRIFENATATE 1 PUFF: 200; 25 POWDER RESPIRATORY (INHALATION) at 18:00

## 2025-03-27 NOTE — ASSESSMENT & PLAN NOTE
Pt presents with generalized weakness without focal deficits, malaise, and increased pain despite increase dose of morphine pump  Pt has chronic pain, morphine pump in place.  PT/OT  Pt will follow up with her pain management team following discharge regarding adjustments to the pump

## 2025-03-27 NOTE — ASSESSMENT & PLAN NOTE
"Lab Results   Component Value Date    HGBA1C 7.6 (A) 03/10/2025       No results for input(s): \"POCGLU\" in the last 72 hours.    Blood Sugar Average: Last 72 hrs:  Monitor blood glucose  ISS  Accuchecks  Diabetic diet  Continue home insulin  Hold oral diabetic agents      "

## 2025-03-27 NOTE — ED PROVIDER NOTES
Time reflects when diagnosis was documented in both MDM as applicable and the Disposition within this note       Time User Action Codes Description Comment    3/27/2025 12:32 PM Rod Smith Add [R53.1] Generalized weakness     3/27/2025 12:33 PM Rod Smith [M54.50] Low back pain           ED Disposition       ED Disposition   Admit    Condition   Stable    Date/Time   Thu Mar 27, 2025 12:41 PM    Comment   Case was discussed with francine and the patient's admission status was agreed to be Admission Status: observation status to the service of Dr. Tariq .               Assessment & Plan       Medical Decision Making  Generalized weakness differential includes toxic metabolic or infectious etiology will check labs chest x-ray COVID flu swabs urinalysis    Amount and/or Complexity of Data Reviewed  Labs: ordered.  Radiology: ordered and independent interpretation performed.    Risk  Prescription drug management.  Decision regarding hospitalization.             Medications   sodium chloride 0.9 % bolus 1,000 mL (1,000 mL Intravenous New Bag 3/27/25 3069)   acetaminophen (Ofirmev) injection 1,000 mg (0 mg Intravenous Stopped 3/27/25 1154)   HYDROmorphone (DILAUDID) injection 0.5 mg (has no administration in time range)       ED Risk Strat Scores   HEART Risk Score      Flowsheet Row Most Recent Value   Heart Score Risk Calculator    History 0 Filed at: 03/27/2025 1210   ECG 0 Filed at: 03/27/2025 1210   Age 2 Filed at: 03/27/2025 1210   Risk Factors 1 Filed at: 03/27/2025 1210   Troponin 0 Filed at: 03/27/2025 1210   HEART Score 3 Filed at: 03/27/2025 1210          HEART Risk Score      Flowsheet Row Most Recent Value   Heart Score Risk Calculator    History 0 Filed at: 03/27/2025 1210   ECG 0 Filed at: 03/27/2025 1210   Age 2 Filed at: 03/27/2025 1210   Risk Factors 1 Filed at: 03/27/2025 1210   Troponin 0 Filed at: 03/27/2025 1210   HEART Score 3 Filed at: 03/27/2025 1210                              SBIRT 20yo+       Flowsheet Row Most Recent Value   Initial Alcohol Screen: US AUDIT-C     1. How often do you have a drink containing alcohol? 0 Filed at: 2025   2. How many drinks containing alcohol do you have on a typical day you are drinking?  0 Filed at: 2025   3a. Male UNDER 65: How often do you have five or more drinks on one occasion? 0 Filed at: 2025   3b. FEMALE Any Age, or MALE 65+: How often do you have 4 or more drinks on one occassion? 0 Filed at: 2025   Audit-C Score 0 Filed at: 2025   ALVARO: How many times in the past year have you...    Used an illegal drug or used a prescription medication for non-medical reasons? Never Filed at: 2025                            History of Present Illness       Chief Complaint   Patient presents with    Weakness - Generalized     Pt presents from home via EMS c/o increased generalized weakness and leg pain x1 week. Pt states she has spinal nerve damage and has a morphine pump in place but pain is worse and weakness is new. Denies any recent falls or sickness       Past Medical History:   Diagnosis Date    PABLO (acute kidney injury) (HCC) 2021    Allergic     Anemia     Anesthesia complication     Aspiration pneumonia after     Anxiety     Arthritis     Asthma     Uses Albuterol daily    Breast lump     Resolved: 2017     Chronic kidney disease 10/2021    PABLO    Chronic pain     back    Chronic pain disorder     Back, legs    Colon cancer (HCC)     Diabetes mellitus (HCC)     Diverticulitis of colon     Dizziness     Duodenal ulcer     GERD (gastroesophageal reflux disease)     Hepatitis     Hepatitis A 2017    History of stomach ulcers     HL (hearing loss) 2015    Estimated    Hyperlipidemia     Hypertension     Hypotension 2021    Infectious viral hepatitis     Hepatitiss A    Lung nodule 10/2023    Memory loss 2015    Estimated    Pneumonia     aspiration pneumonia    PONV  (postoperative nausea and vomiting)     Stomach problems       Past Surgical History:   Procedure Laterality Date    ABDOMINAL SURGERY      Gastric bypass    APPENDECTOMY      BACK SURGERY      BARIATRIC SURGERY  About 15 years ago    CATARACT EXTRACTION, BILATERAL       SECTION      CHOLECYSTECTOMY      COLON SURGERY  2024    [facility]    COLONOSCOPY  2016    EYE SURGERY  2023    Cataracts    GASTRECTOMY N/A 2024    Procedure: COMPLETION GASTRECTOMY;  Surgeon: Karlene Cruz MD;  Location: [facility];  Service: Surgical Oncology    GASTRIC BYPASS      GASTRIC BYPASS      INFUSION PUMP IMPLANTATION      LAPAROTOMY N/A 2024    Procedure: DUODENAL EXPLORATION;  Surgeon: Karlene Cruz MD;  Location: [facility];  Service: Surgical Oncology    TN NDSC WRST SURG W/RLS TRANSVRS CARPL LIGM Right 2022    Procedure: Right endoscopic carpal tunnel release;  Surgeon: Papo Peguero MD;  Location: [facility];  Service: Orthopedics    SHOULDER SURGERY      SMALL INTESTINE SURGERY N/A 2024    Procedure: INTRAOPERATIVE ULTRASOUND;  Surgeon: Karlene Cruz MD;  Location: [facility];  Service: Surgical Oncology    SPINAL CORD STIMULATOR IMPLANT      SPINE SURGERY      Multiple    UPPER GASTROINTESTINAL ENDOSCOPY      Multiple for bleeding ulcer    WHIPPLE PROCEDURE/PANCREATICO-DUODENECTOMY N/A 2024    Procedure: WHIPPLE;  Surgeon: Karlene Cruz MD;  Location: [facility];  Service: Surgical Oncology      Family History   Problem Relation Age of Onset    Diabetes Mother         Mellitus    Hyperlipidemia Mother     Hypertension Mother     Colon cancer Mother 78    Pancreatic cancer Mother 84    Melanoma Mother 76    Learning disabilities Mother     Cancer Mother         Colon and pancreas    Cancer Father 78        Lung, prostrate and bladder    Alcohol abuse Father     Lung cancer Father 78    Prostate cancer Father 78    No Known Problems Daughter      No Known Problems Maternal Grandmother     No Known Problems Maternal Grandfather     Stomach cancer Paternal Grandmother         60's    Lung cancer Paternal Grandfather 76        Lung    Diabetes Brother         Mellitus    Hyperlipidemia Brother     Hypertension Brother     Learning disabilities Brother     Diabetes Brother     No Known Problems Brother     No Known Problems Son     No Known Problems Son     Breast cancer Maternal Aunt 32        Unknown age    No Known Problems Paternal Aunt     No Known Problems Paternal Aunt     No Known Problems Paternal Aunt     No Known Problems Paternal Aunt     Arthritis Family     Mental illness Neg Hx       Social History     Tobacco Use    Smoking status: Former     Current packs/day: 0.00     Average packs/day: 1 pack/day for 34.0 years (34.0 ttl pk-yrs)     Types: Cigarettes     Start date: 1973     Quit date: 2007     Years since quittin.2    Smokeless tobacco: Never    Tobacco comments:     15yrs ago   Vaping Use    Vaping status: Never Used   Substance Use Topics    Alcohol use: No    Drug use: No      E-Cigarette/Vaping    E-Cigarette Use Never User       E-Cigarette/Vaping Substances    Nicotine No     THC No     CBD No     Flavoring No     Other No     Unknown No       I have reviewed and agree with the history as documented.     This is a 65-year-old female presents via ambulance from home for evaluation of generalized weakness that has been going on over the past week she does have a history of spinal stenosis has a morphine pump in place has had her dosages increase over the past several months with decrease in her oral therapy she denies any incontinence of urine or stool no fevers or chills no history of diabetes or drug use she was able to ambulate a short distance to the ambulance today.  Also has a metallic taste in her mouth and some generalized achiness and urinary frequency      History provided by:  Patient  Medical  Problem  Location:  Generalized  Quality:  Weakness  Severity:  Moderate  Onset quality:  Gradual  Duration:  1 week  Timing:  Constant  Progression:  Worsening  Chronicity:  New  Context:  Generalized weakness  Worsened by:  Possible infection or medication  Associated symptoms: myalgias        Review of Systems   Musculoskeletal:  Positive for back pain and myalgias.   Neurological:  Positive for weakness and numbness.   All other systems reviewed and are negative.          Objective       ED Triage Vitals   Temperature Pulse Blood Pressure Respirations SpO2 Patient Position - Orthostatic VS   03/27/25 0945 03/27/25 0923 03/27/25 0923 03/27/25 0923 03/27/25 0923 03/27/25 0923   98.9 °F (37.2 °C) 92 (!) 177/91 17 97 % Lying      Temp Source Heart Rate Source BP Location FiO2 (%) Pain Score    03/27/25 0945 03/27/25 0923 03/27/25 0923 -- 03/27/25 0923    Temporal Monitor Right arm  10 - Worst Possible Pain      Vitals      Date and Time Temp Pulse SpO2 Resp BP Pain Score FACES Pain Rating User   03/27/25 1631 97.9 °F (36.6 °C) 87 95 % -- 158/92 -- -- DII   03/27/25 1629 -- -- -- -- 158/92 8 --    03/27/25 1503 -- -- 97 % -- -- -- --    03/27/25 1345 -- -- -- -- -- 8 --    03/27/25 1334 -- -- -- 16 -- -- --    03/27/25 1334 97.7 °F (36.5 °C) 89 95 % -- 154/86 -- -- DII   03/27/25 1247 -- -- -- -- -- 8 -- AC   03/27/25 1230 -- 83 97 % 16 161/80 -- -- AC   03/27/25 1100 -- 80 95 % 13 168/79 -- -- LK   03/27/25 0945 -- 87 94 % 21 176/84 -- -- MB   03/27/25 0945 98.9 °F (37.2 °C) -- -- -- -- -- -- SS   03/27/25 0923 -- 92 97 % 17 177/91 10 - Worst Possible Pain -- SS            Physical Exam  Vitals and nursing note reviewed.   Constitutional:       Appearance: She is ill-appearing. She is not toxic-appearing or diaphoretic.   HENT:      Head: Normocephalic and atraumatic.      Right Ear: Tympanic membrane, ear canal and external ear normal.      Left Ear: Tympanic membrane, ear canal and external ear normal.       Mouth/Throat:      Mouth: Mucous membranes are dry.   Eyes:      General:         Right eye: No discharge.         Left eye: No discharge.      Extraocular Movements: Extraocular movements intact.      Comments: Pupils 4 mm bilateral   Cardiovascular:      Rate and Rhythm: Normal rate and regular rhythm.      Pulses: Normal pulses.      Heart sounds: No murmur heard.     No friction rub. No gallop.   Pulmonary:      Effort: No respiratory distress.      Breath sounds: No stridor. No wheezing, rhonchi or rales.   Abdominal:      General: There is no distension.      Palpations: Abdomen is soft.      Tenderness: There is no abdominal tenderness.   Musculoskeletal:         General: No swelling, tenderness, deformity or signs of injury.      Cervical back: Neck supple. No rigidity.      Right lower leg: No edema.      Left lower leg: No edema.   Skin:     General: Skin is warm and dry.      Coloration: Skin is pale. Skin is not jaundiced.      Findings: No bruising, erythema or rash.   Neurological:      General: No focal deficit present.      Mental Status: She is alert and oriented to person, place, and time.      Cranial Nerves: No cranial nerve deficit.      Sensory: Sensory deficit present.      Motor: Weakness present.      Coordination: Coordination normal.      Comments: Decree sensation posterior calf areas bilaterally which is chronic per patient she is able to move both extremities equally without any focal motor deficit   Psychiatric:         Mood and Affect: Mood normal.         Results Reviewed       Procedure Component Value Units Date/Time    HS Troponin I 2hr [[id number]]  (Normal) Collected: 03/27/25 1138    Lab Status: Final result Specimen: Blood from Arm, Right Updated: 03/27/25 1207     hs TnI 2hr 4 ng/L      Delta 2hr hsTnI 0 ng/L     Urine Microscopic [[id number]] Collected: 03/27/25 1009    Lab Status: Final result Specimen: Urine, Clean Catch Updated: 03/27/25 1125     RBC, UA None Seen /hpf       WBC, UA None Seen /hpf      Epithelial Cells Occasional /hpf      Bacteria, UA None Seen /hpf      MUCUS THREADS None Seen     Amorphous Crystals, UA Occasional    UA w Reflex to Microscopic w Reflex to Culture [[id number]]  (Abnormal) Collected: 03/27/25 1009    Lab Status: Final result Specimen: Urine, Clean Catch Updated: 03/27/25 1125     Color, UA Light Yellow     Clarity, UA Clear     Specific Gravity, UA 1.015     pH, UA 7.0     Leukocytes, UA Negative     Nitrite, UA Negative     Protein, UA Trace mg/dl      Glucose, UA Negative mg/dl      Ketones, UA 10 (1+) mg/dl      Urobilinogen, UA <2.0 mg/dl      Bilirubin, UA Negative     Occult Blood, UA Trace    FLU/RSV/COVID - if FLU/RSV clinically relevant (2hr TAT) [[id number]]  (Normal) Collected: 03/27/25 0955    Lab Status: Final result Specimen: Nares from Nose Updated: 03/27/25 1048     SARS-CoV-2 Negative     INFLUENZA A PCR Negative     INFLUENZA B PCR Negative     RSV PCR Negative    Narrative:      This test has been performed using the CoV-2/Flu/RSV plus assay on the [employer] Gene[employer]pert platform. This test has been validated by the  and verified by the performing laboratory.     This test is designed to amplify and detect the following: nucleocapsid (N), envelope (E), and RNA-dependent RNA polymerase (RdRP) genes of the SARS-CoV-2 genome; matrix (M), basic polymerase (PB2), and acidic protein (PA) segments of the influenza A genome; matrix (M) and non-structural protein (NS) segments of the influenza B genome, and the nucleocapsid genes of RSV A and RSV B.     Positive results are indicative of the presence of Flu A, Flu B, RSV, and/or SARS-CoV-2 RNA. Positive results for SARS-CoV-2 or suspected novel influenza should be reported to state, local, or federal health departments according to local reporting requirements.      All results should be assessed in conjunction with clinical presentation and other laboratory markers for clinical  management.     FOR PEDIATRIC PATIENTS - copy/paste COVID Guidelines URL to browser: [email or web address]       TSH, 3rd generation with Free T4 reflex [[id number]]  (Normal) Collected: 03/27/25 0955    Lab Status: Final result Specimen: Blood from Arm, Right Updated: 03/27/25 1042     TSH 3RD GENERATON 1.784 uIU/mL     Procalcitonin [[id number]]  (Normal) Collected: 03/27/25 0955    Lab Status: Final result Specimen: Blood from Arm, Right Updated: 03/27/25 1035     Procalcitonin 0.10 ng/ml     HS Troponin 0hr (reflex protocol) [[id number]]  (Normal) Collected: 03/27/25 0955    Lab Status: Final result Specimen: Blood from Arm, Right Updated: 03/27/25 1032     hs TnI 0hr 4 ng/L     Comprehensive metabolic panel [[id number]]  (Abnormal) Collected: 03/27/25 0955    Lab Status: Final result Specimen: Blood from Arm, Right Updated: 03/27/25 1026     Sodium 140 mmol/L      Potassium 4.0 mmol/L      Chloride 104 mmol/L      CO2 26 mmol/L      ANION GAP 10 mmol/L      BUN 28 mg/dL      Creatinine 1.09 mg/dL      Glucose 167 mg/dL      Calcium 9.5 mg/dL      AST 40 U/L      ALT 51 U/L      Alkaline Phosphatase 95 U/L      Total Protein 7.4 g/dL      Albumin 4.2 g/dL      Total Bilirubin 0.61 mg/dL      eGFR 53 ml/min/1.73sq m     Narrative:      National Kid[employer] guidelines for Chronic Kidney Disease (CKD):     Stage 1 with normal or high GFR (GFR > 90 mL/min/1.73 square meters)    Stage 2 Mild CKD (GFR = 60-89 mL/min/1.73 square meters)    Stage 3A Moderate CKD (GFR = 45-59 mL/min/1.73 square meters)    Stage 3B Moderate CKD (GFR = 30-44 mL/min/1.73 square meters)    Stage 4 Severe CKD (GFR = 15-29 mL/min/1.73 square meters)    Stage 5 End Stage CKD (GFR <15 mL/min/1.73 square meters)  Note: GFR calculation is accurate only with a steady state creatinine    C-reactive protein [[id number]]  (Normal) Collected: 03/27/25 0955    Lab Status: Final result  Specimen: Blood from Arm, Right Updated: 03/27/25 1026     CRP <1.0 mg/L     Magnesium [[id number]]  (Abnormal) Collected: 03/27/25 0955    Lab Status: Final result Specimen: Blood from Arm, Right Updated: 03/27/25 1026     Magnesium 1.5 mg/dL     Phosphorus [[id number]]  (Normal) Collected: 03/27/25 0955    Lab Status: Final result Specimen: Blood from Arm, Right Updated: 03/27/25 1026     Phosphorus 4.0 mg/dL     CK [[id number]]  (Normal) Collected: 03/27/25 0955    Lab Status: Final result Specimen: Blood from Arm, Right Updated: 03/27/25 1026     Total CK 87 U/L     CBC and differential [[id number]]  (Abnormal) Collected: 03/27/25 0955    Lab Status: Final result Specimen: Blood from Arm, Right Updated: 03/27/25 1010     WBC 9.13 Thousand/uL      RBC 4.50 Million/uL      Hemoglobin 12.5 g/dL      Hematocrit 39.8 %      MCV 88 fL      MCH 27.8 pg      MCHC 31.4 g/dL      RDW 12.9 %      MPV 12.6 fL      Platelets 161 Thousands/uL      nRBC 0 /100 WBCs      Segmented % 84 %      Immature Grans % 1 %      Lymphocytes % 11 %      Monocytes % 3 %      Eosinophils Relative 1 %      Basophils Relative 0 %      Absolute Neutrophils 7.76 Thousands/µL      Absolute Immature Grans 0.08 Thousand/uL      Absolute Lymphocytes 0.97 Thousands/µL      Absolute Monocytes 0.24 Thousand/µL      Eosinophils Absolute 0.06 Thousand/µL      Basophils Absolute 0.02 Thousands/µL             XR chest 1 view portable   ED Interpretation by Rod Smith DO (03/27 1209)   No acute infiltrates or congestive heart failure      Final Interpretation by Jose Roberto Coronado MD (03/27 1323)      No acute cardiopulmonary disease.            Resident: ARIEL Moura I, the attending radiologist, have reviewed the images and agree with the final report above.      Workstation performed: [id number]             ECG 12 Lead Documentation Only    Date/Time: 3/27/2025 9:51 AM    Performed by: Rod Smith DO  Authorized by: Rod Smith DO    ECG  reviewed by me, the ED Provider: yes    Patient location:  ED  Rate:     ECG rate:  87  Rhythm:     Rhythm: sinus rhythm    Conduction:     Conduction: normal    T waves:     T waves: normal        ED Medication and Procedure Management   Prior to Admission Medications   Prescriptions Last Dose Informant Patient Reported? Taking?   Cyanocobalamin (B-12 IJ) Past Week Self Yes Yes   Sig: Inject as directed every 30 (thirty) days   Patient taking differently: Inject as directed every 14 (fourteen) days   Fluticasone-Salmeterol (Advair) 250-50 mcg/dose inhaler 3/26/2025 Evening  No Yes   Sig: Inhale 1 puff 2 (two) times a day Rinse mouth after use.   HYDROcodone-acetaminophen (NORCO) 5-325 mg per tablet 3/27/2025 at  6:00 AM Self Yes Yes   Sig: take 1 to 2 tablets by mouth every 6 hours as needed   Morphine Sulfate (MORPHINE 0.05 MG/ML SYRINGE, NICU/PICU,, CMPD ORDER,) 3/27/2025 Self Yes Yes   albuterol (2.5 mg/3 mL) 0.083 % nebulizer solution More than a month Self No No   Sig: Take 3 mL (2.5 mg total) by nebulization every 6 (six) hours as needed for wheezing or shortness of breath   albuterol (PROVENTIL HFA,VENTOLIN HFA) 90 mcg/act inhaler Past Week Self No Yes   Sig: USE 2 INHALATIONS ORALLY EVERY 6 HOURS AS NEEDED FORWHEEZING   amLODIPine (NORVASC) 10 mg tablet 3/26/2025 Morning Self No Yes   Sig: Take 1 tablet (10 mg total) by mouth daily   aspirin (ECOTRIN LOW STRENGTH) 81 mg EC tablet 3/26/2025 Morning Self Yes Yes   Sig: Take 81 mg by mouth daily   glipiZIDE (GLUCOTROL XL) 5 mg 24 hr tablet 3/26/2025 Morning  No Yes   Sig: Take 1 tablet (5 mg total) by mouth daily   glucose blood (Accu-Chek Guide) test strip  Self No No   Sig: Test   lidocaine (XYLOCAINE) 5 % ointment More than a month Self Yes No   Sig: apply topically to affected area three times a day   linaCLOtide 290 MCG CAPS Not Taking  No No   Sig: Take 1 capsule by mouth in the morning   Patient not taking: Reported on 3/27/2025   metFORMIN (GLUCOPHAGE)  1000 MG tablet 3/26/2025 Evening Self No Yes   Sig: Take 1 tablet (1,000 mg total) by mouth 2 (two) times a day with meals   methocarbamol (ROBAXIN) 500 mg tablet Not Taking Self Yes No   Sig: Take 500 mg by mouth   Patient not taking: Reported on 3/27/2025   metoclopramide (REGLAN) 5 mg tablet Not Taking  Yes No   Sig: Take 1 tablet by mouth 4 times a day   Patient not taking: Reported on 3/27/2025   mometasone (NASONEX) 50 mcg/act nasal spray 3/26/2025 Evening Self No Yes   Sig: SPRAY 2 SPRAYS INTO EACH NOSTRIL EVERY DAY   montelukast (SINGULAIR) 10 mg tablet 3/26/2025 Bedtime  No Yes   Sig: Take 1 tablet (10 mg total) by mouth daily at bedtime   omeprazole (PriLOSEC) 40 MG capsule 3/26/2025 Evening Self No Yes   Sig: Take 1 capsule (40 mg total) by mouth 2 (two) times a day   ondansetron (ZOFRAN) 4 mg tablet Not Taking Self No No   Sig: TAKE 1 TABLET BY MOUTH EVERY 8 HOURS AS NEEDED FOR NAUSEA AND VOMITING   Patient not taking: Reported on 3/27/2025   pregabalin (LYRICA) 200 MG capsule 3/26/2025 Evening Self Yes Yes   Sig: Take 200 mg by mouth 3 (three) times a day   rosuvastatin (CRESTOR) 20 MG tablet 3/26/2025 Evening Self No Yes   Sig: Take 1 tablet (20 mg total) by mouth daily   traMADol (ULTRAM) 50 mg tablet 3/27/2025 Morning Self Yes Yes   Sig: Take 150 mg by mouth every 8 (eight) hours as needed   tretinoin (Retin-A) 0.05 % cream 3/26/2025 Bedtime  No Yes   Sig: Apply topically daily at bedtime   zolpidem (AMBIEN) 5 mg tablet 3/26/2025 Bedtime  No Yes   Sig: Take 1 tablet (5 mg total) by mouth daily at bedtime as needed for sleep      Facility-Administered Medications: None     Current Discharge Medication List        CONTINUE these medications which have NOT CHANGED    Details   albuterol (PROVENTIL HFA,VENTOLIN HFA) 90 mcg/act inhaler USE 2 INHALATIONS ORALLY EVERY 6 HOURS AS NEEDED FORWHEEZING  Qty: 25.5 g, Refills: 1    Comments: Substitution to a formulary equivalent within the same pharmaceutical  class is authorized.  Associated Diagnoses: Mild persistent asthma, unspecified whether complicated      amLODIPine (NORVASC) 10 mg tablet Take 1 tablet (10 mg total) by mouth daily  Qty: 90 tablet, Refills: 1    Associated Diagnoses: Benign essential hypertension      aspirin (ECOTRIN LOW STRENGTH) 81 mg EC tablet Take 81 mg by mouth daily      Cyanocobalamin (B-12 IJ) Inject as directed every 30 (thirty) days      Fluticasone-Salmeterol (Advair) 250-50 mcg/dose inhaler Inhale 1 puff 2 (two) times a day Rinse mouth after use.  Qty: 180 blister, Refills: 1    Comments: Substitution to a formulary equivalent within the same pharmaceutical class is authorized. Patient did best with wixela if that is available  Associated Diagnoses: Moderate persistent asthma without complication      glipiZIDE (GLUCOTROL XL) 5 mg 24 hr tablet Take 1 tablet (5 mg total) by mouth daily  Qty: 100 tablet, Refills: 0    Associated Diagnoses: Type 2 diabetes mellitus with diabetic neuropathy, with long-term current use of insulin ([facility])      HYDROcodone-acetaminophen (NORCO) 5-325 mg per tablet take 1 to 2 tablets by mouth every 6 hours as needed      metFORMIN (GLUCOPHAGE) 1000 MG tablet Take 1 tablet (1,000 mg total) by mouth 2 (two) times a day with meals  Qty: 180 tablet, Refills: 1    Associated Diagnoses: Type 2 diabetes mellitus without complication, without long-term current use of insulin ([facility])      mometasone (NASONEX) 50 mcg/act nasal spray SPRAY 2 SPRAYS INTO EACH NOSTRIL EVERY DAY  Qty: 51 g, Refills: 1    Associated Diagnoses: Acute non-recurrent maxillary sinusitis      montelukast (SINGULAIR) 10 mg tablet Take 1 tablet (10 mg total) by mouth daily at bedtime  Qty: 30 tablet, Refills: 5    Associated Diagnoses: Moderate persistent asthma without complication      Morphine Sulfate (MORPHINE 0.05 MG/ML SYRINGE, NICU/PICU,, CMPD ORDER,)       omeprazole (PriLOSEC) 40 MG capsule Take 1 capsule (40 mg total) by mouth 2 (two) times  a day  Qty: 180 capsule, Refills: 1    Associated Diagnoses: Gastroesophageal reflux disease with esophagitis      pregabalin (LYRICA) 200 MG capsule Take 200 mg by mouth 3 (three) times a day      rosuvastatin (CRESTOR) 20 MG tablet Take 1 tablet (20 mg total) by mouth daily  Qty: 100 tablet, Refills: 3    Associated Diagnoses: Hyperlipidemia associated with type 2 diabetes mellitus  ([facility])      traMADol (ULTRAM) 50 mg tablet Take 150 mg by mouth every 8 (eight) hours as needed      tretinoin (Retin-A) 0.05 % cream Apply topically daily at bedtime  Qty: 45 g, Refills: 1    Associated Diagnoses: Age-related facial wrinkles; Eczema of face      zolpidem (AMBIEN) 5 mg tablet Take 1 tablet (5 mg total) by mouth daily at bedtime as needed for sleep  Qty: 30 tablet, Refills: 0    Associated Diagnoses: Insomnia, unspecified type      albuterol (2.5 mg/3 mL) 0.083 % nebulizer solution Take 3 mL (2.5 mg total) by nebulization every 6 (six) hours as needed for wheezing or shortness of breath  Qty: 300 mL, Refills: 0    Associated Diagnoses: Mild persistent asthma without complication      glucose blood (Accu-Chek Guide) test strip Test  Qty: 100 strip, Refills: 3    Comments: Ok to change to 90 day supply  Associated Diagnoses: Type 2 diabetes mellitus without complication, without long-term current use of insulin ([facility])      lidocaine (XYLOCAINE) 5 % ointment apply topically to affected area three times a day      linaCLOtide 290 MCG CAPS Take 1 capsule by mouth in the morning  Qty: 60 capsule, Refills: 0    Associated Diagnoses: Opioid-induced constipation      methocarbamol (ROBAXIN) 500 mg tablet Take 500 mg by mouth      metoclopramide (REGLAN) 5 mg tablet Take 1 tablet by mouth 4 times a day      ondansetron (ZOFRAN) 4 mg tablet TAKE 1 TABLET BY MOUTH EVERY 8 HOURS AS NEEDED FOR NAUSEA AND VOMITING  Qty: 90 tablet, Refills: 1    Comments: DX Code Needed  .  Associated Diagnoses: Nausea           No discharge procedures  on file.  ED SEPSIS DOCUMENTATION   Time reflects when diagnosis was documented in both MDM as applicable and the Disposition within this note       Time User Action Codes Description Comment    3/27/2025 12:32 PM Rod Smith [R53.1] Generalized weakness     3/27/2025 12:33 PM Rod Smith [M54.50] Low back pain                  Rod Smith DO  03/27/25 [id number]

## 2025-03-27 NOTE — PLAN OF CARE
Problem: Potential for Falls  Goal: Patient will remain free of falls  Description: INTERVENTIONS:- Educate patient/family on patient safety including physical limitations- Instruct patient to call for assistance with activity - Consult OT/PT to assist with strengthening/mobility - Keep Call bell within reach- Keep bed low and locked with side rails adjusted as appropriate- Keep care items and personal belongings within reach- Initiate and maintain comfort rounds- Make Fall Risk Sign visible to staff- Offer Toileting every x Hours, in advance of need- Initiate/Maintain xalarm- Obtain necessary fall risk management equipment: x- Apply yellow socks and bracelet for high fall risk patients- Consider moving patient to room near nurses station  INTERVENTIONS:- Educate patient/family on patient safety including physical limitations- Instruct patient to call for assistance with activity - Consult OT/PT to assist with strengthening/mobility - Keep Call bell within reach- Keep bed low and locked with side rails adjusted as appropriate- Keep care items and personal belongings within reach- Initiate and maintain comfort rounds- Make Fall Risk Sign visible to staff- Offer Toileting every xx Hours, in advance of need- Initiate/Maintain xalarm- Obtain necessary fall risk management equipment: x- Apply yellow socks and bracelet for high fall risk patients- Consider moving patient to room near nurses station  Outcome: Progressing     Problem: PAIN - ADULT  Goal: Verbalizes/displays adequate comfort level or baseline comfort level  Description: Interventions:- Encourage patient to monitor pain and request assistance- Assess pain using appropriate pain scale- Administer analgesics based on type and severity of pain and evaluate response- Implement non-pharmacological measures as appropriate and evaluate response- Consider cultural and social influences on pain and pain management- Notify physician/advanced practitioner if  interventions unsuccessful or patient reports new pain  Outcome: Progressing     Problem: INFECTION - ADULT  Goal: Absence or prevention of progression during hospitalization  Description: INTERVENTIONS:- Assess and monitor for signs and symptoms of infection- Monitor lab/diagnostic results- Monitor all insertion sites, i.e. indwelling lines, tubes, and drains- Monitor endotracheal if appropriate and nasal secretions for changes in amount and color- [place] appropriate cooling/warming therapies per order- Administer medications as ordered- Instruct and encourage patient and family to use good hand hygiene technique- Identify and instruct in appropriate isolation precautions for identified infection/condition  Outcome: Progressing  Goal: Absence of fever/infection during neutropenic period  Description: INTERVENTIONS:- Monitor WBC  Outcome: Progressing     Problem: SAFETY ADULT  Goal: Patient will remain free of falls  Description: INTERVENTIONS:- Educate patient/family on patient safety including physical limitations- Instruct patient to call for assistance with activity - Consult OT/PT to assist with strengthening/mobility - Keep Call bell within reach- Keep bed low and locked with side rails adjusted as appropriate- Keep care items and personal belongings within reach- Initiate and maintain comfort rounds- Make Fall Risk Sign visible to staff- Offer Toileting every x Hours, in advance of need- Initiate/Maintain xalarm- Obtain necessary fall risk management equipment: x- Apply yellow socks and bracelet for high fall risk patients- Consider moving patient to room near nurses station  INTERVENTIONS:- Educate patient/family on patient safety including physical limitations- Instruct patient to call for assistance with activity - Consult OT/PT to assist with strengthening/mobility - Keep Call bell within reach- Keep bed low and locked with side rails adjusted as appropriate- Keep care items and personal belongings  within reach- Initiate and maintain comfort rounds- Make Fall Risk Sign visible to staff- Offer Toileting every x Hours, in advance of need- Initiate/Maintain xalarm- Obtain necessary fall risk management equipment: x- Apply yellow socks and bracelet for high fall risk patients- Consider moving patient to room near nurses station  Outcome: Progressing  Goal: Maintain or return to baseline ADL function  Description: INTERVENTIONS:-  Assess patient's ability to carry out ADLs; assess patient's baseline for ADL function and identify physical deficits which impact ability to perform ADLs (bathing, care of mouth/teeth, toileting, grooming, dressing, etc.)- Assess/evaluate cause of self-care deficits - Assess range of motion- Assess patient's mobility; develop plan if impaired- Assess patient's need for assistive devices and provide as appropriate- Encourage maximum independence but intervene and supervise when necessary- Involve family in performance of ADLs- Assess for home care needs following discharge - Consider OT consult to assist with ADL evaluation and planning for discharge- Provide patient education as appropriate  Outcome: Progressing  Goal: Maintains/Returns to pre admission functional level  Description: INTERVENTIONS:- Perform AM-PAC 6 Click Basic Mobility/ Daily Activity assessment daily.- Set and communicate daily mobility goal to care team and patient/family/caregiver. - Collaborate with rehabilitation services on mobility goals if consulted- Perform Range of Motion xx times a day.- Reposition patient every x hours.- Dangle patient x times a day- Stand patient x times a day- Ambulate patient x times a day- Out of bed to chair x times a day - Out of bed for meals xxx times a day- Out of bed for toileting- Record patient progress and toleration of activity level   Outcome: Progressing     Problem: DISCHARGE PLANNING  Goal: Discharge to home or other facility with appropriate resources  Description:  INTERVENTIONS:- Identify barriers to discharge w/patient and caregiver- Arrange for needed discharge resources and transportation as appropriate- Identify discharge learning needs (meds, wound care, etc.)- Arrange for interpretive services to assist at discharge as needed- Refer to Case Management Department for coordinating discharge planning if the patient needs post-hospital services based on physician/advanced practitioner order or complex needs related to functional status, cognitive ability, or social support system  Outcome: Progressing     Problem: Knowledge Deficit  Goal: Patient/family/caregiver demonstrates understanding of disease process, treatment plan, medications, and discharge instructions  Description: Complete learning assessment and assess knowledge base.Interventions:- Provide teaching at level of understanding- Provide teaching via preferred learning methods  Outcome: Progressing

## 2025-03-27 NOTE — H&P
"H&P - Hospitalist   Name: Aleida Hammond 65 y.o. female I MRN: [id number]  Unit/Bed#: -01 I Date of Admission: 3/27/2025   Date of Service: 3/27/2025 I Hospital Day: 0     Assessment & Plan  Chronic lumbar radiculopathy  Pt presents with generalized weakness without focal deficits, malaise, and increased pain despite increase dose of morphine pump  Pt has chronic pain, morphine pump in place.  PT/OT  Pt will follow up with her pain management team following discharge regarding adjustments to the pump  Benign essential hypertension  Stable  Cont home meds  PRN hydralazine  Type 2 diabetes mellitus with diabetic neuropathy, with long-term current use of insulin ([facility])  Lab Results   Component Value Date    HGBA1C 7.6 (A) 03/10/2025       No results for input(s): \"POCGLU\" in the last 72 hours.    Blood Sugar Average: Last 72 hrs:  Monitor blood glucose  ISS  Accuchecks  Diabetic diet  Continue home insulin  Hold oral diabetic agents             Chief Complaint   Patient presents with    Weakness - Generalized     Pt presents from home via EMS c/o increased generalized weakness and leg pain x1 week. Pt states she has spinal nerve damage and has a morphine pump in place but pain is worse and weakness is new. Denies any recent falls or sickness        HPI:  Aleida Hammond is a 65 y.o. female who presents with back and leg pain. Pt has longstanding such issues and has a morphine pump in place. Issues started after a fall and underwent surgical repair ~30 yrs ago. Pain was well controlled for 10 yrs and then developed spinal stenosis, radiculopathy etc. Pt recently underwent morphine pump dose increase and still reports malaise, fatigue. No chest pain. No shortness of breath. No dysuria. No other complaints.     Historical Information   Past Medical History:   Diagnosis Date    PABLO (acute kidney injury) ([facility]) 2021    Allergic     Anemia     Anesthesia complication     Aspiration pneumonia after     " Anxiety     Arthritis     Asthma     Uses Albuterol daily    Breast lump     Resolved: 2017     Chronic kidney disease 10/2021    PABLO    Chronic pain     back    Chronic pain disorder     Back, legs    Colon cancer (HCC)     Diabetes mellitus (HCC)     Diverticulitis of colon     Dizziness     Duodenal ulcer     GERD (gastroesophageal reflux disease)     Hepatitis     Hepatitis A 2017    History of stomach ulcers     HL (hearing loss)     Estimated    Hyperlipidemia     Hypertension     Hypotension 2021    Infectious viral hepatitis     Hepatitiss A    Lung nodule 10/2023    Memory loss     Estimated    Pneumonia     aspiration pneumonia    PONV (postoperative nausea and vomiting)     Stomach problems      Past Surgical History:   Procedure Laterality Date    ABDOMINAL SURGERY  2012    Gastric bypass    APPENDECTOMY      BACK SURGERY      BARIATRIC SURGERY  About 15 years ago    CATARACT EXTRACTION, BILATERAL       SECTION      CHOLECYSTECTOMY      COLON SURGERY  2024    [facility]    COLONOSCOPY  2016    EYE SURGERY  2023    Cataracts    GASTRECTOMY N/A 2024    Procedure: COMPLETION GASTRECTOMY;  Surgeon: Karlene Cruz MD;  Location: [facility];  Service: Surgical Oncology    GASTRIC BYPASS      GASTRIC BYPASS      INFUSION PUMP IMPLANTATION      LAPAROTOMY N/A 2024    Procedure: DUODENAL EXPLORATION;  Surgeon: Karlene Cruz MD;  Location: [facility];  Service: Surgical Oncology    MA NDSC WRST SURG W/RLS TRANSVRS CARPL LIGM Right 2022    Procedure: Right endoscopic carpal tunnel release;  Surgeon: Papo Peguero MD;  Location: [facility];  Service: Orthopedics    SHOULDER SURGERY      SMALL INTESTINE SURGERY N/A 2024    Procedure: INTRAOPERATIVE ULTRASOUND;  Surgeon: Karlene Cruz MD;  Location: [facility];  Service: Surgical Oncology    SPINAL CORD STIMULATOR IMPLANT      SPINE SURGERY      Multiple    UPPER  GASTROINTESTINAL ENDOSCOPY  2012    Multiple for bleeding ulcer    WHIPPLE PROCEDURE/PANCREATICO-DUODENECTOMY N/A 2024    Procedure: WHIPPLE;  Surgeon: Karlene Cruz MD;  Location: [facility];  Service: Surgical Oncology     Social History   Social History     Substance and Sexual Activity   Alcohol Use No     Social History     Substance and Sexual Activity   Drug Use No     Social History     Tobacco Use   Smoking Status Former    Current packs/day: 0.00    Average packs/day: 1 pack/day for 34.0 years (34.0 ttl pk-yrs)    Types: Cigarettes    Start date: 1973    Quit date: 2007    Years since quittin.2   Smokeless Tobacco Never   Tobacco Comments    15yrs ago     Family History   Problem Relation Age of Onset    Diabetes Mother         Mellitus    Hyperlipidemia Mother     Hypertension Mother     Colon cancer Mother 78    Pancreatic cancer Mother 84    Melanoma Mother 76    Learning disabilities Mother     Cancer Mother         Colon and pancreas    Cancer Father 78        Lung, prostrate and bladder    Alcohol abuse Father     Lung cancer Father 78    Prostate cancer Father 78    No Known Problems Daughter     No Known Problems Maternal Grandmother     No Known Problems Maternal Grandfather     Stomach cancer Paternal Grandmother         60's    Lung cancer Paternal Grandfather 76        Lung    Diabetes Brother         Mellitus    Hyperlipidemia Brother     Hypertension Brother     Learning disabilities Brother     Diabetes Brother     No Known Problems Brother     No Known Problems Son     No Known Problems Son     Breast cancer Maternal Aunt 32        Unknown age    No Known Problems Paternal Aunt     No Known Problems Paternal Aunt     No Known Problems Paternal Aunt     No Known Problems Paternal Aunt     Arthritis Family     Mental illness Neg Hx        Meds/Allergies   Allergies   Allergen Reactions    Nsaids Other (See Comments)     Cannot take NSAIDS secondary to having gastric  bypass    Percocet [Oxycodone-Acetaminophen] GI Intolerance       Meds:    Current Facility-Administered Medications:     acetaminophen (TYLENOL) tablet 650 mg, 650 mg, Oral, Q6H PRN, Mila Tariq MD    amLODIPine (NORVASC) tablet 10 mg, 10 mg, Oral, Daily, Mila Tariq MD    Artificial Tears Op Soln 1 drop, 1 drop, Both Eyes, Q6H PRN, Mila Tariq MD    aspirin (ECOTRIN LOW STRENGTH) EC tablet 81 mg, 81 mg, Oral, Daily, Mila Tariq MD    atorvastatin (LIPITOR) tablet 40 mg, 40 mg, Oral, Daily With Dinner, Mila Tariq MD    calcium carbonate (TUMS) chewable tablet 500 mg, 500 mg, Oral, Daily PRN, Mila Tariq MD    dextromethorphan-guaiFENesin (ROBITUSSIN DM) oral syrup 10 mL, 10 mL, Oral, Q4H PRN, Mila Tariq MD    enoxaparin (LOVENOX) subcutaneous injection 40 mg, 40 mg, Subcutaneous, Daily, Mila Tariq MD    fluticasone-vilanterol 200-25 mcg/actuation 1 puff, 1 puff, Inhalation, Daily, Mila Tariq MD    hydrALAZINE (APRESOLINE) injection 5 mg, 5 mg, Intravenous, Q6H PRN, Mila Tariq MD    HYDROmorphone (DILAUDID) injection 1 mg, 1 mg, Intravenous, Q2H PRN, Mila Tariq MD    insulin lispro (HumALOG/ADMELOG) 100 units/mL subcutaneous injection 1-5 Units, 1-5 Units, Subcutaneous, TID AC **AND** Fingerstick Glucose (POCT), , , TID AC, Mila Tariq MD    montelukast (SINGULAIR) tablet 10 mg, 10 mg, Oral, HS, Mila Tariq MD    polyethylene glycol (MIRALAX) packet 17 g, 17 g, Oral, Daily PRN, Mila Tariq MD    pregabalin (LYRICA) capsule 200 mg, 200 mg, Oral, TID, Mila Tariq MD    senna (SENOKOT) tablet 8.6 mg, 1 tablet, Oral, HS PRN, Mila Tariq MD    traMADol (ULTRAM) tablet 150 mg, 150 mg, Oral, Q8H PRN, Mila Tariq MD    zolpidem (AMBIEN) tablet 5 mg, 5 mg, Oral, HS PRN, Mila Tariq MD      Medications Prior to Admission:     albuterol (PROVENTIL HFA,VENTOLIN  "HFA) 90 mcg/act inhaler    amLODIPine (NORVASC) 10 mg tablet    aspirin (ECOTRIN LOW STRENGTH) 81 mg EC tablet    Cyanocobalamin (B-12 IJ)    Fluticasone-Salmeterol (Advair) 250-50 mcg/dose inhaler    glipiZIDE (GLUCOTROL XL) 5 mg 24 hr tablet    HYDROcodone-acetaminophen (NORCO) 5-325 mg per tablet    metFORMIN (GLUCOPHAGE) 1000 MG tablet    mometasone (NASONEX) 50 mcg/act nasal spray    montelukast (SINGULAIR) 10 mg tablet    Morphine Sulfate (MORPHINE 0.05 MG/ML SYRINGE, NICU/PICU,, CMPD ORDER,)    omeprazole (PriLOSEC) 40 MG capsule    pregabalin (LYRICA) 200 MG capsule    rosuvastatin (CRESTOR) 20 MG tablet    traMADol (ULTRAM) 50 mg tablet    tretinoin (Retin-A) 0.05 % cream    zolpidem (AMBIEN) 5 mg tablet    albuterol (2.5 mg/3 mL) 0.083 % nebulizer solution    glucose blood (Accu-Chek Guide) test strip    lidocaine (XYLOCAINE) 5 % ointment    linaCLOtide 290 MCG CAPS    methocarbamol (ROBAXIN) 500 mg tablet    metoclopramide (REGLAN) 5 mg tablet    ondansetron (ZOFRAN) 4 mg tablet      Review of Systems:    A complete and comprehensive 14 point organ system review was performed and all other systems are negative other than stated above in the HPI    Current Vitals:   Blood Pressure: 154/86 (03/27/25 1334)  Pulse: 89 (03/27/25 1334)  Temperature: 97.7 °F (36.5 °C) (03/27/25 1334)  Temp Source: Temporal (03/27/25 1334)  Respirations: 16 (03/27/25 1334)  Height: 5' 4\" (162.6 cm) (03/27/25 1334)  Weight - Scale: 72.6 kg (160 lb) (03/27/25 1334)  SpO2: 97 % (03/27/25 1503)  SPO2 RA Rest      Flowsheet Row ED to Hosp-Admission (Current) from 3/27/2025 in [facility] Med [facility] Unit   SpO2 97 %   SpO2 Activity At Rest   O2 Device None (Room air)   O2 Flow Rate --            Intake/Output Summary (Last 24 hours) at 3/27/2025 1552  Last data filed at 3/27/2025 1154  Gross per 24 hour   Intake 100 ml   Output --   Net 100 ml     Body mass index is 27.46 kg/m².     Physical Exam:       General: well " appearing, no acute distress  HEENT: atraumatic, PERRLA, moist mucosa, normal pharynx, normal tonsils and adenoids, normal tongue, no fluid in sinuses  Neck: Trachea midline, no carotid bruit, no masses  Respiratory: normal chest wall expansion, CTA B, no r/r/w, no rubs  Cardiovascular: RRR, no m/r/g, Normal S1 and S2  Abdomen: Soft, non-tender, non-distended, normal bowel sounds in all quadrants, no hepatosplenomegaly, no tympany  Rectal: deferred  Musculoskeletal: normal ROM in upper and lower extremities  Integumentary: warm, dry, and pink, with no rash, purpura, or petechia  Heme/Lymph: no lymphadenopathy, no bruises  Neurological: Cranial Nerves II-XII grossly intact; no focal deficits in sensation or strength, strength intact  Psychiatric: cooperative with normal mood and affect    Lab Results:   CBC:   Lab Results   Component Value Date    WBC 9.13 03/27/2025    HGB 12.5 03/27/2025    HCT 39.8 03/27/2025    MCV 88 03/27/2025     03/27/2025    RBC 4.50 03/27/2025    MCH 27.8 03/27/2025    MCHC 31.4 03/27/2025    RDW 12.9 03/27/2025    MPV 12.6 03/27/2025    NRBC 0 03/27/2025     CMP:  Lab Results   Component Value Date     11/04/2017     03/27/2025     09/07/2023    CO2 26 03/27/2025    CO2 25 02/17/2025    CO2 28 09/07/2023    BUN 28 (H) 03/27/2025    BUN 30 (H) 09/07/2023    CREATININE 1.09 03/27/2025    CREATININE 0.63 11/04/2017    GLUCOSE 462 (HH) 02/17/2025    CALCIUM 9.5 03/27/2025    CALCIUM 9.5 09/07/2023    AST 40 (H) 03/27/2025    AST 23 09/07/2023    ALT 51 03/27/2025    ALT 22 09/07/2023    ALKPHOS 95 03/27/2025    ALKPHOS 60 09/07/2023    PROT 6.6 07/22/2017    BILITOT 0.5 07/22/2017    EGFR 53 03/27/2025     Lab Results   Component Value Date    TROPONINI <0.02 11/04/2021    CKTOTAL 87 03/27/2025     Coagulation:   Lab Results   Component Value Date    INR 0.93 01/09/2024    Urinalysis:  Lab Results   Component Value Date    COLORU Light Yellow 03/27/2025    CLARITYU  "Clear 03/27/2025    SPECGRAV 1.015 03/27/2025    PHUR 7.0 03/27/2025    PHUR 5.5 01/01/2019    LEUKOCYTESUR Negative 03/27/2025    NITRITE Negative 03/27/2025    GLUCOSEU Negative 03/27/2025    KETONESU 10 (1+) (A) 03/27/2025    BILIRUBINUR Negative 03/27/2025    BLOODU Trace (A) 03/27/2025      Amylase:   Lab Results   Component Value Date    AMYLASE 20 (L) 01/27/2024     Lipase:   Lab Results   Component Value Date    LIPASE 27 02/17/2025        Imaging: XR chest 1 view portable  Result Date: 3/27/2025  Narrative: XR CHEST PORTABLE INDICATION: weakness. Generalized weakness and leg pain x1 week COMPARISON: Chest radiograph 8/5/2023 FINDINGS: Stimulator lead is noted in the lower thoracic spine. Stable benign bibasilar scarring. No focal consolidation. No pneumothorax or pleural effusion. Normal cardiomediastinal silhouette. Bones are unremarkable for age. Normal upper abdomen.     Impression: No acute cardiopulmonary disease. Resident: ARIEL Moura I, the attending radiologist, have reviewed the images and agree with the final report above. Workstation performed: [id number]     EKG, Pathology, and Other Studies: I have personally reviewed the results.    VTE   Prophylaxis: In place    Code Status: Level 1 - Full Code    Anticipated Length of Stay:  Patient will be admitted on an Observation basis with an anticipated length of stay of less 2 midnights.       \"This note has been constructed using a voice recognition system\"      Mila Tariq MD  3/27/2025, 3:52 PM          "

## 2025-03-28 VITALS
SYSTOLIC BLOOD PRESSURE: 113 MMHG | RESPIRATION RATE: 16 BRPM | BODY MASS INDEX: 27.31 KG/M2 | HEIGHT: 64 IN | WEIGHT: 160 LBS | HEART RATE: 76 BPM | DIASTOLIC BLOOD PRESSURE: 92 MMHG | TEMPERATURE: 97.2 F | OXYGEN SATURATION: 93 %

## 2025-03-28 LAB
ALBUMIN SERPL BCG-MCNC: 3.5 G/DL (ref 3.5–5)
ALP SERPL-CCNC: 71 U/L (ref 34–104)
ALT SERPL W P-5'-P-CCNC: 47 U/L (ref 7–52)
ANION GAP SERPL CALCULATED.3IONS-SCNC: 7 MMOL/L (ref 4–13)
AST SERPL W P-5'-P-CCNC: 43 U/L (ref 13–39)
BILIRUB SERPL-MCNC: 0.4 MG/DL (ref 0.2–1)
BUN SERPL-MCNC: 25 MG/DL (ref 5–25)
CALCIUM SERPL-MCNC: 8.3 MG/DL (ref 8.4–10.2)
CHLORIDE SERPL-SCNC: 106 MMOL/L (ref 96–108)
CO2 SERPL-SCNC: 27 MMOL/L (ref 21–32)
CREAT SERPL-MCNC: 1.13 MG/DL (ref 0.6–1.3)
ERYTHROCYTE [DISTWIDTH] IN BLOOD BY AUTOMATED COUNT: 13.1 % (ref 11.6–15.1)
GFR SERPL CREATININE-BSD FRML MDRD: 51 ML/MIN/1.73SQ M
GLUCOSE SERPL-MCNC: 164 MG/DL (ref 65–140)
GLUCOSE SERPL-MCNC: 248 MG/DL (ref 65–140)
HCT VFR BLD AUTO: 33.6 % (ref 34.8–46.1)
HGB BLD-MCNC: 10.7 G/DL (ref 11.5–15.4)
MCH RBC QN AUTO: 28.2 PG (ref 26.8–34.3)
MCHC RBC AUTO-ENTMCNC: 31.8 G/DL (ref 31.4–37.4)
MCV RBC AUTO: 88 FL (ref 82–98)
PLATELET # BLD AUTO: 164 THOUSANDS/UL (ref 149–390)
PMV BLD AUTO: 11.8 FL (ref 8.9–12.7)
POTASSIUM SERPL-SCNC: 3.6 MMOL/L (ref 3.5–5.3)
PROT SERPL-MCNC: 6.3 G/DL (ref 6.4–8.4)
RBC # BLD AUTO: 3.8 MILLION/UL (ref 3.81–5.12)
SODIUM SERPL-SCNC: 140 MMOL/L (ref 135–147)
WBC # BLD AUTO: 7.92 THOUSAND/UL (ref 4.31–10.16)

## 2025-03-28 PROCEDURE — 80053 COMPREHEN METABOLIC PANEL: CPT | Performed by: HOSPITALIST

## 2025-03-28 PROCEDURE — 85027 COMPLETE CBC AUTOMATED: CPT | Performed by: HOSPITALIST

## 2025-03-28 PROCEDURE — 99239 HOSP IP/OBS DSCHRG MGMT >30: CPT | Performed by: HOSPITALIST

## 2025-03-28 PROCEDURE — 82948 REAGENT STRIP/BLOOD GLUCOSE: CPT

## 2025-03-28 RX ORDER — HYDROCODONE BITARTRATE AND ACETAMINOPHEN 5; 325 MG/1; MG/1
1 TABLET ORAL EVERY 6 HOURS PRN
Status: DISCONTINUED | OUTPATIENT
Start: 2025-03-28 | End: 2025-03-28 | Stop reason: HOSPADM

## 2025-03-28 RX ORDER — SENNOSIDES 8.6 MG
1 TABLET ORAL
Status: DISCONTINUED | OUTPATIENT
Start: 2025-03-28 | End: 2025-03-28 | Stop reason: HOSPADM

## 2025-03-28 RX ORDER — ACETAMINOPHEN 325 MG/1
650 TABLET ORAL EVERY 6 HOURS PRN
Status: DISCONTINUED | OUTPATIENT
Start: 2025-03-28 | End: 2025-03-28 | Stop reason: HOSPADM

## 2025-03-28 RX ADMIN — FLUTICASONE FUROATE AND VILANTEROL TRIFENATATE 1 PUFF: 200; 25 POWDER RESPIRATORY (INHALATION) at 07:47

## 2025-03-28 RX ADMIN — ENOXAPARIN SODIUM 40 MG: 40 INJECTION SUBCUTANEOUS at 07:48

## 2025-03-28 RX ADMIN — HYDROMORPHONE HYDROCHLORIDE 1 MG: 1 INJECTION, SOLUTION INTRAMUSCULAR; INTRAVENOUS; SUBCUTANEOUS at 06:27

## 2025-03-28 RX ADMIN — HYDROMORPHONE HYDROCHLORIDE 1 MG: 1 INJECTION, SOLUTION INTRAMUSCULAR; INTRAVENOUS; SUBCUTANEOUS at 03:49

## 2025-03-28 RX ADMIN — INSULIN LISPRO 1 UNITS: 100 INJECTION, SOLUTION INTRAVENOUS; SUBCUTANEOUS at 07:47

## 2025-03-28 RX ADMIN — AMLODIPINE BESYLATE 10 MG: 5 TABLET ORAL at 07:48

## 2025-03-28 RX ADMIN — ASPIRIN 81 MG: 81 TABLET, COATED ORAL at 07:47

## 2025-03-28 RX ADMIN — HYDROMORPHONE HYDROCHLORIDE 1 MG: 1 INJECTION, SOLUTION INTRAMUSCULAR; INTRAVENOUS; SUBCUTANEOUS at 00:34

## 2025-03-28 RX ADMIN — PREGABALIN 200 MG: 100 CAPSULE ORAL at 07:47

## 2025-03-28 RX ADMIN — HYDROCODONE BITARTRATE AND ACETAMINOPHEN 1 TABLET: 5; 325 TABLET ORAL at 08:56

## 2025-03-28 NOTE — PLAN OF CARE
Problem: PAIN - ADULT  Goal: Verbalizes/displays adequate comfort level or baseline comfort level  Description: Interventions:- Encourage patient to monitor pain and request assistance- Assess pain using appropriate pain scale- Administer analgesics based on type and severity of pain and evaluate response- Implement non-pharmacological measures as appropriate and evaluate response- Consider cultural and social influences on pain and pain management- Notify physician/advanced practitioner if interventions unsuccessful or patient reports new pain  Outcome: Progressing     Problem: INFECTION - ADULT  Goal: Absence or prevention of progression during hospitalization  Description: INTERVENTIONS:- Assess and monitor for signs and symptoms of infection- Monitor lab/diagnostic results- Monitor all insertion sites, i.e. indwelling lines, tubes, and drains- Monitor endotracheal if appropriate and nasal secretions for changes in amount and color- [place] appropriate cooling/warming therapies per order- Administer medications as ordered- Instruct and encourage patient and family to use good hand hygiene technique- Identify and instruct in appropriate isolation precautions for identified infection/condition  Outcome: Progressing  Goal: Absence of fever/infection during neutropenic period  Description: INTERVENTIONS:- Monitor WBC  Outcome: Progressing     Problem: SAFETY ADULT  Goal: Maintain or return to baseline ADL function  Description: INTERVENTIONS:-  Assess patient's ability to carry out ADLs; assess patient's baseline for ADL function and identify physical deficits which impact ability to perform ADLs (bathing, care of mouth/teeth, toileting, grooming, dressing, etc.)- Assess/evaluate cause of self-care deficits - Assess range of motion- Assess patient's mobility; develop plan if impaired- Assess patient's need for assistive devices and provide as appropriate- Encourage maximum independence but intervene and supervise  when necessary- Involve family in performance of ADLs- Assess for home care needs following discharge - Consider OT consult to assist with ADL evaluation and planning for discharge- Provide patient education as appropriate  Outcome: Progressing     Problem: DISCHARGE PLANNING  Goal: Discharge to home or other facility with appropriate resources  Description: INTERVENTIONS:- Identify barriers to discharge w/patient and caregiver- Arrange for needed discharge resources and transportation as appropriate- Identify discharge learning needs (meds, wound care, etc.)- Arrange for interpretive services to assist at discharge as needed- Refer to Case Management Department for coordinating discharge planning if the patient needs post-hospital services based on physician/advanced practitioner order or complex needs related to functional status, cognitive ability, or social support system  Outcome: Progressing     Problem: Knowledge Deficit  Goal: Patient/family/caregiver demonstrates understanding of disease process, treatment plan, medications, and discharge instructions  Description: Complete learning assessment and assess knowledge base.Interventions:- Provide teaching at level of understanding- Provide teaching via preferred learning methods  Outcome: Progressing

## 2025-03-28 NOTE — PLAN OF CARE
Problem: Potential for Falls  Goal: Patient will remain free of falls  Description: INTERVENTIONS:- Educate patient/family on patient safety including physical limitations- Instruct patient to call for assistance with activity - Consult OT/PT to assist with strengthening/mobility - Keep Call bell within reach- Keep bed low and locked with side rails adjusted as appropriate- Keep care items and personal belongings within reach- Initiate and maintain comfort rounds- Make Fall Risk Sign visible to staff- Offer Toileting every 2 Hours, in advance of need- Initiate/Maintain bed/chair alarm- Obtain necessary fall risk management equipment: yellow bracelet and yellow socks - Apply yellow socks and bracelet for high fall risk patients- Consider moving patient to room near nurses station  INTERVENTIONS:- Educate patient/family on patient safety including physical limitations- Instruct patient to call for assistance with activity - Consult OT/PT to assist with strengthening/mobility - Keep Call bell within reach- Keep bed low and locked with side rails adjusted as appropriate- Keep care items and personal belongings within reach- Initiate and maintain comfort rounds- Make Fall Risk Sign visible to staff- Offer Toileting every 2 Hours, in advance of need- Initiate/Maintain bed/chair alarm- Obtain necessary fall risk management equipment: yellow bracelet and yellow socks - Apply yellow socks and bracelet for high fall risk patients- Consider moving patient to room near nurses station  Outcome: Progressing     Problem: PAIN - ADULT  Goal: Verbalizes/displays adequate comfort level or baseline comfort level  Description: Interventions:- Encourage patient to monitor pain and request assistance- Assess pain using appropriate pain scale- Administer analgesics based on type and severity of pain and evaluate response- Implement non-pharmacological measures as appropriate and evaluate response- Consider cultural and social influences  on pain and pain management- Notify physician/advanced practitioner if interventions unsuccessful or patient reports new pain  Outcome: Progressing     Problem: INFECTION - ADULT  Goal: Absence or prevention of progression during hospitalization  Description: INTERVENTIONS:- Assess and monitor for signs and symptoms of infection- Monitor lab/diagnostic results- Monitor all insertion sites, i.e. indwelling lines, tubes, and drains- Monitor endotracheal if appropriate and nasal secretions for changes in amount and color- [place] appropriate cooling/warming therapies per order- Administer medications as ordered- Instruct and encourage patient and family to use good hand hygiene technique- Identify and instruct in appropriate isolation precautions for identified infection/condition  Outcome: Progressing  Goal: Absence of fever/infection during neutropenic period  Description: INTERVENTIONS:- Monitor WBC  Outcome: Progressing     Problem: SAFETY ADULT  Goal: Patient will remain free of falls  Description: INTERVENTIONS:- Educate patient/family on patient safety including physical limitations- Instruct patient to call for assistance with activity - Consult OT/PT to assist with strengthening/mobility - Keep Call bell within reach- Keep bed low and locked with side rails adjusted as appropriate- Keep care items and personal belongings within reach- Initiate and maintain comfort rounds- Make Fall Risk Sign visible to staff- Offer Toileting every 2 Hours, in advance of need- Initiate/Maintain bed/chair alarm- Obtain necessary fall risk management equipment: yellow bracelet and yellow socks - Apply yellow socks and bracelet for high fall risk patients- Consider moving patient to room near nurses station  INTERVENTIONS:- Educate patient/family on patient safety including physical limitations- Instruct patient to call for assistance with activity - Consult OT/PT to assist with strengthening/mobility - Keep Call bell within  reach- Keep bed low and locked with side rails adjusted as appropriate- Keep care items and personal belongings within reach- Initiate and maintain comfort rounds- Make Fall Risk Sign visible to staff- Offer Toileting every 2 Hours, in advance of need- Initiate/Maintain bed/chair alarm- Obtain necessary fall risk management equipment: yellow bracelet and yellow socks - Apply yellow socks and bracelet for high fall risk patients- Consider moving patient to room near nurses station  Outcome: Progressing  Goal: Maintain or return to baseline ADL function  Description: INTERVENTIONS:-  Assess patient's ability to carry out ADLs; assess patient's baseline for ADL function and identify physical deficits which impact ability to perform ADLs (bathing, care of mouth/teeth, toileting, grooming, dressing, etc.)- Assess/evaluate cause of self-care deficits - Assess range of motion- Assess patient's mobility; develop plan if impaired- Assess patient's need for assistive devices and provide as appropriate- Encourage maximum independence but intervene and supervise when necessary- Involve family in performance of ADLs- Assess for home care needs following discharge - Consider OT consult to assist with ADL evaluation and planning for discharge- Provide patient education as appropriate  Outcome: Progressing  Goal: Maintains/Returns to pre admission functional level  Description: INTERVENTIONS:- Perform AM-PAC 6 Click Basic Mobility/ Daily Activity assessment daily.- Set and communicate daily mobility goal to care team and patient/family/caregiver. - Collaborate with rehabilitation services on mobility goals if consulted- Perform Range of Motion 3 times a day.- Reposition patient every 2 hours.- Dangle patient 3 times a day- Stand patient 3 times a day- Ambulate patient 3 times a day- Out of bed to chair 3 times a day - Out of bed for meals 3 times a day- Out of bed for toileting- Record patient progress and toleration of activity  level   Outcome: Progressing     Problem: DISCHARGE PLANNING  Goal: Discharge to home or other facility with appropriate resources  Description: INTERVENTIONS:- Identify barriers to discharge w/patient and caregiver- Arrange for needed discharge resources and transportation as appropriate- Identify discharge learning needs (meds, wound care, etc.)- Arrange for interpretive services to assist at discharge as needed- Refer to Case Management Department for coordinating discharge planning if the patient needs post-hospital services based on physician/advanced practitioner order or complex needs related to functional status, cognitive ability, or social support system  Outcome: Progressing     Problem: Knowledge Deficit  Goal: Patient/family/caregiver demonstrates understanding of disease process, treatment plan, medications, and discharge instructions  Description: Complete learning assessment and assess knowledge base.Interventions:- Provide teaching at level of understanding- Provide teaching via preferred learning methods  Outcome: Progressing

## 2025-03-28 NOTE — ASSESSMENT & PLAN NOTE
Pt presents with generalized weakness without focal deficits, malaise, and increased pain despite increase dose of morphine pump  Pt has chronic pain, morphine pump in place.  Pt now ambulating well.   Sx resolved 3/28. Suspect polypharmacy/med issue related to morphine pump and narcotic analgesia. RESOLVED  Pt will follow up with her pain management team following discharge regarding adjustments to the pump

## 2025-03-28 NOTE — CASE MANAGEMENT
Case Management Assessment & Discharge Planning Note    Patient name Aleida Hammond  Location /-01 MRN [id number]  : 1960 Date 3/28/2025       Current Admission Date: 3/27/2025  Current Admission Diagnosis:Chronic lumbar radiculopathy   Patient Active Problem List    Diagnosis Date Noted Date Diagnosed    History of Whipple procedure 2025     History of duodenal cancer 2025     Abnormal chest CT 10/28/2024     Constipation 10/02/2024     Change in stool 04/15/2024     Duodenal cancer (HCC) 2024     Malignant neoplasm of spinal cord (HCC) 2024     Duodenal mass 2023     PONV (postoperative nausea and vomiting) 2023     S/P endoscopic carpal tunnel release 2023     Pillar pain, post-operative 2023     Microcytic anemia 2023     Carpal tunnel syndrome on right 2022     COVID-19 vaccine series completed 2021     Type 2 diabetes mellitus with diabetic neuropathy, with long-term current use of insulin (HCC) 2021     Narcotic dependency, continuous (HCC) 2021     Memory dysfunction 2021     Iron deficiency 2020     B12 deficiency 2020     Pain in joint involving pelvic region and thigh 2018     Chronic lumbar radiculopathy 2017     Chronic cervical pain 2017     Chronic thoracic spine pain 2017     Peripheral polyneuropathy 2017     Insomnia 2017     Benign essential hypertension 2017     Mild persistent asthma without complication 2017     Chronic bilateral back pain 2017     GERD without esophagitis 2017     Hyperlipidemia associated with type 2 diabetes mellitus  (HCC) 2017     Lumbar radiculopathy 2017     Thoracic spondylosis without myelopathy 2015     Lumbar spondylosis 2015     Thoracic and lumbosacral neuritis 2013     Degeneration of lumbar intervertebral disc 2013     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/2005       LOS (days): 0  Geometric Mean LOS (GMLOS) (days):   Days to GMLOS:     OBJECTIVE:              Current admission status: Observation       Preferred Pharmacy:   [id number] [place] - [address]  [address]  [place] [address]  Phone: [phone] Fax: [phone]    Primary Care Provider: Tara Colon DO    Primary Insurance: [employer]  Secondary Insurance:     ASSESSMENT:  Active Health Care Proxies       Ishaan Castaneda Health Care Representative - Son   Primary Phone: [phone] (Home)                 Advance Directives  Does patient have a Health Care POA?: No  Was patient offered paperwork?: Yes (declined)  Does patient currently have a Health Care decision maker?: Yes, please see Health Care Proxy section  Does patient have Advance Directives?: No  Was patient offered paperwork?: Yes (declined)  Primary Contact: Ishaan Castaneda, oscar    Obs Notice Signed: 03/28/25 (1052am)    Readmission Root Cause  30 Day Readmission: No    Patient Information  Admitted from:: Home  Mental Status: Alert  During Assessment patient was accompanied by: Not accompanied during assessment  Assessment information provided by:: Patient  Primary Caregiver: Self  Support Systems: Self, Son  County of Residence: [place]  What city do you live in?: [place]  Home entry access options. Select all that apply.: Stairs  Number of steps to enter home.: 5  Type of Current Residence: [address] [place] home  Upon entering residence, is there a bedroom on the main floor (no further steps)?: Yes  Upon entering residence, is there a bathroom on the main floor (no further steps)?: Yes  Living Arrangements: Lives Alone (One son lives in apartment below and other son lives next door)    Activities of Daily Living Prior to Admission  Functional Status: Independent  Completes ADLs independently?: Yes  Ambulates independently?: Yes  Does patient use assisted  devices?: No  Does patient currently own DME?: No  Does patient have a history of Outpatient Therapy (PT/OT)?: No  Does the patient have a history of Short-Term Rehab?: No  Does patient have a history of HHC?: No  Does patient currently have HHC?: No         Patient Information Continued  Income Source: Employed  Does patient have prescription coverage?: Yes  Can the patient afford their medications and any related supplies (such as glucometers or test strips)?: Yes  Does patient receive dialysis treatments?: No  Does patient have a history of substance abuse?: No  Does patient have a history of Mental Health Diagnosis?: No         Means of Transportation  Means of Transport to Appts:: Family transport          DISCHARGE DETAILS:    Discharge planning discussed with:: Patient  Freedom of Choice: Yes     CM contacted family/caregiver?: No- see comments (Pt is alert and oriented)  Were Treatment Team discharge recommendations reviewed with patient/caregiver?: Yes  Did patient/caregiver verbalize understanding of patient care needs?: Yes  Were patient/caregiver advised of the risks associated with not following Treatment Team discharge recommendations?: Yes         Requested Home Health Care         Is the patient interested in HHC at discharge?: No    DME Referral Provided  Referral made for DME?: No              Treatment Team Recommendation: Home  Discharge Destination Plan:: Home  Transport at Discharge : Family                                      Additional Comments: CM met with pt to discuss role of CM and any needs prior to discharge. Pt resides w/ son in 3SH w/ 5STE and 1st flr setup. Indp PTA. No DME. No hx OP PT/STR/HH/MH/DA. Pt is employed and drives. [facility] [address] [facility]. Son will transport at discharge.

## 2025-03-28 NOTE — ASSESSMENT & PLAN NOTE
Lab Results   Component Value Date    HGBA1C 7.6 (A) 03/10/2025       Recent Labs     03/27/25  1640 03/28/25  0733   POCGLU 159* 164*       Blood Sugar Average: Last 72 hrs:  (P) 161.5Monitor blood glucose  ISS  Accuchecks  Diabetic diet  Continue home insulin  Hold oral diabetic agents

## 2025-03-28 NOTE — DISCHARGE SUMMARY
Discharge Summary - Hospitalist   Name: Aleida Hammond 65 y.o. female I MRN: [id number]  Unit/Bed#: -01 I Date of Admission: 3/27/2025   Date of Service: 3/28/2025 I Hospital Day: 0     Assessment & Plan  Chronic lumbar radiculopathy  Pt presents with generalized weakness without focal deficits, malaise, and increased pain despite increase dose of morphine pump  Pt has chronic pain, morphine pump in place.  Pt now ambulating well.   Sx resolved 3/28. Suspect polypharmacy/med issue related to morphine pump and narcotic analgesia. RESOLVED  Pt will follow up with her pain management team following discharge regarding adjustments to the pump  Benign essential hypertension  Stable  Cont home meds  PRN hydralazine  Type 2 diabetes mellitus with diabetic neuropathy, with long-term current use of insulin ([facility])  Lab Results   Component Value Date    HGBA1C 7.6 (A) 03/10/2025       Recent Labs     03/27/25  1640 03/28/25  0733   POCGLU 159* 164*       Blood Sugar Average: Last 72 hrs:  (P) 161.5Monitor blood glucose  ISS  Accuchecks  Diabetic diet  Continue home insulin  Hold oral diabetic agents        Hospital Course:     Aleida Hammond is a 65 y.o. female patient who originally presented to the hospital on   Admission Orders (From admission, onward)       Ordered        03/27/25 1241  Place in Observation  Once                         due to  generalized malaise, fatigue, difficulty getting around. Sx were suspected related to polypharmacy in setting of morphine pump, narcotic analgesia. Pt will follow up with her pain management doc next week for pump changes if indicated. Pt fortunately has no sx or complaints at [place].    Please see above list of diagnoses and related plan for additional information.     Physical Exam:    GEN: No acute distress, comfortable  HEEENT: No JVD, PERRLA, no scleral icterus  RESP: Lungs clear to auscultation bilaterally  CV: RRR, +s1/s2   ABD: SOFT NON TENDER, POSITIVE BOWEL SOUNDS,  NO DISTENTION  PSYCH: CALM  NEURO: [place] baseline, NO FOCAL DEFICITS  SKIN: NO RASH  EXTREM: NO EDEMA    CONSULTING PROVIDERS   None    PROCEDURES PERFORMED  * No surgery found *    RADIOLOGY RESULTS  XR chest 1 view portable  Result Date: 3/27/2025  Narrative: XR CHEST PORTABLE INDICATION: weakness. Generalized weakness and leg pain x1 week COMPARISON: Chest radiograph 8/5/2023 FINDINGS: Stimulator lead is noted in the lower thoracic spine. Stable benign bibasilar scarring. No focal consolidation. No pneumothorax or pleural effusion. Normal cardiomediastinal silhouette. Bones are unremarkable for age. Normal upper abdomen.     Impression: No acute cardiopulmonary disease. Resident: ARIEL Moura I, the attending radiologist, have reviewed the images and agree with the final report above. Workstation performed: [id number]       LABS  Results from last 7 days   Lab Units 03/28/25  0347 03/27/25  0955   WBC Thousand/uL 7.92 9.13   HEMOGLOBIN g/dL 10.7* 12.5   HEMATOCRIT % 33.6* 39.8   MCV fL 88 88   PLATELETS Thousands/uL 164 161     Results from last 7 days   Lab Units 03/28/25  0347 03/27/25  0955   SODIUM mmol/L 140 140   POTASSIUM mmol/L 3.6 4.0   CHLORIDE mmol/L 106 104   CO2 mmol/L 27 26   BUN mg/dL 25 28*   CREATININE mg/dL 1.13 1.09   CALCIUM mg/dL 8.3* 9.5   ALBUMIN g/dL 3.5 4.2   TOTAL BILIRUBIN mg/dL 0.40 0.61   ALK PHOS U/L 71 95   ALT U/L 47 51   AST U/L 43* 40*   EGFR ml/min/1.73sq m 51 53   GLUCOSE RANDOM mg/dL 248* 167*     Results from last 7 days   Lab Units 03/27/25  1138 03/27/25  0955   HS TNI 0HR ng/L  --  4   HS TNI 2HR ng/L 4  --               Results from last 7 days   Lab Units 03/28/25  0733 03/27/25  1640   POC GLUCOSE mg/dl 164* 159*         Results from last 7 days   Lab Units 03/27/25  0955   TSH 3RD GENERATON uIU/mL 1.784     Results from last 7 days   Lab Units 03/27/25  0955   PROCALCITONIN ng/ml 0.10           Cultures:   Results from last 7 days   Lab Units 03/27/25  1009   COLOR  UA  Light Yellow   CLARITY UA  Clear   SPEC GRAV UA  1.015   PH UA  7.0   LEUKOCYTES UA  Negative   NITRITE UA  Negative   GLUCOSE UA mg/dl Negative   KETONES UA mg/dl 10 (1+)*   BILIRUBIN UA  Negative   BLOOD UA  Trace*      Results from last 7 days   Lab Units 03/27/25  1009   RBC UA /hpf None Seen   WBC UA /hpf None Seen   EPITHELIAL CELLS WET PREP /hpf Occasional   BACTERIA UA /hpf None Seen      Results from last 7 days   Lab Units 03/27/25  0955   INFLUENZA A PCR  Negative         Condition at Discharge:  good      Discharge instructions/Information to patient and family:   See after visit summary for information provided to patient and family.  The above hospital course, results, incidental findings, need for follow up was discussed in detail with the patient and/or family.    Provisions for Follow-Up Care:  See after visit summary for information related to follow-up care and any pertinent home health orders.      Disposition:     Home       Discharge Statement:  I spent 36 minutes discharging the patient. This time was spent on the day of discharge. I had direct contact with the patient on the day of discharge. Greater than 50% of the total time was spent examining patient, answering all patient questions, arranging and discussing plan of care with patient as well as directly providing post-discharge instructions.  Additional time then spent on discharge activities.    Discharge Medications:  See after visit summary for reconciled discharge medications provided to patient and family.      ** Please Note: This note has been constructed using a voice recognition system **

## 2025-03-29 DIAGNOSIS — K21.00 GASTROESOPHAGEAL REFLUX DISEASE WITH ESOPHAGITIS: ICD-10-CM

## 2025-03-31 ENCOUNTER — TRANSITIONAL CARE MANAGEMENT (OUTPATIENT)
Dept: FAMILY MEDICINE CLINIC | Facility: HOSPITAL | Age: 65
End: 2025-03-31

## 2025-03-31 ENCOUNTER — TELEPHONE (OUTPATIENT)
Age: 65
End: 2025-03-31

## 2025-03-31 RX ORDER — OMEPRAZOLE 40 MG/1
40 CAPSULE, DELAYED RELEASE ORAL 2 TIMES DAILY
Qty: 180 CAPSULE | Refills: 1 | Status: SHIPPED | OUTPATIENT
Start: 2025-03-31

## 2025-03-31 NOTE — TELEPHONE ENCOUNTER
Patient is calling at the request of her Neurologist to have Marie add a B12 blood work order to her labs.  [facility] was called but they were busy with patients.

## 2025-03-31 NOTE — TELEPHONE ENCOUNTER
I called Stephany to let her know that she can go to the [facility]'s lab and let them know to collect the labs ordered by Lauren, which includes a B12 level. We will call her with results. No other needs at this time.

## 2025-04-02 ENCOUNTER — APPOINTMENT (OUTPATIENT)
Dept: LAB | Facility: HOSPITAL | Age: 65
End: 2025-04-02
Payer: COMMERCIAL

## 2025-04-02 DIAGNOSIS — Z90.3 S/P TOTAL GASTRECTOMY AND ROUX-EN-Y ESOPHAGOJEJUNAL ANASTOMOSIS: ICD-10-CM

## 2025-04-02 DIAGNOSIS — E53.8 B12 DEFICIENCY: ICD-10-CM

## 2025-04-02 DIAGNOSIS — E61.1 IRON DEFICIENCY: ICD-10-CM

## 2025-04-02 DIAGNOSIS — Z79.4 TYPE 2 DIABETES MELLITUS WITH DIABETIC NEUROPATHY, WITH LONG-TERM CURRENT USE OF INSULIN (HCC): ICD-10-CM

## 2025-04-02 DIAGNOSIS — E11.40 TYPE 2 DIABETES MELLITUS WITH DIABETIC NEUROPATHY, WITH LONG-TERM CURRENT USE OF INSULIN (HCC): ICD-10-CM

## 2025-04-02 DIAGNOSIS — C17.0 ADENOCARCINOMA OF DUODENUM (HCC): ICD-10-CM

## 2025-04-02 DIAGNOSIS — Z98.0 S/P TOTAL GASTRECTOMY AND ROUX-EN-Y ESOPHAGOJEJUNAL ANASTOMOSIS: ICD-10-CM

## 2025-04-02 LAB
ALBUMIN SERPL BCG-MCNC: 4 G/DL (ref 3.5–5)
ALP SERPL-CCNC: 92 U/L (ref 34–104)
ALT SERPL W P-5'-P-CCNC: 35 U/L (ref 7–52)
ANION GAP SERPL CALCULATED.3IONS-SCNC: 8 MMOL/L (ref 4–13)
AST SERPL W P-5'-P-CCNC: 31 U/L (ref 13–39)
BASOPHILS # BLD AUTO: 0.02 THOUSANDS/ÂΜL (ref 0–0.1)
BASOPHILS NFR BLD AUTO: 0 % (ref 0–1)
BILIRUB SERPL-MCNC: 0.41 MG/DL (ref 0.2–1)
BUN SERPL-MCNC: 26 MG/DL (ref 5–25)
CALCIUM SERPL-MCNC: 8.8 MG/DL (ref 8.4–10.2)
CHLORIDE SERPL-SCNC: 101 MMOL/L (ref 96–108)
CO2 SERPL-SCNC: 28 MMOL/L (ref 21–32)
CREAT SERPL-MCNC: 1.3 MG/DL (ref 0.6–1.3)
EOSINOPHIL # BLD AUTO: 0.11 THOUSAND/ÂΜL (ref 0–0.61)
EOSINOPHIL NFR BLD AUTO: 1 % (ref 0–6)
ERYTHROCYTE [DISTWIDTH] IN BLOOD BY AUTOMATED COUNT: 13.6 % (ref 11.6–15.1)
FERRITIN SERPL-MCNC: 181 NG/ML (ref 11–307)
GFR SERPL CREATININE-BSD FRML MDRD: 43 ML/MIN/1.73SQ M
GLUCOSE P FAST SERPL-MCNC: 357 MG/DL (ref 65–99)
HCT VFR BLD AUTO: 35.2 % (ref 34.8–46.1)
HGB BLD-MCNC: 10.9 G/DL (ref 11.5–15.4)
IMM GRANULOCYTES # BLD AUTO: 0.03 THOUSAND/UL (ref 0–0.2)
IMM GRANULOCYTES NFR BLD AUTO: 0 % (ref 0–2)
IRON SATN MFR SERPL: 19 % (ref 15–50)
IRON SERPL-MCNC: 58 UG/DL (ref 50–212)
LYMPHOCYTES # BLD AUTO: 1.77 THOUSANDS/ÂΜL (ref 0.6–4.47)
LYMPHOCYTES NFR BLD AUTO: 23 % (ref 14–44)
MCH RBC QN AUTO: 28.2 PG (ref 26.8–34.3)
MCHC RBC AUTO-ENTMCNC: 31 G/DL (ref 31.4–37.4)
MCV RBC AUTO: 91 FL (ref 82–98)
MONOCYTES # BLD AUTO: 0.52 THOUSAND/ÂΜL (ref 0.17–1.22)
MONOCYTES NFR BLD AUTO: 7 % (ref 4–12)
NEUTROPHILS # BLD AUTO: 5.27 THOUSANDS/ÂΜL (ref 1.85–7.62)
NEUTS SEG NFR BLD AUTO: 69 % (ref 43–75)
NRBC BLD AUTO-RTO: 0 /100 WBCS
PLATELET # BLD AUTO: 149 THOUSANDS/UL (ref 149–390)
PMV BLD AUTO: 13 FL (ref 8.9–12.7)
POTASSIUM SERPL-SCNC: 4.2 MMOL/L (ref 3.5–5.3)
PROT SERPL-MCNC: 6.8 G/DL (ref 6.4–8.4)
RBC # BLD AUTO: 3.87 MILLION/UL (ref 3.81–5.12)
SODIUM SERPL-SCNC: 137 MMOL/L (ref 135–147)
TIBC SERPL-MCNC: 312.2 UG/DL (ref 250–450)
TRANSFERRIN SERPL-MCNC: 223 MG/DL (ref 203–362)
UIBC SERPL-MCNC: 254 UG/DL (ref 155–355)
VIT B12 SERPL-MCNC: 461 PG/ML (ref 180–914)
WBC # BLD AUTO: 7.72 THOUSAND/UL (ref 4.31–10.16)

## 2025-04-02 PROCEDURE — 83036 HEMOGLOBIN GLYCOSYLATED A1C: CPT

## 2025-04-02 PROCEDURE — 82607 VITAMIN B-12: CPT

## 2025-04-02 PROCEDURE — 83550 IRON BINDING TEST: CPT

## 2025-04-02 PROCEDURE — 82728 ASSAY OF FERRITIN: CPT

## 2025-04-02 PROCEDURE — 80053 COMPREHEN METABOLIC PANEL: CPT

## 2025-04-02 PROCEDURE — 83540 ASSAY OF IRON: CPT

## 2025-04-02 PROCEDURE — 85025 COMPLETE CBC W/AUTO DIFF WBC: CPT

## 2025-04-02 PROCEDURE — 36415 COLL VENOUS BLD VENIPUNCTURE: CPT

## 2025-04-03 LAB
EST. AVERAGE GLUCOSE BLD GHB EST-MCNC: 186 MG/DL
HBA1C MFR BLD: 8.1 %

## 2025-04-04 ENCOUNTER — OFFICE VISIT (OUTPATIENT)
Dept: FAMILY MEDICINE CLINIC | Facility: HOSPITAL | Age: 65
End: 2025-04-04
Payer: COMMERCIAL

## 2025-04-04 ENCOUNTER — HOSPITAL ENCOUNTER (OUTPATIENT)
Dept: INFUSION CENTER | Facility: HOSPITAL | Age: 65
End: 2025-04-04
Attending: INTERNAL MEDICINE
Payer: COMMERCIAL

## 2025-04-04 VITALS
SYSTOLIC BLOOD PRESSURE: 110 MMHG | HEART RATE: 92 BPM | OXYGEN SATURATION: 93 % | DIASTOLIC BLOOD PRESSURE: 60 MMHG | WEIGHT: 161 LBS | BODY MASS INDEX: 27.49 KG/M2 | HEIGHT: 64 IN

## 2025-04-04 DIAGNOSIS — Z13.29 SCREENING FOR THYROID DISORDER: ICD-10-CM

## 2025-04-04 DIAGNOSIS — G47.00 INSOMNIA, UNSPECIFIED TYPE: ICD-10-CM

## 2025-04-04 DIAGNOSIS — E61.1 IRON DEFICIENCY: ICD-10-CM

## 2025-04-04 DIAGNOSIS — M54.16 CHRONIC LUMBAR RADICULOPATHY: ICD-10-CM

## 2025-04-04 DIAGNOSIS — Z79.4 TYPE 2 DIABETES MELLITUS WITH DIABETIC NEUROPATHY, WITH LONG-TERM CURRENT USE OF INSULIN (HCC): ICD-10-CM

## 2025-04-04 DIAGNOSIS — E78.5 HYPERLIPIDEMIA ASSOCIATED WITH TYPE 2 DIABETES MELLITUS  (HCC): ICD-10-CM

## 2025-04-04 DIAGNOSIS — E11.40 TYPE 2 DIABETES MELLITUS WITH DIABETIC NEUROPATHY, WITH LONG-TERM CURRENT USE OF INSULIN (HCC): ICD-10-CM

## 2025-04-04 DIAGNOSIS — E53.8 B12 DEFICIENCY: Primary | ICD-10-CM

## 2025-04-04 DIAGNOSIS — E11.69 HYPERLIPIDEMIA ASSOCIATED WITH TYPE 2 DIABETES MELLITUS  (HCC): ICD-10-CM

## 2025-04-04 DIAGNOSIS — Z76.89 ENCOUNTER FOR SUPPORT AND COORDINATION OF TRANSITION OF CARE: Primary | ICD-10-CM

## 2025-04-04 DIAGNOSIS — I10 BENIGN ESSENTIAL HYPERTENSION: ICD-10-CM

## 2025-04-04 PROCEDURE — 99496 TRANSJ CARE MGMT HIGH F2F 7D: CPT | Performed by: STUDENT IN AN ORGANIZED HEALTH CARE EDUCATION/TRAINING PROGRAM

## 2025-04-04 PROCEDURE — 96372 THER/PROPH/DIAG INJ SC/IM: CPT

## 2025-04-04 RX ORDER — CYANOCOBALAMIN 1000 UG/ML
1000 INJECTION, SOLUTION INTRAMUSCULAR; SUBCUTANEOUS ONCE
Status: CANCELLED | OUTPATIENT
Start: 2025-04-18

## 2025-04-04 RX ORDER — CYANOCOBALAMIN 1000 UG/ML
1000 INJECTION, SOLUTION INTRAMUSCULAR; SUBCUTANEOUS ONCE
Status: COMPLETED | OUTPATIENT
Start: 2025-04-04 | End: 2025-04-04

## 2025-04-04 RX ORDER — GLIPIZIDE 10 MG/1
10 TABLET, FILM COATED, EXTENDED RELEASE ORAL DAILY
Qty: 100 TABLET | Refills: 1 | Status: SHIPPED | OUTPATIENT
Start: 2025-04-04

## 2025-04-04 RX ORDER — ZOLPIDEM TARTRATE 5 MG/1
5 TABLET ORAL
Qty: 30 TABLET | Refills: 0 | Status: SHIPPED | OUTPATIENT
Start: 2025-04-16 | End: 2025-04-07 | Stop reason: SDUPTHER

## 2025-04-04 RX ADMIN — CYANOCOBALAMIN 1000 MCG: 1000 INJECTION, SOLUTION INTRAMUSCULAR at 13:14

## 2025-04-04 NOTE — ASSESSMENT & PLAN NOTE
"Prior Authorization Specialty Medication Request    Medication/Dose: MVW vitamin  ICD code (if different than what is on RX):    Previously Tried and Failed:  N/A  Important Lab Values:   Fecal elastase 128.0 (low)  Vitamin A 0.20 (low)  Viitamin D unmeasurable (low)  INR 1.29 high (indicates vitamin K deficieincy)    Rationale:  Please use the following letter for appeal  To Whom it May Concern:    I am writing to formally request a prior authorization of coverage for my patient,  Lawrence Agee, for treatment using MVW soft gels, 2 capsule daily.     Diagnoses: Recurrent pancreatitis, pancreatic insufficiency, Vitamin D deficiency, Vitamin E deficiency, prolonged INR    Children with pancreatic insufficiency or cholestasis cannot absorb the fat soluble vitamins, A,D,E, and K from food. They develop deficiencies in these vitamins, which in turn lead to potential blindess, bone disease/rickets, ataxia and neurodevelopmental issues, and bleeding disorders respectively.  In order to prevent this, they require special, water-soluble forms of the vitamins, of which MVW is one example. This child has documented deficiencies in one or more of these vitamins, so this supplement medically necessary and not supplemental.    2016 [place] Statute 643M3650 Subd. 32. states: \"Medical assistance covers nutritional products needed for nutritional supplementation because solid food or nutrients thereof cannot be properly absorbed by the body\"    I firmly believe that this therapy is clinically appropriate and that my patient would benefit from improved nutrition and quality of life if allowed the opportunity to receive this treatment.       Sincerely,      Bertha Aggarwal MD        Insurance Name: Please see [employer] portal TRX code [id number]  I  Pharmacy Information (if different than what is on RX)  Name: [facility] in [place]              " Upcoming pain mgmt appt after new MRI is done.   Not sure at this time if pump malfunctioned.   Feeling back to normal level of pain.

## 2025-04-04 NOTE — ASSESSMENT & PLAN NOTE
Lab Results   Component Value Date    HGBA1C 8.1 (H) 04/02/2025     Can increase to 10 mg qd. Reorder given.   Labs fasting next visit.   Orders:  •  glipiZIDE (GLUCOTROL XL) 10 mg 24 hr tablet; Take 1 tablet (10 mg total) by mouth daily  •  Hemoglobin A1C; Future  •  Comprehensive metabolic panel; Future  •  Albumin / creatinine urine ratio; Future

## 2025-04-04 NOTE — PROGRESS NOTES
..Aleida Hammond  tolerated treatment well with no complications.      Aleida Hammond is aware of future appt on 5/2 at 10:00.     AVS printed and given to Aleida Hammond:  No (Declined by Aleida Hammond)

## 2025-04-04 NOTE — PROGRESS NOTES
Transition of Care Visit:  Name: Aleida Hammond      : 1960      MRN: [id number]  Encounter Provider: Tara Colon DO  Encounter Date: 2025   Encounter department: [facility] PRIMARY CARE [address]    Assessment & Plan  Encounter for support and coordination of transition of care  TCM done today.        Chronic lumbar radiculopathy  Upcoming pain mgmt appt after new MRI is done.   Not sure at this time if pump malfunctioned.   Feeling back to normal level of pain.        Type 2 diabetes mellitus with diabetic neuropathy, with long-term current use of insulin (HCC)  Lab Results   Component Value Date    HGBA1C 8.1 (H) 2025     Can increase to 10 mg qd. Reorder given.   Labs fasting next visit.   Orders:  •  glipiZIDE (GLUCOTROL XL) 10 mg 24 hr tablet; Take 1 tablet (10 mg total) by mouth daily  •  Hemoglobin A1C; Future  •  Comprehensive metabolic panel; Future  •  Albumin / creatinine urine ratio; Future    Insomnia, unspecified type  PDMP reviewed,  sent in.   Orders:  •  zolpidem (AMBIEN) 5 mg tablet; Take 1 tablet (5 mg total) by mouth daily at bedtime as needed for sleep Do not start before 2025.    Benign essential hypertension  Norvasc daily.        Hyperlipidemia associated with type 2 diabetes mellitus  (HCC)    Lab Results   Component Value Date    HGBA1C 8.1 (H) 2025   On statin 20 mg, and recheck next labs.   Orders:  •  Lipid Panel with Direct LDL reflex; Future    Screening for thyroid disorder  Orders:  •  TSH, 3rd generation; Future    Iron deficiency  Orders:  •  CBC and Platelet; Future         History of Present Illness     Transitional Care Management Review:   Aleida Hammond is a 65 y.o. female here for TCM follow up.     During the TCM phone call patient stated:  TCM Call (since 3/21/2025)     Hospital care reviewed  Records reviewed    Patient was hospitialized at  [facility]    Date of Admission  25    Date of  "discharge  03/28/25    Diagnosis  Chronic lumbar radiculopathy    Disposition  Home    Were the patients medications reviewed and updated  Yes    Current Symptoms  None      TCM Call (since 3/21/2025)     Scheduled for follow up?  Yes    Did you obtain your prescribed medications  Yes    Do you need help managing your prescriptions or medications  No    Is transportation to your appointment needed  No    I have advised the patient to call PCP with any new or worsening symptoms  Marie Velásquez MA - [facility]    Living Arrangements  Alone    Support System  Friends; Family; Children    Are you recieving home care services  No        HPI  • Weakness - Generalized       Pt presents from home via EMS c/o increased generalized weakness and leg pain x1 week. Pt states she has spinal nerve damage and has a morphine pump in place but pain is worse and weakness is new. Denies any recent falls or sickness   2 severe flares 10 days apart.   Had taken 2 tramadol &  one norco due to severity of pain.   Had muscle spasms.     Had f/u with pain mgmt yesterday.   He thought a few things maybe happened.     Had extreme pain into leg and weakness.     Had 2 flares at home months ago - not as severe as this.     Back to normal level of pain since being home.   24 hours of bad.     Review of Systems   Constitutional:  Negative for chills and fever.   Respiratory:  Negative for cough and shortness of breath.    Cardiovascular:  Negative for chest pain, palpitations and leg swelling.   Musculoskeletal:  Positive for arthralgias (chronic b/l leg pain 2/2 back) and back pain.   Neurological:  Positive for dizziness (occas) and light-headedness (occas). Negative for headaches.     Objective   /60   Pulse 92   Ht 5' 4\" (1.626 m)   Wt 73 kg (161 lb)   LMP  (LMP Unknown)   SpO2 93%   BMI 27.64 kg/m²     Physical Exam  Vitals and nursing note reviewed.   Constitutional:       General: She is not in acute distress.     Appearance: " Normal appearance. She is well-developed and normal weight. She is not ill-appearing.   HENT:      Head: Normocephalic and atraumatic.   Eyes:      General: No scleral icterus.        Right eye: No discharge.         Left eye: No discharge.      Conjunctiva/sclera: Conjunctivae normal.   Cardiovascular:      Rate and Rhythm: Normal rate and regular rhythm.      Pulses: Normal pulses.      Heart sounds: Normal heart sounds. No murmur heard.  Pulmonary:      Effort: Pulmonary effort is normal. No respiratory distress.      Breath sounds: Normal breath sounds.   Musculoskeletal:      Cervical back: Normal range of motion and neck supple. No rigidity.      Right lower leg: No edema.      Left lower leg: No edema.   Skin:     General: Skin is warm and dry.      Capillary Refill: Capillary refill takes less than 2 seconds.   Neurological:      Mental Status: She is alert and oriented to person, place, and time.      Gait: Gait normal.   Psychiatric:         Mood and Affect: Mood normal.         Behavior: Behavior normal.         Thought Content: Thought content normal.         Judgment: Judgment normal.         Medications have been reviewed by provider in current encounter

## 2025-04-04 NOTE — ASSESSMENT & PLAN NOTE
PDMP reviewed, April script sent in.   Orders:  •  zolpidem (AMBIEN) 5 mg tablet; Take 1 tablet (5 mg total) by mouth daily at bedtime as needed for sleep Do not start before April 16, 2025.

## 2025-04-04 NOTE — ASSESSMENT & PLAN NOTE
Lab Results   Component Value Date    HGBA1C 8.1 (H) 04/02/2025   On statin 20 mg, and recheck next labs.   Orders:  •  Lipid Panel with Direct LDL reflex; Future

## 2025-04-07 ENCOUNTER — NURSE TRIAGE (OUTPATIENT)
Age: 65
End: 2025-04-07

## 2025-04-07 ENCOUNTER — TELEPHONE (OUTPATIENT)
Age: 65
End: 2025-04-07

## 2025-04-07 DIAGNOSIS — G47.00 INSOMNIA, UNSPECIFIED TYPE: ICD-10-CM

## 2025-04-07 RX ORDER — ZOLPIDEM TARTRATE 5 MG/1
5 TABLET ORAL
Qty: 30 TABLET | Refills: 0 | Status: SHIPPED | OUTPATIENT
Start: 2025-04-16

## 2025-04-07 NOTE — QUICK NOTE
Investigation Report    Device investigated: [employer] SCS and [employer] Pain pump       SCS Model:                    3660                                                                      8637-20   Leads info:                     3186 x2                                                                                      n/a           Method investigated   imaging report  vendor contacted     EPIC   website used     Time spent investigating in minutes: 15+    Investigation findings: conditional    Risk vs Benefit performed.  If so, list physician and outcome: N/A

## 2025-04-07 NOTE — TELEPHONE ENCOUNTER
----- Message from Jacqueline MCGRAW sent at 4/7/2025  9:47 AM EDT -----  Pt called to have her prescriptions for zolpidem (AMBIEN) 5 mg tablet [ sent to her new pharmacy of [facility] - [place] - [address] [facility]. It was sent to [facility] by mistake

## 2025-04-07 NOTE — TELEPHONE ENCOUNTER
Prescription sent to [facility] by mistake. Please re send to [facility].

## 2025-04-17 ENCOUNTER — HOSPITAL ENCOUNTER (OUTPATIENT)
Dept: INFUSION CENTER | Facility: HOSPITAL | Age: 65
Discharge: HOME | End: 2025-04-17
Attending: INTERNAL MEDICINE
Payer: COMMERCIAL

## 2025-04-17 VITALS — HEART RATE: 80 BPM | SYSTOLIC BLOOD PRESSURE: 114 MMHG | DIASTOLIC BLOOD PRESSURE: 71 MMHG

## 2025-04-17 DIAGNOSIS — E53.8 B12 DEFICIENCY: Primary | ICD-10-CM

## 2025-04-17 PROCEDURE — 96372 THER/PROPH/DIAG INJ SC/IM: CPT

## 2025-04-17 RX ORDER — CYANOCOBALAMIN 1000 UG/ML
1000 INJECTION, SOLUTION INTRAMUSCULAR; SUBCUTANEOUS ONCE
Status: CANCELLED | OUTPATIENT
Start: 2025-05-02

## 2025-04-17 RX ORDER — CYANOCOBALAMIN 1000 UG/ML
1000 INJECTION, SOLUTION INTRAMUSCULAR; SUBCUTANEOUS ONCE
Status: COMPLETED | OUTPATIENT
Start: 2025-04-17 | End: 2025-04-17

## 2025-04-17 RX ADMIN — CYANOCOBALAMIN 1000 MCG: 1000 INJECTION, SOLUTION INTRAMUSCULAR at 09:33

## 2025-04-17 NOTE — TELEPHONE ENCOUNTER
Approve      Rpt labs/OV 7/2019 Pt received via cart, on room air, nasal canula applied. Eye patch to Left eye in place. IV R hand 20g in place with fluids infusing. Able to arouse but resting. Monitor and pulse ox applied, vital signs stable. Updated patients daughter on completion of procedure and she is waiting in 1st floor waiting area.

## 2025-04-17 NOTE — PROGRESS NOTES
Aleida Hammnod  tolerated treatment well with no complications.      Aleida Hammond is aware of future appt on 5/2 at 1000.     AVS printed and given to Aleida Hammond:    No (Declined by Aleida Hammond)

## 2025-04-18 ENCOUNTER — HOSPITAL ENCOUNTER (OUTPATIENT)
Dept: INFUSION CENTER | Facility: HOSPITAL | Age: 65
End: 2025-04-18
Attending: INTERNAL MEDICINE

## 2025-04-21 DIAGNOSIS — Z79.4 TYPE 2 DIABETES MELLITUS WITH DIABETIC NEUROPATHY, WITH LONG-TERM CURRENT USE OF INSULIN (HCC): ICD-10-CM

## 2025-04-21 DIAGNOSIS — G47.00 INSOMNIA, UNSPECIFIED TYPE: ICD-10-CM

## 2025-04-21 DIAGNOSIS — E11.40 TYPE 2 DIABETES MELLITUS WITH DIABETIC NEUROPATHY, WITH LONG-TERM CURRENT USE OF INSULIN (HCC): ICD-10-CM

## 2025-04-21 RX ORDER — GLIPIZIDE 10 MG/1
10 TABLET, FILM COATED, EXTENDED RELEASE ORAL DAILY
Qty: 100 TABLET | Refills: 1 | Status: CANCELLED | OUTPATIENT
Start: 2025-04-21

## 2025-04-21 NOTE — TELEPHONE ENCOUNTER
Patient needs a refill.  Patient would like the refill to go to [facility] instead of [facility] in [place]

## 2025-04-22 ENCOUNTER — HOSPITAL ENCOUNTER (OUTPATIENT)
Dept: MRI IMAGING | Facility: HOSPITAL | Age: 65
Discharge: HOME | End: 2025-04-22
Payer: COMMERCIAL

## 2025-04-22 DIAGNOSIS — M54.16 RADICULOPATHY, LUMBAR REGION: ICD-10-CM

## 2025-04-22 PROCEDURE — 76014 MR SFTY IMPLT&/FB ASMT STF 1: CPT

## 2025-04-22 PROCEDURE — 72148 MRI LUMBAR SPINE W/O DYE: CPT

## 2025-04-22 RX ORDER — GLIPIZIDE 10 MG/1
10 TABLET, FILM COATED, EXTENDED RELEASE ORAL DAILY
Qty: 100 TABLET | Refills: 1 | Status: SHIPPED | OUTPATIENT
Start: 2025-04-22

## 2025-04-22 NOTE — LETTER
[facility]  [address] [address]      April 29, 2025    MRN: [id number]     Phone: [phone]     Dear Ms. Hammond,    You recently had a(n) MRI performed on 4/22/2025 at  [facility] that was requested by Pierre Cole DO. The study was reviewed by a radiologist, which is a physician who specializes in medical imaging. The radiologist issued a report describing his or her findings. In that report there was a finding that the radiologist felt warranted further discussion with your health care provider and that discussion would be beneficial to you.      The results were sent to Pierre Cole DO on 04/23/2025  9:39 AM. We recommend that you contact Pierre Cole DO at [phone] or set up an appointment to discuss the results of the imaging test. If you have already heard from Pierre Cole DO regarding the results of your study, you can disregard this letter.     This letter is not meant to alarm you, but intended to encourage you to follow-up on your results with the provider that sent you for the imaging study. In addition, we have enclosed answers to frequently asked questions by other patients who have also received a letter to review results with their health care provider (see page two).      Thank you for choosing [facility] for your medical imaging needs.                                                                                                                                                        FREQUENTLY ASKED QUESTIONS    Why am I receiving this letter?  [place] State Law requires us to notify patients who have findings on imaging exams that may require more testing or follow-up with a health professional within the next 3 months.        How serious is the finding on the imaging test?  This letter is sent to all patients who may need follow-up or more testing within the  next 3 months.  Receiving this letter does not necessarily mean you have a life-threatening imaging finding or disease.  Recommendations in the radiologist’s imaging report are general in nature and it is up to your healthcare provider to say whether those recommendations make sense for your situation.  You are strongly encouraged to talk to your health care provider about the results and ask whether additional steps need to be taken.    Where can I get a copy of the final report for my recent radiology exam?  To get a full copy of the report you can access your records online at [email or web address] or please contact [facility] Medical Records Department at [phone] Monday through Friday between 8 am and 6 pm.         What do I need to do now?           Please contact your health care provider who requested the imaging study to discuss what further actions (if any) are needed.  You may have already heard from (your ordering provider) in regard to this test in which case you can disregard this letter.        NOTICE IN ACCORDANCE WITH THE [place] STATE “PATIENT TEST RESULT INFORMATION ACT OF 2018”    You are receiving this notice as a result of a determination by your diagnostic imaging service that further discussions of your test results are warranted and would be beneficial to you.    The complete results of your test or tests have been or will be sent to the health care practitioner that ordered the test or tests. It is recommended that you contact your health care practitioner to discuss your results as soon as possible.

## 2025-04-23 RX ORDER — ZOLPIDEM TARTRATE 5 MG/1
5 TABLET ORAL
Qty: 30 TABLET | Refills: 0 | Status: SHIPPED | OUTPATIENT
Start: 2025-04-23

## 2025-04-24 ENCOUNTER — TELEPHONE (OUTPATIENT)
Dept: FAMILY MEDICINE CLINIC | Facility: HOSPITAL | Age: 65
End: 2025-04-24

## 2025-04-24 NOTE — TELEPHONE ENCOUNTER
PA for zolpidem (AMBIEN) 5 mg tablet SUBMITTED to [facility]    via    []CMM-KEY:   [x]Surescripts-Case ID # [id number]   []Availity-Auth ID # NDC #   []Faxed to plan   []Other website   []Phone call Case ID #     [x]PA sent as URGENT    All office notes, labs and other pertaining documents and studies sent. Clinical questions answered. Awaiting determination from insurance company.     Turnaround time for your insurance to make a decision on your Prior Authorization can take 7-21 business days.

## 2025-04-25 ENCOUNTER — NURSE TRIAGE (OUTPATIENT)
Age: 65
End: 2025-04-25

## 2025-04-25 NOTE — TELEPHONE ENCOUNTER
----- Message from Jacqueline MCGRAW sent at 4/25/2025  8:49 AM EDT -----  Pt called with low blood sugar yesterday of 44, she felt tired and was given crackers but advised to call  right away. . Pt has normal range today but called for appt, nothing until Monday. Unsuccessful warm transfer to Nurse line.

## 2025-04-25 NOTE — TELEPHONE ENCOUNTER
"FOLLOW UP: Discuss with PCP and callback by nurse today    REASON FOR CONVERSATION: Hypoglycemia    SYMPTOMS: Patient called, had a BG of 44 yesterday, felt tired, weak, sweating, and heart pounding out of chest. Ate crackers, blood glucose returned to normal and symptoms subsided. Patient takes Glipizide and Metformin, patient wants to know if she should stop taking or decrease Glipizide. Fasting blood sugars are usually in the 80s. Fasting blood sugar this morning 89. Did not take Glipizide this morning, only took Metformin. Please review and advise.    OTHER: N/A    DISPOSITION: Discuss With PCP and Callback by Nurse Today      Reason for Disposition   Morning (before breakfast) blood glucose < 80 mg/dL (4.4 mmol/L) and more than once in past week    Answer Assessment - Initial Assessment Questions  1. SYMPTOMS: \"What symptoms are you concerned about?\"      Felt tired, sweating, weak yesterday with BG 44  2. ONSET:  \"When did the symptoms start?\"      See above   3. BLOOD GLUCOSE: \"What is your blood glucose level?\"       Before breakfast and Metformin BG 89  4. USUAL RANGE: \"What is your blood glucose level usually?\" (e.g., usual fasting morning value, usual evening value)      80s fasting  5. TYPE 1 or 2:  \"Do you know what type of diabetes you have?\"  (e.g., Type 1, Type 2, Gestational; doesn't know)       Type 2  6. INSULIN: \"Do you take insulin?\" \"What type of insulin(s) do you use? What is the mode of delivery? (syringe, pen; injection or pump) \"When did you last give yourself an insulin dose?\" (i.e., time or hours/minutes ago) \"How much did you give?\" (i.e., how many units)      N/A  7. DIABETES PILLS: \"Do you take any pills for your diabetes?\" If Yes, ask: \"What is the name of the medicine(s) that you take for high blood sugar?\"      Metformin, Glipizide  8. OTHER SYMPTOMS: \"Do you have any symptoms?\" (e.g., fever, frequent urination, difficulty breathing, vomiting)      None  9. LOW BLOOD GLUCOSE " "TREATMENT: \"What have you done so far to treat the low blood glucose level?\"      Ate crackers  10. FOOD: \"When did you last eat or drink?\"        N/A  11. ALONE: \"Are you alone right now or is someone with you?\"         N/A  12. PREGNANCY: \"Is there any chance you are pregnant?\" \"When was your last menstrual period?\"            Protocols used: Diabetes - Low Blood Sugar-Adult-[place]    "

## 2025-04-25 NOTE — TELEPHONE ENCOUNTER
PA for zolpidem (AMBIEN) 5 mg tablet APPROVED     Date(s) approved 01/01/25-12/31/25    Case # [id number]    Patient advised by          [][employer] Message  [x]Phone call   [x]LMOM  []L/M to call office as no active Communication consent on file  []Unable to leave detailed message as VM not approved on Communication consent       Pharmacy advised by    [x]Fax  []Phone call  []Secure Chat    Approval letter scanned into Media Yes

## 2025-05-02 ENCOUNTER — HOSPITAL ENCOUNTER (OUTPATIENT)
Dept: INFUSION CENTER | Facility: HOSPITAL | Age: 65
End: 2025-05-02
Attending: INTERNAL MEDICINE
Payer: COMMERCIAL

## 2025-05-02 VITALS
HEART RATE: 77 BPM | OXYGEN SATURATION: 95 % | SYSTOLIC BLOOD PRESSURE: 119 MMHG | DIASTOLIC BLOOD PRESSURE: 82 MMHG | RESPIRATION RATE: 16 BRPM | TEMPERATURE: 97.3 F

## 2025-05-02 DIAGNOSIS — E53.8 B12 DEFICIENCY: Primary | ICD-10-CM

## 2025-05-02 PROCEDURE — 96372 THER/PROPH/DIAG INJ SC/IM: CPT

## 2025-05-02 RX ORDER — CYANOCOBALAMIN 1000 UG/ML
1000 INJECTION, SOLUTION INTRAMUSCULAR; SUBCUTANEOUS ONCE
OUTPATIENT
Start: 2025-05-16

## 2025-05-02 RX ORDER — CYANOCOBALAMIN 1000 UG/ML
1000 INJECTION, SOLUTION INTRAMUSCULAR; SUBCUTANEOUS ONCE
Status: COMPLETED | OUTPATIENT
Start: 2025-05-02 | End: 2025-05-02

## 2025-05-02 RX ADMIN — CYANOCOBALAMIN 1000 MCG: 1000 INJECTION, SOLUTION INTRAMUSCULAR at 10:02

## 2025-05-02 NOTE — PROGRESS NOTES
..Aleida Hammond  tolerated treatment well with no complications.      Aleida Hammond is aware of future appt on 5/16 at 3:00.     AVS printed and given to Aleida Hammond:  No (Declined by Aleida Hammond)

## 2025-05-06 DIAGNOSIS — J45.40 MODERATE PERSISTENT ASTHMA WITHOUT COMPLICATION: ICD-10-CM

## 2025-05-06 DIAGNOSIS — J01.00 ACUTE NON-RECURRENT MAXILLARY SINUSITIS: ICD-10-CM

## 2025-05-07 RX ORDER — FLUTICASONE PROPIONATE AND SALMETEROL 250; 50 UG/1; UG/1
1 POWDER RESPIRATORY (INHALATION) 2 TIMES DAILY
Qty: 180 BLISTER | Refills: 1 | Status: SHIPPED | OUTPATIENT
Start: 2025-05-07 | End: 2026-05-02

## 2025-05-07 RX ORDER — MOMETASONE FUROATE MONOHYDRATE 50 UG/1
SPRAY, METERED NASAL
Qty: 51 G | Refills: 1 | Status: SHIPPED | OUTPATIENT
Start: 2025-05-07

## 2025-05-08 DIAGNOSIS — I10 BENIGN ESSENTIAL HYPERTENSION: ICD-10-CM

## 2025-05-09 RX ORDER — AMLODIPINE BESYLATE 10 MG/1
10 TABLET ORAL DAILY
Qty: 90 TABLET | Refills: 1 | Status: SHIPPED | OUTPATIENT
Start: 2025-05-09

## 2025-05-15 DIAGNOSIS — E11.9 TYPE 2 DIABETES MELLITUS WITHOUT COMPLICATION, WITHOUT LONG-TERM CURRENT USE OF INSULIN (HCC): ICD-10-CM

## 2025-05-16 ENCOUNTER — HOSPITAL ENCOUNTER (OUTPATIENT)
Dept: INFUSION CENTER | Facility: HOSPITAL | Age: 65
End: 2025-05-16
Attending: INTERNAL MEDICINE
Payer: COMMERCIAL

## 2025-05-16 DIAGNOSIS — E53.8 B12 DEFICIENCY: Primary | ICD-10-CM

## 2025-05-16 RX ORDER — CYANOCOBALAMIN 1000 UG/ML
1000 INJECTION, SOLUTION INTRAMUSCULAR; SUBCUTANEOUS ONCE
OUTPATIENT
Start: 2025-05-30

## 2025-05-16 RX ORDER — CYANOCOBALAMIN 1000 UG/ML
1000 INJECTION, SOLUTION INTRAMUSCULAR; SUBCUTANEOUS ONCE
Status: COMPLETED | OUTPATIENT
Start: 2025-05-16 | End: 2025-05-16

## 2025-05-16 RX ADMIN — CYANOCOBALAMIN 1000 MCG: 1000 INJECTION, SOLUTION INTRAMUSCULAR at 15:04

## 2025-05-16 NOTE — PROGRESS NOTES
Aleida Hammond  tolerated treatment well with no complications.      Aleida Hammond is aware of future appt on 5/30 at 0930.     AVS printed and given to Aleida Hammond:    No (Declined by Aleida Hammond)

## 2025-05-21 ENCOUNTER — NURSE TRIAGE (OUTPATIENT)
Age: 65
End: 2025-05-21

## 2025-05-21 ENCOUNTER — TELEPHONE (OUTPATIENT)
Age: 65
End: 2025-05-21

## 2025-05-21 DIAGNOSIS — G47.00 INSOMNIA, UNSPECIFIED TYPE: ICD-10-CM

## 2025-05-21 DIAGNOSIS — E11.9 TYPE 2 DIABETES MELLITUS WITHOUT COMPLICATION, WITHOUT LONG-TERM CURRENT USE OF INSULIN (HCC): ICD-10-CM

## 2025-05-21 NOTE — TELEPHONE ENCOUNTER
Pt calling in because she switched pharmacies and is now having scripts filled at [facility] but the medications are still being sent to [facility].    Please be advised and transfer all scripts to [facility] - [place] - [address] [facility].

## 2025-05-21 NOTE — TELEPHONE ENCOUNTER
"FOLLOW UP: Please review and further advise. Appears prescription was already dispensed for 90 day supply by [facility] on 5/15/25.     REASON FOR CONVERSATION: Medication Problem    SYMPTOMS: n/a    OTHER: n/a    DISPOSITION: Callback by PCP Today      Reason for Disposition   Caller has NON-URGENT medicine question about med that PCP or specialist prescribed and triager unable to answer question    Answer Assessment - Initial Assessment Questions  1. NAME of MEDICINE: \"What medicine(s) are you calling about?\"      Metformin  2. QUESTION: \"What is your question?\" (e.g., double dose of medicine, side effect)      Sent to [facility]. Pt requests script be forwarded to [facility]  3. PRESCRIBER: \"Who prescribed the medicine?\" Reason: if prescribed by specialist, call should be referred to that group.      PCP    Protocols used: Medication Question Call-Adult-[place]    "

## 2025-05-21 NOTE — TELEPHONE ENCOUNTER
Medication: zolpidem (AMBIEN)    Dose/Frequency: 5 mg tablet; Take 1 tablet (5 mg total) by mouth daily at bedtime as needed for sleep     Quantity: 30    Pharmacy: [facility] - [place] - [address] [place] [address].     Office:   [x] PCP/Provider -   [] Speciality/Provider -     Does the patient have enough for 3 days?   [x] Yes   [] No - Send as HP to POD

## 2025-05-21 NOTE — TELEPHONE ENCOUNTER
Regarding: switch script to different pharmacy  ----- Message from Bev STAFFORD sent at 5/21/2025 12:24 PM EDT -----  Pt had pharmacies switched and her Metformin script was sent to [facility] and needs it to be sent over to [facility] - [place] - [address] [facility].    Please advise.

## 2025-05-21 NOTE — TELEPHONE ENCOUNTER
Qued up for Dr Colon.  for [facility] kevin to cancel her metformin script.

## 2025-05-22 RX ORDER — ZOLPIDEM TARTRATE 5 MG/1
5 TABLET ORAL
Qty: 30 TABLET | Refills: 1 | Status: SHIPPED | OUTPATIENT
Start: 2025-05-22

## 2025-05-24 ENCOUNTER — HOSPITAL ENCOUNTER (EMERGENCY)
Facility: HOSPITAL | Age: 65
Discharge: HOME | End: 2025-05-24
Attending: EMERGENCY MEDICINE | Admitting: EMERGENCY MEDICINE
Payer: COMMERCIAL

## 2025-05-24 VITALS
DIASTOLIC BLOOD PRESSURE: 88 MMHG | HEIGHT: 64 IN | TEMPERATURE: 98.8 F | WEIGHT: 160 LBS | HEART RATE: 96 BPM | SYSTOLIC BLOOD PRESSURE: 180 MMHG | BODY MASS INDEX: 27.31 KG/M2 | RESPIRATION RATE: 18 BRPM

## 2025-05-24 DIAGNOSIS — M79.2 NEUROPATHIC PAIN: Primary | ICD-10-CM

## 2025-05-24 LAB
ALBUMIN SERPL BCG-MCNC: 4.3 G/DL (ref 3.5–5)
ALP SERPL-CCNC: 86 U/L (ref 34–104)
ALT SERPL W P-5'-P-CCNC: 63 U/L (ref 7–52)
ANION GAP SERPL CALCULATED.3IONS-SCNC: 11 MMOL/L (ref 4–13)
AST SERPL W P-5'-P-CCNC: 43 U/L (ref 13–39)
BASOPHILS # BLD AUTO: 0.03 THOUSANDS/ÂΜL (ref 0–0.1)
BASOPHILS NFR BLD AUTO: 0 % (ref 0–1)
BILIRUB SERPL-MCNC: 0.81 MG/DL (ref 0.2–1)
BUN SERPL-MCNC: 28 MG/DL (ref 5–25)
CALCIUM SERPL-MCNC: 9.6 MG/DL (ref 8.4–10.2)
CHLORIDE SERPL-SCNC: 105 MMOL/L (ref 96–108)
CO2 SERPL-SCNC: 23 MMOL/L (ref 21–32)
CREAT SERPL-MCNC: 1.21 MG/DL (ref 0.6–1.3)
EOSINOPHIL # BLD AUTO: 0.06 THOUSAND/ÂΜL (ref 0–0.61)
EOSINOPHIL NFR BLD AUTO: 1 % (ref 0–6)
ERYTHROCYTE [DISTWIDTH] IN BLOOD BY AUTOMATED COUNT: 13.6 % (ref 11.6–15.1)
GFR SERPL CREATININE-BSD FRML MDRD: 47 ML/MIN/1.73SQ M
GLUCOSE SERPL-MCNC: 153 MG/DL (ref 65–140)
HCT VFR BLD AUTO: 42.2 % (ref 34.8–46.1)
HGB BLD-MCNC: 13.1 G/DL (ref 11.5–15.4)
IMM GRANULOCYTES # BLD AUTO: 0.1 THOUSAND/UL (ref 0–0.2)
IMM GRANULOCYTES NFR BLD AUTO: 1 % (ref 0–2)
LYMPHOCYTES # BLD AUTO: 1.83 THOUSANDS/ÂΜL (ref 0.6–4.47)
LYMPHOCYTES NFR BLD AUTO: 15 % (ref 14–44)
MCH RBC QN AUTO: 27.6 PG (ref 26.8–34.3)
MCHC RBC AUTO-ENTMCNC: 31 G/DL (ref 31.4–37.4)
MCV RBC AUTO: 89 FL (ref 82–98)
MONOCYTES # BLD AUTO: 0.61 THOUSAND/ÂΜL (ref 0.17–1.22)
MONOCYTES NFR BLD AUTO: 5 % (ref 4–12)
NEUTROPHILS # BLD AUTO: 9.72 THOUSANDS/ÂΜL (ref 1.85–7.62)
NEUTS SEG NFR BLD AUTO: 78 % (ref 43–75)
NRBC BLD AUTO-RTO: 0 /100 WBCS
PLATELET # BLD AUTO: 180 THOUSANDS/UL (ref 149–390)
PMV BLD AUTO: 12.6 FL (ref 8.9–12.7)
POTASSIUM SERPL-SCNC: 4.1 MMOL/L (ref 3.5–5.3)
PROT SERPL-MCNC: 7.6 G/DL (ref 6.4–8.4)
RBC # BLD AUTO: 4.75 MILLION/UL (ref 3.81–5.12)
SODIUM SERPL-SCNC: 139 MMOL/L (ref 135–147)
WBC # BLD AUTO: 12.35 THOUSAND/UL (ref 4.31–10.16)

## 2025-05-24 PROCEDURE — 96375 TX/PRO/DX INJ NEW DRUG ADDON: CPT

## 2025-05-24 PROCEDURE — 85025 COMPLETE CBC W/AUTO DIFF WBC: CPT | Performed by: EMERGENCY MEDICINE

## 2025-05-24 PROCEDURE — 99285 EMERGENCY DEPT VISIT HI MDM: CPT | Performed by: EMERGENCY MEDICINE

## 2025-05-24 PROCEDURE — 96365 THER/PROPH/DIAG IV INF INIT: CPT

## 2025-05-24 PROCEDURE — 36415 COLL VENOUS BLD VENIPUNCTURE: CPT | Performed by: EMERGENCY MEDICINE

## 2025-05-24 PROCEDURE — 96376 TX/PRO/DX INJ SAME DRUG ADON: CPT

## 2025-05-24 PROCEDURE — 80053 COMPREHEN METABOLIC PANEL: CPT | Performed by: EMERGENCY MEDICINE

## 2025-05-24 PROCEDURE — 99284 EMERGENCY DEPT VISIT MOD MDM: CPT

## 2025-05-24 RX ORDER — HYDROMORPHONE HCL/PF 1 MG/ML
0.6 SYRINGE (ML) INJECTION ONCE
Refills: 0 | Status: COMPLETED | OUTPATIENT
Start: 2025-05-24 | End: 2025-05-24

## 2025-05-24 RX ORDER — HYDROMORPHONE HYDROCHLORIDE 2 MG/1
1 TABLET ORAL EVERY 4 HOURS PRN
Qty: 10 TABLET | Refills: 0 | Status: SHIPPED | OUTPATIENT
Start: 2025-05-24 | End: 2025-05-24

## 2025-05-24 RX ORDER — HYDROMORPHONE HCL/PF 1 MG/ML
0.6 SYRINGE (ML) INJECTION ONCE
Status: COMPLETED | OUTPATIENT
Start: 2025-05-24 | End: 2025-05-24

## 2025-05-24 RX ORDER — HYDROMORPHONE HYDROCHLORIDE 2 MG/1
1 TABLET ORAL EVERY 4 HOURS PRN
Qty: 10 TABLET | Refills: 0 | Status: SHIPPED | OUTPATIENT
Start: 2025-05-24 | End: 2025-06-03

## 2025-05-24 RX ORDER — ACETAMINOPHEN 10 MG/ML
1000 INJECTION, SOLUTION INTRAVENOUS ONCE
Status: COMPLETED | OUTPATIENT
Start: 2025-05-24 | End: 2025-05-24

## 2025-05-24 RX ADMIN — HYDROMORPHONE HYDROCHLORIDE 0.6 MG: 1 INJECTION, SOLUTION INTRAMUSCULAR; INTRAVENOUS; SUBCUTANEOUS at 17:00

## 2025-05-24 RX ADMIN — ACETAMINOPHEN 1000 MG: 10 INJECTION INTRAVENOUS at 15:58

## 2025-05-24 RX ADMIN — HYDROMORPHONE HYDROCHLORIDE 0.6 MG: 1 INJECTION, SOLUTION INTRAMUSCULAR; INTRAVENOUS; SUBCUTANEOUS at 15:54

## 2025-05-24 NOTE — ED PROVIDER NOTES
Time reflects when diagnosis was documented in both MDM as applicable and the Disposition within this note       Time User Action Codes Description Comment    5/24/2025  4:41 PM Talat Walden Add [M79.2] Neuropathic pain     5/24/2025  4:41 PM Talat Walden Modify [M79.2] Neuropathic pain bilateral lower legs acute exacerbation          ED Disposition       ED Disposition   Discharge    Condition   Stable    Date/Time   Sat May 24, 2025  4:54 PM    Comment   Aleidajunior Hammond discharge to home/self care.                   Assessment & Plan       Medical Decision Making  Diff includes exacerbation chronic pain/ neuropathy legs, malfunctioning morphine pump, less likely cauda equina, vitamin depletion    Reviewed with  acute pain provider dr. Son - recommended pca pump no continuous rate, 0.2mg/ 6 min lockout, up to 2mg per hour) or give her low dose PO script.  Patient had improvement with IV dilaudid. Stable for discharge    Amount and/or Complexity of Data Reviewed  Labs: ordered.    Risk  Prescription drug management.        ED Course as of 05/24/25 2241   Sat May 24, 2025   1536 [facility] [place]       Medications   HYDROmorphone (DILAUDID) injection 0.6 mg (0.6 mg Intravenous Given 5/24/25 1554)   acetaminophen (Ofirmev) injection 1,000 mg (0 mg Intravenous Stopped 5/24/25 1700)   HYDROmorphone (DILAUDID) injection 0.6 mg (0.6 mg Intravenous Given 5/24/25 1700)       ED Risk Strat Scores                    No data recorded                            History of Present Illness       Chief Complaint   Patient presents with    Leg Pain     EMS stated that pt is from home. Pt believes that her pain pump isnt working. Pt has an increase in pain in bilateral leg below the knee which is chronic. Pt has spinal never damage        Past Medical History[1]   Past Surgical History[2]   Family History[3]   Social History[4]   E-Cigarette/Vaping    E-Cigarette Use Never User       E-Cigarette/Vaping  Substances    Nicotine No     THC No     CBD No     Flavoring No     Other No     Unknown No       I have reviewed and agree with the history as documented.     Hx from patient and paramedics - yrn from home where she lives with family with concern for bilateral lower leg neuropathic pain severe this morning - she thinks the morphine pump has stopped working.  She tried 2 tablets of her hydrocodone 5/325 without relief.  She had the pump placed June 2021 in [facility].  She gets the pump filled every 60 days, last fill April 1.  She had back surgery 1994 laminectomy.        Review of Systems   Constitutional:  Negative for chills and fever.   HENT:  Negative for rhinorrhea and sore throat.    Respiratory:  Negative for shortness of breath.    Cardiovascular:  Negative for chest pain.   Gastrointestinal:  Negative for constipation, diarrhea, nausea and vomiting.   Genitourinary:  Negative for dysuria and frequency.   Skin:  Negative for rash.   All other systems reviewed and are negative.          Objective       ED Triage Vitals   Temperature Pulse Blood Pressure Respirations SpO2 Patient Position - Orthostatic VS   05/24/25 1452 05/24/25 1617 05/24/25 1452 05/24/25 1452 -- 05/24/25 1617   98.9 °F (37.2 °C) 96 (!) 183/101 18  Lying      Temp Source Heart Rate Source BP Location FiO2 (%) Pain Score    05/24/25 1452 05/24/25 1452 05/24/25 1617 -- 05/24/25 1554    Oral Monitor Left arm  10 - Worst Possible Pain      Vitals      Date and Time Temp Pulse SpO2 Resp BP Pain Score FACES Pain Rating User   05/24/25 1617 98.8 °F (37.1 °C) 96 -- 18 180/88 -- -- MP   05/24/25 1554 -- -- -- -- -- 10 - Worst Possible Pain -- BY   05/24/25 1452 98.9 °F (37.2 °C) -- -- 18 183/101 -- -- LD            Physical Exam  Vitals and nursing note reviewed.   Constitutional:       General: She is in acute distress.      Appearance: She is well-developed.   HENT:      Head: Normocephalic and atraumatic.      Right Ear: External ear  normal.      Left Ear: External ear normal.      Nose: Nose normal.     Eyes:      Conjunctiva/sclera: Conjunctivae normal.      Pupils: Pupils are equal, round, and reactive to light.       Cardiovascular:      Rate and Rhythm: Normal rate and regular rhythm.      Heart sounds: Normal heart sounds.   Pulmonary:      Effort: Pulmonary effort is normal. No respiratory distress.      Breath sounds: Normal breath sounds. No wheezing.   Abdominal:      General: Bowel sounds are normal. There is no distension.      Palpations: Abdomen is soft.      Tenderness: There is no abdominal tenderness.      Comments: Left lower abdomen morphine pump site appears clean dry intact, nontender     Musculoskeletal:         General: No deformity. Normal range of motion.      Cervical back: Normal range of motion and neck supple. No bony tenderness. No spinous process tenderness.      Thoracic back: No bony tenderness.      Lumbar back: No bony tenderness. Negative right straight leg raise test and negative left straight leg raise test.      Right lower leg: No swelling or tenderness.      Left lower leg: No swelling or tenderness.      Right foot: Normal capillary refill. Normal pulse.      Left foot: Normal capillary refill. Normal pulse.     Skin:     General: Skin is warm and dry.      Findings: No rash.     Neurological:      General: No focal deficit present.      Mental Status: She is alert and oriented to person, place, and time.      GCS: GCS eye subscore is 4. GCS verbal subscore is 5. GCS motor subscore is 6.      Sensory: Sensation is intact. No sensory deficit.      Motor: Motor function is intact. No weakness.     Psychiatric:         Mood and Affect: Mood normal.         Results Reviewed       Procedure Component Value Units Date/Time    Comprehensive metabolic panel [[id number]]  (Abnormal) Collected: 05/24/25 1556    Lab Status: Final result Specimen: Blood from Arm, Left Updated: 05/24/25 1617     Sodium 139 mmol/L       Potassium 4.1 mmol/L      Chloride 105 mmol/L      CO2 23 mmol/L      ANION GAP 11 mmol/L      BUN 28 mg/dL      Creatinine 1.21 mg/dL      Glucose 153 mg/dL      Calcium 9.6 mg/dL      AST 43 U/L      ALT 63 U/L      Alkaline Phosphatase 86 U/L      Total Protein 7.6 g/dL      Albumin 4.3 g/dL      Total Bilirubin 0.81 mg/dL      eGFR 47 ml/min/1.73sq m     Narrative:      National Kidney Disease Foundation guidelines for Chronic Kidney Disease (CKD):     Stage 1 with normal or high GFR (GFR > 90 mL/min/1.73 square meters)    Stage 2 Mild CKD (GFR = 60-89 mL/min/1.73 square meters)    Stage 3A Moderate CKD (GFR = 45-59 mL/min/1.73 square meters)    Stage 3B Moderate CKD (GFR = 30-44 mL/min/1.73 square meters)    Stage 4 Severe CKD (GFR = 15-29 mL/min/1.73 square meters)    Stage 5 End Stage CKD (GFR <15 mL/min/1.73 square meters)  Note: GFR calculation is accurate only with a steady state creatinine    CBC and differential [[id number]]  (Abnormal) Collected: 05/24/25 1556    Lab Status: Final result Specimen: Blood from Arm, Left Updated: 05/24/25 1603     WBC 12.35 Thousand/uL      RBC 4.75 Million/uL      Hemoglobin 13.1 g/dL      Hematocrit 42.2 %      MCV 89 fL      MCH 27.6 pg      MCHC 31.0 g/dL      RDW 13.6 %      MPV 12.6 fL      Platelets 180 Thousands/uL      nRBC 0 /100 WBCs      Segmented % 78 %      Immature Grans % 1 %      Lymphocytes % 15 %      Monocytes % 5 %      Eosinophils Relative 1 %      Basophils Relative 0 %      Absolute Neutrophils 9.72 Thousands/µL      Absolute Immature Grans 0.10 Thousand/uL      Absolute Lymphocytes 1.83 Thousands/µL      Absolute Monocytes 0.61 Thousand/µL      Eosinophils Absolute 0.06 Thousand/µL      Basophils Absolute 0.03 Thousands/µL                   No orders to display       Procedures    ED Medication and Procedure Management   Prior to Admission Medications   Prescriptions Last Dose Informant Patient Reported? Taking?   Fluticasone-Salmeterol (Advair)  250-50 mcg/dose inhaler   No No   Sig: Inhale 1 puff 2 (two) times a day Rinse mouth after use.   HYDROcodone-acetaminophen (NORCO) 5-325 mg per tablet  Self Yes No   Sig: take 1 to 2 tablets by mouth every 6 hours as needed   Morphine Sulfate (MORPHINE 0.05 MG/ML SYRINGE, NICU/PICU,, CMPD ORDER,)  Self Yes No   albuterol (2.5 mg/3 mL) 0.083 % nebulizer solution  Self No No   Sig: Take 3 mL (2.5 mg total) by nebulization every 6 (six) hours as needed for wheezing or shortness of breath   albuterol (PROVENTIL HFA,VENTOLIN HFA) 90 mcg/act inhaler  Self No No   Sig: USE 2 INHALATIONS ORALLY EVERY 6 HOURS AS NEEDED FORWHEEZING   amLODIPine (NORVASC) 10 mg tablet   No No   Sig: TAKE 1 TABLET BY MOUTH EVERY DAY   aspirin (ECOTRIN LOW STRENGTH) 81 mg EC tablet  Self Yes No   Sig: Take 81 mg by mouth daily   glipiZIDE (GLUCOTROL XL) 10 mg 24 hr tablet   No No   Sig: Take 1 tablet (10 mg total) by mouth daily   lidocaine (XYLOCAINE) 5 % ointment  Self Yes No   Sig: apply topically to affected area three times a day   metFORMIN (GLUCOPHAGE) 1000 MG tablet   No No   Sig: Take 1 tablet (1,000 mg total) by mouth 2 (two) times a day with meals   mometasone (NASONEX) 50 mcg/act nasal spray   No No   Sig: SPRAY 2 SPRAYS INTO EACH NOSTRIL EVERY DAY   montelukast (SINGULAIR) 10 mg tablet   No No   Sig: Take 1 tablet (10 mg total) by mouth daily at bedtime   omeprazole (PriLOSEC) 40 MG capsule   No No   Sig: TAKE 1 CAPSULE BY MOUTH TWICE A DAY   pregabalin (LYRICA) 200 MG capsule  Self Yes No   Sig: Take 200 mg by mouth 3 (three) times a day   rosuvastatin (CRESTOR) 20 MG tablet  Self No No   Sig: Take 1 tablet (20 mg total) by mouth daily   tretinoin (Retin-A) 0.05 % cream   No No   Sig: Apply topically daily at bedtime   zolpidem (AMBIEN) 5 mg tablet   No No   Sig: Take 1 tablet (5 mg total) by mouth daily at bedtime as needed for sleep      Facility-Administered Medications: None     Discharge Medication List as of 5/24/2025  5:04  PM        START taking these medications    Details   HYDROmorphone (DILAUDID) 2 mg tablet Take 0.5 tablets (1 mg total) by mouth every 4 (four) hours as needed for severe pain for up to 10 days Max Daily Amount: 6 mg, Starting Sat 5/24/2025, Until Tue 6/3/2025 at 2359, Normal           CONTINUE these medications which have NOT CHANGED    Details   albuterol (2.5 mg/3 mL) 0.083 % nebulizer solution Take 3 mL (2.5 mg total) by nebulization every 6 (six) hours as needed for wheezing or shortness of breath, Starting Mon 11/11/2024, Normal      albuterol (PROVENTIL HFA,VENTOLIN HFA) 90 mcg/act inhaler USE 2 INHALATIONS ORALLY EVERY 6 HOURS AS NEEDED FORWHEEZING, Normal      amLODIPine (NORVASC) 10 mg tablet TAKE 1 TABLET BY MOUTH EVERY DAY, Starting Fri 5/9/2025, Normal      aspirin (ECOTRIN LOW STRENGTH) 81 mg EC tablet Take 81 mg by mouth daily, Historical Med      Fluticasone-Salmeterol (Advair) 250-50 mcg/dose inhaler Inhale 1 puff 2 (two) times a day Rinse mouth after use., Starting Wed 5/7/2025, Until Sat 5/2/2026, Normal      glipiZIDE (GLUCOTROL XL) 10 mg 24 hr tablet Take 1 tablet (10 mg total) by mouth daily, Starting Tue 4/22/2025, Normal      HYDROcodone-acetaminophen (NORCO) 5-325 mg per tablet take 1 to 2 tablets by mouth every 6 hours as needed, Historical Med      lidocaine (XYLOCAINE) 5 % ointment apply topically to affected area three times a day, Historical Med      metFORMIN (GLUCOPHAGE) 1000 MG tablet Take 1 tablet (1,000 mg total) by mouth 2 (two) times a day with meals, Starting Thu 5/22/2025, Normal      mometasone (NASONEX) 50 mcg/act nasal spray SPRAY 2 SPRAYS INTO EACH NOSTRIL EVERY DAY, Normal      montelukast (SINGULAIR) 10 mg tablet Take 1 tablet (10 mg total) by mouth daily at bedtime, Starting Wed 3/26/2025, Until Mon 9/22/2025, Normal      Morphine Sulfate (MORPHINE 0.05 MG/ML SYRINGE, NICU/PICU,, CMPD ORDER,) Starting Tue 3/1/2022, Historical Med      omeprazole (PriLOSEC) 40 MG capsule  TAKE 1 CAPSULE BY MOUTH TWICE A DAY, Starting Mon 3/31/2025, Normal      pregabalin (LYRICA) 200 MG capsule Take 200 mg by mouth 3 (three) times a day, Starting Thu 2024, Historical Med      rosuvastatin (CRESTOR) 20 MG tablet Take 1 tablet (20 mg total) by mouth daily, Starting Wed 2024, Normal      tretinoin (Retin-A) 0.05 % cream Apply topically daily at bedtime, Starting Mon 3/10/2025, Normal      zolpidem (AMBIEN) 5 mg tablet Take 1 tablet (5 mg total) by mouth daily at bedtime as needed for sleep, Starting Thu 2025, Normal             ED SEPSIS DOCUMENTATION   Time reflects when diagnosis was documented in both MDM as applicable and the Disposition within this note       Time User Action Codes Description Comment    2025  4:41 PM Talat Walden Add [M79.2] Neuropathic pain     2025  4:41 PM Talat Walden Modify [M79.2] Neuropathic pain bilateral lower legs acute exacerbation                   [1]   Past Medical History:  Diagnosis Date    PABLO (acute kidney injury) (HCC) 2021    Allergic     Anemia     Anesthesia complication     Aspiration pneumonia after     Anxiety     Arthritis     Asthma     Uses Albuterol daily    Breast lump     Resolved: 2017     Chronic kidney disease 10/2021    PABLO    Chronic pain     back    Chronic pain disorder     Back, legs    Colon cancer (HCC)     Diabetes mellitus (HCC)     Diverticulitis of colon     Dizziness     Duodenal ulcer     GERD (gastroesophageal reflux disease)     Hepatitis     Hepatitis A 2017    History of stomach ulcers     HL (hearing loss)     Estimated    Hyperlipidemia     Hypertension     Hypotension 2021    Infectious viral hepatitis     Hepatitiss A    Lung nodule 10/2023    Memory loss     Estimated    Pneumonia     aspiration pneumonia    PONV (postoperative nausea and vomiting)     Stomach problems    [2]   Past Surgical History:  Procedure Laterality Date    ABDOMINAL SURGERY  2012    Gastric  bypass    APPENDECTOMY      BACK SURGERY      BARIATRIC SURGERY  About 15 years ago    CATARACT EXTRACTION, BILATERAL       SECTION      CHOLECYSTECTOMY      COLON SURGERY  2024    [facility]    COLONOSCOPY  2016    EYE SURGERY  2023    Cataracts    GASTRECTOMY N/A 2024    Procedure: COMPLETION GASTRECTOMY;  Surgeon: Karlene Cruz MD;  Location: [facility];  Service: Surgical Oncology    GASTRIC BYPASS      GASTRIC BYPASS      INFUSION PUMP IMPLANTATION      LAPAROTOMY N/A 2024    Procedure: DUODENAL EXPLORATION;  Surgeon: Karlene Cruz MD;  Location: [facility];  Service: Surgical Oncology    [place] NDSC WRST SURG W/RLS TRANSVRS CARPL LIGM Right 2022    Procedure: Right endoscopic carpal tunnel release;  Surgeon: Papo Peguero MD;  Location: [facility];  Service: Orthopedics    SHOULDER SURGERY      SMALL INTESTINE SURGERY N/A 2024    Procedure: INTRAOPERATIVE ULTRASOUND;  Surgeon: Karlene Cruz MD;  Location: [facility];  Service: Surgical Oncology    SPINAL CORD STIMULATOR IMPLANT      SPINE SURGERY      Multiple    UPPER GASTROINTESTINAL ENDOSCOPY      Multiple for bleeding ulcer    WHIPPLE PROCEDURE/PANCREATICO-DUODENECTOMY N/A 2024    Procedure: WHIPPLE;  Surgeon: Karlene Cruz MD;  Location: [facility];  Service: Surgical Oncology   [3]   Family History  Problem Relation Name Age of Onset    Diabetes Mother Melina Hammond         Mellitus    Hyperlipidemia Mother Melina Hammond     Hypertension Mother Melina Hammond     Colon cancer Mother Melina Hammond 78    Pancreatic cancer Mother Melina Hammond 84    Melanoma Mother Melina Hammond 76    Learning disabilities Mother Melina Hammond     Cancer Mother Melina Hammond         Colon and pancreas    Cancer Father Phong Hammond 78        Lung, prostrate and bladder    Alcohol abuse Father Phong Hammond     Lung cancer Father Phong Hammond 78    Prostate cancer Father Phong Hammond 78    No Known  Problems Daughter      No Known Problems Maternal Grandmother      No Known Problems Maternal Grandfather      Stomach cancer Paternal Grandmother Melina Mooneywander         60's    Lung cancer Paternal Grandfather Talat Hammond 76        Lung    Diabetes Brother Jovani Stephany         Mellitus    Hyperlipidemia Brother Jovani Stephany     Hypertension Brother Jovani Stephany     Learning disabilities Brother Jovani Stephany     Diabetes Brother      No Known Problems Brother      No Known Problems Son      No Known Problems Son      Breast cancer Maternal Aunt Ivonne white 32        Unknown age    No Known Problems Paternal Aunt      No Known Problems Paternal Aunt      No Known Problems Paternal Aunt      No Known Problems Paternal Aunt      Arthritis Family      Mental illness Neg Hx     [4]   Social History  Tobacco Use    Smoking status: Former     Current packs/day: 0.00     Average packs/day: 1 pack/day for 34.0 years (34.0 ttl pk-yrs)     Types: Cigarettes     Start date: 1973     Quit date: 2007     Years since quittin.4    Smokeless tobacco: Never    Tobacco comments:     15yrs ago   Vaping Use    Vaping status: Never Used   Substance Use Topics    Alcohol use: Never    Drug use: No        Talat Walden DO  25 [phone]

## 2025-05-30 ENCOUNTER — PATIENT OUTREACH (OUTPATIENT)
Dept: CASE MANAGEMENT | Facility: OTHER | Age: 65
End: 2025-05-30

## 2025-05-30 ENCOUNTER — HOSPITAL ENCOUNTER (OUTPATIENT)
Dept: INFUSION CENTER | Facility: HOSPITAL | Age: 65
Discharge: HOME | End: 2025-05-30
Attending: INTERNAL MEDICINE
Payer: COMMERCIAL

## 2025-05-30 DIAGNOSIS — E53.8 B12 DEFICIENCY: Primary | ICD-10-CM

## 2025-05-30 PROCEDURE — 96372 THER/PROPH/DIAG INJ SC/IM: CPT

## 2025-05-30 RX ORDER — CYANOCOBALAMIN 1000 UG/ML
1000 INJECTION, SOLUTION INTRAMUSCULAR; SUBCUTANEOUS ONCE
Status: COMPLETED | OUTPATIENT
Start: 2025-05-30 | End: 2025-05-30

## 2025-05-30 RX ORDER — CYANOCOBALAMIN 1000 UG/ML
1000 INJECTION, SOLUTION INTRAMUSCULAR; SUBCUTANEOUS ONCE
Status: CANCELLED | OUTPATIENT
Start: 2025-06-13

## 2025-05-30 RX ADMIN — CYANOCOBALAMIN 1000 MCG: 1000 INJECTION, SOLUTION INTRAMUSCULAR at 09:29

## 2025-05-30 NOTE — PROGRESS NOTES
Aleida Hammond  tolerated treatment well with no complications.      Aleida Hammond is aware of future appt on 6/13 at 1030.     AVS printed and given to Aleida Hammond   No (Declined by Aleida Hammond)

## 2025-05-30 NOTE — PROGRESS NOTES
"Outpatient Care Management note- CM referral, direct provider referral from ED visit    Chart review completed    Patient was seen in [facility] ED on 5/24/25 regarding chronic LE pain and her pain pump not working.     Call initiated to the patient, introduced self and explained RN CM role.     Stephany reports she has a pain management provider in [facility], Dr. Cole. She has been established with him for 23 years. She has had her pain pump for 5 years. He manages her prescriptions for Hydrocodone and Lyrica. Stephany states the pump stops working after activity such as lifting. She states something with the movement triggers it to be ineffective for 24-36 hours and it causes her to go into withdrawal. This started in December 2024 and the pump has stopped working about 6 times since then. She has communicated the issue with Dr. Cole and Stephany states he \"just throws his hands up with it; he doesn't know what is happening.\" She has called the pump  and they suggest Dr. Cole do a dye test, x ray or interrogate the log/system but Stephany reports Dr. Cole refuses to do any of it. She states Dr. Cole has looked at her logs and everything is \"fine; not showing anything abnormal.\" She has reached out to [facility] to a pain management provider for a second opinion. She spoke with them today and needs to have her records sent and her MRI. That MD will review and call back to see if they can schedule her. She expressed frustration with this situation and has researched a lot different causes such as the port to the pump not registering, a clogged catheter or a general default in the pump. She reports she is scheduled with Dr. Cole on 6/3/25 and will try again to discuss then. Discussed a possible new pump but insurance coverage would need to be checked.     Patient has no further RN CM need at this time. Gave name and number if follow up is needed. Will close the referral at this time.       "

## 2025-06-12 ENCOUNTER — TELEMEDICINE (OUTPATIENT)
Dept: FAMILY MEDICINE CLINIC | Facility: HOSPITAL | Age: 65
End: 2025-06-12
Payer: COMMERCIAL

## 2025-06-12 ENCOUNTER — TELEPHONE (OUTPATIENT)
Age: 65
End: 2025-06-12

## 2025-06-12 VITALS — DIASTOLIC BLOOD PRESSURE: 92 MMHG | SYSTOLIC BLOOD PRESSURE: 117 MMHG | BODY MASS INDEX: 27.12 KG/M2 | WEIGHT: 158 LBS

## 2025-06-12 DIAGNOSIS — J45.31 MILD PERSISTENT ASTHMA WITH ACUTE EXACERBATION: ICD-10-CM

## 2025-06-12 DIAGNOSIS — K21.9 GERD WITHOUT ESOPHAGITIS: ICD-10-CM

## 2025-06-12 DIAGNOSIS — J20.9 ACUTE BRONCHITIS, UNSPECIFIED ORGANISM: Primary | ICD-10-CM

## 2025-06-12 PROCEDURE — G2211 COMPLEX E/M VISIT ADD ON: HCPCS | Performed by: NURSE PRACTITIONER

## 2025-06-12 PROCEDURE — 99213 OFFICE O/P EST LOW 20 MIN: CPT | Performed by: NURSE PRACTITIONER

## 2025-06-12 RX ORDER — AZITHROMYCIN 250 MG/1
TABLET, FILM COATED ORAL
Qty: 6 TABLET | Refills: 0 | Status: SHIPPED | OUTPATIENT
Start: 2025-06-12 | End: 2025-06-17

## 2025-06-12 NOTE — PROGRESS NOTES
Virtual Regular Visit  Name: Aleida Hammond      : 1960      MRN: [id number]  Encounter Provider: CHITRA Marie  Encounter Date: 2025   Encounter department: [facility] CARE [address]  :  Assessment & Plan  Acute bronchitis, unspecified organism  HX Asthma, GERD as well as seasonal allergies.   Presents with c/o increased dyspnea with cough/congestion exacerbating her GERD and causing sleep disturbance-states this occurs yearly around this time.  No fever/chills/change in appetite.  Does have seasonal allergies and is adherent to mometasone and Singulair.  Taking her Advair as directed and using her nebulizer q4hrs without improvement.  Adherent to omeprazole for GERD and using mylanta PRN.  BS range  without s/s hypo/hyperglycemia.    -NAD, VSS.  Notes wheezing improved as she just used her nebulizer.  No cough noted on exam.  A/Ox4, CAM negative.  Moist mucous membranes.  -given symptoms will treat for suspected bronchitis with azithromycin.  Continue supportive treatments and consider adding Claritin or Allegra during seasonal allergy triggers.  If no improvement will need in person evaluation.         GERD without esophagitis         Mild persistent asthma with acute exacerbation             History of Present Illness     Per A/P            Review of Systems   Constitutional:  Positive for fatigue. Negative for activity change, appetite change, chills and fever.   HENT:  Positive for congestion and postnasal drip. Negative for ear pain, sinus pain and trouble swallowing.    Eyes: Negative.    Respiratory:  Positive for cough, shortness of breath and wheezing.         Chest congestion   Cardiovascular: Negative.  Negative for chest pain and leg swelling.   Gastrointestinal: Negative.  Negative for constipation and diarrhea.        GERD exacerbated by allergies.    Endocrine: Negative.    Genitourinary: Negative.  Negative for dysuria.   Musculoskeletal: Negative.     Skin: Negative.    Allergic/Immunologic: Negative.    Neurological: Negative.  Negative for dizziness, light-headedness and numbness.   Hematological: Negative.    Psychiatric/Behavioral:  Positive for sleep disturbance.        Objective   /92   Wt 71.7 kg (158 lb)   LMP  (LMP Unknown)   BMI 27.12 kg/m²     Physical Exam  Constitutional:       General: She is awake. She is not in acute distress.     Appearance: Normal appearance. She is not ill-appearing.   HENT:      Head: Normocephalic and atraumatic.      Right Ear: Hearing normal.      Left Ear: Hearing normal.      Nose: Congestion present.      Right Sinus: No maxillary sinus tenderness or frontal sinus tenderness.      Left Sinus: No maxillary sinus tenderness or frontal sinus tenderness.      Mouth/Throat:      Mouth: Mucous membranes are moist.      Pharynx: Posterior oropharyngeal erythema and postnasal drip present. No oropharyngeal exudate.   Pulmonary:      Effort: Pulmonary effort is normal. No respiratory distress.   Abdominal:      General: There is no distension.      Tenderness: There is no abdominal tenderness. There is no right CVA tenderness or left CVA tenderness.     Musculoskeletal:      Cervical back: Normal range of motion.     Skin:     General: Skin is warm and dry.     Neurological:      General: No focal deficit present.      Mental Status: She is alert and oriented to person, place, and time.     Psychiatric:         Attention and Perception: Attention and perception normal.         Mood and Affect: Mood and affect normal.         Speech: Speech normal.         Behavior: Behavior normal. Behavior is cooperative.         Thought Content: Thought content normal.         Cognition and Memory: Cognition and memory normal.         Judgment: Judgment normal.         Administrative Statements   Encounter provider CHITRA Marie    The Patient is located at Home and in the following state in which I hold an active license  [place].    The patient was identified by name and date of birth. Aleida Hammond was informed that this is a telemedicine visit and that the visit is being conducted through the Epic Embedded platform. She agrees to proceed..  My office door was closed. No one else was in the room.  She acknowledged consent and understanding of privacy and security of the video platform. The patient has agreed to participate and understands they can discontinue the visit at any time.    I have spent a total time of 15 minutes in caring for this patient on the day of the visit/encounter including Risks and benefits of tx options, Instructions for management, Patient and family education, Risk factor reductions, Impressions, Documenting in the medical record, Reviewing/placing orders in the medical record (including tests, medications, and/or procedures), and Obtaining or reviewing history  , not including the time spent for establishing the audio/video connection.

## 2025-06-12 NOTE — ASSESSMENT & PLAN NOTE
HX Asthma, GERD as well as seasonal allergies.   Presents with c/o increased dyspnea with cough/congestion exacerbating her GERD and causing sleep disturbance-states this occurs yearly around this time.  No fever/chills/change in appetite.  Does have seasonal allergies and is adherent to mometasone and Singulair.  Taking her Advair as directed and using her nebulizer q4hrs without improvement.  Adherent to omeprazole for GERD and using mylanta PRN.  BS range  without s/s hypo/hyperglycemia.    -NAD, VSS.  Notes wheezing improved as she just used her nebulizer.  No cough noted on exam.  A/Ox4, CAM negative.  Moist mucous membranes.  -given symptoms will treat for suspected bronchitis with azithromycin.  Continue supportive treatments and consider adding Claritin or Allegra during seasonal allergy triggers.  If no improvement will need in person evaluation.

## 2025-06-13 ENCOUNTER — HOSPITAL ENCOUNTER (OUTPATIENT)
Dept: INFUSION CENTER | Facility: HOSPITAL | Age: 65
End: 2025-06-13
Attending: INTERNAL MEDICINE
Payer: COMMERCIAL

## 2025-06-13 DIAGNOSIS — E53.8 B12 DEFICIENCY: Primary | ICD-10-CM

## 2025-06-13 PROCEDURE — 96372 THER/PROPH/DIAG INJ SC/IM: CPT

## 2025-06-13 RX ORDER — CYANOCOBALAMIN 1000 UG/ML
1000 INJECTION, SOLUTION INTRAMUSCULAR; SUBCUTANEOUS ONCE
Status: COMPLETED | OUTPATIENT
Start: 2025-06-13 | End: 2025-06-13

## 2025-06-13 RX ORDER — CYANOCOBALAMIN 1000 UG/ML
1000 INJECTION, SOLUTION INTRAMUSCULAR; SUBCUTANEOUS ONCE
Status: CANCELLED | OUTPATIENT
Start: 2025-06-27

## 2025-06-13 RX ADMIN — CYANOCOBALAMIN 1000 MCG: 1000 INJECTION, SOLUTION INTRAMUSCULAR at 10:21

## 2025-06-13 NOTE — PROGRESS NOTES
Pt tolerated B12 injection without any adverse reaction. Band-aid applied and intact. Pt ambulated with steady gait off unit. Declined AVS     Aware of next appt 06/27/25  @ 1100 am

## 2025-06-16 DIAGNOSIS — G47.00 INSOMNIA, UNSPECIFIED TYPE: ICD-10-CM

## 2025-06-16 RX ORDER — ZOLPIDEM TARTRATE 5 MG/1
5 TABLET ORAL
Qty: 30 TABLET | Refills: 0 | Status: SHIPPED | OUTPATIENT
Start: 2025-06-16

## 2025-06-18 DIAGNOSIS — J45.30 MILD PERSISTENT ASTHMA, UNSPECIFIED WHETHER COMPLICATED: ICD-10-CM

## 2025-06-18 RX ORDER — ALBUTEROL SULFATE 90 UG/1
INHALANT RESPIRATORY (INHALATION)
Qty: 25.5 G | Refills: 1 | Status: SHIPPED | OUTPATIENT
Start: 2025-06-18

## 2025-06-25 ENCOUNTER — HOSPITAL ENCOUNTER (OUTPATIENT)
Dept: RADIOLOGY | Facility: HOSPITAL | Age: 65
Discharge: HOME | End: 2025-06-25
Payer: COMMERCIAL

## 2025-06-25 ENCOUNTER — HOSPITAL ENCOUNTER (OUTPATIENT)
Dept: CT IMAGING | Facility: HOSPITAL | Age: 65
Discharge: HOME | End: 2025-06-25
Attending: PHYSICAL MEDICINE & REHABILITATION
Payer: COMMERCIAL

## 2025-06-25 ENCOUNTER — TELEMEDICINE (OUTPATIENT)
Dept: OTHER | Facility: HOSPITAL | Age: 65
End: 2025-06-25
Payer: COMMERCIAL

## 2025-06-25 VITALS — HEART RATE: 86 BPM | SYSTOLIC BLOOD PRESSURE: 119 MMHG | DIASTOLIC BLOOD PRESSURE: 81 MMHG

## 2025-06-25 DIAGNOSIS — R26.0 ATAXIC GAIT: ICD-10-CM

## 2025-06-25 DIAGNOSIS — J20.9 ACUTE BRONCHITIS, UNSPECIFIED ORGANISM: Primary | ICD-10-CM

## 2025-06-25 DIAGNOSIS — R26.89 OTHER ABNORMALITIES OF GAIT AND MOBILITY: ICD-10-CM

## 2025-06-25 DIAGNOSIS — J20.9 ACUTE BRONCHITIS, UNSPECIFIED ORGANISM: ICD-10-CM

## 2025-06-25 DIAGNOSIS — H81.393 OTHER PERIPHERAL VERTIGO, BILATERAL: ICD-10-CM

## 2025-06-25 PROCEDURE — 70450 CT HEAD/BRAIN W/O DYE: CPT

## 2025-06-25 PROCEDURE — 71046 X-RAY EXAM CHEST 2 VIEWS: CPT

## 2025-06-25 PROCEDURE — 99213 OFFICE O/P EST LOW 20 MIN: CPT | Performed by: NURSE PRACTITIONER

## 2025-06-25 RX ORDER — DOXYCYCLINE HYCLATE 100 MG
100 TABLET ORAL 2 TIMES DAILY
Qty: 14 TABLET | Refills: 0 | Status: SHIPPED | OUTPATIENT
Start: 2025-06-25 | End: 2025-07-02

## 2025-06-25 RX ORDER — PREDNISONE 10 MG/1
TABLET ORAL
Qty: 10 TABLET | Refills: 0 | Status: SHIPPED | OUTPATIENT
Start: 2025-06-25 | End: 2025-06-29

## 2025-06-25 NOTE — PROGRESS NOTES
Virtual Regular Visit  Name: Aleida Hammond      : 1960      MRN: [id number]  Encounter Provider: Your Video Visit Provider  Encounter Date: 2025   Encounter department: VIRTUAL CARE       Verification of patient location:  Patient is located at Home in the following state in which I hold an active license [place] :  Assessment & Plan  Acute bronchitis, unspecified organism    Orders:    XR chest pa and lateral; Future    predniSONE 10 mg tablet; Take 4 tablets (40 mg total) by mouth daily for 1 day, THEN 3 tablets (30 mg total) daily for 1 day, THEN 2 tablets (20 mg total) daily for 1 day, THEN 1 tablet (10 mg total) daily for 1 day.    doxycycline hyclate (VIBRA-TABS) 100 mg tablet; Take 1 tablet (100 mg total) by mouth 2 (two) times a day for 7 days        Encounter provider Your Video Visit Provider    The patient was identified by name and date of birth. Aleida Hammond was informed that this is a telemedicine visit and that the visit is being conducted through the Epic Embedded platform. She agrees to proceed..  My office door was closed. No one else was in the room. She acknowledged consent and understanding of privacy and security of the video platform. The patient has agreed to participate and understands they can discontinue the visit at any time.    Patient is aware this is a billable service.     History obtained from: patient  History of Present Illness     This is a 65 year old female here today for video visit.  She was seen on  via telehealth by PCP and treated for bronchitis.  She was put on Zpack at that time.  She does have a history of asthma. She states the symptoms started with episode of acid reflux the night before she was seen on .  She states she continues to have congestion in her chest.  She states she is not having any chest pain or SOB.  She had has had some wheezing at time.  She denies any weight gain or swelling in her legs beyond baseline. She states she is  eating and drinking okay.  No fevers.  She states her blood sugar has been 120-140 fasting.      Review of Systems   Constitutional:  Positive for fever. Negative for activity change, chills and fatigue.   HENT:  Positive for congestion. Negative for sinus pain.    Respiratory:  Positive for cough. Negative for shortness of breath and wheezing.    Cardiovascular:  Negative for chest pain.   Neurological: Negative.    Psychiatric/Behavioral: Negative.         Objective   /81   Pulse 86   LMP  (LMP Unknown)     Physical Exam  Constitutional:       General: She is not in acute distress.     Appearance: Normal appearance. She is not ill-appearing or toxic-appearing.   HENT:      Head: Normocephalic and atraumatic.      Nose: No congestion or rhinorrhea.   Pulmonary:      Effort: Pulmonary effort is normal. No respiratory distress.      Comments: Slight cough    Neurological:      Mental Status: She is alert and oriented to person, place, and time.     Psychiatric:         Mood and Affect: Mood normal.         Behavior: Behavior normal.         Thought Content: Thought content normal.         Judgment: Judgment normal.         Visit Time  Total Visit Duration: 6 minutes not including the time spent for establishing the audio/video connection.

## 2025-06-25 NOTE — ASSESSMENT & PLAN NOTE
Orders:    XR chest pa and lateral; Future    predniSONE 10 mg tablet; Take 4 tablets (40 mg total) by mouth daily for 1 day, THEN 3 tablets (30 mg total) daily for 1 day, THEN 2 tablets (20 mg total) daily for 1 day, THEN 1 tablet (10 mg total) daily for 1 day.    doxycycline hyclate (VIBRA-TABS) 100 mg tablet; Take 1 tablet (100 mg total) by mouth 2 (two) times a day for 7 days

## 2025-06-25 NOTE — PATIENT INSTRUCTIONS
Will treat with prednisone and doxy.  Take probiotic.  Will get a chest xray.  Follow up with PCP if no improvement.  Go to ER with any worsening symptoms, chest pain or sob.

## 2025-06-26 ENCOUNTER — APPOINTMENT (OUTPATIENT)
Dept: LAB | Facility: HOSPITAL | Age: 65
End: 2025-06-26
Payer: COMMERCIAL

## 2025-06-26 ENCOUNTER — TELEPHONE (OUTPATIENT)
Age: 65
End: 2025-06-26

## 2025-06-26 DIAGNOSIS — Z13.29 SCREENING FOR THYROID DISORDER: ICD-10-CM

## 2025-06-26 DIAGNOSIS — E61.1 IRON DEFICIENCY: ICD-10-CM

## 2025-06-26 DIAGNOSIS — E11.40 TYPE 2 DIABETES MELLITUS WITH DIABETIC NEUROPATHY, WITH LONG-TERM CURRENT USE OF INSULIN (HCC): ICD-10-CM

## 2025-06-26 DIAGNOSIS — E78.5 HYPERLIPIDEMIA ASSOCIATED WITH TYPE 2 DIABETES MELLITUS  (HCC): ICD-10-CM

## 2025-06-26 DIAGNOSIS — E53.8 B12 DEFICIENCY: Primary | ICD-10-CM

## 2025-06-26 DIAGNOSIS — E11.69 HYPERLIPIDEMIA ASSOCIATED WITH TYPE 2 DIABETES MELLITUS  (HCC): ICD-10-CM

## 2025-06-26 DIAGNOSIS — Z79.4 TYPE 2 DIABETES MELLITUS WITH DIABETIC NEUROPATHY, WITH LONG-TERM CURRENT USE OF INSULIN (HCC): ICD-10-CM

## 2025-06-26 LAB
ALBUMIN SERPL BCG-MCNC: 3.7 G/DL (ref 3.5–5)
ALP SERPL-CCNC: 75 U/L (ref 34–104)
ALT SERPL W P-5'-P-CCNC: 36 U/L (ref 7–52)
ANION GAP SERPL CALCULATED.3IONS-SCNC: 12 MMOL/L (ref 4–13)
AST SERPL W P-5'-P-CCNC: 25 U/L (ref 13–39)
BILIRUB SERPL-MCNC: 0.27 MG/DL (ref 0.2–1)
BUN SERPL-MCNC: 35 MG/DL (ref 5–25)
CALCIUM SERPL-MCNC: 9.1 MG/DL (ref 8.4–10.2)
CHLORIDE SERPL-SCNC: 103 MMOL/L (ref 96–108)
CHOLEST SERPL-MCNC: 116 MG/DL (ref ?–200)
CO2 SERPL-SCNC: 25 MMOL/L (ref 21–32)
CREAT SERPL-MCNC: 1.35 MG/DL (ref 0.6–1.3)
ERYTHROCYTE [DISTWIDTH] IN BLOOD BY AUTOMATED COUNT: 14.3 % (ref 11.6–15.1)
EST. AVERAGE GLUCOSE BLD GHB EST-MCNC: 183 MG/DL
GFR SERPL CREATININE-BSD FRML MDRD: 41 ML/MIN/1.73SQ M
GLUCOSE P FAST SERPL-MCNC: 179 MG/DL (ref 65–99)
HBA1C MFR BLD: 8 %
HCT VFR BLD AUTO: 32 % (ref 34.8–46.1)
HDLC SERPL-MCNC: 38 MG/DL
HGB BLD-MCNC: 9.7 G/DL (ref 11.5–15.4)
LDLC SERPL CALC-MCNC: 57 MG/DL (ref 0–100)
MCH RBC QN AUTO: 27.6 PG (ref 26.8–34.3)
MCHC RBC AUTO-ENTMCNC: 30.3 G/DL (ref 31.4–37.4)
MCV RBC AUTO: 91 FL (ref 82–98)
PLATELET # BLD AUTO: 195 THOUSANDS/UL (ref 149–390)
PMV BLD AUTO: 12.9 FL (ref 8.9–12.7)
POTASSIUM SERPL-SCNC: 4.3 MMOL/L (ref 3.5–5.3)
PROT SERPL-MCNC: 6.9 G/DL (ref 6.4–8.4)
RBC # BLD AUTO: 3.52 MILLION/UL (ref 3.81–5.12)
SODIUM SERPL-SCNC: 140 MMOL/L (ref 135–147)
TRIGL SERPL-MCNC: 105 MG/DL (ref ?–150)
TSH SERPL DL<=0.05 MIU/L-ACNC: 1.57 UIU/ML (ref 0.45–4.5)
WBC # BLD AUTO: 12.2 THOUSAND/UL (ref 4.31–10.16)

## 2025-06-26 PROCEDURE — 36415 COLL VENOUS BLD VENIPUNCTURE: CPT

## 2025-06-26 PROCEDURE — 80053 COMPREHEN METABOLIC PANEL: CPT

## 2025-06-26 PROCEDURE — 85027 COMPLETE CBC AUTOMATED: CPT

## 2025-06-26 PROCEDURE — 84443 ASSAY THYROID STIM HORMONE: CPT

## 2025-06-26 PROCEDURE — 83036 HEMOGLOBIN GLYCOSYLATED A1C: CPT

## 2025-06-26 PROCEDURE — 80061 LIPID PANEL: CPT

## 2025-06-26 NOTE — TELEPHONE ENCOUNTER
Returned phone call to patient. I confirmed with her lab orders have been entered. Patient reports she is returning a urine to the lab tomorrow, so will get the remaining lab work tomorrow. She has no additional questions or concerns at this time.

## 2025-06-27 ENCOUNTER — RA CDI HCC (OUTPATIENT)
Dept: OTHER | Facility: HOSPITAL | Age: 65
End: 2025-06-27

## 2025-06-27 ENCOUNTER — APPOINTMENT (OUTPATIENT)
Dept: LAB | Facility: HOSPITAL | Age: 65
End: 2025-06-27
Payer: COMMERCIAL

## 2025-06-27 ENCOUNTER — HOSPITAL ENCOUNTER (OUTPATIENT)
Dept: INFUSION CENTER | Facility: HOSPITAL | Age: 65
End: 2025-06-27
Attending: INTERNAL MEDICINE
Payer: COMMERCIAL

## 2025-06-27 VITALS
RESPIRATION RATE: 16 BRPM | TEMPERATURE: 97.3 F | OXYGEN SATURATION: 97 % | HEART RATE: 82 BPM | SYSTOLIC BLOOD PRESSURE: 131 MMHG | DIASTOLIC BLOOD PRESSURE: 73 MMHG

## 2025-06-27 DIAGNOSIS — E53.8 B12 DEFICIENCY: Primary | ICD-10-CM

## 2025-06-27 DIAGNOSIS — E53.8 B12 DEFICIENCY: ICD-10-CM

## 2025-06-27 DIAGNOSIS — E61.1 IRON DEFICIENCY: ICD-10-CM

## 2025-06-27 LAB
CREAT UR-MCNC: 31.6 MG/DL
FERRITIN SERPL-MCNC: 220 NG/ML (ref 30–307)
IRON SATN MFR SERPL: 21 % (ref 15–50)
IRON SERPL-MCNC: 61 UG/DL (ref 50–212)
MICROALBUMIN UR-MCNC: 28.1 MG/L
MICROALBUMIN/CREAT 24H UR: 89 MG/G CREATININE (ref 0–30)
TIBC SERPL-MCNC: 289.8 UG/DL (ref 250–450)
TRANSFERRIN SERPL-MCNC: 207 MG/DL (ref 203–362)
UIBC SERPL-MCNC: 229 UG/DL (ref 155–355)
VIT B12 SERPL-MCNC: 558 PG/ML (ref 180–914)

## 2025-06-27 PROCEDURE — 83540 ASSAY OF IRON: CPT

## 2025-06-27 PROCEDURE — 36415 COLL VENOUS BLD VENIPUNCTURE: CPT

## 2025-06-27 PROCEDURE — 82043 UR ALBUMIN QUANTITATIVE: CPT

## 2025-06-27 PROCEDURE — 82728 ASSAY OF FERRITIN: CPT

## 2025-06-27 PROCEDURE — 83550 IRON BINDING TEST: CPT

## 2025-06-27 PROCEDURE — 96372 THER/PROPH/DIAG INJ SC/IM: CPT

## 2025-06-27 PROCEDURE — 82607 VITAMIN B-12: CPT

## 2025-06-27 PROCEDURE — 82570 ASSAY OF URINE CREATININE: CPT

## 2025-06-27 RX ORDER — CYANOCOBALAMIN 1000 UG/ML
1000 INJECTION, SOLUTION INTRAMUSCULAR; SUBCUTANEOUS ONCE
OUTPATIENT
Start: 2025-07-11

## 2025-06-27 RX ORDER — CYANOCOBALAMIN 1000 UG/ML
1000 INJECTION, SOLUTION INTRAMUSCULAR; SUBCUTANEOUS ONCE
Status: COMPLETED | OUTPATIENT
Start: 2025-06-27 | End: 2025-06-27

## 2025-06-27 RX ADMIN — CYANOCOBALAMIN 1000 MCG: 1000 INJECTION, SOLUTION INTRAMUSCULAR at 11:04

## 2025-06-27 NOTE — PROGRESS NOTES
Aleida Hammond  tolerated treatment well with no complications.      Aleida Hammond is aware of future appt on 7/11/2025 at 1000.     AVS printed and given to Aleida Hammond:    No (Declined by Aleida Hammond)

## 2025-07-03 ENCOUNTER — OFFICE VISIT (OUTPATIENT)
Age: 65
End: 2025-07-03
Payer: COMMERCIAL

## 2025-07-03 VITALS
TEMPERATURE: 97.8 F | OXYGEN SATURATION: 96 % | WEIGHT: 160 LBS | SYSTOLIC BLOOD PRESSURE: 98 MMHG | BODY MASS INDEX: 27.46 KG/M2 | RESPIRATION RATE: 16 BRPM | HEART RATE: 74 BPM | DIASTOLIC BLOOD PRESSURE: 62 MMHG

## 2025-07-03 DIAGNOSIS — E53.8 B12 DEFICIENCY: Primary | ICD-10-CM

## 2025-07-03 DIAGNOSIS — C17.0 DUODENAL CANCER (HCC): ICD-10-CM

## 2025-07-03 PROCEDURE — 99213 OFFICE O/P EST LOW 20 MIN: CPT | Performed by: NURSE PRACTITIONER

## 2025-07-03 NOTE — PROGRESS NOTES
Name: Aleida Hammond      : 1960      MRN: [id number]  Encounter Provider: CHITRA Mancini  Encounter Date: 7/3/2025   Encounter department: [facility]  :  Assessment & Plan  B12 deficiency  History of iron and B12 deficiency in setting of postsurgical malabsorption in the setting of duodenal resection/gastric bypass.  She has received parenteral iron replacement in the past  She continues on injectable B12 replacement every 2 weeks    Recent blood work without any evidence of recurrent iron deficiency  Serum B12 in acceptable range    Will continue on injectable B12 replacement every 2 weeks without change    Orders:    CBC and differential; Future    Comprehensive metabolic panel; Future    Vitamin B12; Future    Duodenal cancer (HCC)  In 2024 she underwent resection of duodenal mass and pathology was consistent with stage I adenocarcinoma of the duodenum, pT1a pN0 disease. She did not require adjuvant therapy.   Most recent imaging consistent with GENEVA  She follows closely with surgical oncology    Return in about 6 months (around 1/3/2026) for Lauren, bloodwork.    History of Present Illness   Chief Complaint   Patient presents with    Follow-up     Oncology History   Cancer Staging   Duodenal cancer (HCC)  Staging form: Small Intestine - Adenocarcinoma, AJCC 8th Edition  - Pathologic: Stage I (pT1a, pN0, cM0) - Signed by Karlene Cruz MD on 2024  Total positive nodes: 0  Oncology History Overview Note   24 S/P Whipple which showed a small focus of adenocarcinoma arising from a duodenal adenomatous polyp with focal high-grade dysplasia.  Tumor invading the muscularis mucosa, pT1a.  Margins negative.  0 out of 17 lymph nodes were positive.  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 Complete gastrectomy was also performed showing chronic inactive gastritis and prepyloric ulcer.   0/3 lymph nodes positive.     Stage I adenocarcinoma of the duodenum, pT1a pN0 disease.  No role for adjuvant therapy.     Adenocarcinoma of pancreas (HCC) (Resolved)   1/22/2024 Surgery    A. Lymph Node, Portacaval lymph node:  - One lymph node, negative for malignancy (0/1).     B. Pancreas, Whipple:  - Small focus of adenocarcinoma arising in duodenal adenomatous polyp with focal high grade dysplasia.  - Tumor invades muscularis mucosa (lamina propria, pT1a)  - Ampulla involved by low grade dysplasia.  - Pancreatic intraepitheliel neoplasia (PanIN), involving pancreatic resection margin.  - All margins are negative for carcinoma or high grade dysplasia.  - Seventeen lymph nodes, negative for malignancy (0/17).      C. Stomach, Completion gastrectomy:  - Stomach with chronic inactive gastritis and prepyloric ulcer.  - Negative for dysplasia or malignancy.  - Three lymph nodes, negative for malignancy (0/3).     2/6/2024 Initial Diagnosis    Adenocarcinoma of pancreas (HCC)     Duodenal cancer (HCC)   2/8/2024 Initial Diagnosis    Adenocarcinoma of duodenum (HCC)     2/8/2024 -  Cancer Staged    Staging form: Small Intestine - Adenocarcinoma, AJCC 8th Edition  - Pathologic: Stage I (pT1a, pN0, cM0) - Signed by Karlene Cruz MD on 2/8/2024  Total positive nodes: 0          Pertinent Medical History     07/03/25: Stephany presents for follow-up for history of iron and B12 deficiency, duodenal adenocarcinoma.  She has had a rough 6 months.  She was hospitalized in March due to generalized malaise and fatigue.  Her implantable morphine pump was not working appropriately and she was having severe exacerbations in her chronic pain symptoms.  She follows closely with her chronic pain specialist and her symptoms have improved  She recently completed a course of antibiotic and steroids for an acute bronchitis that was exacerbated by her GERD.  Most recent blood work was taken at time of infection.  CBC reflects mild  leukocytosis.  Hemoglobin 9.7, MCV 91, platelets 195.  No differential was done  Serum B12 558, iron panel normal     Review of Systems   Constitutional:  Positive for fatigue.   Musculoskeletal:  Positive for arthralgias and myalgias.   All other systems reviewed and are negative.    Medical History Reviewed by provider this encounter:     .      Objective   BP 98/62 (BP Location: Left arm, Patient Position: Sitting, Cuff Size: Standard)   Pulse 74   Temp 97.8 °F (36.6 °C) (Temporal)   Resp 16   Wt 72.6 kg (160 lb)   LMP  (LMP Unknown)   SpO2 96%   BMI 27.46 kg/m²     Pain Screening:  Pain Score:   4  ECOG     Physical Exam  Constitutional:       General: She is not in acute distress.     Appearance: She is not toxic-appearing.   HENT:      Head: Normocephalic and atraumatic.     Eyes:      General: No scleral icterus.      Cardiovascular:      Rate and Rhythm: Normal rate and regular rhythm.   Pulmonary:      Effort: Pulmonary effort is normal.      Breath sounds: Normal breath sounds.   Abdominal:      Palpations: Abdomen is soft.     Musculoskeletal:         General: Normal range of motion.      Cervical back: Normal range of motion.     Skin:     General: Skin is warm and dry.      Coloration: Skin is pale.     Neurological:      General: No focal deficit present.      Mental Status: She is alert and oriented to person, place, and time.     Psychiatric:         Mood and Affect: Mood normal.         Behavior: Behavior normal.         Labs: I have reviewed the following labs:  Lab Results   Component Value Date/Time    WBC 12.20 (H) 06/26/2025 08:02 AM    RBC 3.52 (L) 06/26/2025 08:02 AM    Hemoglobin 9.7 (L) 06/26/2025 08:02 AM    Hematocrit 32.0 (L) 06/26/2025 08:02 AM    MCV 91 06/26/2025 08:02 AM    MCH 27.6 06/26/2025 08:02 AM    RDW 14.3 06/26/2025 08:02 AM    Platelets 195 06/26/2025 08:02 AM    Segmented % 78 (H) 05/24/2025 03:56 PM    Lymphocytes % 15 05/24/2025 03:56 PM    Monocytes % 5  05/24/2025 03:56 PM    Eosinophils Relative 1 05/24/2025 03:56 PM    Basophils Relative 0 05/24/2025 03:56 PM    Immature Grans % 1 05/24/2025 03:56 PM    Absolute Neutrophils 9.72 (H) 05/24/2025 03:56 PM     Lab Results   Component Value Date/Time    Potassium 4.3 06/26/2025 08:02 AM    Chloride 103 06/26/2025 08:02 AM    CO2 25 06/26/2025 08:02 AM    CO2, i-STAT 25 02/17/2025 06:32 PM    BUN 35 (H) 06/26/2025 08:02 AM    Creatinine 1.35 (H) 06/26/2025 08:02 AM    Glucose, i-STAT 462 (HH) 02/17/2025 06:32 PM    Glucose, Fasting 179 (H) 06/26/2025 08:02 AM    Calcium 9.1 06/26/2025 08:02 AM    AST 25 06/26/2025 08:02 AM    ALT 36 06/26/2025 08:02 AM    Alkaline Phosphatase 75 06/26/2025 08:02 AM    Total Protein 6.9 06/26/2025 08:02 AM    Albumin 3.7 06/26/2025 08:02 AM    Total Bilirubin 0.27 06/26/2025 08:02 AM    eGFR 41 06/26/2025 08:02 AM           Administrative Statements   I have spent a total time of 25 minutes in caring for this patient on the day of the visit/encounter including Diagnostic results, Instructions for management, Impressions, Documenting in the medical record, Reviewing/placing orders in the medical record (including tests, medications, and/or procedures), and Obtaining or reviewing history  .

## 2025-07-03 NOTE — ASSESSMENT & PLAN NOTE
In January 2024 she underwent resection of duodenal mass and pathology was consistent with stage I adenocarcinoma of the duodenum, pT1a pN0 disease. She did not require adjuvant therapy.   Most recent imaging consistent with GENEVA  She follows closely with surgical oncology

## 2025-07-03 NOTE — ASSESSMENT & PLAN NOTE
History of iron and B12 deficiency in setting of postsurgical malabsorption in the setting of duodenal resection/gastric bypass.  She has received parenteral iron replacement in the past  She continues on injectable B12 replacement every 2 weeks    Recent blood work without any evidence of recurrent iron deficiency  Serum B12 in acceptable range    Will continue on injectable B12 replacement every 2 weeks without change    Orders:    CBC and differential; Future    Comprehensive metabolic panel; Future    Vitamin B12; Future

## 2025-07-07 ENCOUNTER — OFFICE VISIT (OUTPATIENT)
Dept: FAMILY MEDICINE CLINIC | Facility: HOSPITAL | Age: 65
End: 2025-07-07
Payer: COMMERCIAL

## 2025-07-07 VITALS
BODY MASS INDEX: 27.76 KG/M2 | HEIGHT: 64 IN | WEIGHT: 162.6 LBS | SYSTOLIC BLOOD PRESSURE: 118 MMHG | HEART RATE: 78 BPM | DIASTOLIC BLOOD PRESSURE: 64 MMHG | OXYGEN SATURATION: 96 %

## 2025-07-07 DIAGNOSIS — Z79.4 TYPE 2 DIABETES MELLITUS WITH DIABETIC NEUROPATHY, WITH LONG-TERM CURRENT USE OF INSULIN (HCC): Primary | ICD-10-CM

## 2025-07-07 DIAGNOSIS — K21.9 GERD WITHOUT ESOPHAGITIS: ICD-10-CM

## 2025-07-07 DIAGNOSIS — E78.5 HYPERLIPIDEMIA ASSOCIATED WITH TYPE 2 DIABETES MELLITUS  (HCC): ICD-10-CM

## 2025-07-07 DIAGNOSIS — E11.40 TYPE 2 DIABETES MELLITUS WITH DIABETIC NEUROPATHY, WITH LONG-TERM CURRENT USE OF INSULIN (HCC): Primary | ICD-10-CM

## 2025-07-07 DIAGNOSIS — E53.8 B12 DEFICIENCY: ICD-10-CM

## 2025-07-07 DIAGNOSIS — J45.31 MILD PERSISTENT ASTHMA WITH ACUTE EXACERBATION: ICD-10-CM

## 2025-07-07 DIAGNOSIS — E11.69 HYPERLIPIDEMIA ASSOCIATED WITH TYPE 2 DIABETES MELLITUS  (HCC): ICD-10-CM

## 2025-07-07 DIAGNOSIS — G47.00 INSOMNIA, UNSPECIFIED TYPE: ICD-10-CM

## 2025-07-07 DIAGNOSIS — L89.151 PRESSURE INJURY OF SACRAL REGION, STAGE 1: ICD-10-CM

## 2025-07-07 DIAGNOSIS — M67.442 GANGLION CYST OF FINGER OF LEFT HAND: ICD-10-CM

## 2025-07-07 DIAGNOSIS — I10 BENIGN ESSENTIAL HYPERTENSION: ICD-10-CM

## 2025-07-07 PROCEDURE — G2211 COMPLEX E/M VISIT ADD ON: HCPCS | Performed by: STUDENT IN AN ORGANIZED HEALTH CARE EDUCATION/TRAINING PROGRAM

## 2025-07-07 PROCEDURE — 99215 OFFICE O/P EST HI 40 MIN: CPT | Performed by: STUDENT IN AN ORGANIZED HEALTH CARE EDUCATION/TRAINING PROGRAM

## 2025-07-07 RX ORDER — ZOLPIDEM TARTRATE 5 MG/1
5 TABLET ORAL
Qty: 30 TABLET | Refills: 2 | Status: SHIPPED | OUTPATIENT
Start: 2025-07-11

## 2025-07-11 ENCOUNTER — HOSPITAL ENCOUNTER (OUTPATIENT)
Dept: INFUSION CENTER | Facility: HOSPITAL | Age: 65
End: 2025-07-11
Attending: INTERNAL MEDICINE
Payer: COMMERCIAL

## 2025-07-11 DIAGNOSIS — E53.8 B12 DEFICIENCY: Primary | ICD-10-CM

## 2025-07-11 PROCEDURE — 96372 THER/PROPH/DIAG INJ SC/IM: CPT

## 2025-07-11 RX ORDER — CYANOCOBALAMIN 1000 UG/ML
1000 INJECTION, SOLUTION INTRAMUSCULAR; SUBCUTANEOUS ONCE
OUTPATIENT
Start: 2025-07-25

## 2025-07-11 RX ORDER — CYANOCOBALAMIN 1000 UG/ML
1000 INJECTION, SOLUTION INTRAMUSCULAR; SUBCUTANEOUS ONCE
Status: COMPLETED | OUTPATIENT
Start: 2025-07-11 | End: 2025-07-11

## 2025-07-11 RX ADMIN — CYANOCOBALAMIN 1000 MCG: 1000 INJECTION, SOLUTION INTRAMUSCULAR at 10:04

## 2025-07-11 NOTE — PROGRESS NOTES
..Aleida Hammond  tolerated treatment well with no complications.      Aleida Hammond is aware of future appt on 7/25 at 10:00.     AVS printed and given to Aleida Hammond:  No (Declined by Aleida Hammond)

## 2025-07-23 ENCOUNTER — HOSPITAL ENCOUNTER (OUTPATIENT)
Age: 65
Discharge: HOME | End: 2025-07-23
Attending: INTERNAL MEDICINE
Payer: COMMERCIAL

## 2025-07-23 ENCOUNTER — OFFICE VISIT (OUTPATIENT)
Dept: FAMILY MEDICINE CLINIC | Facility: HOSPITAL | Age: 65
End: 2025-07-23
Payer: COMMERCIAL

## 2025-07-23 ENCOUNTER — HOSPITAL ENCOUNTER (OUTPATIENT)
Age: 65
Discharge: HOME | End: 2025-07-23
Payer: COMMERCIAL

## 2025-07-23 ENCOUNTER — APPOINTMENT (OUTPATIENT)
Dept: LAB | Facility: HOSPITAL | Age: 65
End: 2025-07-23
Payer: COMMERCIAL

## 2025-07-23 VITALS
BODY MASS INDEX: 27.31 KG/M2 | TEMPERATURE: 98.8 F | SYSTOLIC BLOOD PRESSURE: 110 MMHG | HEART RATE: 88 BPM | HEIGHT: 64 IN | WEIGHT: 160 LBS | RESPIRATION RATE: 16 BRPM | DIASTOLIC BLOOD PRESSURE: 58 MMHG | OXYGEN SATURATION: 90 %

## 2025-07-23 DIAGNOSIS — R10.12 LEFT UPPER QUADRANT ABDOMINAL PAIN: Primary | ICD-10-CM

## 2025-07-23 DIAGNOSIS — R10.12 LEFT UPPER QUADRANT ABDOMINAL PAIN: ICD-10-CM

## 2025-07-23 DIAGNOSIS — N18.32 STAGE 3B CHRONIC KIDNEY DISEASE (HCC): ICD-10-CM

## 2025-07-23 PROBLEM — J20.9 ACUTE BRONCHITIS: Status: RESOLVED | Noted: 2025-06-12 | Resolved: 2025-07-23

## 2025-07-23 LAB
ALBUMIN SERPL BCG-MCNC: 3.8 G/DL (ref 3.5–5.2)
ALP SERPL-CCNC: 110 U/L (ref 35–160)
ALT SERPL W P-5'-P-CCNC: 309 U/L (ref 5–33)
ANION GAP SERPL CALCULATED.3IONS-SCNC: 11 MMOL/L (ref 7–16)
AST SERPL W P-5'-P-CCNC: 372 U/L (ref 5–32)
BILIRUB SERPL-MCNC: 0.22 MG/DL (ref 0.2–1.2)
BUN SERPL-MCNC: 23 MG/DL (ref 8–23)
CALCIUM SERPL-MCNC: 9.1 MG/DL (ref 8.6–10.2)
CHLORIDE SERPL-SCNC: 106 MMOL/L (ref 98–107)
CO2 SERPL-SCNC: 23 MMOL/L (ref 22–29)
CREAT SERPL-MCNC: 1.3 MG/DL (ref 0.5–0.9)
GFR SERPL CREATININE-BSD FRML MDRD: 43 ML/MIN/1.73SQ M
GLUCOSE SERPL-MCNC: 256 MG/DL (ref 65–140)
POTASSIUM SERPL-SCNC: 5.2 MMOL/L (ref 3.5–5.1)
PROT SERPL-MCNC: 6.2 G/DL (ref 6.4–8.3)
SODIUM SERPL-SCNC: 140 MMOL/L (ref 136–145)

## 2025-07-23 PROCEDURE — 80053 COMPREHEN METABOLIC PANEL: CPT

## 2025-07-23 PROCEDURE — 36415 COLL VENOUS BLD VENIPUNCTURE: CPT

## 2025-07-23 PROCEDURE — 74177 CT ABD & PELVIS W/CONTRAST: CPT

## 2025-07-23 PROCEDURE — 99214 OFFICE O/P EST MOD 30 MIN: CPT | Performed by: INTERNAL MEDICINE

## 2025-07-23 PROCEDURE — G2211 COMPLEX E/M VISIT ADD ON: HCPCS | Performed by: INTERNAL MEDICINE

## 2025-07-23 RX ORDER — DOCUSATE SODIUM 100 MG/1
100 CAPSULE, LIQUID FILLED ORAL 2 TIMES DAILY
COMMUNITY

## 2025-07-23 RX ADMIN — IOHEXOL 100 ML: 350 INJECTION, SOLUTION INTRAVENOUS at 18:12

## 2025-07-23 NOTE — ASSESSMENT & PLAN NOTE
Lab Results   Component Value Date    EGFR 41 06/26/2025    EGFR 47 05/24/2025    EGFR 43 04/02/2025    CREATININE 1.35 (H) 06/26/2025    CREATININE 1.21 05/24/2025    CREATININE 1.30 04/02/2025     She has stage IIIb CKD.  Renal function was around baseline in June.  She has no urinary complaints today.  I will recheck her renal function before giving her IV contrast

## 2025-07-23 NOTE — PROGRESS NOTES
Name: Aleida Hammond      : 1960      MRN: [id number]  Encounter Provider: Eladio Live MD  Encounter Date: 2025   Encounter department: [facility] [address]  :  Assessment & Plan  Left upper quadrant abdominal pain  Patient presents with a chief complaint of left-sided abdominal pain that is located mainly to the left upper quadrant, left CVA area and the left lower quadrant.  It started last week on Thursday.  She felt constipated, she took Colace on Thursday and Friday.  Constipation resolved and now she has diarrhea.  Along with the pain she also has nausea and last night she was in the brink of vomiting.  Denies fevers, odynophagia, signs of GI bleeding, dysuria, urinary frequency, urgency, hematuria.    She has history of duodenal cancer, she is status post pancreaticoduodenectomy.    Patient was in no distress on room air.  She was not tachycardic.  Her abdomen was soft and left-sided tenderness was present.  She had no distention.  No guarding.  She had no CVA tenderness.  Skin showed no signs of herpes zoster, no subcutaneous ecchymosis.    We reviewed EGD and colonoscopy report from 2025.  Reviewed CBC and CMP results from .    I am concerned whether patient has diverticulitis, pancreatitis.  Will check labs and I ordered a CAT scan of the abdomen with IV contrast.    I asked her to discontinue Colace and to continue taking omeprazole    Orders:  •  CBC and differential; Future  •  Comprehensive metabolic panel; Future  •  Lipase; Future  •  CT abdomen pelvis w contrast; Future    Stage 3b chronic kidney disease (HCC)  Lab Results   Component Value Date    EGFR 41 2025    EGFR 47 2025    EGFR 43 2025    CREATININE 1.35 (H) 2025    CREATININE 1.21 2025    CREATININE 1.30 2025     She has stage IIIb CKD.  Renal function was around baseline in .  She has no urinary complaints today.  I will recheck her renal function  "before giving her IV contrast              History of Present Illness   HPI  Review of Systems    Objective   /58   Pulse 88   Temp 98.8 °F (37.1 °C) (Tympanic)   Resp 16   Ht 5' 4\" (1.626 m)   Wt 72.6 kg (160 lb)   LMP  (LMP Unknown)   SpO2 90%   BMI 27.46 kg/m²      Physical Exam  Constitutional:       General: She is not in acute distress.     Appearance: She is not toxic-appearing.     Cardiovascular:      Rate and Rhythm: Normal rate and regular rhythm.   Pulmonary:      Effort: No respiratory distress.      Breath sounds: No wheezing or rales.   Abdominal:      General: There is no distension.      Palpations: Abdomen is soft. There is no mass.      Tenderness: There is abdominal tenderness. There is no right CVA tenderness, left CVA tenderness or guarding.     Skin:     General: Skin is warm and dry.      Findings: No bruising, erythema or rash.     Neurological:      Mental Status: She is alert.         "

## 2025-07-24 ENCOUNTER — TELEPHONE (OUTPATIENT)
Dept: NEUROLOGY | Facility: CLINIC | Age: 65
End: 2025-07-24

## 2025-07-24 ENCOUNTER — TELEPHONE (OUTPATIENT)
Dept: FAMILY MEDICINE CLINIC | Facility: HOSPITAL | Age: 65
End: 2025-07-24

## 2025-07-24 ENCOUNTER — APPOINTMENT (OUTPATIENT)
Dept: LAB | Facility: HOSPITAL | Age: 65
End: 2025-07-24
Payer: COMMERCIAL

## 2025-07-24 DIAGNOSIS — K75.9 HEPATITIS: Primary | ICD-10-CM

## 2025-07-24 DIAGNOSIS — K75.9 HEPATITIS: ICD-10-CM

## 2025-07-24 LAB
ALBUMIN SERPL BCG-MCNC: 3.5 G/DL (ref 3.5–5)
ALP SERPL-CCNC: 93 U/L (ref 34–104)
ALT SERPL W P-5'-P-CCNC: 243 U/L (ref 7–52)
ANION GAP SERPL CALCULATED.3IONS-SCNC: 8 MMOL/L (ref 4–13)
AST SERPL W P-5'-P-CCNC: 218 U/L (ref 13–39)
BASOPHILS # BLD AUTO: 0.03 THOUSANDS/ÂΜL (ref 0–0.1)
BASOPHILS NFR BLD AUTO: 0 % (ref 0–1)
BILIRUB SERPL-MCNC: 0.3 MG/DL (ref 0.2–1)
BUN SERPL-MCNC: 22 MG/DL (ref 5–25)
CALCIUM SERPL-MCNC: 9.1 MG/DL (ref 8.4–10.2)
CHLORIDE SERPL-SCNC: 107 MMOL/L (ref 96–108)
CO2 SERPL-SCNC: 27 MMOL/L (ref 21–32)
CREAT SERPL-MCNC: 1.07 MG/DL (ref 0.6–1.3)
EOSINOPHIL # BLD AUTO: 0.12 THOUSAND/ÂΜL (ref 0–0.61)
EOSINOPHIL NFR BLD AUTO: 2 % (ref 0–6)
ERYTHROCYTE [DISTWIDTH] IN BLOOD BY AUTOMATED COUNT: 13.7 % (ref 11.6–15.1)
GFR SERPL CREATININE-BSD FRML MDRD: 54 ML/MIN/1.73SQ M
GLUCOSE SERPL-MCNC: 177 MG/DL (ref 65–140)
HAV IGM SER QL: NORMAL
HBV CORE IGM SER QL: NORMAL
HBV SURFACE AG SER QL: NORMAL
HCT VFR BLD AUTO: 32.4 % (ref 34.8–46.1)
HCV AB SER QL: NORMAL
HGB BLD-MCNC: 10.2 G/DL (ref 11.5–15.4)
IMM GRANULOCYTES # BLD AUTO: 0.02 THOUSAND/UL (ref 0–0.2)
IMM GRANULOCYTES NFR BLD AUTO: 0 % (ref 0–2)
LIPASE SERPL-CCNC: 101 U/L (ref 11–82)
LYMPHOCYTES # BLD AUTO: 1.3 THOUSANDS/ÂΜL (ref 0.6–4.47)
LYMPHOCYTES NFR BLD AUTO: 17 % (ref 14–44)
MCH RBC QN AUTO: 28 PG (ref 26.8–34.3)
MCHC RBC AUTO-ENTMCNC: 31.5 G/DL (ref 31.4–37.4)
MCV RBC AUTO: 89 FL (ref 82–98)
MONOCYTES # BLD AUTO: 0.5 THOUSAND/ÂΜL (ref 0.17–1.22)
MONOCYTES NFR BLD AUTO: 7 % (ref 4–12)
NEUTROPHILS # BLD AUTO: 5.49 THOUSANDS/ÂΜL (ref 1.85–7.62)
NEUTS SEG NFR BLD AUTO: 74 % (ref 43–75)
NRBC BLD AUTO-RTO: 0 /100 WBCS
PLATELET # BLD AUTO: 161 THOUSANDS/UL (ref 149–390)
PMV BLD AUTO: 12.1 FL (ref 8.9–12.7)
POTASSIUM SERPL-SCNC: 5.1 MMOL/L (ref 3.5–5.3)
PROT SERPL-MCNC: 6.1 G/DL (ref 6.4–8.4)
RBC # BLD AUTO: 3.64 MILLION/UL (ref 3.81–5.12)
SODIUM SERPL-SCNC: 142 MMOL/L (ref 135–147)
WBC # BLD AUTO: 7.46 THOUSAND/UL (ref 4.31–10.16)

## 2025-07-24 PROCEDURE — 86664 EPSTEIN-BARR NUCLEAR ANTIGEN: CPT

## 2025-07-24 PROCEDURE — 80053 COMPREHEN METABOLIC PANEL: CPT

## 2025-07-24 PROCEDURE — 86665 EPSTEIN-BARR CAPSID VCA: CPT

## 2025-07-24 PROCEDURE — 85025 COMPLETE CBC W/AUTO DIFF WBC: CPT

## 2025-07-24 PROCEDURE — 80074 ACUTE HEPATITIS PANEL: CPT

## 2025-07-24 PROCEDURE — 86645 CMV ANTIBODY IGM: CPT

## 2025-07-24 PROCEDURE — 86644 CMV ANTIBODY: CPT

## 2025-07-24 PROCEDURE — 36415 COLL VENOUS BLD VENIPUNCTURE: CPT

## 2025-07-24 PROCEDURE — 86663 EPSTEIN-BARR ANTIBODY: CPT

## 2025-07-24 PROCEDURE — 83690 ASSAY OF LIPASE: CPT

## 2025-07-24 NOTE — TELEPHONE ENCOUNTER
I called patient this morning and spoke with her on the phone: She had nausea overnight, she took Zofran that relieved it.  She has mild left-sided abdominal pain.    She denied fevers, symptoms of upper or lower respiratory infection, any new rash, recent tick bites.    CBC with differential was not processed yesterday    I ordered acute hepatitis panel serologies for EBV and CMV infection to see why she developed hepatitis since June 26.    I asked her not to take rosuvastatin for now.    She assured me that she does not take extra Tylenol, she rarely takes hydrocodone with acetaminophen.  Doubt Tylenol toxicity

## 2025-07-24 NOTE — TELEPHONE ENCOUNTER
Called patient Dr. Mera will not be in office on the date of their appt. R/S appt. For 12/18/25 8:30am (8:15am) Cordell

## 2025-07-25 ENCOUNTER — HOSPITAL ENCOUNTER (OUTPATIENT)
Dept: INFUSION CENTER | Facility: HOSPITAL | Age: 65
Discharge: HOME | End: 2025-07-25
Attending: INTERNAL MEDICINE

## 2025-07-25 LAB
CMV IGG SERPL QL IA: POSITIVE
CMV IGM SERPL QL IA: NEGATIVE
EBV EA IGG SER QL IA: NEGATIVE
EBV NA IGG SER QL IA: POSITIVE
EBV VCA IGG SER QL IA: POSITIVE
EBV VCA IGM SER QL IA: NEGATIVE

## 2025-07-28 ENCOUNTER — APPOINTMENT (OUTPATIENT)
Dept: LAB | Facility: HOSPITAL | Age: 65
End: 2025-07-28
Payer: COMMERCIAL

## 2025-07-28 ENCOUNTER — HOSPITAL ENCOUNTER (OUTPATIENT)
Dept: INFUSION CENTER | Facility: HOSPITAL | Age: 65
Discharge: HOME | End: 2025-07-28
Attending: INTERNAL MEDICINE

## 2025-07-28 DIAGNOSIS — K75.9 HEPATITIS: ICD-10-CM

## 2025-07-28 LAB
ALBUMIN SERPL BCG-MCNC: 3.5 G/DL (ref 3.5–5)
ALP SERPL-CCNC: 100 U/L (ref 34–104)
ALT SERPL W P-5'-P-CCNC: 152 U/L (ref 7–52)
ANION GAP SERPL CALCULATED.3IONS-SCNC: 8 MMOL/L (ref 4–13)
AST SERPL W P-5'-P-CCNC: 67 U/L (ref 13–39)
BILIRUB SERPL-MCNC: 0.35 MG/DL (ref 0.2–1)
BUN SERPL-MCNC: 18 MG/DL (ref 5–25)
CALCIUM SERPL-MCNC: 9.1 MG/DL (ref 8.4–10.2)
CHLORIDE SERPL-SCNC: 105 MMOL/L (ref 96–108)
CO2 SERPL-SCNC: 26 MMOL/L (ref 21–32)
CREAT SERPL-MCNC: 1.13 MG/DL (ref 0.6–1.3)
GFR SERPL CREATININE-BSD FRML MDRD: 51 ML/MIN/1.73SQ M
GLUCOSE P FAST SERPL-MCNC: 192 MG/DL (ref 65–99)
POTASSIUM SERPL-SCNC: 4.2 MMOL/L (ref 3.5–5.3)
PROT SERPL-MCNC: 6.9 G/DL (ref 6.4–8.4)
SODIUM SERPL-SCNC: 139 MMOL/L (ref 135–147)

## 2025-07-28 PROCEDURE — 80053 COMPREHEN METABOLIC PANEL: CPT

## 2025-07-28 PROCEDURE — 36415 COLL VENOUS BLD VENIPUNCTURE: CPT

## 2025-07-30 ENCOUNTER — TELEPHONE (OUTPATIENT)
Dept: FAMILY MEDICINE CLINIC | Facility: HOSPITAL | Age: 65
End: 2025-07-30

## 2025-07-30 ENCOUNTER — TELEMEDICINE (OUTPATIENT)
Dept: FAMILY MEDICINE CLINIC | Facility: HOSPITAL | Age: 65
End: 2025-07-30
Payer: COMMERCIAL

## 2025-07-30 VITALS — BODY MASS INDEX: 27.31 KG/M2 | WEIGHT: 160 LBS | HEIGHT: 64 IN

## 2025-07-30 DIAGNOSIS — M54.16 CHRONIC LUMBAR RADICULOPATHY: Primary | ICD-10-CM

## 2025-07-30 PROCEDURE — G2211 COMPLEX E/M VISIT ADD ON: HCPCS

## 2025-07-30 PROCEDURE — 99214 OFFICE O/P EST MOD 30 MIN: CPT

## 2025-07-30 RX ORDER — METHYLPREDNISOLONE 4 MG/1
TABLET ORAL
Qty: 21 EACH | Refills: 0 | Status: SHIPPED | OUTPATIENT
Start: 2025-07-30

## 2025-08-08 ENCOUNTER — HOSPITAL ENCOUNTER (OUTPATIENT)
Dept: INFUSION CENTER | Facility: HOSPITAL | Age: 65
End: 2025-08-08
Attending: INTERNAL MEDICINE
Payer: COMMERCIAL

## 2025-08-11 ENCOUNTER — OFFICE VISIT (OUTPATIENT)
Dept: FAMILY MEDICINE CLINIC | Facility: HOSPITAL | Age: 65
End: 2025-08-11
Payer: COMMERCIAL

## 2025-08-19 ENCOUNTER — APPOINTMENT (OUTPATIENT)
Dept: LAB | Facility: HOSPITAL | Age: 65
End: 2025-08-19
Payer: COMMERCIAL

## 2025-08-19 ENCOUNTER — OFFICE VISIT (OUTPATIENT)
Dept: FAMILY MEDICINE CLINIC | Facility: HOSPITAL | Age: 65
End: 2025-08-19
Payer: COMMERCIAL

## 2025-08-19 VITALS
DIASTOLIC BLOOD PRESSURE: 60 MMHG | WEIGHT: 155.8 LBS | OXYGEN SATURATION: 95 % | HEIGHT: 64 IN | HEART RATE: 95 BPM | SYSTOLIC BLOOD PRESSURE: 100 MMHG | TEMPERATURE: 97.5 F | BODY MASS INDEX: 26.6 KG/M2

## 2025-08-19 DIAGNOSIS — R11.2 NAUSEA AND VOMITING, UNSPECIFIED VOMITING TYPE: ICD-10-CM

## 2025-08-19 DIAGNOSIS — R39.9 UTI SYMPTOMS: ICD-10-CM

## 2025-08-19 DIAGNOSIS — R39.9 UTI SYMPTOMS: Primary | ICD-10-CM

## 2025-08-19 LAB
BACTERIA UR QL AUTO: ABNORMAL /HPF
BILIRUB UR QL STRIP: NEGATIVE
CLARITY UR: ABNORMAL
COLOR UR: ABNORMAL
GLUCOSE UR STRIP-MCNC: NEGATIVE MG/DL
HGB UR QL STRIP.AUTO: NEGATIVE
KETONES UR STRIP-MCNC: NEGATIVE MG/DL
LEUKOCYTE ESTERASE UR QL STRIP: ABNORMAL
NITRITE UR QL STRIP: NEGATIVE
NON-SQ EPI CELLS URNS QL MICRO: ABNORMAL /HPF
PH UR STRIP.AUTO: 6 [PH]
PROT UR STRIP-MCNC: ABNORMAL MG/DL
RBC #/AREA URNS AUTO: ABNORMAL /HPF
SP GR UR STRIP.AUTO: 1.01 (ref 1–1.03)
UROBILINOGEN UR STRIP-ACNC: <2 MG/DL
WBC #/AREA URNS AUTO: ABNORMAL /HPF

## 2025-08-19 PROCEDURE — 87086 URINE CULTURE/COLONY COUNT: CPT

## 2025-08-19 PROCEDURE — 81001 URINALYSIS AUTO W/SCOPE: CPT

## 2025-08-19 PROCEDURE — 87077 CULTURE AEROBIC IDENTIFY: CPT

## 2025-08-19 PROCEDURE — 87186 SC STD MICRODIL/AGAR DIL: CPT

## 2025-08-19 PROCEDURE — 99214 OFFICE O/P EST MOD 30 MIN: CPT

## 2025-08-19 PROCEDURE — G2211 COMPLEX E/M VISIT ADD ON: HCPCS

## 2025-08-19 RX ORDER — METOCLOPRAMIDE 5 MG/1
5 TABLET ORAL 4 TIMES DAILY
Qty: 30 TABLET | Refills: 0 | Status: SHIPPED | OUTPATIENT
Start: 2025-08-19

## 2025-08-21 ENCOUNTER — EVALUATION (OUTPATIENT)
Facility: CLINIC | Age: 65
End: 2025-08-21
Payer: COMMERCIAL

## 2025-08-21 DIAGNOSIS — R26.89 IMBALANCE: ICD-10-CM

## 2025-08-21 PROCEDURE — 97110 THERAPEUTIC EXERCISES: CPT

## 2025-08-21 PROCEDURE — 97162 PT EVAL MOD COMPLEX 30 MIN: CPT

## 2025-08-22 ENCOUNTER — TELEPHONE (OUTPATIENT)
Dept: FAMILY MEDICINE CLINIC | Facility: HOSPITAL | Age: 65
End: 2025-08-22

## 2025-08-22 ENCOUNTER — TELEPHONE (OUTPATIENT)
Age: 65
End: 2025-08-22

## 2025-08-22 LAB
BACTERIA UR CULT: ABNORMAL
BACTERIA UR CULT: ABNORMAL

## (undated) DEVICE — SUT ETHILON 3-0 FSLX 30 IN 1673H

## (undated) DEVICE — SUT SILK 3-0 SH 30 IN K832H

## (undated) DEVICE — SURGICEL 4 X 8IN

## (undated) DEVICE — ELECTRODE BLADE MOD E-Z CLEAN 2.5IN 6.4CM -0012M

## (undated) DEVICE — STANDARD SURGICAL GOWN, L: Brand: CONVERTORS

## (undated) DEVICE — SPONGE PVP SCRUB WING STERILE

## (undated) DEVICE — THE ECHELON, ECHELON ENDOPATH™ AND ECHELON FLEX™ FAMILIES OF ENDOSCOPIC LINEAR CUTTERS AND RELOADS ARE STERILE, SINGLE PATIENT USE INSTRUMENTS THAT SIMULTANEOUSLY CUT AND STAPLE TISSUE. THERE ARE SIX STAGGERED ROWS OF STAPLES, THREE ON EITHER SIDE OF THE CUT LINE. THE 45 MM INSTRUMENTS HAVE A STAPLE LINE THATIS APPROXIMATELY 45 MM LONG AND A CUT LINE THAT IS APPROXIMATELY 42 MM LONG. THE SHAFT CAN ROTATE FREELY IN BOTH DIRECTIONS AND AN ARTICULATION MECHANISM ON ARTICULATING INSTRUMENTS ENABLES BENDING THE DISTAL PORTIONOF THE SHAFT TO FACILITATE LATERAL ACCESS OF THE OPERATIVE SITE.THE INSTRUMENTS ARE SHIPPED WITHOUT A RELOAD AND MUST BE LOADED PRIOR TO USE. A STAPLE RETAINING CAP ON THE RELOAD PROTECTS THE STAPLE LEG POINTS DURING SHIPPING AND TRANSPORTATION. THE INSTRUMENTS’ LOCK-OUT FEATURE IS DESIGNED TO PREVENT A USED RELOAD FROM BEING REFIRED.: Brand: ECHELON ENDOPATH

## (undated) DEVICE — SUT PROLENE 4-0 PC-3 18 IN 8634G

## (undated) DEVICE — GAUZE SPONGES,16 PLY: Brand: CURITY

## (undated) DEVICE — POOLE SUCTION HANDLE: Brand: CARDINAL HEALTH

## (undated) DEVICE — NEEDLE 18 G X 1 1/2 SAFETY

## (undated) DEVICE — ENSEAL X1 TISSUE SEALER, CURVED JAW, 25 CM SHAFT LENGTH: Brand: ENSEAL

## (undated) DEVICE — ELECTRODE BLADE MOD  E-Z CLEAN 6.5IN -0014M

## (undated) DEVICE — Device

## (undated) DEVICE — SUT ETHILON 3-0 FS-1 18 IN 663G

## (undated) DEVICE — ADHESIVE SKIN HIGH VISCOSITY EXOFIN 1ML

## (undated) DEVICE — SUTURE BOOTS YELLOW

## (undated) DEVICE — 3M™ TEGADERM™ TRANSPARENT FILM DRESSING FRAME STYLE, 1628, 6 IN X 8 IN (15 CM X 20 CM), 10/CT 8CT/CASE: Brand: 3M™ TEGADERM™

## (undated) DEVICE — INTENDED FOR TISSUE SEPARATION, AND OTHER PROCEDURES THAT REQUIRE A SHARP SURGICAL BLADE TO PUNCTURE OR CUT.: Brand: BARD-PARKER SAFETY BLADES SIZE 10, STERILE

## (undated) DEVICE — VESSEL LOOPS X-RAY DETECTABLE: Brand: DEROYAL

## (undated) DEVICE — PLUMEPEN PRO 10FT

## (undated) DEVICE — SUT SILK 0 30 IN A306H

## (undated) DEVICE — SUT SILK 2-0 18 IN A185H

## (undated) DEVICE — LIGACLIP MCA MULTIPLE CLIP APPLIERS, 20 MEDIUM CLIPS: Brand: LIGACLIP

## (undated) DEVICE — STERILE BETHLEHEM PLASTIC HAND: Brand: CARDINAL HEALTH

## (undated) DEVICE — SYRINGE 10ML LL

## (undated) DEVICE — BETHLEHEM MAJOR GENERAL PACK: Brand: CARDINAL HEALTH

## (undated) DEVICE — 40601 PROLONGED POSITIONING SYSTEM: Brand: 40601 PROLONGED POSITIONING SYSTEM

## (undated) DEVICE — DRESSING MEPILEX BORDER 4 X 10 IN

## (undated) DEVICE — MEDI-VAC YANK SUCT HNDL W/TPRD BULBOUS TIP: Brand: CARDINAL HEALTH

## (undated) DEVICE — RETROGRADE KNIFE BOX OF 6: Brand: ECTRA

## (undated) DEVICE — ANTIBACTERIAL UNDYED BRAIDED (POLYGLACTIN 910), SYNTHETIC ABSORBABLE SUTURE: Brand: COATED VICRYL

## (undated) DEVICE — SUT SILK 3-0 SH CR/8 18 IN C013D

## (undated) DEVICE — DRAPE FLUID WARMER (BIRD BATH)

## (undated) DEVICE — SUT BOOTS

## (undated) DEVICE — SUT PROLENE 4-0 RB-1/RB-1 36 IN 8557H

## (undated) DEVICE — GLOVE INDICATOR PI UNDERGLOVE SZ 8 BLUE

## (undated) DEVICE — CHLORAPREP HI-LITE 26ML ORANGE

## (undated) DEVICE — PENCIL ELECTROSURG E-Z CLEAN -0035H

## (undated) DEVICE — POLYURETHANE FEEDING TUBE RADIOPAQUE: Brand: KANGAROO

## (undated) DEVICE — SUT PROLENE 3-0 SH 36 IN 8522H

## (undated) DEVICE — SUT VICRYL 2-0 D-SPECIAL 27 IN D8114

## (undated) DEVICE — GLOVE SRG BIOGEL 7.5

## (undated) DEVICE — SUT VICRYL 3-0 SH 27 IN J416H

## (undated) DEVICE — INTENDED FOR TISSUE SEPARATION, AND OTHER PROCEDURES THAT REQUIRE A SHARP SURGICAL BLADE TO PUNCTURE OR CUT.: Brand: BARD-PARKER SAFETY BLADES SIZE 15, STERILE

## (undated) DEVICE — HEAVY DUTY TABLE COVER: Brand: CONVERTORS

## (undated) DEVICE — VIOLET BRAIDED (POLYGLACTIN 910), SYNTHETIC ABSORBABLE SUTURE: Brand: COATED VICRYL

## (undated) DEVICE — SUT PDS PLUS 1 CTX 36IN PDP371T

## (undated) DEVICE — THE ECHELON FLEX POWERED PLUS ARTICULATING ENDOSCOPIC LINEAR CUTTERS ARE STERILE, SINGLE PATIENT USE INSTRUMENTS THAT SIMULTANEOUSLYCUT AND STAPLE TISSUE. THERE ARE SIX STAGGERED ROWS OF STAPLES, THREE ON EITHER SIDE OF THE CUT LINE. THE ECHELON FLEX 45 POWERED PLUSINSTRUMENTS HAVE A STAPLE LINE THAT IS APPROXIMATELY 45 MM LONG AND A CUT LINE THAT IS APPROXIMATELY 42 MM LONG. THE SHAFT CAN ROTATE FREELYIN BOTH DIRECTIONS AND AN ARTICULATION MECHANISM ENABLES THE DISTAL PORTION OF THE SHAFT TO PIVOT TO FACILITATE LATERAL ACCESS TO THE OPERATIVESITE.THE INSTRUMENTS ARE PACKAGED WITH A PRIMARY LITHIUM BATTERY PACK THAT MUST BE INSTALLED PRIOR TO USE. THERE ARE SPECIFIC REQUIREMENTS FORDISPOSING OF THE BATTERY PACK. REFER TO THE BATTERY PACK DISPOSAL SECTION.THE INSTRUMENTS ARE PACKAGED WITHOUT A RELOAD AND MUST BE LOADED PRIOR TO USE. A STAPLE RETAINING CAP ON THE RELOAD PROTECTS THE STAPLE LEGPOINTS DURING SHIPPING AND TRANSPORTATION. THE INSTRUMENTS’ LOCK-OUT FEATURE IS DESIGNED TO PREVENT A USED OR IMPROPERLY INSTALLED RELOADFROM BEING REFIRED OR AN INSTRUMENT FROM BEING FIRED WITHOUT A RELOAD.: Brand: ECHELON FLEX

## (undated) DEVICE — PROXIMATE SKIN STAPLERS (35 WIDE) CONTAINS 35 STAINLESS STEEL STAPLES (FIXED HEAD): Brand: PROXIMATE

## (undated) DEVICE — SUT SILK 2-0 SH CR/8 18 IN C012D

## (undated) DEVICE — LIGHTWEIGHT CLIPS AND CLAMPS

## (undated) DEVICE — SUT PROLENE 3-0 SH 30 IN 8832H

## (undated) DEVICE — HEMOSTATIC MATRIX SURGIFLO 8ML W/THROMBIN

## (undated) DEVICE — STERILE COTTON TIPPED APPLICATORS: Brand: PURITAN

## (undated) DEVICE — SUT POLYESTER TAPE D-G 8618-00

## (undated) DEVICE — 3M™ IOBAN™ 2 ANTIMICROBIAL INCISE DRAPE 6650EZ: Brand: IOBAN™ 2

## (undated) DEVICE — TRAY FOLEY 16FR URIMETER SURESTEP

## (undated) DEVICE — GLOVE SRG BIOGEL 6.5

## (undated) DEVICE — SUT SILK 3-0 18 IN A184H